# Patient Record
Sex: FEMALE | Race: WHITE | Employment: OTHER
[De-identification: names, ages, dates, MRNs, and addresses within clinical notes are randomized per-mention and may not be internally consistent; named-entity substitution may affect disease eponyms.]

---

## 2017-01-09 ENCOUNTER — OFFICE VISIT (OUTPATIENT)
Dept: INTERNAL MEDICINE | Facility: CLINIC | Age: 42
End: 2017-01-09

## 2017-01-09 VITALS
RESPIRATION RATE: 16 BRPM | WEIGHT: 223 LBS | HEART RATE: 68 BPM | DIASTOLIC BLOOD PRESSURE: 64 MMHG | SYSTOLIC BLOOD PRESSURE: 120 MMHG | HEIGHT: 64 IN | BODY MASS INDEX: 38.07 KG/M2

## 2017-01-09 DIAGNOSIS — K90.49 MALABSORPTION DUE TO INTOLERANCE, NOT ELSEWHERE CLASSIFIED: ICD-10-CM

## 2017-01-09 DIAGNOSIS — D50.8 OTHER IRON DEFICIENCY ANEMIA: Primary | ICD-10-CM

## 2017-01-09 DIAGNOSIS — F41.9 ANXIETY: ICD-10-CM

## 2017-01-09 DIAGNOSIS — Z12.31 ENCOUNTER FOR SCREENING MAMMOGRAM FOR BREAST CANCER: ICD-10-CM

## 2017-01-09 DIAGNOSIS — Z13.6 SCREENING FOR CARDIOVASCULAR CONDITION: ICD-10-CM

## 2017-01-09 DIAGNOSIS — F32.A DEPRESSION, UNSPECIFIED DEPRESSION TYPE: ICD-10-CM

## 2017-01-09 PROCEDURE — 99203 OFFICE O/P NEW LOW 30 MIN: CPT | Performed by: NURSE PRACTITIONER

## 2017-01-09 RX ORDER — LORAZEPAM 0.5 MG/1
0.5 TABLET ORAL EVERY 8 HOURS PRN
Qty: 15 TABLET | Refills: 0 | Status: SHIPPED | OUTPATIENT
Start: 2017-01-09 | End: 2017-02-08

## 2017-01-09 ASSESSMENT — ENCOUNTER SYMPTOMS
DIARRHEA: 0
COUGH: 0
SHORTNESS OF BREATH: 0
RHINORRHEA: 0
PHOTOPHOBIA: 0
CONSTIPATION: 0
SORE THROAT: 0

## 2017-01-09 ASSESSMENT — PATIENT HEALTH QUESTIONNAIRE - PHQ9
SUM OF ALL RESPONSES TO PHQ QUESTIONS 1-9: 1
2. FEELING DOWN, DEPRESSED OR HOPELESS: 1
SUM OF ALL RESPONSES TO PHQ9 QUESTIONS 1 & 2: 1
1. LITTLE INTEREST OR PLEASURE IN DOING THINGS: 0

## 2017-01-13 LAB
CHOLESTEROL, TOTAL: 147 MG/DL
CHOLESTEROL/HDL RATIO: 3.5
HDLC SERPL-MCNC: 42 MG/DL (ref 35–70)
LDL CHOLESTEROL CALCULATED: 89 MG/DL (ref 0–160)
TRIGL SERPL-MCNC: 83 MG/DL
VLDLC SERPL CALC-MCNC: 17 MG/DL

## 2017-01-17 ENCOUNTER — TELEPHONE (OUTPATIENT)
Dept: INTERNAL MEDICINE | Facility: CLINIC | Age: 42
End: 2017-01-17

## 2017-01-17 DIAGNOSIS — K90.49 MALABSORPTION DUE TO INTOLERANCE, NOT ELSEWHERE CLASSIFIED: ICD-10-CM

## 2017-01-17 DIAGNOSIS — D50.8 OTHER IRON DEFICIENCY ANEMIA: ICD-10-CM

## 2017-01-17 DIAGNOSIS — Z13.6 SCREENING FOR CARDIOVASCULAR CONDITION: ICD-10-CM

## 2017-06-21 ENCOUNTER — HOSPITAL ENCOUNTER (EMERGENCY)
Age: 42
Discharge: HOME | End: 2017-06-21
Attending: EMERGENCY MEDICINE

## 2017-06-21 ENCOUNTER — APPOINTMENT (OUTPATIENT)
Dept: GENERAL RADIOLOGY | Age: 42
End: 2017-06-21

## 2017-06-21 VITALS
HEIGHT: 64 IN | WEIGHT: 260 LBS | SYSTOLIC BLOOD PRESSURE: 117 MMHG | BODY MASS INDEX: 44.39 KG/M2 | DIASTOLIC BLOOD PRESSURE: 74 MMHG | OXYGEN SATURATION: 98 % | RESPIRATION RATE: 15 BRPM | HEART RATE: 67 BPM | TEMPERATURE: 98.4 F

## 2017-06-21 DIAGNOSIS — L03.116 CELLULITIS OF LEFT FOOT: Primary | ICD-10-CM

## 2017-06-21 LAB
ABSOLUTE EOS #: 0.08 K/UL (ref 0–0.4)
ABSOLUTE LYMPH #: 1.8 K/UL (ref 1–4.8)
ABSOLUTE MONO #: 0.74 K/UL (ref 0.1–1.2)
ANION GAP SERPL CALCULATED.3IONS-SCNC: 12 MMOL/L (ref 9–17)
BASOPHILS # BLD: 1 %
BASOPHILS ABSOLUTE: 0.08 K/UL (ref 0–0.2)
BUN BLDV-MCNC: 13 MG/DL (ref 6–20)
BUN/CREAT BLD: NORMAL (ref 9–20)
CALCIUM SERPL-MCNC: 9.1 MG/DL (ref 8.6–10.4)
CHLORIDE BLD-SCNC: 105 MMOL/L (ref 98–107)
CO2: 23 MMOL/L (ref 20–31)
CREAT SERPL-MCNC: 0.58 MG/DL (ref 0.5–0.9)
DIFFERENTIAL TYPE: ABNORMAL
EOSINOPHILS RELATIVE PERCENT: 1 %
GFR AFRICAN AMERICAN: >60 ML/MIN
GFR NON-AFRICAN AMERICAN: >60 ML/MIN
GFR SERPL CREATININE-BSD FRML MDRD: NORMAL ML/MIN/{1.73_M2}
GFR SERPL CREATININE-BSD FRML MDRD: NORMAL ML/MIN/{1.73_M2}
GLUCOSE BLD-MCNC: 77 MG/DL (ref 70–99)
HCT VFR BLD CALC: 38.2 % (ref 36–46)
HEMOGLOBIN: 12.2 G/DL (ref 12–16)
LYMPHOCYTES # BLD: 22 %
MCH RBC QN AUTO: 24.6 PG (ref 26–34)
MCHC RBC AUTO-ENTMCNC: 32 G/DL (ref 31–37)
MCV RBC AUTO: 77 FL (ref 80–100)
MONOCYTES # BLD: 9 %
MORPHOLOGY: ABNORMAL
PDW BLD-RTO: 28.4 % (ref 12.5–15.4)
PLATELET # BLD: 177 K/UL (ref 140–450)
PLATELET ESTIMATE: ABNORMAL
PMV BLD AUTO: 9.6 FL (ref 6–12)
POTASSIUM SERPL-SCNC: 4 MMOL/L (ref 3.7–5.3)
RBC # BLD: 4.96 M/UL (ref 4–5.2)
RBC # BLD: ABNORMAL 10*6/UL
SEG NEUTROPHILS: 67 %
SEGMENTED NEUTROPHILS ABSOLUTE COUNT: 5.5 K/UL (ref 1.8–7.7)
SODIUM BLD-SCNC: 140 MMOL/L (ref 135–144)
TROPONIN INTERP: NORMAL
TROPONIN T: <0.03 NG/ML
WBC # BLD: 8.2 K/UL (ref 3.5–11)
WBC # BLD: ABNORMAL 10*3/UL

## 2017-06-21 PROCEDURE — 2580000003 HC RX 258: Performed by: EMERGENCY MEDICINE

## 2017-06-21 PROCEDURE — 96365 THER/PROPH/DIAG IV INF INIT: CPT

## 2017-06-21 PROCEDURE — 93005 ELECTROCARDIOGRAM TRACING: CPT

## 2017-06-21 PROCEDURE — 85025 COMPLETE CBC W/AUTO DIFF WBC: CPT

## 2017-06-21 PROCEDURE — 99284 EMERGENCY DEPT VISIT MOD MDM: CPT

## 2017-06-21 PROCEDURE — 71020 XR CHEST STANDARD TWO VW: CPT

## 2017-06-21 PROCEDURE — 80048 BASIC METABOLIC PNL TOTAL CA: CPT

## 2017-06-21 PROCEDURE — 36415 COLL VENOUS BLD VENIPUNCTURE: CPT

## 2017-06-21 PROCEDURE — 73630 X-RAY EXAM OF FOOT: CPT

## 2017-06-21 PROCEDURE — 6360000002 HC RX W HCPCS: Performed by: EMERGENCY MEDICINE

## 2017-06-21 PROCEDURE — 87040 BLOOD CULTURE FOR BACTERIA: CPT

## 2017-06-21 PROCEDURE — 84484 ASSAY OF TROPONIN QUANT: CPT

## 2017-06-21 RX ORDER — DOXYCYCLINE 100 MG/1
100 TABLET ORAL 2 TIMES DAILY
Qty: 20 TABLET | Refills: 0 | Status: SHIPPED | OUTPATIENT
Start: 2017-06-21 | End: 2017-07-01

## 2017-06-21 RX ORDER — CEPHALEXIN 500 MG/1
500 CAPSULE ORAL 2 TIMES DAILY
Qty: 20 CAPSULE | Refills: 0 | Status: SHIPPED | OUTPATIENT
Start: 2017-06-21 | End: 2018-05-02

## 2017-06-21 RX ADMIN — CEFTRIAXONE SODIUM 1 G: 1 INJECTION, POWDER, FOR SOLUTION INTRAMUSCULAR; INTRAVENOUS at 17:31

## 2017-06-21 ASSESSMENT — PAIN DESCRIPTION - ORIENTATION: ORIENTATION: LEFT

## 2017-06-21 ASSESSMENT — PAIN SCALES - GENERAL: PAINLEVEL_OUTOF10: 8

## 2017-06-21 ASSESSMENT — PAIN DESCRIPTION - PAIN TYPE: TYPE: ACUTE PAIN

## 2017-06-21 ASSESSMENT — PAIN DESCRIPTION - LOCATION: LOCATION: FOOT

## 2017-06-22 LAB
EKG ATRIAL RATE: 62 BPM
EKG P AXIS: 43 DEGREES
EKG P-R INTERVAL: 160 MS
EKG Q-T INTERVAL: 384 MS
EKG QRS DURATION: 74 MS
EKG QTC CALCULATION (BAZETT): 389 MS
EKG R AXIS: -14 DEGREES
EKG T AXIS: 21 DEGREES
EKG VENTRICULAR RATE: 62 BPM

## 2017-06-27 ENCOUNTER — HOSPITAL ENCOUNTER (EMERGENCY)
Age: 42
Discharge: HOME | End: 2017-06-27
Attending: EMERGENCY MEDICINE
Payer: COMMERCIAL

## 2017-06-27 VITALS
WEIGHT: 260 LBS | RESPIRATION RATE: 17 BRPM | OXYGEN SATURATION: 100 % | HEIGHT: 64 IN | DIASTOLIC BLOOD PRESSURE: 87 MMHG | TEMPERATURE: 98.2 F | BODY MASS INDEX: 44.39 KG/M2 | HEART RATE: 89 BPM | SYSTOLIC BLOOD PRESSURE: 141 MMHG

## 2017-06-27 DIAGNOSIS — L23.7 POISON IVY DERMATITIS: Primary | ICD-10-CM

## 2017-06-27 LAB
CULTURE: NORMAL
CULTURE: NORMAL
Lab: NORMAL
Lab: NORMAL
SPECIMEN DESCRIPTION: NORMAL
SPECIMEN DESCRIPTION: NORMAL
STATUS: NORMAL

## 2017-06-27 PROCEDURE — 6360000002 HC RX W HCPCS: Performed by: EMERGENCY MEDICINE

## 2017-06-27 PROCEDURE — 99282 EMERGENCY DEPT VISIT SF MDM: CPT

## 2017-06-27 PROCEDURE — 96372 THER/PROPH/DIAG INJ SC/IM: CPT

## 2017-06-27 RX ORDER — DEXAMETHASONE SODIUM PHOSPHATE 10 MG/ML
8 INJECTION INTRAMUSCULAR; INTRAVENOUS ONCE
Status: COMPLETED | OUTPATIENT
Start: 2017-06-27 | End: 2017-06-27

## 2017-06-27 RX ORDER — PREDNISONE 10 MG/1
TABLET ORAL
Qty: 45 TABLET | Refills: 0 | Status: SHIPPED | OUTPATIENT
Start: 2017-06-27 | End: 2018-05-02

## 2017-06-27 RX ADMIN — DEXAMETHASONE SODIUM PHOSPHATE 8 MG: 10 INJECTION INTRAMUSCULAR; INTRAVENOUS at 17:26

## 2017-06-28 ENCOUNTER — TELEPHONE (OUTPATIENT)
Dept: PRIMARY CARE CLINIC | Age: 42
End: 2017-06-28

## 2017-06-28 ENCOUNTER — OFFICE VISIT (OUTPATIENT)
Dept: PRIMARY CARE CLINIC | Age: 42
End: 2017-06-28
Payer: COMMERCIAL

## 2017-06-28 VITALS
SYSTOLIC BLOOD PRESSURE: 126 MMHG | RESPIRATION RATE: 16 BRPM | DIASTOLIC BLOOD PRESSURE: 84 MMHG | BODY MASS INDEX: 43.54 KG/M2 | HEART RATE: 72 BPM | WEIGHT: 255 LBS | HEIGHT: 64 IN

## 2017-06-28 DIAGNOSIS — L23.9 ALLERGIC CONTACT DERMATITIS, UNSPECIFIED TRIGGER: Primary | ICD-10-CM

## 2017-06-28 PROCEDURE — 99214 OFFICE O/P EST MOD 30 MIN: CPT | Performed by: INTERNAL MEDICINE

## 2017-06-28 RX ORDER — POVIDONE-IODINE 10 MG/G
OINTMENT TOPICAL
Qty: 1 TUBE | Refills: 4 | Status: SHIPPED | OUTPATIENT
Start: 2017-06-28 | End: 2017-07-05

## 2017-06-28 RX ORDER — BACITRACIN ZINC AND POLYMYXIN B SULFATE 500; 1000 [USP'U]/G; [USP'U]/G
OINTMENT TOPICAL
Qty: 1 TUBE | Refills: 3 | Status: SHIPPED | OUTPATIENT
Start: 2017-06-28 | End: 2017-07-05

## 2017-06-28 ASSESSMENT — ENCOUNTER SYMPTOMS
COUGH: 0
SHORTNESS OF BREATH: 0
BACK PAIN: 0
EYE REDNESS: 0
NAUSEA: 0
EYE DISCHARGE: 0
TROUBLE SWALLOWING: 0
ABDOMINAL PAIN: 0
VOMITING: 0
SORE THROAT: 0

## 2017-08-09 RX ORDER — LORAZEPAM 0.5 MG/1
0.5 TABLET ORAL EVERY 8 HOURS PRN
Qty: 15 TABLET | Refills: 0 | OUTPATIENT
Start: 2017-08-09 | End: 2017-09-08

## 2018-05-02 ENCOUNTER — OFFICE VISIT (OUTPATIENT)
Dept: ONCOLOGY | Age: 43
End: 2018-05-02
Payer: COMMERCIAL

## 2018-05-02 ENCOUNTER — HOSPITAL ENCOUNTER (OUTPATIENT)
Age: 43
Discharge: HOME | End: 2018-05-02
Payer: COMMERCIAL

## 2018-05-02 VITALS
BODY MASS INDEX: 44.69 KG/M2 | DIASTOLIC BLOOD PRESSURE: 86 MMHG | HEART RATE: 99 BPM | SYSTOLIC BLOOD PRESSURE: 125 MMHG | HEIGHT: 64 IN | WEIGHT: 261.8 LBS | TEMPERATURE: 98.4 F | RESPIRATION RATE: 20 BRPM

## 2018-05-02 DIAGNOSIS — D50.8 IRON DEFICIENCY ANEMIA SECONDARY TO INADEQUATE DIETARY IRON INTAKE: ICD-10-CM

## 2018-05-02 DIAGNOSIS — D50.8 IRON DEFICIENCY ANEMIA SECONDARY TO INADEQUATE DIETARY IRON INTAKE: Primary | ICD-10-CM

## 2018-05-02 LAB
ABSOLUTE EOS #: 0.1 K/UL (ref 0–0.4)
ABSOLUTE IMMATURE GRANULOCYTE: ABNORMAL K/UL (ref 0–0.3)
ABSOLUTE LYMPH #: 1.5 K/UL (ref 1–4.8)
ABSOLUTE MONO #: 0.6 K/UL (ref 0.1–1.2)
ALBUMIN SERPL-MCNC: 3.9 G/DL (ref 3.5–5.2)
ALBUMIN/GLOBULIN RATIO: 1.1 (ref 1–2.5)
ALP BLD-CCNC: 67 U/L (ref 35–104)
ALT SERPL-CCNC: 17 U/L (ref 5–33)
ANION GAP SERPL CALCULATED.3IONS-SCNC: 10 MMOL/L (ref 9–17)
AST SERPL-CCNC: 16 U/L
BASOPHILS # BLD: 1 % (ref 0–2)
BASOPHILS ABSOLUTE: 0.1 K/UL (ref 0–0.2)
BILIRUB SERPL-MCNC: 0.23 MG/DL (ref 0.3–1.2)
BUN BLDV-MCNC: 13 MG/DL (ref 6–20)
BUN/CREAT BLD: ABNORMAL (ref 9–20)
CALCIUM SERPL-MCNC: 9 MG/DL (ref 8.6–10.4)
CHLORIDE BLD-SCNC: 105 MMOL/L (ref 98–107)
CO2: 25 MMOL/L (ref 20–31)
CREAT SERPL-MCNC: 0.55 MG/DL (ref 0.5–0.9)
DIFFERENTIAL TYPE: ABNORMAL
EOSINOPHILS RELATIVE PERCENT: 1 % (ref 1–4)
FERRITIN: 4 UG/L (ref 13–150)
FOLATE: 10.2 NG/ML
GFR AFRICAN AMERICAN: >60 ML/MIN
GFR NON-AFRICAN AMERICAN: >60 ML/MIN
GFR SERPL CREATININE-BSD FRML MDRD: ABNORMAL ML/MIN/{1.73_M2}
GFR SERPL CREATININE-BSD FRML MDRD: ABNORMAL ML/MIN/{1.73_M2}
GLUCOSE BLD-MCNC: 126 MG/DL (ref 70–99)
HCT VFR BLD CALC: 33.4 % (ref 36–46)
HEMOGLOBIN: 10.7 G/DL (ref 12–16)
IMMATURE GRANULOCYTES: ABNORMAL %
LYMPHOCYTES # BLD: 15 % (ref 24–44)
MCH RBC QN AUTO: 24.6 PG (ref 26–34)
MCHC RBC AUTO-ENTMCNC: 32 G/DL (ref 31–37)
MCV RBC AUTO: 76.8 FL (ref 80–100)
MONOCYTES # BLD: 6 % (ref 2–11)
NRBC AUTOMATED: ABNORMAL PER 100 WBC
PDW BLD-RTO: 14.4 % (ref 12.5–15.4)
PLATELET # BLD: 251 K/UL (ref 140–450)
PLATELET ESTIMATE: ABNORMAL
PMV BLD AUTO: 9.4 FL (ref 6–12)
POTASSIUM SERPL-SCNC: 4.4 MMOL/L (ref 3.7–5.3)
RBC # BLD: 4.36 M/UL (ref 4–5.2)
RBC # BLD: ABNORMAL 10*6/UL
SEG NEUTROPHILS: 77 % (ref 36–66)
SEGMENTED NEUTROPHILS ABSOLUTE COUNT: 7.5 K/UL (ref 1.8–7.7)
SODIUM BLD-SCNC: 140 MMOL/L (ref 135–144)
THYROXINE, FREE: 1.17 NG/DL (ref 0.93–1.7)
TOTAL PROTEIN: 7.6 G/DL (ref 6.4–8.3)
TSH SERPL DL<=0.05 MIU/L-ACNC: 1.57 MIU/L (ref 0.3–5)
VITAMIN B-12: 399 PG/ML (ref 232–1245)
VITAMIN D 25-HYDROXY: 10.9 NG/ML (ref 30–100)
WBC # BLD: 9.8 K/UL (ref 3.5–11)
WBC # BLD: ABNORMAL 10*3/UL

## 2018-05-02 PROCEDURE — 82728 ASSAY OF FERRITIN: CPT

## 2018-05-02 PROCEDURE — 82746 ASSAY OF FOLIC ACID SERUM: CPT

## 2018-05-02 PROCEDURE — 99211 OFF/OP EST MAY X REQ PHY/QHP: CPT

## 2018-05-02 PROCEDURE — 82306 VITAMIN D 25 HYDROXY: CPT

## 2018-05-02 PROCEDURE — 36415 COLL VENOUS BLD VENIPUNCTURE: CPT

## 2018-05-02 PROCEDURE — 84443 ASSAY THYROID STIM HORMONE: CPT

## 2018-05-02 PROCEDURE — 82607 VITAMIN B-12: CPT

## 2018-05-02 PROCEDURE — 80053 COMPREHEN METABOLIC PANEL: CPT

## 2018-05-02 PROCEDURE — 99214 OFFICE O/P EST MOD 30 MIN: CPT | Performed by: INTERNAL MEDICINE

## 2018-05-02 PROCEDURE — 84439 ASSAY OF FREE THYROXINE: CPT

## 2018-05-02 PROCEDURE — 82525 ASSAY OF COPPER: CPT

## 2018-05-02 PROCEDURE — 84630 ASSAY OF ZINC: CPT

## 2018-05-02 PROCEDURE — 85025 COMPLETE CBC W/AUTO DIFF WBC: CPT

## 2018-05-05 LAB
COPPER: 114 UG/DL (ref 80–155)
ZINC: 60 UG/DL (ref 60–120)

## 2018-05-16 ENCOUNTER — HOSPITAL ENCOUNTER (OUTPATIENT)
Dept: INFUSION THERAPY | Age: 43
Discharge: HOME | End: 2018-05-16
Payer: COMMERCIAL

## 2018-05-16 ENCOUNTER — OFFICE VISIT (OUTPATIENT)
Dept: ONCOLOGY | Age: 43
End: 2018-05-16
Payer: MEDICAID

## 2018-05-16 VITALS
HEART RATE: 83 BPM | TEMPERATURE: 98.4 F | BODY MASS INDEX: 44.85 KG/M2 | DIASTOLIC BLOOD PRESSURE: 81 MMHG | WEIGHT: 261.3 LBS | RESPIRATION RATE: 18 BRPM | SYSTOLIC BLOOD PRESSURE: 125 MMHG

## 2018-05-16 DIAGNOSIS — K90.9 INTESTINAL MALABSORPTION, UNSPECIFIED TYPE: Primary | ICD-10-CM

## 2018-05-16 DIAGNOSIS — K90.49 MALABSORPTION DUE TO INTOLERANCE, NOT ELSEWHERE CLASSIFIED: ICD-10-CM

## 2018-05-16 DIAGNOSIS — D50.8 OTHER IRON DEFICIENCY ANEMIA: ICD-10-CM

## 2018-05-16 PROCEDURE — 6360000002 HC RX W HCPCS: Performed by: INTERNAL MEDICINE

## 2018-05-16 PROCEDURE — 96372 THER/PROPH/DIAG INJ SC/IM: CPT

## 2018-05-16 PROCEDURE — 2580000003 HC RX 258: Performed by: INTERNAL MEDICINE

## 2018-05-16 PROCEDURE — 99214 OFFICE O/P EST MOD 30 MIN: CPT | Performed by: INTERNAL MEDICINE

## 2018-05-16 PROCEDURE — 96366 THER/PROPH/DIAG IV INF ADDON: CPT

## 2018-05-16 PROCEDURE — 96365 THER/PROPH/DIAG IV INF INIT: CPT

## 2018-05-16 RX ORDER — SODIUM CHLORIDE 9 MG/ML
INJECTION, SOLUTION INTRAVENOUS CONTINUOUS
Status: CANCELLED | OUTPATIENT
Start: 2018-05-16

## 2018-05-16 RX ORDER — HEPARIN SODIUM (PORCINE) LOCK FLUSH IV SOLN 100 UNIT/ML 100 UNIT/ML
500 SOLUTION INTRAVENOUS PRN
Status: CANCELLED | OUTPATIENT
Start: 2018-05-16

## 2018-05-16 RX ORDER — CALCITRIOL 0.25 UG/1
0.25 CAPSULE, LIQUID FILLED ORAL DAILY
Status: CANCELLED | OUTPATIENT
Start: 2018-05-16

## 2018-05-16 RX ORDER — CYANOCOBALAMIN 1000 UG/ML
1000 INJECTION INTRAMUSCULAR; SUBCUTANEOUS
Status: DISCONTINUED | OUTPATIENT
Start: 2018-05-16 | End: 2018-05-17 | Stop reason: HOSPADM

## 2018-05-16 RX ORDER — SODIUM CHLORIDE 0.9 % (FLUSH) 0.9 %
5 SYRINGE (ML) INJECTION PRN
Status: CANCELLED | OUTPATIENT
Start: 2018-05-16

## 2018-05-16 RX ORDER — SODIUM CHLORIDE 0.9 % (FLUSH) 0.9 %
10 SYRINGE (ML) INJECTION PRN
Status: CANCELLED | OUTPATIENT
Start: 2018-05-16

## 2018-05-16 RX ORDER — CYANOCOBALAMIN 1000 UG/ML
1000 INJECTION INTRAMUSCULAR; SUBCUTANEOUS
Status: CANCELLED
Start: 2018-05-16

## 2018-05-16 RX ORDER — DIPHENHYDRAMINE HYDROCHLORIDE 50 MG/ML
50 INJECTION INTRAMUSCULAR; INTRAVENOUS ONCE
Status: CANCELLED | OUTPATIENT
Start: 2018-05-16 | End: 2018-05-16

## 2018-05-16 RX ADMIN — IRON SUCROSE 300 MG: 20 INJECTION, SOLUTION INTRAVENOUS at 16:38

## 2018-05-16 RX ADMIN — CYANOCOBALAMIN 1000 MCG: 1000 INJECTION, SOLUTION INTRAMUSCULAR at 16:41

## 2018-05-16 NOTE — PROGRESS NOTES
Pt here for Venofer infusion. Pt was treated without incident and discharged in stable condition. Pt will return in 2 days for next Venofer.

## 2018-05-17 RX ORDER — 0.9 % SODIUM CHLORIDE 0.9 %
10 VIAL (ML) INJECTION ONCE
Status: CANCELLED | OUTPATIENT
Start: 2018-05-17 | End: 2018-05-17

## 2018-05-17 RX ORDER — SODIUM CHLORIDE 9 MG/ML
INJECTION, SOLUTION INTRAVENOUS ONCE
Status: CANCELLED | OUTPATIENT
Start: 2018-05-17 | End: 2018-05-17

## 2018-05-17 RX ORDER — CYANOCOBALAMIN 1000 UG/ML
1000 INJECTION INTRAMUSCULAR; SUBCUTANEOUS ONCE
Status: CANCELLED
Start: 2018-05-17

## 2018-05-17 RX ORDER — EPINEPHRINE 1 MG/ML
0.3 INJECTION, SOLUTION, CONCENTRATE INTRAVENOUS PRN
Status: CANCELLED | OUTPATIENT
Start: 2018-05-17

## 2018-05-17 RX ORDER — METHYLPREDNISOLONE SODIUM SUCCINATE 125 MG/2ML
125 INJECTION, POWDER, LYOPHILIZED, FOR SOLUTION INTRAMUSCULAR; INTRAVENOUS ONCE
Status: CANCELLED | OUTPATIENT
Start: 2018-05-17 | End: 2018-05-17

## 2018-05-18 ENCOUNTER — HOSPITAL ENCOUNTER (OUTPATIENT)
Dept: INFUSION THERAPY | Age: 43
Discharge: HOME | End: 2018-05-18
Payer: COMMERCIAL

## 2018-05-18 VITALS
RESPIRATION RATE: 18 BRPM | HEART RATE: 88 BPM | DIASTOLIC BLOOD PRESSURE: 78 MMHG | SYSTOLIC BLOOD PRESSURE: 122 MMHG | TEMPERATURE: 98.2 F

## 2018-05-18 DIAGNOSIS — K90.49 MALABSORPTION DUE TO INTOLERANCE, NOT ELSEWHERE CLASSIFIED: ICD-10-CM

## 2018-05-18 DIAGNOSIS — D50.8 OTHER IRON DEFICIENCY ANEMIA: ICD-10-CM

## 2018-05-18 PROCEDURE — 96366 THER/PROPH/DIAG IV INF ADDON: CPT

## 2018-05-18 PROCEDURE — 6360000002 HC RX W HCPCS: Performed by: INTERNAL MEDICINE

## 2018-05-18 PROCEDURE — 2580000003 HC RX 258: Performed by: INTERNAL MEDICINE

## 2018-05-18 PROCEDURE — 96365 THER/PROPH/DIAG IV INF INIT: CPT

## 2018-05-18 RX ORDER — CYANOCOBALAMIN 1000 UG/ML
1000 INJECTION INTRAMUSCULAR; SUBCUTANEOUS ONCE
Status: CANCELLED
Start: 2018-05-18

## 2018-05-18 RX ORDER — SODIUM CHLORIDE 0.9 % (FLUSH) 0.9 %
5 SYRINGE (ML) INJECTION PRN
Status: CANCELLED | OUTPATIENT
Start: 2018-05-18

## 2018-05-18 RX ORDER — METHYLPREDNISOLONE SODIUM SUCCINATE 125 MG/2ML
125 INJECTION, POWDER, LYOPHILIZED, FOR SOLUTION INTRAMUSCULAR; INTRAVENOUS ONCE
Status: CANCELLED | OUTPATIENT
Start: 2018-05-18 | End: 2018-05-18

## 2018-05-18 RX ORDER — SODIUM CHLORIDE 9 MG/ML
INJECTION, SOLUTION INTRAVENOUS CONTINUOUS
Status: CANCELLED | OUTPATIENT
Start: 2018-05-18

## 2018-05-18 RX ORDER — SODIUM CHLORIDE 9 MG/ML
INJECTION, SOLUTION INTRAVENOUS ONCE
Status: COMPLETED | OUTPATIENT
Start: 2018-05-18 | End: 2018-05-18

## 2018-05-18 RX ORDER — SODIUM CHLORIDE 0.9 % (FLUSH) 0.9 %
10 SYRINGE (ML) INJECTION PRN
Status: CANCELLED | OUTPATIENT
Start: 2018-05-18

## 2018-05-18 RX ORDER — SODIUM CHLORIDE 9 MG/ML
INJECTION, SOLUTION INTRAVENOUS ONCE
Status: CANCELLED | OUTPATIENT
Start: 2018-05-18 | End: 2018-05-18

## 2018-05-18 RX ORDER — 0.9 % SODIUM CHLORIDE 0.9 %
10 VIAL (ML) INJECTION ONCE
Status: CANCELLED | OUTPATIENT
Start: 2018-05-18 | End: 2018-05-18

## 2018-05-18 RX ORDER — CYANOCOBALAMIN 1000 UG/ML
1000 INJECTION INTRAMUSCULAR; SUBCUTANEOUS ONCE
Status: DISCONTINUED
Start: 2018-05-18 | End: 2018-05-18

## 2018-05-18 RX ORDER — HEPARIN SODIUM (PORCINE) LOCK FLUSH IV SOLN 100 UNIT/ML 100 UNIT/ML
500 SOLUTION INTRAVENOUS PRN
Status: CANCELLED | OUTPATIENT
Start: 2018-05-18

## 2018-05-18 RX ORDER — DIPHENHYDRAMINE HYDROCHLORIDE 50 MG/ML
50 INJECTION INTRAMUSCULAR; INTRAVENOUS ONCE
Status: CANCELLED | OUTPATIENT
Start: 2018-05-18 | End: 2018-05-18

## 2018-05-18 RX ORDER — EPINEPHRINE 1 MG/ML
0.3 INJECTION, SOLUTION, CONCENTRATE INTRAVENOUS PRN
Status: CANCELLED | OUTPATIENT
Start: 2018-05-18

## 2018-05-18 RX ADMIN — SODIUM CHLORIDE: 9 INJECTION, SOLUTION INTRAVENOUS at 14:27

## 2018-05-18 RX ADMIN — IRON SUCROSE 300 MG: 20 INJECTION, SOLUTION INTRAVENOUS at 14:36

## 2018-05-18 ASSESSMENT — PAIN DESCRIPTION - LOCATION: LOCATION: LEG

## 2018-05-18 ASSESSMENT — PAIN DESCRIPTION - PAIN TYPE: TYPE: CHRONIC PAIN

## 2018-05-18 ASSESSMENT — PAIN DESCRIPTION - ORIENTATION: ORIENTATION: LEFT

## 2018-05-18 ASSESSMENT — PAIN SCALES - GENERAL: PAINLEVEL_OUTOF10: 3

## 2018-05-18 NOTE — PLAN OF CARE
Problem: Pain:  Goal: Pain level will decrease  Pain level will decrease   Outcome: Ongoing    Goal: Control of chronic pain  Control of chronic pain   Outcome: Ongoing

## 2018-05-18 NOTE — PROGRESS NOTES
Pt here for iron infusion. Tolerated very well. Denies any problems. Will return 5/21 for #3 of 5 treatments.

## 2018-05-21 ENCOUNTER — HOSPITAL ENCOUNTER (OUTPATIENT)
Dept: INFUSION THERAPY | Age: 43
Discharge: HOME | End: 2018-05-21
Payer: COMMERCIAL

## 2018-05-21 VITALS
HEART RATE: 65 BPM | RESPIRATION RATE: 16 BRPM | TEMPERATURE: 97.6 F | DIASTOLIC BLOOD PRESSURE: 79 MMHG | SYSTOLIC BLOOD PRESSURE: 124 MMHG

## 2018-05-21 DIAGNOSIS — K90.49 MALABSORPTION DUE TO INTOLERANCE, NOT ELSEWHERE CLASSIFIED: ICD-10-CM

## 2018-05-21 DIAGNOSIS — D50.8 OTHER IRON DEFICIENCY ANEMIA: ICD-10-CM

## 2018-05-21 PROCEDURE — 2580000003 HC RX 258: Performed by: INTERNAL MEDICINE

## 2018-05-21 PROCEDURE — 96365 THER/PROPH/DIAG IV INF INIT: CPT

## 2018-05-21 PROCEDURE — 96366 THER/PROPH/DIAG IV INF ADDON: CPT

## 2018-05-21 PROCEDURE — 6360000002 HC RX W HCPCS: Performed by: INTERNAL MEDICINE

## 2018-05-21 RX ORDER — DIPHENHYDRAMINE HYDROCHLORIDE 50 MG/ML
50 INJECTION INTRAMUSCULAR; INTRAVENOUS ONCE
Status: CANCELLED | OUTPATIENT
Start: 2018-05-21 | End: 2018-05-21

## 2018-05-21 RX ORDER — 0.9 % SODIUM CHLORIDE 0.9 %
10 VIAL (ML) INJECTION ONCE
Status: CANCELLED | OUTPATIENT
Start: 2018-05-21 | End: 2018-05-21

## 2018-05-21 RX ORDER — CYANOCOBALAMIN 1000 UG/ML
1000 INJECTION INTRAMUSCULAR; SUBCUTANEOUS ONCE
Status: CANCELLED
Start: 2018-05-21

## 2018-05-21 RX ORDER — SODIUM CHLORIDE 0.9 % (FLUSH) 0.9 %
5 SYRINGE (ML) INJECTION PRN
Status: CANCELLED | OUTPATIENT
Start: 2018-05-21

## 2018-05-21 RX ORDER — SODIUM CHLORIDE 9 MG/ML
INJECTION, SOLUTION INTRAVENOUS ONCE
Status: COMPLETED | OUTPATIENT
Start: 2018-05-21 | End: 2018-05-21

## 2018-05-21 RX ORDER — SODIUM CHLORIDE 9 MG/ML
INJECTION, SOLUTION INTRAVENOUS ONCE
Status: CANCELLED | OUTPATIENT
Start: 2018-05-21 | End: 2018-05-21

## 2018-05-21 RX ORDER — SODIUM CHLORIDE 9 MG/ML
INJECTION, SOLUTION INTRAVENOUS CONTINUOUS
Status: CANCELLED | OUTPATIENT
Start: 2018-05-21

## 2018-05-21 RX ORDER — SODIUM CHLORIDE 0.9 % (FLUSH) 0.9 %
10 SYRINGE (ML) INJECTION PRN
Status: CANCELLED | OUTPATIENT
Start: 2018-05-21

## 2018-05-21 RX ORDER — HEPARIN SODIUM (PORCINE) LOCK FLUSH IV SOLN 100 UNIT/ML 100 UNIT/ML
500 SOLUTION INTRAVENOUS PRN
Status: CANCELLED | OUTPATIENT
Start: 2018-05-21

## 2018-05-21 RX ORDER — EPINEPHRINE 1 MG/ML
0.3 INJECTION, SOLUTION, CONCENTRATE INTRAVENOUS PRN
Status: CANCELLED | OUTPATIENT
Start: 2018-05-21

## 2018-05-21 RX ORDER — METHYLPREDNISOLONE SODIUM SUCCINATE 125 MG/2ML
125 INJECTION, POWDER, LYOPHILIZED, FOR SOLUTION INTRAMUSCULAR; INTRAVENOUS ONCE
Status: CANCELLED | OUTPATIENT
Start: 2018-05-21 | End: 2018-05-21

## 2018-05-21 RX ADMIN — SODIUM CHLORIDE: 9 INJECTION, SOLUTION INTRAVENOUS at 11:47

## 2018-05-21 RX ADMIN — IRON SUCROSE 300 MG: 20 INJECTION, SOLUTION INTRAVENOUS at 11:49

## 2018-05-21 ASSESSMENT — PAIN DESCRIPTION - LOCATION: LOCATION: LEG

## 2018-05-21 ASSESSMENT — PAIN DESCRIPTION - PAIN TYPE: TYPE: CHRONIC PAIN

## 2018-05-21 ASSESSMENT — PAIN SCALES - GENERAL: PAINLEVEL_OUTOF10: 4

## 2018-05-21 ASSESSMENT — PAIN DESCRIPTION - DESCRIPTORS: DESCRIPTORS: TIGHTNESS;OTHER (COMMENT)

## 2018-05-21 ASSESSMENT — PAIN DESCRIPTION - ORIENTATION: ORIENTATION: LEFT

## 2018-05-21 NOTE — FLOWSHEET NOTE
05/21/18 1237   Encounter Summary   Services provided to: Patient   Referral/Consult From: [facility];Family members   Continue Visiting (05/21/18)   Complexity of Encounter Low   Length of Encounter 15 minutes   Routine   Type Initial   Assessment Approachable   Intervention Active listening;Explored feelings, thoughts, concerns;Explored coping resources;Nurtured hope;Discussed meaning/purpose   Outcome Engaged in conversation;Expressed feelings/needs/concerns;Coping;Receptive   Spiritual/Moravian   Type Spiritual support

## 2018-05-21 NOTE — PROGRESS NOTES
Pt here for Venofer infusion. Pt was treated without incident and discharged in stable condition. Pt will return in 2 days for Venofer.

## 2018-05-23 ENCOUNTER — HOSPITAL ENCOUNTER (OUTPATIENT)
Dept: INFUSION THERAPY | Age: 43
Discharge: HOME | End: 2018-05-23
Payer: COMMERCIAL

## 2018-05-23 VITALS
TEMPERATURE: 97.9 F | HEART RATE: 76 BPM | SYSTOLIC BLOOD PRESSURE: 122 MMHG | DIASTOLIC BLOOD PRESSURE: 84 MMHG | RESPIRATION RATE: 16 BRPM

## 2018-05-23 DIAGNOSIS — D50.8 OTHER IRON DEFICIENCY ANEMIA: ICD-10-CM

## 2018-05-23 DIAGNOSIS — K90.49 MALABSORPTION DUE TO INTOLERANCE, NOT ELSEWHERE CLASSIFIED: ICD-10-CM

## 2018-05-23 PROCEDURE — 96365 THER/PROPH/DIAG IV INF INIT: CPT | Performed by: NURSE PRACTITIONER

## 2018-05-23 PROCEDURE — 2580000003 HC RX 258: Performed by: INTERNAL MEDICINE

## 2018-05-23 PROCEDURE — 6360000002 HC RX W HCPCS: Performed by: INTERNAL MEDICINE

## 2018-05-23 PROCEDURE — 96366 THER/PROPH/DIAG IV INF ADDON: CPT | Performed by: NURSE PRACTITIONER

## 2018-05-23 RX ORDER — DIPHENHYDRAMINE HYDROCHLORIDE 50 MG/ML
50 INJECTION INTRAMUSCULAR; INTRAVENOUS ONCE
Status: CANCELLED | OUTPATIENT
Start: 2018-05-23 | End: 2018-05-23

## 2018-05-23 RX ORDER — EPINEPHRINE 1 MG/ML
0.3 INJECTION, SOLUTION, CONCENTRATE INTRAVENOUS PRN
Status: CANCELLED | OUTPATIENT
Start: 2018-05-23

## 2018-05-23 RX ORDER — SODIUM CHLORIDE 9 MG/ML
INJECTION, SOLUTION INTRAVENOUS ONCE
Status: CANCELLED | OUTPATIENT
Start: 2018-05-23 | End: 2018-05-23

## 2018-05-23 RX ORDER — HEPARIN SODIUM (PORCINE) LOCK FLUSH IV SOLN 100 UNIT/ML 100 UNIT/ML
500 SOLUTION INTRAVENOUS PRN
Status: CANCELLED | OUTPATIENT
Start: 2018-05-23

## 2018-05-23 RX ORDER — SODIUM CHLORIDE 9 MG/ML
INJECTION, SOLUTION INTRAVENOUS CONTINUOUS
Status: CANCELLED | OUTPATIENT
Start: 2018-05-23

## 2018-05-23 RX ORDER — SODIUM CHLORIDE 0.9 % (FLUSH) 0.9 %
10 SYRINGE (ML) INJECTION PRN
Status: CANCELLED | OUTPATIENT
Start: 2018-05-23

## 2018-05-23 RX ORDER — METHYLPREDNISOLONE SODIUM SUCCINATE 125 MG/2ML
125 INJECTION, POWDER, LYOPHILIZED, FOR SOLUTION INTRAMUSCULAR; INTRAVENOUS ONCE
Status: CANCELLED | OUTPATIENT
Start: 2018-05-23 | End: 2018-05-23

## 2018-05-23 RX ORDER — 0.9 % SODIUM CHLORIDE 0.9 %
10 VIAL (ML) INJECTION ONCE
Status: CANCELLED | OUTPATIENT
Start: 2018-05-23 | End: 2018-05-23

## 2018-05-23 RX ORDER — SODIUM CHLORIDE 0.9 % (FLUSH) 0.9 %
5 SYRINGE (ML) INJECTION PRN
Status: CANCELLED | OUTPATIENT
Start: 2018-05-23

## 2018-05-23 RX ORDER — CYANOCOBALAMIN 1000 UG/ML
1000 INJECTION INTRAMUSCULAR; SUBCUTANEOUS ONCE
Status: CANCELLED
Start: 2018-05-23

## 2018-05-23 RX ORDER — SODIUM CHLORIDE 9 MG/ML
INJECTION, SOLUTION INTRAVENOUS ONCE
Status: COMPLETED | OUTPATIENT
Start: 2018-05-23 | End: 2018-05-23

## 2018-05-23 RX ADMIN — IRON SUCROSE 300 MG: 20 INJECTION, SOLUTION INTRAVENOUS at 16:00

## 2018-05-23 RX ADMIN — SODIUM CHLORIDE: 9 INJECTION, SOLUTION INTRAVENOUS at 16:00

## 2018-05-25 ENCOUNTER — HOSPITAL ENCOUNTER (OUTPATIENT)
Dept: INFUSION THERAPY | Age: 43
Discharge: HOME | End: 2018-05-25
Payer: COMMERCIAL

## 2018-05-25 VITALS
TEMPERATURE: 98.6 F | SYSTOLIC BLOOD PRESSURE: 109 MMHG | RESPIRATION RATE: 16 BRPM | DIASTOLIC BLOOD PRESSURE: 70 MMHG | HEART RATE: 89 BPM

## 2018-05-25 DIAGNOSIS — K90.49 MALABSORPTION DUE TO INTOLERANCE, NOT ELSEWHERE CLASSIFIED: ICD-10-CM

## 2018-05-25 DIAGNOSIS — D50.8 OTHER IRON DEFICIENCY ANEMIA: ICD-10-CM

## 2018-05-25 PROCEDURE — 96365 THER/PROPH/DIAG IV INF INIT: CPT

## 2018-05-25 PROCEDURE — 96366 THER/PROPH/DIAG IV INF ADDON: CPT

## 2018-05-25 PROCEDURE — 2580000003 HC RX 258: Performed by: INTERNAL MEDICINE

## 2018-05-25 PROCEDURE — 6360000002 HC RX W HCPCS: Performed by: INTERNAL MEDICINE

## 2018-05-25 RX ORDER — SODIUM CHLORIDE 0.9 % (FLUSH) 0.9 %
10 SYRINGE (ML) INJECTION PRN
Status: CANCELLED | OUTPATIENT
Start: 2018-05-25

## 2018-05-25 RX ORDER — SODIUM CHLORIDE 0.9 % (FLUSH) 0.9 %
5 SYRINGE (ML) INJECTION PRN
Status: CANCELLED | OUTPATIENT
Start: 2018-05-25

## 2018-05-25 RX ORDER — CYANOCOBALAMIN 1000 UG/ML
1000 INJECTION INTRAMUSCULAR; SUBCUTANEOUS ONCE
Status: CANCELLED
Start: 2018-05-25

## 2018-05-25 RX ORDER — DIPHENHYDRAMINE HYDROCHLORIDE 50 MG/ML
50 INJECTION INTRAMUSCULAR; INTRAVENOUS ONCE
Status: CANCELLED | OUTPATIENT
Start: 2018-05-25 | End: 2018-05-25

## 2018-05-25 RX ORDER — EPINEPHRINE 1 MG/ML
0.3 INJECTION, SOLUTION, CONCENTRATE INTRAVENOUS PRN
Status: CANCELLED | OUTPATIENT
Start: 2018-05-25

## 2018-05-25 RX ORDER — METHYLPREDNISOLONE SODIUM SUCCINATE 125 MG/2ML
125 INJECTION, POWDER, LYOPHILIZED, FOR SOLUTION INTRAMUSCULAR; INTRAVENOUS ONCE
Status: CANCELLED | OUTPATIENT
Start: 2018-05-25 | End: 2018-05-25

## 2018-05-25 RX ORDER — SODIUM CHLORIDE 9 MG/ML
INJECTION, SOLUTION INTRAVENOUS ONCE
Status: COMPLETED | OUTPATIENT
Start: 2018-05-25 | End: 2018-05-25

## 2018-05-25 RX ORDER — 0.9 % SODIUM CHLORIDE 0.9 %
10 VIAL (ML) INJECTION ONCE
Status: CANCELLED | OUTPATIENT
Start: 2018-05-25 | End: 2018-05-25

## 2018-05-25 RX ORDER — SODIUM CHLORIDE 9 MG/ML
INJECTION, SOLUTION INTRAVENOUS CONTINUOUS
Status: CANCELLED | OUTPATIENT
Start: 2018-05-25

## 2018-05-25 RX ORDER — HEPARIN SODIUM (PORCINE) LOCK FLUSH IV SOLN 100 UNIT/ML 100 UNIT/ML
500 SOLUTION INTRAVENOUS PRN
Status: CANCELLED | OUTPATIENT
Start: 2018-05-25

## 2018-05-25 RX ORDER — SODIUM CHLORIDE 9 MG/ML
INJECTION, SOLUTION INTRAVENOUS ONCE
Status: CANCELLED | OUTPATIENT
Start: 2018-05-25 | End: 2018-05-25

## 2018-05-25 RX ADMIN — SODIUM CHLORIDE: 9 INJECTION, SOLUTION INTRAVENOUS at 14:38

## 2018-05-25 RX ADMIN — IRON SUCROSE 300 MG: 20 INJECTION, SOLUTION INTRAVENOUS at 14:40

## 2018-05-25 NOTE — PROGRESS NOTES
Pt here for 5 of 5 Venofer infusion. Pt was treated without incident and discharged in stable condition. Pt will return in 3 weeks for Vit B12.

## 2018-06-12 ENCOUNTER — HOSPITAL ENCOUNTER (OUTPATIENT)
Dept: INFUSION THERAPY | Age: 43
Discharge: HOME | End: 2018-06-12
Payer: COMMERCIAL

## 2018-06-12 VITALS
DIASTOLIC BLOOD PRESSURE: 80 MMHG | SYSTOLIC BLOOD PRESSURE: 123 MMHG | TEMPERATURE: 98.3 F | HEART RATE: 64 BPM | RESPIRATION RATE: 16 BRPM

## 2018-06-12 DIAGNOSIS — D50.8 OTHER IRON DEFICIENCY ANEMIA: ICD-10-CM

## 2018-06-12 DIAGNOSIS — K90.49 MALABSORPTION DUE TO INTOLERANCE, NOT ELSEWHERE CLASSIFIED: ICD-10-CM

## 2018-06-12 PROCEDURE — 96372 THER/PROPH/DIAG INJ SC/IM: CPT

## 2018-06-12 PROCEDURE — 6360000002 HC RX W HCPCS: Performed by: INTERNAL MEDICINE

## 2018-06-12 RX ORDER — SODIUM CHLORIDE 9 MG/ML
INJECTION, SOLUTION INTRAVENOUS ONCE
Status: CANCELLED | OUTPATIENT
Start: 2018-06-15 | End: 2018-06-15

## 2018-06-12 RX ORDER — 0.9 % SODIUM CHLORIDE 0.9 %
10 VIAL (ML) INJECTION ONCE
OUTPATIENT
Start: 2018-06-15 | End: 2018-06-15

## 2018-06-12 RX ORDER — SODIUM CHLORIDE 0.9 % (FLUSH) 0.9 %
10 SYRINGE (ML) INJECTION PRN
OUTPATIENT
Start: 2018-06-15

## 2018-06-12 RX ORDER — METHYLPREDNISOLONE SODIUM SUCCINATE 125 MG/2ML
125 INJECTION, POWDER, LYOPHILIZED, FOR SOLUTION INTRAMUSCULAR; INTRAVENOUS ONCE
OUTPATIENT
Start: 2018-06-15 | End: 2018-06-15

## 2018-06-12 RX ORDER — SODIUM CHLORIDE 9 MG/ML
INJECTION, SOLUTION INTRAVENOUS CONTINUOUS
OUTPATIENT
Start: 2018-06-15

## 2018-06-12 RX ORDER — SODIUM CHLORIDE 9 MG/ML
INJECTION, SOLUTION INTRAVENOUS ONCE
Status: DISCONTINUED | OUTPATIENT
Start: 2018-06-12 | End: 2018-06-13 | Stop reason: HOSPADM

## 2018-06-12 RX ORDER — CYANOCOBALAMIN 1000 UG/ML
1000 INJECTION INTRAMUSCULAR; SUBCUTANEOUS ONCE
Status: CANCELLED
Start: 2018-06-15

## 2018-06-12 RX ORDER — HEPARIN SODIUM (PORCINE) LOCK FLUSH IV SOLN 100 UNIT/ML 100 UNIT/ML
500 SOLUTION INTRAVENOUS PRN
OUTPATIENT
Start: 2018-06-15

## 2018-06-12 RX ORDER — EPINEPHRINE 1 MG/ML
0.3 INJECTION, SOLUTION, CONCENTRATE INTRAVENOUS PRN
OUTPATIENT
Start: 2018-06-15

## 2018-06-12 RX ORDER — DIPHENHYDRAMINE HYDROCHLORIDE 50 MG/ML
50 INJECTION INTRAMUSCULAR; INTRAVENOUS ONCE
OUTPATIENT
Start: 2018-06-15 | End: 2018-06-15

## 2018-06-12 RX ORDER — CYANOCOBALAMIN 1000 UG/ML
1000 INJECTION INTRAMUSCULAR; SUBCUTANEOUS ONCE
Status: COMPLETED
Start: 2018-06-12 | End: 2018-06-12

## 2018-06-12 RX ORDER — SODIUM CHLORIDE 0.9 % (FLUSH) 0.9 %
5 SYRINGE (ML) INJECTION PRN
OUTPATIENT
Start: 2018-06-15

## 2018-06-12 RX ADMIN — CYANOCOBALAMIN 1000 MCG: 1000 INJECTION, SOLUTION INTRAMUSCULAR at 12:36

## 2018-06-12 ASSESSMENT — PAIN DESCRIPTION - PAIN TYPE: TYPE: CHRONIC PAIN

## 2018-06-12 ASSESSMENT — PAIN DESCRIPTION - ORIENTATION: ORIENTATION: LEFT

## 2018-06-12 ASSESSMENT — PAIN DESCRIPTION - LOCATION: LOCATION: LEG

## 2018-06-12 ASSESSMENT — PAIN DESCRIPTION - DESCRIPTORS: DESCRIPTORS: ACHING;CONSTANT

## 2018-06-12 ASSESSMENT — PAIN SCALES - GENERAL: PAINLEVEL_OUTOF10: 5

## 2018-06-12 NOTE — PROGRESS NOTES
Pt here for B12 injection. Pt came in day early as she will not have transportation tomorrow. Pt was treated without incident and discharged in stable condition. Pt will return in 1 month for B12.

## 2018-07-11 ENCOUNTER — HOSPITAL ENCOUNTER (OUTPATIENT)
Dept: INFUSION THERAPY | Age: 43
Discharge: HOME | End: 2018-07-11
Payer: COMMERCIAL

## 2018-07-11 DIAGNOSIS — D50.8 OTHER IRON DEFICIENCY ANEMIA: ICD-10-CM

## 2018-07-11 DIAGNOSIS — K90.49 MALABSORPTION DUE TO INTOLERANCE, NOT ELSEWHERE CLASSIFIED: ICD-10-CM

## 2018-07-11 PROCEDURE — 96372 THER/PROPH/DIAG INJ SC/IM: CPT

## 2018-07-11 PROCEDURE — 6360000002 HC RX W HCPCS: Performed by: INTERNAL MEDICINE

## 2018-07-11 RX ORDER — CYANOCOBALAMIN 1000 UG/ML
1000 INJECTION INTRAMUSCULAR; SUBCUTANEOUS ONCE
Status: CANCELLED
Start: 2018-07-13

## 2018-07-11 RX ORDER — CYANOCOBALAMIN 1000 UG/ML
1000 INJECTION INTRAMUSCULAR; SUBCUTANEOUS ONCE
Status: COMPLETED
Start: 2018-07-11 | End: 2018-07-11

## 2018-07-11 RX ADMIN — CYANOCOBALAMIN 1000 MCG: 1000 INJECTION, SOLUTION INTRAMUSCULAR at 11:28

## 2018-07-11 NOTE — PROGRESS NOTES
Pt here for Vit B12 injection. Pt was treated without incident and discharged in stable condition. Pt will return in 1 month for next B12.

## 2018-10-03 ENCOUNTER — HOSPITAL ENCOUNTER (OUTPATIENT)
Dept: INFUSION THERAPY | Age: 43
Discharge: HOME | End: 2018-10-03
Payer: COMMERCIAL

## 2018-10-03 DIAGNOSIS — K90.49 MALABSORPTION DUE TO INTOLERANCE, NOT ELSEWHERE CLASSIFIED: ICD-10-CM

## 2018-10-03 DIAGNOSIS — D50.8 OTHER IRON DEFICIENCY ANEMIA: ICD-10-CM

## 2018-10-03 PROCEDURE — 6360000002 HC RX W HCPCS: Performed by: INTERNAL MEDICINE

## 2018-10-03 PROCEDURE — 96372 THER/PROPH/DIAG INJ SC/IM: CPT

## 2018-10-03 RX ORDER — CYANOCOBALAMIN 1000 UG/ML
1000 INJECTION INTRAMUSCULAR; SUBCUTANEOUS ONCE
Status: CANCELLED
Start: 2018-10-03

## 2018-10-03 RX ORDER — CYANOCOBALAMIN 1000 UG/ML
1000 INJECTION INTRAMUSCULAR; SUBCUTANEOUS ONCE
Status: COMPLETED
Start: 2018-10-03 | End: 2018-10-03

## 2018-10-03 RX ADMIN — CYANOCOBALAMIN 1000 MCG: 1000 INJECTION, SOLUTION INTRAMUSCULAR at 16:16

## 2018-10-19 ENCOUNTER — TELEPHONE (OUTPATIENT)
Dept: ONCOLOGY | Age: 43
End: 2018-10-19

## 2018-10-22 ENCOUNTER — HOSPITAL ENCOUNTER (OUTPATIENT)
Age: 43
Discharge: HOME | End: 2018-10-22
Payer: COMMERCIAL

## 2018-10-22 DIAGNOSIS — K90.9 INTESTINAL MALABSORPTION, UNSPECIFIED TYPE: ICD-10-CM

## 2018-10-22 LAB
ABSOLUTE EOS #: 0.1 K/UL (ref 0–0.4)
ABSOLUTE IMMATURE GRANULOCYTE: ABNORMAL K/UL (ref 0–0.3)
ABSOLUTE LYMPH #: 1.58 K/UL (ref 1–4.8)
ABSOLUTE MONO #: 0.39 K/UL (ref 0.1–1.2)
BASOPHILS # BLD: 1 % (ref 0–2)
BASOPHILS ABSOLUTE: 0.04 K/UL (ref 0–0.2)
DIFFERENTIAL TYPE: ABNORMAL
EOSINOPHILS RELATIVE PERCENT: 2 % (ref 1–4)
FERRITIN: 4 UG/L (ref 13–150)
HCT VFR BLD CALC: 32.3 % (ref 36–46)
HEMOGLOBIN: 9.7 G/DL (ref 12–16)
IMMATURE GRANULOCYTES: ABNORMAL %
LYMPHOCYTES # BLD: 31 % (ref 24–44)
MCH RBC QN AUTO: 23.4 PG (ref 26–34)
MCHC RBC AUTO-ENTMCNC: 30 G/DL (ref 31–37)
MCV RBC AUTO: 78 FL (ref 80–100)
MONOCYTES # BLD: 8 % (ref 2–11)
NRBC AUTOMATED: ABNORMAL PER 100 WBC
PDW BLD-RTO: 15.8 % (ref 12.5–15.4)
PLATELET # BLD: 229 K/UL (ref 140–450)
PLATELET ESTIMATE: ABNORMAL
PMV BLD AUTO: 11.5 FL (ref 8–14)
RBC # BLD: 4.14 M/UL (ref 4–5.2)
RBC # BLD: ABNORMAL 10*6/UL
SEG NEUTROPHILS: 58 % (ref 36–66)
SEGMENTED NEUTROPHILS ABSOLUTE COUNT: 2.96 K/UL (ref 1.8–7.7)
VITAMIN D 25-HYDROXY: 10.6 NG/ML (ref 30–100)
WBC # BLD: 5.1 K/UL (ref 3.5–11)
WBC # BLD: ABNORMAL 10*3/UL

## 2018-10-22 PROCEDURE — 84630 ASSAY OF ZINC: CPT

## 2018-10-22 PROCEDURE — 82306 VITAMIN D 25 HYDROXY: CPT

## 2018-10-22 PROCEDURE — 82728 ASSAY OF FERRITIN: CPT

## 2018-10-22 PROCEDURE — 36415 COLL VENOUS BLD VENIPUNCTURE: CPT

## 2018-10-22 PROCEDURE — 85025 COMPLETE CBC W/AUTO DIFF WBC: CPT

## 2018-10-22 PROCEDURE — 82525 ASSAY OF COPPER: CPT

## 2018-10-24 ENCOUNTER — OFFICE VISIT (OUTPATIENT)
Dept: ONCOLOGY | Age: 43
End: 2018-10-24
Payer: COMMERCIAL

## 2018-10-24 VITALS
BODY MASS INDEX: 46.09 KG/M2 | WEIGHT: 268.5 LBS | TEMPERATURE: 98.4 F | DIASTOLIC BLOOD PRESSURE: 82 MMHG | SYSTOLIC BLOOD PRESSURE: 124 MMHG | HEART RATE: 83 BPM

## 2018-10-24 DIAGNOSIS — D50.8 OTHER IRON DEFICIENCY ANEMIA: ICD-10-CM

## 2018-10-24 LAB
COPPER: 105 UG/DL (ref 80–155)
ZINC: 58 UG/DL (ref 60–120)

## 2018-10-24 PROCEDURE — G8484 FLU IMMUNIZE NO ADMIN: HCPCS | Performed by: INTERNAL MEDICINE

## 2018-10-24 PROCEDURE — 1036F TOBACCO NON-USER: CPT | Performed by: INTERNAL MEDICINE

## 2018-10-24 PROCEDURE — G8417 CALC BMI ABV UP PARAM F/U: HCPCS | Performed by: INTERNAL MEDICINE

## 2018-10-24 PROCEDURE — 99213 OFFICE O/P EST LOW 20 MIN: CPT | Performed by: INTERNAL MEDICINE

## 2018-10-24 PROCEDURE — 99211 OFF/OP EST MAY X REQ PHY/QHP: CPT | Performed by: INTERNAL MEDICINE

## 2018-10-24 PROCEDURE — G8427 DOCREV CUR MEDS BY ELIG CLIN: HCPCS | Performed by: INTERNAL MEDICINE

## 2018-10-24 NOTE — PROGRESS NOTES
PROGRESS NOTE    PCP: Giovani Strong MD  Referring Provider: No ref. provider found    VISIT DIAGNOSIS:  The encounter diagnosis was Other iron deficiency anemia. PAST MEDICAL HISTORY:      Diagnosis Date    Anemia     Anxiety     Carpal tunnel syndrome     Depression     Endometriosis     Gestational diabetes     x 3 children    Iron deficiency anemia     Scleroderma ([address]        PAST SURGICAL HISTORY:      Procedure Laterality Date     SECTION  x 3    GASTRIC BYPASS SURGERY      OTHER SURGICAL HISTORY      growth removed from outer area of uterus    TOENAIL EXCISION         CURRENT MEDICATIONS:   No current outpatient prescriptions on file. No current facility-administered medications for this visit. SUBJECTIVE:  Shelli Alvarez is a very pleasant 37 y.o. female who is hher for continued evaluation and follow-up of her known ongoing iron deficiency anemia. Notes over the last month she is increasingly more fatigued and tired. She describes that she was really scheduled to see me on November but her left leg is continuing to cause her pain. She's asking for referral to [facility]. She's frustrated not getting answers as she describes it for me she's contacting her office but cannot get an appointment warrant up-to-date as to the plan of care per her description to me. Thus she noted that I had offered potential referral to [facility] in once to continue this. She denies fevers chills night sweats denies vaginal bleeding blood in stools black or tarry stools epistaxis no change in her bowel habits. Denies chest pain she is short of breath with exertion but this is stable for \"I'm always tired\" but notes that this is more so with the last month or 2. She notes no abdominal pain no nausea.     PHYSICAL EXAM:   Vitals:    10/24/18 1605   BP: 124/82   Pulse: 83   Temp: 98.4 °F (36.9 °C)       Physical Exam   Constitutional: She is oriented to person, place, and referral to [facility] vascular surgery for her which I will gladly due for second opinion. As always I've asked her to contact me with any new questions problems or ongoing issues she continues otherwise on B12 replacement.       Electronically signed by Terrance Gregorio DO on 10/24/2018 at 4:56 PM

## 2018-10-25 RX ORDER — 0.9 % SODIUM CHLORIDE 0.9 %
10 VIAL (ML) INJECTION ONCE
Status: CANCELLED | OUTPATIENT
Start: 2018-10-25 | End: 2018-10-25

## 2018-10-25 RX ORDER — SODIUM CHLORIDE 9 MG/ML
INJECTION, SOLUTION INTRAVENOUS ONCE
Status: CANCELLED | OUTPATIENT
Start: 2018-10-25 | End: 2018-10-25

## 2018-10-25 RX ORDER — DIPHENHYDRAMINE HYDROCHLORIDE 50 MG/ML
50 INJECTION INTRAMUSCULAR; INTRAVENOUS ONCE
Status: CANCELLED | OUTPATIENT
Start: 2018-10-25 | End: 2018-10-25

## 2018-10-25 RX ORDER — HEPARIN SODIUM (PORCINE) LOCK FLUSH IV SOLN 100 UNIT/ML 100 UNIT/ML
500 SOLUTION INTRAVENOUS PRN
Status: CANCELLED | OUTPATIENT
Start: 2018-10-25

## 2018-10-25 RX ORDER — EPINEPHRINE 1 MG/ML
0.3 INJECTION, SOLUTION, CONCENTRATE INTRAVENOUS PRN
Status: CANCELLED | OUTPATIENT
Start: 2018-10-25

## 2018-10-25 RX ORDER — SODIUM CHLORIDE 0.9 % (FLUSH) 0.9 %
5 SYRINGE (ML) INJECTION PRN
Status: CANCELLED | OUTPATIENT
Start: 2018-10-25

## 2018-10-25 RX ORDER — SODIUM CHLORIDE 0.9 % (FLUSH) 0.9 %
10 SYRINGE (ML) INJECTION PRN
Status: CANCELLED | OUTPATIENT
Start: 2018-10-25

## 2018-10-25 RX ORDER — METHYLPREDNISOLONE SODIUM SUCCINATE 125 MG/2ML
125 INJECTION, POWDER, LYOPHILIZED, FOR SOLUTION INTRAMUSCULAR; INTRAVENOUS ONCE
Status: CANCELLED | OUTPATIENT
Start: 2018-10-25 | End: 2018-10-25

## 2018-10-25 RX ORDER — SODIUM CHLORIDE 9 MG/ML
INJECTION, SOLUTION INTRAVENOUS CONTINUOUS
Status: CANCELLED | OUTPATIENT
Start: 2018-10-25

## 2018-10-26 ENCOUNTER — TELEPHONE (OUTPATIENT)
Dept: ONCOLOGY | Age: 43
End: 2018-10-26

## 2018-10-26 NOTE — TELEPHONE ENCOUNTER
AVS from 10/24/2018    *Referral to [facility] was faxed, confirmation rcv'd  *Labs will be drawn one week before return visit.  Pt will have them done at [facility] center lab  *RV scheduled for 1/2/19 @ 3:20p    Pt was given AVS and appt schedule

## 2018-10-31 ENCOUNTER — HOSPITAL ENCOUNTER (OUTPATIENT)
Dept: INFUSION THERAPY | Age: 43
Discharge: HOME | End: 2018-10-31
Payer: COMMERCIAL

## 2018-10-31 DIAGNOSIS — K90.49 MALABSORPTION DUE TO INTOLERANCE, NOT ELSEWHERE CLASSIFIED: ICD-10-CM

## 2018-10-31 DIAGNOSIS — D50.8 OTHER IRON DEFICIENCY ANEMIA: ICD-10-CM

## 2018-10-31 PROCEDURE — 96372 THER/PROPH/DIAG INJ SC/IM: CPT

## 2018-10-31 PROCEDURE — 6360000002 HC RX W HCPCS: Performed by: INTERNAL MEDICINE

## 2018-10-31 RX ORDER — HEPARIN SODIUM (PORCINE) LOCK FLUSH IV SOLN 100 UNIT/ML 100 UNIT/ML
500 SOLUTION INTRAVENOUS PRN
Status: CANCELLED | OUTPATIENT
Start: 2018-10-31

## 2018-10-31 RX ORDER — 0.9 % SODIUM CHLORIDE 0.9 %
10 VIAL (ML) INJECTION ONCE
Status: CANCELLED | OUTPATIENT
Start: 2018-10-31 | End: 2018-10-31

## 2018-10-31 RX ORDER — SODIUM CHLORIDE 9 MG/ML
INJECTION, SOLUTION INTRAVENOUS CONTINUOUS
Status: CANCELLED | OUTPATIENT
Start: 2018-10-31

## 2018-10-31 RX ORDER — SODIUM CHLORIDE 9 MG/ML
INJECTION, SOLUTION INTRAVENOUS ONCE
Status: CANCELLED | OUTPATIENT
Start: 2018-10-31 | End: 2018-10-31

## 2018-10-31 RX ORDER — CYANOCOBALAMIN 1000 UG/ML
1000 INJECTION INTRAMUSCULAR; SUBCUTANEOUS ONCE
Status: CANCELLED
Start: 2018-10-31

## 2018-10-31 RX ORDER — SODIUM CHLORIDE 0.9 % (FLUSH) 0.9 %
10 SYRINGE (ML) INJECTION PRN
Status: CANCELLED | OUTPATIENT
Start: 2018-10-31

## 2018-10-31 RX ORDER — DIPHENHYDRAMINE HYDROCHLORIDE 50 MG/ML
50 INJECTION INTRAMUSCULAR; INTRAVENOUS ONCE
Status: CANCELLED | OUTPATIENT
Start: 2018-10-31 | End: 2018-10-31

## 2018-10-31 RX ORDER — METHYLPREDNISOLONE SODIUM SUCCINATE 125 MG/2ML
125 INJECTION, POWDER, LYOPHILIZED, FOR SOLUTION INTRAMUSCULAR; INTRAVENOUS ONCE
Status: CANCELLED | OUTPATIENT
Start: 2018-10-31 | End: 2018-10-31

## 2018-10-31 RX ORDER — EPINEPHRINE 1 MG/ML
0.3 INJECTION, SOLUTION, CONCENTRATE INTRAVENOUS PRN
Status: CANCELLED | OUTPATIENT
Start: 2018-10-31

## 2018-10-31 RX ORDER — CYANOCOBALAMIN 1000 UG/ML
1000 INJECTION INTRAMUSCULAR; SUBCUTANEOUS ONCE
Status: COMPLETED
Start: 2018-10-31 | End: 2018-10-31

## 2018-10-31 RX ORDER — SODIUM CHLORIDE 0.9 % (FLUSH) 0.9 %
5 SYRINGE (ML) INJECTION PRN
Status: CANCELLED | OUTPATIENT
Start: 2018-10-31

## 2018-10-31 RX ADMIN — CYANOCOBALAMIN 1000 MCG: 1000 INJECTION, SOLUTION INTRAMUSCULAR at 15:36

## 2018-11-12 ENCOUNTER — TELEPHONE (OUTPATIENT)
Dept: ONCOLOGY | Age: 43
End: 2018-11-12

## 2018-11-20 ENCOUNTER — HOSPITAL ENCOUNTER (OUTPATIENT)
Dept: INFUSION THERAPY | Age: 43
Discharge: HOME | End: 2018-11-20
Payer: COMMERCIAL

## 2018-11-20 VITALS
TEMPERATURE: 98.5 F | RESPIRATION RATE: 16 BRPM | HEART RATE: 65 BPM | DIASTOLIC BLOOD PRESSURE: 77 MMHG | SYSTOLIC BLOOD PRESSURE: 119 MMHG

## 2018-11-20 DIAGNOSIS — D50.8 OTHER IRON DEFICIENCY ANEMIA: ICD-10-CM

## 2018-11-20 DIAGNOSIS — K90.49 MALABSORPTION DUE TO INTOLERANCE, NOT ELSEWHERE CLASSIFIED: ICD-10-CM

## 2018-11-20 PROCEDURE — 6360000002 HC RX W HCPCS: Performed by: INTERNAL MEDICINE

## 2018-11-20 PROCEDURE — 96366 THER/PROPH/DIAG IV INF ADDON: CPT

## 2018-11-20 PROCEDURE — 96365 THER/PROPH/DIAG IV INF INIT: CPT

## 2018-11-20 PROCEDURE — 2580000003 HC RX 258: Performed by: INTERNAL MEDICINE

## 2018-11-20 RX ORDER — SODIUM CHLORIDE 0.9 % (FLUSH) 0.9 %
10 SYRINGE (ML) INJECTION PRN
Status: CANCELLED | OUTPATIENT
Start: 2018-11-20

## 2018-11-20 RX ORDER — SODIUM CHLORIDE 9 MG/ML
INJECTION, SOLUTION INTRAVENOUS ONCE
Status: CANCELLED | OUTPATIENT
Start: 2018-11-20 | End: 2018-11-20

## 2018-11-20 RX ORDER — SODIUM CHLORIDE 0.9 % (FLUSH) 0.9 %
5 SYRINGE (ML) INJECTION PRN
Status: CANCELLED | OUTPATIENT
Start: 2018-11-20

## 2018-11-20 RX ORDER — SODIUM CHLORIDE 9 MG/ML
INJECTION, SOLUTION INTRAVENOUS CONTINUOUS
Status: CANCELLED | OUTPATIENT
Start: 2018-11-20

## 2018-11-20 RX ORDER — HEPARIN SODIUM (PORCINE) LOCK FLUSH IV SOLN 100 UNIT/ML 100 UNIT/ML
500 SOLUTION INTRAVENOUS PRN
Status: CANCELLED | OUTPATIENT
Start: 2018-11-20

## 2018-11-20 RX ORDER — DIPHENHYDRAMINE HYDROCHLORIDE 50 MG/ML
50 INJECTION INTRAMUSCULAR; INTRAVENOUS ONCE
Status: CANCELLED | OUTPATIENT
Start: 2018-11-20 | End: 2018-11-20

## 2018-11-20 RX ORDER — METHYLPREDNISOLONE SODIUM SUCCINATE 125 MG/2ML
125 INJECTION, POWDER, LYOPHILIZED, FOR SOLUTION INTRAMUSCULAR; INTRAVENOUS ONCE
Status: CANCELLED | OUTPATIENT
Start: 2018-11-20 | End: 2018-11-20

## 2018-11-20 RX ORDER — SODIUM CHLORIDE 9 MG/ML
INJECTION, SOLUTION INTRAVENOUS ONCE
Status: COMPLETED | OUTPATIENT
Start: 2018-11-20 | End: 2018-11-20

## 2018-11-20 RX ORDER — CYANOCOBALAMIN 1000 UG/ML
1000 INJECTION INTRAMUSCULAR; SUBCUTANEOUS ONCE
Status: CANCELLED
Start: 2018-11-20

## 2018-11-20 RX ORDER — EPINEPHRINE 1 MG/ML
0.3 INJECTION, SOLUTION, CONCENTRATE INTRAVENOUS PRN
Status: CANCELLED | OUTPATIENT
Start: 2018-11-20

## 2018-11-20 RX ORDER — 0.9 % SODIUM CHLORIDE 0.9 %
10 VIAL (ML) INJECTION ONCE
Status: CANCELLED | OUTPATIENT
Start: 2018-11-20 | End: 2018-11-20

## 2018-11-20 RX ADMIN — IRON SUCROSE 300 MG: 20 INJECTION, SOLUTION INTRAVENOUS at 08:31

## 2018-11-20 RX ADMIN — SODIUM CHLORIDE: 9 INJECTION, SOLUTION INTRAVENOUS at 08:31

## 2018-11-20 NOTE — PROGRESS NOTES
Patient arrived for venofer 1 of 6  Tolerated infusion without incident   Ambulated to exit in stable condition   Return 11/27  venofer and b12   Gela Lewis RN

## 2018-11-27 ENCOUNTER — HOSPITAL ENCOUNTER (OUTPATIENT)
Dept: INFUSION THERAPY | Age: 43
Discharge: HOME | End: 2018-11-27
Payer: COMMERCIAL

## 2018-11-27 ENCOUNTER — TELEPHONE (OUTPATIENT)
Dept: ONCOLOGY | Age: 43
End: 2018-11-27

## 2018-11-27 VITALS
DIASTOLIC BLOOD PRESSURE: 72 MMHG | TEMPERATURE: 97.5 F | HEART RATE: 76 BPM | SYSTOLIC BLOOD PRESSURE: 104 MMHG | RESPIRATION RATE: 17 BRPM | BODY MASS INDEX: 45.76 KG/M2 | WEIGHT: 266.6 LBS

## 2018-11-27 DIAGNOSIS — K90.49 MALABSORPTION DUE TO INTOLERANCE, NOT ELSEWHERE CLASSIFIED: ICD-10-CM

## 2018-11-27 DIAGNOSIS — D50.8 OTHER IRON DEFICIENCY ANEMIA: ICD-10-CM

## 2018-11-27 DIAGNOSIS — D50.8 OTHER IRON DEFICIENCY ANEMIA: Primary | ICD-10-CM

## 2018-11-27 PROCEDURE — 6360000002 HC RX W HCPCS: Performed by: INTERNAL MEDICINE

## 2018-11-27 PROCEDURE — 2580000003 HC RX 258: Performed by: INTERNAL MEDICINE

## 2018-11-27 PROCEDURE — 96365 THER/PROPH/DIAG IV INF INIT: CPT

## 2018-11-27 PROCEDURE — 96372 THER/PROPH/DIAG INJ SC/IM: CPT

## 2018-11-27 PROCEDURE — 96366 THER/PROPH/DIAG IV INF ADDON: CPT

## 2018-11-27 RX ORDER — DIPHENHYDRAMINE HYDROCHLORIDE 50 MG/ML
50 INJECTION INTRAMUSCULAR; INTRAVENOUS ONCE
Status: CANCELLED | OUTPATIENT
Start: 2018-11-27 | End: 2018-11-27

## 2018-11-27 RX ORDER — SODIUM CHLORIDE 0.9 % (FLUSH) 0.9 %
10 SYRINGE (ML) INJECTION PRN
Status: CANCELLED | OUTPATIENT
Start: 2018-11-27

## 2018-11-27 RX ORDER — CYANOCOBALAMIN 1000 UG/ML
1000 INJECTION INTRAMUSCULAR; SUBCUTANEOUS ONCE
Status: CANCELLED
Start: 2018-11-27

## 2018-11-27 RX ORDER — CYANOCOBALAMIN 1000 UG/ML
1000 INJECTION INTRAMUSCULAR; SUBCUTANEOUS ONCE
Status: COMPLETED
Start: 2018-11-27 | End: 2018-11-27

## 2018-11-27 RX ORDER — METHYLPREDNISOLONE SODIUM SUCCINATE 125 MG/2ML
125 INJECTION, POWDER, LYOPHILIZED, FOR SOLUTION INTRAMUSCULAR; INTRAVENOUS ONCE
Status: CANCELLED | OUTPATIENT
Start: 2018-11-27 | End: 2018-11-27

## 2018-11-27 RX ORDER — SODIUM CHLORIDE 9 MG/ML
INJECTION, SOLUTION INTRAVENOUS ONCE
Status: COMPLETED | OUTPATIENT
Start: 2018-11-27 | End: 2018-11-27

## 2018-11-27 RX ORDER — 0.9 % SODIUM CHLORIDE 0.9 %
10 VIAL (ML) INJECTION ONCE
Status: CANCELLED | OUTPATIENT
Start: 2018-11-27 | End: 2018-11-27

## 2018-11-27 RX ORDER — SODIUM CHLORIDE 9 MG/ML
INJECTION, SOLUTION INTRAVENOUS CONTINUOUS
Status: CANCELLED | OUTPATIENT
Start: 2018-11-27

## 2018-11-27 RX ORDER — SODIUM CHLORIDE 0.9 % (FLUSH) 0.9 %
5 SYRINGE (ML) INJECTION PRN
Status: CANCELLED | OUTPATIENT
Start: 2018-11-27

## 2018-11-27 RX ORDER — SODIUM CHLORIDE 9 MG/ML
INJECTION, SOLUTION INTRAVENOUS ONCE
Status: CANCELLED | OUTPATIENT
Start: 2018-11-27 | End: 2018-11-27

## 2018-11-27 RX ORDER — HEPARIN SODIUM (PORCINE) LOCK FLUSH IV SOLN 100 UNIT/ML 100 UNIT/ML
500 SOLUTION INTRAVENOUS PRN
Status: CANCELLED | OUTPATIENT
Start: 2018-11-27

## 2018-11-27 RX ORDER — EPINEPHRINE 1 MG/ML
0.3 INJECTION, SOLUTION, CONCENTRATE INTRAVENOUS PRN
Status: CANCELLED | OUTPATIENT
Start: 2018-11-27

## 2018-11-27 RX ADMIN — CYANOCOBALAMIN 1000 MCG: 1000 INJECTION, SOLUTION INTRAMUSCULAR at 08:35

## 2018-11-27 RX ADMIN — SODIUM CHLORIDE: 9 INJECTION, SOLUTION INTRAVENOUS at 08:32

## 2018-11-27 RX ADMIN — IRON SUCROSE 300 MG: 20 INJECTION, SOLUTION INTRAVENOUS at 08:47

## 2018-11-27 ASSESSMENT — PAIN DESCRIPTION - LOCATION: LOCATION: LEG

## 2018-11-27 ASSESSMENT — PAIN SCALES - GENERAL: PAINLEVEL_OUTOF10: 4

## 2018-11-27 ASSESSMENT — PAIN DESCRIPTION - PAIN TYPE: TYPE: CHRONIC PAIN

## 2018-11-27 ASSESSMENT — PAIN DESCRIPTION - ORIENTATION: ORIENTATION: LEFT

## 2018-11-27 NOTE — TELEPHONE ENCOUNTER
Patient called stating that she was trying to schedule her [facility] referral and they were unable to access it since a referral wasn't available   Writer looked at Dr Miles Devi last progress note, he is ok with referral to [address] [place] [address] for second opinion  Order placed and [facility] to process referral   Patient notified of the above verbalizes understanding   Gonzalo Lora RN

## 2018-11-28 ENCOUNTER — TELEPHONE (OUTPATIENT)
Dept: ONCOLOGY | Age: 43
End: 2018-11-28

## 2018-11-28 NOTE — TELEPHONE ENCOUNTER
Writer called Javi Lagunas back to inform her that patient has seen an in-network doctor, Doctor Abhishek Quezada @ [facility]. Writer left a detailed message informing Javi Lagunas of above.  Jocy Nobles

## 2018-11-28 NOTE — TELEPHONE ENCOUNTER
Collette returned call from [facility] [phone] and stated that Franco Boucher is out of network, she would like Ashley Browning to reutrn her call

## 2018-11-29 DIAGNOSIS — D50.8 OTHER IRON DEFICIENCY ANEMIA: Primary | ICD-10-CM

## 2018-11-29 DIAGNOSIS — K90.49 MALABSORPTION DUE TO INTOLERANCE, NOT ELSEWHERE CLASSIFIED: ICD-10-CM

## 2018-11-30 ENCOUNTER — HOSPITAL ENCOUNTER (OUTPATIENT)
Dept: INFUSION THERAPY | Age: 43
Discharge: HOME | End: 2018-11-30
Payer: COMMERCIAL

## 2018-11-30 VITALS
HEART RATE: 80 BPM | RESPIRATION RATE: 16 BRPM | DIASTOLIC BLOOD PRESSURE: 85 MMHG | SYSTOLIC BLOOD PRESSURE: 139 MMHG | TEMPERATURE: 98.2 F

## 2018-11-30 DIAGNOSIS — K90.49 MALABSORPTION DUE TO INTOLERANCE, NOT ELSEWHERE CLASSIFIED: ICD-10-CM

## 2018-11-30 DIAGNOSIS — D50.8 OTHER IRON DEFICIENCY ANEMIA: ICD-10-CM

## 2018-11-30 LAB
ABSOLUTE EOS #: 0.1 K/UL (ref 0–0.4)
ABSOLUTE IMMATURE GRANULOCYTE: ABNORMAL K/UL (ref 0–0.3)
ABSOLUTE LYMPH #: 1.4 K/UL (ref 1–4.8)
ABSOLUTE MONO #: 0.3 K/UL (ref 0.1–1.2)
BASOPHILS # BLD: 1 % (ref 0–2)
BASOPHILS ABSOLUTE: 0.1 K/UL (ref 0–0.2)
DIFFERENTIAL TYPE: ABNORMAL
EOSINOPHILS RELATIVE PERCENT: 2 % (ref 1–4)
FERRITIN: 126 UG/L (ref 13–150)
HCT VFR BLD CALC: 32.2 % (ref 36–46)
HEMOGLOBIN: 9.9 G/DL (ref 12–16)
IMMATURE GRANULOCYTES: ABNORMAL %
LYMPHOCYTES # BLD: 30 % (ref 24–44)
MCH RBC QN AUTO: 21.9 PG (ref 26–34)
MCHC RBC AUTO-ENTMCNC: 30.9 G/DL (ref 31–37)
MCV RBC AUTO: 71.1 FL (ref 80–100)
MONOCYTES # BLD: 7 % (ref 2–11)
NRBC AUTOMATED: ABNORMAL PER 100 WBC
PDW BLD-RTO: 18.3 % (ref 12.5–15.4)
PLATELET # BLD: 239 K/UL (ref 140–450)
PLATELET ESTIMATE: ABNORMAL
PMV BLD AUTO: 9.4 FL (ref 6–12)
RBC # BLD: 4.53 M/UL (ref 4–5.2)
RBC # BLD: ABNORMAL 10*6/UL
SEG NEUTROPHILS: 60 % (ref 36–66)
SEGMENTED NEUTROPHILS ABSOLUTE COUNT: 2.8 K/UL (ref 1.8–7.7)
VITAMIN D 25-HYDROXY: 22.3 NG/ML (ref 30–100)
WBC # BLD: 4.7 K/UL (ref 3.5–11)
WBC # BLD: ABNORMAL 10*3/UL

## 2018-11-30 PROCEDURE — 6360000002 HC RX W HCPCS: Performed by: INTERNAL MEDICINE

## 2018-11-30 PROCEDURE — 82306 VITAMIN D 25 HYDROXY: CPT

## 2018-11-30 PROCEDURE — 82728 ASSAY OF FERRITIN: CPT

## 2018-11-30 PROCEDURE — 2580000003 HC RX 258: Performed by: INTERNAL MEDICINE

## 2018-11-30 PROCEDURE — 96365 THER/PROPH/DIAG IV INF INIT: CPT | Performed by: NURSE PRACTITIONER

## 2018-11-30 PROCEDURE — 36415 COLL VENOUS BLD VENIPUNCTURE: CPT

## 2018-11-30 PROCEDURE — 96366 THER/PROPH/DIAG IV INF ADDON: CPT | Performed by: NURSE PRACTITIONER

## 2018-11-30 PROCEDURE — 85025 COMPLETE CBC W/AUTO DIFF WBC: CPT

## 2018-11-30 RX ORDER — DIPHENHYDRAMINE HYDROCHLORIDE 50 MG/ML
50 INJECTION INTRAMUSCULAR; INTRAVENOUS ONCE
Status: CANCELLED | OUTPATIENT
Start: 2018-11-30 | End: 2018-11-30

## 2018-11-30 RX ORDER — SODIUM CHLORIDE 9 MG/ML
INJECTION, SOLUTION INTRAVENOUS ONCE
Status: COMPLETED | OUTPATIENT
Start: 2018-11-30 | End: 2018-11-30

## 2018-11-30 RX ORDER — SODIUM CHLORIDE 0.9 % (FLUSH) 0.9 %
10 SYRINGE (ML) INJECTION PRN
Status: CANCELLED | OUTPATIENT
Start: 2018-11-30

## 2018-11-30 RX ORDER — EPINEPHRINE 1 MG/ML
0.3 INJECTION, SOLUTION, CONCENTRATE INTRAVENOUS PRN
Status: CANCELLED | OUTPATIENT
Start: 2018-11-30

## 2018-11-30 RX ORDER — METHYLPREDNISOLONE SODIUM SUCCINATE 125 MG/2ML
125 INJECTION, POWDER, LYOPHILIZED, FOR SOLUTION INTRAMUSCULAR; INTRAVENOUS ONCE
Status: CANCELLED | OUTPATIENT
Start: 2018-11-30 | End: 2018-11-30

## 2018-11-30 RX ORDER — CYANOCOBALAMIN 1000 UG/ML
1000 INJECTION INTRAMUSCULAR; SUBCUTANEOUS ONCE
Status: CANCELLED
Start: 2018-11-30

## 2018-11-30 RX ORDER — HEPARIN SODIUM (PORCINE) LOCK FLUSH IV SOLN 100 UNIT/ML 100 UNIT/ML
500 SOLUTION INTRAVENOUS PRN
Status: CANCELLED | OUTPATIENT
Start: 2018-11-30

## 2018-11-30 RX ORDER — SODIUM CHLORIDE 0.9 % (FLUSH) 0.9 %
5 SYRINGE (ML) INJECTION PRN
Status: CANCELLED | OUTPATIENT
Start: 2018-11-30

## 2018-11-30 RX ORDER — SODIUM CHLORIDE 9 MG/ML
INJECTION, SOLUTION INTRAVENOUS ONCE
Status: CANCELLED | OUTPATIENT
Start: 2018-11-30 | End: 2018-11-30

## 2018-11-30 RX ORDER — SODIUM CHLORIDE 9 MG/ML
INJECTION, SOLUTION INTRAVENOUS CONTINUOUS
Status: CANCELLED | OUTPATIENT
Start: 2018-11-30

## 2018-11-30 RX ORDER — 0.9 % SODIUM CHLORIDE 0.9 %
10 VIAL (ML) INJECTION ONCE
Status: CANCELLED | OUTPATIENT
Start: 2018-11-30 | End: 2018-11-30

## 2018-11-30 RX ADMIN — IRON SUCROSE 300 MG: 20 INJECTION, SOLUTION INTRAVENOUS at 08:41

## 2018-11-30 RX ADMIN — SODIUM CHLORIDE: 9 INJECTION, SOLUTION INTRAVENOUS at 08:41

## 2018-12-04 ENCOUNTER — HOSPITAL ENCOUNTER (OUTPATIENT)
Dept: INFUSION THERAPY | Age: 43
Discharge: HOME | End: 2018-12-04
Payer: COMMERCIAL

## 2018-12-04 VITALS
SYSTOLIC BLOOD PRESSURE: 117 MMHG | RESPIRATION RATE: 17 BRPM | TEMPERATURE: 98.1 F | DIASTOLIC BLOOD PRESSURE: 82 MMHG | HEART RATE: 62 BPM

## 2018-12-04 DIAGNOSIS — D50.8 OTHER IRON DEFICIENCY ANEMIA: ICD-10-CM

## 2018-12-04 DIAGNOSIS — K90.49 MALABSORPTION DUE TO INTOLERANCE, NOT ELSEWHERE CLASSIFIED: ICD-10-CM

## 2018-12-04 PROCEDURE — 2580000003 HC RX 258: Performed by: INTERNAL MEDICINE

## 2018-12-04 PROCEDURE — 96365 THER/PROPH/DIAG IV INF INIT: CPT

## 2018-12-04 PROCEDURE — 6360000002 HC RX W HCPCS: Performed by: INTERNAL MEDICINE

## 2018-12-04 PROCEDURE — 96366 THER/PROPH/DIAG IV INF ADDON: CPT

## 2018-12-04 RX ORDER — HEPARIN SODIUM (PORCINE) LOCK FLUSH IV SOLN 100 UNIT/ML 100 UNIT/ML
500 SOLUTION INTRAVENOUS PRN
Status: CANCELLED | OUTPATIENT
Start: 2018-12-04

## 2018-12-04 RX ORDER — 0.9 % SODIUM CHLORIDE 0.9 %
10 VIAL (ML) INJECTION ONCE
Status: CANCELLED | OUTPATIENT
Start: 2018-12-04 | End: 2018-12-04

## 2018-12-04 RX ORDER — SODIUM CHLORIDE 9 MG/ML
INJECTION, SOLUTION INTRAVENOUS ONCE
Status: CANCELLED | OUTPATIENT
Start: 2018-12-04 | End: 2018-12-04

## 2018-12-04 RX ORDER — SODIUM CHLORIDE 0.9 % (FLUSH) 0.9 %
5 SYRINGE (ML) INJECTION PRN
Status: CANCELLED | OUTPATIENT
Start: 2018-12-04

## 2018-12-04 RX ORDER — SODIUM CHLORIDE 9 MG/ML
INJECTION, SOLUTION INTRAVENOUS ONCE
Status: COMPLETED | OUTPATIENT
Start: 2018-12-04 | End: 2018-12-04

## 2018-12-04 RX ORDER — CYANOCOBALAMIN 1000 UG/ML
1000 INJECTION INTRAMUSCULAR; SUBCUTANEOUS ONCE
Status: CANCELLED
Start: 2018-12-04

## 2018-12-04 RX ORDER — SODIUM CHLORIDE 0.9 % (FLUSH) 0.9 %
10 SYRINGE (ML) INJECTION PRN
Status: CANCELLED | OUTPATIENT
Start: 2018-12-04

## 2018-12-04 RX ORDER — DIPHENHYDRAMINE HYDROCHLORIDE 50 MG/ML
50 INJECTION INTRAMUSCULAR; INTRAVENOUS ONCE
Status: CANCELLED | OUTPATIENT
Start: 2018-12-04 | End: 2018-12-04

## 2018-12-04 RX ORDER — EPINEPHRINE 1 MG/ML
0.3 INJECTION, SOLUTION, CONCENTRATE INTRAVENOUS PRN
Status: CANCELLED | OUTPATIENT
Start: 2018-12-04

## 2018-12-04 RX ORDER — METHYLPREDNISOLONE SODIUM SUCCINATE 125 MG/2ML
125 INJECTION, POWDER, LYOPHILIZED, FOR SOLUTION INTRAMUSCULAR; INTRAVENOUS ONCE
Status: CANCELLED | OUTPATIENT
Start: 2018-12-04 | End: 2018-12-04

## 2018-12-04 RX ORDER — SODIUM CHLORIDE 9 MG/ML
INJECTION, SOLUTION INTRAVENOUS CONTINUOUS
Status: CANCELLED | OUTPATIENT
Start: 2018-12-04

## 2018-12-04 RX ADMIN — IRON SUCROSE 300 MG: 20 INJECTION, SOLUTION INTRAVENOUS at 08:27

## 2018-12-04 RX ADMIN — SODIUM CHLORIDE: 9 INJECTION, SOLUTION INTRAVENOUS at 08:15

## 2018-12-07 ENCOUNTER — HOSPITAL ENCOUNTER (OUTPATIENT)
Dept: INFUSION THERAPY | Age: 43
Discharge: HOME | End: 2018-12-07
Payer: COMMERCIAL

## 2018-12-07 VITALS
HEART RATE: 62 BPM | RESPIRATION RATE: 16 BRPM | TEMPERATURE: 98.2 F | DIASTOLIC BLOOD PRESSURE: 75 MMHG | SYSTOLIC BLOOD PRESSURE: 109 MMHG

## 2018-12-07 DIAGNOSIS — K90.49 MALABSORPTION DUE TO INTOLERANCE, NOT ELSEWHERE CLASSIFIED: ICD-10-CM

## 2018-12-07 DIAGNOSIS — D50.8 OTHER IRON DEFICIENCY ANEMIA: ICD-10-CM

## 2018-12-07 DIAGNOSIS — K90.9 INTESTINAL MALABSORPTION, UNSPECIFIED TYPE: ICD-10-CM

## 2018-12-07 PROCEDURE — 96366 THER/PROPH/DIAG IV INF ADDON: CPT

## 2018-12-07 PROCEDURE — 6360000002 HC RX W HCPCS: Performed by: INTERNAL MEDICINE

## 2018-12-07 PROCEDURE — 96365 THER/PROPH/DIAG IV INF INIT: CPT

## 2018-12-07 PROCEDURE — 2580000003 HC RX 258: Performed by: INTERNAL MEDICINE

## 2018-12-07 RX ORDER — SODIUM CHLORIDE 0.9 % (FLUSH) 0.9 %
5 SYRINGE (ML) INJECTION PRN
Status: CANCELLED | OUTPATIENT
Start: 2018-12-07

## 2018-12-07 RX ORDER — DIPHENHYDRAMINE HYDROCHLORIDE 50 MG/ML
50 INJECTION INTRAMUSCULAR; INTRAVENOUS ONCE
Status: CANCELLED | OUTPATIENT
Start: 2018-12-07 | End: 2018-12-07

## 2018-12-07 RX ORDER — 0.9 % SODIUM CHLORIDE 0.9 %
10 VIAL (ML) INJECTION ONCE
Status: CANCELLED | OUTPATIENT
Start: 2018-12-07 | End: 2018-12-07

## 2018-12-07 RX ORDER — HEPARIN SODIUM (PORCINE) LOCK FLUSH IV SOLN 100 UNIT/ML 100 UNIT/ML
500 SOLUTION INTRAVENOUS PRN
Status: CANCELLED | OUTPATIENT
Start: 2018-12-07

## 2018-12-07 RX ORDER — SODIUM CHLORIDE 9 MG/ML
INJECTION, SOLUTION INTRAVENOUS CONTINUOUS
Status: CANCELLED | OUTPATIENT
Start: 2018-12-07

## 2018-12-07 RX ORDER — SODIUM CHLORIDE 0.9 % (FLUSH) 0.9 %
10 SYRINGE (ML) INJECTION PRN
Status: CANCELLED | OUTPATIENT
Start: 2018-12-07

## 2018-12-07 RX ORDER — SODIUM CHLORIDE 9 MG/ML
INJECTION, SOLUTION INTRAVENOUS ONCE
Status: COMPLETED | OUTPATIENT
Start: 2018-12-07 | End: 2018-12-07

## 2018-12-07 RX ORDER — EPINEPHRINE 1 MG/ML
0.3 INJECTION, SOLUTION, CONCENTRATE INTRAVENOUS PRN
Status: CANCELLED | OUTPATIENT
Start: 2018-12-07

## 2018-12-07 RX ORDER — METHYLPREDNISOLONE SODIUM SUCCINATE 125 MG/2ML
125 INJECTION, POWDER, LYOPHILIZED, FOR SOLUTION INTRAMUSCULAR; INTRAVENOUS ONCE
Status: CANCELLED | OUTPATIENT
Start: 2018-12-07 | End: 2018-12-07

## 2018-12-07 RX ORDER — SODIUM CHLORIDE 9 MG/ML
INJECTION, SOLUTION INTRAVENOUS ONCE
Status: CANCELLED | OUTPATIENT
Start: 2018-12-07 | End: 2018-12-07

## 2018-12-07 RX ORDER — CYANOCOBALAMIN 1000 UG/ML
1000 INJECTION INTRAMUSCULAR; SUBCUTANEOUS ONCE
Status: CANCELLED
Start: 2018-12-07

## 2018-12-07 RX ADMIN — SODIUM CHLORIDE: 9 INJECTION, SOLUTION INTRAVENOUS at 08:28

## 2018-12-07 RX ADMIN — IRON SUCROSE 300 MG: 20 INJECTION, SOLUTION INTRAVENOUS at 08:29

## 2018-12-07 ASSESSMENT — PAIN DESCRIPTION - ORIENTATION: ORIENTATION: LEFT

## 2018-12-07 ASSESSMENT — PAIN DESCRIPTION - DESCRIPTORS: DESCRIPTORS: TIGHTNESS

## 2018-12-07 ASSESSMENT — PAIN DESCRIPTION - LOCATION: LOCATION: LEG

## 2018-12-07 ASSESSMENT — PAIN DESCRIPTION - PAIN TYPE: TYPE: CHRONIC PAIN

## 2018-12-07 ASSESSMENT — PAIN SCALES - GENERAL: PAINLEVEL_OUTOF10: 4

## 2018-12-07 NOTE — PROGRESS NOTES
Pt here for venofer infusion. Pt was treated without incident and discharged in stable condition. Pt will return in 4 days for venofer.

## 2018-12-11 ENCOUNTER — HOSPITAL ENCOUNTER (OUTPATIENT)
Dept: INFUSION THERAPY | Age: 43
Discharge: HOME | End: 2018-12-11
Payer: COMMERCIAL

## 2018-12-11 VITALS
TEMPERATURE: 98.1 F | RESPIRATION RATE: 17 BRPM | HEART RATE: 62 BPM | DIASTOLIC BLOOD PRESSURE: 77 MMHG | SYSTOLIC BLOOD PRESSURE: 106 MMHG

## 2018-12-11 DIAGNOSIS — K90.49 MALABSORPTION DUE TO INTOLERANCE, NOT ELSEWHERE CLASSIFIED: ICD-10-CM

## 2018-12-11 DIAGNOSIS — D50.8 OTHER IRON DEFICIENCY ANEMIA: ICD-10-CM

## 2018-12-11 PROCEDURE — 96366 THER/PROPH/DIAG IV INF ADDON: CPT

## 2018-12-11 PROCEDURE — 2580000003 HC RX 258: Performed by: INTERNAL MEDICINE

## 2018-12-11 PROCEDURE — 6360000002 HC RX W HCPCS: Performed by: INTERNAL MEDICINE

## 2018-12-11 PROCEDURE — 96365 THER/PROPH/DIAG IV INF INIT: CPT

## 2018-12-11 RX ORDER — EPINEPHRINE 1 MG/ML
0.3 INJECTION, SOLUTION, CONCENTRATE INTRAVENOUS PRN
Status: CANCELLED | OUTPATIENT
Start: 2018-12-11

## 2018-12-11 RX ORDER — METHYLPREDNISOLONE SODIUM SUCCINATE 125 MG/2ML
125 INJECTION, POWDER, LYOPHILIZED, FOR SOLUTION INTRAMUSCULAR; INTRAVENOUS ONCE
Status: CANCELLED | OUTPATIENT
Start: 2018-12-11 | End: 2018-12-11

## 2018-12-11 RX ORDER — HEPARIN SODIUM (PORCINE) LOCK FLUSH IV SOLN 100 UNIT/ML 100 UNIT/ML
500 SOLUTION INTRAVENOUS PRN
Status: CANCELLED | OUTPATIENT
Start: 2018-12-11

## 2018-12-11 RX ORDER — SODIUM CHLORIDE 9 MG/ML
INJECTION, SOLUTION INTRAVENOUS ONCE
Status: COMPLETED | OUTPATIENT
Start: 2018-12-11 | End: 2018-12-11

## 2018-12-11 RX ORDER — SODIUM CHLORIDE 0.9 % (FLUSH) 0.9 %
10 SYRINGE (ML) INJECTION PRN
Status: CANCELLED | OUTPATIENT
Start: 2018-12-11

## 2018-12-11 RX ORDER — SODIUM CHLORIDE 9 MG/ML
INJECTION, SOLUTION INTRAVENOUS CONTINUOUS
Status: CANCELLED | OUTPATIENT
Start: 2018-12-11

## 2018-12-11 RX ORDER — 0.9 % SODIUM CHLORIDE 0.9 %
10 VIAL (ML) INJECTION ONCE
Status: CANCELLED | OUTPATIENT
Start: 2018-12-11 | End: 2018-12-11

## 2018-12-11 RX ORDER — SODIUM CHLORIDE 0.9 % (FLUSH) 0.9 %
5 SYRINGE (ML) INJECTION PRN
Status: CANCELLED | OUTPATIENT
Start: 2018-12-11

## 2018-12-11 RX ORDER — DIPHENHYDRAMINE HYDROCHLORIDE 50 MG/ML
50 INJECTION INTRAMUSCULAR; INTRAVENOUS ONCE
Status: CANCELLED | OUTPATIENT
Start: 2018-12-11 | End: 2018-12-11

## 2018-12-11 RX ORDER — SODIUM CHLORIDE 9 MG/ML
INJECTION, SOLUTION INTRAVENOUS ONCE
Status: CANCELLED | OUTPATIENT
Start: 2018-12-11 | End: 2018-12-11

## 2018-12-11 RX ORDER — CYANOCOBALAMIN 1000 UG/ML
1000 INJECTION INTRAMUSCULAR; SUBCUTANEOUS ONCE
Status: CANCELLED
Start: 2018-12-11

## 2018-12-11 RX ADMIN — SODIUM CHLORIDE: 9 INJECTION, SOLUTION INTRAVENOUS at 08:29

## 2018-12-11 RX ADMIN — IRON SUCROSE 300 MG: 20 INJECTION, SOLUTION INTRAVENOUS at 08:30

## 2018-12-19 ENCOUNTER — HOSPITAL ENCOUNTER (OUTPATIENT)
Age: 43
Discharge: HOME | End: 2018-12-19
Payer: COMMERCIAL

## 2018-12-19 DIAGNOSIS — D50.8 OTHER IRON DEFICIENCY ANEMIA: ICD-10-CM

## 2018-12-19 LAB
ABSOLUTE EOS #: 0.05 K/UL (ref 0–0.4)
ABSOLUTE IMMATURE GRANULOCYTE: ABNORMAL K/UL (ref 0–0.3)
ABSOLUTE LYMPH #: 1.62 K/UL (ref 1–4.8)
ABSOLUTE MONO #: 0.43 K/UL (ref 0.1–1.2)
BASOPHILS # BLD: 1 % (ref 0–2)
BASOPHILS ABSOLUTE: 0.05 K/UL (ref 0–0.2)
DIFFERENTIAL TYPE: ABNORMAL
EOSINOPHILS RELATIVE PERCENT: 1 % (ref 1–4)
HCT VFR BLD CALC: 37.1 % (ref 36–46)
HEMOGLOBIN: 11.6 G/DL (ref 12–16)
IMMATURE GRANULOCYTES: ABNORMAL %
LYMPHOCYTES # BLD: 30 % (ref 24–44)
MCH RBC QN AUTO: 24.2 PG (ref 26–34)
MCHC RBC AUTO-ENTMCNC: 31.4 G/DL (ref 31–37)
MCV RBC AUTO: 77 FL (ref 80–100)
MONOCYTES # BLD: 8 % (ref 2–11)
MORPHOLOGY: ABNORMAL
NRBC AUTOMATED: ABNORMAL PER 100 WBC
PDW BLD-RTO: 27.5 % (ref 12.5–15.4)
PLATELET # BLD: 236 K/UL (ref 140–450)
PLATELET ESTIMATE: ABNORMAL
PMV BLD AUTO: 9.4 FL (ref 6–12)
RBC # BLD: 4.82 M/UL (ref 4–5.2)
RBC # BLD: ABNORMAL 10*6/UL
SEG NEUTROPHILS: 60 % (ref 36–66)
SEGMENTED NEUTROPHILS ABSOLUTE COUNT: 3.25 K/UL (ref 1.8–7.7)
WBC # BLD: 5.4 K/UL (ref 3.5–11)
WBC # BLD: ABNORMAL 10*3/UL

## 2018-12-19 PROCEDURE — 85025 COMPLETE CBC W/AUTO DIFF WBC: CPT

## 2018-12-19 PROCEDURE — 82728 ASSAY OF FERRITIN: CPT

## 2018-12-19 PROCEDURE — 36415 COLL VENOUS BLD VENIPUNCTURE: CPT

## 2018-12-20 LAB — FERRITIN: 179 UG/L (ref 13–150)

## 2018-12-26 ENCOUNTER — TELEPHONE (OUTPATIENT)
Dept: ONCOLOGY | Age: 43
End: 2018-12-26

## 2018-12-26 ENCOUNTER — HOSPITAL ENCOUNTER (OUTPATIENT)
Dept: INFUSION THERAPY | Age: 43
Discharge: HOME | End: 2018-12-26
Payer: COMMERCIAL

## 2018-12-26 DIAGNOSIS — D50.8 OTHER IRON DEFICIENCY ANEMIA: ICD-10-CM

## 2018-12-26 DIAGNOSIS — K90.49 MALABSORPTION DUE TO INTOLERANCE, NOT ELSEWHERE CLASSIFIED: ICD-10-CM

## 2018-12-26 PROCEDURE — 96372 THER/PROPH/DIAG INJ SC/IM: CPT

## 2018-12-26 PROCEDURE — 6360000002 HC RX W HCPCS: Performed by: INTERNAL MEDICINE

## 2018-12-26 RX ORDER — SODIUM CHLORIDE 9 MG/ML
INJECTION, SOLUTION INTRAVENOUS CONTINUOUS
Status: CANCELLED | OUTPATIENT
Start: 2018-12-26

## 2018-12-26 RX ORDER — CYANOCOBALAMIN 1000 UG/ML
1000 INJECTION INTRAMUSCULAR; SUBCUTANEOUS ONCE
Status: CANCELLED
Start: 2018-12-26

## 2018-12-26 RX ORDER — EPINEPHRINE 1 MG/ML
0.3 INJECTION, SOLUTION, CONCENTRATE INTRAVENOUS PRN
Status: CANCELLED | OUTPATIENT
Start: 2018-12-26

## 2018-12-26 RX ORDER — METHYLPREDNISOLONE SODIUM SUCCINATE 125 MG/2ML
125 INJECTION, POWDER, LYOPHILIZED, FOR SOLUTION INTRAMUSCULAR; INTRAVENOUS ONCE
Status: CANCELLED | OUTPATIENT
Start: 2018-12-26 | End: 2018-12-26

## 2018-12-26 RX ORDER — SODIUM CHLORIDE 0.9 % (FLUSH) 0.9 %
10 SYRINGE (ML) INJECTION PRN
Status: CANCELLED | OUTPATIENT
Start: 2018-12-26

## 2018-12-26 RX ORDER — HEPARIN SODIUM (PORCINE) LOCK FLUSH IV SOLN 100 UNIT/ML 100 UNIT/ML
500 SOLUTION INTRAVENOUS PRN
Status: CANCELLED | OUTPATIENT
Start: 2018-12-26

## 2018-12-26 RX ORDER — 0.9 % SODIUM CHLORIDE 0.9 %
10 VIAL (ML) INJECTION ONCE
Status: CANCELLED | OUTPATIENT
Start: 2018-12-26 | End: 2018-12-26

## 2018-12-26 RX ORDER — DIPHENHYDRAMINE HYDROCHLORIDE 50 MG/ML
50 INJECTION INTRAMUSCULAR; INTRAVENOUS ONCE
Status: CANCELLED | OUTPATIENT
Start: 2018-12-26 | End: 2018-12-26

## 2018-12-26 RX ORDER — CYANOCOBALAMIN 1000 UG/ML
1000 INJECTION INTRAMUSCULAR; SUBCUTANEOUS ONCE
Status: COMPLETED
Start: 2018-12-26 | End: 2018-12-26

## 2018-12-26 RX ORDER — SODIUM CHLORIDE 0.9 % (FLUSH) 0.9 %
5 SYRINGE (ML) INJECTION PRN
Status: CANCELLED | OUTPATIENT
Start: 2018-12-26

## 2018-12-26 RX ADMIN — CYANOCOBALAMIN 1000 MCG: 1000 INJECTION, SOLUTION INTRAMUSCULAR at 15:21

## 2019-01-02 ENCOUNTER — TELEPHONE (OUTPATIENT)
Dept: ONCOLOGY | Age: 44
End: 2019-01-02

## 2019-01-02 ENCOUNTER — OFFICE VISIT (OUTPATIENT)
Dept: ONCOLOGY | Age: 44
End: 2019-01-02
Payer: COMMERCIAL

## 2019-01-02 VITALS
WEIGHT: 270 LBS | SYSTOLIC BLOOD PRESSURE: 136 MMHG | HEART RATE: 62 BPM | TEMPERATURE: 97.9 F | DIASTOLIC BLOOD PRESSURE: 89 MMHG | BODY MASS INDEX: 46.35 KG/M2

## 2019-01-02 DIAGNOSIS — D50.8 OTHER IRON DEFICIENCY ANEMIA: ICD-10-CM

## 2019-01-02 PROCEDURE — G8427 DOCREV CUR MEDS BY ELIG CLIN: HCPCS | Performed by: INTERNAL MEDICINE

## 2019-01-02 PROCEDURE — 99211 OFF/OP EST MAY X REQ PHY/QHP: CPT | Performed by: INTERNAL MEDICINE

## 2019-01-02 PROCEDURE — G8484 FLU IMMUNIZE NO ADMIN: HCPCS | Performed by: INTERNAL MEDICINE

## 2019-01-02 PROCEDURE — G8417 CALC BMI ABV UP PARAM F/U: HCPCS | Performed by: INTERNAL MEDICINE

## 2019-01-02 PROCEDURE — 1036F TOBACCO NON-USER: CPT | Performed by: INTERNAL MEDICINE

## 2019-01-02 PROCEDURE — 99213 OFFICE O/P EST LOW 20 MIN: CPT | Performed by: INTERNAL MEDICINE

## 2019-01-02 RX ORDER — M-VIT,TX,IRON,MINS/CALC/FOLIC 27MG-0.4MG
1 TABLET ORAL DAILY
COMMUNITY
End: 2020-12-01

## 2019-01-02 RX ORDER — ZINC GLUCONATE 50 MG
25 TABLET ORAL DAILY
COMMUNITY
End: 2020-12-01 | Stop reason: ALTCHOICE

## 2019-01-23 ENCOUNTER — HOSPITAL ENCOUNTER (OUTPATIENT)
Dept: INFUSION THERAPY | Age: 44
Discharge: HOME | End: 2019-01-23
Payer: COMMERCIAL

## 2019-01-23 DIAGNOSIS — K90.49 MALABSORPTION DUE TO INTOLERANCE, NOT ELSEWHERE CLASSIFIED: ICD-10-CM

## 2019-01-23 DIAGNOSIS — D50.8 OTHER IRON DEFICIENCY ANEMIA: ICD-10-CM

## 2019-01-23 PROCEDURE — 96372 THER/PROPH/DIAG INJ SC/IM: CPT

## 2019-01-23 PROCEDURE — 6360000002 HC RX W HCPCS: Performed by: INTERNAL MEDICINE

## 2019-01-23 RX ORDER — SODIUM CHLORIDE 0.9 % (FLUSH) 0.9 %
10 SYRINGE (ML) INJECTION PRN
Status: CANCELLED | OUTPATIENT
Start: 2019-01-23

## 2019-01-23 RX ORDER — CYANOCOBALAMIN 1000 UG/ML
1000 INJECTION INTRAMUSCULAR; SUBCUTANEOUS ONCE
Status: COMPLETED
Start: 2019-01-23 | End: 2019-01-23

## 2019-01-23 RX ORDER — METHYLPREDNISOLONE SODIUM SUCCINATE 125 MG/2ML
125 INJECTION, POWDER, LYOPHILIZED, FOR SOLUTION INTRAMUSCULAR; INTRAVENOUS ONCE
Status: CANCELLED | OUTPATIENT
Start: 2019-01-23 | End: 2019-01-23

## 2019-01-23 RX ORDER — SODIUM CHLORIDE 0.9 % (FLUSH) 0.9 %
5 SYRINGE (ML) INJECTION PRN
Status: CANCELLED | OUTPATIENT
Start: 2019-01-23

## 2019-01-23 RX ORDER — DIPHENHYDRAMINE HYDROCHLORIDE 50 MG/ML
50 INJECTION INTRAMUSCULAR; INTRAVENOUS ONCE
Status: CANCELLED | OUTPATIENT
Start: 2019-01-23 | End: 2019-01-23

## 2019-01-23 RX ORDER — EPINEPHRINE 1 MG/ML
0.3 INJECTION, SOLUTION, CONCENTRATE INTRAVENOUS PRN
Status: CANCELLED | OUTPATIENT
Start: 2019-01-23

## 2019-01-23 RX ORDER — HEPARIN SODIUM (PORCINE) LOCK FLUSH IV SOLN 100 UNIT/ML 100 UNIT/ML
500 SOLUTION INTRAVENOUS PRN
Status: CANCELLED | OUTPATIENT
Start: 2019-01-23

## 2019-01-23 RX ORDER — 0.9 % SODIUM CHLORIDE 0.9 %
10 VIAL (ML) INJECTION ONCE
Status: CANCELLED | OUTPATIENT
Start: 2019-01-23 | End: 2019-01-23

## 2019-01-23 RX ORDER — CYANOCOBALAMIN 1000 UG/ML
1000 INJECTION INTRAMUSCULAR; SUBCUTANEOUS ONCE
Status: CANCELLED
Start: 2019-01-23

## 2019-01-23 RX ORDER — SODIUM CHLORIDE 9 MG/ML
INJECTION, SOLUTION INTRAVENOUS CONTINUOUS
Status: CANCELLED | OUTPATIENT
Start: 2019-01-23

## 2019-01-23 RX ADMIN — CYANOCOBALAMIN 1000 MCG: 1000 INJECTION, SOLUTION INTRAMUSCULAR at 15:45

## 2019-01-29 RX ORDER — CHOLECALCIFEROL (VITAMIN D3) 1250 MCG
1 CAPSULE ORAL WEEKLY
Qty: 7 CAPSULE | Refills: 1 | Status: SHIPPED | OUTPATIENT
Start: 2019-01-29 | End: 2020-12-09 | Stop reason: ALTCHOICE

## 2019-02-20 ENCOUNTER — HOSPITAL ENCOUNTER (OUTPATIENT)
Dept: INFUSION THERAPY | Age: 44
Discharge: HOME | End: 2019-02-20
Payer: COMMERCIAL

## 2019-02-20 DIAGNOSIS — K90.49 MALABSORPTION DUE TO INTOLERANCE, NOT ELSEWHERE CLASSIFIED: ICD-10-CM

## 2019-02-20 DIAGNOSIS — D50.8 OTHER IRON DEFICIENCY ANEMIA: ICD-10-CM

## 2019-02-20 PROCEDURE — 96372 THER/PROPH/DIAG INJ SC/IM: CPT

## 2019-02-20 PROCEDURE — 6360000002 HC RX W HCPCS: Performed by: INTERNAL MEDICINE

## 2019-02-20 RX ORDER — SODIUM CHLORIDE 9 MG/ML
INJECTION, SOLUTION INTRAVENOUS CONTINUOUS
Status: CANCELLED | OUTPATIENT
Start: 2019-02-20

## 2019-02-20 RX ORDER — SODIUM CHLORIDE 0.9 % (FLUSH) 0.9 %
10 SYRINGE (ML) INJECTION PRN
Status: CANCELLED | OUTPATIENT
Start: 2019-02-20

## 2019-02-20 RX ORDER — CYANOCOBALAMIN 1000 UG/ML
1000 INJECTION INTRAMUSCULAR; SUBCUTANEOUS ONCE
Status: COMPLETED
Start: 2019-02-20 | End: 2019-02-20

## 2019-02-20 RX ORDER — SODIUM CHLORIDE 0.9 % (FLUSH) 0.9 %
5 SYRINGE (ML) INJECTION PRN
Status: CANCELLED | OUTPATIENT
Start: 2019-02-20

## 2019-02-20 RX ORDER — HEPARIN SODIUM (PORCINE) LOCK FLUSH IV SOLN 100 UNIT/ML 100 UNIT/ML
500 SOLUTION INTRAVENOUS PRN
Status: CANCELLED | OUTPATIENT
Start: 2019-02-20

## 2019-02-20 RX ORDER — DIPHENHYDRAMINE HYDROCHLORIDE 50 MG/ML
50 INJECTION INTRAMUSCULAR; INTRAVENOUS ONCE
Status: CANCELLED | OUTPATIENT
Start: 2019-02-20 | End: 2019-02-20

## 2019-02-20 RX ORDER — EPINEPHRINE 1 MG/ML
0.3 INJECTION, SOLUTION, CONCENTRATE INTRAVENOUS PRN
Status: CANCELLED | OUTPATIENT
Start: 2019-02-20

## 2019-02-20 RX ORDER — CYANOCOBALAMIN 1000 UG/ML
1000 INJECTION INTRAMUSCULAR; SUBCUTANEOUS ONCE
Status: CANCELLED
Start: 2019-02-20

## 2019-02-20 RX ORDER — 0.9 % SODIUM CHLORIDE 0.9 %
10 VIAL (ML) INJECTION ONCE
Status: CANCELLED | OUTPATIENT
Start: 2019-02-20 | End: 2019-02-20

## 2019-02-20 RX ORDER — METHYLPREDNISOLONE SODIUM SUCCINATE 125 MG/2ML
125 INJECTION, POWDER, LYOPHILIZED, FOR SOLUTION INTRAMUSCULAR; INTRAVENOUS ONCE
Status: CANCELLED | OUTPATIENT
Start: 2019-02-20 | End: 2019-02-20

## 2019-02-20 RX ADMIN — CYANOCOBALAMIN 1000 MCG: 1000 INJECTION, SOLUTION INTRAMUSCULAR at 15:59

## 2019-02-20 NOTE — PROGRESS NOTES
Pt here for B12 injection. Pt was treated without incident and discharged in stable condition. Pt will return in 1 month for B12.

## 2019-03-04 ENCOUNTER — HOSPITAL ENCOUNTER (EMERGENCY)
Age: 44
Discharge: HOME | End: 2019-03-04
Attending: SPECIALIST
Payer: COMMERCIAL

## 2019-03-04 ENCOUNTER — APPOINTMENT (OUTPATIENT)
Dept: GENERAL RADIOLOGY | Age: 44
End: 2019-03-04
Payer: COMMERCIAL

## 2019-03-04 VITALS
WEIGHT: 260 LBS | SYSTOLIC BLOOD PRESSURE: 162 MMHG | BODY MASS INDEX: 44.39 KG/M2 | HEART RATE: 74 BPM | HEIGHT: 64 IN | TEMPERATURE: 98.6 F | DIASTOLIC BLOOD PRESSURE: 87 MMHG | RESPIRATION RATE: 14 BRPM | OXYGEN SATURATION: 96 %

## 2019-03-04 DIAGNOSIS — J11.1 INFLUENZA-LIKE ILLNESS: Primary | ICD-10-CM

## 2019-03-04 LAB
DIRECT EXAM: NORMAL
Lab: NORMAL
SPECIMEN DESCRIPTION: NORMAL

## 2019-03-04 PROCEDURE — 71046 X-RAY EXAM CHEST 2 VIEWS: CPT

## 2019-03-04 PROCEDURE — 87804 INFLUENZA ASSAY W/OPTIC: CPT

## 2019-03-04 PROCEDURE — 99284 EMERGENCY DEPT VISIT MOD MDM: CPT

## 2019-03-04 PROCEDURE — 93005 ELECTROCARDIOGRAM TRACING: CPT

## 2019-03-04 RX ORDER — OSELTAMIVIR PHOSPHATE 75 MG/1
75 CAPSULE ORAL 2 TIMES DAILY
Qty: 10 CAPSULE | Refills: 0 | Status: SHIPPED | OUTPATIENT
Start: 2019-03-04 | End: 2019-03-09

## 2019-03-04 ASSESSMENT — PAIN DESCRIPTION - PAIN TYPE: TYPE: ACUTE PAIN

## 2019-03-04 ASSESSMENT — PAIN DESCRIPTION - LOCATION: LOCATION: GENERALIZED

## 2019-03-05 LAB
EKG ATRIAL RATE: 70 BPM
EKG P AXIS: 52 DEGREES
EKG P-R INTERVAL: 156 MS
EKG Q-T INTERVAL: 366 MS
EKG QRS DURATION: 80 MS
EKG QTC CALCULATION (BAZETT): 395 MS
EKG R AXIS: -12 DEGREES
EKG T AXIS: 15 DEGREES
EKG VENTRICULAR RATE: 70 BPM

## 2019-03-05 ASSESSMENT — ENCOUNTER SYMPTOMS
EYE DISCHARGE: 0
VOMITING: 0
NAUSEA: 1
WHEEZING: 0
RHINORRHEA: 1
SORE THROAT: 0
EYE REDNESS: 0
ABDOMINAL PAIN: 0
SHORTNESS OF BREATH: 0
COUGH: 1
DIARRHEA: 0

## 2019-04-17 RX ORDER — SODIUM CHLORIDE 0.9 % (FLUSH) 0.9 %
5 SYRINGE (ML) INJECTION PRN
Status: CANCELLED | OUTPATIENT
Start: 2019-05-15

## 2019-04-17 RX ORDER — CYANOCOBALAMIN 1000 UG/ML
1000 INJECTION INTRAMUSCULAR; SUBCUTANEOUS ONCE
Status: CANCELLED
Start: 2019-05-15

## 2019-04-17 RX ORDER — 0.9 % SODIUM CHLORIDE 0.9 %
10 VIAL (ML) INJECTION ONCE
Status: CANCELLED | OUTPATIENT
Start: 2019-05-15

## 2019-04-17 RX ORDER — SODIUM CHLORIDE 9 MG/ML
INJECTION, SOLUTION INTRAVENOUS CONTINUOUS
Status: CANCELLED | OUTPATIENT
Start: 2019-05-15

## 2019-04-17 RX ORDER — HEPARIN SODIUM (PORCINE) LOCK FLUSH IV SOLN 100 UNIT/ML 100 UNIT/ML
500 SOLUTION INTRAVENOUS PRN
Status: CANCELLED | OUTPATIENT
Start: 2019-05-15

## 2019-04-17 RX ORDER — SODIUM CHLORIDE 0.9 % (FLUSH) 0.9 %
10 SYRINGE (ML) INJECTION PRN
Status: CANCELLED | OUTPATIENT
Start: 2019-05-15

## 2019-04-17 RX ORDER — METHYLPREDNISOLONE SODIUM SUCCINATE 125 MG/2ML
125 INJECTION, POWDER, LYOPHILIZED, FOR SOLUTION INTRAMUSCULAR; INTRAVENOUS ONCE
Status: CANCELLED | OUTPATIENT
Start: 2019-05-15

## 2019-04-17 RX ORDER — DIPHENHYDRAMINE HYDROCHLORIDE 50 MG/ML
50 INJECTION INTRAMUSCULAR; INTRAVENOUS ONCE
Status: CANCELLED | OUTPATIENT
Start: 2019-05-15

## 2019-04-17 RX ORDER — EPINEPHRINE 1 MG/ML
0.3 INJECTION, SOLUTION, CONCENTRATE INTRAVENOUS PRN
Status: CANCELLED | OUTPATIENT
Start: 2019-05-15

## 2019-04-17 RX ORDER — CYANOCOBALAMIN 1000 UG/ML
1000 INJECTION INTRAMUSCULAR; SUBCUTANEOUS ONCE
Status: DISCONTINUED
Start: 2019-04-17 | End: 2019-04-19 | Stop reason: HOSPADM

## 2019-04-18 ENCOUNTER — APPOINTMENT (OUTPATIENT)
Dept: GENERAL RADIOLOGY | Age: 44
End: 2019-04-18
Payer: COMMERCIAL

## 2019-04-18 ENCOUNTER — APPOINTMENT (OUTPATIENT)
Dept: CT IMAGING | Age: 44
End: 2019-04-18
Payer: COMMERCIAL

## 2019-04-18 ENCOUNTER — HOSPITAL ENCOUNTER (EMERGENCY)
Age: 44
Discharge: HOME | End: 2019-04-18
Attending: EMERGENCY MEDICINE
Payer: COMMERCIAL

## 2019-04-18 ENCOUNTER — HOSPITAL ENCOUNTER (OUTPATIENT)
Dept: INFUSION THERAPY | Age: 44
Discharge: HOME | End: 2019-04-18
Payer: COMMERCIAL

## 2019-04-18 VITALS
WEIGHT: 270 LBS | RESPIRATION RATE: 11 BRPM | DIASTOLIC BLOOD PRESSURE: 72 MMHG | SYSTOLIC BLOOD PRESSURE: 119 MMHG | HEART RATE: 72 BPM | OXYGEN SATURATION: 99 % | HEIGHT: 64 IN | TEMPERATURE: 98.3 F | BODY MASS INDEX: 46.1 KG/M2

## 2019-04-18 DIAGNOSIS — D50.8 OTHER IRON DEFICIENCY ANEMIA: Primary | ICD-10-CM

## 2019-04-18 DIAGNOSIS — R42 DIZZINESS: Primary | ICD-10-CM

## 2019-04-18 DIAGNOSIS — K90.49 MALABSORPTION DUE TO INTOLERANCE, NOT ELSEWHERE CLASSIFIED: ICD-10-CM

## 2019-04-18 LAB
-: ABNORMAL
AMORPHOUS: ABNORMAL
ANION GAP SERPL CALCULATED.3IONS-SCNC: 9 MMOL/L (ref 9–17)
BACTERIA: ABNORMAL
BILIRUBIN URINE: NEGATIVE
BUN BLDV-MCNC: 11 MG/DL (ref 6–20)
BUN/CREAT BLD: NORMAL (ref 9–20)
CALCIUM SERPL-MCNC: 8.8 MG/DL (ref 8.6–10.4)
CASTS UA: ABNORMAL /LPF
CHLORIDE BLD-SCNC: 105 MMOL/L (ref 98–107)
CO2: 24 MMOL/L (ref 20–31)
COLOR: YELLOW
COMMENT UA: ABNORMAL
CREAT SERPL-MCNC: 0.5 MG/DL (ref 0.5–0.9)
CRYSTALS, UA: ABNORMAL /HPF
EPITHELIAL CELLS UA: ABNORMAL /HPF
FERRITIN: 11 UG/L (ref 13–150)
GFR AFRICAN AMERICAN: >60 ML/MIN
GFR NON-AFRICAN AMERICAN: >60 ML/MIN
GFR SERPL CREATININE-BSD FRML MDRD: NORMAL ML/MIN/{1.73_M2}
GFR SERPL CREATININE-BSD FRML MDRD: NORMAL ML/MIN/{1.73_M2}
GLUCOSE BLD-MCNC: 99 MG/DL (ref 70–99)
GLUCOSE URINE: ABNORMAL
HCT VFR BLD CALC: 38.3 % (ref 36–46)
HEMOGLOBIN: 12.8 G/DL (ref 12–16)
KETONES, URINE: NEGATIVE
LEUKOCYTE ESTERASE, URINE: NEGATIVE
MCH RBC QN AUTO: 26.9 PG (ref 26–34)
MCHC RBC AUTO-ENTMCNC: 33.3 G/DL (ref 31–37)
MCV RBC AUTO: 80.7 FL (ref 80–100)
MUCUS: ABNORMAL
NITRITE, URINE: NEGATIVE
NRBC AUTOMATED: NORMAL PER 100 WBC
OTHER OBSERVATIONS UA: ABNORMAL
PDW BLD-RTO: 14.9 % (ref 12.5–15.4)
PH UA: 6
PLATELET # BLD: 218 K/UL (ref 140–450)
PMV BLD AUTO: 9.4 FL (ref 6–12)
POTASSIUM SERPL-SCNC: 4.1 MMOL/L (ref 3.7–5.3)
PROTEIN UA: NEGATIVE
RBC # BLD: 4.75 M/UL (ref 4–5.2)
RBC UA: ABNORMAL /HPF
RENAL EPITHELIAL, UA: ABNORMAL /HPF
SODIUM BLD-SCNC: 138 MMOL/L (ref 135–144)
SPECIFIC GRAVITY UA: 1.02
TRICHOMONAS: ABNORMAL
TROPONIN INTERP: NORMAL
TROPONIN T: NORMAL NG/ML
TROPONIN, HIGH SENSITIVITY: <6 NG/L (ref 0–14)
TURBIDITY: ABNORMAL
URINE HGB: NEGATIVE
UROBILINOGEN, URINE: NORMAL
VITAMIN D 25-HYDROXY: 24.3 NG/ML (ref 30–100)
WBC # BLD: 5.2 K/UL (ref 3.5–11)
WBC UA: ABNORMAL /HPF
YEAST: ABNORMAL

## 2019-04-18 PROCEDURE — 96372 THER/PROPH/DIAG INJ SC/IM: CPT | Performed by: NURSE PRACTITIONER

## 2019-04-18 PROCEDURE — 80048 BASIC METABOLIC PNL TOTAL CA: CPT

## 2019-04-18 PROCEDURE — 82525 ASSAY OF COPPER: CPT

## 2019-04-18 PROCEDURE — 6360000002 HC RX W HCPCS: Performed by: INTERNAL MEDICINE

## 2019-04-18 PROCEDURE — 99284 EMERGENCY DEPT VISIT MOD MDM: CPT

## 2019-04-18 PROCEDURE — 36415 COLL VENOUS BLD VENIPUNCTURE: CPT

## 2019-04-18 PROCEDURE — 84484 ASSAY OF TROPONIN QUANT: CPT

## 2019-04-18 PROCEDURE — 81001 URINALYSIS AUTO W/SCOPE: CPT

## 2019-04-18 PROCEDURE — 70450 CT HEAD/BRAIN W/O DYE: CPT

## 2019-04-18 PROCEDURE — 6370000000 HC RX 637 (ALT 250 FOR IP): Performed by: EMERGENCY MEDICINE

## 2019-04-18 PROCEDURE — 84630 ASSAY OF ZINC: CPT

## 2019-04-18 PROCEDURE — 93005 ELECTROCARDIOGRAM TRACING: CPT

## 2019-04-18 PROCEDURE — 71046 X-RAY EXAM CHEST 2 VIEWS: CPT

## 2019-04-18 PROCEDURE — 82728 ASSAY OF FERRITIN: CPT

## 2019-04-18 PROCEDURE — 85027 COMPLETE CBC AUTOMATED: CPT

## 2019-04-18 PROCEDURE — 82306 VITAMIN D 25 HYDROXY: CPT

## 2019-04-18 RX ORDER — CYANOCOBALAMIN 1000 UG/ML
1000 INJECTION INTRAMUSCULAR; SUBCUTANEOUS ONCE
Status: COMPLETED
Start: 2019-04-18 | End: 2019-04-18

## 2019-04-18 RX ORDER — MECLIZINE HCL 12.5 MG/1
25 TABLET ORAL ONCE
Status: COMPLETED | OUTPATIENT
Start: 2019-04-18 | End: 2019-04-18

## 2019-04-18 RX ORDER — MECLIZINE HYDROCHLORIDE 25 MG/1
25 TABLET ORAL 3 TIMES DAILY PRN
Qty: 30 TABLET | Refills: 0 | Status: SHIPPED | OUTPATIENT
Start: 2019-04-18 | End: 2019-04-28

## 2019-04-18 RX ADMIN — MECLIZINE 25 MG: 12.5 TABLET ORAL at 10:53

## 2019-04-18 RX ADMIN — CYANOCOBALAMIN 1000 MCG: 1000 INJECTION, SOLUTION INTRAMUSCULAR at 08:46

## 2019-04-18 ASSESSMENT — PAIN DESCRIPTION - LOCATION: LOCATION: HEAD

## 2019-04-18 ASSESSMENT — PAIN SCALES - GENERAL: PAINLEVEL_OUTOF10: 5

## 2019-04-18 ASSESSMENT — PAIN DESCRIPTION - DESCRIPTORS: DESCRIPTORS: ACHING

## 2019-04-18 NOTE — PROGRESS NOTES
Patient here for her B12 injection. She c/o not feeling well. She tolerated her injection well. She stated that she was suppose to have lab work after her last visit with Dr. Kamran Sherwood. But she had not started her Vit D yet. So someone told her not to have her labs drawn that day. She wanted to know if she should have them done today. I told her that would be fine. Katt Reilly from registration called me. She informed me that she had her labs drawn. Patient wanted to know if she should wait. I told zaria to tell the patient she could and I will watch for her. Her cbc is the only lab that would be back today. The cbc is completely with in normal limits. I updated Katt Reilly and she will let the patient know. I encouraged Katt Reilly to tell her she should go to the ED if she still was feeling bad. Katt Reilly verbalized understanding.

## 2019-04-18 NOTE — ED PROVIDER NOTES
Musculoskeletal: No extremity pain or swelling   Neurologic: Moving all 4 extremities without difficulty there are no gross focal neurologic deficits finger to nose normal.  Skin: Warm and dry       Physical Exam  DIFFERENTIAL DIAGNOSIS/ MDM:     No gross focal neurologic deficits. Labs EKG chest x-ray and CT head. DIAGNOSTIC RESULTS     EKG: All [facility]'s [facility] who either signs or Co-signs this chart in the absence of a cardiologist.    1016 sinus rhythm rate 74.  QRS 74  no acute ST or T wave changes.     Not indicated unless otherwise documented above    LABS:  Results for orders placed or performed during the hospital encounter of 63/92/46   Basic Metabolic Panel   Result Value Ref Range    Glucose 99 70 - 99 mg/dL    BUN 11 6 - 20 mg/dL    CREATININE 0.50 0.50 - 0.90 mg/dL    Bun/Cre Ratio NOT REPORTED 9 - 20    Calcium 8.8 8.6 - 10.4 mg/dL    Sodium 138 135 - 144 mmol/L    Potassium 4.1 3.7 - 5.3 mmol/L    Chloride 105 98 - 107 mmol/L    CO2 24 20 - 31 mmol/L    Anion Gap 9 9 - 17 mmol/L    GFR Non-African American >60 >60 mL/min    GFR African American >60 >60 mL/min    GFR Comment          GFR Staging NOT REPORTED    Troponin   Result Value Ref Range    Troponin, High Sensitivity <6 0 - 14 ng/L    Troponin T NOT REPORTED <0.03 ng/mL    Troponin Interp NOT REPORTED    Urinalysis Reflex to Culture   Result Value Ref Range    Color, UA YELLOW YELLOW    Turbidity UA SLIGHTLY CLOUDY (A) CLEAR    Glucose, Ur 1+ (A) NEGATIVE    Bilirubin Urine NEGATIVE NEGATIVE    Ketones, Urine NEGATIVE NEGATIVE    Specific [place], UA 1.025     Urine Hgb NEGATIVE NEGATIVE    pH, UA 6.0     Protein, UA NEGATIVE NEGATIVE    Urobilinogen, Urine Normal Normal    Nitrite, Urine NEGATIVE NEGATIVE    Leukocyte Esterase, Urine NEGATIVE NEGATIVE    Urinalysis Comments NOT REPORTED    Microscopic Urinalysis   Result Value Ref Range    -          WBC, UA 0 TO 2 /HPF    RBC, UA 0 TO 2 /HPF    Casts UA NOT REPORTED /LPF    Crystals UA NOT REPORTED None /HPF    Epithelial Cells UA 2 TO 5 /HPF    Renal Epithelial, Urine NOT REPORTED 0 /HPF    Bacteria, UA FEW (A) None    Mucus, UA 2+ (A) None    Trichomonas, UA NOT REPORTED None    Amorphous, UA NOT REPORTED None    Other Observations UA (A) NOT REQ. Utilizing a urinalysis as the only screening method to exclude a potential uropathogen can be unreliable in many patient populations. Rapid screening tests are less sensitive than culture and if UTI is a clinical possibility, culture should be considered despite a negative urinalysis. Yeast, UA NOT REPORTED None       Not indicated unless otherwise documented above    RADIOLOGY:   I reviewed the radiologist interpretations:    XR CHEST STANDARD (2 VW)   Final Result   No acute findings in the chest.         CT Head WO Contrast   Final Result   No acute intracranial abnormality. Not indicated unless otherwise documented above    EMERGENCY DEPARTMENT COURSE:     The patient was given the following medications:  Orders Placed This Encounter   Medications    meclizine (ANTIVERT) tablet 25 mg    meclizine (ANTIVERT) 25 MG tablet     Sig: Take 1 tablet by mouth 3 times daily as needed for Dizziness     Dispense:  30 tablet     Refill:  0        Vitals:   -------------------------  /82   Pulse 74   Temp 97.9 °F (36.6 °C)   Resp 16   Ht 5' 4\" (1.626 m)   Wt 122.5 kg (270 lb)   SpO2 99%   BMI 46.35 kg/m²     11:35 AM CAT scan unremarkable. Labs unremarkable. Hemoglobin in the office was 12.8. Discharge to follow-up with ENT for possible further workup. May need cardiology as well. Despite now admitting that this is been going on since 2009 those avenues have not been explored. Discharge with [facility] [place]. Return if worsening symptoms or any other concerns. I have reviewed the disposition diagnosis with the patient and or their family/guardian.  I have answered their questions and given discharge instructions. They voiced understanding of these instructions and did not have any furtherquestions or complaints. CRITICAL CARE:    None    CONSULTS:    None    PROCEDURES:    None      OARRS Report if indicated             FINAL IMPRESSION      1.  Dizziness          DISPOSITION/PLAN   DISPOSITION          PATIENT REFERRED TO:  Brandon Roach MD  [address] [place] [address], [address] [place] [address]  [address]  [phone]    Schedule an appointment as soon as possible for a visit in 1 week        DISCHARGE MEDICATIONS:  New Prescriptions    MECLIZINE (ANTIVERT) 25 MG TABLET    Take 1 tablet by mouth 3 times daily as needed for Dizziness       (Please note that portions of thisnote were completed with a voice recognition program.  Efforts were made to edit the dictations but occasionally words are mis-transcribed.)    Valeria Jose,, DO  Attending Emergency Physician        Valeria Jose, DO  04/18/19 [address], DO  04/18/19 1710

## 2019-04-20 LAB
EKG ATRIAL RATE: 74 BPM
EKG P AXIS: 37 DEGREES
EKG P-R INTERVAL: 174 MS
EKG Q-T INTERVAL: 372 MS
EKG QRS DURATION: 74 MS
EKG QTC CALCULATION (BAZETT): 412 MS
EKG R AXIS: -15 DEGREES
EKG T AXIS: 20 DEGREES
EKG VENTRICULAR RATE: 74 BPM

## 2019-04-21 LAB — COPPER: 109 UG/DL (ref 80–155)

## 2019-04-22 ENCOUNTER — TELEPHONE (OUTPATIENT)
Dept: ONCOLOGY | Age: 44
End: 2019-04-22

## 2019-04-22 NOTE — TELEPHONE ENCOUNTER
Florecita, CNP, reviewed lab results and stated Ferritin is low. Florecita stated, per Dr Franca Wolfe, to order Venofer. Writer informed Shelli Tran, pharmacist, of above. Shelli Tran to add cycle to patient's order.  Emmie Kevin

## 2019-04-24 RX ORDER — SODIUM CHLORIDE 0.9 % (FLUSH) 0.9 %
5 SYRINGE (ML) INJECTION PRN
Status: CANCELLED | OUTPATIENT
Start: 2019-04-24

## 2019-04-24 RX ORDER — SODIUM CHLORIDE 9 MG/ML
INJECTION, SOLUTION INTRAVENOUS CONTINUOUS
Status: CANCELLED | OUTPATIENT
Start: 2019-04-24

## 2019-04-24 RX ORDER — SODIUM CHLORIDE 0.9 % (FLUSH) 0.9 %
10 SYRINGE (ML) INJECTION PRN
Status: CANCELLED | OUTPATIENT
Start: 2019-04-24

## 2019-04-24 RX ORDER — METHYLPREDNISOLONE SODIUM SUCCINATE 125 MG/2ML
125 INJECTION, POWDER, LYOPHILIZED, FOR SOLUTION INTRAMUSCULAR; INTRAVENOUS ONCE
Status: CANCELLED | OUTPATIENT
Start: 2019-04-24

## 2019-04-24 RX ORDER — HEPARIN SODIUM (PORCINE) LOCK FLUSH IV SOLN 100 UNIT/ML 100 UNIT/ML
500 SOLUTION INTRAVENOUS PRN
Status: CANCELLED | OUTPATIENT
Start: 2019-04-24

## 2019-04-24 RX ORDER — 0.9 % SODIUM CHLORIDE 0.9 %
10 VIAL (ML) INJECTION ONCE
Status: CANCELLED | OUTPATIENT
Start: 2019-04-24

## 2019-04-24 RX ORDER — EPINEPHRINE 1 MG/ML
0.3 INJECTION, SOLUTION, CONCENTRATE INTRAVENOUS PRN
Status: CANCELLED | OUTPATIENT
Start: 2019-04-24

## 2019-04-24 RX ORDER — DIPHENHYDRAMINE HYDROCHLORIDE 50 MG/ML
50 INJECTION INTRAMUSCULAR; INTRAVENOUS ONCE
Status: CANCELLED | OUTPATIENT
Start: 2019-04-24

## 2019-04-25 LAB — ZINC: 68 UG/DL (ref 60–120)

## 2019-04-26 NOTE — TELEPHONE ENCOUNTER
Venofer denied by insurance due to Hgb >12. Writer faxed lab results showing low ferritin to utilization mgmt at [phone] and marketplace at [phone].  Babar Morin No

## 2019-04-30 ENCOUNTER — TELEPHONE (OUTPATIENT)
Dept: ONCOLOGY | Age: 44
End: 2019-04-30

## 2019-05-01 PROBLEM — K90.9 MALABSORPTION OF IRON: Status: ACTIVE | Noted: 2019-05-01

## 2019-05-01 NOTE — TELEPHONE ENCOUNTER
Dr Danielle Valenzeula stated he performed peer to peer with pharmacist and stated that Venofer is denied due to Hgb >12.  Sumi Talley

## 2019-05-03 ENCOUNTER — TELEPHONE (OUTPATIENT)
Dept: ONCOLOGY | Age: 44
End: 2019-05-03

## 2019-05-03 NOTE — TELEPHONE ENCOUNTER
Pt called and LM questioning her iron infusion, I see notes from RN's that it was denied. Looks like a peer to peer was possible done on 5/2.  Will talk to jerzy rn on 5/6    I called pt back to let her know that I will look into it next week but mailbox is full

## 2019-05-08 ENCOUNTER — OFFICE VISIT (OUTPATIENT)
Dept: ONCOLOGY | Age: 44
End: 2019-05-08
Payer: COMMERCIAL

## 2019-05-08 ENCOUNTER — HOSPITAL ENCOUNTER (OUTPATIENT)
Age: 44
Discharge: HOME | End: 2019-05-08
Payer: COMMERCIAL

## 2019-05-08 VITALS
HEART RATE: 81 BPM | TEMPERATURE: 98 F | WEIGHT: 269.8 LBS | BODY MASS INDEX: 46.31 KG/M2 | SYSTOLIC BLOOD PRESSURE: 133 MMHG | DIASTOLIC BLOOD PRESSURE: 88 MMHG

## 2019-05-08 DIAGNOSIS — D50.8 OTHER IRON DEFICIENCY ANEMIA: ICD-10-CM

## 2019-05-08 LAB
ABSOLUTE EOS #: 0.1 K/UL (ref 0–0.4)
ABSOLUTE IMMATURE GRANULOCYTE: ABNORMAL K/UL (ref 0–0.3)
ABSOLUTE LYMPH #: 2.1 K/UL (ref 1–4.8)
ABSOLUTE MONO #: 0.6 K/UL (ref 0.1–1.2)
BASOPHILS # BLD: 1 % (ref 0–2)
BASOPHILS ABSOLUTE: 0.1 K/UL (ref 0–0.2)
DIFFERENTIAL TYPE: ABNORMAL
EOSINOPHILS RELATIVE PERCENT: 2 % (ref 1–4)
FERRITIN: 8 UG/L (ref 13–150)
HCT VFR BLD CALC: 36.2 % (ref 36–46)
HEMOGLOBIN: 12 G/DL (ref 12–16)
IMMATURE GRANULOCYTES: ABNORMAL %
LYMPHOCYTES # BLD: 27 % (ref 24–44)
MCH RBC QN AUTO: 26.5 PG (ref 26–34)
MCHC RBC AUTO-ENTMCNC: 33.2 G/DL (ref 31–37)
MCV RBC AUTO: 79.9 FL (ref 80–100)
MONOCYTES # BLD: 8 % (ref 2–11)
NRBC AUTOMATED: ABNORMAL PER 100 WBC
PDW BLD-RTO: 14.6 % (ref 12.5–15.4)
PLATELET # BLD: 298 K/UL (ref 140–450)
PLATELET ESTIMATE: ABNORMAL
PMV BLD AUTO: 9.2 FL (ref 6–12)
RBC # BLD: 4.54 M/UL (ref 4–5.2)
RBC # BLD: ABNORMAL 10*6/UL
SEG NEUTROPHILS: 62 % (ref 36–66)
SEGMENTED NEUTROPHILS ABSOLUTE COUNT: 4.7 K/UL (ref 1.8–7.7)
WBC # BLD: 7.6 K/UL (ref 3.5–11)
WBC # BLD: ABNORMAL 10*3/UL

## 2019-05-08 PROCEDURE — 1036F TOBACCO NON-USER: CPT | Performed by: INTERNAL MEDICINE

## 2019-05-08 PROCEDURE — 82728 ASSAY OF FERRITIN: CPT

## 2019-05-08 PROCEDURE — 99211 OFF/OP EST MAY X REQ PHY/QHP: CPT | Performed by: INTERNAL MEDICINE

## 2019-05-08 PROCEDURE — 36415 COLL VENOUS BLD VENIPUNCTURE: CPT

## 2019-05-08 PROCEDURE — 85025 COMPLETE CBC W/AUTO DIFF WBC: CPT

## 2019-05-08 PROCEDURE — 99213 OFFICE O/P EST LOW 20 MIN: CPT | Performed by: INTERNAL MEDICINE

## 2019-05-08 PROCEDURE — G8427 DOCREV CUR MEDS BY ELIG CLIN: HCPCS | Performed by: INTERNAL MEDICINE

## 2019-05-08 PROCEDURE — G8417 CALC BMI ABV UP PARAM F/U: HCPCS | Performed by: INTERNAL MEDICINE

## 2019-05-08 NOTE — PROGRESS NOTES
PROGRESS NOTE    PCP: No primary care provider on file. Referring Provider: No ref. provider found    VISIT DIAGNOSIS:  The encounter diagnosis was Other iron deficiency anemia. PAST MEDICAL HISTORY:      Diagnosis Date    Anemia     Anxiety     Carpal tunnel syndrome     Depression     Endometriosis     Gestational diabetes     x 3 children    Iron deficiency anemia     Scleroderma ([address]        PAST SURGICAL HISTORY:      Procedure Laterality Date     SECTION  x 3    CHOLECYSTECTOMY      GASTRIC BYPASS SURGERY      OTHER SURGICAL HISTORY      growth removed from outer area of uterus    PELVIC LAPAROSCOPY      TOENAIL EXCISION         CURRENT MEDICATIONS:   Current Outpatient Medications   Medication Sig Dispense Refill    vitamin D (CHOLECALCIFEROL) 1000 UNIT TABS tablet Take 50 tablets by mouth once a week for 7 doses Sig one 50,000 IU tablet weekly x 7 weeks 350 tablet 0    zinc gluconate 50 MG tablet Take 25 mg by mouth daily      Multiple Vitamins-Minerals (THERAPEUTIC MULTIVITAMIN-MINERALS) tablet Take 1 tablet by mouth daily      Cholecalciferol (VITAMIN D3) 56654 units CAPS Take 1 capsule by mouth once a week for 7 doses 7 capsule 1     No current facility-administered medications for this visit. SUBJECTIVE:  Rufino Hernandez is a very pleasant 37 y.o. female who is for continued evaluation regard to her continued iron deficiency anemia. She notes last one to 2 months she's much more fatigued than she previously has. By description she's been seen in urgent care/the ER in regard to ongoing disequilibrium and feeling off balance. She's not fallen. Symptoms did improve about a week or 10 days ago but she is very very fatigued. She has not started any other medication. You. She's not had this in the past though. She denies chest pain orthopnea she is quite short of breath with exertion this is worsening. I feel miserable again. .  I need to get the iron again. Ju Perry \". Blood in stools or black or tarry stools no hematuria no epistaxis. She's not had interval infections. She's not been hospitalized. She's not required transfusion. Notes no focal numbness tingling or weakness. No blurry vision or double vision no headaches no mouth sores are noted. The patients past, family and social histories documented within the EHR have been reviewed. Any updates or changes have been made to the EHR. PHYSICAL EXAM:   Vitals:    05/08/19 1527   BP: 133/88   Pulse: 81   Temp: 98 °F (36.7 °C)       Physical Exam   Constitutional: She is oriented to person, place, and time. She appears well-developed and well-nourished. No distress. HENT:   Head: Normocephalic and atraumatic. Mouth/Throat: Oropharynx is clear and moist. No oropharyngeal exudate. Eyes: Pupils are equal, round, and reactive to light. Conjunctivae are normal. Right eye exhibits no discharge. No scleral icterus. Neck: Normal range of motion. Neck supple. No JVD present. No tracheal deviation present. No thyromegaly present. Cardiovascular: Normal rate, regular rhythm, normal heart sounds and intact distal pulses. Exam reveals no gallop and no friction rub. No murmur heard. Pulmonary/Chest: Effort normal and breath sounds normal. No stridor. No respiratory distress. She has no wheezes. She has no rales. She exhibits no tenderness. Abdominal: Soft. Bowel sounds are normal. She exhibits no distension and no mass. There is no tenderness. There is no rebound and no guarding. No hernia. Musculoskeletal: Normal range of motion. She exhibits no edema, tenderness or deformity. Lymphadenopathy:     She has no cervical adenopathy. Neurological: She is alert and oriented to person, place, and time. She displays normal reflexes. No cranial nerve deficit or sensory deficit. Coordination normal.   Skin: Skin is warm and dry. No rash noted. She is not diaphoretic. No erythema. No pallor.        LABS:   Results for EKG 12 Lead   Result Value Ref Range    Ventricular Rate 74 BPM    Atrial Rate 74 BPM    P-R Interval 174 ms    QRS Duration 74 ms    Q-T Interval 372 ms    QTc Calculation (Bazett) 412 ms    P Axis 37 degrees    R Axis -15 degrees    T Axis 20 degrees       IMPRESSION:     1. Other iron deficiency anemia        Patient Active Problem List   Diagnosis    Iron deficiency anemia    Intestinal malabsorption    Anxiety    Depression    Malabsorption of iron       PLAN:     49-year-old white female known history of anxiety/depression, scleroderma, DJD, known histories of gastric bypass resulting in iron deficiency from absorption issues.  She's not been seen since July 2 insurance issues. Radha Maciel has ongoing issues with swelling/lymphedema of her lower extremities left predominantly over right continues to be an ongoing chronic issue. Bharathi continues to have issues with left lower extremity edema chronically. She has attempted to set up a second opinion regarding vascular surgery at [facility] clinic and for some reason has not been approved. She may go back to [address] she is unclear of where she wants to go next. Swelling is no worse but continues to be an ongoing issue. Of note there was suggestion of iliac vein compression syndrome by vascular surgery at [facility]. Last ferritin was 8 thus she is iron deficient severely. I've related to her that I did write orders for Venofer, however, her insurance is rejected this based on a policy that requires the hemoglobin to drop below 12 regardless of the ferritin before iron infusion. I've discussed that I reviewed this with. Appear with her insurance spoke to a pharmacist covering the [id number] and reviewed that I thought this was a real disservice that led to patient's having symptoms and ongoing issues and could put patients at risk for cardiovascular events/laded events to anemia/iron deficiency really impaired quality of life/Ali of care.   His pharmacist phyllis nsurance policy of requiring the hemoglobin to drop below 12 . [employer] [facility] pharmacist [id number] was witnessed by [employer]. Patient's severity symptoms I will attempt to initiate appeal.  Otherwise I've written orders for CBC and ferritin be checked every 3 weeks. I've asked Mee Gray to contact me interim new questions problems or ongoing issues particularly worsening symptoms of anemia. I will plan a three-month follow-up otherwise. He is verbalizes understanding and is in agreement with plan of care.     1.       Electronically signed by Sandra Turner DO on 5/8/2019 at 4:09 PM

## 2019-05-15 ENCOUNTER — HOSPITAL ENCOUNTER (OUTPATIENT)
Dept: INFUSION THERAPY | Age: 44
Discharge: HOME | End: 2019-05-15
Payer: COMMERCIAL

## 2019-05-15 DIAGNOSIS — K90.49 MALABSORPTION DUE TO INTOLERANCE, NOT ELSEWHERE CLASSIFIED: ICD-10-CM

## 2019-05-15 DIAGNOSIS — D50.8 OTHER IRON DEFICIENCY ANEMIA: Primary | ICD-10-CM

## 2019-05-15 PROCEDURE — 96372 THER/PROPH/DIAG INJ SC/IM: CPT

## 2019-05-15 PROCEDURE — 6360000002 HC RX W HCPCS: Performed by: INTERNAL MEDICINE

## 2019-05-15 RX ORDER — 0.9 % SODIUM CHLORIDE 0.9 %
10 VIAL (ML) INJECTION ONCE
Status: CANCELLED | OUTPATIENT
Start: 2019-06-12

## 2019-05-15 RX ORDER — SODIUM CHLORIDE 9 MG/ML
INJECTION, SOLUTION INTRAVENOUS CONTINUOUS
Status: CANCELLED | OUTPATIENT
Start: 2019-06-12

## 2019-05-15 RX ORDER — SODIUM CHLORIDE 0.9 % (FLUSH) 0.9 %
5 SYRINGE (ML) INJECTION PRN
Status: CANCELLED | OUTPATIENT
Start: 2019-06-12

## 2019-05-15 RX ORDER — HEPARIN SODIUM (PORCINE) LOCK FLUSH IV SOLN 100 UNIT/ML 100 UNIT/ML
500 SOLUTION INTRAVENOUS PRN
Status: CANCELLED | OUTPATIENT
Start: 2019-06-12

## 2019-05-15 RX ORDER — CYANOCOBALAMIN 1000 UG/ML
1000 INJECTION INTRAMUSCULAR; SUBCUTANEOUS ONCE
Status: COMPLETED
Start: 2019-05-15 | End: 2019-05-15

## 2019-05-15 RX ORDER — DIPHENHYDRAMINE HYDROCHLORIDE 50 MG/ML
50 INJECTION INTRAMUSCULAR; INTRAVENOUS ONCE
Status: CANCELLED | OUTPATIENT
Start: 2019-06-12

## 2019-05-15 RX ORDER — CYANOCOBALAMIN 1000 UG/ML
1000 INJECTION INTRAMUSCULAR; SUBCUTANEOUS ONCE
Status: CANCELLED
Start: 2019-06-12

## 2019-05-15 RX ORDER — SODIUM CHLORIDE 0.9 % (FLUSH) 0.9 %
10 SYRINGE (ML) INJECTION PRN
Status: CANCELLED | OUTPATIENT
Start: 2019-06-12

## 2019-05-15 RX ORDER — METHYLPREDNISOLONE SODIUM SUCCINATE 125 MG/2ML
125 INJECTION, POWDER, LYOPHILIZED, FOR SOLUTION INTRAMUSCULAR; INTRAVENOUS ONCE
Status: CANCELLED | OUTPATIENT
Start: 2019-06-12

## 2019-05-15 RX ORDER — EPINEPHRINE 1 MG/ML
0.3 INJECTION, SOLUTION, CONCENTRATE INTRAVENOUS PRN
Status: CANCELLED | OUTPATIENT
Start: 2019-06-12

## 2019-05-15 RX ADMIN — CYANOCOBALAMIN 1000 MCG: 1000 INJECTION, SOLUTION INTRAMUSCULAR at 16:02

## 2019-06-20 ENCOUNTER — HOSPITAL ENCOUNTER (OUTPATIENT)
Age: 44
Discharge: HOME | End: 2019-06-20
Payer: COMMERCIAL

## 2019-06-20 DIAGNOSIS — D50.8 OTHER IRON DEFICIENCY ANEMIA: ICD-10-CM

## 2019-06-20 LAB
ABSOLUTE EOS #: 0.1 K/UL (ref 0–0.4)
ABSOLUTE IMMATURE GRANULOCYTE: ABNORMAL K/UL (ref 0–0.3)
ABSOLUTE LYMPH #: 1.7 K/UL (ref 1–4.8)
ABSOLUTE MONO #: 0.7 K/UL (ref 0.1–1.2)
BASOPHILS # BLD: 1 % (ref 0–2)
BASOPHILS ABSOLUTE: 0.1 K/UL (ref 0–0.2)
DIFFERENTIAL TYPE: ABNORMAL
EOSINOPHILS RELATIVE PERCENT: 1 % (ref 1–4)
HCT VFR BLD CALC: 31.9 % (ref 36–46)
HEMOGLOBIN: 10.2 G/DL (ref 12–16)
IMMATURE GRANULOCYTES: ABNORMAL %
LYMPHOCYTES # BLD: 22 % (ref 24–44)
MCH RBC QN AUTO: 23.9 PG (ref 26–34)
MCHC RBC AUTO-ENTMCNC: 32.1 G/DL (ref 31–37)
MCV RBC AUTO: 74.4 FL (ref 80–100)
MONOCYTES # BLD: 9 % (ref 2–11)
NRBC AUTOMATED: ABNORMAL PER 100 WBC
PDW BLD-RTO: 15.7 % (ref 12.5–15.4)
PLATELET # BLD: 223 K/UL (ref 140–450)
PLATELET ESTIMATE: ABNORMAL
PMV BLD AUTO: 9.3 FL (ref 6–12)
RBC # BLD: 4.28 M/UL (ref 4–5.2)
RBC # BLD: ABNORMAL 10*6/UL
SEG NEUTROPHILS: 67 % (ref 36–66)
SEGMENTED NEUTROPHILS ABSOLUTE COUNT: 5.2 K/UL (ref 1.8–7.7)
WBC # BLD: 7.7 K/UL (ref 3.5–11)
WBC # BLD: ABNORMAL 10*3/UL

## 2019-06-20 PROCEDURE — 82728 ASSAY OF FERRITIN: CPT

## 2019-06-20 PROCEDURE — 36415 COLL VENOUS BLD VENIPUNCTURE: CPT

## 2019-06-20 PROCEDURE — 85025 COMPLETE CBC W/AUTO DIFF WBC: CPT

## 2019-06-21 LAB — FERRITIN: 5 UG/L (ref 13–150)

## 2019-06-25 ENCOUNTER — TELEPHONE (OUTPATIENT)
Dept: ONCOLOGY | Age: 44
End: 2019-06-25

## 2019-06-25 NOTE — TELEPHONE ENCOUNTER
Call received from patient requesting lab results from 6/20/19. Lab results provided. Patient questioning if Venofer will be approved. Writer spoke with Bailee Bernal, , and she stated to have pharmacy re-drop Venofer. Writer informed Liliam Flores, pre-cert, of lab results from 6/20/19 with Hgb 10.2 and ferritin 5.  Nahomy Mendez

## 2019-06-26 NOTE — TELEPHONE ENCOUNTER
Aidan Marks informed writer to contact [employer] at [phone] to obtain a PA for Venofer. Writer called [employer] at above number and spoke with Cary. Cary faxed over PA form. PA form completed and faxed to [employer] at [phone] along with cbc results, ferritin results, and progress note.  Alex Lara

## 2019-06-26 NOTE — TELEPHONE ENCOUNTER
Writer requested status of pre-cert form Joseph Garza. Lula Santiago stated she would look into pre-cert for [employer].  Ashwin Vasquez

## 2019-07-11 ENCOUNTER — HOSPITAL ENCOUNTER (OUTPATIENT)
Dept: INFUSION THERAPY | Age: 44
Discharge: HOME | End: 2019-07-11
Payer: COMMERCIAL

## 2019-07-11 VITALS
TEMPERATURE: 99.1 F | HEART RATE: 87 BPM | RESPIRATION RATE: 16 BRPM | DIASTOLIC BLOOD PRESSURE: 80 MMHG | SYSTOLIC BLOOD PRESSURE: 139 MMHG

## 2019-07-11 DIAGNOSIS — K90.49 MALABSORPTION DUE TO INTOLERANCE, NOT ELSEWHERE CLASSIFIED: ICD-10-CM

## 2019-07-11 DIAGNOSIS — D50.8 OTHER IRON DEFICIENCY ANEMIA: Primary | ICD-10-CM

## 2019-07-11 PROCEDURE — 2580000003 HC RX 258: Performed by: INTERNAL MEDICINE

## 2019-07-11 PROCEDURE — 96372 THER/PROPH/DIAG INJ SC/IM: CPT

## 2019-07-11 PROCEDURE — 96366 THER/PROPH/DIAG IV INF ADDON: CPT

## 2019-07-11 PROCEDURE — 6360000002 HC RX W HCPCS: Performed by: INTERNAL MEDICINE

## 2019-07-11 PROCEDURE — 96365 THER/PROPH/DIAG IV INF INIT: CPT

## 2019-07-11 RX ORDER — CYANOCOBALAMIN 1000 UG/ML
1000 INJECTION INTRAMUSCULAR; SUBCUTANEOUS ONCE
Status: COMPLETED
Start: 2019-07-11 | End: 2019-07-11

## 2019-07-11 RX ORDER — 0.9 % SODIUM CHLORIDE 0.9 %
10 VIAL (ML) INJECTION ONCE
Status: CANCELLED | OUTPATIENT
Start: 2019-07-15

## 2019-07-11 RX ORDER — METHYLPREDNISOLONE SODIUM SUCCINATE 125 MG/2ML
125 INJECTION, POWDER, LYOPHILIZED, FOR SOLUTION INTRAMUSCULAR; INTRAVENOUS ONCE
Status: CANCELLED | OUTPATIENT
Start: 2019-07-15

## 2019-07-11 RX ORDER — SODIUM CHLORIDE 9 MG/ML
INJECTION, SOLUTION INTRAVENOUS CONTINUOUS
Status: CANCELLED | OUTPATIENT
Start: 2019-07-15

## 2019-07-11 RX ORDER — SODIUM CHLORIDE 9 MG/ML
INJECTION, SOLUTION INTRAVENOUS CONTINUOUS
Status: DISCONTINUED | OUTPATIENT
Start: 2019-07-11 | End: 2019-07-11 | Stop reason: HOSPADM

## 2019-07-11 RX ORDER — CYANOCOBALAMIN 1000 UG/ML
1000 INJECTION INTRAMUSCULAR; SUBCUTANEOUS ONCE
Status: CANCELLED
Start: 2019-08-08

## 2019-07-11 RX ORDER — SODIUM CHLORIDE 0.9 % (FLUSH) 0.9 %
5 SYRINGE (ML) INJECTION PRN
Status: CANCELLED | OUTPATIENT
Start: 2019-07-15

## 2019-07-11 RX ORDER — HEPARIN SODIUM (PORCINE) LOCK FLUSH IV SOLN 100 UNIT/ML 100 UNIT/ML
500 SOLUTION INTRAVENOUS PRN
Status: CANCELLED | OUTPATIENT
Start: 2019-07-15

## 2019-07-11 RX ORDER — SODIUM CHLORIDE 0.9 % (FLUSH) 0.9 %
10 SYRINGE (ML) INJECTION PRN
Status: CANCELLED | OUTPATIENT
Start: 2019-07-15

## 2019-07-11 RX ORDER — DIPHENHYDRAMINE HYDROCHLORIDE 50 MG/ML
50 INJECTION INTRAMUSCULAR; INTRAVENOUS ONCE
Status: CANCELLED | OUTPATIENT
Start: 2019-07-15

## 2019-07-11 RX ORDER — EPINEPHRINE 1 MG/ML
0.3 INJECTION, SOLUTION, CONCENTRATE INTRAVENOUS PRN
Status: CANCELLED | OUTPATIENT
Start: 2019-07-15

## 2019-07-11 RX ADMIN — CYANOCOBALAMIN 1000 MCG: 1000 INJECTION, SOLUTION INTRAMUSCULAR at 10:33

## 2019-07-11 RX ADMIN — SODIUM CHLORIDE: 9 INJECTION, SOLUTION INTRAVENOUS at 10:33

## 2019-07-11 RX ADMIN — IRON SUCROSE 300 MG: 20 INJECTION, SOLUTION INTRAVENOUS at 10:36

## 2019-07-11 NOTE — PROGRESS NOTES
Pt arrives ambulatory with daughter at side for #1 of 6 Venofer and B12 injection. C/o fatigue. Venofer and B12 given without incident. Pt kept for 30 min observation, no s/s of reaction. Pt then d/c'd in stable condition. Will return on 7/18/19 for #2 Venofer.

## 2019-07-18 ENCOUNTER — HOSPITAL ENCOUNTER (OUTPATIENT)
Dept: INFUSION THERAPY | Age: 44
Discharge: HOME | End: 2019-07-18
Payer: COMMERCIAL

## 2019-07-18 VITALS
TEMPERATURE: 98.2 F | RESPIRATION RATE: 16 BRPM | HEART RATE: 76 BPM | DIASTOLIC BLOOD PRESSURE: 84 MMHG | SYSTOLIC BLOOD PRESSURE: 124 MMHG

## 2019-07-18 DIAGNOSIS — D50.8 OTHER IRON DEFICIENCY ANEMIA: Primary | ICD-10-CM

## 2019-07-18 PROCEDURE — 2580000003 HC RX 258: Performed by: INTERNAL MEDICINE

## 2019-07-18 PROCEDURE — 96366 THER/PROPH/DIAG IV INF ADDON: CPT | Performed by: NURSE PRACTITIONER

## 2019-07-18 PROCEDURE — 96365 THER/PROPH/DIAG IV INF INIT: CPT | Performed by: NURSE PRACTITIONER

## 2019-07-18 PROCEDURE — 6360000002 HC RX W HCPCS: Performed by: INTERNAL MEDICINE

## 2019-07-18 RX ORDER — EPINEPHRINE 1 MG/ML
0.3 INJECTION, SOLUTION, CONCENTRATE INTRAVENOUS PRN
Status: CANCELLED | OUTPATIENT
Start: 2019-07-22

## 2019-07-18 RX ORDER — SODIUM CHLORIDE 0.9 % (FLUSH) 0.9 %
10 SYRINGE (ML) INJECTION PRN
Status: CANCELLED | OUTPATIENT
Start: 2019-07-22

## 2019-07-18 RX ORDER — SODIUM CHLORIDE 0.9 % (FLUSH) 0.9 %
5 SYRINGE (ML) INJECTION PRN
Status: CANCELLED | OUTPATIENT
Start: 2019-07-22

## 2019-07-18 RX ORDER — HEPARIN SODIUM (PORCINE) LOCK FLUSH IV SOLN 100 UNIT/ML 100 UNIT/ML
500 SOLUTION INTRAVENOUS PRN
Status: CANCELLED | OUTPATIENT
Start: 2019-07-22

## 2019-07-18 RX ORDER — 0.9 % SODIUM CHLORIDE 0.9 %
10 VIAL (ML) INJECTION ONCE
Status: CANCELLED | OUTPATIENT
Start: 2019-07-22

## 2019-07-18 RX ORDER — DIPHENHYDRAMINE HYDROCHLORIDE 50 MG/ML
50 INJECTION INTRAMUSCULAR; INTRAVENOUS ONCE
Status: CANCELLED | OUTPATIENT
Start: 2019-07-22

## 2019-07-18 RX ORDER — SODIUM CHLORIDE 9 MG/ML
INJECTION, SOLUTION INTRAVENOUS CONTINUOUS
Status: CANCELLED | OUTPATIENT
Start: 2019-07-22

## 2019-07-18 RX ORDER — SODIUM CHLORIDE 9 MG/ML
INJECTION, SOLUTION INTRAVENOUS CONTINUOUS
Status: DISCONTINUED | OUTPATIENT
Start: 2019-07-18 | End: 2019-07-19 | Stop reason: HOSPADM

## 2019-07-18 RX ORDER — METHYLPREDNISOLONE SODIUM SUCCINATE 125 MG/2ML
125 INJECTION, POWDER, LYOPHILIZED, FOR SOLUTION INTRAMUSCULAR; INTRAVENOUS ONCE
Status: CANCELLED | OUTPATIENT
Start: 2019-07-22

## 2019-07-18 RX ORDER — CYANOCOBALAMIN 1000 UG/ML
1000 INJECTION INTRAMUSCULAR; SUBCUTANEOUS ONCE
Status: CANCELLED
Start: 2019-08-08

## 2019-07-18 RX ADMIN — SODIUM CHLORIDE: 9 INJECTION, SOLUTION INTRAVENOUS at 14:23

## 2019-07-18 RX ADMIN — IRON SUCROSE 300 MG: 20 INJECTION, SOLUTION INTRAVENOUS at 14:24

## 2019-07-18 NOTE — PROGRESS NOTES
Patient here for her iron infusion, 2 of 6. She is very tired today, but no other complaints. Patient tolerated infusion well. Stable and ambulatory at d/c, accompanied by her daughter.

## 2019-07-25 ENCOUNTER — HOSPITAL ENCOUNTER (OUTPATIENT)
Dept: INFUSION THERAPY | Age: 44
Discharge: HOME | End: 2019-07-25
Payer: COMMERCIAL

## 2019-07-25 VITALS — SYSTOLIC BLOOD PRESSURE: 115 MMHG | RESPIRATION RATE: 16 BRPM | DIASTOLIC BLOOD PRESSURE: 80 MMHG | HEART RATE: 75 BPM

## 2019-07-25 DIAGNOSIS — D50.8 OTHER IRON DEFICIENCY ANEMIA: Primary | ICD-10-CM

## 2019-07-25 PROCEDURE — 6360000002 HC RX W HCPCS: Performed by: INTERNAL MEDICINE

## 2019-07-25 PROCEDURE — 96365 THER/PROPH/DIAG IV INF INIT: CPT

## 2019-07-25 PROCEDURE — 2580000003 HC RX 258: Performed by: INTERNAL MEDICINE

## 2019-07-25 PROCEDURE — 96366 THER/PROPH/DIAG IV INF ADDON: CPT

## 2019-07-25 RX ORDER — HEPARIN SODIUM (PORCINE) LOCK FLUSH IV SOLN 100 UNIT/ML 100 UNIT/ML
500 SOLUTION INTRAVENOUS PRN
Status: CANCELLED | OUTPATIENT
Start: 2019-07-29

## 2019-07-25 RX ORDER — DIPHENHYDRAMINE HYDROCHLORIDE 50 MG/ML
50 INJECTION INTRAMUSCULAR; INTRAVENOUS ONCE
Status: CANCELLED | OUTPATIENT
Start: 2019-07-29

## 2019-07-25 RX ORDER — EPINEPHRINE 1 MG/ML
0.3 INJECTION, SOLUTION, CONCENTRATE INTRAVENOUS PRN
Status: CANCELLED | OUTPATIENT
Start: 2019-07-29

## 2019-07-25 RX ORDER — 0.9 % SODIUM CHLORIDE 0.9 %
10 VIAL (ML) INJECTION ONCE
Status: CANCELLED | OUTPATIENT
Start: 2019-07-29

## 2019-07-25 RX ORDER — SODIUM CHLORIDE 9 MG/ML
INJECTION, SOLUTION INTRAVENOUS CONTINUOUS
Status: DISCONTINUED | OUTPATIENT
Start: 2019-07-25 | End: 2019-07-26 | Stop reason: HOSPADM

## 2019-07-25 RX ORDER — SODIUM CHLORIDE 9 MG/ML
INJECTION, SOLUTION INTRAVENOUS CONTINUOUS
Status: CANCELLED | OUTPATIENT
Start: 2019-07-29

## 2019-07-25 RX ORDER — METHYLPREDNISOLONE SODIUM SUCCINATE 125 MG/2ML
125 INJECTION, POWDER, LYOPHILIZED, FOR SOLUTION INTRAMUSCULAR; INTRAVENOUS ONCE
Status: CANCELLED | OUTPATIENT
Start: 2019-07-29

## 2019-07-25 RX ORDER — SODIUM CHLORIDE 0.9 % (FLUSH) 0.9 %
10 SYRINGE (ML) INJECTION PRN
Status: CANCELLED | OUTPATIENT
Start: 2019-07-29

## 2019-07-25 RX ORDER — SODIUM CHLORIDE 0.9 % (FLUSH) 0.9 %
5 SYRINGE (ML) INJECTION PRN
Status: CANCELLED | OUTPATIENT
Start: 2019-07-29

## 2019-07-25 RX ADMIN — SODIUM CHLORIDE: 9 INJECTION, SOLUTION INTRAVENOUS at 13:25

## 2019-07-25 RX ADMIN — IRON SUCROSE 300 MG: 20 INJECTION, SOLUTION INTRAVENOUS at 13:25

## 2019-07-25 NOTE — PROGRESS NOTES
Pt here for 3 of 6 Venofer infusions. Pt was treated without incident and discharged in stable condition. Pt will return in 1 week for 4 of 6.

## 2019-08-05 ENCOUNTER — HOSPITAL ENCOUNTER (OUTPATIENT)
Dept: INFUSION THERAPY | Age: 44
Discharge: HOME | End: 2019-08-05
Payer: COMMERCIAL

## 2019-08-05 VITALS
DIASTOLIC BLOOD PRESSURE: 65 MMHG | SYSTOLIC BLOOD PRESSURE: 109 MMHG | HEART RATE: 69 BPM | RESPIRATION RATE: 18 BRPM | TEMPERATURE: 98.7 F

## 2019-08-05 DIAGNOSIS — D50.8 OTHER IRON DEFICIENCY ANEMIA: Primary | ICD-10-CM

## 2019-08-05 PROCEDURE — 6360000002 HC RX W HCPCS: Performed by: INTERNAL MEDICINE

## 2019-08-05 PROCEDURE — 96366 THER/PROPH/DIAG IV INF ADDON: CPT

## 2019-08-05 PROCEDURE — 2580000003 HC RX 258: Performed by: INTERNAL MEDICINE

## 2019-08-05 PROCEDURE — 96365 THER/PROPH/DIAG IV INF INIT: CPT

## 2019-08-05 RX ORDER — 0.9 % SODIUM CHLORIDE 0.9 %
10 VIAL (ML) INJECTION ONCE
Status: CANCELLED | OUTPATIENT
Start: 2019-08-08

## 2019-08-05 RX ORDER — METHYLPREDNISOLONE SODIUM SUCCINATE 125 MG/2ML
125 INJECTION, POWDER, LYOPHILIZED, FOR SOLUTION INTRAMUSCULAR; INTRAVENOUS ONCE
Status: CANCELLED | OUTPATIENT
Start: 2019-08-08

## 2019-08-05 RX ORDER — HEPARIN SODIUM (PORCINE) LOCK FLUSH IV SOLN 100 UNIT/ML 100 UNIT/ML
500 SOLUTION INTRAVENOUS PRN
Status: CANCELLED | OUTPATIENT
Start: 2019-08-08

## 2019-08-05 RX ORDER — SODIUM CHLORIDE 9 MG/ML
INJECTION, SOLUTION INTRAVENOUS CONTINUOUS
Status: CANCELLED | OUTPATIENT
Start: 2019-08-08

## 2019-08-05 RX ORDER — CYANOCOBALAMIN 1000 UG/ML
1000 INJECTION INTRAMUSCULAR; SUBCUTANEOUS ONCE
Status: CANCELLED
Start: 2019-08-08

## 2019-08-05 RX ORDER — SODIUM CHLORIDE 0.9 % (FLUSH) 0.9 %
5 SYRINGE (ML) INJECTION PRN
Status: CANCELLED | OUTPATIENT
Start: 2019-08-08

## 2019-08-05 RX ORDER — SODIUM CHLORIDE 0.9 % (FLUSH) 0.9 %
10 SYRINGE (ML) INJECTION PRN
Status: CANCELLED | OUTPATIENT
Start: 2019-08-08

## 2019-08-05 RX ORDER — SODIUM CHLORIDE 9 MG/ML
INJECTION, SOLUTION INTRAVENOUS CONTINUOUS
Status: ACTIVE | OUTPATIENT
Start: 2019-08-05 | End: 2019-08-05

## 2019-08-05 RX ORDER — EPINEPHRINE 1 MG/ML
0.3 INJECTION, SOLUTION, CONCENTRATE INTRAVENOUS PRN
Status: CANCELLED | OUTPATIENT
Start: 2019-08-08

## 2019-08-05 RX ORDER — DIPHENHYDRAMINE HYDROCHLORIDE 50 MG/ML
50 INJECTION INTRAMUSCULAR; INTRAVENOUS ONCE
Status: CANCELLED | OUTPATIENT
Start: 2019-08-08

## 2019-08-05 RX ADMIN — SODIUM CHLORIDE: 9 INJECTION, SOLUTION INTRAVENOUS at 09:23

## 2019-08-05 RX ADMIN — IRON SUCROSE 300 MG: 20 INJECTION, SOLUTION INTRAVENOUS at 09:25

## 2019-08-05 ASSESSMENT — PAIN DESCRIPTION - ORIENTATION: ORIENTATION: LEFT

## 2019-08-05 ASSESSMENT — PAIN DESCRIPTION - LOCATION: LOCATION: LEG

## 2019-08-05 ASSESSMENT — PAIN DESCRIPTION - DESCRIPTORS: DESCRIPTORS: TIGHTNESS

## 2019-08-05 ASSESSMENT — PAIN SCALES - GENERAL: PAINLEVEL_OUTOF10: 2

## 2019-08-05 ASSESSMENT — PAIN DESCRIPTION - PAIN TYPE: TYPE: CHRONIC PAIN

## 2019-08-05 NOTE — PROGRESS NOTES
Salina Ferguson here for iron infusion #4 of 6. Denies any problems. Tolerates very well. Will return next week but needs to reschedule to Thursdays. Will stop and talk with schedulers.

## 2019-08-15 ENCOUNTER — HOSPITAL ENCOUNTER (OUTPATIENT)
Dept: INFUSION THERAPY | Age: 44
Discharge: HOME | End: 2019-08-15
Payer: COMMERCIAL

## 2019-08-15 VITALS
RESPIRATION RATE: 16 BRPM | HEART RATE: 76 BPM | DIASTOLIC BLOOD PRESSURE: 83 MMHG | SYSTOLIC BLOOD PRESSURE: 128 MMHG | TEMPERATURE: 98.2 F

## 2019-08-15 DIAGNOSIS — D50.8 OTHER IRON DEFICIENCY ANEMIA: Primary | ICD-10-CM

## 2019-08-15 DIAGNOSIS — K90.49 MALABSORPTION DUE TO INTOLERANCE, NOT ELSEWHERE CLASSIFIED: ICD-10-CM

## 2019-08-15 PROCEDURE — 96372 THER/PROPH/DIAG INJ SC/IM: CPT

## 2019-08-15 PROCEDURE — 6360000002 HC RX W HCPCS: Performed by: INTERNAL MEDICINE

## 2019-08-15 PROCEDURE — 96365 THER/PROPH/DIAG IV INF INIT: CPT

## 2019-08-15 PROCEDURE — 2580000003 HC RX 258: Performed by: INTERNAL MEDICINE

## 2019-08-15 RX ORDER — SODIUM CHLORIDE 0.9 % (FLUSH) 0.9 %
10 SYRINGE (ML) INJECTION PRN
Status: CANCELLED | OUTPATIENT
Start: 2019-08-19

## 2019-08-15 RX ORDER — SODIUM CHLORIDE 0.9 % (FLUSH) 0.9 %
5 SYRINGE (ML) INJECTION PRN
Status: CANCELLED | OUTPATIENT
Start: 2019-08-19

## 2019-08-15 RX ORDER — SODIUM CHLORIDE 9 MG/ML
INJECTION, SOLUTION INTRAVENOUS CONTINUOUS
Status: CANCELLED | OUTPATIENT
Start: 2019-08-19

## 2019-08-15 RX ORDER — SODIUM CHLORIDE 9 MG/ML
INJECTION, SOLUTION INTRAVENOUS CONTINUOUS
Status: DISCONTINUED | OUTPATIENT
Start: 2019-08-15 | End: 2019-08-16 | Stop reason: HOSPADM

## 2019-08-15 RX ORDER — DIPHENHYDRAMINE HYDROCHLORIDE 50 MG/ML
50 INJECTION INTRAMUSCULAR; INTRAVENOUS ONCE
Status: CANCELLED | OUTPATIENT
Start: 2019-08-19

## 2019-08-15 RX ORDER — EPINEPHRINE 1 MG/ML
0.3 INJECTION, SOLUTION, CONCENTRATE INTRAVENOUS PRN
Status: CANCELLED | OUTPATIENT
Start: 2019-08-19

## 2019-08-15 RX ORDER — CYANOCOBALAMIN 1000 UG/ML
1000 INJECTION INTRAMUSCULAR; SUBCUTANEOUS ONCE
Status: COMPLETED
Start: 2019-08-15 | End: 2019-08-15

## 2019-08-15 RX ORDER — METHYLPREDNISOLONE SODIUM SUCCINATE 125 MG/2ML
125 INJECTION, POWDER, LYOPHILIZED, FOR SOLUTION INTRAMUSCULAR; INTRAVENOUS ONCE
Status: CANCELLED | OUTPATIENT
Start: 2019-08-19

## 2019-08-15 RX ORDER — CYANOCOBALAMIN 1000 UG/ML
1000 INJECTION INTRAMUSCULAR; SUBCUTANEOUS ONCE
Status: CANCELLED
Start: 2019-09-12

## 2019-08-15 RX ORDER — 0.9 % SODIUM CHLORIDE 0.9 %
10 VIAL (ML) INJECTION ONCE
Status: CANCELLED | OUTPATIENT
Start: 2019-08-19

## 2019-08-15 RX ORDER — HEPARIN SODIUM (PORCINE) LOCK FLUSH IV SOLN 100 UNIT/ML 100 UNIT/ML
500 SOLUTION INTRAVENOUS PRN
Status: CANCELLED | OUTPATIENT
Start: 2019-08-19

## 2019-08-15 RX ADMIN — IRON SUCROSE 300 MG: 20 INJECTION, SOLUTION INTRAVENOUS at 13:39

## 2019-08-15 RX ADMIN — CYANOCOBALAMIN 1000 MCG: 1000 INJECTION, SOLUTION INTRAMUSCULAR at 15:51

## 2019-08-15 RX ADMIN — SODIUM CHLORIDE: 9 INJECTION, SOLUTION INTRAVENOUS at 13:39

## 2019-08-15 ASSESSMENT — PAIN SCALES - GENERAL: PAINLEVEL_OUTOF10: 5

## 2019-08-15 ASSESSMENT — PAIN DESCRIPTION - LOCATION: LOCATION: LEG

## 2019-08-15 ASSESSMENT — PAIN DESCRIPTION - PAIN TYPE: TYPE: CHRONIC PAIN

## 2019-08-15 ASSESSMENT — PAIN DESCRIPTION - ORIENTATION: ORIENTATION: LEFT

## 2019-08-22 ENCOUNTER — HOSPITAL ENCOUNTER (OUTPATIENT)
Dept: INFUSION THERAPY | Age: 44
Discharge: HOME | End: 2019-08-22
Payer: COMMERCIAL

## 2019-08-22 VITALS
SYSTOLIC BLOOD PRESSURE: 113 MMHG | HEART RATE: 87 BPM | DIASTOLIC BLOOD PRESSURE: 77 MMHG | RESPIRATION RATE: 16 BRPM | TEMPERATURE: 98.3 F

## 2019-08-22 DIAGNOSIS — D50.8 OTHER IRON DEFICIENCY ANEMIA: Primary | ICD-10-CM

## 2019-08-22 PROCEDURE — 96365 THER/PROPH/DIAG IV INF INIT: CPT

## 2019-08-22 PROCEDURE — 2580000003 HC RX 258: Performed by: INTERNAL MEDICINE

## 2019-08-22 PROCEDURE — 6360000002 HC RX W HCPCS: Performed by: INTERNAL MEDICINE

## 2019-08-22 RX ORDER — SODIUM CHLORIDE 9 MG/ML
INJECTION, SOLUTION INTRAVENOUS CONTINUOUS
Status: CANCELLED | OUTPATIENT
Start: 2019-08-22

## 2019-08-22 RX ORDER — SODIUM CHLORIDE 0.9 % (FLUSH) 0.9 %
10 SYRINGE (ML) INJECTION PRN
Status: CANCELLED | OUTPATIENT
Start: 2019-08-22

## 2019-08-22 RX ORDER — SODIUM CHLORIDE 0.9 % (FLUSH) 0.9 %
5 SYRINGE (ML) INJECTION PRN
Status: CANCELLED | OUTPATIENT
Start: 2019-08-22

## 2019-08-22 RX ORDER — EPINEPHRINE 1 MG/ML
0.3 INJECTION, SOLUTION, CONCENTRATE INTRAVENOUS PRN
Status: CANCELLED | OUTPATIENT
Start: 2019-08-22

## 2019-08-22 RX ORDER — DIPHENHYDRAMINE HYDROCHLORIDE 50 MG/ML
50 INJECTION INTRAMUSCULAR; INTRAVENOUS ONCE
Status: CANCELLED | OUTPATIENT
Start: 2019-08-22

## 2019-08-22 RX ORDER — METHYLPREDNISOLONE SODIUM SUCCINATE 125 MG/2ML
125 INJECTION, POWDER, LYOPHILIZED, FOR SOLUTION INTRAMUSCULAR; INTRAVENOUS ONCE
Status: CANCELLED | OUTPATIENT
Start: 2019-08-22

## 2019-08-22 RX ORDER — SODIUM CHLORIDE 9 MG/ML
INJECTION, SOLUTION INTRAVENOUS CONTINUOUS
Status: DISCONTINUED | OUTPATIENT
Start: 2019-08-22 | End: 2019-08-23 | Stop reason: HOSPADM

## 2019-08-22 RX ORDER — HEPARIN SODIUM (PORCINE) LOCK FLUSH IV SOLN 100 UNIT/ML 100 UNIT/ML
500 SOLUTION INTRAVENOUS PRN
Status: CANCELLED | OUTPATIENT
Start: 2019-08-22

## 2019-08-22 RX ORDER — 0.9 % SODIUM CHLORIDE 0.9 %
10 VIAL (ML) INJECTION ONCE
Status: CANCELLED | OUTPATIENT
Start: 2019-08-22

## 2019-08-22 RX ADMIN — IRON SUCROSE 300 MG: 20 INJECTION, SOLUTION INTRAVENOUS at 13:15

## 2019-08-22 RX ADMIN — SODIUM CHLORIDE: 9 INJECTION, SOLUTION INTRAVENOUS at 13:15

## 2019-08-22 NOTE — PROGRESS NOTES
Pt here for 6 of 6 Venofer infusion. Pt was treated without incident and discharged in stable condition. Pt will return on 9-4-19 for follow up with Dr Angella Lilly.

## 2019-08-28 ENCOUNTER — TELEPHONE (OUTPATIENT)
Dept: ONCOLOGY | Age: 44
End: 2019-08-28

## 2019-09-03 ENCOUNTER — HOSPITAL ENCOUNTER (OUTPATIENT)
Age: 44
Discharge: HOME | End: 2019-09-03
Payer: COMMERCIAL

## 2019-09-03 DIAGNOSIS — D50.8 OTHER IRON DEFICIENCY ANEMIA: ICD-10-CM

## 2019-09-03 LAB
ALBUMIN SERPL-MCNC: 4.3 G/DL (ref 3.5–5.2)
ALBUMIN/GLOBULIN RATIO: 1.1 (ref 1–2.5)
ALP BLD-CCNC: 69 U/L (ref 35–104)
ALT SERPL-CCNC: 27 U/L (ref 5–33)
ANION GAP SERPL CALCULATED.3IONS-SCNC: 12 MMOL/L (ref 9–17)
AST SERPL-CCNC: 23 U/L
BILIRUB SERPL-MCNC: 0.2 MG/DL (ref 0.3–1.2)
BUN BLDV-MCNC: 8 MG/DL (ref 6–20)
BUN/CREAT BLD: ABNORMAL (ref 9–20)
CALCIUM SERPL-MCNC: 9.3 MG/DL (ref 8.6–10.4)
CHLORIDE BLD-SCNC: 99 MMOL/L (ref 98–107)
CO2: 25 MMOL/L (ref 20–31)
CREAT SERPL-MCNC: 0.64 MG/DL (ref 0.5–0.9)
FERRITIN: 161 UG/L (ref 13–150)
FOLATE: 10.6 NG/ML
GFR AFRICAN AMERICAN: >60 ML/MIN
GFR NON-AFRICAN AMERICAN: >60 ML/MIN
GFR SERPL CREATININE-BSD FRML MDRD: ABNORMAL ML/MIN/{1.73_M2}
GFR SERPL CREATININE-BSD FRML MDRD: ABNORMAL ML/MIN/{1.73_M2}
GLUCOSE BLD-MCNC: 147 MG/DL (ref 70–99)
HCT VFR BLD CALC: 42.1 % (ref 36–46)
HEMOGLOBIN: 13.6 G/DL (ref 12–16)
MCH RBC QN AUTO: 25.1 PG (ref 26–34)
MCHC RBC AUTO-ENTMCNC: 32.4 G/DL (ref 31–37)
MCV RBC AUTO: 77.6 FL (ref 80–100)
NRBC AUTOMATED: ABNORMAL PER 100 WBC
PDW BLD-RTO: 26.7 % (ref 12.5–15.4)
PLATELET # BLD: 189 K/UL (ref 140–450)
PMV BLD AUTO: 9.9 FL (ref 6–12)
POTASSIUM SERPL-SCNC: 4.1 MMOL/L (ref 3.7–5.3)
RBC # BLD: 5.42 M/UL (ref 4–5.2)
SODIUM BLD-SCNC: 136 MMOL/L (ref 135–144)
TOTAL PROTEIN: 8.2 G/DL (ref 6.4–8.3)
VITAMIN B-12: 548 PG/ML (ref 232–1245)
WBC # BLD: 6.2 K/UL (ref 3.5–11)

## 2019-09-03 PROCEDURE — 82728 ASSAY OF FERRITIN: CPT

## 2019-09-03 PROCEDURE — 82746 ASSAY OF FOLIC ACID SERUM: CPT

## 2019-09-03 PROCEDURE — 36415 COLL VENOUS BLD VENIPUNCTURE: CPT

## 2019-09-03 PROCEDURE — 85027 COMPLETE CBC AUTOMATED: CPT

## 2019-09-03 PROCEDURE — 82607 VITAMIN B-12: CPT

## 2019-09-03 PROCEDURE — 80053 COMPREHEN METABOLIC PANEL: CPT

## 2019-09-04 ENCOUNTER — HOSPITAL ENCOUNTER (OUTPATIENT)
Dept: ULTRASOUND IMAGING | Age: 44
Discharge: HOME | End: 2019-09-06
Payer: COMMERCIAL

## 2019-09-04 ENCOUNTER — OFFICE VISIT (OUTPATIENT)
Dept: ONCOLOGY | Age: 44
End: 2019-09-04
Payer: COMMERCIAL

## 2019-09-04 ENCOUNTER — TELEPHONE (OUTPATIENT)
Dept: ONCOLOGY | Age: 44
End: 2019-09-04

## 2019-09-04 VITALS
WEIGHT: 274.1 LBS | SYSTOLIC BLOOD PRESSURE: 156 MMHG | TEMPERATURE: 98 F | BODY MASS INDEX: 47.05 KG/M2 | DIASTOLIC BLOOD PRESSURE: 100 MMHG | HEART RATE: 89 BPM | RESPIRATION RATE: 18 BRPM

## 2019-09-04 DIAGNOSIS — D50.0 IRON DEFICIENCY ANEMIA DUE TO CHRONIC BLOOD LOSS: Primary | ICD-10-CM

## 2019-09-04 DIAGNOSIS — D50.0 IRON DEFICIENCY ANEMIA DUE TO CHRONIC BLOOD LOSS: ICD-10-CM

## 2019-09-04 PROCEDURE — 93971 EXTREMITY STUDY: CPT

## 2019-09-04 PROCEDURE — G8427 DOCREV CUR MEDS BY ELIG CLIN: HCPCS | Performed by: INTERNAL MEDICINE

## 2019-09-04 PROCEDURE — G8417 CALC BMI ABV UP PARAM F/U: HCPCS | Performed by: INTERNAL MEDICINE

## 2019-09-04 PROCEDURE — 1036F TOBACCO NON-USER: CPT | Performed by: INTERNAL MEDICINE

## 2019-09-04 PROCEDURE — 99211 OFF/OP EST MAY X REQ PHY/QHP: CPT | Performed by: INTERNAL MEDICINE

## 2019-09-04 PROCEDURE — 99213 OFFICE O/P EST LOW 20 MIN: CPT | Performed by: INTERNAL MEDICINE

## 2019-09-04 NOTE — TELEPHONE ENCOUNTER
Per Dr Christina Holland' AVS-pt to have venous scan of lt leg-being done at [facility] on 9/4/19 pm.  Pt to return to see him in Feb 2020-pt informed she will be notified to make an appt at a later date as the 2020 schedule is not yet built-pt understood-pt to have labs before rv-slips given to pt w/instruction, understood

## 2019-09-12 ENCOUNTER — HOSPITAL ENCOUNTER (OUTPATIENT)
Dept: INFUSION THERAPY | Age: 44
Discharge: HOME | End: 2019-09-12
Payer: COMMERCIAL

## 2019-09-12 DIAGNOSIS — K90.49 MALABSORPTION DUE TO INTOLERANCE, NOT ELSEWHERE CLASSIFIED: ICD-10-CM

## 2019-09-12 DIAGNOSIS — D50.8 OTHER IRON DEFICIENCY ANEMIA: Primary | ICD-10-CM

## 2019-09-12 PROCEDURE — 6360000002 HC RX W HCPCS: Performed by: INTERNAL MEDICINE

## 2019-09-12 PROCEDURE — 96372 THER/PROPH/DIAG INJ SC/IM: CPT

## 2019-09-12 RX ORDER — CYANOCOBALAMIN 1000 UG/ML
1000 INJECTION INTRAMUSCULAR; SUBCUTANEOUS ONCE
Status: COMPLETED
Start: 2019-09-12 | End: 2019-09-12

## 2019-09-12 RX ORDER — CYANOCOBALAMIN 1000 UG/ML
1000 INJECTION INTRAMUSCULAR; SUBCUTANEOUS ONCE
Status: CANCELLED
Start: 2019-10-10

## 2019-09-12 RX ADMIN — CYANOCOBALAMIN 1000 MCG: 1000 INJECTION, SOLUTION INTRAMUSCULAR at 15:46

## 2019-09-12 NOTE — PROGRESS NOTES
Marisel Rhodes here for B 12 injection   She denies any complaints   B 12 injection given without incident and pt discharged to home

## 2019-10-01 ENCOUNTER — OFFICE VISIT (OUTPATIENT)
Dept: PRIMARY CARE CLINIC | Age: 44
End: 2019-10-01
Payer: COMMERCIAL

## 2019-10-01 VITALS
DIASTOLIC BLOOD PRESSURE: 82 MMHG | HEART RATE: 72 BPM | WEIGHT: 274 LBS | RESPIRATION RATE: 18 BRPM | BODY MASS INDEX: 46.78 KG/M2 | SYSTOLIC BLOOD PRESSURE: 132 MMHG | HEIGHT: 64 IN

## 2019-10-01 DIAGNOSIS — Z13.6 SCREENING FOR CARDIOVASCULAR CONDITION: ICD-10-CM

## 2019-10-01 DIAGNOSIS — Z13.29 SCREENING FOR THYROID DISORDER: ICD-10-CM

## 2019-10-01 DIAGNOSIS — R73.09 ELEVATED GLUCOSE: ICD-10-CM

## 2019-10-01 DIAGNOSIS — D50.8 OTHER IRON DEFICIENCY ANEMIA: ICD-10-CM

## 2019-10-01 DIAGNOSIS — Z86.32 HISTORY OF GESTATIONAL DIABETES: ICD-10-CM

## 2019-10-01 DIAGNOSIS — F41.9 ANXIETY: ICD-10-CM

## 2019-10-01 DIAGNOSIS — M79.89 PAIN AND SWELLING OF LEFT LOWER LEG: ICD-10-CM

## 2019-10-01 DIAGNOSIS — F41.0 PANIC ATTACKS: Primary | ICD-10-CM

## 2019-10-01 DIAGNOSIS — M79.662 PAIN AND SWELLING OF LEFT LOWER LEG: ICD-10-CM

## 2019-10-01 PROCEDURE — G8417 CALC BMI ABV UP PARAM F/U: HCPCS | Performed by: NURSE PRACTITIONER

## 2019-10-01 PROCEDURE — G8484 FLU IMMUNIZE NO ADMIN: HCPCS | Performed by: NURSE PRACTITIONER

## 2019-10-01 PROCEDURE — G8427 DOCREV CUR MEDS BY ELIG CLIN: HCPCS | Performed by: NURSE PRACTITIONER

## 2019-10-01 PROCEDURE — 99205 OFFICE O/P NEW HI 60 MIN: CPT | Performed by: NURSE PRACTITIONER

## 2019-10-01 PROCEDURE — 1036F TOBACCO NON-USER: CPT | Performed by: NURSE PRACTITIONER

## 2019-10-01 RX ORDER — BUSPIRONE HYDROCHLORIDE 15 MG/1
15 TABLET ORAL 3 TIMES DAILY PRN
Qty: 60 TABLET | Refills: 5 | Status: SHIPPED | OUTPATIENT
Start: 2019-10-01 | End: 2020-06-15

## 2019-10-01 RX ORDER — CILOSTAZOL 100 MG/1
100 TABLET ORAL 2 TIMES DAILY
Qty: 60 TABLET | Refills: 3 | Status: SHIPPED | OUTPATIENT
Start: 2019-10-01 | End: 2020-12-01 | Stop reason: ALTCHOICE

## 2019-10-01 ASSESSMENT — ENCOUNTER SYMPTOMS
DIARRHEA: 0
COUGH: 0
VOMITING: 0
SHORTNESS OF BREATH: 0
ABDOMINAL PAIN: 0
NAUSEA: 0
SINUS PRESSURE: 0
WHEEZING: 0
TROUBLE SWALLOWING: 0
SORE THROAT: 0
BLOOD IN STOOL: 0
CONSTIPATION: 0

## 2019-10-02 ENCOUNTER — TELEPHONE (OUTPATIENT)
Dept: PRIMARY CARE CLINIC | Age: 44
End: 2019-10-02

## 2019-10-02 DIAGNOSIS — M79.89 PAIN AND SWELLING OF LEFT LOWER LEG: Primary | ICD-10-CM

## 2019-10-02 DIAGNOSIS — M79.662 PAIN AND SWELLING OF LEFT LOWER LEG: Primary | ICD-10-CM

## 2019-10-10 ENCOUNTER — HOSPITAL ENCOUNTER (OUTPATIENT)
Dept: INFUSION THERAPY | Age: 44
Discharge: HOME | End: 2019-10-10
Payer: COMMERCIAL

## 2019-10-10 DIAGNOSIS — D50.8 OTHER IRON DEFICIENCY ANEMIA: Primary | ICD-10-CM

## 2019-10-10 DIAGNOSIS — K90.49 MALABSORPTION DUE TO INTOLERANCE, NOT ELSEWHERE CLASSIFIED: ICD-10-CM

## 2019-10-10 PROCEDURE — 96372 THER/PROPH/DIAG INJ SC/IM: CPT

## 2019-10-10 PROCEDURE — 6360000002 HC RX W HCPCS: Performed by: INTERNAL MEDICINE

## 2019-10-10 RX ORDER — CYANOCOBALAMIN 1000 UG/ML
1000 INJECTION INTRAMUSCULAR; SUBCUTANEOUS ONCE
Status: COMPLETED
Start: 2019-10-10 | End: 2019-10-10

## 2019-10-10 RX ORDER — CYANOCOBALAMIN 1000 UG/ML
1000 INJECTION INTRAMUSCULAR; SUBCUTANEOUS ONCE
Status: CANCELLED
Start: 2019-11-07

## 2019-10-10 RX ADMIN — CYANOCOBALAMIN 1000 MCG: 1000 INJECTION, SOLUTION INTRAMUSCULAR at 16:03

## 2019-10-18 ENCOUNTER — HOSPITAL ENCOUNTER (OUTPATIENT)
Age: 44
Discharge: HOME | End: 2019-10-18
Payer: COMMERCIAL

## 2019-10-18 DIAGNOSIS — Z13.6 SCREENING FOR CARDIOVASCULAR CONDITION: ICD-10-CM

## 2019-10-18 DIAGNOSIS — Z86.32 HISTORY OF GESTATIONAL DIABETES: ICD-10-CM

## 2019-10-18 DIAGNOSIS — R73.09 ELEVATED GLUCOSE: ICD-10-CM

## 2019-10-18 DIAGNOSIS — Z13.29 SCREENING FOR THYROID DISORDER: ICD-10-CM

## 2019-10-18 LAB
CHOLESTEROL/HDL RATIO: 4
CHOLESTEROL: 165 MG/DL
HDLC SERPL-MCNC: 41 MG/DL
LDL CHOLESTEROL: 105 MG/DL (ref 0–130)
TRIGL SERPL-MCNC: 96 MG/DL
TSH SERPL DL<=0.05 MIU/L-ACNC: 1.93 MIU/L (ref 0.3–5)
VLDLC SERPL CALC-MCNC: NORMAL MG/DL (ref 1–30)

## 2019-10-18 PROCEDURE — 36415 COLL VENOUS BLD VENIPUNCTURE: CPT

## 2019-10-18 PROCEDURE — 80061 LIPID PANEL: CPT

## 2019-10-18 PROCEDURE — 83036 HEMOGLOBIN GLYCOSYLATED A1C: CPT

## 2019-10-18 PROCEDURE — 84443 ASSAY THYROID STIM HORMONE: CPT

## 2019-10-20 LAB
ESTIMATED AVERAGE GLUCOSE: 111 MG/DL
HBA1C MFR BLD: 5.5 % (ref 4–6)

## 2019-11-12 ENCOUNTER — OFFICE VISIT (OUTPATIENT)
Dept: FAMILY MEDICINE CLINIC | Age: 44
End: 2019-11-12
Payer: COMMERCIAL

## 2019-11-12 VITALS
TEMPERATURE: 98.3 F | HEART RATE: 95 BPM | HEIGHT: 64 IN | DIASTOLIC BLOOD PRESSURE: 88 MMHG | WEIGHT: 274.03 LBS | SYSTOLIC BLOOD PRESSURE: 133 MMHG | BODY MASS INDEX: 46.78 KG/M2 | RESPIRATION RATE: 16 BRPM

## 2019-11-12 DIAGNOSIS — H57.11 EYE PAIN, RIGHT: Primary | ICD-10-CM

## 2019-11-12 PROCEDURE — G8417 CALC BMI ABV UP PARAM F/U: HCPCS | Performed by: PHYSICIAN ASSISTANT

## 2019-11-12 PROCEDURE — G8427 DOCREV CUR MEDS BY ELIG CLIN: HCPCS | Performed by: PHYSICIAN ASSISTANT

## 2019-11-12 PROCEDURE — G8484 FLU IMMUNIZE NO ADMIN: HCPCS | Performed by: PHYSICIAN ASSISTANT

## 2019-11-12 PROCEDURE — 99213 OFFICE O/P EST LOW 20 MIN: CPT | Performed by: PHYSICIAN ASSISTANT

## 2019-11-12 PROCEDURE — 1036F TOBACCO NON-USER: CPT | Performed by: PHYSICIAN ASSISTANT

## 2019-11-12 RX ORDER — ERYTHROMYCIN 5 MG/G
OINTMENT OPHTHALMIC
Qty: 1 TUBE | Refills: 0 | Status: SHIPPED | OUTPATIENT
Start: 2019-11-12 | End: 2019-11-22

## 2019-11-12 RX ORDER — POLYMYXIN B SULFATE AND TRIMETHOPRIM 1; 10000 MG/ML; [USP'U]/ML
1 SOLUTION OPHTHALMIC EVERY 4 HOURS
Qty: 1 BOTTLE | Refills: 0 | Status: SHIPPED | OUTPATIENT
Start: 2019-11-12 | End: 2019-11-22

## 2019-11-15 ENCOUNTER — HOSPITAL ENCOUNTER (EMERGENCY)
Age: 44
Discharge: HOME | End: 2019-11-15
Attending: EMERGENCY MEDICINE
Payer: COMMERCIAL

## 2019-11-15 VITALS
SYSTOLIC BLOOD PRESSURE: 147 MMHG | HEART RATE: 62 BPM | OXYGEN SATURATION: 99 % | RESPIRATION RATE: 18 BRPM | TEMPERATURE: 97.6 F | WEIGHT: 260 LBS | DIASTOLIC BLOOD PRESSURE: 94 MMHG | BODY MASS INDEX: 44.39 KG/M2 | HEIGHT: 64 IN

## 2019-11-15 DIAGNOSIS — H57.11 ACUTE RIGHT EYE PAIN: Primary | ICD-10-CM

## 2019-11-15 PROCEDURE — 99282 EMERGENCY DEPT VISIT SF MDM: CPT

## 2019-11-15 PROCEDURE — 90715 TDAP VACCINE 7 YRS/> IM: CPT | Performed by: EMERGENCY MEDICINE

## 2019-11-15 PROCEDURE — 6370000000 HC RX 637 (ALT 250 FOR IP): Performed by: EMERGENCY MEDICINE

## 2019-11-15 PROCEDURE — 90471 IMMUNIZATION ADMIN: CPT | Performed by: EMERGENCY MEDICINE

## 2019-11-15 PROCEDURE — 6360000002 HC RX W HCPCS: Performed by: EMERGENCY MEDICINE

## 2019-11-15 RX ORDER — TOBRAMYCIN 3 MG/ML
1 SOLUTION/ DROPS OPHTHALMIC EVERY 4 HOURS
Qty: 5 ML | Refills: 0 | Status: SHIPPED | OUTPATIENT
Start: 2019-11-15 | End: 2019-11-25

## 2019-11-15 RX ADMIN — FLUORESCEIN SODIUM 1 MG: 1 STRIP OPHTHALMIC at 16:50

## 2019-11-15 RX ADMIN — TETANUS TOXOID, REDUCED DIPHTHERIA TOXOID AND ACELLULAR PERTUSSIS VACCINE, ADSORBED 0.5 ML: 5; 2.5; 8; 8; 2.5 SUSPENSION INTRAMUSCULAR at 16:50

## 2019-11-15 ASSESSMENT — PAIN SCALES - GENERAL: PAINLEVEL_OUTOF10: 2

## 2019-11-15 ASSESSMENT — ENCOUNTER SYMPTOMS
BLURRED VISION: 1
RESPIRATORY NEGATIVE: 1
EYE REDNESS: 1
FOREIGN BODY SENSATION: 1
GASTROINTESTINAL NEGATIVE: 1
EYE ITCHING: 1
PHOTOPHOBIA: 0
EYE DISCHARGE: 0
EYE PAIN: 1

## 2019-11-15 ASSESSMENT — PAIN DESCRIPTION - LOCATION: LOCATION: EYE

## 2019-11-15 ASSESSMENT — VISUAL ACUITY
OU: 20/30
OS: 20/40
OD: 0

## 2019-11-15 ASSESSMENT — PAIN DESCRIPTION - ORIENTATION: ORIENTATION: RIGHT

## 2020-01-03 ENCOUNTER — HOSPITAL ENCOUNTER (OUTPATIENT)
Dept: INFUSION THERAPY | Age: 45
Discharge: HOME | End: 2020-01-03
Payer: COMMERCIAL

## 2020-01-03 DIAGNOSIS — D50.8 OTHER IRON DEFICIENCY ANEMIA: Primary | ICD-10-CM

## 2020-01-03 DIAGNOSIS — K90.49 MALABSORPTION DUE TO INTOLERANCE, NOT ELSEWHERE CLASSIFIED: ICD-10-CM

## 2020-01-03 PROCEDURE — 96372 THER/PROPH/DIAG INJ SC/IM: CPT

## 2020-01-03 PROCEDURE — 6360000002 HC RX W HCPCS: Performed by: INTERNAL MEDICINE

## 2020-01-03 RX ORDER — CYANOCOBALAMIN 1000 UG/ML
1000 INJECTION INTRAMUSCULAR; SUBCUTANEOUS ONCE
Status: CANCELLED
Start: 2020-01-30

## 2020-01-03 RX ORDER — CYANOCOBALAMIN 1000 UG/ML
1000 INJECTION INTRAMUSCULAR; SUBCUTANEOUS ONCE
Status: COMPLETED
Start: 2020-01-03 | End: 2020-01-03

## 2020-01-03 RX ADMIN — CYANOCOBALAMIN 1000 MCG: 1000 INJECTION, SOLUTION INTRAMUSCULAR at 15:34

## 2020-01-03 NOTE — PROGRESS NOTES
Patient here for B12 injection. She tolerated injection well and was discharged home in stable condition. She is due to return 1/31 for next injection.

## 2020-03-25 ENCOUNTER — TELEPHONE (OUTPATIENT)
Dept: PRIMARY CARE CLINIC | Age: 45
End: 2020-03-25

## 2020-03-25 NOTE — TELEPHONE ENCOUNTER
I do not have any reason to advise her to avoid working at this time. As long as she continues to use good handwashing and avoid close contact with others she should be fine.  She is also advised to change from her work clothes as soon as she arrives home

## 2020-03-27 ENCOUNTER — TELEPHONE (OUTPATIENT)
Dept: INFUSION THERAPY | Age: 45
End: 2020-03-27

## 2020-04-27 ENCOUNTER — TELEPHONE (OUTPATIENT)
Dept: ONCOLOGY | Age: 45
End: 2020-04-27

## 2020-05-28 ENCOUNTER — TELEPHONE (OUTPATIENT)
Dept: ONCOLOGY | Age: 45
End: 2020-05-28

## 2020-05-28 NOTE — TELEPHONE ENCOUNTER
received call back from patient. Yu Moon reports she had lost her insurance recently due to issues with premium payments. Yu Moon states she used to have Medicaid but has not been eligible recently and has utilized [facility].  discussed [address] with patient and sent paperwork. Patient states she has multiple doctor appointments she has put off and injections which she says she has lately not been feeling well.  encouraged patient to call with any questions about application.

## 2020-06-15 ENCOUNTER — TELEPHONE (OUTPATIENT)
Dept: PRIMARY CARE CLINIC | Age: 45
End: 2020-06-15

## 2020-06-15 ENCOUNTER — HOSPITAL ENCOUNTER (EMERGENCY)
Age: 45
Discharge: HOME | End: 2020-06-15
Attending: EMERGENCY MEDICINE

## 2020-06-15 ENCOUNTER — APPOINTMENT (OUTPATIENT)
Dept: CT IMAGING | Age: 45
End: 2020-06-15

## 2020-06-15 VITALS
SYSTOLIC BLOOD PRESSURE: 120 MMHG | TEMPERATURE: 98.8 F | OXYGEN SATURATION: 98 % | BODY MASS INDEX: 47.8 KG/M2 | DIASTOLIC BLOOD PRESSURE: 76 MMHG | HEART RATE: 70 BPM | RESPIRATION RATE: 18 BRPM | HEIGHT: 64 IN | WEIGHT: 280 LBS

## 2020-06-15 LAB
-: ABNORMAL
ABSOLUTE EOS #: 0 K/UL (ref 0–0.4)
ABSOLUTE IMMATURE GRANULOCYTE: ABNORMAL K/UL (ref 0–0.3)
ABSOLUTE LYMPH #: 1.07 K/UL (ref 1–4.8)
ABSOLUTE MONO #: 0.54 K/UL (ref 0.1–0.8)
ALBUMIN SERPL-MCNC: 4.1 G/DL (ref 3.5–5.2)
ALBUMIN/GLOBULIN RATIO: 1.2 (ref 1–2.5)
ALP BLD-CCNC: 66 U/L (ref 35–104)
ALT SERPL-CCNC: 13 U/L (ref 5–33)
AMORPHOUS: ABNORMAL
AMYLASE: 38 U/L (ref 28–100)
ANION GAP SERPL CALCULATED.3IONS-SCNC: 10 MMOL/L (ref 9–17)
AST SERPL-CCNC: 16 U/L
BACTERIA: ABNORMAL
BASOPHILS # BLD: 0 % (ref 0–2)
BASOPHILS ABSOLUTE: 0 K/UL (ref 0–0.2)
BILIRUB SERPL-MCNC: 0.28 MG/DL (ref 0.3–1.2)
BILIRUBIN URINE: NEGATIVE
BUN BLDV-MCNC: 13 MG/DL (ref 6–20)
BUN/CREAT BLD: ABNORMAL (ref 9–20)
CALCIUM SERPL-MCNC: 9 MG/DL (ref 8.6–10.4)
CASTS UA: ABNORMAL /LPF
CHLORIDE BLD-SCNC: 101 MMOL/L (ref 98–107)
CO2: 24 MMOL/L (ref 20–31)
COLOR: YELLOW
COMMENT UA: ABNORMAL
CREAT SERPL-MCNC: 1.02 MG/DL (ref 0.5–0.9)
CRYSTALS, UA: ABNORMAL /HPF
DIFFERENTIAL TYPE: ABNORMAL
EOSINOPHILS RELATIVE PERCENT: 0 % (ref 1–4)
EPITHELIAL CELLS UA: ABNORMAL /HPF (ref 0–5)
GFR AFRICAN AMERICAN: >60 ML/MIN
GFR NON-AFRICAN AMERICAN: 59 ML/MIN
GFR SERPL CREATININE-BSD FRML MDRD: ABNORMAL ML/MIN/{1.73_M2}
GFR SERPL CREATININE-BSD FRML MDRD: ABNORMAL ML/MIN/{1.73_M2}
GLUCOSE BLD-MCNC: 105 MG/DL (ref 70–99)
GLUCOSE URINE: NEGATIVE
HCG QUANTITATIVE: <1 IU/L
HCT VFR BLD CALC: 27.2 % (ref 36–46)
HEMOGLOBIN: 8.1 G/DL (ref 12–16)
IMMATURE GRANULOCYTES: ABNORMAL %
KETONES, URINE: NEGATIVE
LEUKOCYTE ESTERASE, URINE: NEGATIVE
LIPASE: 29 U/L (ref 13–60)
LYMPHOCYTES # BLD: 16 % (ref 24–44)
MCH RBC QN AUTO: 18.6 PG (ref 26–34)
MCHC RBC AUTO-ENTMCNC: 29.8 G/DL (ref 31–37)
MCV RBC AUTO: 62.4 FL (ref 80–100)
MONOCYTES # BLD: 8 % (ref 1–7)
MORPHOLOGY: ABNORMAL
MUCUS: ABNORMAL
NITRITE, URINE: NEGATIVE
NRBC AUTOMATED: ABNORMAL PER 100 WBC
OTHER OBSERVATIONS UA: ABNORMAL
PDW BLD-RTO: 19.2 % (ref 12.5–15.4)
PH UA: 5.5 (ref 5–8)
PLATELET # BLD: 304 K/UL (ref 140–450)
PLATELET ESTIMATE: ABNORMAL
PMV BLD AUTO: 9.4 FL (ref 6–12)
POTASSIUM SERPL-SCNC: 4.1 MMOL/L (ref 3.7–5.3)
PROTEIN UA: NEGATIVE
RBC # BLD: 4.36 M/UL (ref 4–5.2)
RBC # BLD: ABNORMAL 10*6/UL
RBC UA: ABNORMAL /HPF (ref 0–2)
RENAL EPITHELIAL, UA: ABNORMAL /HPF
SEG NEUTROPHILS: 76 % (ref 36–66)
SEGMENTED NEUTROPHILS ABSOLUTE COUNT: 5.09 K/UL (ref 1.8–7.7)
SODIUM BLD-SCNC: 135 MMOL/L (ref 135–144)
SPECIFIC GRAVITY UA: 1.03 (ref 1–1.03)
TOTAL PROTEIN: 7.4 G/DL (ref 6.4–8.3)
TRICHOMONAS: ABNORMAL
TURBIDITY: ABNORMAL
URINE HGB: NEGATIVE
UROBILINOGEN, URINE: NORMAL
WBC # BLD: 6.7 K/UL (ref 3.5–11)
WBC # BLD: ABNORMAL 10*3/UL
WBC UA: ABNORMAL /HPF (ref 0–5)
YEAST: ABNORMAL

## 2020-06-15 PROCEDURE — 84702 CHORIONIC GONADOTROPIN TEST: CPT

## 2020-06-15 PROCEDURE — 99284 EMERGENCY DEPT VISIT MOD MDM: CPT

## 2020-06-15 PROCEDURE — 85025 COMPLETE CBC W/AUTO DIFF WBC: CPT

## 2020-06-15 PROCEDURE — 36415 COLL VENOUS BLD VENIPUNCTURE: CPT

## 2020-06-15 PROCEDURE — 83690 ASSAY OF LIPASE: CPT

## 2020-06-15 PROCEDURE — 82150 ASSAY OF AMYLASE: CPT

## 2020-06-15 PROCEDURE — 6370000000 HC RX 637 (ALT 250 FOR IP): Performed by: EMERGENCY MEDICINE

## 2020-06-15 PROCEDURE — 80053 COMPREHEN METABOLIC PANEL: CPT

## 2020-06-15 PROCEDURE — 74176 CT ABD & PELVIS W/O CONTRAST: CPT

## 2020-06-15 PROCEDURE — 6360000002 HC RX W HCPCS: Performed by: EMERGENCY MEDICINE

## 2020-06-15 PROCEDURE — 96374 THER/PROPH/DIAG INJ IV PUSH: CPT

## 2020-06-15 PROCEDURE — 81001 URINALYSIS AUTO W/SCOPE: CPT

## 2020-06-15 PROCEDURE — 96375 TX/PRO/DX INJ NEW DRUG ADDON: CPT

## 2020-06-15 RX ORDER — ONDANSETRON 4 MG/1
4 TABLET, ORALLY DISINTEGRATING ORAL EVERY 8 HOURS PRN
Qty: 20 TABLET | Refills: 0 | Status: SHIPPED | OUTPATIENT
Start: 2020-06-15 | End: 2020-12-01

## 2020-06-15 RX ORDER — TAMSULOSIN HYDROCHLORIDE 0.4 MG/1
0.4 CAPSULE ORAL ONCE
Status: COMPLETED | OUTPATIENT
Start: 2020-06-15 | End: 2020-06-15

## 2020-06-15 RX ORDER — TAMSULOSIN HYDROCHLORIDE 0.4 MG/1
0.4 CAPSULE ORAL DAILY
Qty: 5 CAPSULE | Refills: 0 | Status: SHIPPED | OUTPATIENT
Start: 2020-06-15 | End: 2020-12-09 | Stop reason: ALTCHOICE

## 2020-06-15 RX ORDER — FENTANYL CITRATE 50 UG/ML
50 INJECTION, SOLUTION INTRAMUSCULAR; INTRAVENOUS EVERY 30 MIN PRN
Status: DISCONTINUED | OUTPATIENT
Start: 2020-06-15 | End: 2020-06-15 | Stop reason: HOSPADM

## 2020-06-15 RX ORDER — ONDANSETRON 2 MG/ML
4 INJECTION INTRAMUSCULAR; INTRAVENOUS ONCE
Status: COMPLETED | OUTPATIENT
Start: 2020-06-15 | End: 2020-06-15

## 2020-06-15 RX ORDER — HYDROCODONE BITARTRATE AND ACETAMINOPHEN 5; 325 MG/1; MG/1
1 TABLET ORAL EVERY 6 HOURS PRN
Qty: 20 TABLET | Refills: 0 | Status: SHIPPED | OUTPATIENT
Start: 2020-06-15 | End: 2020-06-18

## 2020-06-15 RX ADMIN — FENTANYL CITRATE 50 MCG: 50 INJECTION, SOLUTION INTRAMUSCULAR; INTRAVENOUS at 16:02

## 2020-06-15 RX ADMIN — TAMSULOSIN HYDROCHLORIDE 0.4 MG: 0.4 CAPSULE ORAL at 17:51

## 2020-06-15 RX ADMIN — ONDANSETRON 4 MG: 2 INJECTION INTRAMUSCULAR; INTRAVENOUS at 16:00

## 2020-06-15 ASSESSMENT — PAIN SCALES - GENERAL
PAINLEVEL_OUTOF10: 4
PAINLEVEL_OUTOF10: 7
PAINLEVEL_OUTOF10: 5

## 2020-06-15 ASSESSMENT — ENCOUNTER SYMPTOMS
NAUSEA: 0
CONSTIPATION: 0
EYE REDNESS: 0
STRIDOR: 0
DIARRHEA: 0
VOMITING: 0
SHORTNESS OF BREATH: 0
WHEEZING: 0
EYE PAIN: 0
COUGH: 0
SORE THROAT: 0
COLOR CHANGE: 0
EYE DISCHARGE: 0
ABDOMINAL PAIN: 1

## 2020-06-15 ASSESSMENT — PAIN DESCRIPTION - DESCRIPTORS: DESCRIPTORS: ACHING

## 2020-06-15 ASSESSMENT — PAIN DESCRIPTION - PAIN TYPE: TYPE: ACUTE PAIN

## 2020-06-15 ASSESSMENT — PAIN DESCRIPTION - LOCATION: LOCATION: FLANK

## 2020-06-15 ASSESSMENT — PAIN DESCRIPTION - ORIENTATION: ORIENTATION: LEFT

## 2020-06-15 NOTE — ED NOTES
Female patient to ED with left flank pain that radiates around to her groin area that started on Saturday, relates to history of kidney stones, rates pain 5/10 achy in nature, relates to some nausea and vomiting today, patient took 600 mg Motrin @ 1300 prior to arrival, relates to burning and frequency with urination, resp even, no distress noted, skin pink warm and dry, patient is calm and cooperative, here for evaluation      Perlita Sahu RN  06/15/20 7556

## 2020-06-15 NOTE — ED PROVIDER NOTES
[address] [facility]  EMERGENCY DEPARTMENT ENCOUNTER      Pt Name: Kimberlee Hood  MRN: [id number]  [place] 1975  Date of evaluation: 6/15/2020  Provider: Cain Christopher MD    [address] [place] [address]       Chief Complaint   Patient presents with    Flank Pain     left       HISTORY OF PRESENT ILLNESS  (Location/Symptom, Timing/Onset, Context/Setting, Quality, Duration, Modifying Factors, Severity.)   Kimberlee Hood is a 40 y.o. female who presents to the emergency department complaining of left flank pain. It started within the last 24 hours. It has progressively gotten worse. No problems with urination or bowel habits. No fever that she is aware of. No chest pain or shortness of breath. Nothing seems to be making it better or worse. Nursing Notes were reviewed. REVIEW OF SYSTEMS    (2-9 systems for level 4, 10 or more for level 5)     Review of Systems   Constitutional: Negative for activity change, appetite change, chills, fatigue and fever. HENT: Negative for congestion, ear pain and sore throat. Eyes: Negative for pain, discharge and redness. Respiratory: Negative for cough, shortness of breath, wheezing and stridor. Cardiovascular: Negative for chest pain. Gastrointestinal: Positive for abdominal pain. Negative for constipation, diarrhea, nausea and vomiting. Genitourinary: Positive for flank pain. Negative for decreased urine volume and difficulty urinating. Musculoskeletal: Negative for arthralgias and myalgias. Skin: Negative for color change and rash. Neurological: Negative for dizziness, weakness and headaches. Psychiatric/Behavioral: Negative for behavioral problems and confusion. Except as noted above the remainder of the review of systems was reviewed and negative.        PAST MEDICAL HISTORY     Past Medical History:   Diagnosis Date    Anemia     Anxiety     Carpal tunnel syndrome     Depression     Endometriosis     Gestational diabetes     x 3 children Physician        Avila Taylor MD  06/18/20 0154

## 2020-06-15 NOTE — TELEPHONE ENCOUNTER
Pt called in today stating the pain in her back was unbearable. Pt states that on Sat the pain was on the right side of her back but on Sunday all the pain she was having was on her left side. Pt states nothing she does relieves her pain besides just sitting and rocking until she vomits. I put the pt on hold and spoke with PCP because I saw she was available to speak with. PCP recommends that if she thinks it is kidney stones she needs to be evaluated in the ER where they can do imaging for her. I returned to my call with the pt and informed her that it her PCP has recommended she be seen by the ED.

## 2020-06-17 ENCOUNTER — TELEPHONE (OUTPATIENT)
Dept: PRIMARY CARE CLINIC | Age: 45
End: 2020-06-17

## 2020-11-20 ENCOUNTER — NURSE TRIAGE (OUTPATIENT)
Dept: OTHER | Facility: CLINIC | Age: 45
End: 2020-11-20

## 2020-11-20 NOTE — TELEPHONE ENCOUNTER
Swelling to left leg. Not new. Has been going on for a long time. Increasingly worse over the past year. Dec 1st appointment. Reason for Disposition   Mild swelling of both ankles and chronic (unchanged)    Answer Assessment - Initial Assessment Questions  1. ONSET: \"When did the swelling start? \" (e.g., minutes, hours, days)        See above    2. LOCATION: \"What part of the leg is swollen? \"  \"Are both legs swollen or just one leg? \"        Whole leg    3. SEVERITY: \"How bad is the swelling? \" (e.g., localized; mild, moderate, severe)   - Localized - small area of swelling localized to one leg   - MILD pedal edema - swelling limited to foot and ankle, pitting edema < 1/4 inch (6 mm) deep, rest and elevation eliminate most or all swelling   - MODERATE edema - swelling of lower leg to knee, pitting edema > 1/4 inch (6 mm) deep, rest and elevation only partially reduce swelling   - SEVERE edema - swelling extends above knee, facial or hand swelling present         Moderate    4. REDNESS: \"Does the swelling look red or infected? \"        No    5. PAIN: \"Is the swelling painful to touch? \" If so, ask: \"How painful is it? \"   (Scale 1-10; mild, moderate or severe)        Pain 3/10    6. FEVER: \"Do you have a fever? \" If so, ask: \"What is it, how was it measured, and when did it start? \"         No    7. CAUSE: \"What do you think is causing the leg swelling? \"        Being worked up    8. MEDICAL HISTORY: \"Do you have a history of heart failure, kidney disease, liver failure, or cancer? \"        No    9. RECURRENT SYMPTOM: \"Have you had leg swelling before? \" If so, ask: \"When was the last time? \" \"What happened that time? \"        Yes    10. OTHER SYMPTOMS: \"Do you have any other symptoms? \" (e.g., chest pain, difficulty breathing)          No    11. PREGNANCY: \"Is there any chance you are pregnant? \" \"When was your last menstrual period? \"          No    Protocols used: LEG SWELLING AND EDEMA-ADULT-OH    Caller provided care

## 2020-12-01 ENCOUNTER — OFFICE VISIT (OUTPATIENT)
Dept: PRIMARY CARE CLINIC | Age: 45
End: 2020-12-01
Payer: MEDICARE

## 2020-12-01 VITALS
WEIGHT: 273 LBS | BODY MASS INDEX: 46.86 KG/M2 | OXYGEN SATURATION: 99 % | HEART RATE: 99 BPM | DIASTOLIC BLOOD PRESSURE: 84 MMHG | SYSTOLIC BLOOD PRESSURE: 146 MMHG

## 2020-12-01 PROBLEM — I77.9 PERIPHERAL ARTERIAL OCCLUSIVE DISEASE (HCC): Status: ACTIVE | Noted: 2020-12-01

## 2020-12-01 PROBLEM — E60 ZINC DEFICIENCY: Status: ACTIVE | Noted: 2020-12-01

## 2020-12-01 PROBLEM — E55.9 VITAMIN D DEFICIENCY: Status: ACTIVE | Noted: 2020-12-01

## 2020-12-01 PROCEDURE — G8484 FLU IMMUNIZE NO ADMIN: HCPCS | Performed by: NURSE PRACTITIONER

## 2020-12-01 PROCEDURE — G8417 CALC BMI ABV UP PARAM F/U: HCPCS | Performed by: NURSE PRACTITIONER

## 2020-12-01 PROCEDURE — 1036F TOBACCO NON-USER: CPT | Performed by: NURSE PRACTITIONER

## 2020-12-01 PROCEDURE — 99214 OFFICE O/P EST MOD 30 MIN: CPT | Performed by: NURSE PRACTITIONER

## 2020-12-01 PROCEDURE — G8427 DOCREV CUR MEDS BY ELIG CLIN: HCPCS | Performed by: NURSE PRACTITIONER

## 2020-12-01 ASSESSMENT — ENCOUNTER SYMPTOMS
SORE THROAT: 0
ABDOMINAL PAIN: 0
CONSTIPATION: 0
TROUBLE SWALLOWING: 0
SINUS PRESSURE: 0
SHORTNESS OF BREATH: 0
COUGH: 0
WHEEZING: 0
NAUSEA: 0
BLOOD IN STOOL: 0
DIARRHEA: 0
VOMITING: 0

## 2020-12-01 NOTE — PROGRESS NOTES
[address] PRIMARY CARE  [address] [facility]  [address] [address]  Dept: [phone]  Dept Fax: [phone]    Vinny Gresham is a 39 y.o. female who presentstoday for her medical conditions/complaints as noted below.   Vinny Gresham is c/o of  Chief Complaint   Patient presents with    Edema     left leg     Other     has previous qualified for insurance, needs to get back to seeing specialists shes lost     Anemia     asking about getting bloodwork done before seeing specialist next week        HPI:     Presents today with several concerns  Reports about a year ago she lost her insurance and consequently has been able to see her hematologist or follow through with any other referral or treatments  She is now insured again,  has upcoming appt with heme and would like blood work prior to seeing them  Has had increased fatigue, tearfulness, reports this is how she felt when she was first diagnosed and needed to be hospitalized    Quit job in July due to her leg pain  In process of pursuing disability and is currently on Medicaid  She reports has increased difficulty with walking and standing due to the left leg pain, past history of arterial surgery on left  Was referred to vascular surgeon over 1 year ago but unable to follow through due to loss of insurance          Hemoglobin A1C (%)   Date Value   10/18/2019 5.5             ( goal A1C is < 7)   No results found for: LABMICR  LDL Cholesterol (mg/dL)   Date Value   10/18/2019 105     LDL Calculated (mg/dL)   Date Value   2017 89       (goal LDL is <100)   AST (U/L)   Date Value   06/15/2020 16     ALT (U/L)   Date Value   06/15/2020 13     BUN (mg/dL)   Date Value   06/15/2020 13     BP Readings from Last 3 Encounters:   20 (!) 146/84   06/15/20 120/76   11/15/19 (!) 147/94          ([id number])    Past Medical History:   Diagnosis Date    Anemia     Anxiety     Carpal tunnel syndrome     Depression     Endometriosis     Gestational diabetes     x 3 children    Headache     migraines    Iron deficiency anemia     Kidney stones     Scleroderma (HCC)       Past Surgical History:   Procedure Laterality Date     SECTION  x 3    CHOLECYSTECTOMY      GASTRIC BYPASS SURGERY      OTHER SURGICAL HISTORY      growth removed from outer area of uterus    PELVIC LAPAROSCOPY      TOENAIL EXCISION         Family History   Problem Relation Age of Onset    Colon Cancer Mother     Early Death Father         accident    Diabetes Sister     High Blood Pressure Sister     Diabetes Brother     High Blood Pressure Brother     Diabetes Sister     High Blood Pressure Sister           Social History     Tobacco Use    Smoking status: Never Smoker    Smokeless tobacco: Never Used   Substance Use Topics    Alcohol use: No      Current Outpatient Medications   Medication Sig Dispense Refill    tamsulosin (FLOMAX) 0.4 MG capsule Take 1 capsule by mouth daily for 5 doses 5 capsule 0    vitamin D (CHOLECALCIFEROL) 1000 UNIT TABS tablet Take 50 tablets by mouth once a week for 7 doses Sig one 50,000 IU tablet weekly x 7 weeks 350 tablet 0    Cholecalciferol (VITAMIN D3) 06868 units CAPS Take 1 capsule by mouth once a week for 7 doses 7 capsule 1     No current facility-administered medications for this visit.       Allergies   Allergen Reactions    Morphine     Omnipaque [Iohexol] Hives     CT Dye  **can do the omnipaque if does allergy pack treatment**    Trileptal [Oxcarbazepine]        Health Maintenance   Topic Date Due    HIV screen  1990    Cervical cancer screen  1996    Flu vaccine (1) 2020    Diabetes screen  10/18/2022    Lipid screen  10/18/2024    DTaP/Tdap/Td vaccine (4 - Td) 11/15/2029    Hepatitis A vaccine  Aged Out    Hepatitis B vaccine  Aged Out    Hib vaccine  Aged Out    Meningococcal (ACWY) vaccine  Aged Out    Pneumococcal 0-64 years Vaccine  Aged [place] Subjective:      Review of Systems   Constitutional: Positive for fatigue. Negative for activity change, appetite change, chills, fever and unexpected weight change. HENT: Negative for congestion, ear pain, hearing loss, sinus pressure, sore throat and trouble swallowing. Eyes: Negative for visual disturbance. Respiratory: Negative for cough, shortness of breath and wheezing. Cardiovascular: Negative for chest pain, palpitations and leg swelling. Gastrointestinal: Negative for abdominal pain, blood in stool, constipation, diarrhea, nausea and vomiting. Endocrine: Negative for cold intolerance, heat intolerance, polydipsia, polyphagia and polyuria. Genitourinary: Negative for difficulty urinating, frequency, hematuria and urgency. Musculoskeletal: Positive for arthralgias, gait problem (Due to left leg pain) and myalgias. Skin: Negative for rash. Allergic/Immunologic: Negative for environmental allergies. Neurological: Negative for dizziness, weakness, light-headedness and headaches. Psychiatric/Behavioral: Negative for confusion. The patient is not nervous/anxious. Objective:     Physical Exam  Constitutional:       Appearance: She is well-developed. HENT:      Head: Normocephalic. Eyes:      Conjunctiva/sclera: Conjunctivae normal.      Pupils: Pupils are equal, round, and reactive to light. Neck:      Musculoskeletal: Normal range of motion. Cardiovascular:      Rate and Rhythm: Normal rate and regular rhythm. Heart sounds: Normal heart sounds. No murmur. Pulmonary:      Effort: Pulmonary effort is normal.      Breath sounds: Normal breath sounds. No wheezing. Abdominal:      General: Bowel sounds are normal. There is no distension. Palpations: Abdomen is soft. Musculoskeletal: Normal range of motion. Skin:     General: Skin is warm and dry. Neurological:      Mental Status: She is alert and oriented to person, place, and time.    Psychiatric: Behavior: Behavior normal.         Thought Content: Thought content normal.         Judgment: Judgment normal.       BP (!) 146/84   Pulse 99   Wt 273 lb (123.8 kg)   SpO2 99%   BMI 46.86 kg/m²     Assessment:       Diagnosis Orders   1. Other iron deficiency anemia  CBC Auto Differential    Copper, Serum    Iron and TIBC    Vitamin B12 & Folate    Ferritin   2. Malabsorption due to intolerance, not elsewhere classified  CBC Auto Differential    Iron and TIBC    Ferritin   3. Malabsorption of iron  CBC Auto Differential    Iron and TIBC    Ferritin   4. Screening for cardiovascular condition  Comprehensive Metabolic Panel    Lipid Panel   5. Vitamin D deficiency  Vitamin D 25 Hydroxy   6. Zinc deficiency  Zinc   7. Screening for thyroid disorder  TSH without Reflex   8. Encounter for screening mammogram for breast cancer  JOVITA DIGITAL SCREEN W OR WO CAD BILATERAL   9. Left leg pain  Corinna Ramos MD, Vascular Surgery, [place]   10. Peripheral arterial occlusive disease (HCC)  Corinna Ramos MD, Vascular Surgery, [place]             Plan:      Return in about 3 months (around 3/1/2021) for leg pain, anemia.     Orders Placed This Encounter   Procedures    JOVITA DIGITAL SCREEN W OR WO CAD BILATERAL     Standing Status:   Future     Standing Expiration Date:   2/1/2022     Order Specific Question:   Reason for exam:     Answer:   screening    CBC Auto Differential     Standing Status:   Future     Standing Expiration Date:   12/1/2021    Comprehensive Metabolic Panel     Standing Status:   Future     Standing Expiration Date:   12/1/2021    Lipid Panel     Standing Status:   Future     Standing Expiration Date:   12/1/2021     Order Specific Question:   Is Patient Fasting?/# of Hours     Answer:   8    Zinc     Standing Status:   Future     Standing Expiration Date:   12/1/2021    Copper, Serum     Standing Status:   Future     Standing Expiration Date:   12/1/2021    Iron and TIBC Standing Status:   Future     Standing Expiration Date:   12/1/2021     Order Specific Question:   Is Patient Fasting? Answer:   yes     Order Specific Question:   No of Hours? Answer:   8    Vitamin B12 & Folate     Standing Status:   Future     Standing Expiration Date:   12/1/2021    Ferritin     Standing Status:   Future     Standing Expiration Date:   12/1/2021    TSH without Reflex     Standing Status:   Future     Standing Expiration Date:   12/1/2021    Vitamin D 25 Hydroxy     Standing Status:   Future     Standing Expiration Date:   12/1/2021   Payam Diaz MD, Vascular Surgery, [place]     Referral Priority:   Routine     Referral Type:   Eval and Treat     Referral Reason:   Specialty Services Required     Referred to Provider:   Kristyn Blackburn MD     Requested Specialty:   Vascular Surgery     Number of Visits Requested:   1     Iron deficiency anemia, vitamin deficiencies-lengthy discussion with review of chart, labs that have been previously ordered per hematology have been reordered. She has appointment next week with her hematologist and will complete labs prior to that time  Left leg pain, peripheral arterial disease-lengthy discussion, she will be provided informed to office regarding her disability, referral to vascular surgeon   Patient given educational materials - see patient instructions. Discussed use, benefit, and side effects of prescribed medications. All patientquestions answered. Pt voiced understanding. Reviewed health maintenance. Instructedto continue current medications, diet and exercise. Patient agreed with treatmentplan. Follow up as directed.      Electronicallysigned by JUSTICE You CNP on 12/1/2020 at 5:23 PM

## 2020-12-03 ENCOUNTER — TELEPHONE (OUTPATIENT)
Dept: PRIMARY CARE CLINIC | Age: 45
End: 2020-12-03

## 2020-12-03 ENCOUNTER — HOSPITAL ENCOUNTER (OUTPATIENT)
Age: 45
Discharge: HOME | End: 2020-12-03
Payer: MEDICARE

## 2020-12-03 DIAGNOSIS — E55.9 VITAMIN D DEFICIENCY: ICD-10-CM

## 2020-12-03 DIAGNOSIS — K90.9 MALABSORPTION OF IRON: ICD-10-CM

## 2020-12-03 DIAGNOSIS — D50.8 OTHER IRON DEFICIENCY ANEMIA: ICD-10-CM

## 2020-12-03 DIAGNOSIS — E60 ZINC DEFICIENCY: ICD-10-CM

## 2020-12-03 DIAGNOSIS — Z13.6 SCREENING FOR CARDIOVASCULAR CONDITION: ICD-10-CM

## 2020-12-03 DIAGNOSIS — K90.49 MALABSORPTION DUE TO INTOLERANCE, NOT ELSEWHERE CLASSIFIED: ICD-10-CM

## 2020-12-03 DIAGNOSIS — Z13.29 SCREENING FOR THYROID DISORDER: ICD-10-CM

## 2020-12-03 LAB
ABSOLUTE EOS #: 0.07 K/UL (ref 0–0.4)
ABSOLUTE IMMATURE GRANULOCYTE: ABNORMAL K/UL (ref 0–0.3)
ABSOLUTE LYMPH #: 2.06 K/UL (ref 1–4.8)
ABSOLUTE MONO #: 0.57 K/UL (ref 0.1–1.2)
ALBUMIN SERPL-MCNC: 4.1 G/DL (ref 3.5–5.2)
ALBUMIN/GLOBULIN RATIO: 1.1 (ref 1–2.5)
ALP BLD-CCNC: 74 U/L (ref 35–104)
ALT SERPL-CCNC: 19 U/L (ref 5–33)
ANION GAP SERPL CALCULATED.3IONS-SCNC: 10 MMOL/L (ref 9–17)
AST SERPL-CCNC: 19 U/L
BASOPHILS # BLD: 1 % (ref 0–2)
BASOPHILS ABSOLUTE: 0.07 K/UL (ref 0–0.2)
BILIRUB SERPL-MCNC: 0.28 MG/DL (ref 0.3–1.2)
BUN BLDV-MCNC: 11 MG/DL (ref 6–20)
BUN/CREAT BLD: ABNORMAL (ref 9–20)
CALCIUM SERPL-MCNC: 9.9 MG/DL (ref 8.6–10.4)
CHLORIDE BLD-SCNC: 105 MMOL/L (ref 98–107)
CHOLESTEROL/HDL RATIO: 3
CHOLESTEROL: 133 MG/DL
CO2: 24 MMOL/L (ref 20–31)
CREAT SERPL-MCNC: 0.61 MG/DL (ref 0.5–0.9)
DIFFERENTIAL TYPE: ABNORMAL
EOSINOPHILS RELATIVE PERCENT: 1 % (ref 1–4)
FERRITIN: 3 UG/L (ref 13–150)
FOLATE: 15 NG/ML
GFR AFRICAN AMERICAN: >60 ML/MIN
GFR NON-AFRICAN AMERICAN: >60 ML/MIN
GFR SERPL CREATININE-BSD FRML MDRD: ABNORMAL ML/MIN/{1.73_M2}
GFR SERPL CREATININE-BSD FRML MDRD: ABNORMAL ML/MIN/{1.73_M2}
GLUCOSE BLD-MCNC: 105 MG/DL (ref 70–99)
HCT VFR BLD CALC: 27.2 % (ref 36–46)
HDLC SERPL-MCNC: 44 MG/DL
HEMOGLOBIN: 7.6 G/DL (ref 12–16)
IMMATURE GRANULOCYTES: ABNORMAL %
IRON SATURATION: 5 % (ref 20–55)
IRON: 22 UG/DL (ref 37–145)
LDL CHOLESTEROL: 69 MG/DL (ref 0–130)
LYMPHOCYTES # BLD: 29 % (ref 24–44)
MCH RBC QN AUTO: 16.8 PG (ref 26–34)
MCHC RBC AUTO-ENTMCNC: 27.9 G/DL (ref 31–37)
MCV RBC AUTO: 60 FL (ref 80–100)
MONOCYTES # BLD: 8 % (ref 2–11)
MORPHOLOGY: ABNORMAL
NRBC AUTOMATED: ABNORMAL PER 100 WBC
PDW BLD-RTO: 23.1 % (ref 12.5–15.4)
PLATELET # BLD: 313 K/UL (ref 140–450)
PLATELET ESTIMATE: ABNORMAL
PMV BLD AUTO: 9.8 FL (ref 8–14)
POTASSIUM SERPL-SCNC: 4.5 MMOL/L (ref 3.7–5.3)
RBC # BLD: 4.53 M/UL (ref 4–5.2)
RBC # BLD: ABNORMAL 10*6/UL
SEG NEUTROPHILS: 61 % (ref 36–66)
SEGMENTED NEUTROPHILS ABSOLUTE COUNT: 4.33 K/UL (ref 1.8–7.7)
SODIUM BLD-SCNC: 139 MMOL/L (ref 135–144)
TOTAL IRON BINDING CAPACITY: 443 UG/DL (ref 250–450)
TOTAL PROTEIN: 7.8 G/DL (ref 6.4–8.3)
TRIGL SERPL-MCNC: 101 MG/DL
TSH SERPL DL<=0.05 MIU/L-ACNC: 2.77 MIU/L (ref 0.3–5)
UNSATURATED IRON BINDING CAPACITY: 421 UG/DL (ref 112–347)
VITAMIN B-12: 1653 PG/ML (ref 232–1245)
VITAMIN D 25-HYDROXY: 32.5 NG/ML (ref 30–100)
VLDLC SERPL CALC-MCNC: NORMAL MG/DL (ref 1–30)
WBC # BLD: 7.1 K/UL (ref 3.5–11)
WBC # BLD: ABNORMAL 10*3/UL

## 2020-12-03 PROCEDURE — 83540 ASSAY OF IRON: CPT

## 2020-12-03 PROCEDURE — 82728 ASSAY OF FERRITIN: CPT

## 2020-12-03 PROCEDURE — 80053 COMPREHEN METABOLIC PANEL: CPT

## 2020-12-03 PROCEDURE — 85025 COMPLETE CBC W/AUTO DIFF WBC: CPT

## 2020-12-03 PROCEDURE — 82607 VITAMIN B-12: CPT

## 2020-12-03 PROCEDURE — 82306 VITAMIN D 25 HYDROXY: CPT

## 2020-12-03 PROCEDURE — 82746 ASSAY OF FOLIC ACID SERUM: CPT

## 2020-12-03 PROCEDURE — 82525 ASSAY OF COPPER: CPT

## 2020-12-03 PROCEDURE — 83550 IRON BINDING TEST: CPT

## 2020-12-03 PROCEDURE — 84443 ASSAY THYROID STIM HORMONE: CPT

## 2020-12-03 PROCEDURE — 84630 ASSAY OF ZINC: CPT

## 2020-12-03 PROCEDURE — 80061 LIPID PANEL: CPT

## 2020-12-03 PROCEDURE — 36415 COLL VENOUS BLD VENIPUNCTURE: CPT

## 2020-12-03 NOTE — TELEPHONE ENCOUNTER
Please let her know this is not emergent however I would like her to call Dr. Kieran Bower office in the morning and make them aware of her recent labs in case they would like her to get an infusion prior to her visit with him.   Thanks

## 2020-12-03 NOTE — TELEPHONE ENCOUNTER
Noted, see separate phone encounter, patient encouraged to call her hematology office to report results.

## 2020-12-03 NOTE — TELEPHONE ENCOUNTER
Patient received an alert on her phone regarding a critical lab result. Her Hemoglobin is 7.6. She sees Dr. Maximino De on 12/9/2020   She is wondering if she needs to do anything right now in the meantime. She is not having any new or worsening symptoms. Please advise.  Thank you

## 2020-12-04 ENCOUNTER — TELEPHONE (OUTPATIENT)
Dept: ONCOLOGY | Age: 45
End: 2020-12-04

## 2020-12-04 NOTE — TELEPHONE ENCOUNTER
Call received from patient stating her hgb is low at 7.6. Writer spoke with patient and she says she has some dizziness and fatigue. Patient denies SOB, chest pain and all other s/s. Patient denies any active bleeding/bruising. Patient was previously seen and treated by Dr Ervin Smith for iron deficiency anemia. Patient's ferritin is 3, iron is 22, iron sat is 5 and hgb is 7.6. Patient has appt with Dr Monster Bryant on 12/9 for further management. Patient last saw Dr Ervin Smith 9/2019. Writer instructed patient to go to ER STAT with any severe s/s. Patient communicated understanding. Dr Monster Bryant informed of above and he will place orders for Liliam Zurdo. Dr Monster Bryant stated that there is no need for a blood transfusion at this time.   Josie Owen

## 2020-12-06 LAB
COPPER: 146.4 UG/DL (ref 80–155)
ZINC: 79.8 UG/DL (ref 60–120)

## 2020-12-09 ENCOUNTER — OFFICE VISIT (OUTPATIENT)
Dept: ONCOLOGY | Age: 45
End: 2020-12-09
Payer: MEDICARE

## 2020-12-09 ENCOUNTER — TELEPHONE (OUTPATIENT)
Dept: ONCOLOGY | Age: 45
End: 2020-12-09

## 2020-12-09 VITALS
TEMPERATURE: 98.5 F | HEART RATE: 92 BPM | WEIGHT: 272.8 LBS | BODY MASS INDEX: 46.83 KG/M2 | SYSTOLIC BLOOD PRESSURE: 126 MMHG | DIASTOLIC BLOOD PRESSURE: 81 MMHG

## 2020-12-09 PROCEDURE — 99204 OFFICE O/P NEW MOD 45 MIN: CPT | Performed by: INTERNAL MEDICINE

## 2020-12-09 PROCEDURE — G8484 FLU IMMUNIZE NO ADMIN: HCPCS | Performed by: INTERNAL MEDICINE

## 2020-12-09 PROCEDURE — G8417 CALC BMI ABV UP PARAM F/U: HCPCS | Performed by: INTERNAL MEDICINE

## 2020-12-09 PROCEDURE — G8427 DOCREV CUR MEDS BY ELIG CLIN: HCPCS | Performed by: INTERNAL MEDICINE

## 2020-12-09 PROCEDURE — 99211 OFF/OP EST MAY X REQ PHY/QHP: CPT | Performed by: INTERNAL MEDICINE

## 2020-12-09 RX ORDER — IBUPROFEN 200 MG
1 CAPSULE ORAL DAILY
COMMUNITY

## 2020-12-09 RX ORDER — CHOLECALCIFEROL (VITAMIN D3) 25 MCG
CAPSULE ORAL DAILY
COMMUNITY
End: 2021-05-05 | Stop reason: SDUPTHER

## 2020-12-09 RX ORDER — UBIDECARENONE 75 MG
50 CAPSULE ORAL DAILY
COMMUNITY
End: 2022-04-15 | Stop reason: SDUPTHER

## 2020-12-09 RX ORDER — MELATONIN
1000 DAILY
Qty: 90 TABLET | Refills: 1 | Status: SHIPPED | OUTPATIENT
Start: 2020-12-09 | End: 2022-04-15 | Stop reason: SDUPTHER

## 2020-12-09 NOTE — TELEPHONE ENCOUNTER
AVS from 12/9/20    rv in 3 months with labs prior     rv scheduled for 3/10/21 @ 3:30pm pt will have labs drawn prior to appt    Pt was given AVS and appt schedule

## 2020-12-09 NOTE — PROGRESS NOTES
Maninder Jensen                                                                                                                  2020  MRN:   [id number]  YOB: 1975  PCP:                           JUSTICE Scott CNP  Referring Physician: No ref. provider found  Treating Physician Name: Rani Kingsley MD      Reason for consultation:  Chief Complaint   Patient presents with    Follow-up     review status of disease    Discuss Labs       Current problems:  Iron deficiency anemia with malabsorption component     Active and recent treatments:  Plan for IV iron    Summary of Case/History:    Maninder Jensen a 39 y. o.female is a patient with anemia. She has history of anemia and was managed previously by Dr. Helen Abreu with IV iron. Her most recent lab work shows recurrent iron deficiency anemia and she reports she historically received monthly B12 injections and IV iron every 3-6 months. She has history of scleroderma and gastric by-pass, done in , contributing to iron malabsorption. She reports she has had zinc and D deficiencies, she does not have any bariatric vitamins. She is very symptomatic with dizziness, cold intolerance and feeling very fatigued. She denies any bleeding.        Past Medical History:   Past Medical History:   Diagnosis Date    Anemia     Anxiety     Carpal tunnel syndrome     Depression     Endometriosis     Gestational diabetes     x 3 children    Headache     migraines    Iron deficiency anemia     Kidney stones     Scleroderma (HCC)        Past Surgical History:     Past Surgical History:   Procedure Laterality Date     SECTION  x 3    CHOLECYSTECTOMY      GASTRIC BYPASS SURGERY      OTHER SURGICAL HISTORY      growth removed from outer area of uterus    PELVIC LAPAROSCOPY      TOENAIL EXCISION         Patient Family Social History:     Social History     Socioeconomic History    Marital status: Single     Spouse name: None    Number of children: None    Years of education: None    Highest education level: None   Occupational History    None   Social Needs    Financial resource strain: None    Food insecurity     Worry: None     Inability: None    Transportation needs     Medical: None     Non-medical: None   Tobacco Use    Smoking status: Never Smoker    Smokeless tobacco: Never Used   Substance and Sexual Activity    Alcohol use: No    Drug use: No    Sexual activity: None   Lifestyle    Physical activity     Days per week: None     Minutes per session: None    Stress: None   Relationships    Social connections     Talks on phone: None     Gets together: None     Attends Holiness service: None     Active member of club or organization: None     Attends meetings of clubs or organizations: None     Relationship status: None    Intimate partner violence     Fear of current or ex partner: None     Emotionally abused: None     Physically abused: None     Forced sexual activity: None   Other Topics Concern    None   Social History Narrative    None     Family History   Problem Relation Age of Onset    Colon Cancer Mother     Early Death Father         accident    Diabetes Sister     High Blood Pressure Sister     Diabetes Brother     High Blood Pressure Brother     Diabetes Sister     High Blood Pressure Sister      Current Medications:     Current Outpatient Medications   Medication Sig Dispense Refill    Multiple Vitamins-Minerals (MULTI-JULIO CÉSAR PO) Take by mouth daily      Cholecalciferol (VITAMIN D-3) 25 MCG (1000 UT) CAPS Take by mouth daily      vitamin B-12 (CYANOCOBALAMIN) 100 MCG tablet Take 50 mcg by mouth daily      calcium carbonate (OYSTER SHELL CALCIUM 500 MG) 1250 (500 Ca) MG tablet Take 1 tablet by mouth daily      Zinc Sulfate (ZINC 15 PO) Take by mouth daily       No current facility-administered medications for this visit.         Allergies:   Morphine; Omnipaque [iohexol]; and Trileptal [oxcarbazepine] Review of Systems:    Constitutional: No fever or chills. No night sweats, no weight loss. Positive fatigue  Eyes: No eye discharge, double vision, or eye pain   HEENT: negative for sore mouth, sore throat, hoarseness and voice change   Respiratory: negative for cough , sputum, dyspnea, wheezing, hemoptysis, chest pain   Cardiovascular: negative for chest pain, dyspnea, palpitations, orthopnea, PND   Gastrointestinal: negative for nausea, vomiting, diarrhea, constipation, abdominal pain, Dysphagia, hematemesis and hematochezia   Genitourinary: negative for frequency, dysuria, nocturia, urinary incontinence, and hematuria   Integument: negative for rash, skin lesions, bruises.    Hematologic/Lymphatic: negative for easy bruising, bleeding, lymphadenopathy, or petechiae   Endocrine: negative for heat or cold intolerance,weight changes, change in bowel habits and hair loss   Musculoskeletal: negative for myalgias, arthralgias, pain, joint swelling,and bone pain   Neurological: negative for headaches, dizziness, seizures, weakness, numbness        Physical Exam:    Vitals: /81   Pulse 92   Temp 98.5 °F (36.9 °C) (Oral)   Wt 272 lb 12.8 oz (123.7 kg)   BMI 46.83 kg/m²   General appearance - well appearing, no in pain or distress  Mental status - AAO X3  Eyes - pupils equal and reactive, extraocular eye movements intact  Mouth - mucous membranes moist, pharynx normal without lesions  Neck - supple, no significant adenopathy  Lymphatics - no palpable lymphadenopathy, no hepatosplenomegaly  Chest - clear to auscultation, no wheezes, rales or rhonchi, symmetric air entry  Heart - normal rate, regular rhythm, normal S1, S2, no murmurs  Abdomen - soft, nontender, nondistended, no masses or organomegaly  Neurological - alert, oriented, normal speech, no focal findings or movement disorder noted  Extremities - peripheral pulses normal, no pedal edema, no clubbing or cyanosis  Skin - normal coloration and turgor, no rashes, no suspicious skin lesions noted       DATA:    CBC: No results for input(s): WBC, HGB, PLT in the last 72 hours. BMP:  No results for input(s): NA, K, CL, CO2, BUN, CREATININE, GLUCOSE in the last 72 hours. Hepatic: No results for input(s): AST, ALT, ALB, BILITOT, ALKPHOS in the last 72 hours. INR: No results for input(s): INR in the last 72 hours. PTT:No results for input(s): PTT in the last 72 hours. Lab Results   Component Value Date    WBC 7.1 12/03/2020    HGB 7.6 (L) 12/03/2020    HCT 27.2 (L) 12/03/2020    MCV 60.0 (L) 12/03/2020     12/03/2020     Lab Results   Component Value Date    IRON 22 (L) 12/03/2020    TIBC 443 12/03/2020    FERRITIN 3 (L) 12/03/2020       Chemistry        Component Value Date/Time     12/03/2020 1251    K 4.5 12/03/2020 1251     12/03/2020 1251    CO2 24 12/03/2020 1251    BUN 11 12/03/2020 1251    CREATININE 0.61 12/03/2020 1251        Component Value Date/Time    CALCIUM 9.9 12/03/2020 1251    ALKPHOS 74 12/03/2020 1251    AST 19 12/03/2020 1251    ALT 19 12/03/2020 1251    BILITOT 0.28 (L) 12/03/2020 1251        Lab Results   Component Value Date    [id number] 1653 (H) 12/03/2020     Impression   1. 4 mm proximal left ureteral calculus with mild hydronephrosis.  Additional   nonobstructive lower pole 5 mm calculus on the left. 2. No other acute findings within the abdomen or pelvis. Impression:  Iron deficiency anemia with malabsorption component  HX gastric bypass, 2009  HX scleroderma   Vitamin D deficiency  Fatigue    Plan:  I had a detailed discussion with the patient and personally went over results of her lab work-up and other relevant clinical data. Reviewed previous medical records  Patient has history of anemia likely secondary to malabsorption from gastric bypass surgery. Patient has failed oral iron in the past and has required IV iron.   Patient latest hemoglobin shows a ferritin of 3 and her CBC is only 7.6 with MCV of 60.  This is consistent with severe iron deficiency. I will arrange for IV iron. Also check for other nutritional deficiencies of which patient is at risk of due to gastric Quinn-en-Y surgery. Patient had multiple questions which answered to the best of my ability. Patient expressed understanding of the plan and was in agreement. YOANA PERKINS         I spent more than 60 minutes examining, evaluating, reviewing data, counseling the patient and coordinating care. Greater than 50% of time was spent face-to-face with the patient this note is created with the assistance of a speech recognition program.  While intending to generate a document that actually reflects the content of the visit, the document can still have some errors including those of syntax and sound a like substitutions which may escape proof reading. It such instances, actual meaning can be extrapolated by contextual diversion.

## 2020-12-15 ENCOUNTER — TELEPHONE (OUTPATIENT)
Dept: ONCOLOGY | Age: 45
End: 2020-12-15

## 2020-12-21 ENCOUNTER — INITIAL CONSULT (OUTPATIENT)
Dept: VASCULAR SURGERY | Age: 45
End: 2020-12-21
Payer: MEDICARE

## 2020-12-21 ENCOUNTER — HOSPITAL ENCOUNTER (OUTPATIENT)
Dept: INFUSION THERAPY | Age: 45
Discharge: HOME | End: 2020-12-21
Payer: MEDICARE

## 2020-12-21 VITALS
DIASTOLIC BLOOD PRESSURE: 81 MMHG | BODY MASS INDEX: 46.57 KG/M2 | SYSTOLIC BLOOD PRESSURE: 130 MMHG | WEIGHT: 272.8 LBS | HEIGHT: 64 IN | RESPIRATION RATE: 18 BRPM | HEART RATE: 88 BPM | TEMPERATURE: 97.4 F | OXYGEN SATURATION: 98 %

## 2020-12-21 VITALS
WEIGHT: 272.8 LBS | TEMPERATURE: 98.2 F | SYSTOLIC BLOOD PRESSURE: 120 MMHG | BODY MASS INDEX: 46.83 KG/M2 | RESPIRATION RATE: 18 BRPM | HEART RATE: 82 BPM | DIASTOLIC BLOOD PRESSURE: 76 MMHG

## 2020-12-21 DIAGNOSIS — K90.9 MALABSORPTION OF IRON: Primary | ICD-10-CM

## 2020-12-21 PROCEDURE — 2580000003 HC RX 258: Performed by: INTERNAL MEDICINE

## 2020-12-21 PROCEDURE — 96365 THER/PROPH/DIAG IV INF INIT: CPT | Performed by: NURSE PRACTITIONER

## 2020-12-21 PROCEDURE — 6360000002 HC RX W HCPCS: Performed by: INTERNAL MEDICINE

## 2020-12-21 PROCEDURE — G8427 DOCREV CUR MEDS BY ELIG CLIN: HCPCS | Performed by: SURGERY

## 2020-12-21 PROCEDURE — G8484 FLU IMMUNIZE NO ADMIN: HCPCS | Performed by: SURGERY

## 2020-12-21 PROCEDURE — 99244 OFF/OP CNSLTJ NEW/EST MOD 40: CPT | Performed by: SURGERY

## 2020-12-21 PROCEDURE — G8417 CALC BMI ABV UP PARAM F/U: HCPCS | Performed by: SURGERY

## 2020-12-21 RX ORDER — SODIUM CHLORIDE 9 MG/ML
INJECTION, SOLUTION INTRAVENOUS CONTINUOUS
Status: CANCELLED | OUTPATIENT
Start: 2020-12-21

## 2020-12-21 RX ORDER — SODIUM CHLORIDE 0.9 % (FLUSH) 0.9 %
5 SYRINGE (ML) INJECTION PRN
Status: CANCELLED | OUTPATIENT
Start: 2020-12-28

## 2020-12-21 RX ORDER — SODIUM CHLORIDE 0.9 % (FLUSH) 0.9 %
5 SYRINGE (ML) INJECTION PRN
Status: CANCELLED | OUTPATIENT
Start: 2020-12-21

## 2020-12-21 RX ORDER — METHYLPREDNISOLONE SODIUM SUCCINATE 125 MG/2ML
125 INJECTION, POWDER, LYOPHILIZED, FOR SOLUTION INTRAMUSCULAR; INTRAVENOUS ONCE
Status: CANCELLED | OUTPATIENT
Start: 2020-12-21 | End: 2020-12-21

## 2020-12-21 RX ORDER — EPINEPHRINE 1 MG/ML
0.3 INJECTION, SOLUTION, CONCENTRATE INTRAVENOUS PRN
Status: CANCELLED | OUTPATIENT
Start: 2020-12-21

## 2020-12-21 RX ORDER — EPINEPHRINE 1 MG/ML
0.3 INJECTION, SOLUTION, CONCENTRATE INTRAVENOUS PRN
Status: CANCELLED | OUTPATIENT
Start: 2020-12-28

## 2020-12-21 RX ORDER — SODIUM CHLORIDE 9 MG/ML
INJECTION, SOLUTION INTRAVENOUS CONTINUOUS
Status: CANCELLED | OUTPATIENT
Start: 2020-12-28

## 2020-12-21 RX ORDER — SODIUM CHLORIDE 0.9 % (FLUSH) 0.9 %
10 SYRINGE (ML) INJECTION PRN
Status: CANCELLED | OUTPATIENT
Start: 2020-12-28

## 2020-12-21 RX ORDER — HEPARIN SODIUM (PORCINE) LOCK FLUSH IV SOLN 100 UNIT/ML 100 UNIT/ML
500 SOLUTION INTRAVENOUS PRN
Status: CANCELLED | OUTPATIENT
Start: 2020-12-21

## 2020-12-21 RX ORDER — DIPHENHYDRAMINE HYDROCHLORIDE 50 MG/ML
50 INJECTION INTRAMUSCULAR; INTRAVENOUS ONCE
Status: CANCELLED | OUTPATIENT
Start: 2020-12-28 | End: 2020-12-28

## 2020-12-21 RX ORDER — DIPHENHYDRAMINE HYDROCHLORIDE 50 MG/ML
50 INJECTION INTRAMUSCULAR; INTRAVENOUS ONCE
Status: CANCELLED | OUTPATIENT
Start: 2020-12-21 | End: 2020-12-21

## 2020-12-21 RX ORDER — SODIUM CHLORIDE 0.9 % (FLUSH) 0.9 %
10 SYRINGE (ML) INJECTION PRN
Status: CANCELLED | OUTPATIENT
Start: 2020-12-21

## 2020-12-21 RX ORDER — HEPARIN SODIUM (PORCINE) LOCK FLUSH IV SOLN 100 UNIT/ML 100 UNIT/ML
500 SOLUTION INTRAVENOUS PRN
Status: CANCELLED | OUTPATIENT
Start: 2020-12-28

## 2020-12-21 RX ORDER — SODIUM CHLORIDE 9 MG/ML
INJECTION, SOLUTION INTRAVENOUS CONTINUOUS
Status: DISCONTINUED | OUTPATIENT
Start: 2020-12-21 | End: 2020-12-22 | Stop reason: HOSPADM

## 2020-12-21 RX ORDER — METHYLPREDNISOLONE SODIUM SUCCINATE 125 MG/2ML
125 INJECTION, POWDER, LYOPHILIZED, FOR SOLUTION INTRAMUSCULAR; INTRAVENOUS ONCE
Status: CANCELLED | OUTPATIENT
Start: 2020-12-28 | End: 2020-12-28

## 2020-12-21 RX ADMIN — SODIUM CHLORIDE: 9 INJECTION, SOLUTION INTRAVENOUS at 12:00

## 2020-12-21 RX ADMIN — FERRIC CARBOXYMALTOSE INJECTION 750 MG: 50 INJECTION, SOLUTION INTRAVENOUS at 12:09

## 2020-12-21 NOTE — PROGRESS NOTES
Medications:      Current Outpatient Medications   Medication Sig Dispense Refill    Multiple Vitamins-Minerals (MULTI-JULIO CÉSAR PO) Take by mouth daily      Cholecalciferol (VITAMIN D-3) 25 MCG (1000 UT) CAPS Take by mouth daily      vitamin B-12 (CYANOCOBALAMIN) 100 MCG tablet Take 50 mcg by mouth daily      calcium carbonate (OYSTER SHELL CALCIUM 500 MG) 1250 (500 Ca) MG tablet Take 1 tablet by mouth daily      vitamin D3 (CHOLECALCIFEROL) 25 MCG (1000 UT) TABS tablet Take 1 tablet by mouth daily 90 tablet 1    zinc 50 MG CAPS Take 50 mg by mouth daily 90 capsule 1     No current facility-administered medications for this visit. Social History:     Tobacco:    reports that she has never smoked. She has never used smokeless tobacco.  Alcohol:      reports no history of alcohol use. Drug Use:  reports no history of drug use. 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 HENT: Negative for congestion. Eyes: Negative for visual disturbance. Respiratory: Negative for chest tightness and shortness of breath. Cardiovascular: Positive for leg swelling. Negative for chest pain. Gastrointestinal: Negative for abdominal pain. Endocrine: Negative. Genitourinary: Negative. Musculoskeletal: Negative. Skin: Negative for color change and wound. Allergic/Immunologic: Negative. Neurological: Negative for facial asymmetry, speech difficulty, weakness and numbness. Hematological: Negative. Psychiatric/Behavioral: Negative.         Physical Exam:     Vitals:  /81 (Site: Right Upper Arm, Position: Sitting, Cuff Size: Large Adult)   Pulse 88   Temp 97.4 °F (36.3 °C)   Resp 18   Ht 5' 4\" (1.626 m)   Wt 272 lb 12.8 oz (123.7 kg)   SpO2 98%   BMI 46.83 kg/m²     Physical Exam  Constitutional: Appearance: She is well-developed and well-groomed. Eyes:      Extraocular Movements: Extraocular movements intact. Conjunctiva/sclera: Conjunctivae normal.   Neck:      Musculoskeletal: Full passive range of motion without pain. Vascular: No carotid bruit. Cardiovascular:      Rate and Rhythm: Normal rate and regular rhythm. Pulses:           Radial pulses are 2+ on the right side and 2+ on the left side. Dorsalis pedis pulses are 2+ on the right side and 2+ on the left side. Posterior tibial pulses are 2+ on the right side and 2+ on the left side. Pulmonary:      Effort: Pulmonary effort is normal. No respiratory distress. Abdominal:      Palpations: Abdomen is soft. Tenderness: There is no abdominal tenderness. Musculoskeletal:      Right lower leg: She exhibits no tenderness and no swelling. Left lower leg: She exhibits swelling. She exhibits no tenderness. No edema. Right foot: Normal capillary refill. No tenderness or swelling. Left foot: Normal capillary refill. No tenderness or swelling. Feet:      Right foot:      Skin integrity: No ulcer or skin breakdown. Left foot:      Skin integrity: No ulcer or skin breakdown. Skin:     General: Skin is warm. Capillary Refill: Capillary refill takes less than 2 seconds. Neurological:      Mental Status: She is alert and oriented to person, place, and time. GCS: GCS eye subscore is 4. GCS verbal subscore is 5. GCS motor subscore is 6. Sensory: Sensation is intact. Motor: Motor function is intact. Psychiatric:         Mood and Affect: Mood normal.         Speech: Speech normal.         Behavior: Behavior normal.         Thought Content:  Thought content normal.     Chronic swelling and lymphedema of left lower extremity      Imaging/Labs:     Venous duplex reveals no superficial or deep venous thrombosis SAVANNAH MARTIN              new onset diabetes mellitus hba1c done in august a soutpt 7.1 ,will ask diabetic educator,metformin once need for cardiac cath determined           Electronically Signed On 08/07/2017 11:10  __________________________________________________   SAVANNAH MARTIN              21:36  __________________________________________________   SAVANNAH MARTIN     CT does not reveal severe narrowing of left common iliac vein to suggest May Thurner's Syndrome    Assessment and Plan:     Chronic left leg swelling, lymphedema, possible element of May Thurner's Syndrome  · She will try to obtain venogram images from her procedure  · Will make referral to lymphedema clinic to help manage swelling with compression and would be good candidate for lymphedema pumps  · Advised her to try to obtain velcro compression stockings for easier application and comfort to help with chronic swelling  · She will follow up in 3-6 months for re-evaluation    Electronically signed by Radha Ayala MD on 12/21/20 at 10:43 AM [place] [address] [address] [facility]  Office: [phone]  Cell: [phone]  Email: [email or web address]

## 2020-12-21 NOTE — PROGRESS NOTES
Patient here esrly, she requested to have her iron. I was able to take her. She tolerated her iron infusion well.

## 2020-12-23 ENCOUNTER — TELEPHONE (OUTPATIENT)
Dept: VASCULAR SURGERY | Age: 45
End: 2020-12-23

## 2020-12-23 NOTE — TELEPHONE ENCOUNTER
----- Message from Mook Gómez MD sent at 12/23/2020 10:39 AM EST -----  Regarding: RE: compression socks  I told her they can be purchased online thru [employer], making referral to lymphedema clinic for pumps which have velcro attachments  ----- Message -----  From: Chris Mae MA  Sent: 12/21/2020  11:17 AM EST  To: Mook Gómez MD, #  Subject: compression socks                                Patient stated that you recommended she use velcro compression stockings. I didn't see an order.     She needs it send to [facility]  in [place] Phone: [phone]

## 2020-12-24 ASSESSMENT — ENCOUNTER SYMPTOMS
SHORTNESS OF BREATH: 0
COLOR CHANGE: 0
ABDOMINAL PAIN: 0
ALLERGIC/IMMUNOLOGIC NEGATIVE: 1
CHEST TIGHTNESS: 0

## 2020-12-28 ENCOUNTER — HOSPITAL ENCOUNTER (OUTPATIENT)
Dept: INFUSION THERAPY | Age: 45
Discharge: HOME | End: 2020-12-28
Payer: MEDICARE

## 2020-12-28 VITALS
HEART RATE: 70 BPM | TEMPERATURE: 98.3 F | RESPIRATION RATE: 18 BRPM | DIASTOLIC BLOOD PRESSURE: 83 MMHG | SYSTOLIC BLOOD PRESSURE: 121 MMHG

## 2020-12-28 DIAGNOSIS — K90.9 MALABSORPTION OF IRON: Primary | ICD-10-CM

## 2020-12-28 PROCEDURE — 96365 THER/PROPH/DIAG IV INF INIT: CPT

## 2020-12-28 PROCEDURE — 2580000003 HC RX 258: Performed by: INTERNAL MEDICINE

## 2020-12-28 PROCEDURE — 6360000002 HC RX W HCPCS: Performed by: INTERNAL MEDICINE

## 2020-12-28 RX ORDER — SODIUM CHLORIDE 0.9 % (FLUSH) 0.9 %
10 SYRINGE (ML) INJECTION PRN
Status: CANCELLED | OUTPATIENT
Start: 2020-12-28

## 2020-12-28 RX ORDER — SODIUM CHLORIDE 9 MG/ML
INJECTION, SOLUTION INTRAVENOUS CONTINUOUS
Status: ACTIVE | OUTPATIENT
Start: 2020-12-28 | End: 2020-12-28

## 2020-12-28 RX ORDER — SODIUM CHLORIDE 9 MG/ML
INJECTION, SOLUTION INTRAVENOUS CONTINUOUS
Status: CANCELLED | OUTPATIENT
Start: 2020-12-28

## 2020-12-28 RX ORDER — HEPARIN SODIUM (PORCINE) LOCK FLUSH IV SOLN 100 UNIT/ML 100 UNIT/ML
500 SOLUTION INTRAVENOUS PRN
Status: CANCELLED | OUTPATIENT
Start: 2020-12-28

## 2020-12-28 RX ORDER — SODIUM CHLORIDE 0.9 % (FLUSH) 0.9 %
5 SYRINGE (ML) INJECTION PRN
Status: CANCELLED | OUTPATIENT
Start: 2020-12-28

## 2020-12-28 RX ORDER — EPINEPHRINE 1 MG/ML
0.3 INJECTION, SOLUTION, CONCENTRATE INTRAVENOUS PRN
Status: CANCELLED | OUTPATIENT
Start: 2020-12-28

## 2020-12-28 RX ORDER — METHYLPREDNISOLONE SODIUM SUCCINATE 125 MG/2ML
125 INJECTION, POWDER, LYOPHILIZED, FOR SOLUTION INTRAMUSCULAR; INTRAVENOUS ONCE
Status: CANCELLED | OUTPATIENT
Start: 2020-12-28 | End: 2020-12-28

## 2020-12-28 RX ORDER — DIPHENHYDRAMINE HYDROCHLORIDE 50 MG/ML
50 INJECTION INTRAMUSCULAR; INTRAVENOUS ONCE
Status: CANCELLED | OUTPATIENT
Start: 2020-12-28 | End: 2020-12-28

## 2020-12-28 RX ADMIN — SODIUM CHLORIDE: 9 INJECTION, SOLUTION INTRAVENOUS at 11:26

## 2020-12-28 RX ADMIN — FERRIC CARBOXYMALTOSE INJECTION 750 MG: 50 INJECTION, SOLUTION INTRAVENOUS at 11:25

## 2020-12-28 NOTE — PROGRESS NOTES
Patient here for day 2 of 2 injectafer. Vitals stable. She tolerated treatment well and ws discharged home in stable condition. She is due to return 3/10 for MD visit.

## 2020-12-31 NOTE — TELEPHONE ENCOUNTER
Patient called back and stated that she is not working right now and wanted to know if you could write a DME Rx for her to see if insurance will help cover. Thank you!

## 2021-01-04 DIAGNOSIS — I87.2 CHRONIC VENOUS INSUFFICIENCY OF LOWER EXTREMITY: ICD-10-CM

## 2021-01-04 DIAGNOSIS — I89.0 LYMPHEDEMA OF EXTREMITY: Primary | ICD-10-CM

## 2021-01-22 ENCOUNTER — TELEPHONE (OUTPATIENT)
Dept: VASCULAR SURGERY | Age: 46
End: 2021-01-22

## 2021-01-22 NOTE — TELEPHONE ENCOUNTER
Patient called to get an update on her lymphedema pumps and stockings. I advised her that the order for compression stockings was faxed last month and that the order and everything for the lymphedema pumps was also already sent to our rep, DUNG. I told her I would reach out to Jorge and see where we were at with everything. Patient stated that she has been having issues with her phone lately. Patient also said that she has been having swelling and a \"constant tickle\" about 3-4\" above her ankle on the outside of her left leg. She said it almost feels like someone is using a feather and tickling that same spot. She has been struggling to get around as well. She said it is not warm to the touch, not red and there is not a bump. She wanted to update us with that info. Patient's appt is 6/21 with us. LM for DUNG to get an update.

## 2021-01-22 NOTE — TELEPHONE ENCOUNTER
PT states you wrote the wrong compression stockings on her prescription. She states it has to say Shar Herbert otherwise she will not get the right stockings you want her to receive. She needs a new script sent to her pharmacy.

## 2021-01-27 NOTE — TELEPHONE ENCOUNTER
Spoke to DUNG who said that her insurance won't cover the lymphedema pumps. She gave the patient a financial form to fill out and hasn't heard anything back from her. She will follow up with the patient.

## 2021-03-05 ENCOUNTER — HOSPITAL ENCOUNTER (OUTPATIENT)
Age: 46
Discharge: HOME | End: 2021-03-05
Payer: MEDICARE

## 2021-03-05 DIAGNOSIS — K90.49 MALABSORPTION DUE TO INTOLERANCE, NOT ELSEWHERE CLASSIFIED: ICD-10-CM

## 2021-03-05 DIAGNOSIS — E55.9 VITAMIN D DEFICIENCY: ICD-10-CM

## 2021-03-05 DIAGNOSIS — F41.9 ANXIETY: ICD-10-CM

## 2021-03-05 DIAGNOSIS — D50.8 OTHER IRON DEFICIENCY ANEMIA: ICD-10-CM

## 2021-03-05 DIAGNOSIS — E60 ZINC DEFICIENCY: ICD-10-CM

## 2021-03-05 LAB
ABSOLUTE EOS #: 0.06 K/UL (ref 0–0.4)
ABSOLUTE IMMATURE GRANULOCYTE: ABNORMAL K/UL (ref 0–0.3)
ABSOLUTE LYMPH #: 2.16 K/UL (ref 1–4.8)
ABSOLUTE MONO #: 0.42 K/UL (ref 0.1–0.8)
ALBUMIN SERPL-MCNC: 4.3 G/DL (ref 3.5–5.2)
ALBUMIN/GLOBULIN RATIO: 1.2 (ref 1–2.5)
ALP BLD-CCNC: 99 U/L (ref 35–104)
ALT SERPL-CCNC: 25 U/L (ref 5–33)
ANION GAP SERPL CALCULATED.3IONS-SCNC: 8 MMOL/L (ref 9–17)
AST SERPL-CCNC: 17 U/L
BASOPHILS # BLD: 0 % (ref 0–2)
BASOPHILS ABSOLUTE: 0 K/UL (ref 0–0.2)
BILIRUB SERPL-MCNC: 0.12 MG/DL (ref 0.3–1.2)
BUN BLDV-MCNC: 16 MG/DL (ref 6–20)
BUN/CREAT BLD: ABNORMAL (ref 9–20)
CALCIUM SERPL-MCNC: 9.3 MG/DL (ref 8.6–10.4)
CHLORIDE BLD-SCNC: 102 MMOL/L (ref 98–107)
CO2: 26 MMOL/L (ref 20–31)
CREAT SERPL-MCNC: 0.62 MG/DL (ref 0.5–0.9)
DIFFERENTIAL TYPE: ABNORMAL
EOSINOPHILS RELATIVE PERCENT: 1 % (ref 1–4)
FERRITIN: 6 UG/L (ref 13–150)
FOLATE: 9.4 NG/ML
GFR AFRICAN AMERICAN: >60 ML/MIN
GFR NON-AFRICAN AMERICAN: >60 ML/MIN
GFR SERPL CREATININE-BSD FRML MDRD: ABNORMAL ML/MIN/{1.73_M2}
GFR SERPL CREATININE-BSD FRML MDRD: ABNORMAL ML/MIN/{1.73_M2}
GLUCOSE BLD-MCNC: 128 MG/DL (ref 70–99)
HCT VFR BLD CALC: 34.6 % (ref 36–46)
HEMOGLOBIN: 10.8 G/DL (ref 12–16)
IMMATURE GRANULOCYTES: ABNORMAL %
IRON SATURATION: 4 % (ref 20–55)
IRON: 18 UG/DL (ref 37–145)
LYMPHOCYTES # BLD: 36 % (ref 24–44)
MCH RBC QN AUTO: 23.3 PG (ref 26–34)
MCHC RBC AUTO-ENTMCNC: 31.3 G/DL (ref 31–37)
MCV RBC AUTO: 74.3 FL (ref 80–100)
MONOCYTES # BLD: 7 % (ref 1–7)
MORPHOLOGY: ABNORMAL
MORPHOLOGY: ABNORMAL
NRBC AUTOMATED: ABNORMAL PER 100 WBC
PDW BLD-RTO: 26.2 % (ref 12.5–15.4)
PLATELET # BLD: 286 K/UL (ref 140–450)
PLATELET ESTIMATE: ABNORMAL
PMV BLD AUTO: 9.1 FL (ref 6–12)
POTASSIUM SERPL-SCNC: 4.1 MMOL/L (ref 3.7–5.3)
RBC # BLD: 4.65 M/UL (ref 4–5.2)
RBC # BLD: ABNORMAL 10*6/UL
SEG NEUTROPHILS: 56 % (ref 36–66)
SEGMENTED NEUTROPHILS ABSOLUTE COUNT: 3.36 K/UL (ref 1.8–7.7)
SODIUM BLD-SCNC: 136 MMOL/L (ref 135–144)
TOTAL IRON BINDING CAPACITY: 414 UG/DL (ref 250–450)
TOTAL PROTEIN: 8 G/DL (ref 6.4–8.3)
UNSATURATED IRON BINDING CAPACITY: 396 UG/DL (ref 112–347)
VITAMIN B-12: 685 PG/ML (ref 232–1245)
VITAMIN D 25-HYDROXY: 25.5 NG/ML (ref 30–100)
WBC # BLD: 6 K/UL (ref 3.5–11)
WBC # BLD: ABNORMAL 10*3/UL

## 2021-03-05 PROCEDURE — 83540 ASSAY OF IRON: CPT

## 2021-03-05 PROCEDURE — 80053 COMPREHEN METABOLIC PANEL: CPT

## 2021-03-05 PROCEDURE — 82525 ASSAY OF COPPER: CPT

## 2021-03-05 PROCEDURE — 36415 COLL VENOUS BLD VENIPUNCTURE: CPT

## 2021-03-05 PROCEDURE — 85025 COMPLETE CBC W/AUTO DIFF WBC: CPT

## 2021-03-05 PROCEDURE — 82728 ASSAY OF FERRITIN: CPT

## 2021-03-05 PROCEDURE — 84630 ASSAY OF ZINC: CPT

## 2021-03-05 PROCEDURE — 82607 VITAMIN B-12: CPT

## 2021-03-05 PROCEDURE — 82306 VITAMIN D 25 HYDROXY: CPT

## 2021-03-05 PROCEDURE — 82746 ASSAY OF FOLIC ACID SERUM: CPT

## 2021-03-05 PROCEDURE — 83550 IRON BINDING TEST: CPT

## 2021-03-09 LAB — COPPER: 130.4 UG/DL (ref 80–155)

## 2021-03-10 ENCOUNTER — OFFICE VISIT (OUTPATIENT)
Dept: ONCOLOGY | Age: 46
End: 2021-03-10
Payer: MEDICARE

## 2021-03-10 ENCOUNTER — TELEPHONE (OUTPATIENT)
Dept: ONCOLOGY | Age: 46
End: 2021-03-10

## 2021-03-10 VITALS
SYSTOLIC BLOOD PRESSURE: 129 MMHG | TEMPERATURE: 98.1 F | DIASTOLIC BLOOD PRESSURE: 86 MMHG | HEART RATE: 93 BPM | WEIGHT: 279.6 LBS | BODY MASS INDEX: 47.99 KG/M2

## 2021-03-10 DIAGNOSIS — E55.9 VITAMIN D DEFICIENCY: ICD-10-CM

## 2021-03-10 DIAGNOSIS — D64.9 ANEMIA, UNSPECIFIED TYPE: ICD-10-CM

## 2021-03-10 DIAGNOSIS — D50.8 OTHER IRON DEFICIENCY ANEMIA: Primary | ICD-10-CM

## 2021-03-10 LAB — ZINC: 62.1 UG/DL (ref 60–120)

## 2021-03-10 PROCEDURE — 99214 OFFICE O/P EST MOD 30 MIN: CPT | Performed by: INTERNAL MEDICINE

## 2021-03-10 PROCEDURE — 99211 OFF/OP EST MAY X REQ PHY/QHP: CPT | Performed by: INTERNAL MEDICINE

## 2021-03-10 PROCEDURE — G8417 CALC BMI ABV UP PARAM F/U: HCPCS | Performed by: INTERNAL MEDICINE

## 2021-03-10 PROCEDURE — G8427 DOCREV CUR MEDS BY ELIG CLIN: HCPCS | Performed by: INTERNAL MEDICINE

## 2021-03-10 PROCEDURE — G8484 FLU IMMUNIZE NO ADMIN: HCPCS | Performed by: INTERNAL MEDICINE

## 2021-03-10 PROCEDURE — 1036F TOBACCO NON-USER: CPT | Performed by: INTERNAL MEDICINE

## 2021-03-10 NOTE — PROGRESS NOTES
and updated.     Past Medical History:   Past Medical History:   Diagnosis Date    Anemia     Anxiety     Carpal tunnel syndrome     Depression     Endometriosis     Gestational diabetes     x 3 children    Headache     migraines    Iron deficiency anemia     Kidney stones     Scleroderma (HCC)        Past Surgical History:     Past Surgical History:   Procedure Laterality Date     SECTION  x 3    CHOLECYSTECTOMY      GASTRIC BYPASS SURGERY      OTHER SURGICAL HISTORY      growth removed from outer area of uterus    PELVIC LAPAROSCOPY      TOENAIL EXCISION         Patient Family Social History:     Social History     Socioeconomic History    Marital status: Single     Spouse name: Not on file    Number of children: Not on file    Years of education: Not on file    Highest education level: Not on file   Occupational History    Not on file   Social Needs    Financial resource strain: Not on file    Food insecurity     Worry: Not on file     Inability: Not on file    Transportation needs     Medical: Not on file     Non-medical: Not on file   Tobacco Use    Smoking status: Never Smoker    Smokeless tobacco: Never Used   Substance and Sexual Activity    Alcohol use: No    Drug use: No    Sexual activity: Not on file   Lifestyle    Physical activity     Days per week: Not on file     Minutes per session: Not on file    Stress: Not on file   Relationships    Social connections     Talks on phone: Not on file     Gets together: Not on file     Attends Sabianist service: Not on file     Active member of club or organization: Not on file     Attends meetings of clubs or organizations: Not on file     Relationship status: Not on file    Intimate partner violence     Fear of current or ex partner: Not on file     Emotionally abused: Not on file     Physically abused: Not on file     Forced sexual activity: Not on file   Other Topics Concern    Not on file   Social History Narrative    Not on file     Family History   Problem Relation Age of Onset    Colon Cancer Mother     Early Death Father         accident    Diabetes Sister     High Blood Pressure Sister     Diabetes Brother     High Blood Pressure Brother     Diabetes Sister     High Blood Pressure Sister      Current Medications:     Current Outpatient Medications   Medication Sig Dispense Refill    Elastic Bandages & Supports (JOBST ULTRASHEER 20-30MMHG LG) MISC Wear daily, ok to remove in the evenings 3 each 3    Multiple Vitamins-Minerals (MULTI-JULIO CÉSAR PO) Take by mouth daily      Cholecalciferol (VITAMIN D-3) 25 MCG (1000 UT) CAPS Take by mouth daily      vitamin B-12 (CYANOCOBALAMIN) 100 MCG tablet Take 50 mcg by mouth daily      calcium carbonate (OYSTER SHELL CALCIUM 500 MG) 1250 (500 Ca) MG tablet Take 1 tablet by mouth daily      vitamin D3 (CHOLECALCIFEROL) 25 MCG (1000 UT) TABS tablet Take 1 tablet by mouth daily 90 tablet 1    zinc 50 MG CAPS Take 50 mg by mouth daily 90 capsule 1     No current facility-administered medications for this visit. Allergies:   Contrast [iodides], Morphine, Omnipaque [iohexol], and Trileptal [oxcarbazepine]    Review of Systems:    Constitutional: No fever or chills. No night sweats, no weight loss. Positive fatigue  Eyes: No eye discharge, double vision, or eye pain   HEENT: negative for sore mouth, sore throat, hoarseness and voice change   Respiratory: negative for cough , sputum, wheezing, hemoptysis, chest pain + exertional shortness of breath  Cardiovascular: negative for chest pain, dyspnea, palpitations, orthopnea, PND   Gastrointestinal: negative for nausea, vomiting, diarrhea, constipation, abdominal pain, Dysphagia, hematemesis and hematochezia   Genitourinary: negative for frequency, dysuria, nocturia, urinary incontinence, and hematuria   Gynecological: + menorrhagia with irregular periods  Integument: negative for rash, skin lesions, bruises. Hematologic/Lymphatic: negative for easy bruising, bleeding, lymphadenopathy, or petechiae   Endocrine: negative for heat or cold intolerance,weight changes, change in bowel habits and hair loss   Musculoskeletal: negative for myalgias, arthralgias, pain, joint swelling,and bone pain   Neurological: negative for headaches, dizziness, seizures, weakness, numbness        Physical Exam:    Vitals: There were no vitals taken for this visit. General appearance - well appearing, no in pain or distress  Mental status - AAO X3  Eyes - pupils equal and reactive, extraocular eye movements intact  Mouth - mucous membranes moist, pharynx normal without lesions  Neck - supple, no significant adenopathy  Lymphatics - no palpable lymphadenopathy, no hepatosplenomegaly  Chest - clear to auscultation, no wheezes, rales or rhonchi, symmetric air entry  Heart - normal rate, regular rhythm, normal S1, S2, no murmurs  Abdomen - soft, nontender, nondistended, no masses or organomegaly  Neurological - alert, oriented, normal speech, no focal findings or movement disorder noted  Extremities - peripheral pulses normal, no pedal edema, no clubbing or cyanosis  Skin - normal coloration and turgor, no rashes, no suspicious skin lesions noted       DATA:    CBC: No results for input(s): WBC, HGB, PLT in the last 72 hours. BMP:  No results for input(s): NA, K, CL, CO2, BUN, CREATININE, GLUCOSE in the last 72 hours. Hepatic: No results for input(s): AST, ALT, ALB, BILITOT, ALKPHOS in the last 72 hours. INR: No results for input(s): INR in the last 72 hours. PTT:No results for input(s): PTT in the last 72 hours.      Lab Results   Component Value Date    WBC 6.0 03/05/2021    HGB 10.8 (L) 03/05/2021    HCT 34.6 (L) 03/05/2021    MCV 74.3 (L) 03/05/2021     03/05/2021     Lab Results   Component Value Date    IRON 18 (L) 03/05/2021    TIBC 414 03/05/2021    FERRITIN 6 (L) 03/05/2021       Chemistry        Component Value Date/Time    NA 136 03/05/2021 1501    K 4.1 03/05/2021 1501     03/05/2021 1501    CO2 26 03/05/2021 1501    BUN 16 03/05/2021 1501    CREATININE 0.62 03/05/2021 1501        Component Value Date/Time    CALCIUM 9.3 03/05/2021 1501    ALKPHOS 99 03/05/2021 1501    AST 17 03/05/2021 1501    ALT 25 03/05/2021 1501    BILITOT 0.12 (L) 03/05/2021 1501        Lab Results   Component Value Date    [id number] 950 03/05/2021         Impression:  Iron deficiency anemia with malabsorption component  HX gastric bypass, 2009  HX scleroderma   Vitamin D deficiency  Fatigue    Plan:  We discussed her lab work which shows peristent iron deficiency anemia with little improvement in her counts following IV iron in 12/2020. We discussed her menorrhagia and she plans to follow up with gynecology for evaluation and recommendations. I discussed plan for additional IV iron and ongoing monitoring. Given history of gastric bypass surgery patient will need long-term surveillance for nutritional deficiencies and repletion including iron. Continue supplementation with multivitamins. Return in 2 months with repeat lab work. Lili Garcia        This note is created with the assistance of a speech recognition program.  While intending to generate a document that actually reflects the content of the visit, the document can still have some errors including those of syntax and sound a like substitutions which may escape proof reading. It such instances, actual meaning can be extrapolated by contextual diversion.

## 2021-03-10 NOTE — TELEPHONE ENCOUNTER
AVS from 3/10/21     injectafer  As soon as approved      rv in 2 months with labs couple of days prior     AVS given to schedule to follow precert    RV scheduled 5/12/21 @ 3:15pm    PT was given AVS and an appt schedule    Electronically signed by Sepideh Rodriguez on 3/10/2021 at 4:11 PM

## 2021-03-11 ENCOUNTER — TELEPHONE (OUTPATIENT)
Dept: ONCOLOGY | Age: 46
End: 2021-03-11

## 2021-03-11 NOTE — TELEPHONE ENCOUNTER
Call and lm for pt to call and schedule injectafer    Paperwork is in the pending/left message drawer

## 2021-03-12 ENCOUNTER — HOSPITAL ENCOUNTER (OUTPATIENT)
Dept: INFUSION THERAPY | Age: 46
Discharge: HOME | End: 2021-03-12
Payer: MEDICARE

## 2021-03-12 VITALS — RESPIRATION RATE: 18 BRPM | DIASTOLIC BLOOD PRESSURE: 84 MMHG | SYSTOLIC BLOOD PRESSURE: 143 MMHG | HEART RATE: 85 BPM

## 2021-03-12 DIAGNOSIS — K90.9 MALABSORPTION OF IRON: Primary | ICD-10-CM

## 2021-03-12 DIAGNOSIS — D50.8 OTHER IRON DEFICIENCY ANEMIA: ICD-10-CM

## 2021-03-12 PROCEDURE — 6360000002 HC RX W HCPCS: Performed by: INTERNAL MEDICINE

## 2021-03-12 PROCEDURE — 96365 THER/PROPH/DIAG IV INF INIT: CPT

## 2021-03-12 PROCEDURE — 2580000003 HC RX 258: Performed by: INTERNAL MEDICINE

## 2021-03-12 RX ORDER — SODIUM CHLORIDE 9 MG/ML
INJECTION, SOLUTION INTRAVENOUS CONTINUOUS
Status: CANCELLED | OUTPATIENT
Start: 2021-03-12

## 2021-03-12 RX ORDER — SODIUM CHLORIDE 9 MG/ML
INJECTION, SOLUTION INTRAVENOUS CONTINUOUS
Status: DISCONTINUED | OUTPATIENT
Start: 2021-03-12 | End: 2021-03-13 | Stop reason: HOSPADM

## 2021-03-12 RX ORDER — SODIUM CHLORIDE 9 MG/ML
INJECTION, SOLUTION INTRAVENOUS CONTINUOUS
Status: CANCELLED | OUTPATIENT
Start: 2021-03-19

## 2021-03-12 RX ADMIN — FERRIC CARBOXYMALTOSE INJECTION 750 MG: 50 INJECTION, SOLUTION INTRAVENOUS at 13:32

## 2021-03-12 RX ADMIN — SODIUM CHLORIDE: 9 INJECTION, SOLUTION INTRAVENOUS at 13:32

## 2021-03-18 ENCOUNTER — HOSPITAL ENCOUNTER (OUTPATIENT)
Age: 46
Discharge: HOME | End: 2021-03-18
Payer: MEDICARE

## 2021-03-18 LAB
ABSOLUTE EOS #: 0.19 K/UL (ref 0–0.4)
ABSOLUTE IMMATURE GRANULOCYTE: 0 K/UL (ref 0–0.3)
ABSOLUTE LYMPH #: 2.18 K/UL (ref 1–4.8)
ABSOLUTE MONO #: 0.77 K/UL (ref 0.1–0.8)
BASOPHILS # BLD: 0 % (ref 0–2)
BASOPHILS ABSOLUTE: 0 K/UL (ref 0–0.2)
CHOLESTEROL, FASTING: 154 MG/DL
CHOLESTEROL/HDL RATIO: 3.8
DIFFERENTIAL TYPE: ABNORMAL
EOSINOPHILS RELATIVE PERCENT: 3 % (ref 1–4)
FERRITIN: 523 UG/L (ref 13–150)
FOLLICLE STIMULATING HORMONE: 1.7 U/L (ref 1.7–21.5)
GLUCOSE FASTING: 96 MG/DL (ref 70–99)
HCG QUANTITATIVE: <1 IU/L
HCT VFR BLD CALC: 38.7 % (ref 36.3–47.1)
HDLC SERPL-MCNC: 41 MG/DL
HEMOGLOBIN: 10.9 G/DL (ref 11.9–15.1)
IMMATURE GRANULOCYTES: 0 %
INSULIN COMMENT: NORMAL
INSULIN REFERENCE RANGE:: NORMAL
INSULIN: 16.4 MU/L
IRON: 69 UG/DL (ref 37–145)
LDL CHOLESTEROL: 84 MG/DL (ref 0–130)
LH: 3.4 U/L (ref 1–95.6)
LYMPHOCYTES # BLD: 34 % (ref 24–44)
MCH RBC QN AUTO: 23 PG (ref 25.2–33.5)
MCHC RBC AUTO-ENTMCNC: 28.2 G/DL (ref 28.4–34.8)
MCV RBC AUTO: 81.6 FL (ref 82.6–102.9)
MONOCYTES # BLD: 12 % (ref 1–7)
MORPHOLOGY: ABNORMAL
NRBC AUTOMATED: 0 PER 100 WBC
PDW BLD-RTO: 23.4 % (ref 11.8–14.4)
PLATELET # BLD: ABNORMAL K/UL (ref 138–453)
PLATELET ESTIMATE: ABNORMAL
PLATELET, FLUORESCENCE: 237 K/UL (ref 138–453)
PLATELET, IMMATURE FRACTION: 7.3 % (ref 1.1–10.3)
PMV BLD AUTO: ABNORMAL FL (ref 8.1–13.5)
PROGESTERONE LEVEL: 0.11 NG/ML
PROLACTIN: 21.96 UG/L (ref 4.79–23.3)
RBC # BLD: 4.74 M/UL (ref 3.95–5.11)
RBC # BLD: ABNORMAL 10*6/UL
SEG NEUTROPHILS: 51 % (ref 36–66)
SEGMENTED NEUTROPHILS ABSOLUTE COUNT: 3.26 K/UL (ref 1.8–7.7)
T3 FREE: 2.6 PG/ML (ref 2.02–4.43)
THYROXINE, FREE: 0.87 NG/DL (ref 0.93–1.7)
TRIGLYCERIDE, FASTING: 143 MG/DL
TSH SERPL DL<=0.05 MIU/L-ACNC: 1.76 MIU/L (ref 0.3–5)
VITAMIN D 25-HYDROXY: 21 NG/ML (ref 30–100)
VLDLC SERPL CALC-MCNC: NORMAL MG/DL (ref 1–30)
WBC # BLD: 6.4 K/UL (ref 3.5–11.3)
WBC # BLD: ABNORMAL 10*3/UL

## 2021-03-18 PROCEDURE — 82728 ASSAY OF FERRITIN: CPT

## 2021-03-18 PROCEDURE — 84144 ASSAY OF PROGESTERONE: CPT

## 2021-03-18 PROCEDURE — 82947 ASSAY GLUCOSE BLOOD QUANT: CPT

## 2021-03-18 PROCEDURE — 84481 FREE ASSAY (FT-3): CPT

## 2021-03-18 PROCEDURE — 84702 CHORIONIC GONADOTROPIN TEST: CPT

## 2021-03-18 PROCEDURE — 83002 ASSAY OF GONADOTROPIN (LH): CPT

## 2021-03-18 PROCEDURE — 36415 COLL VENOUS BLD VENIPUNCTURE: CPT

## 2021-03-18 PROCEDURE — 85025 COMPLETE CBC W/AUTO DIFF WBC: CPT

## 2021-03-18 PROCEDURE — 84146 ASSAY OF PROLACTIN: CPT

## 2021-03-18 PROCEDURE — 80061 LIPID PANEL: CPT

## 2021-03-18 PROCEDURE — 83001 ASSAY OF GONADOTROPIN (FSH): CPT

## 2021-03-18 PROCEDURE — 84439 ASSAY OF FREE THYROXINE: CPT

## 2021-03-18 PROCEDURE — 84443 ASSAY THYROID STIM HORMONE: CPT

## 2021-03-18 PROCEDURE — 83525 ASSAY OF INSULIN: CPT

## 2021-03-18 PROCEDURE — 85055 RETICULATED PLATELET ASSAY: CPT

## 2021-03-18 PROCEDURE — 82306 VITAMIN D 25 HYDROXY: CPT

## 2021-03-18 PROCEDURE — 83540 ASSAY OF IRON: CPT

## 2021-03-18 PROCEDURE — 86800 THYROGLOBULIN ANTIBODY: CPT

## 2021-03-19 ENCOUNTER — HOSPITAL ENCOUNTER (OUTPATIENT)
Age: 46
Discharge: HOME | End: 2021-03-19
Payer: MEDICARE

## 2021-03-19 ENCOUNTER — HOSPITAL ENCOUNTER (OUTPATIENT)
Dept: INFUSION THERAPY | Age: 46
Discharge: HOME | End: 2021-03-19
Payer: MEDICARE

## 2021-03-19 VITALS
RESPIRATION RATE: 18 BRPM | DIASTOLIC BLOOD PRESSURE: 85 MMHG | HEART RATE: 67 BPM | TEMPERATURE: 98 F | SYSTOLIC BLOOD PRESSURE: 145 MMHG

## 2021-03-19 DIAGNOSIS — K90.9 MALABSORPTION OF IRON: Primary | ICD-10-CM

## 2021-03-19 DIAGNOSIS — D50.8 OTHER IRON DEFICIENCY ANEMIA: ICD-10-CM

## 2021-03-19 LAB
AFP: 9.7 UG/L
CA 125: 42 U/ML
CA 19-9: 5 U/ML (ref 0–35)
CARCINOEMBRYONIC ANTIGEN: 0.6 NG/ML
ESTRADIOL LEVEL: 26 PG/ML (ref 27–314)
HCG QUALITATIVE: NEGATIVE
LACTATE DEHYDROGENASE: 247 U/L (ref 135–214)
PREALBUMIN: 18.6 MG/DL (ref 20–40)
THYROGLOBULIN AB: <12 IU/ML (ref 0–40)

## 2021-03-19 PROCEDURE — 82105 ALPHA-FETOPROTEIN SERUM: CPT

## 2021-03-19 PROCEDURE — 84134 ASSAY OF PREALBUMIN: CPT

## 2021-03-19 PROCEDURE — 96365 THER/PROPH/DIAG IV INF INIT: CPT

## 2021-03-19 PROCEDURE — 86301 IMMUNOASSAY TUMOR CA 19-9: CPT

## 2021-03-19 PROCEDURE — 84703 CHORIONIC GONADOTROPIN ASSAY: CPT

## 2021-03-19 PROCEDURE — 86304 IMMUNOASSAY TUMOR CA 125: CPT

## 2021-03-19 PROCEDURE — 82670 ASSAY OF TOTAL ESTRADIOL: CPT

## 2021-03-19 PROCEDURE — 6360000002 HC RX W HCPCS: Performed by: INTERNAL MEDICINE

## 2021-03-19 PROCEDURE — 36415 COLL VENOUS BLD VENIPUNCTURE: CPT

## 2021-03-19 PROCEDURE — 82378 CARCINOEMBRYONIC ANTIGEN: CPT

## 2021-03-19 PROCEDURE — 2580000003 HC RX 258: Performed by: INTERNAL MEDICINE

## 2021-03-19 PROCEDURE — 83615 LACTATE (LD) (LDH) ENZYME: CPT

## 2021-03-19 RX ORDER — SODIUM CHLORIDE 9 MG/ML
INJECTION, SOLUTION INTRAVENOUS CONTINUOUS
Status: CANCELLED | OUTPATIENT
Start: 2021-03-19

## 2021-03-19 RX ORDER — SODIUM CHLORIDE 9 MG/ML
INJECTION, SOLUTION INTRAVENOUS CONTINUOUS
Status: DISCONTINUED | OUTPATIENT
Start: 2021-03-19 | End: 2021-03-20 | Stop reason: HOSPADM

## 2021-03-19 RX ADMIN — SODIUM CHLORIDE: 9 INJECTION, SOLUTION INTRAVENOUS at 15:13

## 2021-03-19 RX ADMIN — FERRIC CARBOXYMALTOSE INJECTION 750 MG: 50 INJECTION, SOLUTION INTRAVENOUS at 15:14

## 2021-03-25 ENCOUNTER — TELEPHONE (OUTPATIENT)
Dept: PRIMARY CARE CLINIC | Age: 46
End: 2021-03-25

## 2021-03-25 DIAGNOSIS — R97.0 ELEVATED CEA: Primary | ICD-10-CM

## 2021-03-25 DIAGNOSIS — R97.1 ELEVATED CA-125: ICD-10-CM

## 2021-03-25 DIAGNOSIS — R74.02 ELEVATED SERUM LACTATE DEHYDROGENASE: ICD-10-CM

## 2021-03-25 DIAGNOSIS — R97.8 ELEVATED TUMOR MARKERS: ICD-10-CM

## 2021-03-25 NOTE — TELEPHONE ENCOUNTER
spok with pt, pt states that she has already consulted with Dr. Baljinder Coello. States that she had a D and C scheduled with her that was cancelled due to the lab results. Pt states that Dr. Baljinder Coello referred her to Gynecological Oncologist Dr. Irene Rodriguez, but has not received any communication with Dr. Saul cat to get an appt scheduled. Pt asking if PCP could refer her to the [facility] Gynecological oncology department at the [facility], this would be Dr. Nolia Crigler and [facility]. Please advise.

## 2021-03-25 NOTE — TELEPHONE ENCOUNTER
I have reviewed her chart and I am not overly concerned. The CEA that she sees as elevated can be elevated for other reasons. Her recent mammogram was normal.  I suggest she follow-up with Dr. Kavon Cui as she is the one who has ordered all of these labs. Her other option would be to talk to her current hematologist that she sees for her iron infusions.  Dr Wendy Mcdowell is also on oncologist and would be best able to review her current labs and identify anything of concern  I will plan to see her as scheduled on April 1 as I do not have any sooner appointments available  Thanks

## 2021-03-25 NOTE — TELEPHONE ENCOUNTER
Pt would like to know if she is able to get her appt on  moved to sometime tomorrow. She thinks she has cancer and wants to talk about her lab work . She is concerned because it could be the same cancer that her mother  from. She mentioned that all of her tumor markers were high and she was given a referral to see another doctor who has not called her back in a week. She is asking to receive another referral to a gynecologist at the [facility] in [facility]. Pt would like a call back with an update as soon as possible.

## 2021-04-01 ENCOUNTER — OFFICE VISIT (OUTPATIENT)
Dept: PRIMARY CARE CLINIC | Age: 46
End: 2021-04-01
Payer: MEDICARE

## 2021-04-01 VITALS
DIASTOLIC BLOOD PRESSURE: 82 MMHG | BODY MASS INDEX: 48.71 KG/M2 | RESPIRATION RATE: 18 BRPM | HEART RATE: 76 BPM | OXYGEN SATURATION: 98 % | SYSTOLIC BLOOD PRESSURE: 142 MMHG | WEIGHT: 283.8 LBS

## 2021-04-01 DIAGNOSIS — K90.9 MALABSORPTION OF IRON: ICD-10-CM

## 2021-04-01 DIAGNOSIS — I77.9 PERIPHERAL ARTERIAL OCCLUSIVE DISEASE (HCC): ICD-10-CM

## 2021-04-01 DIAGNOSIS — N85.8 UTERINE MASS: ICD-10-CM

## 2021-04-01 DIAGNOSIS — F41.9 ANXIETY: ICD-10-CM

## 2021-04-01 DIAGNOSIS — D50.8 OTHER IRON DEFICIENCY ANEMIA: Primary | ICD-10-CM

## 2021-04-01 PROBLEM — N80.9 ENDOMETRIOSIS: Status: ACTIVE | Noted: 2021-04-01

## 2021-04-01 PROBLEM — Z87.39 HISTORY OF SCLERODERMA: Status: ACTIVE | Noted: 2021-04-01

## 2021-04-01 PROBLEM — I87.1 MAY-THURNER SYNDROME: Status: ACTIVE | Noted: 2021-04-01

## 2021-04-01 PROCEDURE — G8427 DOCREV CUR MEDS BY ELIG CLIN: HCPCS | Performed by: NURSE PRACTITIONER

## 2021-04-01 PROCEDURE — G8417 CALC BMI ABV UP PARAM F/U: HCPCS | Performed by: NURSE PRACTITIONER

## 2021-04-01 PROCEDURE — 99214 OFFICE O/P EST MOD 30 MIN: CPT | Performed by: NURSE PRACTITIONER

## 2021-04-01 PROCEDURE — 1036F TOBACCO NON-USER: CPT | Performed by: NURSE PRACTITIONER

## 2021-04-01 RX ORDER — BUSPIRONE HYDROCHLORIDE 10 MG/1
10 TABLET ORAL 3 TIMES DAILY PRN
Qty: 90 TABLET | Refills: 5 | Status: SHIPPED | OUTPATIENT
Start: 2021-04-01 | End: 2022-06-07

## 2021-04-01 RX ORDER — MEGESTROL ACETATE 20 MG/1
20 TABLET ORAL 2 TIMES DAILY
COMMUNITY
End: 2021-04-26 | Stop reason: SDUPTHER

## 2021-04-01 SDOH — ECONOMIC STABILITY: FOOD INSECURITY: WITHIN THE PAST 12 MONTHS, YOU WORRIED THAT YOUR FOOD WOULD RUN OUT BEFORE YOU GOT MONEY TO BUY MORE.: NEVER TRUE

## 2021-04-01 SDOH — ECONOMIC STABILITY: TRANSPORTATION INSECURITY
IN THE PAST 12 MONTHS, HAS LACK OF TRANSPORTATION KEPT YOU FROM MEETINGS, WORK, OR FROM GETTING THINGS NEEDED FOR DAILY LIVING?: NO

## 2021-04-01 SDOH — ECONOMIC STABILITY: FOOD INSECURITY: WITHIN THE PAST 12 MONTHS, THE FOOD YOU BOUGHT JUST DIDN'T LAST AND YOU DIDN'T HAVE MONEY TO GET MORE.: NEVER TRUE

## 2021-04-01 SDOH — ECONOMIC STABILITY: INCOME INSECURITY: HOW HARD IS IT FOR YOU TO PAY FOR THE VERY BASICS LIKE FOOD, HOUSING, MEDICAL CARE, AND HEATING?: NOT HARD AT ALL

## 2021-04-01 ASSESSMENT — ENCOUNTER SYMPTOMS
SHORTNESS OF BREATH: 0
COUGH: 0
SINUS PRESSURE: 0
WHEEZING: 0
VOMITING: 0
NAUSEA: 0
DIARRHEA: 0
ABDOMINAL PAIN: 0
CONSTIPATION: 0
SORE THROAT: 0
BLOOD IN STOOL: 0
TROUBLE SWALLOWING: 0

## 2021-04-01 NOTE — PROGRESS NOTES
[facility] PR[facility]  [facility] [address] [facility] [address]  [address] [address]  Dept: [phone]  Dept Fax: [phone]    Vincent Durbin is a 39 y.o. female who presentstoday for her medical conditions/complaints as noted below.   Vincent Durbin is c/o of  Chief Complaint   Patient presents with    3 Month Follow-Up         HPI:     Here today for follow up  Recently saw her GYN, uterine mass found along with some concerning labs  Has appt coming up with GYN-oncology next week, will likely require surgery  Has been started on megestrol  States she is very frightened/worried as has young children in the home, \"I really hope I do not have cancer\"    Reports has been seeing vascular on regular basis, has follow-up in a couple of months for further evaluation, was told may require surgical intervention but she states she has to resolve her uterine issue first    Has been seeing hematologist regularly with intermittent infusions to manage her anemia      Hemoglobin A1C (%)   Date Value   10/18/2019 5.5             ( goal A1C is < 7)   No results found for: LABMICR  LDL Cholesterol (mg/dL)   Date Value   2021 84   2020 69   10/18/2019 105     LDL Calculated (mg/dL)   Date Value   2017 89       (goal LDL is <100)   AST (U/L)   Date Value   2021 17     ALT (U/L)   Date Value   2021 25     BUN (mg/dL)   Date Value   2021 16     BP Readings from Last 3 Encounters:   21 (!) 142/82   21 (!) 145/85   21 (!) 143/84          ([id number])    Past Medical History:   Diagnosis Date    Anemia     Anxiety     Carpal tunnel syndrome     Depression     Endometriosis     Gestational diabetes     x 3 children    Headache     migraines    Iron deficiency anemia     Kidney stones     Scleroderma ([address]       Past Surgical History:   Procedure Laterality Date     SECTION  x 3    CHOLECYSTECTOMY      GASTRIC BYPASS SURGERY      OTHER SURGICAL HISTORY      growth removed from outer area of uterus    PELVIC LAPAROSCOPY      TOENAIL EXCISION         Family History   Problem Relation Age of Onset    Colon Cancer Mother     Early Death Father         accident    Diabetes Sister     High Blood Pressure Sister     Diabetes Brother     High Blood Pressure Brother     Diabetes Sister     High Blood Pressure Sister           Social History     Tobacco Use    Smoking status: Never Smoker    Smokeless tobacco: Never Used   Substance Use Topics    Alcohol use: No      Current Outpatient Medications   Medication Sig Dispense Refill    megestrol (MEGACE) 20 MG tablet Take 20 mg by mouth 2 times daily      busPIRone (BUSPAR) 10 MG tablet Take 1 tablet by mouth 3 times daily as needed (anxiety) 90 tablet 5    Elastic Bandages & Supports (JOBST ULTRASHEER 20-30MMHG LG) MISC Wear daily, ok to remove in the evenings 3 each 3    Multiple Vitamins-Minerals (MULTI-JULIO CÉSAR PO) Take by mouth daily      Cholecalciferol (VITAMIN D-3) 25 MCG (1000 UT) CAPS Take by mouth daily      vitamin B-12 (CYANOCOBALAMIN) 100 MCG tablet Take 50 mcg by mouth daily      calcium carbonate (OYSTER SHELL CALCIUM 500 MG) 1250 (500 Ca) MG tablet Take 1 tablet by mouth daily      vitamin D3 (CHOLECALCIFEROL) 25 MCG (1000 UT) TABS tablet Take 1 tablet by mouth daily 90 tablet 1    zinc 50 MG CAPS Take 50 mg by mouth daily 90 capsule 1     No current facility-administered medications for this visit. Allergies   Allergen Reactions    Contrast [Iodides] Hives     Chest pain, HTN  Able to handle if the pre-medication prep is followed.     Morphine     Omnipaque [Iohexol] Hives     CT Dye  **can do the omnipaque if does allergy pack treatment**    Trileptal [Oxcarbazepine]        Health Maintenance   Topic Date Due    Hepatitis C screen  Never done    HIV screen  Never done    COVID-19 Vaccine (1) Never done    Flu vaccine (Season Ended) 09/01/2021    Diabetes screen 03/18/2024    Cervical cancer screen  03/19/2024    Lipid screen  03/18/2026    DTaP/Tdap/Td vaccine (4 - Td) 11/15/2029    Hepatitis A vaccine  Aged Out    Hepatitis B vaccine  Aged Out    Hib vaccine  Aged Out    Meningococcal (ACWY) vaccine  Aged Out    Pneumococcal 0-64 years Vaccine  Aged Out       Subjective:      Review of Systems   Constitutional: Negative for activity change, appetite change, chills, fatigue, fever and unexpected weight change. HENT: Negative for congestion, ear pain, hearing loss, sinus pressure, sore throat and trouble swallowing. Eyes: Negative for visual disturbance. Respiratory: Negative for cough, shortness of breath and wheezing. Cardiovascular: Negative for chest pain, palpitations and leg swelling. Gastrointestinal: Negative for abdominal pain, blood in stool, constipation, diarrhea, nausea and vomiting. Endocrine: Negative for cold intolerance, heat intolerance, polydipsia, polyphagia and polyuria. Genitourinary: Negative for difficulty urinating, frequency, hematuria and urgency. Musculoskeletal: Positive for arthralgias, gait problem (Cane) and myalgias. Skin: Negative for rash. Allergic/Immunologic: Negative for environmental allergies. Neurological: Negative for dizziness, weakness, light-headedness and headaches. Psychiatric/Behavioral: Negative for confusion. The patient is not nervous/anxious. Objective:     Physical Exam  Constitutional:       Appearance: She is well-developed. HENT:      Head: Normocephalic. Eyes:      Conjunctiva/sclera: Conjunctivae normal.      Pupils: Pupils are equal, round, and reactive to light. Neck:      Musculoskeletal: Normal range of motion. Cardiovascular:      Rate and Rhythm: Normal rate and regular rhythm. Heart sounds: Normal heart sounds. No murmur. Pulmonary:      Effort: Pulmonary effort is normal.      Breath sounds: Normal breath sounds. No wheezing.    Abdominal:      General: Bowel sounds are normal. There is no distension. Palpations: Abdomen is soft. Musculoskeletal: Normal range of motion. Skin:     General: Skin is warm and dry. Neurological:      Mental Status: She is alert and oriented to person, place, and time. Psychiatric:         Behavior: Behavior normal.         Thought Content: Thought content normal.         Judgment: Judgment normal.       BP (!) 142/82   Pulse 76   Resp 18   Wt 283 lb 12.8 oz (128.7 kg)   SpO2 98%   BMI 48.71 kg/m²     Assessment:       Diagnosis Orders   1. Other iron deficiency anemia     2. Peripheral arterial occlusive disease ([address]     3. Malabsorption of iron     4. Anxiety  busPIRone (BUSPAR) 10 MG tablet   5. Uterine mass               Plan:      Return in about 6 months (around 10/1/2021). Iron deficiency anemia-has improved with regular infusions, she will continue to follow-up with hematology as planned  PAD-some progression with worsening of symptoms, may need surgical intervention near future, reviewed last vascular note, she will follow-up as planned in June  Uterine mass, anxiety-lengthy discussion and review of chart. She has appointment with GYN oncology next week. Consequently she is very worried about potential outcomes, anxiety interfering with her daily life, will try BuSpar     Orders Placed This Encounter   Medications    busPIRone (BUSPAR) 10 MG tablet     Sig: Take 1 tablet by mouth 3 times daily as needed (anxiety)     Dispense:  90 tablet     Refill:  5       Patient given educational materials - see patient instructions. Discussed use, benefit, and side effects of prescribed medications. All patientquestions answered. Pt voiced understanding. Reviewed health maintenance. Instructedto continue current medications, diet and exercise. Patient agreed with treatmentplan. Follow up as directed.      Electronicallysigned by JUSTICE Stephenson - CNP on 4/1/2021 at 5:26 PM

## 2021-04-02 ENCOUNTER — IMMUNIZATION (OUTPATIENT)
Dept: PRIMARY CARE CLINIC | Age: 46
End: 2021-04-02
Payer: MEDICARE

## 2021-04-02 PROCEDURE — 0001A COVID-19, PFIZER VACCINE 30MCG/0.3ML DOSE: CPT | Performed by: INTERNAL MEDICINE

## 2021-04-02 PROCEDURE — 91300 COVID-19, PFIZER VACCINE 30MCG/0.3ML DOSE: CPT | Performed by: INTERNAL MEDICINE

## 2021-04-09 ENCOUNTER — HOSPITAL ENCOUNTER (OUTPATIENT)
Age: 46
Setting detail: SPECIMEN
Discharge: HOME | End: 2021-04-09
Payer: MEDICARE

## 2021-04-09 ENCOUNTER — OFFICE VISIT (OUTPATIENT)
Dept: GYNECOLOGIC ONCOLOGY | Age: 46
End: 2021-04-09
Payer: MEDICARE

## 2021-04-09 VITALS
DIASTOLIC BLOOD PRESSURE: 94 MMHG | WEIGHT: 278.4 LBS | HEART RATE: 93 BPM | SYSTOLIC BLOOD PRESSURE: 173 MMHG | OXYGEN SATURATION: 97 % | BODY MASS INDEX: 47.79 KG/M2

## 2021-04-09 DIAGNOSIS — R97.8 ELEVATED TUMOR MARKERS: ICD-10-CM

## 2021-04-09 DIAGNOSIS — N92.1 MENORRHAGIA WITH IRREGULAR CYCLE: ICD-10-CM

## 2021-04-09 DIAGNOSIS — R93.89 THICKENED ENDOMETRIUM: Primary | ICD-10-CM

## 2021-04-09 PROCEDURE — 1036F TOBACCO NON-USER: CPT | Performed by: PHYSICIAN ASSISTANT

## 2021-04-09 PROCEDURE — G8417 CALC BMI ABV UP PARAM F/U: HCPCS | Performed by: PHYSICIAN ASSISTANT

## 2021-04-09 PROCEDURE — 99205 OFFICE O/P NEW HI 60 MIN: CPT | Performed by: PHYSICIAN ASSISTANT

## 2021-04-09 PROCEDURE — G8427 DOCREV CUR MEDS BY ELIG CLIN: HCPCS | Performed by: PHYSICIAN ASSISTANT

## 2021-04-09 NOTE — PROGRESS NOTES
[address] [facility], [facility], [address] [facility] [address]      I am seeing Lucy Carlos for New Patient at the request of her PCP Greg JACK and her OB/GYNDr. Kai Crawford. Chief Complaint: Thickened endometrium, right ovarian cyst, and elevated tumor markers     HPI Carlos Boxer is a  (3  sections) 39 y.o. female who is being referred for findings of thickened endometrial stripe on pelvic ultrasound and elevated tumor markers. She presented to a local OBGYN provider Dr. Kai Crawford to re-establish gynecologic care on 3/11/21 after the patient was lost to follow up care due to lack of insurance. She had concerns of menorrhagia and \"went through 12 super tampons in a work shift\". She disclosed that she has a history of menorrhagia for 10+ years. She previously followed with hematology for her anemia with Dr. Cinthya Silverman and currently follows with Dr. Arjun Hermosillo. She receives IV venofer therapy for iron deficiency anemia with malabsorption component. A pelvic ultrasound was performed on 3/17/21 which revealed the uterus measuring 12.13 x 6.23 x 5.56 cm. Endometrial stripe was thickened at 28 mm. A right ovarian cyst was noted measuring 3.53 cm. No left identified. No free fluid. Blood work was obtained on 3/18/21 revealing Hbg 10.9 Hct 38.7. Iron studies stable. Her serum Hcg was negative. FSH low at 1.7 units (premenopausal)    At follow up with Dr. Roxana Lou on 3/19 to review results of her ultrasound. She was started on Megestrol 20mg BID. Orders were placed for patient to have tumor markers drawn.  slightly elevated at 42 units (less than 38 considered normal), AFP elevated at 9.7 units (less 8.4 considered normal). Her CEA and CA 19-9 were within normal limits. Estradiol 26 units. LDH was slightly elevated at 247 units (less than 214 considered normal).     Patient was then counseled on referral to Gynecology Oncology given the findings of thickened endometrium, ovarian cyst and elevation of tumor markers. She continues to follow with her hematologist Dr. Vanita Heck and her most recent IV venofer therapy was recently given on 3/12 and 3/19. Today, she does not have concerns of the abdominal or pelvic pain. She does state she gets intermittent left lower quadrant discomfort that simply feels like a \"tugging or gnawing sensation\". Her menses at age 15. LMP 2021. She is currently on Megace 20mg BID and only minimal scant vaginal bleeding since starting hormonal therapy. She has urinary incontinence at baseline. She denies dysuria or hematuria. She has formed bowel habits. She denies blood in the stool. She has no chest pain or shortness of breath, however she does comment that she does get out of breath easily with exercise, she attributes this to her weight. She is up to date on pap smear history most recently performed by Dr. Trini Villareal on 3/19/21 was negative for intraepithelial lesion or malignancy, HPV negative. She does not recall history of abnormal pap smear however she did not have pap smear in last 7 years. Medical history is significant for morbid obesity, endometriosis, scleroderma, PAD (follows with Dr. Fariba Carvajal), menorrhagia, and iron deficient anemia with malabsorption component. Surgical history pertinent for 3  sections, gastric bypass in , abdominal laparoscopy for \"uterine growth\" in  Dr. Ricardo Patino. Family history revealed her mother with history of colon cancer, diagnosed at 70. A paternal grandmother with hx breast cancer. She states all of women in her family have had to have hysterectomies, however she is unsure of reasoning. She is a non-smoker, non-drinker. She has not been sexually active in years. She is up to date on mammogram screening, obtained on 3/22/21 BIRADS-1. She has never had a colonoscopy.     Review of Systems  I have personally reviewed and agree with the review of systems done by my ancillary staff in the [facility] documentation. OBJECTIVE:  Vitals:    04/09/21 1355 04/09/21 1519   BP: (!) 175/97 (!) 173/94   Pulse: 93    SpO2: 97%    Weight: 278 lb 6.4 oz (126.3 kg)      General: anxious, yet she is alert and oriented x3, in no acute distress    HEENT:  EOM intact, NICOLASA, no cervical or supraclavicular lymphadenopathy. No Thyromegaly. Heart: Auscultation of heart revels a regular rate with no murmur, gallops, or rubs. Lungs:  Clear bilaterally to auscultation to the bases. CVA: No tenderness bilaterally. Abdomen: Morbidly obese. Prior laparoscopic incision scar present. No rashes or erythema throughout. There are no palpable masses or ascites. No detectable organomegaly. Lower Extremities:  No calf tenderness, no musculoskeletal deformities. Left lower extremity is larger in size compared to RLE (pt states secondary to her vascular disorder- see recent note and image Dr. Mitul Soto)    Pelvic Exam reveals normal-appearing external female genitalia. There are no identifiable lesions to the external genitalia. The patient was quite guarded during examination. Placement of a large speculum reveals a small amount of old blood in the vaginal vault. There is no abnormal discharge. There is no odor present. The vaginal canal was long. The cervix was pulled up rather high. The cervix was cleansed with Betadine swab. A tenaculum was then used on the anterior cervix. To bring the cervical os into view. There were no identifiable lesions on the cervix. Gentle cervical dilation was then performed in a gradual fashion. Endometrial Pipelle was advanced and was able to be sounded to approximately 8 cm. Endometrial biopsy was then obtained, a second pass was made to gain adequate tissue. The tenaculum was then removed. There was adequate hemostasis. Bimanual examination was limited secondary to patient's habitus.   The uterus was palpable and follow up with her PCP in regard to elevated blood pressure readings. She should also have referral to rheumatologist for her history of \"scleroderma\". Follow Up:  10 day- 2 weeks    For patient's who underwent a biopsy: patient is counseled on signs and symptoms to notify office which include increased vaginal bleeding or discharge or a temperature of 100.4 degrees F or higher. I spent 60 minutes providing care to the patient and >50% of the time was spent counseling and conoordinating care, discussing the patient's current situation, reviewing her options, counseling and education her and answering her questions. All of the patient's pertinent images, labs and previous records and procedures were reviewed.     Electronically signed by Julius Kellogg PA-C on 4/9/2021 at 3:35 PM EDT

## 2021-04-09 NOTE — PATIENT INSTRUCTIONS
Return to clinic in 10 to 14 days    Obtain pelvic ultrasound prior to next appointment    Continue Megace therapy as prescribed    Follow-up with your primary care provider regarding elevated blood pressure readings and general wellness    Call or return to clinic sooner with any questions or concerns

## 2021-04-13 LAB — SURGICAL PATHOLOGY REPORT: NORMAL

## 2021-04-19 ENCOUNTER — HOSPITAL ENCOUNTER (OUTPATIENT)
Dept: ULTRASOUND IMAGING | Age: 46
Discharge: HOME | End: 2021-04-21
Payer: MEDICARE

## 2021-04-19 DIAGNOSIS — R93.89 THICKENED ENDOMETRIUM: ICD-10-CM

## 2021-04-19 DIAGNOSIS — N92.1 MENORRHAGIA WITH IRREGULAR CYCLE: ICD-10-CM

## 2021-04-19 PROCEDURE — 76830 TRANSVAGINAL US NON-OB: CPT

## 2021-04-19 PROCEDURE — 76856 US EXAM PELVIC COMPLETE: CPT

## 2021-04-21 ENCOUNTER — TELEPHONE (OUTPATIENT)
Dept: GYNECOLOGIC ONCOLOGY | Age: 46
End: 2021-04-21

## 2021-04-21 ENCOUNTER — VIRTUAL VISIT (OUTPATIENT)
Dept: GYNECOLOGIC ONCOLOGY | Age: 46
End: 2021-04-21
Payer: MEDICARE

## 2021-04-21 ENCOUNTER — NURSE TRIAGE (OUTPATIENT)
Dept: OTHER | Age: 46
End: 2021-04-21

## 2021-04-21 DIAGNOSIS — R10.9 ABDOMINAL DISCOMFORT: Primary | ICD-10-CM

## 2021-04-21 DIAGNOSIS — N92.1 MENORRHAGIA WITH IRREGULAR CYCLE: ICD-10-CM

## 2021-04-21 DIAGNOSIS — R97.8 ELEVATED TUMOR MARKERS: ICD-10-CM

## 2021-04-21 PROCEDURE — G8428 CUR MEDS NOT DOCUMENT: HCPCS | Performed by: PHYSICIAN ASSISTANT

## 2021-04-21 PROCEDURE — 99212 OFFICE O/P EST SF 10 MIN: CPT | Performed by: PHYSICIAN ASSISTANT

## 2021-04-21 RX ORDER — LIDOCAINE HYDROCHLORIDE AND EPINEPHRINE 10; 10 MG/ML; UG/ML
2 INJECTION, SOLUTION INFILTRATION; PERINEURAL ONCE
Status: CANCELLED | OUTPATIENT
Start: 2021-04-21

## 2021-04-21 RX ORDER — PREDNISONE 50 MG/1
50 TABLET ORAL EVERY 6 HOURS
Qty: 3 TABLET | Refills: 0 | Status: SHIPPED | OUTPATIENT
Start: 2021-04-21 | End: 2021-04-22 | Stop reason: CLARIF

## 2021-04-21 RX ORDER — DIPHENHYDRAMINE HCL 25 MG
50 TABLET ORAL ONCE
Qty: 2 TABLET | Refills: 0 | Status: SHIPPED | OUTPATIENT
Start: 2021-04-21 | End: 2021-04-21

## 2021-04-21 NOTE — PROGRESS NOTES
Valente Mejia is a 39 y.o. female evaluated via telephone on 4/21/2021. Consent:  She and/or health care decision maker is aware that that she may receive a bill for this telephone service, depending on her insurance coverage, and has provided verbal consent to proceed: Yes    Documentation:  I communicated with the patient and/or health care decision maker about thickened endometrium and elevated tumor markers. Details of this discussion including any medical advice provided: We discussed the results of her inconclusive endometrial biopsy not obtaining endometrial tissue. We are recommending proceeding with a hysteroscopy D&C. Her recent repeat pelvic ultrasound on 4/19/21 revealed uterus stable in size measuring 12 x 6.8 x 7.7 cm, endometrial stripe remains thickened at 25.6mm. There is no cyst or mass noted in the right ovary as previously stated on her ultrasound with Dr. Kelsey Riley. Left ovary not visualized, however no masses. She states she is continuing to have LLQ pain and she can feel a \"golf ball sized lump in the tissue\" where she states she had the \"growth removed before\" which I did review with her was consistent with endometriosis. She states her vaginal bleeding is stable and \"at bay\" with the current Megace therapy. She has had one episode of passing blood clots. Advised her to continue Megace at this time. Will order CT abdomen pelvis to evaluate abdomen and pelvis further given lower abdominal discomfort and elevated tumor markers. Evaluate for any liver abnormality. Pt states she does have allergy to CT dye which consists of hives. She states she recently had a CT with contrast at [facility] and will tolerate dye if pre-medication is given. We may want to consider adding on diagnostic laparoscopy at the time of her D&C if her tumor markers remain elevated or abnormalities are identified on CT scan and her abdominal discomfort is unexplained otherwise. She will follow up with us in 2 weeks to discuss results and sign surgical consent for hysteroscopy D&C with [facility], possible exploratory laparoscopy     I affirm this is a Patient Initiated Episode with a Patient who has not had a related appointment within my department in the past 7 days or scheduled within the next 24 hours. Patient identification was verified at the start of the visit: Yes    Total Time: minutes: 5-10 minutes    The visit was conducted pursuant to the emergency declaration under the [address] [place] [address], [address] [place] [address] authority and the [employer] Act. Patient identification was verified, and a caregiver was present when appropriate. The patient was located in a state where the provider was credentialed to provide care.     Note: not billable if this call serves to triage the patient into an appointment for the relevant concern      [facility]

## 2021-04-21 NOTE — TELEPHONE ENCOUNTER
Reason for Disposition   [1] COVID-19 infection suspected by caller or triager AND [2] mild symptoms (cough, fever, or others) AND [7] no complications or SOB    Answer Assessment - Initial Assessment Questions  1. COVID-19 DIAGNOSIS: \"Who made your Coronavirus (COVID-19) diagnosis? \" \"Was it confirmed by a positive lab test?\" If not diagnosed by a HCP, ask \"Are there lots of cases (community spread) where you live? \" (See public health department website, if unsure)      Not knowingly exposed to covid  2. COVID-19 EXPOSURE: \"Was there any known exposure to COVID before the symptoms began? \" CDC Definition of close contact: within 6 feet (2 meters) for a total of 15 minutes or more over a 24-hour period. Not sure  3. ONSET: \"When did the COVID-19 symptoms start? \"       Last night  4. WORST SYMPTOM: \"What is your worst symptom? \" (e.g., cough, fever, shortness of breath, muscle aches)      Body aches, sore throat  5. COUGH: \"Do you have a cough? \" If so, ask: \"How bad is the cough? \"        Denies cough  6. FEVER: \"Do you have a fever? \" If so, ask: \"What is your temperature, how was it measured, and when did it start? \"      Denies fever  7. RESPIRATORY STATUS: \"Describe your breathing? \" (e.g., shortness of breath, wheezing, unable to speak)       No respiratory distress. 8. BETTER-SAME-WORSE: \"Are you getting better, staying the same or getting worse compared to yesterday? \"  If getting worse, ask, \"In what way? \"      Feeling worse today  9. HIGH RISK DISEASE: \"Do you have any chronic medical problems? \" (e.g., asthma, heart or lung disease, weak immune system, obesity, etc.)      Wt is 279#, is currently having testing for cancer  10. PREGNANCY: \"Is there any chance you are pregnant? \" \"When was your last menstrual period? \"        No  11. OTHER SYMPTOMS: \"Do you have any other symptoms? \"  (e.g., chills, fatigue, headache, loss of smell or taste, muscle pain, sore throat; new loss of smell or taste especially support the diagnosis of COVID-19)        Does have her sense of smell and taste. Protocols used: CORONAVIRUS (FCLXU-37) DIAGNOSED OR SUSPECTED-ADULT-AH  Patient is considering covid testing. She will follow up with the office tomorrow.

## 2021-04-21 NOTE — PATIENT INSTRUCTIONS
Obtain CT abdomen and pelvis and repeat of tumor marker labs    Continue Megace as prescribed    Follow up in 2 weeks to discuss results and surgical consent

## 2021-04-21 NOTE — TELEPHONE ENCOUNTER
LVM for pt regarding her virtual appt with me today. Calling to see if pt is still able to make appointment I would still like to visit with her to discuss result and next steps. This can be done virtually at this time. Advised pt to return call to office [phone] opt 3 to reschedule her virtual appt.

## 2021-04-21 NOTE — TELEPHONE ENCOUNTER
Lvm for pt to return call. Calling to change virtual visit to an in office visit another day to discuss next steps.

## 2021-04-22 ENCOUNTER — HOSPITAL ENCOUNTER (OUTPATIENT)
Age: 46
Setting detail: SPECIMEN
Discharge: HOME | End: 2021-04-22
Payer: MEDICARE

## 2021-04-22 ENCOUNTER — OFFICE VISIT (OUTPATIENT)
Dept: FAMILY MEDICINE CLINIC | Age: 46
End: 2021-04-22
Payer: MEDICARE

## 2021-04-22 VITALS
DIASTOLIC BLOOD PRESSURE: 77 MMHG | WEIGHT: 278 LBS | TEMPERATURE: 98.2 F | HEART RATE: 82 BPM | SYSTOLIC BLOOD PRESSURE: 134 MMHG | BODY MASS INDEX: 47.46 KG/M2 | RESPIRATION RATE: 16 BRPM | OXYGEN SATURATION: 98 % | HEIGHT: 64 IN

## 2021-04-22 DIAGNOSIS — J02.9 SORE THROAT: ICD-10-CM

## 2021-04-22 DIAGNOSIS — J02.0 ACUTE STREPTOCOCCAL PHARYNGITIS: Primary | ICD-10-CM

## 2021-04-22 DIAGNOSIS — R07.89 CHEST TIGHTNESS: ICD-10-CM

## 2021-04-22 DIAGNOSIS — Z91.89 AT RISK FOR SIDE EFFECT OF MEDICATION: ICD-10-CM

## 2021-04-22 DIAGNOSIS — Z20.822 SUSPECTED COVID-19 VIRUS INFECTION: ICD-10-CM

## 2021-04-22 PROBLEM — N20.0 CALCULUS OF KIDNEY: Status: ACTIVE | Noted: 2018-03-23

## 2021-04-22 PROBLEM — Z98.84 BARIATRIC SURGERY STATUS: Status: ACTIVE | Noted: 2018-03-23

## 2021-04-22 LAB — S PYO AG THROAT QL: POSITIVE

## 2021-04-22 PROCEDURE — 99213 OFFICE O/P EST LOW 20 MIN: CPT | Performed by: NURSE PRACTITIONER

## 2021-04-22 PROCEDURE — 87880 STREP A ASSAY W/OPTIC: CPT | Performed by: NURSE PRACTITIONER

## 2021-04-22 PROCEDURE — 1036F TOBACCO NON-USER: CPT | Performed by: NURSE PRACTITIONER

## 2021-04-22 PROCEDURE — G8427 DOCREV CUR MEDS BY ELIG CLIN: HCPCS | Performed by: NURSE PRACTITIONER

## 2021-04-22 PROCEDURE — G8417 CALC BMI ABV UP PARAM F/U: HCPCS | Performed by: NURSE PRACTITIONER

## 2021-04-22 RX ORDER — FLUCONAZOLE 150 MG/1
150 TABLET ORAL ONCE
Qty: 1 TABLET | Refills: 0 | Status: SHIPPED | OUTPATIENT
Start: 2021-04-22 | End: 2021-04-22

## 2021-04-22 RX ORDER — AMOXICILLIN 500 MG/1
500 CAPSULE ORAL 2 TIMES DAILY
Qty: 20 CAPSULE | Refills: 0 | Status: SHIPPED | OUTPATIENT
Start: 2021-04-22 | End: 2021-05-02

## 2021-04-22 RX ORDER — ALBUTEROL SULFATE 90 UG/1
2 AEROSOL, METERED RESPIRATORY (INHALATION) EVERY 6 HOURS PRN
Qty: 1 INHALER | Refills: 0 | Status: ON HOLD | OUTPATIENT
Start: 2021-04-22 | End: 2021-05-12

## 2021-04-22 ASSESSMENT — ENCOUNTER SYMPTOMS
TROUBLE SWALLOWING: 0
ABDOMINAL PAIN: 0
VOMITING: 0
RHINORRHEA: 0
CHEST TIGHTNESS: 1
VOICE CHANGE: 0
COUGH: 1
SORE THROAT: 1
SHORTNESS OF BREATH: 0
EYE PAIN: 0
DIARRHEA: 1
NAUSEA: 0

## 2021-04-22 NOTE — PROGRESS NOTES
Sister        Social History     Tobacco Use    Smoking status: Never Smoker    Smokeless tobacco: Never Used   Substance Use Topics    Alcohol use: No        Prior to Visit Medications    Medication Sig Taking? Authorizing Provider   albuterol sulfate HFA (PROVENTIL HFA) 108 (90 Base) MCG/ACT inhaler Inhale 2 puffs into the lungs every 6 hours as needed (chest tightness) Yes JUSTICE Machuca CNP   amoxicillin (AMOXIL) 500 MG capsule Take 1 capsule by mouth 2 times daily for 10 days Yes JUSTICE Machuca CNP   fluconazole (DIFLUCAN) 150 MG tablet Take 1 tablet by mouth once for 1 dose Yes JUSTICE Machuca CNP   megestrol (MEGACE) 20 MG tablet Take 20 mg by mouth 2 times daily Yes Historical Provider, MD   busPIRone (BUSPAR) 10 MG tablet Take 1 tablet by mouth 3 times daily as needed (anxiety) Yes JUSTICE Carreon CNP   Elastic Bandages & Supports (JOBST ULTRASHEER 20-30MMHG LG) MISC Wear daily, ok to remove in the evenings Yes Sara Ferrari MD   Multiple Vitamins-Minerals (MULTI-JULIO CÉSAR PO) Take by mouth daily Yes Historical Provider, MD   Cholecalciferol (VITAMIN D-3) 25 MCG (1000 UT) CAPS Take by mouth daily Yes Historical Provider, MD   vitamin B-12 (CYANOCOBALAMIN) 100 MCG tablet Take 50 mcg by mouth daily Yes Historical Provider, MD   calcium carbonate (OYSTER SHELL CALCIUM 500 MG) 1250 (500 Ca) MG tablet Take 1 tablet by mouth daily Yes Historical Provider, MD   vitamin D3 (CHOLECALCIFEROL) 25 MCG (1000 UT) TABS tablet Take 1 tablet by mouth daily Yes Jacqueline Beckham MD   zinc 50 MG CAPS Take 50 mg by mouth daily  Jacqueline Beckham MD       Allergies   Allergen Reactions    Contrast [Iodides] Hives     Chest pain, HTN  Able to handle if the pre-medication prep is followed.     Morphine     Omnipaque [Iohexol] Hives     CT Dye  **can do the omnipaque if does allergy pack treatment**    Trileptal [Oxcarbazepine]          Subjective:      Review of Systems   Constitutional: Positive for fatigue. Negative for chills and fever. HENT: Positive for congestion and sore throat. Negative for ear pain, rhinorrhea, trouble swallowing and voice change. Eyes: Negative for pain and visual disturbance. Respiratory: Positive for cough and chest tightness. Negative for shortness of breath. Cardiovascular: Negative for chest pain, palpitations and leg swelling. Gastrointestinal: Positive for diarrhea. Negative for abdominal pain, nausea and vomiting. Genitourinary: Negative for decreased urine volume and difficulty urinating. Musculoskeletal: Positive for arthralgias and myalgias. Negative for gait problem and neck pain. Skin: Negative for pallor and rash. Neurological: Negative for weakness, light-headedness and headaches. Psychiatric/Behavioral: Negative for sleep disturbance. Objective:     Physical Exam  Vitals signs and nursing note reviewed. Constitutional:       General: She is not in acute distress. Appearance: Normal appearance. HENT:      Head: Normocephalic and atraumatic. Jaw: No trismus. Right Ear: Tympanic membrane and ear canal normal.      Left Ear: Tympanic membrane and ear canal normal.      Nose: Congestion present. Mouth/Throat:      Lips: Pink. Mouth: Mucous membranes are moist.      Pharynx: Oropharynx is clear. Uvula midline. Posterior oropharyngeal erythema present. Comments: Handling secretions with no issues. Eyes:      Extraocular Movements: Extraocular movements intact. Conjunctiva/sclera: Conjunctivae normal.   Neck:      Musculoskeletal: Normal range of motion and neck supple. Cardiovascular:      Rate and Rhythm: Normal rate and regular rhythm. Pulses: Normal pulses. Pulmonary:      Effort: Pulmonary effort is normal. No tachypnea. Breath sounds: Normal breath sounds. No wheezing, rhonchi or rales. Abdominal:      General: Bowel sounds are normal. There is no distension.       Palpations: Abdomen is soft. Tenderness: There is no abdominal tenderness. Musculoskeletal: Normal range of motion. Right lower leg: No edema. Left lower leg: No edema. Skin:     General: Skin is warm and dry. Capillary Refill: Capillary refill takes less than 2 seconds. Findings: No rash. Neurological:      Mental Status: She is alert and oriented to person, place, and time. Coordination: Coordination normal.      Gait: Gait normal.   Psychiatric:         Mood and Affect: Mood normal.         Thought Content: Thought content normal.           MEDICAL DECISION MAKING Assessment/Plan:     Kamari Hazel was seen today for generalized body aches, chest congestion and pharyngitis. Diagnoses and all orders for this visit:    Acute streptococcal pharyngitis  -     amoxicillin (AMOXIL) 500 MG capsule; Take 1 capsule by mouth 2 times daily for 10 days    Suspected COVID-19 virus infection  -     COVID-19; Future    Sore throat  -     COVID-19; Future  -     POCT rapid strep A    Chest tightness  -     COVID-19; Future  -     albuterol sulfate HFA (PROVENTIL HFA) 108 (90 Base) MCG/ACT inhaler; Inhale 2 puffs into the lungs every 6 hours as needed (chest tightness)    At risk for side effect of medication  -     fluconazole (DIFLUCAN) 150 MG tablet; Take 1 tablet by mouth once for 1 dose        Results for orders placed or performed in visit on 04/22/21   POCT rapid strep A   Result Value Ref Range    Strep A Ag Positive (A) None Detected     Based on the history and exam, positive rapid strep test in the office today, will treat as acute strep throat. Strep Throat:  Strep throat is caused by a bacterial infection that causes severe throat pain, fever and swollen glands in the neck. You will need an antibiotic to get better. Will send out COVID-19 testing. Pt to quarantine as stated in the AVS.   Albuterol inhaler as directed for chest tightness.      It is important that you:  Rest.  Drink plenty of fluids. Please fill and take the antibiotic as directed on the bottle for the full duration that it is prescribed. Even if you are feeling better, please finish the medication. Change your toothbrush in 2 days. You are contagious until you have been on the antibiotic for 24 hours. Salt water gargles (1tsp of table salt dissolved in 8oz of warm water. Gargle with as needed)  Use Cepacol lozenges as directed on the package for pain. You may take Ibuprofen as directed on the bottle for pain, fever or chills. You may take Tylenol as directed on the bottle for pain, fever or chills. Please follow up with urgent care or with your PCP if symptoms not improving. Go to the ED for worsening symptoms, difficutly breathing, difficutly swallowing, drooling, swelling of the neck or tongue, cannot move your neck or have difficulty opening your mouth, or for other emergent concerns. Preventing the Spread of Coronavirus Disease 2019 in Homes and Residential Communities: For the most recent information go to: [employer]    Patient given educational materials - see patientinstructions. Discussed use, benefit, and side effects of prescribed medications. All patient questions answered. Pt verbalized understanding. Instructed to continue current medications, diet and exercise. Patient agreed with treatment plan. Follow up as directed.      Electronically signed by JUSTICE Reyes CNP on 4/22/2021 at 4:58 PM

## 2021-04-22 NOTE — PATIENT INSTRUCTIONS
Learning About Coronavirus [phone])  Coronavirus [phone]): Overview  What is coronavirus (COVID-19)? The coronavirus disease (COVID-19) is caused by a virus. It is an illness that was first found in [place], [place], in December 2019. It has since spread worldwide. The virus can cause fever, cough, and trouble breathing. In severe cases, it can cause pneumonia and make it hard to breathe without help. It can cause death. Coronaviruses are a large group of viruses. They cause the common cold. They also cause more serious illnesses like Middle East respiratory syndrome (MERS) and severe acute respiratory syndrome (SARS). COVID-19 is caused by a novel coronavirus. That means it's a new type that has not been seen in people before. This virus spreads person-to-person through droplets from coughing and sneezing. It can also spread when you are close to someone who is infected. And it can spread when you touch something that has the virus on it, such as a doorknob or a tabletop. What can you do to protect yourself from coronavirus (COVID-19)? The best way to protect yourself from getting sick is to:  · Avoid areas where there is an outbreak. · Avoid contact with people who may be infected. · Wash your hands often with soap or alcohol-based hand sanitizers. · Avoid crowds and try to stay at least 6 feet away from other people. · Wash your hands often, especially after you cough or sneeze. Use soap and water, and scrub for at least 20 seconds. If soap and water aren't available, use an alcohol-based hand . · Avoid touching your mouth, nose, and eyes. What can you do to avoid spreading the virus to others? To help avoid spreading the virus to others:  · Cover your mouth with a tissue when you cough or sneeze. Then throw the tissue in the trash. · Use a disinfectant to clean things that you touch often. · Wear a cloth face cover if you have to go to public areas.   · Stay home if you are sick or have with fever. Don't use cold water or ice. · Use petroleum jelly on sore skin. This can help if the skin around your nose and lips becomes sore from rubbing a lot with tissues. Tips for isolation  · Wear a cloth face cover when you are around other people. It can help stop the spread of the virus when you cough or sneeze. · Limit contact with people in your home. If possible, stay in a separate bedroom and use a separate bathroom. · Avoid contact with pets and other animals. · Cover your mouth and nose with a tissue when you cough or sneeze. Then throw it in the trash right away. · Wash your hands often, especially after you cough or sneeze. Use soap and water, and scrub for at least 20 seconds. If soap and water aren't available, use an alcohol-based hand . · Don't share personal household items. These include bedding, towels, cups and glasses, and eating utensils. · Clean and disinfect your home every day. Use household  and disinfectant wipes or sprays. Take special care to clean things that you grab with your hands. These include doorknobs, remote controls, phones, and handles on your refrigerator and microwave. And don't forget countertops, tabletops, bathrooms, and computer keyboards. When should you call for help? [id number] anytime you think you may need emergency care. For example, call if you have life-threatening symptoms, such as:  · You have severe trouble breathing. (You can't talk at all.)  · You have constant chest pain or pressure. · You are severely dizzy or lightheaded. · You are confused or can't think clearly. · Your face and lips have a blue color. · You pass out (lose consciousness) or are very hard to wake up. Call your doctor now or seek immediate medical care if:  · You have moderate trouble breathing. (You can't speak a full sentence.)  · You are coughing up blood (more than about 1 teaspoon). · You have signs of low blood pressure.  These include feeling lightheaded; being too weak to stand; and having cold, pale, clammy skin. Watch closely for changes in your health, and be sure to contact your doctor if:  · Your symptoms get worse. · You are not getting better as expected. Call before you go to the doctor's office. Follow their instructions. And wear a cloth face cover. Current as of: April 24, 2020               Content Version: 12.4  © 2006-2020 [employer]. Care instructions adapted under license by your healthcare professional. If you have questions about a medical condition or this instruction, always ask your healthcare professional. [place] [address] any warranty or liability for your use of this information. Patient Education        Sore Throat: Care Instructions  Your Care Instructions     Infection by bacteria or a virus causes most sore throats. Cigarette smoke, dry air, air pollution, allergies, and yelling can also cause a sore throat. Sore throats can be painful and annoying. Fortunately, most sore throats go away on their own. If you have a bacterial infection, your doctor may prescribe antibiotics. Follow-up care is a key part of your treatment and safety. Be sure to make and go to all appointments, and call your doctor if you are having problems. It's also a good idea to know your test results and keep a list of the medicines you take. How can you care for yourself at home? · If your doctor prescribed antibiotics, take them as directed. Do not stop taking them just because you feel better. You need to take the full course of antibiotics. · Gargle with warm salt water once an hour to help reduce swelling and relieve discomfort. Use 1 teaspoon of salt mixed in 1 cup of warm water. · Take an over-the-counter pain medicine, such as acetaminophen (Tylenol), ibuprofen (Advil, Motrin), or naproxen (Aleve). Read and follow all instructions on the label.   · Be careful when taking over-the-counter cold or flu medicines and Tylenol at the same time. Many of these medicines have acetaminophen, which is Tylenol. Read the labels to make sure that you are not taking more than the recommended dose. Too much acetaminophen (Tylenol) can be harmful. · Drink plenty of fluids. Fluids may help soothe an irritated throat. Hot fluids, such as tea or soup, may help decrease throat pain. · Use over-the-counter throat lozenges to soothe pain. Regular cough drops or hard candy may also help. These should not be given to young children because of the risk of choking. · Do not smoke or allow others to smoke around you. If you need help quitting, talk to your doctor about stop-smoking programs and medicines. These can increase your chances of quitting for good. · Use a vaporizer or humidifier to add moisture to your bedroom. Follow the directions for cleaning the machine. When should you call for help? Call your doctor now or seek immediate medical care if:    · You have new or worse trouble swallowing.     · Your sore throat gets much worse on one side. Watch closely for changes in your health, and be sure to contact your doctor if you do not get better as expected. Where can you learn more? Go to https://Open English.OneSpot. org and sign in to your [employer] account. Enter L649 in the [facility] box to learn more about \"Sore Throat: Care Instructions. \"     If you do not have an account, please click on the \"Sign Up Now\" link. Current as of: December 2, 2020               Content Version: 12.8  © 2006-2021 [employer]. Care instructions adapted under license by [facility] If you have questions about a medical condition or this instruction, always ask your healthcare professional. [place] [address] any warranty or liability for your use of this information.          Patient Education        Strep Throat: Care Instructions  Your Care Instructions     Strep throat is a bacterial infection that causes sudden, severe sore throat and fever. Strep throat, which is caused by bacteria called streptococcus, is treated with antibiotics. Sometimes a strep test is necessary to tell if the sore throat is caused by strep bacteria. Treatment can help ease symptoms and may prevent future problems. Follow-up care is a key part of your treatment and safety. Be sure to make and go to all appointments, and call your doctor if you are having problems. It's also a good idea to know your test results and keep a list of the medicines you take. How can you care for yourself at home? · Take your antibiotics as directed. Do not stop taking them just because you feel better. You need to take the full course of antibiotics. · Strep throat can spread to others until 24 hours after you begin taking antibiotics. During this time, avoid contact with other people at work, school, or home, especially infants and children. Do not sneeze or cough on others, and wash your hands often. Keep your drinking glass and eating utensils separate from those of others. Wash these items well in hot, soapy water. · Gargle with warm salt water at least once each hour to help reduce swelling and make your throat feel better. Use 1 teaspoon of salt mixed in 8 fluid ounces of warm water. · Take an over-the-counter pain medication, such as acetaminophen (Tylenol), ibuprofen (Advil, Motrin), or naproxen (Aleve). Read and follow all instructions on the label. · Try an over-the-counter anesthetic throat spray or throat lozenges, which may help relieve throat pain. · Drink plenty of fluids. Fluids may help soothe an irritated throat. Hot fluids, such as tea or soup, may help your throat feel better. · Eat soft solids and drink plenty of clear liquids. Flavored ice pops, ice cream, scrambled eggs, sherbet, and gelatin dessert (such as Jell-O) may also soothe the throat.   · Get lots of rest.  · Do not smoke, and avoid secondhand smoke. If you need help quitting, talk to your doctor about stop-smoking programs and medicines. These can increase your chances of quitting for good. · Use a vaporizer or humidifier to add moisture to the air in your bedroom. Follow the directions for cleaning the machine. When should you call for help? Call your doctor now or seek immediate medical care if:    · You have a new or higher fever.     · You have a fever with a stiff neck or severe headache.     · You have new or worse trouble swallowing.     · Your sore throat gets much worse on one side.     · Your pain becomes much worse on one side of your throat. Watch closely for changes in your health, and be sure to contact your doctor if:    · You are not getting better after 2 days (48 hours).     · You do not get better as expected. Where can you learn more? Go to https://GT UrologicalpemeganBiTaksieb.Mobstats. org and sign in to your [email or web address] account. Enter K625 in the [employer] box to learn more about \"Strep Throat: Care Instructions. \"     If you do not have an account, please click on the \"Sign Up Now\" link. Current as of: December 2, 2020               Content Version: 12.8  © 1082-6829 [employer]. Care instructions adapted under license by [facility] ([facility]). If you have questions about a medical condition or this instruction, always ask your healthcare professional. [address] any warranty or liability for your use of this information.

## 2021-04-23 LAB
SARS-COV-2: NORMAL
SARS-COV-2: NOT DETECTED
SOURCE: NORMAL

## 2021-04-26 ENCOUNTER — TELEPHONE (OUTPATIENT)
Dept: GYNECOLOGIC ONCOLOGY | Age: 46
End: 2021-04-26

## 2021-04-26 DIAGNOSIS — N92.1 MENORRHAGIA WITH IRREGULAR CYCLE: Primary | ICD-10-CM

## 2021-04-26 RX ORDER — MEGESTROL ACETATE 20 MG/1
20 TABLET ORAL 2 TIMES DAILY
Qty: 30 TABLET | Refills: 1 | Status: SHIPPED | OUTPATIENT
Start: 2021-04-26 | End: 2021-05-05

## 2021-04-26 RX ORDER — PREDNISONE 50 MG/1
TABLET ORAL
Qty: 3 TABLET | Refills: 0 | Status: SHIPPED | OUTPATIENT
Start: 2021-04-26 | End: 2021-05-05

## 2021-04-26 NOTE — TELEPHONE ENCOUNTER
Patient calling regarding upcoming CT on May 3. She's allergic to the dye and needs to have a script for benadryl and a steroid allergy prep kit sent to the pharmacy. She is almost out of the Megace 20 mg. She's asking that you send a script for the Megace in addition to the benadryl and allergy prep kit. Patient uses [facility] in [place], [place].

## 2021-04-26 NOTE — PROGRESS NOTES
Scripts for pre-contrast allergy were sent on 4/21, however it appears someone discontinued the prednisone in her cart. I called [facility] and they confirmed benadryl script. New script for prednisone sent and a refill her Megace.

## 2021-04-30 ENCOUNTER — IMMUNIZATION (OUTPATIENT)
Dept: PRIMARY CARE CLINIC | Age: 46
End: 2021-04-30
Payer: MEDICARE

## 2021-04-30 PROCEDURE — 0002A COVID-19, PFIZER VACCINE 30MCG/0.3ML DOSE: CPT | Performed by: INTERNAL MEDICINE

## 2021-04-30 PROCEDURE — 91300 COVID-19, PFIZER VACCINE 30MCG/0.3ML DOSE: CPT | Performed by: INTERNAL MEDICINE

## 2021-05-03 ENCOUNTER — HOSPITAL ENCOUNTER (OUTPATIENT)
Age: 46
Discharge: HOME | End: 2021-05-03
Payer: MEDICARE

## 2021-05-03 ENCOUNTER — HOSPITAL ENCOUNTER (OUTPATIENT)
Dept: CT IMAGING | Age: 46
Discharge: HOME | End: 2021-05-05
Payer: MEDICARE

## 2021-05-03 DIAGNOSIS — R10.9 ABDOMINAL DISCOMFORT: ICD-10-CM

## 2021-05-03 DIAGNOSIS — R97.8 ELEVATED TUMOR MARKERS: ICD-10-CM

## 2021-05-03 LAB
AFP: 6.2 UG/L
CA 125: 58 U/ML
LACTATE DEHYDROGENASE: 169 U/L (ref 135–214)

## 2021-05-03 PROCEDURE — 36415 COLL VENOUS BLD VENIPUNCTURE: CPT

## 2021-05-03 PROCEDURE — 2580000003 HC RX 258: Performed by: PHYSICIAN ASSISTANT

## 2021-05-03 PROCEDURE — 74177 CT ABD & PELVIS W/CONTRAST: CPT

## 2021-05-03 PROCEDURE — 86304 IMMUNOASSAY TUMOR CA 125: CPT

## 2021-05-03 PROCEDURE — 6360000004 HC RX CONTRAST MEDICATION: Performed by: PHYSICIAN ASSISTANT

## 2021-05-03 PROCEDURE — 82105 ALPHA-FETOPROTEIN SERUM: CPT

## 2021-05-03 PROCEDURE — 83615 LACTATE (LD) (LDH) ENZYME: CPT

## 2021-05-03 RX ORDER — SODIUM CHLORIDE 0.9 % (FLUSH) 0.9 %
10 SYRINGE (ML) INJECTION PRN
Status: DISCONTINUED | OUTPATIENT
Start: 2021-05-03 | End: 2021-05-06 | Stop reason: HOSPADM

## 2021-05-03 RX ORDER — 0.9 % SODIUM CHLORIDE 0.9 %
80 INTRAVENOUS SOLUTION INTRAVENOUS ONCE
Status: COMPLETED | OUTPATIENT
Start: 2021-05-03 | End: 2021-05-03

## 2021-05-03 RX ADMIN — SODIUM CHLORIDE, PRESERVATIVE FREE 10 ML: 5 INJECTION INTRAVENOUS at 14:22

## 2021-05-03 RX ADMIN — SODIUM CHLORIDE 80 ML: 9 INJECTION, SOLUTION INTRAVENOUS at 14:22

## 2021-05-03 RX ADMIN — IOPAMIDOL 75 ML: 755 INJECTION, SOLUTION INTRAVENOUS at 14:22

## 2021-05-05 ENCOUNTER — HOSPITAL ENCOUNTER (OUTPATIENT)
Age: 46
Discharge: HOME | End: 2021-05-05
Payer: MEDICARE

## 2021-05-05 ENCOUNTER — HOSPITAL ENCOUNTER (OUTPATIENT)
Age: 46
Discharge: HOME | End: 2021-05-07
Payer: MEDICARE

## 2021-05-05 ENCOUNTER — HOSPITAL ENCOUNTER (OUTPATIENT)
Dept: GENERAL RADIOLOGY | Age: 46
Discharge: HOME | End: 2021-05-07
Payer: MEDICARE

## 2021-05-05 ENCOUNTER — TELEPHONE (OUTPATIENT)
Dept: GYNECOLOGIC ONCOLOGY | Age: 46
End: 2021-05-05

## 2021-05-05 ENCOUNTER — TELEPHONE (OUTPATIENT)
Dept: PRIMARY CARE CLINIC | Age: 46
End: 2021-05-05

## 2021-05-05 ENCOUNTER — OFFICE VISIT (OUTPATIENT)
Dept: GYNECOLOGIC ONCOLOGY | Age: 46
End: 2021-05-05
Payer: MEDICARE

## 2021-05-05 VITALS
OXYGEN SATURATION: 96 % | HEART RATE: 85 BPM | SYSTOLIC BLOOD PRESSURE: 144 MMHG | HEIGHT: 64 IN | BODY MASS INDEX: 46.95 KG/M2 | DIASTOLIC BLOOD PRESSURE: 96 MMHG | WEIGHT: 275 LBS

## 2021-05-05 DIAGNOSIS — R93.89 THICKENED ENDOMETRIUM: ICD-10-CM

## 2021-05-05 DIAGNOSIS — Z01.818 PRE-OP EVALUATION: ICD-10-CM

## 2021-05-05 DIAGNOSIS — Z01.818 PRE-OP EVALUATION: Primary | ICD-10-CM

## 2021-05-05 DIAGNOSIS — D50.8 OTHER IRON DEFICIENCY ANEMIA: ICD-10-CM

## 2021-05-05 DIAGNOSIS — R97.8 ELEVATED TUMOR MARKERS: ICD-10-CM

## 2021-05-05 LAB
ABO/RH: NORMAL
ABSOLUTE EOS #: 0.21 K/UL (ref 0–0.4)
ABSOLUTE IMMATURE GRANULOCYTE: ABNORMAL K/UL (ref 0–0.3)
ABSOLUTE LYMPH #: 2.28 K/UL (ref 1–4.8)
ABSOLUTE MONO #: 0.62 K/UL (ref 0.1–1.2)
ANION GAP SERPL CALCULATED.3IONS-SCNC: 7 MMOL/L (ref 9–17)
ANTIBODY SCREEN: NEGATIVE
ARM BAND NUMBER: NORMAL
BASOPHILS # BLD: 1 % (ref 0–2)
BASOPHILS ABSOLUTE: 0.07 K/UL (ref 0–0.2)
BUN BLDV-MCNC: 13 MG/DL (ref 6–20)
BUN/CREAT BLD: ABNORMAL (ref 9–20)
CALCIUM SERPL-MCNC: 9.1 MG/DL (ref 8.6–10.4)
CHLORIDE BLD-SCNC: 105 MMOL/L (ref 98–107)
CO2: 24 MMOL/L (ref 20–31)
CREAT SERPL-MCNC: 0.66 MG/DL (ref 0.5–0.9)
DIFFERENTIAL TYPE: ABNORMAL
EOSINOPHILS RELATIVE PERCENT: 3 % (ref 1–4)
EXPIRATION DATE: NORMAL
FERRITIN: 178 UG/L (ref 13–150)
GFR AFRICAN AMERICAN: >60 ML/MIN
GFR NON-AFRICAN AMERICAN: >60 ML/MIN
GFR SERPL CREATININE-BSD FRML MDRD: ABNORMAL ML/MIN/{1.73_M2}
GFR SERPL CREATININE-BSD FRML MDRD: ABNORMAL ML/MIN/{1.73_M2}
GLUCOSE BLD-MCNC: 100 MG/DL (ref 70–99)
HCT VFR BLD CALC: 43.6 % (ref 36–46)
HEMOGLOBIN: 14.3 G/DL (ref 12–16)
IMMATURE GRANULOCYTES: ABNORMAL %
IRON SATURATION: 34 % (ref 20–55)
IRON: 94 UG/DL (ref 37–145)
LYMPHOCYTES # BLD: 33 % (ref 24–44)
MCH RBC QN AUTO: 26.1 PG (ref 26–34)
MCHC RBC AUTO-ENTMCNC: 32.7 G/DL (ref 31–37)
MCV RBC AUTO: 79.6 FL (ref 80–100)
MONOCYTES # BLD: 9 % (ref 2–11)
MORPHOLOGY: ABNORMAL
MORPHOLOGY: ABNORMAL
NRBC AUTOMATED: ABNORMAL PER 100 WBC
PDW BLD-RTO: 25.2 % (ref 12.5–15.4)
PLATELET # BLD: 177 K/UL (ref 140–450)
PLATELET ESTIMATE: ABNORMAL
PMV BLD AUTO: 9.2 FL (ref 6–12)
POTASSIUM SERPL-SCNC: 4.1 MMOL/L (ref 3.7–5.3)
RBC # BLD: 5.48 M/UL (ref 4–5.2)
RBC # BLD: ABNORMAL 10*6/UL
SEG NEUTROPHILS: 54 % (ref 36–66)
SEGMENTED NEUTROPHILS ABSOLUTE COUNT: 3.72 K/UL (ref 1.8–7.7)
SODIUM BLD-SCNC: 136 MMOL/L (ref 135–144)
TOTAL IRON BINDING CAPACITY: 278 UG/DL (ref 250–450)
UNSATURATED IRON BINDING CAPACITY: 184 UG/DL (ref 112–347)
WBC # BLD: 6.9 K/UL (ref 3.5–11)
WBC # BLD: ABNORMAL 10*3/UL

## 2021-05-05 PROCEDURE — 99214 OFFICE O/P EST MOD 30 MIN: CPT | Performed by: PHYSICIAN ASSISTANT

## 2021-05-05 PROCEDURE — G8417 CALC BMI ABV UP PARAM F/U: HCPCS | Performed by: PHYSICIAN ASSISTANT

## 2021-05-05 PROCEDURE — 86900 BLOOD TYPING SEROLOGIC ABO: CPT

## 2021-05-05 PROCEDURE — 80048 BASIC METABOLIC PNL TOTAL CA: CPT

## 2021-05-05 PROCEDURE — 71046 X-RAY EXAM CHEST 2 VIEWS: CPT

## 2021-05-05 PROCEDURE — G8427 DOCREV CUR MEDS BY ELIG CLIN: HCPCS | Performed by: PHYSICIAN ASSISTANT

## 2021-05-05 PROCEDURE — 93005 ELECTROCARDIOGRAM TRACING: CPT | Performed by: PHYSICIAN ASSISTANT

## 2021-05-05 PROCEDURE — 86850 RBC ANTIBODY SCREEN: CPT

## 2021-05-05 PROCEDURE — 86901 BLOOD TYPING SEROLOGIC RH(D): CPT

## 2021-05-05 PROCEDURE — 36415 COLL VENOUS BLD VENIPUNCTURE: CPT

## 2021-05-05 PROCEDURE — 1036F TOBACCO NON-USER: CPT | Performed by: PHYSICIAN ASSISTANT

## 2021-05-05 PROCEDURE — 83550 IRON BINDING TEST: CPT

## 2021-05-05 PROCEDURE — 83540 ASSAY OF IRON: CPT

## 2021-05-05 PROCEDURE — 82728 ASSAY OF FERRITIN: CPT

## 2021-05-05 PROCEDURE — 85025 COMPLETE CBC W/AUTO DIFF WBC: CPT

## 2021-05-05 ASSESSMENT — ENCOUNTER SYMPTOMS
ABDOMINAL DISTENTION: 1
BACK PAIN: 1
ABDOMINAL PAIN: 1

## 2021-05-05 NOTE — PATIENT INSTRUCTIONS
You are to stop taking Megace at this time.     Stay on clear liquid diet 1 day prior to surgery    Obtain preoperative lab work and chest x-ray with EKG    Continue anemia work-up with Dr. Jody Schwab    Call or return to clinic sooner with any questions or concerns

## 2021-05-05 NOTE — TELEPHONE ENCOUNTER
Patient called and is having some minor OP surgery on 05-12 and would need PCP clearance and unable to officially book it until they get the clearance from [facility].  Pt would like to know from her visit on April 1 would be OK to use or would she need to be seen to get the clearance- please reach out to patient so she knows what she would need to do, [phone] pt was seen by Sanford Muller with [facility], thank you

## 2021-05-05 NOTE — TELEPHONE ENCOUNTER
Lvm for pt to return call. Calling to notify surgery is scheduled at [facility] on 5/12/21 @1:00pm;arrive 11:00am. Pre-admission will do telephone call. Covid test is @ [facility] on 5/8/21 @10:30 am under canopy at surgery center. Post op visit 5/19/21 @ 11:30am at [facility] location.

## 2021-05-05 NOTE — PROGRESS NOTES
[address], [facility], [id number] Gissell Brooks is a 39 y.o. female who presents for a follow up for thickened endometrium, elevated     Chief Complaint: Thickened endometrium, elevated     HPI: She is a G3, P3 (3  section) female who presents today to discuss her follow-up testing results and management plan. Of note she has a longstanding history of menorrhagia and irregular cycles. She was recently placed on Megace therapy by her local OB/GYN. A pelvic ultrasound revealed a thickened endometrial stripe and a right ovarian cyst.  She was scheduled to undergo a hysteroscopy D&C however tumor markers were drawn and an elevated CA-125 at 42 units, elevated alpha-fetoprotein at 9.7, and a slightly elevated LDH at 247. Therefore she was referred to [facility] gynecologic oncology for further evaluation and treatment. She was seen by [facility] gynecologic oncology on 2021 and attempt was made at an office endometrial biopsy at this time. However pathology later returned no endometrial tissue identified. She was asked to repeat her pelvic ultrasound and tumor markers. Pelvic ultrasound on 2021 build uterus measuring 12.2 x 6.8 x 7.7 cm. Endometrial stripe with continued thickened at 25.6 mm. Right ovary was within normal limits, with no cyst or masses. The left ovary was not visualized however no adnexal masses. No evidence of free fluid. Repeat tumor markers obtained on 5/3/2021 revealed the alpha-fetoprotein and LDH is now within normal limits. Her with a CA-125 does remain elevated and is now 58 units. Patient also had concerns of left lower quadrant abdominal discomfort and therefore CT abdomen and pelvis was performed on 5/3/2021 showed no acute intra-abdominal or intrapelvic pathology. There are findings of nephrolithiasis without obstruction. Today, the patient is generally doing well.   All of the above results were discussed with the patient which include the continued thickened endometrial stripe and the persistent elevated CA-125. She states she is having intermittent vaginal bleeding with occasional passage of clots. She does again does disclose her family history is \"full of GYN cancers\". She states her mother is 1 of 12 siblings, and that all females had hysterectomy for \"precancerous cells\" or uterine malignancy. She states 2 of her biological sisters have also had a hysterectomy. Review of Systems  I have personally reviewed and agree with the review of the systems done by my ancillary staff in the [facility] documentation. Objective:  Vitals:    05/05/21 0825 05/05/21 0847 05/05/21 0849   BP: (!) 144/96 (!) 148/103 (!) 144/96   Pulse: 85     SpO2: 96%     Weight: 275 lb (124.7 kg)     Height: 5' 4\" (1.626 m)         Physical Exam   General:  Well appearing, alert and oriented x 3, No acute distress. Heart: Auscultation of heart revels a regular rate no murmur, gallops, gallops or rubs. Lungs:  Clear bilaterally to auscultation to the bases. Us Non Ob Transvaginal    Result Date: 4/19/2021  EXAMINATION: PELVIC ULTRASOUND 4/19/2021 TECHNIQUE: Transabdominal and transvaginal pelvic ultrasound was performed. COMPARISON: CT abdomen and pelvis Iza 15, 2020. HISTORY: ORDERING SYSTEM PROVIDED HISTORY: Thickened endometrium TECHNOLOGIST PROVIDED HISTORY: This procedure can be scheduled via [employer].   Access your [employer] account by visiting [employer]. evaluate uterine pathology, hx right ovarian cyst Reason for Exam: thickened endometrium, eval uterine pathology, h/o rt ovarian cyst Acuity: Chronic Type of Exam: Subsequent/Follow-up Relevant Medical/Surgical History: severe anemia, menorrahagia, elevated tumor markers, endometriosis FINDINGS: Measurements: Uterus:  12.2 x 6.8 x 7.7 cm Endometrial stripe:  25.6 mm Right Ovary:  3.2 x 2.4 x 2.2 cm Left Ovary:  Not measured Ultrasound Findings: Uterus: Uterus demonstrates normal myometrial echotexture. Nabothian cysts are noted within the cervical region. Endometrial stripe: Abnormally thickened. Right Ovary: Right ovary is within normal limits. Left Ovary:  Not visualized. No adnexal mass. Free Fluid: No evidence of free fluid. Abnormally thickened endometrium. Endometrial hyperplasia or malignancy cannot be excluded and tissue sampling is recommended if not already performed. Us Pelvis Complete    Result Date: 4/19/2021  EXAMINATION: PELVIC ULTRASOUND 4/19/2021 TECHNIQUE: Transabdominal and transvaginal pelvic ultrasound was performed. COMPARISON: CT abdomen and pelvis Iza 15, 2020. HISTORY: ORDERING SYSTEM PROVIDED HISTORY: Thickened endometrium TECHNOLOGIST PROVIDED HISTORY: This procedure can be scheduled via [employer]. Access your [employer] account by visiting [employer]. evaluate uterine pathology, hx right ovarian cyst Reason for Exam: thickened endometrium, eval uterine pathology, h/o rt ovarian cyst Acuity: Chronic Type of Exam: Subsequent/Follow-up Relevant Medical/Surgical History: severe anemia, menorrahagia, elevated tumor markers, endometriosis FINDINGS: Measurements: Uterus:  12.2 x 6.8 x 7.7 cm Endometrial stripe:  25.6 mm Right Ovary:  3.2 x 2.4 x 2.2 cm Left Ovary:  Not measured Ultrasound Findings: Uterus: Uterus demonstrates normal myometrial echotexture. Nabothian cysts are noted within the cervical region. Endometrial stripe: Abnormally thickened. Right Ovary: Right ovary is within normal limits. Left Ovary:  Not visualized. No adnexal mass. Free Fluid: No evidence of free fluid. Abnormally thickened endometrium. Endometrial hyperplasia or malignancy cannot be excluded and tissue sampling is recommended if not already performed.      Ct Abdomen Pelvis W Iv Contrast Additional Contrast? Oral    Result Date: 5/3/2021  EXAMINATION: CT OF THE ABDOMEN AND PELVIS WITH CONTRAST 5/3/2021 1:01 pm TECHNIQUE: CT of the abdomen and pelvis was performed with the administration of intravenous contrast. Multiplanar reformatted images are provided for review. Dose modulation, iterative reconstruction, and/or weight based adjustment of the mA/kV was utilized to reduce the radiation dose to as low as reasonably achievable. COMPARISON: None. HISTORY: ORDERING SYSTEM PROVIDED HISTORY: Abdominal discomfort TECHNOLOGIST PROVIDED HISTORY: elevated GYN and liver tumor markers, pt reports palpable left abdominal wall mass, hx abdominal wall endometriosis Reason for Exam: Elevated GYN and liver tumor markers, pt feels a palpable mass to left side of abdominal wall. Abdominal discomfort throughout, hx of endometriosis. Acuity: Chronic Type of Exam: Subsequent/Follow-up FINDINGS: Lower Chest: Lung bases are clear without pulmonary nodules, masses, effusions, or foci of airspace disease. The base of the heart is normal in size without pericardial fluid collection. Organs: Status post cholecystectomy. No focal hepatic mass lesions. No intrahepatic or extrahepatic biliary ductal dilatation. The pancreas and spleen are unremarkable. GI/Bowel: Bowel is without evidence for obstruction or inflammation. No free intraperitoneal air or fluid noted. Small bowel loops are normal in caliber. No definite hiatal hernia. Pelvis:  No evidence for free fluid. Peritoneum/Retroperitoneum: The adrenal glands are normal in size and configuration bilaterally. Kidneys perfuse and excrete in a symmetric fashion and ureters are not obstructed. Punctate nephrolithiasis within the lower pole of the left kidney. Urinary bladder is unremarkable. Bones/Soft Tissues: Osseous structures are intact without evidence for acute fracture or dislocation. No definite lytic or blastic lesions. Overlying soft tissues are unremarkable. Bilateral L5 pars defects resulting in subtle anterolisthesis of L5 relative to S1. No acute osseous abnormality. Vasculature:   The abdominal aorta is normal in caliber. No discrete and aneurysm or dissection. No lymphadenopathy within the abdomen or pelvis. No evidence for acute intra-abdominal or intrapelvic pathology. No bowel obstruction or inflammation. No free intraperitoneal air or fluid. No evidence for urinary obstruction. Normal appendix. Left-sided nephrolithiasis without urinary obstruction or hydronephrosis. Status post cholecystectomy. Lab Results   Component Value Date     58 05/03/2021     42 03/19/2021       Assessment:  1. Thickened endometrium    2. Elevated CA-125    Plan:  Plan for hysteroscopy with dilation and curettage       She has been asked to stop her megace at this time due to her underlying PAD and possible May Thurner's Syndrome and the propensity to increase risk of blood clots. Follow Up:  Post op      INFORMED CONSENT:  We discussed options including but not limited to observation with serial ultrasound and tumor markers versus tissue sampling with hysteroscopy D&C. Benefits of the procedure(s) include but not limited to treatment, possible staging of precancerous/cancerous lesions and/or improvement in systems and quality of life. The patient prefers to proceed with her work up including the hysteroscopy D&C. She will undergo pre surgical lab work, CXR, and EKG. We will also ask for medical clearance. The procedure(s) were explained in great detail along with all the possible risks and complications including, but not limited to blood loss requiring transfusions, DVT requiring blood thinning agents, uterine perforation, as well as the possibility of infection requiring prophylactic antibiotics. The patient was counseled at length about the risks of aníbal Covid-19 during their perioperative period and any recovery window from their procedure.   The patient was made aware that aníbal Covid-19  may worsen their prognosis for recovering from their

## 2021-05-05 NOTE — PROGRESS NOTES
Review of Systems   Constitutional: Positive for appetite change (decreased) and fatigue. Cardiovascular: Positive for chest pain (at times), leg swelling (left leg swelling) and palpitations. Gastrointestinal: Positive for abdominal distention (feels bloated) and abdominal pain (LLQ pain). Endocrine: Positive for hot flashes. Genitourinary: Positive for vaginal bleeding. Urgency   Musculoskeletal: Positive for back pain (not new). Neurological: Positive for dizziness (at times,not now). All other systems reviewed and are negative.

## 2021-05-06 LAB
EKG ATRIAL RATE: 62 BPM
EKG P AXIS: 38 DEGREES
EKG P-R INTERVAL: 144 MS
EKG Q-T INTERVAL: 392 MS
EKG QRS DURATION: 76 MS
EKG QTC CALCULATION (BAZETT): 397 MS
EKG R AXIS: -20 DEGREES
EKG T AXIS: 15 DEGREES
EKG VENTRICULAR RATE: 62 BPM

## 2021-05-06 PROCEDURE — 93010 ELECTROCARDIOGRAM REPORT: CPT | Performed by: INTERNAL MEDICINE

## 2021-05-07 NOTE — PROGRESS NOTES
Preoperative Instructions:    Stop eating solid foods at midnight the night prior to your surgery or as directed by dr Rissa Sears     Stop drinking clear liquids at midnight the night prior to your surgery. Arrive at the surgery center (3rd entrance) on ___[id number]____________ by __[id number]_____________. Please stop any blood thinning medications as directed by your surgeon or prescribing physician. Failure to stop certain medications may interfere with your scheduled surgery. These may include: Aspirin, Coumadin, Plavix, NSAIDS (Motrin, Aleve, Advil, Mobic, Celebrex), Eliquis, Pradaxa, Xarelto, Fish oil, and herbal supplements. You may continue the rest of your medications through the night before surgery unless instructed otherwise. Day of surgery please take only the following medication(s) with a small sip of water:None      Please shower with antibacterial soap and water. .      Reminders:  -If you are going home the day of your procedure, you will need a family member or friend to stay during the procedure and drive you home after your procedure. Your  must be 25years of age or older and able to sign off on your discharge instructions.    -If you are going home the same day of your surgery, someone must remain with you for the first 24 hours after your surgery if you receive sedation or anesthesia.      -Please do not wear any jewelery or body piercing the day of surgery

## 2021-05-08 ENCOUNTER — HOSPITAL ENCOUNTER (OUTPATIENT)
Dept: LAB | Age: 46
Setting detail: SPECIMEN
Discharge: HOME | End: 2021-05-08
Payer: MEDICARE

## 2021-05-08 ENCOUNTER — HOSPITAL ENCOUNTER (OUTPATIENT)
Age: 46
Setting detail: SPECIMEN
Discharge: HOME | End: 2021-05-08

## 2021-05-08 DIAGNOSIS — Z20.822 COVID-19 RULED OUT BY LABORATORY TESTING: Primary | ICD-10-CM

## 2021-05-10 ENCOUNTER — PREP FOR PROCEDURE (OUTPATIENT)
Dept: GYNECOLOGIC ONCOLOGY | Age: 46
End: 2021-05-10

## 2021-05-10 DIAGNOSIS — Z20.822 COVID-19 RULED OUT BY LABORATORY TESTING: ICD-10-CM

## 2021-05-10 LAB
SARS-COV-2: NORMAL
SARS-COV-2: NOT DETECTED
SOURCE: NORMAL

## 2021-05-10 RX ORDER — SODIUM CHLORIDE 0.9 % (FLUSH) 0.9 %
10 SYRINGE (ML) INJECTION PRN
Status: CANCELLED | OUTPATIENT
Start: 2021-05-10

## 2021-05-10 RX ORDER — SODIUM CHLORIDE 9 MG/ML
25 INJECTION, SOLUTION INTRAVENOUS PRN
Status: CANCELLED | OUTPATIENT
Start: 2021-05-10

## 2021-05-10 RX ORDER — SODIUM CHLORIDE 9 MG/ML
INJECTION, SOLUTION INTRAVENOUS CONTINUOUS
Status: CANCELLED | OUTPATIENT
Start: 2021-05-10

## 2021-05-10 RX ORDER — SODIUM CHLORIDE 0.9 % (FLUSH) 0.9 %
10 SYRINGE (ML) INJECTION EVERY 12 HOURS SCHEDULED
Status: CANCELLED | OUTPATIENT
Start: 2021-05-10

## 2021-05-11 ENCOUNTER — ANESTHESIA EVENT (OUTPATIENT)
Dept: OPERATING ROOM | Age: 46
End: 2021-05-11
Payer: MEDICARE

## 2021-05-12 ENCOUNTER — APPOINTMENT (OUTPATIENT)
Dept: GENERAL RADIOLOGY | Age: 46
End: 2021-05-12
Payer: MEDICARE

## 2021-05-12 ENCOUNTER — ANESTHESIA (OUTPATIENT)
Dept: OPERATING ROOM | Age: 46
End: 2021-05-12
Payer: MEDICARE

## 2021-05-12 ENCOUNTER — HOSPITAL ENCOUNTER (OUTPATIENT)
Age: 46
Setting detail: OUTPATIENT SURGERY
Discharge: HOME | End: 2021-05-12
Attending: OBSTETRICS & GYNECOLOGY | Admitting: OBSTETRICS & GYNECOLOGY
Payer: MEDICARE

## 2021-05-12 ENCOUNTER — HOSPITAL ENCOUNTER (EMERGENCY)
Age: 46
Discharge: HOME | End: 2021-05-13
Attending: EMERGENCY MEDICINE
Payer: MEDICARE

## 2021-05-12 VITALS
DIASTOLIC BLOOD PRESSURE: 96 MMHG | TEMPERATURE: 97.2 F | BODY MASS INDEX: 47.29 KG/M2 | HEART RATE: 75 BPM | WEIGHT: 277 LBS | RESPIRATION RATE: 12 BRPM | HEIGHT: 64 IN | SYSTOLIC BLOOD PRESSURE: 153 MMHG | OXYGEN SATURATION: 98 %

## 2021-05-12 VITALS — DIASTOLIC BLOOD PRESSURE: 59 MMHG | OXYGEN SATURATION: 99 % | SYSTOLIC BLOOD PRESSURE: 112 MMHG | TEMPERATURE: 66 F

## 2021-05-12 DIAGNOSIS — G89.18 POST-OP PAIN: Primary | ICD-10-CM

## 2021-05-12 DIAGNOSIS — D64.9 CHRONIC ANEMIA: ICD-10-CM

## 2021-05-12 PROBLEM — Z98.890 S/P DILATION AND CURETTAGE: Status: ACTIVE | Noted: 2021-05-12

## 2021-05-12 LAB
-: ABNORMAL
ABSOLUTE EOS #: 0 K/UL (ref 0–0.4)
ABSOLUTE IMMATURE GRANULOCYTE: ABNORMAL K/UL (ref 0–0.3)
ABSOLUTE LYMPH #: 0.68 K/UL (ref 1–4.8)
ABSOLUTE MONO #: 0.23 K/UL (ref 0.1–1.2)
ALBUMIN SERPL-MCNC: 4.1 G/DL (ref 3.5–5.2)
ALBUMIN/GLOBULIN RATIO: 1.2 (ref 1–2.5)
ALP BLD-CCNC: 86 U/L (ref 35–104)
ALT SERPL-CCNC: 55 U/L (ref 5–33)
AMORPHOUS: ABNORMAL
ANION GAP SERPL CALCULATED.3IONS-SCNC: 9 MMOL/L (ref 9–17)
AST SERPL-CCNC: 22 U/L
BACTERIA: ABNORMAL
BASOPHILS # BLD: 0 % (ref 0–2)
BASOPHILS ABSOLUTE: 0 K/UL (ref 0–0.2)
BILIRUB SERPL-MCNC: 0.21 MG/DL (ref 0.3–1.2)
BILIRUBIN DIRECT: <0.08 MG/DL
BILIRUBIN URINE: NEGATIVE
BILIRUBIN, INDIRECT: ABNORMAL MG/DL (ref 0–1)
BUN BLDV-MCNC: 10 MG/DL (ref 6–20)
BUN/CREAT BLD: ABNORMAL (ref 9–20)
CALCIUM SERPL-MCNC: 9.1 MG/DL (ref 8.6–10.4)
CASTS UA: ABNORMAL /LPF
CHLORIDE BLD-SCNC: 107 MMOL/L (ref 98–107)
CO2: 21 MMOL/L (ref 20–31)
COLOR: YELLOW
COMMENT UA: ABNORMAL
CREAT SERPL-MCNC: 0.59 MG/DL (ref 0.5–0.9)
CRYSTALS, UA: ABNORMAL /HPF
DIFFERENTIAL TYPE: ABNORMAL
EOSINOPHILS RELATIVE PERCENT: 0 % (ref 1–4)
EPITHELIAL CELLS UA: ABNORMAL /HPF (ref 0–5)
GFR AFRICAN AMERICAN: >60 ML/MIN
GFR NON-AFRICAN AMERICAN: >60 ML/MIN
GFR SERPL CREATININE-BSD FRML MDRD: ABNORMAL ML/MIN/{1.73_M2}
GFR SERPL CREATININE-BSD FRML MDRD: ABNORMAL ML/MIN/{1.73_M2}
GLOBULIN: ABNORMAL G/DL (ref 1.5–3.8)
GLUCOSE BLD-MCNC: 172 MG/DL (ref 70–99)
GLUCOSE URINE: ABNORMAL
HCG, PREGNANCY URINE (POC): NEGATIVE
HCT VFR BLD CALC: 30.2 % (ref 36–46)
HEMOGLOBIN: 10 G/DL (ref 12–16)
IMMATURE GRANULOCYTES: ABNORMAL %
KETONES, URINE: NEGATIVE
LEUKOCYTE ESTERASE, URINE: NEGATIVE
LIPASE: 26 U/L (ref 13–60)
LYMPHOCYTES # BLD: 12 % (ref 24–44)
MCH RBC QN AUTO: 26.4 PG (ref 26–34)
MCHC RBC AUTO-ENTMCNC: 33.1 G/DL (ref 31–37)
MCV RBC AUTO: 79.7 FL (ref 80–100)
MONOCYTES # BLD: 4 % (ref 2–11)
MORPHOLOGY: ABNORMAL
MUCUS: ABNORMAL
NITRITE, URINE: NEGATIVE
NRBC AUTOMATED: ABNORMAL PER 100 WBC
OTHER OBSERVATIONS UA: ABNORMAL
PDW BLD-RTO: 25.4 % (ref 12.5–15.4)
PH UA: 5 (ref 5–8)
PLATELET # BLD: 164 K/UL (ref 140–450)
PLATELET ESTIMATE: ABNORMAL
PMV BLD AUTO: 9.2 FL (ref 6–12)
POTASSIUM SERPL-SCNC: 4.7 MMOL/L (ref 3.7–5.3)
PROTEIN UA: NEGATIVE
RBC # BLD: 3.79 M/UL (ref 4–5.2)
RBC # BLD: ABNORMAL 10*6/UL
RBC UA: ABNORMAL /HPF (ref 0–2)
RENAL EPITHELIAL, UA: ABNORMAL /HPF
SEG NEUTROPHILS: 84 % (ref 36–66)
SEGMENTED NEUTROPHILS ABSOLUTE COUNT: 4.79 K/UL (ref 1.8–7.7)
SODIUM BLD-SCNC: 137 MMOL/L (ref 135–144)
SPECIFIC GRAVITY UA: 1.02 (ref 1–1.03)
TOTAL PROTEIN: 7.5 G/DL (ref 6.4–8.3)
TRICHOMONAS: ABNORMAL
TURBIDITY: CLEAR
URINE HGB: ABNORMAL
UROBILINOGEN, URINE: NORMAL
WBC # BLD: 5.7 K/UL (ref 3.5–11)
WBC # BLD: ABNORMAL 10*3/UL
WBC UA: ABNORMAL /HPF (ref 0–5)
YEAST: ABNORMAL

## 2021-05-12 PROCEDURE — 58558 HYSTEROSCOPY BIOPSY: CPT | Performed by: OBSTETRICS & GYNECOLOGY

## 2021-05-12 PROCEDURE — 3700000000 HC ANESTHESIA ATTENDED CARE: Performed by: OBSTETRICS & GYNECOLOGY

## 2021-05-12 PROCEDURE — 7100000010 HC PHASE II RECOVERY - FIRST 15 MIN: Performed by: OBSTETRICS & GYNECOLOGY

## 2021-05-12 PROCEDURE — 6360000002 HC RX W HCPCS: Performed by: EMERGENCY MEDICINE

## 2021-05-12 PROCEDURE — 7100000000 HC PACU RECOVERY - FIRST 15 MIN: Performed by: OBSTETRICS & GYNECOLOGY

## 2021-05-12 PROCEDURE — 83690 ASSAY OF LIPASE: CPT

## 2021-05-12 PROCEDURE — 80048 BASIC METABOLIC PNL TOTAL CA: CPT

## 2021-05-12 PROCEDURE — 6360000002 HC RX W HCPCS: Performed by: NURSE ANESTHETIST, CERTIFIED REGISTERED

## 2021-05-12 PROCEDURE — 6360000002 HC RX W HCPCS

## 2021-05-12 PROCEDURE — 3600000003 HC SURGERY LEVEL 3 BASE: Performed by: OBSTETRICS & GYNECOLOGY

## 2021-05-12 PROCEDURE — 2500000003 HC RX 250 WO HCPCS: Performed by: NURSE ANESTHETIST, CERTIFIED REGISTERED

## 2021-05-12 PROCEDURE — 36415 COLL VENOUS BLD VENIPUNCTURE: CPT

## 2021-05-12 PROCEDURE — 3600000013 HC SURGERY LEVEL 3 ADDTL 15MIN: Performed by: OBSTETRICS & GYNECOLOGY

## 2021-05-12 PROCEDURE — 2709999900 HC NON-CHARGEABLE SUPPLY: Performed by: OBSTETRICS & GYNECOLOGY

## 2021-05-12 PROCEDURE — 81025 URINE PREGNANCY TEST: CPT

## 2021-05-12 PROCEDURE — 81001 URINALYSIS AUTO W/SCOPE: CPT

## 2021-05-12 PROCEDURE — 80076 HEPATIC FUNCTION PANEL: CPT

## 2021-05-12 PROCEDURE — 85025 COMPLETE CBC W/AUTO DIFF WBC: CPT

## 2021-05-12 PROCEDURE — 7100000011 HC PHASE II RECOVERY - ADDTL 15 MIN: Performed by: OBSTETRICS & GYNECOLOGY

## 2021-05-12 PROCEDURE — 3700000001 HC ADD 15 MINUTES (ANESTHESIA): Performed by: OBSTETRICS & GYNECOLOGY

## 2021-05-12 PROCEDURE — 88305 TISSUE EXAM BY PATHOLOGIST: CPT

## 2021-05-12 PROCEDURE — 2720000010 HC SURG SUPPLY STERILE: Performed by: OBSTETRICS & GYNECOLOGY

## 2021-05-12 PROCEDURE — 2580000003 HC RX 258: Performed by: ANESTHESIOLOGY

## 2021-05-12 PROCEDURE — 93005 ELECTROCARDIOGRAM TRACING: CPT | Performed by: EMERGENCY MEDICINE

## 2021-05-12 PROCEDURE — 2580000003 HC RX 258: Performed by: EMERGENCY MEDICINE

## 2021-05-12 PROCEDURE — 74022 RADEX COMPL AQT ABD SERIES: CPT

## 2021-05-12 RX ORDER — PROPOFOL 10 MG/ML
INJECTION, EMULSION INTRAVENOUS PRN
Status: DISCONTINUED | OUTPATIENT
Start: 2021-05-12 | End: 2021-05-12 | Stop reason: SDUPTHER

## 2021-05-12 RX ORDER — ONDANSETRON 2 MG/ML
4 INJECTION INTRAMUSCULAR; INTRAVENOUS
Status: COMPLETED | OUTPATIENT
Start: 2021-05-12 | End: 2021-05-12

## 2021-05-12 RX ORDER — ACETAMINOPHEN AND CODEINE PHOSPHATE 300; 30 MG/1; MG/1
1 TABLET ORAL EVERY 4 HOURS PRN
Qty: 5 TABLET | Refills: 0 | Status: SHIPPED | OUTPATIENT
Start: 2021-05-12 | End: 2021-05-14

## 2021-05-12 RX ORDER — OXYCODONE HYDROCHLORIDE AND ACETAMINOPHEN 5; 325 MG/1; MG/1
2 TABLET ORAL PRN
Status: DISCONTINUED | OUTPATIENT
Start: 2021-05-12 | End: 2021-05-12 | Stop reason: HOSPADM

## 2021-05-12 RX ORDER — 0.9 % SODIUM CHLORIDE 0.9 %
500 INTRAVENOUS SOLUTION INTRAVENOUS
Status: DISCONTINUED | OUTPATIENT
Start: 2021-05-12 | End: 2021-05-12 | Stop reason: HOSPADM

## 2021-05-12 RX ORDER — LIDOCAINE HYDROCHLORIDE 10 MG/ML
INJECTION, SOLUTION EPIDURAL; INFILTRATION; INTRACAUDAL; PERINEURAL PRN
Status: DISCONTINUED | OUTPATIENT
Start: 2021-05-12 | End: 2021-05-12 | Stop reason: SDUPTHER

## 2021-05-12 RX ORDER — PROMETHAZINE HYDROCHLORIDE 25 MG/ML
12.5 INJECTION, SOLUTION INTRAMUSCULAR; INTRAVENOUS
Status: DISCONTINUED | OUTPATIENT
Start: 2021-05-12 | End: 2021-05-12 | Stop reason: HOSPADM

## 2021-05-12 RX ORDER — ONDANSETRON 2 MG/ML
INJECTION INTRAMUSCULAR; INTRAVENOUS
Status: COMPLETED
Start: 2021-05-12 | End: 2021-05-12

## 2021-05-12 RX ORDER — SODIUM CHLORIDE 9 MG/ML
25 INJECTION, SOLUTION INTRAVENOUS PRN
Status: DISCONTINUED | OUTPATIENT
Start: 2021-05-12 | End: 2021-05-12 | Stop reason: HOSPADM

## 2021-05-12 RX ORDER — KETOROLAC TROMETHAMINE 30 MG/ML
30 INJECTION, SOLUTION INTRAMUSCULAR; INTRAVENOUS ONCE
Status: COMPLETED | OUTPATIENT
Start: 2021-05-12 | End: 2021-05-12

## 2021-05-12 RX ORDER — ONDANSETRON 2 MG/ML
4 INJECTION INTRAMUSCULAR; INTRAVENOUS ONCE
Status: COMPLETED | OUTPATIENT
Start: 2021-05-12 | End: 2021-05-12

## 2021-05-12 RX ORDER — DEXAMETHASONE SODIUM PHOSPHATE 10 MG/ML
INJECTION INTRAMUSCULAR; INTRAVENOUS PRN
Status: DISCONTINUED | OUTPATIENT
Start: 2021-05-12 | End: 2021-05-12 | Stop reason: SDUPTHER

## 2021-05-12 RX ORDER — OXYCODONE HYDROCHLORIDE AND ACETAMINOPHEN 5; 325 MG/1; MG/1
1 TABLET ORAL PRN
Status: DISCONTINUED | OUTPATIENT
Start: 2021-05-12 | End: 2021-05-12 | Stop reason: HOSPADM

## 2021-05-12 RX ORDER — SODIUM CHLORIDE 0.9 % (FLUSH) 0.9 %
10 SYRINGE (ML) INJECTION EVERY 12 HOURS SCHEDULED
Status: DISCONTINUED | OUTPATIENT
Start: 2021-05-12 | End: 2021-05-12 | Stop reason: HOSPADM

## 2021-05-12 RX ORDER — SODIUM CHLORIDE, SODIUM LACTATE, POTASSIUM CHLORIDE, CALCIUM CHLORIDE 600; 310; 30; 20 MG/100ML; MG/100ML; MG/100ML; MG/100ML
INJECTION, SOLUTION INTRAVENOUS CONTINUOUS
Status: DISCONTINUED | OUTPATIENT
Start: 2021-05-12 | End: 2021-05-12 | Stop reason: HOSPADM

## 2021-05-12 RX ORDER — FENTANYL CITRATE 50 UG/ML
INJECTION, SOLUTION INTRAMUSCULAR; INTRAVENOUS PRN
Status: DISCONTINUED | OUTPATIENT
Start: 2021-05-12 | End: 2021-05-12 | Stop reason: SDUPTHER

## 2021-05-12 RX ORDER — DIPHENHYDRAMINE HYDROCHLORIDE 50 MG/ML
12.5 INJECTION INTRAMUSCULAR; INTRAVENOUS
Status: DISCONTINUED | OUTPATIENT
Start: 2021-05-12 | End: 2021-05-12 | Stop reason: HOSPADM

## 2021-05-12 RX ORDER — LABETALOL 20 MG/4 ML (5 MG/ML) INTRAVENOUS SYRINGE
5 EVERY 10 MIN PRN
Status: DISCONTINUED | OUTPATIENT
Start: 2021-05-12 | End: 2021-05-12 | Stop reason: HOSPADM

## 2021-05-12 RX ORDER — DOCUSATE SODIUM 100 MG/1
100 CAPSULE, LIQUID FILLED ORAL 2 TIMES DAILY
Qty: 20 CAPSULE | Refills: 0 | Status: SHIPPED | OUTPATIENT
Start: 2021-05-12 | End: 2021-05-26 | Stop reason: ALTCHOICE

## 2021-05-12 RX ORDER — MEPERIDINE HYDROCHLORIDE 50 MG/ML
12.5 INJECTION INTRAMUSCULAR; INTRAVENOUS; SUBCUTANEOUS EVERY 5 MIN PRN
Status: DISCONTINUED | OUTPATIENT
Start: 2021-05-12 | End: 2021-05-12 | Stop reason: HOSPADM

## 2021-05-12 RX ORDER — SODIUM CHLORIDE 0.9 % (FLUSH) 0.9 %
10 SYRINGE (ML) INJECTION PRN
Status: DISCONTINUED | OUTPATIENT
Start: 2021-05-12 | End: 2021-05-12 | Stop reason: HOSPADM

## 2021-05-12 RX ORDER — SODIUM CHLORIDE 9 MG/ML
INJECTION, SOLUTION INTRAVENOUS CONTINUOUS
Status: DISCONTINUED | OUTPATIENT
Start: 2021-05-12 | End: 2021-05-12 | Stop reason: HOSPADM

## 2021-05-12 RX ORDER — MIDAZOLAM HYDROCHLORIDE 1 MG/ML
INJECTION INTRAMUSCULAR; INTRAVENOUS PRN
Status: DISCONTINUED | OUTPATIENT
Start: 2021-05-12 | End: 2021-05-12 | Stop reason: SDUPTHER

## 2021-05-12 RX ORDER — 0.9 % SODIUM CHLORIDE 0.9 %
1000 INTRAVENOUS SOLUTION INTRAVENOUS ONCE
Status: COMPLETED | OUTPATIENT
Start: 2021-05-12 | End: 2021-05-12

## 2021-05-12 RX ORDER — LIDOCAINE HYDROCHLORIDE 10 MG/ML
1 INJECTION, SOLUTION EPIDURAL; INFILTRATION; INTRACAUDAL; PERINEURAL
Status: DISCONTINUED | OUTPATIENT
Start: 2021-05-12 | End: 2021-05-12 | Stop reason: HOSPADM

## 2021-05-12 RX ADMIN — ONDANSETRON 4 MG: 2 INJECTION INTRAMUSCULAR; INTRAVENOUS at 14:14

## 2021-05-12 RX ADMIN — DEXAMETHASONE SODIUM PHOSPHATE 5 MG: 10 INJECTION INTRAMUSCULAR; INTRAVENOUS at 13:00

## 2021-05-12 RX ADMIN — FENTANYL CITRATE 25 MCG: 50 INJECTION, SOLUTION INTRAMUSCULAR; INTRAVENOUS at 13:11

## 2021-05-12 RX ADMIN — SODIUM CHLORIDE 1000 ML: 9 INJECTION, SOLUTION INTRAVENOUS at 22:21

## 2021-05-12 RX ADMIN — LIDOCAINE HYDROCHLORIDE 50 MG: 10 INJECTION, SOLUTION EPIDURAL; INFILTRATION; INTRACAUDAL; PERINEURAL at 12:54

## 2021-05-12 RX ADMIN — SODIUM CHLORIDE, POTASSIUM CHLORIDE, SODIUM LACTATE AND CALCIUM CHLORIDE: 600; 310; 30; 20 INJECTION, SOLUTION INTRAVENOUS at 13:30

## 2021-05-12 RX ADMIN — FENTANYL CITRATE 25 MCG: 50 INJECTION, SOLUTION INTRAMUSCULAR; INTRAVENOUS at 13:30

## 2021-05-12 RX ADMIN — FENTANYL CITRATE 100 MCG: 50 INJECTION, SOLUTION INTRAMUSCULAR; INTRAVENOUS at 12:54

## 2021-05-12 RX ADMIN — ONDANSETRON 4 MG: 2 INJECTION INTRAMUSCULAR; INTRAVENOUS at 22:21

## 2021-05-12 RX ADMIN — FENTANYL CITRATE 25 MCG: 50 INJECTION, SOLUTION INTRAMUSCULAR; INTRAVENOUS at 13:06

## 2021-05-12 RX ADMIN — MIDAZOLAM HYDROCHLORIDE 2 MG: 1 INJECTION, SOLUTION INTRAMUSCULAR; INTRAVENOUS at 12:51

## 2021-05-12 RX ADMIN — SODIUM CHLORIDE, POTASSIUM CHLORIDE, SODIUM LACTATE AND CALCIUM CHLORIDE: 600; 310; 30; 20 INJECTION, SOLUTION INTRAVENOUS at 12:51

## 2021-05-12 RX ADMIN — FENTANYL CITRATE 25 MCG: 50 INJECTION, SOLUTION INTRAMUSCULAR; INTRAVENOUS at 13:26

## 2021-05-12 RX ADMIN — KETOROLAC TROMETHAMINE 30 MG: 30 INJECTION, SOLUTION INTRAMUSCULAR; INTRAVENOUS at 22:21

## 2021-05-12 RX ADMIN — PROPOFOL 200 MG: 10 INJECTION, EMULSION INTRAVENOUS at 12:54

## 2021-05-12 ASSESSMENT — PULMONARY FUNCTION TESTS
PIF_VALUE: 19
PIF_VALUE: 3
PIF_VALUE: 0
PIF_VALUE: 18
PIF_VALUE: 18
PIF_VALUE: 19
PIF_VALUE: 13
PIF_VALUE: 1
PIF_VALUE: 18
PIF_VALUE: 24
PIF_VALUE: 19
PIF_VALUE: 2
PIF_VALUE: 19
PIF_VALUE: 3
PIF_VALUE: 6
PIF_VALUE: 5
PIF_VALUE: 19
PIF_VALUE: 19
PIF_VALUE: 6
PIF_VALUE: 7
PIF_VALUE: 17
PIF_VALUE: 19
PIF_VALUE: 18
PIF_VALUE: 1
PIF_VALUE: 5
PIF_VALUE: 19
PIF_VALUE: 18
PIF_VALUE: 18

## 2021-05-12 ASSESSMENT — PAIN SCALES - GENERAL
PAINLEVEL_OUTOF10: 0
PAINLEVEL_OUTOF10: 0

## 2021-05-12 ASSESSMENT — PAIN - FUNCTIONAL ASSESSMENT: PAIN_FUNCTIONAL_ASSESSMENT: 0-10

## 2021-05-12 NOTE — BRIEF OP NOTE
Brief Operative Note  Department of Obstetrics and Gynecology  [facility]     Patient: Celso Aguirre   : 1975  MRN: [id number]       Acct: [de-identified]   Date of Procedure: 21     Pre-operative Diagnosis: 39 y.o. female    Abnormal Uterine Bleeding    Thickened Endometrial Lining    Elevated Ca 125    Anemia     Hx Gastric Bypass    Hx C-Sections    Endometriosis      Post-operative Diagnosis: same as above, endometrial polyp     Procedure: Hysteroscopy, Dilation and curettage, polypectomy     Surgeon: Dr Doug Mccray      Assistants: Mahendra Luna PGY3, Darion Walls [facility]    Anesthesia: General    Findings:  Enlarged broad base endometrial polyp, enlarged uterus   Total IV fluids/Blood products:  1600 ml crystalloid  Urine Output:  150 ml  Drained prior to case   Estimated blood loss:  20 mL  Drains:  none  Specimens:  Endometrial polyp, endometrial curettings   Instrument and Sponge Count: Correct  Complications: none  Condition:  stable, transferred to post anesthesia recovery    See full operative report for further details. Diego Weir DO  Ob/Gyn Resident    2021, 1:44 PM          Attending Physician Statement  I have discussed the care of Celso Aguirre, including pertinent history and exam findings,  with the resident. I have seen and examined the patient and the key elements of all parts of the encounter have been performed by me. I agree with the assessment, plan and orders as documented by the resident.      Velvet Urban MD

## 2021-05-12 NOTE — ANESTHESIA POSTPROCEDURE EVALUATION
Department of Anesthesiology  Postprocedure Note    Patient: Nirmal De La Rosa  MRN: [id number]  YOB: 1975  Date of evaluation: 5/12/2021  Time:  2:14 PM     Procedure Summary     Date: 05/12/21 Room / Location: [address] [place] [address] 04 / [address] [facility]    Anesthesia Start: 1258 Anesthesia Stop: 1347    Procedure: DILATATION AND CURETTAGE HYSTEROSCOPY MYOSURE (N/A ) Diagnosis:       (ENDOMETRIAL LESION )      (ABNORMAL MENSTRAL BLEEDING)    Surgeons: Bruna Adames MD Responsible Provider: Amrita Osei MD    Anesthesia Type: general ASA Status: 2          Anesthesia Type: general    Miguel Angel Phase I: Miguel Angel Score: 9    Miguel Angel Phase II: Miguel Angel Score: 9    Last vitals: Reviewed and per EMR flowsheets.        Anesthesia Post Evaluation    Patient location during evaluation: PACU  Patient participation: complete - patient participated  Level of consciousness: awake and alert  Airway patency: patent  Nausea & Vomiting: no nausea and no vomiting  Complications: no  Cardiovascular status: hemodynamically stable  Respiratory status: face mask and spontaneous ventilation  Hydration status: euvolemic

## 2021-05-12 NOTE — H&P
GYN ONC Pre-Op H&P  [facility]    Patient Name: Dolores Quinones     Patient : 1975  Room/Bed: [facility] OR [facility]/NONE  Admission Date/Time: 2021 11:37 AM  Primary Care Physician: JUSTICE Figueroa CNP  MRN: [id number]    Date: 2021  Time: 12:22 PM    The patient was seen in pre-op holding. She is here for a hysteroscopy and D&C due to thickened endometrial lining and elevated Ca 125. HPI was collected by Yon Cordero PA-C on 21:  Trinity Babb is a G3, P3 (3  section) female who presents today to discuss her follow-up testing results and management plan. Of note she has a longstanding history of menorrhagia and irregular cycles. She was recently placed on Megace therapy by her local OB/GYN. A pelvic ultrasound revealed a thickened endometrial stripe and a right ovarian cyst.  She was scheduled to undergo a hysteroscopy D&C however tumor markers were drawn and an elevated CA-125 at 42 units, elevated alpha-fetoprotein at 9.7, and a slightly elevated LDH at 247. Therefore she was referred to [facility] gynecologic oncology for further evaluation and treatment.     She was seen by [facility] gynecologic oncology on 2021 and attempt was made at an office endometrial biopsy at this time. However pathology later returned no endometrial tissue identified. She was asked to repeat her pelvic ultrasound and tumor markers. Pelvic ultrasound on 2021 build uterus measuring 12.2 x 6.8 x 7.7 cm. Endometrial stripe with continued thickened at 25.6 mm. Right ovary was within normal limits, with no cyst or masses. The left ovary was not visualized however no adnexal masses. No evidence of free fluid. Repeat tumor markers obtained on 5/3/2021 revealed the alpha-fetoprotein and LDH is now within normal limits.   Her with a CA-125 does remain elevated and is now 58 units.      Patient also had concerns of left lower quadrant abdominal discomfort and therefore CT abdomen and pelvis was performed on 5/3/2021 showed no acute intra-abdominal or intrapelvic pathology. There are findings of nephrolithiasis without obstruction.     Today, the patient is generally doing well. All of the above results were discussed with the patient which include the continued thickened endometrial stripe and the persistent elevated CA-125. She states she is having intermittent vaginal bleeding with occasional passage of clots. \"    The procedure risks and complications were reviewed. The labs, Consent, and H&P were reviewed and updated. The patient was counseled on the possibility of  the need of a second surgery. The patient voiced understanding and had all of her questions answered. The possibility of incomplete removal of abnormal tissue was discussed. OBSTETRICAL HISTORY:   OB History   No obstetric history on file. PAST MEDICAL HISTORY:   has a past medical history of Anemia, Anxiety, Carpal tunnel syndrome, Depression, Endometriosis, Gestational diabetes, Headache, History of blood transfusion, Iron deficiency anemia, Kidney stones, May-Thurner syndrome, Obesity, and Scleroderma ([facility] [address] PAST SURGICAL HISTORY:   has a past surgical history that includes Gastric bypass surgery (2009);  section (x 3); toenail excision; other surgical history; Cholecystectomy; pelvic laparoscopy; and vascular surgery (2016).     ALLERGIES:  Allergies as of 2021 - Review Complete 2021   Allergen Reaction Noted    Contrast [iodides] Hives 2020    Morphine  2014    Omnipaque [iohexol] Hives 2014    Trileptal [oxcarbazepine]  2015       MEDICATIONS:  Current Facility-Administered Medications   Medication Dose Route Frequency Provider Last Rate Last Admin    lactated ringers infusion   Intravenous Continuous Arne Saint, MD        sodium chloride flush 0.9 % injection 10 mL  10 mL Intravenous 2 times per day Arne Saint, MD        sodium chloride flush 0.9 % injection 10 mL  10 mL Intravenous FERNANDO Montejo MD        0.9 % sodium chloride infusion  25 mL Intravenous FERNANDO Montejo MD        lidocaine PF 1 % injection 1 mL  1 mL Intradermal Once FERNANDO Montejo MD        0.9 % sodium chloride infusion   Intravenous Continuous Cindy Arik, PA-C        0.9 % sodium chloride infusion  25 mL Intravenous PRN Cindy Raik, PA-C        sodium chloride flush 0.9 % injection 10 mL  10 mL Intravenous 2 times per day Cindy Arik, PA-C        sodium chloride flush 0.9 % injection 10 mL  10 mL Intravenous PRN Cindy Arik, PA-C           FAMILY HISTORY:  family history includes Colon Cancer in her mother; Diabetes in her brother, sister, and sister; Early Death in her father; High Blood Pressure in her brother, sister, and sister. SOCIAL HISTORY:   reports that she has never smoked. She has never used smokeless tobacco. She reports that she does not drink alcohol or use drugs.     VITALS:  Vitals:    05/12/21 1221   BP: 139/82   Resp: 18   Temp: 98.6 °F (37 °C)   TempSrc: Temporal   SpO2: 97%   Weight: 277 lb (125.6 kg)   Height: 5' 4\" (1.626 m)                                                                                                                               PHYSICAL EXAM:     Unchanged from Prior H&P  CONSTITUTIONAL:  Alert and oriented, no acute distress  HEAD: normocephalic, atraumatic  EYES: Pupils equal and reactive to light, Extraocular muscles intact, sclera non icteric  ENT: Mucus membranes moist, No otorrhea, no rhinorrhea  NECK:  supple, symmetrical, trachea midline   LUNGS:  Good air movement bilaterally, unlabored respirations   CARDIOVASCULAR: Regular rate and rhythm   ABDOMEN: soft, non tender, non distended, no rebound or guarding, no hernias, no hepatomegaly, no splenomegly  MUSCULOSKELETAL:  Equal strength bilaterally, normal muscle tone  SKIN: No abscess or rash  NEUROLOGIC:   no focal deficits  PSYCH: affect appropriate  Pelvic Exam: deferred to OR       LAB RESULTS:  Admission on 05/12/2021   Component Date Value Ref Range Status    HCG, Pregnancy Urine (POC) 05/12/2021 NEGATIVE  NEGATIVE Final    Comment: Specimens with hCG levels near the threshold of the test (25 mIU/mL) may give a negative or   indeterminate result. In such cases, another test should be performed with a new specimen   in 48-72 hours. If early pregnancy is suspected clinically in this setting, correlation   with quantitative serum b-hCG level is suggested. TESTING PERFORMED AT  [facility] [address] [facility], [address] Outpatient Visit on 05/08/2021   Component Date Value Ref Range Status    SARS-CoV-2 05/08/2021        Final    Source 05/08/2021 . NASOPHARYNGEAL SWAB   Final    SARS-CoV-2 05/08/2021 Not Detected  Not Detected Final    Comment:       The specimen is NEGATIVE for SARS-CoV-2, the novel coronavirus associated with COVID-19. A negative result does not rule out COVID-19. Alexey SARS-CoV-2 for use on the [employer]ey 6800/8800 Systems is a real-time RT-PCR test intended   for the qualitative detection of nucleic acids from SARS-CoV-2  in clinician-collected nasal, nasopharyngeal, and oropharyngeal swab specimens from   individuals who meet COVID-19 clinical and/or epidemiological criteria. Alexey SARS-CoV-2 is for use only under Emergency Use Authorization (EUA) in laboratories   certified under [address] (CLIA), 42 U. S.C. §181U,   that meet requirements to perform high or moderate complexity tests. An individual without symptoms of COVID-19 and who is not shedding SARS-CoV-2 virus would   expect to have a negative (not detected) result in this assay.   Fact sheet for Healthcare Providers: BuildHer.es  Fact sheet for Patients: https://www.                           fda.gov/media/830060/download METHODOLOGY: RT-PCR     Hospital Outpatient Visit on 05/03/2021   Component Date Value Ref Range Status    AFP (Alpha Fetoprotein) 05/03/2021 6.2  <8.4 ug/L Final    Comment: The Roche \"ECLIA\" assay is used. Results obtained with different assay methods cannot be   used interchangeably.        05/03/2021 58* <38 U/mL Final    LD 05/03/2021 169  135 - 214 U/L Final   Hospital Outpatient Visit on 05/05/2021   Component Date Value Ref Range Status    Ventricular Rate 05/05/2021 62  BPM Final    Atrial Rate 05/05/2021 62  BPM Final    P-R Interval 05/05/2021 144  ms Final    QRS Duration 05/05/2021 76  ms Final    Q-T Interval 05/05/2021 392  ms Final    QTc Calculation (Bazett) 05/05/2021 397  ms Final    P Axis 05/05/2021 38  degrees Final    R Axis 05/05/2021 -20  degrees Final    T Axis 05/05/2021 15  degrees Final   Hospital Outpatient Visit on 05/05/2021   Component Date Value Ref Range Status    Expiration Date 05/05/2021 05/15/2021,2359   Final    Arm Band Number 05/05/2021 BE [id number]   Final    ABO/Rh 05/05/2021 A POSITIVE   Final    Antibody Screen 05/05/2021 NEGATIVE   Final   Hospital Outpatient Visit on 05/05/2021   Component Date Value Ref Range Status    Iron 05/05/2021 94  37 - 145 ug/dL Final    TIBC 05/05/2021 278  250 - 450 ug/dL Final    Iron Saturation 05/05/2021 34  20 - 55 % Final    UIBC 05/05/2021 184  112 - 347 ug/dL Final    Ferritin 05/05/2021 178* 13 - 150 ug/L Final    Glucose 05/05/2021 100* 70 - 99 mg/dL Final    BUN 05/05/2021 13  6 - 20 mg/dL Final    CREATININE 05/05/2021 0.66  0.50 - 0.90 mg/dL Final    Bun/Cre Ratio 05/05/2021 NOT REPORTED  9 - 20 Final    Calcium 05/05/2021 9.1  8.6 - 10.4 mg/dL Final    Sodium 05/05/2021 136  135 - 144 mmol/L Final    Potassium 05/05/2021 4.1  3.7 - 5.3 mmol/L Final    Chloride 05/05/2021 105  98 - 107 mmol/L Final    CO2 05/05/2021 24  20 - 31 mmol/L Final    Anion Gap 05/05/2021 7* 9 - 17 mmol/L Final    GFR Non- 05/05/2021 >60  >60 mL/min Final    GFR  05/05/2021 >60  >60 mL/min Final    GFR Comment 05/05/2021        Final    Comment: Average GFR for 38-51 years old:   80 mL/min/1.73sq m  Chronic Kidney Disease:   <60 mL/min/1.73sq m  Kidney failure:   <15 mL/min/1.73sq m              eGFR calculated using average adult body mass.  Additional eGFR calculator available at:        [employer]            GFR Staging 05/05/2021 NOT REPORTED   Final    WBC 05/05/2021 6.9  3.5 - 11.0 k/uL Final    RBC 05/05/2021 5.48* 4.0 - 5.2 m/uL Final    Hemoglobin 05/05/2021 14.3  12.0 - 16.0 g/dL Final    Hematocrit 05/05/2021 43.6  36 - 46 % Final    MCV 05/05/2021 79.6* 80 - 100 fL Final    MCH 05/05/2021 26.1  26 - 34 pg Final    MCHC 05/05/2021 32.7  31 - 37 g/dL Final    RDW 05/05/2021 25.2* 12.5 - 15.4 % Final    Platelets 52/73/5709 177  140 - 450 k/uL Final    MPV 05/05/2021 9.2  6.0 - 12.0 fL Final    NRBC Automated 05/05/2021 NOT REPORTED  per 100 WBC Final    Differential Type 05/05/2021 NOT REPORTED   Final    Immature Granulocytes 05/05/2021 NOT REPORTED  0 % Final    Absolute Immature Granulocyte 05/05/2021 NOT REPORTED  0.00 - 0.30 k/uL Final    WBC Morphology 05/05/2021 NOT REPORTED   Final    RBC Morphology 05/05/2021 NOT REPORTED   Final    Platelet Estimate 51/80/8016 NOT REPORTED   Final    Seg Neutrophils 05/05/2021 54  36 - 66 % Final    Lymphocytes 05/05/2021 33  24 - 44 % Final    Monocytes 05/05/2021 9  2 - 11 % Final    Eosinophils % 05/05/2021 3  1 - 4 % Final    Basophils 05/05/2021 1  0 - 2 % Final    Segs Absolute 05/05/2021 3.72  1.8 - 7.7 k/uL Final    Absolute Lymph # 05/05/2021 2.28  1.0 - 4.8 k/uL Final    Absolute Mono # 05/05/2021 0.62  0.1 - 1.2 k/uL Final    Absolute Eos # 05/05/2021 0.21  0.0 - 0.4 k/uL Final    Basophils Absolute 05/05/2021 0.07  0.0 - 0.2 k/uL Final    Morphology 05/05/2021 Zeto. evaluate uterine pathology, hx right ovarian cyst Reason for Exam: thickened endometrium, eval uterine pathology, h/o rt ovarian cyst Acuity: Chronic Type of Exam: Subsequent/Follow-up Relevant Medical/Surgical History: severe anemia, menorrahagia, elevated tumor markers, endometriosis FINDINGS: Measurements: Uterus:  12.2 x 6.8 x 7.7 cm Endometrial stripe:  25.6 mm Right Ovary:  3.2 x 2.4 x 2.2 cm Left Ovary:  Not measured Ultrasound Findings: Uterus: Uterus demonstrates normal myometrial echotexture. Nabothian cysts are noted within the cervical region. Endometrial stripe: Abnormally thickened. Right Ovary: Right ovary is within normal limits. Left Ovary:  Not visualized. No adnexal mass. Free Fluid: No evidence of free fluid. Abnormally thickened endometrium. Endometrial hyperplasia or malignancy cannot be excluded and tissue sampling is recommended if not already performed. CT Abd/Pelvis W Iv Contrast Additional Contrast? Oral   Result Date: 5/3/2021  EXAMINATION: CT OF THE ABDOMEN AND PELVIS WITH CONTRAST 5/3/2021 1:01 pm TECHNIQUE: CT of the abdomen and pelvis was performed with the administration of intravenous contrast. Multiplanar reformatted images are provided for review. Dose modulation, iterative reconstruction, and/or weight based adjustment of the mA/kV was utilized to reduce the radiation dose to as low as reasonably achievable. COMPARISON: None. HISTORY: ORDERING SYSTEM PROVIDED HISTORY: Abdominal discomfort TECHNOLOGIST PROVIDED HISTORY: elevated GYN and liver tumor markers, pt reports palpable left abdominal wall mass, hx abdominal wall endometriosis Reason for Exam: Elevated GYN and liver tumor markers, pt feels a palpable mass to left side of abdominal wall. Abdominal discomfort throughout, hx of endometriosis.  Acuity: Chronic Type of Exam: Subsequent/Follow-up FINDINGS: Lower Chest: Lung bases are clear without pulmonary nodules, masses, effusions, or foci of airspace disease. The base of the heart is normal in size without pericardial fluid collection. Organs: Status post cholecystectomy. No focal hepatic mass lesions. No intrahepatic or extrahepatic biliary ductal dilatation. The pancreas and spleen are unremarkable. GI/Bowel: Bowel is without evidence for obstruction or inflammation. No free intraperitoneal air or fluid noted. Small bowel loops are normal in caliber. No definite hiatal hernia. Pelvis:  No evidence for free fluid. Peritoneum/Retroperitoneum: The adrenal glands are normal in size and configuration bilaterally. Kidneys perfuse and excrete in a symmetric fashion and ureters are not obstructed. Punctate nephrolithiasis within the lower pole of the left kidney. Urinary bladder is unremarkable. Bones/Soft Tissues: Osseous structures are intact without evidence for acute fracture or dislocation. No definite lytic or blastic lesions. Overlying soft tissues are unremarkable. Bilateral L5 pars defects resulting in subtle anterolisthesis of L5 relative to S1. No acute osseous abnormality. Vasculature: The abdominal aorta is normal in caliber. No discrete and aneurysm or dissection. No lymphadenopathy within the abdomen or pelvis. No evidence for acute intra-abdominal or intrapelvic pathology. No bowel obstruction or inflammation. No free intraperitoneal air or fluid. No evidence for urinary obstruction. Normal appendix. Left-sided nephrolithiasis without urinary obstruction or hydronephrosis. Status post cholecystectomy. DIAGNOSIS & PLAN:  1. Thickened Endometrial Lining   2. Elevatd Ca 125    - Proceed with planned procedure: hysteroscopy, D&C, possible Myosure    - Consent signed, on chart. - The patient is ready for transport to the operative suite. Counseling: The patient was counseled on all options both medical and surgical, conservative as well as definitive.  She has elected to proceed with the procedure as stated

## 2021-05-12 NOTE — PROGRESS NOTES
CLINICAL PHARMACY NOTE: MEDS TO [address] Select Patient?: No  Total # of Prescriptions Filled: 2   The following medications were delivered to the patient:  · Tylenol #3   · Stool Softener (Colace) 100mg  Total # of Interventions Completed: 0  Time Spent (min): 15    Additional Documentation:  Meds to beds delivered

## 2021-05-12 NOTE — ANESTHESIA PRE PROCEDURE
Intravenous PRN Sully David MD        lidocaine PF 1 % injection 1 mL  1 mL Intradermal Once PRN Sully David MD        0.9 % sodium chloride infusion   Intravenous Continuous Cindy CRISTOBAL Elise        0.9 % sodium chloride infusion  25 mL Intravenous PRN Cindysa Arik PA-C        sodium chloride flush 0.9 % injection 10 mL  10 mL Intravenous 2 times per day [facility]        sodium chloride flush 0.9 % injection 10 mL  10 mL Intravenous PRN Cindysa Arik PA-C           Allergies: Allergies   Allergen Reactions    Trileptal [Oxcarbazepine] Shortness Of Breath     Red neck rash/ visual changes    Contrast [Iodides] Hives     Chest pain, HTN  Able to handle if the pre-medication prep is followed.     Morphine Other (See Comments)     Red streak up arm at iv site/ and hives    Omnipaque [Iohexol] Hives     CT Dye  **can do the omnipaque if does allergy pack treatment**       Problem List:    Patient Active Problem List   Diagnosis Code    Iron deficiency anemia D50.9    Intestinal malabsorption K90.9    Anxiety F41.9    Depression F32.9    Malabsorption of iron K90.9    Vitamin D deficiency E55.9    Zinc deficiency E60    Peripheral arterial occlusive disease (HCC) I77.9    Endometriosis N80.9    History of scleroderma Z87.39    May-Thurner syndrome I87.1    Bariatric surgery status Z98.84    Calculus of kidney N20.0    Suspected COVID-19 virus infection Z20.822       Past Medical History:        Diagnosis Date    Anemia     Anxiety     Carpal tunnel syndrome     Depression     Endometriosis     Gestational diabetes     x 3 children    Headache     migraines    History of blood transfusion     Iron deficiency anemia     Kidney stones     May-Thurner syndrome     Obesity     Scleroderma ([facility] [address]        Past Surgical History:        Procedure Laterality Date     SECTION  x 3    CHOLECYSTECTOMY      GASTRIC BYPASS SURGERY  2009    OTHER SURGICAL HISTORY growth removed from outer area of uterus    PELVIC LAPAROSCOPY      TOENAIL EXCISION      VASCULAR SURGERY  2016    to evaluate May Rader syndrome effects on  blood vessels       Social History:    Social History     Tobacco Use    Smoking status: Never Smoker    Smokeless tobacco: Never Used   Substance Use Topics    Alcohol use: No                                Counseling given: Not Answered      Vital Signs (Current):   Vitals:    05/12/21 1221   BP: 139/82   Resp: 18   Temp: 98.6 °F (37 °C)   TempSrc: Temporal   SpO2: 97%   Weight: 277 lb (125.6 kg)   Height: 5' 4\" (1.626 m)                                              BP Readings from Last 3 Encounters:   05/12/21 139/82   05/05/21 (!) 144/96   04/22/21 134/77       NPO Status: Time of last liquid consumption: 2359                        Time of last solid consumption: 2359                        Date of last liquid consumption: 05/11/21                        Date of last solid food consumption: 05/11/21    BMI:   Wt Readings from Last 3 Encounters:   05/12/21 277 lb (125.6 kg)   05/05/21 275 lb (124.7 kg)   04/22/21 278 lb (126.1 kg)     Body mass index is 47.55 kg/m². CBC:   Lab Results   Component Value Date    WBC 6.9 05/05/2021    RBC 5.48 05/05/2021    HGB 14.3 05/05/2021    HCT 43.6 05/05/2021    MCV 79.6 05/05/2021    RDW 25.2 05/05/2021     05/05/2021       CMP:   Lab Results   Component Value Date     05/05/2021    K 4.1 05/05/2021     05/05/2021    CO2 24 05/05/2021    BUN 13 05/05/2021    CREATININE 0.66 05/05/2021    GFRAA >60 05/05/2021    LABGLOM >60 05/05/2021    GLUCOSE 100 05/05/2021    PROT 8.0 03/05/2021    CALCIUM 9.1 05/05/2021    BILITOT 0.12 03/05/2021    ALKPHOS 99 03/05/2021    AST 17 03/05/2021    ALT 25 03/05/2021       POC Tests: No results for input(s): POCGLU, POCNA, POCK, POCCL, POCBUN, POCHEMO, POCHCT in the last 72 hours.     Coags: No results found for: PROTIME, INR, APTT    HCG (If Applicable):   Lab Results   Component Value Date    HCG NEGATIVE 05/12/2021    HCGQUANT <1 03/18/2021        ABGs: No results found for: PHART, PO2ART, HHR1WPZ, SIH0ITP, BEART, H1RWOSKJ     Type & Screen (If Applicable):  No results found for: LABABO, LABRH    Drug/Infectious Status (If Applicable):  No results found for: HIV, HEPCAB    COVID-19 Screening (If Applicable):   Lab Results   Component Value Date    COVID19 Not Detected 05/08/2021           Anesthesia Evaluation  Patient summary reviewed and Nursing notes reviewed  Airway: Mallampati: I  TM distance: >3 FB   Neck ROM: full  Mouth opening: > = 3 FB Dental: normal exam         Pulmonary:normal exam    (+) sleep apnea: on noncompliant,                             Cardiovascular:Negative CV ROS          ECG reviewed                     ROS comment: -EKG - SR @ 62     Neuro/Psych:                ROS comment: -WEAKNESS LEFT LEG  -NUMBNESS TINGLING LEFT LEG GI/Hepatic/Renal:   (+) morbid obesity          Endo/Other:                      ROS comment: -RECENT STEROID USE 3 WEEKS AGO FOR STREP THROAT  -NPO AFTER MIDNIGHT  -ALLERGIES - TRILEPTAL, IODIDES, MORPHINE Abdominal:           Vascular:           ROS comment: -PERIPHERAL VASCULAR DISEASE  -ANEMIA. Anesthesia Plan      general     ASA 2     (LMA)  Induction: intravenous. MIPS: Postoperative opioids intended and Prophylactic antiemetics administered. Anesthetic plan and risks discussed with patient. Plan discussed with CRNA.     Attending anesthesiologist reviewed and agrees with Pre Eval content              Taisha Sanches MD   5/12/2021

## 2021-05-13 ENCOUNTER — HOSPITAL ENCOUNTER (OUTPATIENT)
Age: 46
Discharge: HOME | End: 2021-05-13
Payer: MEDICARE

## 2021-05-13 ENCOUNTER — TELEPHONE (OUTPATIENT)
Dept: GYNECOLOGIC ONCOLOGY | Age: 46
End: 2021-05-13

## 2021-05-13 VITALS
HEART RATE: 86 BPM | BODY MASS INDEX: 46.95 KG/M2 | OXYGEN SATURATION: 97 % | RESPIRATION RATE: 18 BRPM | DIASTOLIC BLOOD PRESSURE: 80 MMHG | TEMPERATURE: 98.4 F | WEIGHT: 275 LBS | SYSTOLIC BLOOD PRESSURE: 127 MMHG | HEIGHT: 64 IN

## 2021-05-13 DIAGNOSIS — Z98.890 STATUS POST D&C: ICD-10-CM

## 2021-05-13 DIAGNOSIS — Z98.890 POST-OPERATIVE STATE: Primary | ICD-10-CM

## 2021-05-13 DIAGNOSIS — Z98.890 POST-OPERATIVE STATE: ICD-10-CM

## 2021-05-13 LAB
ABSOLUTE EOS #: 0.18 K/UL (ref 0–0.4)
ABSOLUTE IMMATURE GRANULOCYTE: ABNORMAL K/UL (ref 0–0.3)
ABSOLUTE LYMPH #: 2.67 K/UL (ref 1–4.8)
ABSOLUTE MONO #: 0.62 K/UL (ref 0.1–0.8)
ANION GAP SERPL CALCULATED.3IONS-SCNC: 8 MMOL/L (ref 9–17)
BASOPHILS # BLD: 0 % (ref 0–2)
BASOPHILS ABSOLUTE: 0 K/UL (ref 0–0.2)
BUN BLDV-MCNC: 7 MG/DL (ref 6–20)
BUN/CREAT BLD: ABNORMAL (ref 9–20)
CALCIUM SERPL-MCNC: 9.1 MG/DL (ref 8.6–10.4)
CHLORIDE BLD-SCNC: 105 MMOL/L (ref 98–107)
CO2: 25 MMOL/L (ref 20–31)
CREAT SERPL-MCNC: 0.68 MG/DL (ref 0.5–0.9)
DIFFERENTIAL TYPE: ABNORMAL
EKG ATRIAL RATE: 103 BPM
EKG P AXIS: 46 DEGREES
EKG P-R INTERVAL: 160 MS
EKG Q-T INTERVAL: 330 MS
EKG QRS DURATION: 78 MS
EKG QTC CALCULATION (BAZETT): 432 MS
EKG R AXIS: -20 DEGREES
EKG T AXIS: 60 DEGREES
EKG VENTRICULAR RATE: 103 BPM
EOSINOPHILS RELATIVE PERCENT: 2 % (ref 1–4)
GFR AFRICAN AMERICAN: >60 ML/MIN
GFR NON-AFRICAN AMERICAN: >60 ML/MIN
GFR SERPL CREATININE-BSD FRML MDRD: ABNORMAL ML/MIN/{1.73_M2}
GFR SERPL CREATININE-BSD FRML MDRD: ABNORMAL ML/MIN/{1.73_M2}
GLUCOSE BLD-MCNC: 127 MG/DL (ref 70–99)
HCT VFR BLD CALC: 37.3 % (ref 36–46)
HEMOGLOBIN: 12.3 G/DL (ref 12–16)
IMMATURE GRANULOCYTES: ABNORMAL %
LYMPHOCYTES # BLD: 30 % (ref 24–44)
MCH RBC QN AUTO: 26.5 PG (ref 26–34)
MCHC RBC AUTO-ENTMCNC: 33.1 G/DL (ref 31–37)
MCV RBC AUTO: 79.9 FL (ref 80–100)
MONOCYTES # BLD: 7 % (ref 1–7)
MORPHOLOGY: ABNORMAL
MORPHOLOGY: ABNORMAL
NRBC AUTOMATED: ABNORMAL PER 100 WBC
PDW BLD-RTO: 25.4 % (ref 12.5–15.4)
PLATELET # BLD: 207 K/UL (ref 140–450)
PLATELET ESTIMATE: ABNORMAL
PMV BLD AUTO: 9.3 FL (ref 6–12)
POTASSIUM SERPL-SCNC: 3.7 MMOL/L (ref 3.7–5.3)
RBC # BLD: 4.66 M/UL (ref 4–5.2)
RBC # BLD: ABNORMAL 10*6/UL
SEG NEUTROPHILS: 61 % (ref 36–66)
SEGMENTED NEUTROPHILS ABSOLUTE COUNT: 5.43 K/UL (ref 1.8–7.7)
SODIUM BLD-SCNC: 138 MMOL/L (ref 135–144)
WBC # BLD: 8.9 K/UL (ref 3.5–11)
WBC # BLD: ABNORMAL 10*3/UL

## 2021-05-13 PROCEDURE — 85025 COMPLETE CBC W/AUTO DIFF WBC: CPT

## 2021-05-13 PROCEDURE — 80048 BASIC METABOLIC PNL TOTAL CA: CPT

## 2021-05-13 PROCEDURE — 36415 COLL VENOUS BLD VENIPUNCTURE: CPT

## 2021-05-13 RX ORDER — ONDANSETRON 4 MG/1
4 TABLET, ORALLY DISINTEGRATING ORAL EVERY 8 HOURS PRN
Qty: 10 TABLET | Refills: 0 | Status: SHIPPED | OUTPATIENT
Start: 2021-05-13 | End: 2021-05-26 | Stop reason: ALTCHOICE

## 2021-05-13 NOTE — OP NOTE
antigen level at 42 units. Initially, the patient had a slightly elevated alpha-fetoprotein at 9.7  and a slightly elevated LDH at 247; however, these were repeated and  were within the normal range. The CA-125 antigen level was repeated and  was further elevated at 58 units. A pelvic ultrasound repeated on 2021, revealed the uterus  measuring 12.2 x 6.8 x 7.7 cm with an endometrial stripe that was 25.6  mm. The right ovarian cyst was no longer seen. In addition, the patient has been having some left lower quadrant  discomfort, and therefore a CT scan of the abdomen and pelvis was  performed on 2021. There was no acute intra-abdominal or pelvic  pathology noted. Because the patient continues to have heavy bleeding, an endometrial  biopsy was attempted in the office, but tissue was not obtained. She  was, therefore, offered a D and C and hysteroscopy. She agreed and was  taken to the operating room on 2021. Her significant past surgical history included a gastric bypass in . The patient has regained much of her weight. The patient also had a  cholecystectomy, a pelvic laparoscopy and  sections times three. She also had vascular surgery in . Past medical history was significant for endometriosis, gestational  diabetes and chronic iron deficient anemia as well as kidney stones,  morbid obesity, scleroderma and May-Thurner syndrome. She was taken to the operating room on 2021. DESCRIPTION OF OPERATIVE PROCEDURE:  Under adequate general anesthesia,  the patient was placed in the dorsal lithotomy position and prepped and  draped in the usual fashion. We were not able to ascertain the size of  her uterus on exam because of her morbid obesity. The cervix was  grasped anteriorly with a single-tooth tenaculum and was sounded to 12  cm. It was then gradually dilated up, so that we could place the  operative hysteroscope.   We noted what appeared to be a very large mass  filling the entire upper portion of the uterine cavity. It appeared to  have a very large sessile base. It was smooth on its surface; however,  indented easily suggesting that it was a polyp as opposed to a fibroid. An attempt was made to use the MyoSure cutting device, but this was  unsuccessful because we could not get an angle on the mass to where the  MyoSure would work. We therefore sent what we had as polypectomy and  then performed a sharp curettage. Only a small amount of tissue,  however, was obtained. At this point, the polyp forceps were placed and an attempt was made to  grasp the polyp and twisted off, but this was on successful as it was  too large to grasp. At this point, the procedure was abandoned. The patient tolerated the  surgery well and was taken to the recovery room in satisfactory  condition.         Alex Hannah MD    D: 05/12/2021 14:12:51       T: 05/12/2021 14:20:24     [id number]  Job#: [id number]     Doc#: [id number]    CC:

## 2021-05-13 NOTE — ED PROVIDER NOTES
[id number] [facility] ED  [address] [place] [address] [facility] [address]  Phone: [phone]  Fax: [phone]      Pt Name: Chucho Bonilla  [id number]  [place] 1975  Date of evaluation: 5/12/2021      CHIEF COMPLAINT       Chief Complaint   Patient presents with    Other     Had a D&C today and discharged to home. At home took a Tylenol #3 and then later had \"Hot Flashes\", that did not go away. HISTORY OF PRESENT ILLNESS   Chucho Bonilla is a 39 y.o. female who presents for evaluation of feeling flushed. The patient reports that she has history of endometriosis, heavy menstrual periods and anemia. She had a D&C with hysteroscopy today at [facility] by Dr. Santos Nguyen for further evaluation of her vaginal symptoms and was noted to have a uterine tumor that was biopsied. The patient states that afterwards she was discharged home and took a Tylenol #3 around 4:30 PM.  She states that starting around 6 PM she began feeling flushed as if she had a fever. She did not take her temperature and her family members did not feel that she was warm to the touch. The patient states that she subsequently developed a associated dull, achy, global headache, dizziness as if she is off balance, nausea, decreased appetite, and generalized dull, achy, mild abdominal cramping with scant vaginal spotting. She also complains of mild dysuria but thinks this is from the procedure. She tried applying ice packs, sitting under a fan, and taking a shower without any improvement in her symptoms. She has not taken any other medications and does not list any other provoking or palliating factors. The patient denies any associated chest pain or shortness of breath. She states that her abdominal cramping is what she would expect following her procedure. She has not yet had a bowel movement and is unsure if she is passing flatus. The patient is history of gastric bypass surgery and multiple c sections.   She has chronic anemia and this has been evaluated by hematology. She receives iron transfusions every 6 months and has an appointment with hematology next week. She states that her pre-op labs showed significant improvement in her anemia. The patient denies vision changes, neck pain, back pain, chest pain, shortness of breath, hematuria, vaginal discharge, focal weakness, numbness, tingling, recent injury or illness. REVIEW OF SYSTEMS     Ten point review of systems was reviewed and is negative unless otherwise noted in the HPI    [address]    has a past medical history of Anemia, Anxiety, Carpal tunnel syndrome, Depression, Endometriosis, Gestational diabetes, Headache, History of blood transfusion, Iron deficiency anemia, Kidney stones, May-Thurner syndrome, Obesity, and Scleroderma ([facility] [address] SURGICAL HISTORY      has a past surgical history that includes Gastric bypass surgery (2009);  section (x 3); toenail excision; other surgical history; Cholecystectomy; pelvic laparoscopy; vascular surgery (2016); Dilation and curettage of uterus (N/A, 2021); and Dilation and curettage of uterus (N/A, 2021). CURRENT MEDICATIONS       Discharge Medication List as of 2021 12:09 AM      CONTINUE these medications which have NOT CHANGED    Details   docusate sodium (COLACE) 100 MG capsule Take 1 capsule by mouth 2 times daily, Disp-20 capsule, R-0Print      acetaminophen-codeine (TYLENOL/CODEINE #3) 300-30 MG per tablet Take 1 tablet by mouth every 4 hours as needed for Pain for up to 2 days. Intended supply: 5 days.  Take lowest dose possible to manage pain, Disp-5 tablet, R-0Print      busPIRone (BUSPAR) 10 MG tablet Take 1 tablet by mouth 3 times daily as needed (anxiety), Disp-90 tablet, R-5Normal      Elastic Bandages & Supports (JOBST ULTRASHEER 20-30MMHG LG) MISC Disp-3 each, R-3, PrintWear daily, ok to remove in the evenings      Multiple Vitamins-Minerals (MULTI-JULIO CÉSAR PO) Take by mouth dailyHistorical Med      vitamin B-12 (CYANOCOBALAMIN) 100 MCG tablet Take 50 mcg by mouth dailyHistorical Med      calcium carbonate (OYSTER SHELL CALCIUM 500 MG) 1250 (500 Ca) MG tablet Take 1 tablet by mouth dailyHistorical Med      vitamin D3 (CHOLECALCIFEROL) 25 MCG (1000 UT) TABS tablet Take 1 tablet by mouth daily, Disp-90 tablet, R-1Normal      zinc 50 MG CAPS Take 50 mg by mouth daily, Disp-90 capsule, R-1Normal             ALLERGIES     is allergic to trileptal [oxcarbazepine]; contrast [iodides]; morphine; and omnipaque [iohexol]. FAMILY HISTORY     She indicated that her mother is . She indicated that her father is . She indicated that both of her sisters are alive. She indicated that her brother is alive. family history includes Colon Cancer in her mother; Diabetes in her brother, sister, and sister; Early Death in her father; High Blood Pressure in her brother, sister, and sister. SOCIAL HISTORY      reports that she has never smoked. She has never used smokeless tobacco. She reports that she does not drink alcohol or use drugs. PHYSICAL EXAM     INITIAL VITALS:  height is 5' 4\" (1.626 m) and weight is 124.7 kg (275 lb). Her temperature is 98.4 °F (36.9 °C). Her blood pressure is 127/80 and her pulse is 86. Her respiration is 18 and oxygen saturation is 97%. CONSTITUTIONAL: no apparent distress, well appearing  SKIN: warm, dry, no jaundice, hives or petechiae  EYES: clear conjunctiva, non-icteric sclera, pupils 3 mm, equal, round and reactive to light. Extraocular movements intact. HENT: normocephalic, atraumatic, dry mucus membranes  NECK: Nontender and supple with no nuchal rigidity, full range of motion  PULMONARY: clear to auscultation without wheezes, rhonchi, or rales, normal excursion, no accessory muscle use and no stridor  CARDIOVASCULAR: tachycardiac rate, regular rhythm. Strong radial pulses with intact distal perfusion.  Capillary refill <2 seconds. GASTROINTESTINAL: soft, appropriately tender over the lower abdomen, no pain over mcburney's point, negative mendenhall sign, no pulsatile mass, non-distended, no palpable masses, no rebound or guarding   GENITOURINARY: No costovertebral angle tenderness to palpation  MUSCULOSKELETAL: No midline spinal tenderness, step off or deformity. Extremities are otherwise nontender to palpation and nonerythematous. Compartments soft. No peripheral edema. NEUROLOGIC: alert and oriented x 3, GCS 15, normal mentation and speech. Moves all extremities x 4 without motor or sensory deficit, gait is stable without ataxia. Cranial nerves II through XII intact. No cerebellar signs. Normal finger-to-nose. Normal heel-to-shin. PSYCHIATRIC: normal mood and affect, thought process is clear and linear    DIAGNOSTIC RESULTS     EKG:  EKG 2206 sinus tachycardia, rate 103 bpm, normal axis, normal intervals, no ST elevation or depression, no T wave inversions, good R wave progression, Q wave in aVR, no change from EKG in 5/5/2021    RADIOLOGY:   Xr Acute Abd Series Chest 1 Vw    Result Date: 5/12/2021  1. No acute cardiopulmonary disease. 2. Unremarkable bowel gas pattern.        LABS:  Results for orders placed or performed during the hospital encounter of 05/12/21   CBC Auto Differential   Result Value Ref Range    WBC 5.7 3.5 - 11.0 k/uL    RBC 3.79 (L) 4.0 - 5.2 m/uL    Hemoglobin 10.0 (L) 12.0 - 16.0 g/dL    Hematocrit 30.2 (L) 36 - 46 %    MCV 79.7 (L) 80 - 100 fL    MCH 26.4 26 - 34 pg    MCHC 33.1 31 - 37 g/dL    RDW 25.4 (H) 12.5 - 15.4 %    Platelets 964 451 - 270 k/uL    MPV 9.2 6.0 - 12.0 fL    NRBC Automated NOT REPORTED per 100 WBC    Differential Type NOT REPORTED     Immature Granulocytes NOT REPORTED 0 %    Absolute Immature Granulocyte NOT REPORTED 0.00 - 0.30 k/uL    WBC Morphology NOT REPORTED     RBC Morphology NOT REPORTED     Platelet Estimate NOT REPORTED     Seg Neutrophils 84 (H) 36 - 66 %    Lymphocytes 12 (L) 24 - 44 %    Monocytes 4 2 - 11 %    Eosinophils % 0 (L) 1 - 4 %    Basophils 0 0 - 2 %    Segs Absolute 4.79 1.8 - 7.7 k/uL    Absolute Lymph # 0.68 (L) 1.0 - 4.8 k/uL    Absolute Mono # 0.23 0.1 - 1.2 k/uL    Absolute Eos # 0.00 0.0 - 0.4 k/uL    Basophils Absolute 0.00 0.0 - 0.2 k/uL    Morphology MICROCYTOSIS PRESENT     Morphology ANISOCYTOSIS PRESENT     Morphology 1+ ELLIPTOCYTES    Basic Metabolic Panel w/ Reflex to MG   Result Value Ref Range    Glucose 172 (H) 70 - 99 mg/dL    BUN 10 6 - 20 mg/dL    CREATININE 0.59 0.50 - 0.90 mg/dL    Bun/Cre Ratio NOT REPORTED 9 - 20    Calcium 9.1 8.6 - 10.4 mg/dL    Sodium 137 135 - 144 mmol/L    Potassium 4.7 3.7 - 5.3 mmol/L    Chloride 107 98 - 107 mmol/L    CO2 21 20 - 31 mmol/L    Anion Gap 9 9 - 17 mmol/L    GFR Non-African American >60 >60 mL/min    GFR African American >60 >60 mL/min    GFR Comment          GFR Staging NOT REPORTED    Hepatic Function Panel   Result Value Ref Range    Albumin 4.1 3.5 - 5.2 g/dL    Alkaline Phosphatase 86 35 - 104 U/L    ALT 55 (H) 5 - 33 U/L    AST 22 <32 U/L    Total Bilirubin 0.21 (L) 0.3 - 1.2 mg/dL    Bilirubin, Direct <0.08 <0.31 mg/dL    Bilirubin, Indirect CANNOT BE CALCULATED 0.00 - 1.00 mg/dL    Total Protein 7.5 6.4 - 8.3 g/dL    Globulin NOT REPORTED 1.5 - 3.8 g/dL    Albumin/Globulin Ratio 1.2 1.0 - 2.5   Lipase   Result Value Ref Range    Lipase 26 13 - 60 U/L   Urinalysis Reflex to Culture    Specimen: Urine, clean catch   Result Value Ref Range    Color, UA YELLOW YELLOW    Turbidity UA CLEAR CLEAR    Glucose, Ur 3+ (A) NEGATIVE    Bilirubin Urine NEGATIVE NEGATIVE    Ketones, Urine NEGATIVE NEGATIVE    Specific Gravity, UA 1.020 1.005 - 1.030    Urine Hgb MODERATE (A) NEGATIVE    pH, UA 5.0 5.0 - 8.0    Protein, UA NEGATIVE NEGATIVE    Urobilinogen, Urine Normal Normal    Nitrite, Urine NEGATIVE NEGATIVE    Leukocyte Esterase, Urine NEGATIVE NEGATIVE    Urinalysis Comments NOT REPORTED    Microscopic Urinalysis   Result Value Ref Range    -          WBC, UA None 0 - 5 /HPF    RBC, UA 5 TO 10 0 - 2 /HPF    Casts UA NOT REPORTED /LPF    Crystals, UA NOT REPORTED None /HPF    Epithelial Cells UA 0 TO 2 0 - 5 /HPF    Renal Epithelial, UA NOT REPORTED 0 /HPF    Bacteria, UA NOT REPORTED None    Mucus, UA NOT REPORTED None    Trichomonas, UA NOT REPORTED None    Amorphous, UA NOT REPORTED None    Other Observations UA (A) NOT REQ. Utilizing a urinalysis as the only screening method to exclude a potential uropathogen can be unreliable in many patient populations. Rapid screening tests are less sensitive than culture and if UTI is a clinical possibility, culture should be considered despite a negative urinalysis.     Yeast, UA FEW (A) None   EKG 12 Lead   Result Value Ref Range    Ventricular Rate 103 BPM    Atrial Rate 103 BPM    P-R Interval 160 ms    QRS Duration 78 ms    Q-T Interval 330 ms    QTc Calculation (Bazett) 432 ms    P Axis 46 degrees    R Axis -20 degrees    T Axis 60 degrees       EMERGENCY DEPARTMENT COURSE:        The patient was given the following medications:  Orders Placed This Encounter   Medications    0.9 % sodium chloride bolus    ondansetron (ZOFRAN) injection 4 mg    ketorolac (TORADOL) injection 30 mg    ondansetron (ZOFRAN ODT) 4 MG disintegrating tablet     Sig: Take 1 tablet by mouth every 8 hours as needed for Nausea     Dispense:  10 tablet     Refill:  0        Vitals:    Vitals:    05/12/21 2131 05/13/21 0005 05/13/21 0006 05/13/21 0008   BP: (!) 159/78  127/80    Pulse: 106 90 86    Resp:   16 18   Temp: 98.8 °F (37.1 °C)  98.4 °F (36.9 °C)    TempSrc: Oral      SpO2: 97%  97%    Weight: 124.7 kg (275 lb)      Height: 5' 4\" (1.626 m)        -------------------------  BP: 127/80, Temp: 98.4 °F (36.9 °C), Pulse: 86, Resp: 18    CONSULTS:  None    CRITICAL CARE:   None    PROCEDURES:  None    DIAGNOSIS/ MDM:   Cuhcho Bonilla is a 39 y.o. female who presents after she felt flushed following a D&C with hysteroscopy. She also took Tylenol 3 this afternoon. Vital signs show tachycardia upon arrival but heart rate improved with IV fluids. Heart regular rate and rhythm on repeat exam.  Lungs clear to auscultation. Abdomen soft but appropriately tender to palpation. Mucous membranes dry. Capillary refill less than 2 seconds. The patient symptoms significantly improved with IV fluids, Zofran and Toradol. CBC shows hemoglobin of 10 which she has historically been at her baseline. She did have a hemoglobin of 14 one week ago during preop testing but I suspect this may have been a lab error. She did not receive any recent transfusions and has never had a hemoglobin that high in the recent past.  The other possibility is that her hemoglobin dropped from 14 to 10 after her procedure this morning but there was no reported significant blood loss in the patient states that she has had minimal vaginal spotting since the procedure. There is no leukocytosis. BMP, LFTs and lipase are unremarkable. UA shows blood but no signs of infection. Her urinalysis was likely contaminated with her vaginal spotting. X-ray acute abdominal series shows no acute process and no free air. I suspect the patient's symptoms are secondary to her anesthesia and Tylenol 3 tablets today. I have low suspicion for acute abdomen, perforation, obstruction, sepsis, or surgical complication. The patient overall feels improved and is requesting discharge home. I spoke to her surgeon, Dr. Ling Naranjo, at 12:05 AM and he recommends discharging the patient and having her call him in the morning. She was instructed to take Tylenol as needed for pain. She is unable to take NSAIDs secondary to her gastric bypass. I prescribed her Zofran to help with nausea. She was instructed to return to the ER for worsening symptoms or any other concern.  The patient understands that at this time there is no evidence for a more malignant underlying process, but also understands that early in the process of an illness or injury, an emergency department work-up can be falsely reassuring. Routine discharge counseling was given, and the patient understands that worsening, changing or persistent symptoms should prompt a immediate call or follow-up with their primary care physician or return to the emergency department. The importance of appropriate follow-up was also discussed. I have reviewed the disposition diagnosis with the patient. I have answered their questions and given discharge instructions. They voiced understanding of these instructions and did not have any further questions or complaints. FINAL IMPRESSION      1. Post-op pain    2. Chronic anemia          DISPOSITION/PLAN   DISPOSITION Decision To Discharge 05/13/2021 12:01:57 AM        PATIENT REFERRED TO:  JUSTICE Negron - CNP  [address] [facility]  [address]  [address]  [phone]    Schedule an appointment as soon as possible for a visit in 2 days      Tello Carias [address] [place] [address] 36.  [phone]    Call today      [facility]  [address] [facility] [address] [phone]  Go to   If symptoms worsen      DISCHARGE MEDICATIONS:  Discharge Medication List as of 5/13/2021 12:09 AM      START taking these medications    Details   ondansetron (ZOFRAN ODT) 4 MG disintegrating tablet Take 1 tablet by mouth every 8 hours as needed for Nausea, Disp-10 tablet, R-0Normal             (Please note that portions of this note were completed with a voice recognitionprogram.  Efforts were made to edit the dictations but occasionally words are mis-transcribed.)    [address]  Emergency Physician Attending         Keegan Anderson DO  05/13/21 0136

## 2021-05-13 NOTE — TELEPHONE ENCOUNTER
Called to f/u with patient after recent ED visit overnight. She states she is doing better this morning, minimal abdominal discomfort but  \"Manageable\" She has gotten her son up and to school already this morning. She states the prior feeling of \"overheating\" that took her to the ED has since subsided. She is taking only Tylenol as needed for pain. She reports scant vaginal spotting. Per Dr. Santos Nguyen we would like her to repeat CBC and BMP today. Pt voiced understanding and plans to have completed this afternoon. Will follow up on results.

## 2021-05-14 LAB — SURGICAL PATHOLOGY REPORT: NORMAL

## 2021-05-19 ENCOUNTER — TELEPHONE (OUTPATIENT)
Dept: GYNECOLOGIC ONCOLOGY | Age: 46
End: 2021-05-19

## 2021-05-19 ENCOUNTER — OFFICE VISIT (OUTPATIENT)
Dept: GYNECOLOGIC ONCOLOGY | Age: 46
End: 2021-05-19

## 2021-05-19 VITALS
HEIGHT: 64 IN | HEART RATE: 81 BPM | SYSTOLIC BLOOD PRESSURE: 114 MMHG | WEIGHT: 278.2 LBS | OXYGEN SATURATION: 96 % | DIASTOLIC BLOOD PRESSURE: 81 MMHG | BODY MASS INDEX: 47.5 KG/M2

## 2021-05-19 DIAGNOSIS — N93.9 ABNORMAL UTERINE BLEEDING DUE TO ENDOMETRIAL POLYP: Primary | ICD-10-CM

## 2021-05-19 DIAGNOSIS — N84.0 ABNORMAL UTERINE BLEEDING DUE TO ENDOMETRIAL POLYP: Primary | ICD-10-CM

## 2021-05-19 PROCEDURE — 99024 POSTOP FOLLOW-UP VISIT: CPT | Performed by: OBSTETRICS & GYNECOLOGY

## 2021-05-19 ASSESSMENT — ENCOUNTER SYMPTOMS
NAUSEA: 1
BACK PAIN: 1

## 2021-05-19 NOTE — PROGRESS NOTES
Review of Systems   Constitutional: Positive for fatigue. Cardiovascular: Positive for chest pain (off and on not new) and leg swelling. Gastrointestinal: Positive for nausea (at times). Genitourinary: Positive for vaginal bleeding (spotting). Musculoskeletal: Positive for back pain. Neurological: Positive for dizziness (at times).
96%   Weight: 278 lb 3.2 oz (126.2 kg)   Height: 5' 4\" (1.626 m)       Physical Exam:  Not performed today but rather a discussion was held of the pathological results and options for management. Assessment:  Very large intrauterine polypoid mass, benign. Post Operative Changes: Good    Discussed result with patients, and all questions were answered. Discussion: I mention to the patient that the options are to continue to observe with ultrasound and Ca1 25's versus proceeding with hysterectomy because of the bleeding. The patient wishes to be observed at least initially. Plan: We will therefore obtain a follow-up pelvic ultrasound and Ca1 25 in 2 months and have the patient here to see how well she is doing. We may wish to obtain an H&H unless it has been performed by her hematologist.        Follow Up Instructions: Return in 2 weeks    Continue activity restrictions for another 1 weeks.     Electronically signed by Titi Hernandez MD on 5/19/21 at 12:06 PM EDT

## 2021-05-19 NOTE — TELEPHONE ENCOUNTER
Called pt, no answer lvm to see if pt is on her way to appointment today  @11:30am or if appointment needs to be rescheduled to another day.

## 2021-05-26 ENCOUNTER — TELEPHONE (OUTPATIENT)
Dept: ONCOLOGY | Age: 46
End: 2021-05-26

## 2021-05-26 ENCOUNTER — OFFICE VISIT (OUTPATIENT)
Dept: ONCOLOGY | Age: 46
End: 2021-05-26
Payer: MEDICARE

## 2021-05-26 VITALS
WEIGHT: 278.1 LBS | SYSTOLIC BLOOD PRESSURE: 119 MMHG | BODY MASS INDEX: 47.74 KG/M2 | DIASTOLIC BLOOD PRESSURE: 87 MMHG | HEART RATE: 84 BPM | TEMPERATURE: 96.4 F

## 2021-05-26 DIAGNOSIS — K90.9 IRON MALABSORPTION: Primary | ICD-10-CM

## 2021-05-26 DIAGNOSIS — D50.8 OTHER IRON DEFICIENCY ANEMIA: ICD-10-CM

## 2021-05-26 DIAGNOSIS — D64.9 ANEMIA, UNSPECIFIED TYPE: ICD-10-CM

## 2021-05-26 DIAGNOSIS — R97.1 ELEVATED CA-125: ICD-10-CM

## 2021-05-26 PROCEDURE — 99214 OFFICE O/P EST MOD 30 MIN: CPT | Performed by: INTERNAL MEDICINE

## 2021-05-26 PROCEDURE — 1036F TOBACCO NON-USER: CPT | Performed by: INTERNAL MEDICINE

## 2021-05-26 PROCEDURE — G8417 CALC BMI ABV UP PARAM F/U: HCPCS | Performed by: INTERNAL MEDICINE

## 2021-05-26 PROCEDURE — G8427 DOCREV CUR MEDS BY ELIG CLIN: HCPCS | Performed by: INTERNAL MEDICINE

## 2021-05-26 PROCEDURE — 99211 OFF/OP EST MAY X REQ PHY/QHP: CPT | Performed by: INTERNAL MEDICINE

## 2021-05-26 NOTE — TELEPHONE ENCOUNTER
AVS from 5/26/21    rv in 3 months with labs prior    RV scheduled 8/25/21 @ 10:45am    PT will have labs drawn one week prior to return visit      PT was given AVS and an appt schedule    Electronically signed by Ana Gray on 5/26/2021 at 2:28 PM

## 2021-05-26 NOTE — TELEPHONE ENCOUNTER
AVS from 5/26/21    rv in 3 months with labs prior    RV scheduled 8/25/21 @ 10:45am    PT will have labs drawn one week prior to return visit    PT was given AVS and an appt schedule    Electronically signed by Killian Strange on 5/26/2021 at 3:09 PM

## 2021-05-26 NOTE — PROGRESS NOTES
Verner Spell                                                                                                                  5/26/2021  MRN:   [id number]  YOB: 1975  PCP:                           JUSTICE Montaño - CNP  Referring Physician: No ref. provider found  Treating Physician Name: Agata Ortez MD      Reason for visit:  Chief Complaint   Patient presents with    Follow-up     review status of disease    Discuss Labs    Other     Discuss her iron deficiency     Fatigue     worse       Current problems:  Iron deficiency anemia with malabsorption component     Active and recent treatments:  Parenteral iron3/2021    Summary of Case/History:    Verner Spell a 39 y. o.female is a patient with anemia. She has history of anemia and was managed previously by Dr. Mis Kruse with IV iron. Her most recent lab work shows recurrent iron deficiency anemia and she reports she historically received monthly B12 injections and IV iron every 3-6 months. She has history of scleroderma and gastric by-pass, done in 2009, contributing to iron malabsorption. She reports she has had zinc and D deficiencies, she does not have any bariatric vitamins. She is very symptomatic with dizziness, cold intolerance and feeling very fatigued. She denies any bleeding. She has history of menorrhagia with irregular periods, she had benign growth removed from outside of the uterus in 2012. She has family history of uterine fibroids and cancer. Interim History: The patient presents today to review lab work. She has followed up with gynecology for menorrhagia and was referred to gyn-onc for elevated , work up showed large endometrial polyp was seen and biopsy taken was negative for malignancy, it was too large to be resected, hysterectomy was not recommended at this time but will continue to monitor.  She had been started on Megace and menorrhagia resolved but she had persistent spotting, she was taken off last month due to concern for elevated clotting, her menses have not resumed. She is concerned about her results. She has some fatigue. During this visit patient's allergy, social, medical, surgical history and medications were reviewed and updated.     Past Medical History:   Past Medical History:   Diagnosis Date    Anemia     Anxiety     Carpal tunnel syndrome     Depression     Endometriosis     Gestational diabetes     x 3 children    Headache     migraines    History of blood transfusion     Iron deficiency anemia     Kidney stones     May-Thurner syndrome     Obesity     Scleroderma ([address]        Past Surgical History:     Past Surgical History:   Procedure Laterality Date     SECTION  x 3    CHOLECYSTECTOMY      DILATION AND CURETTAGE OF UTERUS N/A 2021     DILATATION AND CURETTAGE HYSTEROSCOPY MYOSURE (N/A )    DILATION AND CURETTAGE OF UTERUS N/A 2021    DILATATION AND CURETTAGE HYSTEROSCOPY Heavenly Murcia performed by Kasia Giang MD at [address] [place] [address]      OTHER SURGICAL HISTORY      growth removed from outer area of uterus    PELVIC LAPAROSCOPY      TOENAIL EXCISION      VASCULAR SURGERY  2016    to evaluate May Rader syndrome effects on  blood vessels       Patient Family Social History:     Social History     Socioeconomic History    Marital status: Single     Spouse name: None    Number of children: None    Years of education: None    Highest education level: None   Occupational History    None   Tobacco Use    Smoking status: Never Smoker    Smokeless tobacco: Never Used   Vaping Use    Vaping Use: Never used   Substance and Sexual Activity    Alcohol use: No    Drug use: No    Sexual activity: Not Currently   Other Topics Concern    None   Social History Narrative    None     Social Determinants of Health     Financial Resource Strain: Low Risk     Difficulty of Paying Living Expenses: Not hard at all   Food Insecurity: No Food Insecurity    Worried About Running Out of Food in the Last Year: Never true    Ran Out of Food in the Last Year: Never true   Transportation Needs: No Transportation Needs    Lack of Transportation (Medical): No    Lack of Transportation (Non-Medical): No   Physical Activity:     Days of Exercise per Week:     Minutes of Exercise per Session:    Stress:     Feeling of Stress :    Social Connections:     Frequency of Communication with Friends and Family:     Frequency of Social Gatherings with Friends and Family:     Attends Jewish Services:     Active Member of Clubs or Organizations:     Attends Club or Organization Meetings:     Marital Status:    Intimate Partner Violence:     Fear of Current or Ex-Partner:     Emotionally Abused:     Physically Abused:     Sexually Abused:      Family History   Problem Relation Age of Onset    Colon Cancer Mother     Early Death Father         accident    Diabetes Sister     High Blood Pressure Sister     Diabetes Brother     High Blood Pressure Brother     Diabetes Sister     High Blood Pressure Sister      Current Medications:     Current Outpatient Medications   Medication Sig Dispense Refill    busPIRone (BUSPAR) 10 MG tablet Take 1 tablet by mouth 3 times daily as needed (anxiety) 90 tablet 5    Multiple Vitamins-Minerals (MULTI-JULIO CÉSAR PO) Take by mouth daily      vitamin B-12 (CYANOCOBALAMIN) 100 MCG tablet Take 50 mcg by mouth daily      calcium carbonate (OYSTER SHELL CALCIUM 500 MG) 1250 (500 Ca) MG tablet Take 1 tablet by mouth daily      vitamin D3 (CHOLECALCIFEROL) 25 MCG (1000 UT) TABS tablet Take 1 tablet by mouth daily 90 tablet 1    zinc 50 MG CAPS Take 50 mg by mouth daily 90 capsule 1    Elastic Bandages & Supports (JOBST ULTRASHEER 20-30MMHG LG) MISC Wear daily, ok to remove in the evenings (Patient not taking: Reported on 5/26/2021) 3 each 3     No current facility-administered medications for this visit. Allergies:   Trileptal [oxcarbazepine], Contrast [iodides], Morphine, and Omnipaque [iohexol]    Review of Systems:    Constitutional: No fever or chills. No night sweats, no weight loss. Positive fatigue  Eyes: No eye discharge, double vision, or eye pain   HEENT: negative for sore mouth, sore throat, hoarseness and voice change   Respiratory: negative for cough , sputum, wheezing, hemoptysis, chest pain  Cardiovascular: negative for chest pain, dyspnea, palpitations, orthopnea, PND   Gastrointestinal: negative for nausea, vomiting, diarrhea, constipation, abdominal pain, Dysphagia, hematemesis and hematochezia   Genitourinary: negative for frequency, dysuria, nocturia, urinary incontinence, and hematuria   Gynecological: + menorrhagia with irregular periods  Integument: negative for rash, skin lesions, bruises.    Hematologic/Lymphatic: negative for easy bruising, bleeding, lymphadenopathy, or petechiae   Endocrine: negative for heat or cold intolerance,weight changes, change in bowel habits and hair loss   Musculoskeletal: negative for myalgias, arthralgias, pain, joint swelling,and bone pain   Neurological: negative for headaches, dizziness, seizures, weakness, numbness        Physical Exam:    Vitals: /87   Pulse 84   Temp 96.4 °F (35.8 °C) (Temporal)   Wt 278 lb 1.6 oz (126.1 kg)   BMI 47.74 kg/m²   General appearance - well appearing, no in pain or distress  Mental status - AAO X3  Eyes - pupils equal and reactive, extraocular eye movements intact  Mouth - mucous membranes moist, pharynx normal without lesions  Neck - supple, no significant adenopathy  Lymphatics - no palpable lymphadenopathy, no hepatosplenomegaly  Chest - clear to auscultation, no wheezes, rales or rhonchi, symmetric air entry  Heart - normal rate, regular rhythm, normal S1, S2, no murmurs  Abdomen - soft, nontender, nondistended, no masses or organomegaly  Neurological - alert, oriented, normal speech, no focal findings or movement disorder noted  Extremities - peripheral pulses normal, no pedal edema, no clubbing or cyanosis  Skin - normal coloration and turgor, no rashes, no suspicious skin lesions noted       DATA:      Lab Results   Component Value Date    WBC 8.9 05/13/2021    HGB 12.3 05/13/2021    HCT 37.3 05/13/2021    MCV 79.9 (L) 05/13/2021     05/13/2021     Lab Results   Component Value Date    IRON 94 05/05/2021    TIBC 278 05/05/2021    FERRITIN 178 (H) 05/05/2021       Chemistry        Component Value Date/Time     05/13/2021 1358    K 3.7 05/13/2021 1358     05/13/2021 1358    CO2 25 05/13/2021 1358    BUN 7 05/13/2021 1358    CREATININE 0.68 05/13/2021 1358        Component Value Date/Time    CALCIUM 9.1 05/13/2021 1358    ALKPHOS 86 05/12/2021 2220    AST 22 05/12/2021 2220    ALT 55 (H) 05/12/2021 2220    BILITOT 0.21 (L) 05/12/2021 2220        Lab Results   Component Value Date    GSGAGHEC13 642 03/05/2021         Impression:  Iron deficiency anemia with malabsorption component  HX gastric bypass, 2009  HX scleroderma   Vitamin D deficiency  Fatigue  Elevated CA-125  May Thurner syndrome    Plan:  Reviewed results of lab work-up and other relevant clinical data  Patient received IV iron and tolerated well. Patient had a good response to IV iron with normalization of iron stores as well as normalization of hemoglobin. Patient menorrhagia has stopped now however she was taken off Megace due to concerns regarding possible venous thromboembolism due to May Thurner syndrome  Recommend surveillance of iron stores and CBC. Patient has failed oral iron in the past due to iron malabsorption. Continue surveillance of elevated CA-125. She will be seeing gynecological oncology in couple months with repeat CA-125 and ultrasound of pelvis.   We will see patient back in office in 3 months or sooner if clinically indicated        Rosio Lee MD        This note is created with the assistance of a speech

## 2021-07-19 ENCOUNTER — OFFICE VISIT (OUTPATIENT)
Dept: VASCULAR SURGERY | Age: 46
End: 2021-07-19
Payer: MEDICARE

## 2021-07-19 ENCOUNTER — TELEPHONE (OUTPATIENT)
Dept: GYNECOLOGIC ONCOLOGY | Age: 46
End: 2021-07-19

## 2021-07-19 ENCOUNTER — HOSPITAL ENCOUNTER (OUTPATIENT)
Dept: ULTRASOUND IMAGING | Age: 46
Discharge: HOME | End: 2021-07-21
Payer: MEDICARE

## 2021-07-19 ENCOUNTER — HOSPITAL ENCOUNTER (OUTPATIENT)
Age: 46
Setting detail: SPECIMEN
Discharge: HOME | End: 2021-07-19
Payer: MEDICARE

## 2021-07-19 VITALS
WEIGHT: 289.6 LBS | SYSTOLIC BLOOD PRESSURE: 153 MMHG | HEIGHT: 64 IN | HEART RATE: 88 BPM | DIASTOLIC BLOOD PRESSURE: 103 MMHG | TEMPERATURE: 97.9 F | OXYGEN SATURATION: 96 % | BODY MASS INDEX: 49.44 KG/M2 | RESPIRATION RATE: 18 BRPM

## 2021-07-19 DIAGNOSIS — N84.0 ABNORMAL UTERINE BLEEDING DUE TO ENDOMETRIAL POLYP: ICD-10-CM

## 2021-07-19 DIAGNOSIS — M79.604 PAIN IN BOTH LOWER EXTREMITIES: ICD-10-CM

## 2021-07-19 DIAGNOSIS — M79.89 LEG SWELLING: ICD-10-CM

## 2021-07-19 DIAGNOSIS — I89.0 LYMPHEDEMA OF EXTREMITY: ICD-10-CM

## 2021-07-19 DIAGNOSIS — I87.2 CHRONIC VENOUS INSUFFICIENCY OF LOWER EXTREMITY: Primary | ICD-10-CM

## 2021-07-19 DIAGNOSIS — M79.605 PAIN IN BOTH LOWER EXTREMITIES: ICD-10-CM

## 2021-07-19 DIAGNOSIS — N93.9 ABNORMAL UTERINE BLEEDING DUE TO ENDOMETRIAL POLYP: ICD-10-CM

## 2021-07-19 PROCEDURE — 99214 OFFICE O/P EST MOD 30 MIN: CPT | Performed by: SURGERY

## 2021-07-19 PROCEDURE — G8427 DOCREV CUR MEDS BY ELIG CLIN: HCPCS | Performed by: SURGERY

## 2021-07-19 PROCEDURE — 1036F TOBACCO NON-USER: CPT | Performed by: SURGERY

## 2021-07-19 PROCEDURE — G8417 CALC BMI ABV UP PARAM F/U: HCPCS | Performed by: SURGERY

## 2021-07-19 PROCEDURE — 76856 US EXAM PELVIC COMPLETE: CPT

## 2021-07-19 PROCEDURE — 76830 TRANSVAGINAL US NON-OB: CPT

## 2021-07-19 RX ORDER — DIPHENHYDRAMINE HYDROCHLORIDE 25 MG/1
25 CAPSULE ORAL PRN
COMMUNITY
Start: 2021-04-22 | End: 2021-07-21

## 2021-07-19 NOTE — PROGRESS NOTES
Division of Vascular Surgery        Follow Up      Chief Complaint:      Chronic pain and swelling in lower extremities    History of Present Illness:      Liberty Chandra is a 39 y.o. woman who presents for 6-month follow up regarding chronic swelling and pain in her left leg. It continues to cause her pain and discomfort. She tried compression socks but they would cause too much discomfort. Due to the pain and discomfort she does not walk as much anymore and has become more sedentary. She does not have any open wounds or sores on her legs. She never got to the lymphedema clinic for evaluation. (20) Candance Fore is a 39 y.o. woman who presents with chronic swelling and pain in her left leg for over 20 years. She denies history of DVT and has had multiple venous duplex for evaluation. Pain is worse when she has been on her feet for long period of time. Denies any trauma to her lower extremities. She was worked up by Dr. Qamar Singh for May Thurner Syndrome and even underwent venogram which she was told only had about 60% narrowing so a stent was not placed, this was a year or two ago. She has difficulty wearing compression stockings due to tightness around her ankle. She has good and bad days based on how swollen her legs are. She noticed increased swelling when she was pregnant and each time it got worse.     Medical History:     Past Medical History:   Diagnosis Date    Anemia     Anxiety     Carpal tunnel syndrome     Depression     Endometriosis     Gestational diabetes     x 3 children    Headache     migraines    History of blood transfusion     Iron deficiency anemia     Kidney stones     May-Thurner syndrome     Obesity     Scleroderma ([address]        Surgical History:     Past Surgical History:   Procedure Laterality Date     SECTION  x 3    CHOLECYSTECTOMY      DILATION AND CURETTAGE OF UTERUS N/A 2021     DILATATION AND CURETTAGE HYSTEROSCOPY MYOSURE (N/A )    DILATION AND CURETTAGE OF UTERUS N/A 5/12/2021    DILATATION AND CURETTAGE HYSTEROSCOPY Brenton Leonard performed by Ian Mock MD at [address] [place] [address]  2009    OTHER SURGICAL HISTORY      growth removed from outer area of uterus    PELVIC LAPAROSCOPY      TOENAIL EXCISION      VASCULAR SURGERY  2016    to evaluate May Rader syndrome effects on  blood vessels       Family History:     Family History   Problem Relation Age of Onset    Colon Cancer Mother     Early Death Father         accident    Diabetes Sister     High Blood Pressure Sister     Diabetes Brother     High Blood Pressure Brother     Diabetes Sister     High Blood Pressure Sister        Allergies:       Trileptal [oxcarbazepine], Contrast [iodides], Morphine, and Omnipaque [iohexol]    Medications:      Current Outpatient Medications   Medication Sig Dispense Refill    BANOPHEN 25 MG capsule Take 25 mg by mouth as needed      busPIRone (BUSPAR) 10 MG tablet Take 1 tablet by mouth 3 times daily as needed (anxiety) 90 tablet 5    Multiple Vitamins-Minerals (MULTI-JULIO CÉSAR PO) Take by mouth daily      vitamin B-12 (CYANOCOBALAMIN) 100 MCG tablet Take 50 mcg by mouth daily      calcium carbonate (OYSTER SHELL CALCIUM 500 MG) 1250 (500 Ca) MG tablet Take 1 tablet by mouth daily      vitamin D3 (CHOLECALCIFEROL) 25 MCG (1000 UT) TABS tablet Take 1 tablet by mouth daily 90 tablet 1    zinc 50 MG CAPS Take 50 mg by mouth daily 90 capsule 1    Elastic Bandages & Supports (JOBST ULTRASHEER 20-30MMHG LG) MISC Wear daily, ok to remove in the evenings (Patient not taking: Reported on 5/26/2021) 3 each 3     No current facility-administered medications for this visit. Social History:     Tobacco:    reports that she has never smoked. She has never used smokeless tobacco.  Alcohol:      reports no history of alcohol use. Drug Use:  reports no history of drug use.     Review of Systems:     Review of Systems refill. Swelling present. No tenderness. Feet:      Right foot:      Skin integrity: No ulcer or skin breakdown. Left foot:      Skin integrity: No ulcer or skin breakdown. Skin:     General: Skin is warm. Capillary Refill: Capillary refill takes less than 2 seconds. Neurological:      Mental Status: She is alert and oriented to person, place, and time. GCS: GCS eye subscore is 4. GCS verbal subscore is 5. GCS motor subscore is 6. Sensory: Sensation is intact. Motor: Motor function is intact. Psychiatric:         Mood and Affect: Mood normal.         Speech: Speech normal.         Behavior: Behavior normal.         Thought Content: Thought content normal.        Imaging/Labs:     none    Assessment and Plan:     Chronic left lower extremity swelling with pain, venous insufficiency, lymphedema  · Will make referral to lymphedema clinic again for evaluation  · She would be good candidate for lymphedema pumps  Patient has tried and failed 4 weeks of conservative treatments including elevation, compression, and exercise and significant symptoms still remain. Patient has proximal swelling leading into the groin and abdomen area. A basic pump could exacerbate symptoms and cause an increase in proximal swelling. I am recommending this patient receive a flexitouch to address truncal swelling and safely and efficiently move lymphatic fluid. · She will follow up in 6 months to see her progress      Laquey Abdiel was evaluated today and a DME order was entered for a motorized scooter because she requires this to successfully complete daily living tasks of ambulating. A motorized scooter is necessary due to the patient's impaired ambulation and mobility restrictions. The patient would not be able to resolve these daily living tasks using a cane or walker. The patient can self-propel safely in their home and can maneuver within their home with adequate access.   The patient cannot self-propel in a standard wheelchair. The need for this equipment was discussed with the patient and she understands, is in agreement, and has not expressed an unwillingness to use the scooter. Electronically signed by Chela Gray MD on 7/19/21 at 11:37 AM [address]: [phone]  C: [phone]  Email: [email or web address]

## 2021-07-20 LAB — CA 125: 36 U/ML

## 2021-07-21 ENCOUNTER — OFFICE VISIT (OUTPATIENT)
Dept: GYNECOLOGIC ONCOLOGY | Age: 46
End: 2021-07-21
Payer: MEDICARE

## 2021-07-21 VITALS
SYSTOLIC BLOOD PRESSURE: 150 MMHG | HEART RATE: 74 BPM | OXYGEN SATURATION: 96 % | HEIGHT: 64 IN | WEIGHT: 287.8 LBS | BODY MASS INDEX: 49.13 KG/M2 | DIASTOLIC BLOOD PRESSURE: 95 MMHG

## 2021-07-21 DIAGNOSIS — N84.0 ABNORMAL UTERINE BLEEDING DUE TO ENDOMETRIAL POLYP: ICD-10-CM

## 2021-07-21 DIAGNOSIS — N93.9 ABNORMAL UTERINE BLEEDING DUE TO ENDOMETRIAL POLYP: ICD-10-CM

## 2021-07-21 DIAGNOSIS — R93.89 THICKENED ENDOMETRIUM: Primary | ICD-10-CM

## 2021-07-21 PROCEDURE — 99213 OFFICE O/P EST LOW 20 MIN: CPT | Performed by: PHYSICIAN ASSISTANT

## 2021-07-21 PROCEDURE — G8427 DOCREV CUR MEDS BY ELIG CLIN: HCPCS | Performed by: PHYSICIAN ASSISTANT

## 2021-07-21 PROCEDURE — 1036F TOBACCO NON-USER: CPT | Performed by: PHYSICIAN ASSISTANT

## 2021-07-21 PROCEDURE — G8417 CALC BMI ABV UP PARAM F/U: HCPCS | Performed by: PHYSICIAN ASSISTANT

## 2021-07-21 ASSESSMENT — ENCOUNTER SYMPTOMS
NAUSEA: 1
ABDOMINAL DISTENTION: 1
BACK PAIN: 1

## 2021-07-21 NOTE — PROGRESS NOTES
[address] [facility], [facility], [id number] Gissell Tomlinson is a 39 y.o. female who presents for a follow up for thickened endometrium, elevated     Chief Complaint: Thickened endometrium, history of elevated     HPI: She is a G3, P3 (3  section) female who has a longstanding history of menorrhagia and irregular cycles. She has followed with hematology in the past due to chronic iron deficiency anemia with malabsorption component. A pelvic ultrasound revealed a thickened endometrial stripe and a right ovarian cyst.  She was scheduled to undergo a hysteroscopy D&C however tumor markers were drawn and an elevated CA-125 at 42 units, elevated alpha-fetoprotein at 9.7, and a slightly elevated LDH at 247. She was placed on Megace therapy at this time. She was then referred to [facility] gynecologic oncology for further evaluation and treatment. She was seen by [facility] gynecologic oncology on 2021 and attempt was made at an office endometrial biopsy at this time. However pathology later returned no endometrial tissue identified. She disclosed a family history is \"full of cancers\". She states her mother is 1 of 12 siblings, and that all females had hysterectomy for \"precancerous cells\" or uterine malignancy. She states 2 of her biological sisters have also had a hysterectomy. Repeat pelvic ultrasound on 2021 revealed uterus measuring 12.2 x 6.8 x 7.7 cm. Endometrial stripe with continued thickened at 25.6 mm. Right ovary was within normal limits, with no cyst or masses. The left ovary was not visualized however no adnexal masses. No evidence of free fluid. Repeat tumor markers obtained on 5/3/2021 revealed the alpha-fetoprotein and LDH is now within normal limits. Her pre operative CA-125 was elevated and is now 58 units.     She was asked to come off her Megace therapy as the patient has a history of vascular disorder that may increase her risk of blood clot formation. Discussed was held regarding further work up and it was advised that patient undergo a hysteroscopy and D&C. She was taken to the operating room for a hysteroscopy D&C on 5/12/2021. Intraoperatively, upon placement of operative scope a large mass filling the entire upper portion of the uterine cavity with a large sessile base. Appearance was smooth with easy indentation. The Myosure device was unable to resect the lesion due to size and therefore a sharp curettage was performed. The final pathology later returned benign endometrial polyps. She did well post operatively and discussion on 5/19/21 was to follow up with patient closely with repeat pelvic ultrasound and  antigen level. Most recent  is top normal limits at 36 units in 7/19/21. Her pelvic ultrasound on 7/19/21 reveals uterus measuring 13.6 x 4.5 x 6.7 cm. The endometrial stipe is mildly enlarged at 2 cm. Bilateral ovaries non visualized. She continues to follow with hematology Dr. Adonis Peck and was most recently seen on 5/26/21. She is due for repeat labs including CBC next month. Today, she is doing well. She states she had two days of light vaginal spotting two weeks ago. She denies any additional vaginal bleeding. She has no pelvic pain. She has no chest pain or shortness of breath. She is continuing the work up for her chronic left leg lymphedema. Review of Systems  I have personally reviewed and agree with the review of the systems done by my ancillary staff in the [facility] documentation. Objective:  Vitals:    07/21/21 1010 07/21/21 1034   BP: (!) 137/98 (!) 150/95   Pulse: 74    SpO2: 96%    Weight: 287 lb 12.8 oz (130.5 kg)    Height: 5' 4\" (1.626 m)        Physical Exam   General:  Well appearing, alert and oriented x 3, No acute distress. Heart: Auscultation of heart revels a regular rate no murmur, gallops, gallops or rubs.     Lungs:  Clear bilaterally to auscultation to the bases. Abdomen: Non tender to palpation     Lab Results   Component Value Date     36 07/19/2021     58 05/03/2021     42 03/19/2021     A discussion was held with the patient regarding her recent pelvic ultrasound which shows stability and slight decreased in endometrial lining since her D&C. Her  has now returned to normal. We discussed that her has had minimal bleeding since her procedure. We then discussed importance of continued follow up and role for treatment if patient is to develop heavy uterine bleeding or changes occur on transvaginal ultrasound and . Options for management including repeat D&C with resectoscope, versus D&C with IUD placement, versus endometrial ablation versus the most definitive management of hysterectomy. At this point in time as she is asymptomatic and her recent imaging and lab work are stable, we will plan for surveillance at this time. She is agreeable to plan. Assessment/Plan:  1. Endometrial polyps, benign. S/p D&C     2. Hx elevated , most recent value is within normal    3. Hx chronic iron deficiency anemia, will follow up on labs with hematology next month    Her Surveillance plan is as follows:   1. Obtain pelvic ultrasound and  in 3 1/2 months    2. Return to clinic after imaging to discuss results    3. She has been asked to call or return to clinic sooner if heavy uterine bleeding occurs     I spent 20 minutes providing care to the patient and >50% of the time was spent counseling and coordinating care, discussing the patient's current situation, reviewing her options, counseling and education her and answering her questions. The patient's pertinent images, labs, and previous records and procedures were reviewed.     Electronically signed by Jason Zarco PA-C on 7/21/21 at 9:45 AM DUARTE

## 2021-07-21 NOTE — PROGRESS NOTES
Review of Systems   Constitutional: Positive for appetite change (2-3 bites then she has nausea x 2 weeks) and fatigue. Cardiovascular: Positive for leg swelling (left worse than right ). Gastrointestinal: Positive for abdominal distention and nausea. Endocrine: Positive for hot flashes. Genitourinary: Positive for bladder incontinence and frequency. Musculoskeletal: Positive for back pain. Neurological: Positive for dizziness (sometimes).

## 2021-07-21 NOTE — PATIENT INSTRUCTIONS
Obtain pelvic and transvaginal ultrasound with  in 3 1/2 months    Follow up in clinic in 4 months to discuss results    Call or return to clinic sooner with any questions or concerns or onset of heavy uterine bleeding

## 2021-07-25 ASSESSMENT — ENCOUNTER SYMPTOMS
SHORTNESS OF BREATH: 0
CHEST TIGHTNESS: 0
COLOR CHANGE: 0
ABDOMINAL PAIN: 0
ALLERGIC/IMMUNOLOGIC NEGATIVE: 1

## 2021-08-23 ENCOUNTER — TELEPHONE (OUTPATIENT)
Dept: ONCOLOGY | Age: 46
End: 2021-08-23

## 2021-08-23 DIAGNOSIS — D50.8 OTHER IRON DEFICIENCY ANEMIA: Primary | ICD-10-CM

## 2021-08-23 NOTE — TELEPHONE ENCOUNTER
Left message asking patient to get labs done for her appointment with Navin. Did let her know if she has questions to call the office.

## 2021-08-24 ENCOUNTER — HOSPITAL ENCOUNTER (OUTPATIENT)
Age: 46
Discharge: HOME | End: 2021-08-24
Payer: MEDICARE

## 2021-08-24 DIAGNOSIS — D50.8 OTHER IRON DEFICIENCY ANEMIA: ICD-10-CM

## 2021-08-24 LAB
ABSOLUTE EOS #: 0.11 K/UL (ref 0–0.44)
ABSOLUTE IMMATURE GRANULOCYTE: <0.03 K/UL (ref 0–0.3)
ABSOLUTE LYMPH #: 2.08 K/UL (ref 1.1–3.7)
ABSOLUTE MONO #: 0.73 K/UL (ref 0.1–1.2)
ANION GAP SERPL CALCULATED.3IONS-SCNC: 14 MMOL/L (ref 9–17)
BASOPHILS # BLD: 1 % (ref 0–2)
BASOPHILS ABSOLUTE: 0.06 K/UL (ref 0–0.2)
BUN BLDV-MCNC: 12 MG/DL (ref 6–20)
BUN/CREAT BLD: NORMAL (ref 9–20)
CALCIUM SERPL-MCNC: 9.2 MG/DL (ref 8.6–10.4)
CHLORIDE BLD-SCNC: 105 MMOL/L (ref 98–107)
CO2: 22 MMOL/L (ref 20–31)
CREAT SERPL-MCNC: 0.67 MG/DL (ref 0.5–0.9)
DIFFERENTIAL TYPE: NORMAL
EOSINOPHILS RELATIVE PERCENT: 1 % (ref 1–4)
FERRITIN: 22 UG/L (ref 13–150)
FOLATE: 11.5 NG/ML
GFR AFRICAN AMERICAN: >60 ML/MIN
GFR NON-AFRICAN AMERICAN: >60 ML/MIN
GFR SERPL CREATININE-BSD FRML MDRD: NORMAL ML/MIN/{1.73_M2}
GFR SERPL CREATININE-BSD FRML MDRD: NORMAL ML/MIN/{1.73_M2}
GLUCOSE BLD-MCNC: 95 MG/DL (ref 70–99)
HCT VFR BLD CALC: 46.2 % (ref 36.3–47.1)
HEMOGLOBIN: 13.7 G/DL (ref 11.9–15.1)
IMMATURE GRANULOCYTES: 0 %
IRON SATURATION: 11 % (ref 20–55)
IRON: 36 UG/DL (ref 37–145)
LYMPHOCYTES # BLD: 27 % (ref 24–43)
MCH RBC QN AUTO: 27.7 PG (ref 25.2–33.5)
MCHC RBC AUTO-ENTMCNC: 29.7 G/DL (ref 28.4–34.8)
MCV RBC AUTO: 93.3 FL (ref 82.6–102.9)
MONOCYTES # BLD: 9 % (ref 3–12)
NRBC AUTOMATED: 0 PER 100 WBC
PDW BLD-RTO: 13.2 % (ref 11.8–14.4)
PLATELET # BLD: 215 K/UL (ref 138–453)
PLATELET ESTIMATE: NORMAL
PMV BLD AUTO: 12.6 FL (ref 8.1–13.5)
POTASSIUM SERPL-SCNC: 4.8 MMOL/L (ref 3.7–5.3)
RBC # BLD: 4.95 M/UL (ref 3.95–5.11)
RBC # BLD: NORMAL 10*6/UL
SEG NEUTROPHILS: 62 % (ref 36–65)
SEGMENTED NEUTROPHILS ABSOLUTE COUNT: 4.78 K/UL (ref 1.5–8.1)
SODIUM BLD-SCNC: 141 MMOL/L (ref 135–144)
TOTAL IRON BINDING CAPACITY: 342 UG/DL (ref 250–450)
UNSATURATED IRON BINDING CAPACITY: 306 UG/DL (ref 112–347)
VITAMIN B-12: 468 PG/ML (ref 232–1245)
WBC # BLD: 7.8 K/UL (ref 3.5–11.3)
WBC # BLD: NORMAL 10*3/UL

## 2021-08-24 PROCEDURE — 82607 VITAMIN B-12: CPT

## 2021-08-24 PROCEDURE — 36415 COLL VENOUS BLD VENIPUNCTURE: CPT

## 2021-08-24 PROCEDURE — 82728 ASSAY OF FERRITIN: CPT

## 2021-08-24 PROCEDURE — 80048 BASIC METABOLIC PNL TOTAL CA: CPT

## 2021-08-24 PROCEDURE — 82746 ASSAY OF FOLIC ACID SERUM: CPT

## 2021-08-24 PROCEDURE — 85025 COMPLETE CBC W/AUTO DIFF WBC: CPT

## 2021-08-24 PROCEDURE — 83550 IRON BINDING TEST: CPT

## 2021-08-24 PROCEDURE — 83540 ASSAY OF IRON: CPT

## 2021-08-25 ENCOUNTER — TELEPHONE (OUTPATIENT)
Dept: ONCOLOGY | Age: 46
End: 2021-08-25

## 2021-08-25 ENCOUNTER — HOSPITAL ENCOUNTER (OUTPATIENT)
Age: 46
Discharge: HOME | End: 2021-08-25
Payer: MEDICARE

## 2021-08-25 ENCOUNTER — TELEPHONE (OUTPATIENT)
Dept: GYNECOLOGIC ONCOLOGY | Age: 46
End: 2021-08-25

## 2021-08-25 ENCOUNTER — OFFICE VISIT (OUTPATIENT)
Dept: ONCOLOGY | Age: 46
End: 2021-08-25
Payer: MEDICARE

## 2021-08-25 VITALS
TEMPERATURE: 95.5 F | DIASTOLIC BLOOD PRESSURE: 83 MMHG | HEART RATE: 81 BPM | WEIGHT: 289.4 LBS | BODY MASS INDEX: 49.68 KG/M2 | SYSTOLIC BLOOD PRESSURE: 138 MMHG

## 2021-08-25 DIAGNOSIS — K90.9 IRON MALABSORPTION: ICD-10-CM

## 2021-08-25 DIAGNOSIS — D50.8 OTHER IRON DEFICIENCY ANEMIA: Primary | ICD-10-CM

## 2021-08-25 DIAGNOSIS — D50.8 OTHER IRON DEFICIENCY ANEMIA: ICD-10-CM

## 2021-08-25 DIAGNOSIS — N92.1 MENORRHAGIA WITH IRREGULAR CYCLE: Primary | ICD-10-CM

## 2021-08-25 DIAGNOSIS — R97.1 ELEVATED CA-125: ICD-10-CM

## 2021-08-25 LAB
ABSOLUTE EOS #: 0.1 K/UL (ref 0–0.4)
ABSOLUTE IMMATURE GRANULOCYTE: NORMAL K/UL (ref 0–0.3)
ABSOLUTE LYMPH #: 1.9 K/UL (ref 1–4.8)
ABSOLUTE MONO #: 0.5 K/UL (ref 0.1–1.2)
BASOPHILS # BLD: 1 % (ref 0–2)
BASOPHILS ABSOLUTE: 0.1 K/UL (ref 0–0.2)
DIFFERENTIAL TYPE: NORMAL
EOSINOPHILS RELATIVE PERCENT: 2 % (ref 1–4)
HCT VFR BLD CALC: 40.4 % (ref 36–46)
HEMOGLOBIN: 13.1 G/DL (ref 12–16)
IMMATURE GRANULOCYTES: NORMAL %
LYMPHOCYTES # BLD: 33 % (ref 24–44)
MCH RBC QN AUTO: 28.1 PG (ref 26–34)
MCHC RBC AUTO-ENTMCNC: 32.5 G/DL (ref 31–37)
MCV RBC AUTO: 86.3 FL (ref 80–100)
MONOCYTES # BLD: 9 % (ref 2–11)
NRBC AUTOMATED: NORMAL PER 100 WBC
PDW BLD-RTO: 13.9 % (ref 12.5–15.4)
PLATELET # BLD: 198 K/UL (ref 140–450)
PLATELET ESTIMATE: NORMAL
PMV BLD AUTO: 9.6 FL (ref 6–12)
RBC # BLD: 4.68 M/UL (ref 4–5.2)
RBC # BLD: NORMAL 10*6/UL
SEG NEUTROPHILS: 55 % (ref 36–66)
SEGMENTED NEUTROPHILS ABSOLUTE COUNT: 3.1 K/UL (ref 1.8–7.7)
WBC # BLD: 5.6 K/UL (ref 3.5–11)
WBC # BLD: NORMAL 10*3/UL

## 2021-08-25 PROCEDURE — 36415 COLL VENOUS BLD VENIPUNCTURE: CPT

## 2021-08-25 PROCEDURE — 85025 COMPLETE CBC W/AUTO DIFF WBC: CPT

## 2021-08-25 PROCEDURE — 99211 OFF/OP EST MAY X REQ PHY/QHP: CPT | Performed by: INTERNAL MEDICINE

## 2021-08-25 PROCEDURE — 99214 OFFICE O/P EST MOD 30 MIN: CPT | Performed by: INTERNAL MEDICINE

## 2021-08-25 PROCEDURE — G8417 CALC BMI ABV UP PARAM F/U: HCPCS | Performed by: INTERNAL MEDICINE

## 2021-08-25 PROCEDURE — 1036F TOBACCO NON-USER: CPT | Performed by: INTERNAL MEDICINE

## 2021-08-25 PROCEDURE — G8427 DOCREV CUR MEDS BY ELIG CLIN: HCPCS | Performed by: INTERNAL MEDICINE

## 2021-08-25 RX ORDER — FERROUS SULFATE 325(65) MG
325 TABLET ORAL
Qty: 90 TABLET | Refills: 1 | Status: SHIPPED | OUTPATIENT
Start: 2021-08-25 | End: 2021-09-14 | Stop reason: ALTCHOICE

## 2021-08-25 RX ORDER — MEDROXYPROGESTERONE ACETATE 10 MG/1
20 TABLET ORAL DAILY
Qty: 14 TABLET | Refills: 0 | Status: SHIPPED | OUTPATIENT
Start: 2021-08-25 | End: 2021-09-13 | Stop reason: DRUGHIGH

## 2021-08-25 RX ORDER — UBIDECARENONE 75 MG
50 CAPSULE ORAL DAILY
Qty: 30 TABLET | Status: CANCELLED | OUTPATIENT
Start: 2021-08-25

## 2021-08-25 NOTE — PROGRESS NOTES
Maris Tomlinson                                                                                                                  8/25/2021  MRN:   [id number]  YOB: 1975  PCP:                           JUSTICE Jiménez CNP  Referring Physician: No ref. provider found  Treating Physician Name: Shmuel Fulton MD      Reason for visit:  Chief Complaint   Patient presents with    Follow-up     review status of disease    Discuss Labs    Other     Period are back to being very heavy        Current problems:  Iron deficiency anemia with malabsorption component     Active and recent treatments:  Parenteral iron3/2021    Summary of Case/History:    Maris Tomlinson a 39 y. o.female is a patient with anemia. She has history of anemia and was managed previously by Dr. Missy Lewis with IV iron. Her most recent lab work shows recurrent iron deficiency anemia and she reports she historically received monthly B12 injections and IV iron every 3-6 months. She has history of scleroderma and gastric by-pass, done in 2009, contributing to iron malabsorption. She reports she has had zinc and D deficiencies, she does not have any bariatric vitamins. She is very symptomatic with dizziness, cold intolerance and feeling very fatigued. She denies any bleeding. She has history of menorrhagia with irregular periods, she had benign growth removed from outside of the uterus in 2012. She has family history of uterine fibroids and cancer. Interim History:     Patient presents to the clinic for follow-up visit and to discuss results of her lab work-up and other relevant clinical data. Patient is complaining of being tired. Also complains of having heavy menstrual bleeding. She will be following up with her gynecologist to discuss further treatment of the uterine fibroids. Patient CA-125 is now down to 36. Fatigue is continuous generalized.     During this visit patient's allergy, social, medical, surgical history and medications were reviewed and updated.     Past Medical History:   Past Medical History:   Diagnosis Date    Anemia     Anxiety     Carpal tunnel syndrome     Depression     Endometriosis     Gestational diabetes     x 3 children    Headache     migraines    History of blood transfusion     Iron deficiency anemia     Kidney stones     May-Thurner syndrome     Obesity     Scleroderma ([facility] [address]        Past Surgical History:     Past Surgical History:   Procedure Laterality Date     SECTION  x 3    CHOLECYSTECTOMY      DILATION AND CURETTAGE OF UTERUS N/A 2021     DILATATION AND CURETTAGE HYSTEROSCOPY MYOSURE (N/A )    DILATION AND CURETTAGE OF UTERUS N/A 2021    DILATATION AND CURETTAGE HYSTEROSCOPY Cammy Newfarhad performed by Anita Florentino MD at [address] [place] [address]      OTHER SURGICAL HISTORY      growth removed from outer area of uterus    PELVIC LAPAROSCOPY      TOENAIL EXCISION      VASCULAR SURGERY  2016    to evaluate May Rader syndrome effects on  blood vessels       Patient Family Social History:     Social History     Socioeconomic History    Marital status: Single     Spouse name: None    Number of children: None    Years of education: None    Highest education level: None   Occupational History    None   Tobacco Use    Smoking status: Never Smoker    Smokeless tobacco: Never Used   Vaping Use    Vaping Use: Never used   Substance and Sexual Activity    Alcohol use: No    Drug use: No    Sexual activity: Not Currently   Other Topics Concern    None   Social History Narrative    None     Social Determinants of Health     Financial Resource Strain: Low Risk     Difficulty of Paying Living Expenses: Not hard at all   Food Insecurity: No Food Insecurity    Worried About Running Out of Food in the Last Year: Never true    Blanco of Food in the Last Year: Never true   Transportation Needs: No Transportation Needs    Lack of Transportation (Medical): No    Lack of Transportation (Non-Medical): No   Physical Activity:     Days of Exercise per Week:     Minutes of Exercise per Session:    Stress:     Feeling of Stress :    Social Connections:     Frequency of Communication with Friends and Family:     Frequency of Social Gatherings with Friends and Family:     Attends Baptism Services:     Active Member of Clubs or Organizations:     Attends Club or Organization Meetings:     Marital Status:    Intimate Partner Violence:     Fear of Current or Ex-Partner:     Emotionally Abused:     Physically Abused:     Sexually Abused:      Family History   Problem Relation Age of Onset    Colon Cancer Mother     Early Death Father         accident    Diabetes Sister     High Blood Pressure Sister     Diabetes Brother     High Blood Pressure Brother     Diabetes Sister     High Blood Pressure Sister      Current Medications:     Current Outpatient Medications   Medication Sig Dispense Refill    busPIRone (BUSPAR) 10 MG tablet Take 1 tablet by mouth 3 times daily as needed (anxiety) 90 tablet 5    Multiple Vitamins-Minerals (MULTI-JULIO CÉSAR PO) Take by mouth daily      vitamin B-12 (CYANOCOBALAMIN) 100 MCG tablet Take 50 mcg by mouth daily      calcium carbonate (OYSTER SHELL CALCIUM 500 MG) 1250 (500 Ca) MG tablet Take 1 tablet by mouth daily      vitamin D3 (CHOLECALCIFEROL) 25 MCG (1000 UT) TABS tablet Take 1 tablet by mouth daily 90 tablet 1    zinc 50 MG CAPS Take 50 mg by mouth daily 90 capsule 1    Elastic Bandages & Supports (JOBST ULTRASHEER 20-30MMHG LG) MISC Wear daily, ok to remove in the evenings (Patient not taking: Reported on 5/26/2021) 3 each 3     No current facility-administered medications for this visit. Allergies:   Trileptal [oxcarbazepine], Contrast [iodides], Morphine, and Omnipaque [iohexol]    Review of Systems:    Constitutional: No fever or chills. No night sweats, no weight loss.   Positive fatigue  Eyes: No eye discharge, double vision, or eye pain   HEENT: negative for sore mouth, sore throat, hoarseness and voice change   Respiratory: negative for cough , sputum, wheezing, hemoptysis, chest pain  Cardiovascular: negative for chest pain, dyspnea, palpitations, orthopnea, PND   Gastrointestinal: negative for nausea, vomiting, diarrhea, constipation, abdominal pain, Dysphagia, hematemesis and hematochezia   Genitourinary: negative for frequency, dysuria, nocturia, urinary incontinence, and hematuria   Gynecological: + menorrhagia with irregular periods  Integument: negative for rash, skin lesions, bruises.    Hematologic/Lymphatic: negative for easy bruising, bleeding, lymphadenopathy, or petechiae   Endocrine: negative for heat or cold intolerance,weight changes, change in bowel habits and hair loss   Musculoskeletal: negative for myalgias, arthralgias, pain, joint swelling,and bone pain   Neurological: negative for headaches, dizziness, seizures, weakness, numbness        Physical Exam:    Vitals: /83   Pulse 81   Temp 95.5 °F (35.3 °C) (Temporal)   Wt 289 lb 6.4 oz (131.3 kg)   BMI 49.68 kg/m²   General appearance - well appearing, no in pain or distress  Mental status - AAO X3  Eyes - pupils equal and reactive, extraocular eye movements intact  Mouth - mucous membranes moist, pharynx normal without lesions  Neck - supple, no significant adenopathy  Lymphatics - no palpable lymphadenopathy, no hepatosplenomegaly  Chest - clear to auscultation, no wheezes, rales or rhonchi, symmetric air entry  Heart - normal rate, regular rhythm, normal S1, S2, no murmurs  Abdomen - soft, nontender, nondistended, no masses or organomegaly  Neurological - alert, oriented, normal speech, no focal findings or movement disorder noted  Extremities - peripheral pulses normal, no pedal edema, no clubbing or cyanosis  Skin - normal coloration and turgor, no rashes, no suspicious skin lesions noted DATA:      Lab Results   Component Value Date    WBC 7.8 08/24/2021    HGB 13.7 08/24/2021    HCT 46.2 08/24/2021    MCV 93.3 08/24/2021     08/24/2021     Lab Results   Component Value Date    IRON 36 (L) 08/24/2021    TIBC 342 08/24/2021    FERRITIN 22 08/24/2021       Chemistry        Component Value Date/Time     08/24/2021 1701    K 4.8 08/24/2021 1701     08/24/2021 1701    CO2 22 08/24/2021 1701    BUN 12 08/24/2021 1701    CREATININE 0.67 08/24/2021 1701        Component Value Date/Time    CALCIUM 9.2 08/24/2021 1701    ALKPHOS 86 05/12/2021 2220    AST 22 05/12/2021 2220    ALT 55 (H) 05/12/2021 2220    BILITOT 0.21 (L) 05/12/2021 2220        Lab Results   Component Value Date    ECZXSNQZ86 189 08/24/2021         Impression:  Iron deficiency anemia with malabsorption component  HX gastric bypass, 2009  HX scleroderma   Vitamin D deficiency  Fatigue  Elevated CA-125  May Thurner syndrome    Plan:  Reviewed results of lab work-up and other relevant clinical data  Patient is now having menorrhagia. I will reach out to patient's gynecologist  Iron saturation is low. Ferritin is only 22. Patient is symptomatic. I believe she will benefit from IV iron  Patient was earlier taken off Megace due to concern regarding high risk of venous thromboembolism due to May Thurner syndrome  Continue surveillance with periodic checking of CA-125 which is now getting better  We will see patient back in office in 2 to 3 months to assess response to IV iron. Hopefully with correction of menorrhagia patient iron deficiency anemia will improve  Patient multiple questions which answered to the best my ability. Addendum:    Patient CBC was checked per patient request due to having severe fatigue and heavy menstrual bleeding. Hemoglobin is stable at 13.1.   Patient's case was also discussed with the phone with López Neely from gynecologic oncology      Flora Clayton MD        This note is created with the assistance of a speech recognition program.  While intending to generate a document that actually reflects the content of the visit, the document can still have some errors including those of syntax and sound a like substitutions which may escape proof reading. It such instances, actual meaning can be extrapolated by contextual diversion.

## 2021-08-25 NOTE — TELEPHONE ENCOUNTER
AVS from 8/25/21     Cbc now      injectafer as soon approved      rv   In 2 months      Labs couple of days before rv  Cbc cmp iron studies and b12    Cbc drawn today    AVS given to  to follow precert    RV scheduled 10/27/21 @ 11am    PT will have labs drawn one week prior to return visit    PT was given AVS and an appt schedule    Electronically signed by José Manuel Moss on 8/25/2021 at 12:37 PM

## 2021-08-25 NOTE — TELEPHONE ENCOUNTER
Patient experiencing heavy bleeding. Menstrual cycle started on Sunday. Today she has saturated four \"super\" tampons in an hour. She's also had clots with this menstrual cycle. One clot was longer than the tampon and pushed the tampon out. Just saw Dr. Kenroy Sawyer and he states she is anemic again; he's ordering IV and oral iron for her.

## 2021-08-25 NOTE — PATIENT INSTRUCTIONS
Cbc now     injectafer as soon approved     rv   In 2 months     Labs couple of days before rv  Cbc cmp iron studies and b12

## 2021-08-25 NOTE — TELEPHONE ENCOUNTER
Called and spoke with patient to discuss her concerns. She states her heavy menstrual bleeding which started this morning. We reviewed her recent lab work from yesterday and today which is her hemoglobin stable at 13 units. She is planned to have IV iron therapy for her chronic iron deficiency anemia. Advised patient we would like to send short course of Provera medication for acute uterine bleeding. We also will have follow-up in 1 week appointment to discuss surgical intervention. Also discussed with the patient that if she is to continue to have acute heavy bleeding through this medication that she should present to the local ED for evaluation. She voiced understanding.

## 2021-08-27 ENCOUNTER — TELEPHONE (OUTPATIENT)
Dept: INFUSION THERAPY | Age: 46
End: 2021-08-27

## 2021-08-27 RX ORDER — MEDROXYPROGESTERONE ACETATE 10 MG/1
20 TABLET ORAL 3 TIMES DAILY
Qty: 30 TABLET | Refills: 0 | Status: SHIPPED | OUTPATIENT
Start: 2021-08-27 | End: 2021-09-13

## 2021-08-27 NOTE — PROGRESS NOTES
Called to check in on patient and see how she is doing since she started the Provera. She states she is taking the Provera 2 tablets once a day and she states her bleeding has begun to lessen however she is still passing clots. Reviewed prescription and it appears the medication should be written for 2 tablets 3 times daily as indicated for acute abnormal uterine bleeding. Will write for new prescription so patient has adequate amount of medications in order to take 20 mg 3 times daily for 7 days. She has follow-up in clinic on 9/1/2021.

## 2021-08-27 NOTE — TELEPHONE ENCOUNTER
Notified by pre-cert Injectafer denied d/t hgb=13.1. Will notify Dr. Magui Parra. F/u appt with repeat labs scheduled 10/27/21.

## 2021-09-01 ENCOUNTER — OFFICE VISIT (OUTPATIENT)
Dept: GYNECOLOGIC ONCOLOGY | Age: 46
End: 2021-09-01
Payer: MEDICARE

## 2021-09-01 VITALS
WEIGHT: 290.4 LBS | SYSTOLIC BLOOD PRESSURE: 133 MMHG | HEIGHT: 64 IN | BODY MASS INDEX: 49.58 KG/M2 | OXYGEN SATURATION: 98 % | HEART RATE: 78 BPM | DIASTOLIC BLOOD PRESSURE: 89 MMHG

## 2021-09-01 DIAGNOSIS — N92.1 MENORRHAGIA WITH IRREGULAR CYCLE: Primary | ICD-10-CM

## 2021-09-01 DIAGNOSIS — N85.9 LESION OF ENDOMETRIUM: ICD-10-CM

## 2021-09-01 DIAGNOSIS — D50.0 IRON DEFICIENCY ANEMIA DUE TO CHRONIC BLOOD LOSS: ICD-10-CM

## 2021-09-01 PROCEDURE — G8417 CALC BMI ABV UP PARAM F/U: HCPCS | Performed by: PHYSICIAN ASSISTANT

## 2021-09-01 PROCEDURE — 99214 OFFICE O/P EST MOD 30 MIN: CPT | Performed by: PHYSICIAN ASSISTANT

## 2021-09-01 PROCEDURE — 1036F TOBACCO NON-USER: CPT | Performed by: PHYSICIAN ASSISTANT

## 2021-09-01 PROCEDURE — G8427 DOCREV CUR MEDS BY ELIG CLIN: HCPCS | Performed by: PHYSICIAN ASSISTANT

## 2021-09-01 ASSESSMENT — ENCOUNTER SYMPTOMS
EYE PROBLEMS: 1
BACK PAIN: 1
GASTROINTESTINAL NEGATIVE: 1
CHEST TIGHTNESS: 1

## 2021-09-01 NOTE — PROGRESS NOTES
Review of Systems   Constitutional: Positive for fatigue. HENT:  Negative. Eyes: Positive for eye problems (dry eye). Respiratory: Positive for chest tightness (pain). Cardiovascular: Positive for chest pain (sharp right sided pain). Gastrointestinal: Negative. Endocrine: Positive for hot flashes. Genitourinary: Positive for bladder incontinence, frequency, menstrual problem, pelvic pain (cramping) and vaginal bleeding. Musculoskeletal: Positive for back pain (not new). Skin: Negative. Neurological: Positive for headaches. Hematological: Bruises/bleeds easily.

## 2021-09-01 NOTE — PROGRESS NOTES
[address] [facility], [facility], [address] Gissell Muñoz is a 39 y.o. female who presents for a follow up heavy uterine bleeding    Chief Complaint: Thickened endometrium, heavy uterine bleeding, history of elevated     HPI: She is a G3, P3 (3  section) female who has a longstanding history of menorrhagia and irregular cycles. She has followed with hematology in the past due to chronic iron deficiency anemia with malabsorption component. A pelvic ultrasound revealed a thickened endometrial stripe and a right ovarian cyst.  She was scheduled to undergo a hysteroscopy D&C however tumor markers were drawn and an elevated CA-125 at 42 units, elevated alpha-fetoprotein at 9.7, and a slightly elevated LDH at 247. She was placed on Megace therapy at this time. She was then referred to [facility] gynecologic oncology for further evaluation and treatment. She was seen by [facility] gynecologic oncology on 2021 and attempt was made at an office endometrial biopsy at this time. However pathology later returned no endometrial tissue identified. She disclosed a family history is \"full of cancers\". She states her mother is 1 of 12 siblings, and that all females had hysterectomy for \"precancerous cells\" or uterine malignancy. She states 2 of her biological sisters have also had a hysterectomy. Repeat pelvic ultrasound on 2021 revealed uterus measuring 12.2 x 6.8 x 7.7 cm. Endometrial stripe with continued thickened at 25.6 mm. Right ovary was within normal limits, with no cyst or masses. The left ovary was not visualized however no adnexal masses. No evidence of free fluid. Repeat tumor markers obtained on 5/3/2021 revealed the alpha-fetoprotein and LDH is now within normal limits. Her pre operative CA-125 was elevated and is now 58 units.     Discussed was held regarding further work up and it was advised that patient undergo a hysteroscopy and D&C. She was taken to the operating room for a hysteroscopy D&C on 2021. Intraoperatively, upon placement of operative scope a large mass filling the entire upper portion of the uterine cavity with a large sessile base. Appearance was smooth with easy indentation. The Myosure device was unable to resect the lesion due to size and therefore a sharp curettage was performed. The final pathology later returned benign endometrial polyps. She did well post operatively and discussion on 21 was to follow up with patient closely with repeat pelvic ultrasound and  antigen level. Most recent  is top normal limits at 36 units in 21. Her pelvic ultrasound on 21 reveals uterus measuring 13.6 x 4.5 x 6.7 cm. The endometrial stipe is mildly enlarged at 2 cm. Bilateral ovaries non visualized. She continues to follow with hematology Dr. Sophy Roque for chronic anemia. Today, she most recently notified [facility] gynecologic oncology of severe uterine bleeding with large clots. She was placed on acute progesterone therapy and advised to follow-up in clinic today to discuss management options. She states now that she is on the Provera her bleeding has stopped. Her surgical history is consistent with 3  sections, gastric bypass and laparoscopic cholecystectomy. Review of Systems  I have personally reviewed and agree with the review of the systems done by my ancillary staff in the [facility] documentation. Objective:  Vitals:    21 1058 21 1109   BP: (!) 143/87 133/89   Site: Right Upper Arm    Position: Sitting    Cuff Size: Large Adult    Pulse: 78    SpO2: 98%    Weight: 290 lb 6.4 oz (131.7 kg)    Height: 5' 4\" (1.626 m)        Physical Exam   General:  Well appearing, alert and oriented x 3, No acute distress. Heart: Auscultation of heart revels a regular rate no murmur, gallops, gallops or rubs.     Lungs:  Clear bilaterally to auscultation to the bases. Lab Results   Component Value Date     36 07/19/2021     58 05/03/2021     42 03/19/2021     A discussion was held with the patient by myself and Dr. Lance Luz. At this point in time patient is continue to have severe menorrhagia with an endometrial lesion that has not been able to be fully resected therefore possibility for underlying malignancy. We discussed recommendations including proceeding with definitive therapy. We also discussed that again there is a possibility of an underlying malignancy and therefore approach the situation for complete surgical staging. Plan: Robotic laparoscopic modified radical hysterectomy, bilateral salpingo-oophorectomies, cytologic washings, sentinel lymph node mapping and sentinel lymph node biopsies with possible laparotomy. She is aware she will need medical clearance. Assessment/Plan:  1. Severe menorrhagia     2. Endometrial lesion    3. Hx elevated     4. Hx chronic iron deficiency anemia    Her plan is as follows:   1. Obtain medical clearance with attention to hypertension. We will leave the discretion of cardiac clearance to her primary care provider. 2.  We discussed she may stop the acute Provera treatment this time as her menses has now concluded however if her next menstrual cycle continues with the severe heavy bleeding she is to restart her Provera as prescribed. 3. Return post operatively    INFORMED CONSENT:  We discussed options including but not limited to observation, versus hormonal therapy versus attempt at a second resection versus definitive therapy with hysterectomy. Our recommendation includes triple intervention she prefers to proceed with definitive treatment of a hysterectomy. Benefits of the procedure(s) include but not limited to treatment, possible staging of precancerous/cancerous lesions and/or improvement in systems and quality of life.       The procedure(s) were explained in great detail along with all the possible risks and complications including, but not limited to blood loss requiring transfusions, DVT requiring blood thinning agents, injury to surrounding structures including bladder, bowel ureters, etc., as well as the possibility of infection requiring prophylactic antibiotics. I spent 40 minutes providing care to the patient and >50% of the time was spent counseling and coordinating care, discussing the patient's current situation, reviewing her options, counseling and education her and answering her questions. The patient's pertinent images, labs, and previous records and procedures were reviewed. The patient was counseled at length about the risks of aníbal Covid-19 during their perioperative period and any recovery window from their procedure. The patient was made aware that aníbal Covid-19  may worsen their prognosis for recovering from their procedure  and lend to a higher morbidity and/or mortality risk. All material risks, benefits, and reasonable alternatives including postponing the procedure were discussed. The patient does wish to proceed with the procedure at this time.     Electronically signed by Echo Pena PA-C on 9/1/2021 at 11:45 AM EDT

## 2021-09-01 NOTE — PATIENT INSTRUCTIONS
[address] [address], [address]  [place] [address]    Dulcolax Bowel Preparation for Surgery    A few days before surgery purchase Dulcolax Laxative Tablets (generic name is Bisacodyl Tablets)    Be sure you purchase the Ducolax (Bisacodyl) LAXATIVE tablets (not stimulant tablets)    2 Days before Surgery:  · You may take your regular mediations, unless otherwise directed. · In the morning, take 4 Dulcolax laxative tablets. · Then drink clear liquids. Drink as much clear liquids as you can; this will prevent dehydration. · Do not eat any solid foods. · Continue to drink at least 8 ounces of clear liquid every 1-2 hours until bedtime. Day of Surgery:  · You may take your regular medications, unless otherwise directed. · You may continue to drink clear liquids up to 4 hours prior to surgery. Clear Liquid Diet:  · Bouillon (Chicken or beef; no meat or noodles), coffee and tea (you may use sugar/sweetener), gelatin, popsicles, water, all fruit juices (no pulp), all sports drinks (Gatorade, Power Janelle, Lemonade, or Lime janelle (no pulp), sherbet, clear soft drinks. NOTICE TO DIABETIC PATIENTS:  Make sure our office is aware that you are diabetic. Contact your primary care physician or endocrinologist for instructions on how to use your insulin or diabetic medications. POST OPERATIVE INSTRUCTIONS for HYSTERECTOMY    Pain Control:  · You may feel some chest, shoulder, or abdominal discomfort for a few days. This discomfort is a result of the gas that was introduced into the abdomen during surgery. Your body will absorb this gas within 24 to 48 hours which will relieve these symptoms. In the meantime, it may be helpful to apply heat to your abdomen or to lie flat. It is important to take a stool softener such as Colace while taking narcotic pain medication such as Percocet or Vicodin. Please purchase a stool softener before your surgery.   You may take Assurant distend the abdomen during surgery. Swelling of the hands and lower extremities is common due to fluids given during surgery. Swelling of the face can occur due to positioning during surgery. If you have selling of the calves that is persistent or associated with redness, call our office. Activity:  If is normal to feel tired for a few days after surgery. Listen to your body and do not overdo it. · Walking is encouraged immediately after surgery, as tolerated. You should NOT be bedridden after surgery as continued movement will prevent prolonged recovery times due to \"deconditioning\". ·  If you could climb stairs un-aided prior to surgery you may resume climbing stairs after discharge following your procedure. · No strenuous activities such as heavy lifting (greater than 10 pounds or a gallon of milk), pushing or pulling for six weeks. · You may slowly increase your cardiovascular exercise after 2 weeks. Abdominal exercise should be avoided for 6 weeks. · Do Not drive while taking prescription pain medication or if your level of discomfort could inhibit your ability to operate a motor vehicle safely. · You may shower the day after surgery. Pat incisions dry. Do not rub incisions with washcloth or towel. Keep your incisions as dry as possible. · You may take a bath after six weeks. You must also wait 6 weeks to go into a swimming pool, hot tub, or the ocean. Vaginal Bleeding:  Light vaginal bleeding, spotting, or brown discharge for up to 6 weeks is common. Note: If you have heavy, bright red vaginal bleeding, call our office. Bleeding that fills a pad in one hour is considered to be heavy bleeding. Sexual [place]:  Avoid placing anything in the vagina for 8 weeks (ie. .Tampons, Douching, and Sexual intercourse).   Vaginal closure disruption can occur if sexual intercourse is resumed before healing is complete and will require additional surgery, which can lead to shortening of the vagina and painful intercourse. Follow Up Appointment:  Your follow up appointment will be scheduled on the same day we schedule your surgery. If one is not, please call the office when you get home to schedule. Call the office if you Experience:  ·  A fever higher than 100.4 Fahrenheit  · Increasing pain not controlled by pain medications  · Inability to eat or drink without vomiting  · Shortness of breath  · Inability to empty your bladder  · Redness and tenderness at the incision site, or a large amount of drainage  · Heavy, bright red vaginal bleeding or foul odor from discharge. You can expect to have a small amount of reddish-brown colored discharge up to 6 weeks. DO NOT BE ALARMED BY THIS. If you fell you need to be seen emergently, please go to the Emergency Department, if possible, where your surgery was performed so that our physicians may care for you. Any questions regarding your surgery or post-operative recovery should be directed to the staff at the [facility]) Gynecology Oncology at [phone], rather than your primary care physician.

## 2021-09-07 ENCOUNTER — TELEPHONE (OUTPATIENT)
Dept: PRIMARY CARE CLINIC | Age: 46
End: 2021-09-07

## 2021-09-07 NOTE — TELEPHONE ENCOUNTER
Patient requires medical clearance for Hysterectomy. Writer called patient to schedule an appt for clearance, patient will call our office back when she has her schedule in front of her to schedule an appt for clearance.

## 2021-09-08 ENCOUNTER — TELEPHONE (OUTPATIENT)
Dept: ONCOLOGY | Age: 46
End: 2021-09-08

## 2021-09-08 NOTE — TELEPHONE ENCOUNTER
Called pt to schedule venofer infusion and she states she is on the way to her daughters school and will call back this afternoon to schedule    Paperwork is in the pending /left message drawer

## 2021-09-09 ENCOUNTER — HOSPITAL ENCOUNTER (OUTPATIENT)
Dept: INFUSION THERAPY | Age: 46
Discharge: HOME | End: 2021-09-09
Payer: MEDICARE

## 2021-09-09 VITALS
HEART RATE: 60 BPM | SYSTOLIC BLOOD PRESSURE: 120 MMHG | DIASTOLIC BLOOD PRESSURE: 77 MMHG | TEMPERATURE: 98.4 F | RESPIRATION RATE: 16 BRPM

## 2021-09-09 DIAGNOSIS — D50.0 IRON DEFICIENCY ANEMIA DUE TO CHRONIC BLOOD LOSS: Primary | ICD-10-CM

## 2021-09-09 PROCEDURE — 96365 THER/PROPH/DIAG IV INF INIT: CPT

## 2021-09-09 PROCEDURE — 6360000002 HC RX W HCPCS: Performed by: INTERNAL MEDICINE

## 2021-09-09 PROCEDURE — 2580000003 HC RX 258: Performed by: INTERNAL MEDICINE

## 2021-09-09 RX ORDER — SODIUM CHLORIDE 9 MG/ML
INJECTION, SOLUTION INTRAVENOUS CONTINUOUS
Status: CANCELLED | OUTPATIENT
Start: 2021-09-16

## 2021-09-09 RX ORDER — HEPARIN SODIUM (PORCINE) LOCK FLUSH IV SOLN 100 UNIT/ML 100 UNIT/ML
500 SOLUTION INTRAVENOUS PRN
Status: CANCELLED | OUTPATIENT
Start: 2021-09-09

## 2021-09-09 RX ORDER — HEPARIN SODIUM (PORCINE) LOCK FLUSH IV SOLN 100 UNIT/ML 100 UNIT/ML
500 SOLUTION INTRAVENOUS PRN
Status: CANCELLED | OUTPATIENT
Start: 2021-09-16

## 2021-09-09 RX ORDER — EPINEPHRINE 1 MG/ML
0.3 INJECTION, SOLUTION, CONCENTRATE INTRAVENOUS PRN
Status: CANCELLED | OUTPATIENT
Start: 2021-09-16

## 2021-09-09 RX ORDER — METHYLPREDNISOLONE SODIUM SUCCINATE 125 MG/2ML
125 INJECTION, POWDER, LYOPHILIZED, FOR SOLUTION INTRAMUSCULAR; INTRAVENOUS ONCE
Status: CANCELLED | OUTPATIENT
Start: 2021-09-09 | End: 2021-09-09

## 2021-09-09 RX ORDER — METHYLPREDNISOLONE SODIUM SUCCINATE 125 MG/2ML
125 INJECTION, POWDER, LYOPHILIZED, FOR SOLUTION INTRAMUSCULAR; INTRAVENOUS ONCE
Status: CANCELLED | OUTPATIENT
Start: 2021-09-16 | End: 2021-09-16

## 2021-09-09 RX ORDER — DIPHENHYDRAMINE HYDROCHLORIDE 50 MG/ML
50 INJECTION INTRAMUSCULAR; INTRAVENOUS ONCE
Status: CANCELLED | OUTPATIENT
Start: 2021-09-16 | End: 2021-09-16

## 2021-09-09 RX ORDER — SODIUM CHLORIDE 9 MG/ML
25 INJECTION, SOLUTION INTRAVENOUS PRN
Status: CANCELLED | OUTPATIENT
Start: 2021-09-09

## 2021-09-09 RX ORDER — SODIUM CHLORIDE 0.9 % (FLUSH) 0.9 %
5-40 SYRINGE (ML) INJECTION PRN
Status: CANCELLED | OUTPATIENT
Start: 2021-09-09

## 2021-09-09 RX ORDER — SODIUM CHLORIDE 0.9 % (FLUSH) 0.9 %
5-40 SYRINGE (ML) INJECTION PRN
Status: CANCELLED | OUTPATIENT
Start: 2021-09-16

## 2021-09-09 RX ORDER — SODIUM CHLORIDE 9 MG/ML
25 INJECTION, SOLUTION INTRAVENOUS PRN
Status: CANCELLED | OUTPATIENT
Start: 2021-09-16

## 2021-09-09 RX ORDER — SODIUM CHLORIDE 9 MG/ML
INJECTION, SOLUTION INTRAVENOUS CONTINUOUS
Status: ACTIVE | OUTPATIENT
Start: 2021-09-09 | End: 2021-09-09

## 2021-09-09 RX ORDER — MEPERIDINE HYDROCHLORIDE 50 MG/ML
12.5 INJECTION INTRAMUSCULAR; INTRAVENOUS; SUBCUTANEOUS ONCE
Status: CANCELLED | OUTPATIENT
Start: 2021-09-16 | End: 2021-09-16

## 2021-09-09 RX ORDER — MEPERIDINE HYDROCHLORIDE 50 MG/ML
12.5 INJECTION INTRAMUSCULAR; INTRAVENOUS; SUBCUTANEOUS ONCE
Status: CANCELLED | OUTPATIENT
Start: 2021-09-09 | End: 2021-09-09

## 2021-09-09 RX ORDER — SODIUM CHLORIDE 9 MG/ML
INJECTION, SOLUTION INTRAVENOUS CONTINUOUS
Status: CANCELLED | OUTPATIENT
Start: 2021-09-09

## 2021-09-09 RX ORDER — EPINEPHRINE 1 MG/ML
0.3 INJECTION, SOLUTION, CONCENTRATE INTRAVENOUS PRN
Status: CANCELLED | OUTPATIENT
Start: 2021-09-09

## 2021-09-09 RX ORDER — DIPHENHYDRAMINE HYDROCHLORIDE 50 MG/ML
50 INJECTION INTRAMUSCULAR; INTRAVENOUS ONCE
Status: CANCELLED | OUTPATIENT
Start: 2021-09-09 | End: 2021-09-09

## 2021-09-09 RX ADMIN — SODIUM CHLORIDE: 9 INJECTION, SOLUTION INTRAVENOUS at 08:17

## 2021-09-09 RX ADMIN — IRON SUCROSE 200 MG: 20 INJECTION, SOLUTION INTRAVENOUS at 08:25

## 2021-09-14 ENCOUNTER — OFFICE VISIT (OUTPATIENT)
Dept: PRIMARY CARE CLINIC | Age: 46
End: 2021-09-14
Payer: MEDICARE

## 2021-09-14 VITALS
SYSTOLIC BLOOD PRESSURE: 122 MMHG | BODY MASS INDEX: 49.57 KG/M2 | HEART RATE: 98 BPM | RESPIRATION RATE: 16 BRPM | DIASTOLIC BLOOD PRESSURE: 80 MMHG | WEIGHT: 288.8 LBS | OXYGEN SATURATION: 99 %

## 2021-09-14 DIAGNOSIS — N84.0 ABNORMAL UTERINE BLEEDING DUE TO ENDOMETRIAL POLYP: ICD-10-CM

## 2021-09-14 DIAGNOSIS — N93.9 ABNORMAL UTERINE BLEEDING DUE TO ENDOMETRIAL POLYP: ICD-10-CM

## 2021-09-14 DIAGNOSIS — I10 MILD HYPERTENSION: ICD-10-CM

## 2021-09-14 DIAGNOSIS — Z01.818 PRE-OP EXAM: Primary | ICD-10-CM

## 2021-09-14 PROCEDURE — 1036F TOBACCO NON-USER: CPT | Performed by: NURSE PRACTITIONER

## 2021-09-14 PROCEDURE — G8417 CALC BMI ABV UP PARAM F/U: HCPCS | Performed by: NURSE PRACTITIONER

## 2021-09-14 PROCEDURE — G8427 DOCREV CUR MEDS BY ELIG CLIN: HCPCS | Performed by: NURSE PRACTITIONER

## 2021-09-14 PROCEDURE — 99214 OFFICE O/P EST MOD 30 MIN: CPT | Performed by: NURSE PRACTITIONER

## 2021-09-14 RX ORDER — LOSARTAN POTASSIUM 25 MG/1
25 TABLET ORAL DAILY
Qty: 30 TABLET | Refills: 5 | Status: SHIPPED | OUTPATIENT
Start: 2021-09-14 | End: 2022-04-18

## 2021-09-14 ASSESSMENT — PATIENT HEALTH QUESTIONNAIRE - PHQ9
9. THOUGHTS THAT YOU WOULD BE BETTER OFF DEAD, OR OF HURTING YOURSELF: 0
4. FEELING TIRED OR HAVING LITTLE ENERGY: 3
6. FEELING BAD ABOUT YOURSELF - OR THAT YOU ARE A FAILURE OR HAVE LET YOURSELF OR YOUR FAMILY DOWN: 3
10. IF YOU CHECKED OFF ANY PROBLEMS, HOW DIFFICULT HAVE THESE PROBLEMS MADE IT FOR YOU TO DO YOUR WORK, TAKE CARE OF THINGS AT HOME, OR GET ALONG WITH OTHER PEOPLE: 0
3. TROUBLE FALLING OR STAYING ASLEEP: 3
SUM OF ALL RESPONSES TO PHQ QUESTIONS 1-9: 18
5. POOR APPETITE OR OVEREATING: 0
SUM OF ALL RESPONSES TO PHQ QUESTIONS 1-9: 18
8. MOVING OR SPEAKING SO SLOWLY THAT OTHER PEOPLE COULD HAVE NOTICED. OR THE OPPOSITE, BEING SO FIGETY OR RESTLESS THAT YOU HAVE BEEN MOVING AROUND A LOT MORE THAN USUAL: 3
SUM OF ALL RESPONSES TO PHQ QUESTIONS 1-9: 18
1. LITTLE INTEREST OR PLEASURE IN DOING THINGS: 3
7. TROUBLE CONCENTRATING ON THINGS, SUCH AS READING THE NEWSPAPER OR WATCHING TELEVISION: 0
SUM OF ALL RESPONSES TO PHQ9 QUESTIONS 1 & 2: 6
2. FEELING DOWN, DEPRESSED OR HOPELESS: 3

## 2021-09-14 ASSESSMENT — ENCOUNTER SYMPTOMS
VOMITING: 0
COUGH: 0
SORE THROAT: 0
TROUBLE SWALLOWING: 0
ABDOMINAL PAIN: 0
WHEEZING: 0
DIARRHEA: 0
CONSTIPATION: 0
NAUSEA: 0
SINUS PRESSURE: 0
BLOOD IN STOOL: 0
SHORTNESS OF BREATH: 0

## 2021-09-14 ASSESSMENT — COLUMBIA-SUICIDE SEVERITY RATING SCALE - C-SSRS
1. WITHIN THE PAST MONTH, HAVE YOU WISHED YOU WERE DEAD OR WISHED YOU COULD GO TO SLEEP AND NOT WAKE UP?: NO
2. HAVE YOU ACTUALLY HAD ANY THOUGHTS OF KILLING YOURSELF?: NO
6. HAVE YOU EVER DONE ANYTHING, STARTED TO DO ANYTHING, OR PREPARED TO DO ANYTHING TO END YOUR LIFE?: NO

## 2021-09-14 NOTE — LETTER
[facility]  [address]  [facility] [address]  Phone: [phone]  Fax: [address] [place] [address] JUSTICE Cruz CNP        2021     Patient: Jose Mathew   YOB: 1975   Date of Visit: 2021       To Whom It May Concern: It is my medical opinion that Radha Hernandez is considered a low surgical risk. Today she has been started on losartan for management of mild HTN. Pending normal pre-op labs and EKG, she is medically cleared. If you have any questions or concerns, please don't hesitate to call.     Sincerely,        JUSTICE Zimmer CNP

## 2021-09-14 NOTE — PROGRESS NOTES
[address] [facility]  [facility] [address]  [address] [address]  Dept: [phone]  Dept Fax: [phone]    Wanda Muñoz is a 39 y.o. female who presentstoday for her medical conditions/complaints as noted below.   Wanda Muñoz is c/o of  Chief Complaint   Patient presents with    Other     Clearance for Hysterectomy    Health Maintenance     Declined Colonoscopy, would like to proceed with Surgery first       HPI:     Here today for pre-op clearance for hysterectomy  She has had recurrent heavy uterine bleeding in spite of use of provera  Has had hysteroscopy that offered relief for a brief period of time  Also had findings of precancerous cells so has been being monitored closely  She states with this most recent recurrence of heavy bleeding she is ready for hysterectomy though she is notably anxious about the procedure  She reports blood pressures have been elevated at times at other office follow-ups, asking about treatment given her current anxiety level  Today's blood pressure is within normal ranges, review of other recent blood pressures at GYN indicates intermittent elevations upper 130s low 140s      Hemoglobin A1C (%)   Date Value   10/18/2019 5.5             ( goal A1C is < 7)   No results found for: LABMICR  LDL Cholesterol (mg/dL)   Date Value   2021 84   2020 69   10/18/2019 105     LDL Calculated (mg/dL)   Date Value   2017 89       (goal LDL is <100)   AST (U/L)   Date Value   2021 22     ALT (U/L)   Date Value   2021 55 (H)     BUN (mg/dL)   Date Value   2021 12     BP Readings from Last 3 Encounters:   21 122/80   21 120/77   21 133/89          ([id number])    Past Medical History:   Diagnosis Date    Anemia     Anxiety     Carpal tunnel syndrome     Depression     Endometriosis     Gestational diabetes     x 3 children    Headache     migraines    History of blood transfusion     Iron deficiency anemia     Kidney stones     May-Thurner syndrome     Obesity     Scleroderma (HCC)       Past Surgical History:   Procedure Laterality Date     SECTION  x 3    CHOLECYSTECTOMY      DILATION AND CURETTAGE OF UTERUS N/A 2021     DILATATION AND CURETTAGE HYSTEROSCOPY MYOSURE (N/A )    DILATION AND CURETTAGE OF UTERUS N/A 2021    DILATATION AND CURETTAGE HYSTEROSCOPY Brandi Vicente performed by Jamir Chavez MD at [address] [place] [address]      OTHER SURGICAL HISTORY      growth removed from outer area of uterus    PELVIC LAPAROSCOPY      TOENAIL EXCISION      VASCULAR SURGERY  2016    to evaluate May Rader syndrome effects on  blood vessels       Family History   Problem Relation Age of Onset    Colon Cancer Mother     Early Death Father         accident    Diabetes Sister     High Blood Pressure Sister     Diabetes Brother     High Blood Pressure Brother     Diabetes Sister     High Blood Pressure Sister           Social History     Tobacco Use    Smoking status: Never Smoker    Smokeless tobacco: Never Used   Substance Use Topics    Alcohol use: No      Current Outpatient Medications   Medication Sig Dispense Refill    losartan (COZAAR) 25 MG tablet Take 1 tablet by mouth daily 30 tablet 5    medroxyPROGESTERone (PROVERA) 10 MG tablet Take 1 tablet by mouth 2 times daily For 5 days if needed at onset of heavy uterine bleeding (Patient taking differently: Take 10 mg by mouth 3 times daily For 5 days if needed at onset of heavy uterine bleeding) 30 tablet 0    busPIRone (BUSPAR) 10 MG tablet Take 1 tablet by mouth 3 times daily as needed (anxiety) 90 tablet 5    Elastic Bandages & Supports (JOBST ULTRASHEER 20-30MMHG LG) MISC Wear daily, ok to remove in the evenings 3 each 3    Multiple Vitamins-Minerals (MULTI-JULIO CÉSAR PO) Take by mouth daily      calcium carbonate (OYSTER SHELL CALCIUM 500 MG) 1250 (500 Ca) MG tablet Take 1 tablet by mouth daily      vitamin D3 (CHOLECALCIFEROL) 25 MCG (1000 UT) TABS tablet Take 1 tablet by mouth daily 90 tablet 1    vitamin B-12 (CYANOCOBALAMIN) 100 MCG tablet Take 50 mcg by mouth daily      zinc 50 MG CAPS Take 50 mg by mouth daily 90 capsule 1     No current facility-administered medications for this visit. Allergies   Allergen Reactions    Trileptal [Oxcarbazepine] Shortness Of Breath     Red neck rash/ visual changes    Contrast [Iodides] Hives     Chest pain, HTN  Able to handle if the pre-medication prep is followed.  Morphine Other (See Comments)     Red streak up arm at iv site/ and hives    Omnipaque [Iohexol] Hives     CT Dye  **can do the omnipaque if does allergy pack treatment**       Health Maintenance   Topic Date Due    Hepatitis C screen  Never done    HIV screen  Never done    Colon cancer screen colonoscopy  Never done    Cervical cancer screen  03/20/2021    Flu vaccine (1) 09/01/2021    Potassium monitoring  08/24/2022    Creatinine monitoring  08/24/2022    Lipid screen  03/18/2026    DTaP/Tdap/Td vaccine (4 - Td or Tdap) 11/15/2029    COVID-19 Vaccine  Completed    Hepatitis A vaccine  Aged Out    Hepatitis B vaccine  Aged Out    Hib vaccine  Aged Out    Meningococcal (ACWY) vaccine  Aged Out    Pneumococcal 0-64 years Vaccine  Aged Out       Subjective:      Review of Systems   Constitutional: Negative for activity change, appetite change, chills, fatigue, fever and unexpected weight change. HENT: Negative for congestion, ear pain, hearing loss, sinus pressure, sore throat and trouble swallowing. Eyes: Negative for visual disturbance. Respiratory: Negative for cough, shortness of breath and wheezing. Cardiovascular: Negative for chest pain, palpitations and leg swelling. Gastrointestinal: Negative for abdominal pain, blood in stool, constipation, diarrhea, nausea and vomiting.    Endocrine: Negative for cold intolerance, heat intolerance, polydipsia, polyphagia and polyuria. Genitourinary: Negative for difficulty urinating, frequency, hematuria and urgency. Heavy uterine bleeding   Musculoskeletal: Negative for arthralgias and myalgias. Skin: Negative for rash. Allergic/Immunologic: Negative for environmental allergies. Neurological: Negative for dizziness, weakness, light-headedness and headaches. Psychiatric/Behavioral: Negative for confusion. The patient is not nervous/anxious. Objective:     Physical Exam  Constitutional:       Appearance: She is well-developed. HENT:      Head: Normocephalic. Eyes:      Conjunctiva/sclera: Conjunctivae normal.      Pupils: Pupils are equal, round, and reactive to light. Cardiovascular:      Rate and Rhythm: Normal rate and regular rhythm. Heart sounds: Normal heart sounds. No murmur heard. Pulmonary:      Effort: Pulmonary effort is normal.      Breath sounds: Normal breath sounds. No wheezing. Abdominal:      General: Bowel sounds are normal. There is no distension. Palpations: Abdomen is soft. Musculoskeletal:         General: Normal range of motion. Cervical back: Normal range of motion. Skin:     General: Skin is warm and dry. Neurological:      Mental Status: She is alert and oriented to person, place, and time. Psychiatric:         Behavior: Behavior normal.         Thought Content: Thought content normal.         Judgment: Judgment normal.       /80   Pulse 98   Resp 16   Wt 288 lb 12.8 oz (131 kg)   SpO2 99%   BMI 49.57 kg/m²     Assessment:       Diagnosis Orders   1. Pre-op exam     2. Abnormal uterine bleeding due to endometrial polyp     3. Mild hypertension  losartan (COZAAR) 25 MG tablet             Plan:      Return if symptoms worsen or fail to improve. Preop exam, abnormal uterine bleeding-reviewed and discussed recent notes from gynecology/oncology, most recent labs in chart are within normal ranges.   Will clear medically for surgery pending normal preop labs and EKG  Mild hypertension-start low-dose losartan, discussed may need dose increase in order to note improvement. She is agreeable to get BP better controlled prior to surgery. Encouraged to check blood pressures at home and to call/return to office if does not improve     Orders Placed This Encounter   Medications    losartan (COZAAR) 25 MG tablet     Sig: Take 1 tablet by mouth daily     Dispense:  30 tablet     Refill:  5       Patient given educational materials - see patient instructions. Discussed use, benefit, and side effects of prescribed medications. All patientquestions answered. Pt voiced understanding. Reviewed health maintenance. Instructedto continue current medications, diet and exercise. Patient agreed with treatmentplan. Follow up as directed.      Electronicallysigned by JUSTICE Gutiérrez CNP on 9/14/2021 at 8:20 PM

## 2021-09-17 ENCOUNTER — HOSPITAL ENCOUNTER (OUTPATIENT)
Dept: INFUSION THERAPY | Age: 46
Discharge: HOME | End: 2021-09-17
Payer: MEDICARE

## 2021-09-17 VITALS
SYSTOLIC BLOOD PRESSURE: 146 MMHG | TEMPERATURE: 98.6 F | RESPIRATION RATE: 20 BRPM | DIASTOLIC BLOOD PRESSURE: 82 MMHG | HEART RATE: 93 BPM

## 2021-09-17 DIAGNOSIS — D50.0 IRON DEFICIENCY ANEMIA DUE TO CHRONIC BLOOD LOSS: Primary | ICD-10-CM

## 2021-09-17 PROCEDURE — 96365 THER/PROPH/DIAG IV INF INIT: CPT

## 2021-09-17 PROCEDURE — 2580000003 HC RX 258: Performed by: INTERNAL MEDICINE

## 2021-09-17 PROCEDURE — 6360000002 HC RX W HCPCS: Performed by: INTERNAL MEDICINE

## 2021-09-17 RX ORDER — HEPARIN SODIUM (PORCINE) LOCK FLUSH IV SOLN 100 UNIT/ML 100 UNIT/ML
500 SOLUTION INTRAVENOUS PRN
Status: CANCELLED | OUTPATIENT
Start: 2021-09-23

## 2021-09-17 RX ORDER — SODIUM CHLORIDE 9 MG/ML
25 INJECTION, SOLUTION INTRAVENOUS PRN
Status: CANCELLED | OUTPATIENT
Start: 2021-09-23

## 2021-09-17 RX ORDER — METHYLPREDNISOLONE SODIUM SUCCINATE 125 MG/2ML
125 INJECTION, POWDER, LYOPHILIZED, FOR SOLUTION INTRAMUSCULAR; INTRAVENOUS ONCE
Status: CANCELLED | OUTPATIENT
Start: 2021-09-23 | End: 2021-09-23

## 2021-09-17 RX ORDER — SODIUM CHLORIDE 9 MG/ML
INJECTION, SOLUTION INTRAVENOUS CONTINUOUS
Status: CANCELLED | OUTPATIENT
Start: 2021-09-23

## 2021-09-17 RX ORDER — EPINEPHRINE 1 MG/ML
0.3 INJECTION, SOLUTION, CONCENTRATE INTRAVENOUS PRN
Status: CANCELLED | OUTPATIENT
Start: 2021-09-23

## 2021-09-17 RX ORDER — SODIUM CHLORIDE 9 MG/ML
INJECTION, SOLUTION INTRAVENOUS CONTINUOUS
Status: DISCONTINUED | OUTPATIENT
Start: 2021-09-17 | End: 2021-09-18 | Stop reason: HOSPADM

## 2021-09-17 RX ORDER — DIPHENHYDRAMINE HYDROCHLORIDE 50 MG/ML
50 INJECTION INTRAMUSCULAR; INTRAVENOUS ONCE
Status: CANCELLED | OUTPATIENT
Start: 2021-09-23 | End: 2021-09-23

## 2021-09-17 RX ORDER — SODIUM CHLORIDE 0.9 % (FLUSH) 0.9 %
5-40 SYRINGE (ML) INJECTION PRN
Status: CANCELLED | OUTPATIENT
Start: 2021-09-23

## 2021-09-17 RX ADMIN — IRON SUCROSE 200 MG: 20 INJECTION, SOLUTION INTRAVENOUS at 14:30

## 2021-09-17 RX ADMIN — SODIUM CHLORIDE: 9 INJECTION, SOLUTION INTRAVENOUS at 14:23

## 2021-09-21 ENCOUNTER — PREP FOR PROCEDURE (OUTPATIENT)
Dept: GYNECOLOGIC ONCOLOGY | Age: 46
End: 2021-09-21

## 2021-09-21 DIAGNOSIS — Z01.811 PRE-OP CHEST EXAM: Primary | ICD-10-CM

## 2021-09-21 RX ORDER — GABAPENTIN 600 MG/1
300 TABLET ORAL ONCE
Status: CANCELLED | OUTPATIENT
Start: 2021-09-21 | End: 2021-09-21

## 2021-09-21 RX ORDER — SODIUM CHLORIDE 9 MG/ML
25 INJECTION, SOLUTION INTRAVENOUS PRN
Status: CANCELLED | OUTPATIENT
Start: 2021-09-21

## 2021-09-21 RX ORDER — SODIUM CHLORIDE 0.9 % (FLUSH) 0.9 %
10 SYRINGE (ML) INJECTION EVERY 12 HOURS SCHEDULED
Status: CANCELLED | OUTPATIENT
Start: 2021-09-21

## 2021-09-21 RX ORDER — ACETAMINOPHEN 500 MG
1000 TABLET ORAL ONCE
Status: CANCELLED | OUTPATIENT
Start: 2021-09-21 | End: 2021-09-21

## 2021-09-21 RX ORDER — ONDANSETRON 2 MG/ML
4 INJECTION INTRAMUSCULAR; INTRAVENOUS ONCE
Status: CANCELLED | OUTPATIENT
Start: 2021-09-21 | End: 2021-09-21

## 2021-09-21 RX ORDER — CELECOXIB 100 MG/1
200 CAPSULE ORAL ONCE
Status: CANCELLED | OUTPATIENT
Start: 2021-09-21 | End: 2021-09-21

## 2021-09-21 RX ORDER — SODIUM CHLORIDE 9 MG/ML
INJECTION, SOLUTION INTRAVENOUS CONTINUOUS
Status: CANCELLED | OUTPATIENT
Start: 2021-09-21

## 2021-09-21 RX ORDER — DEXAMETHASONE 4 MG/1
4 TABLET ORAL ONCE
Status: CANCELLED | OUTPATIENT
Start: 2021-09-21 | End: 2021-09-21

## 2021-09-21 RX ORDER — SODIUM CHLORIDE 0.9 % (FLUSH) 0.9 %
10 SYRINGE (ML) INJECTION PRN
Status: CANCELLED | OUTPATIENT
Start: 2021-09-21

## 2021-09-24 ENCOUNTER — HOSPITAL ENCOUNTER (OUTPATIENT)
Dept: INFUSION THERAPY | Age: 46
Discharge: HOME | End: 2021-09-24
Payer: MEDICARE

## 2021-09-24 VITALS
HEART RATE: 80 BPM | RESPIRATION RATE: 20 BRPM | TEMPERATURE: 98.1 F | DIASTOLIC BLOOD PRESSURE: 65 MMHG | SYSTOLIC BLOOD PRESSURE: 129 MMHG

## 2021-09-24 DIAGNOSIS — D50.0 IRON DEFICIENCY ANEMIA DUE TO CHRONIC BLOOD LOSS: Primary | ICD-10-CM

## 2021-09-24 PROCEDURE — 96365 THER/PROPH/DIAG IV INF INIT: CPT

## 2021-09-24 PROCEDURE — 2580000003 HC RX 258: Performed by: INTERNAL MEDICINE

## 2021-09-24 PROCEDURE — 6360000002 HC RX W HCPCS: Performed by: INTERNAL MEDICINE

## 2021-09-24 RX ORDER — METHYLPREDNISOLONE SODIUM SUCCINATE 125 MG/2ML
125 INJECTION, POWDER, LYOPHILIZED, FOR SOLUTION INTRAMUSCULAR; INTRAVENOUS ONCE
Status: CANCELLED | OUTPATIENT
Start: 2021-10-01 | End: 2021-10-01

## 2021-09-24 RX ORDER — DIPHENHYDRAMINE HYDROCHLORIDE 50 MG/ML
50 INJECTION INTRAMUSCULAR; INTRAVENOUS ONCE
Status: CANCELLED | OUTPATIENT
Start: 2021-10-01 | End: 2021-10-01

## 2021-09-24 RX ORDER — EPINEPHRINE 1 MG/ML
0.3 INJECTION, SOLUTION, CONCENTRATE INTRAVENOUS PRN
Status: CANCELLED | OUTPATIENT
Start: 2021-10-01

## 2021-09-24 RX ORDER — HEPARIN SODIUM (PORCINE) LOCK FLUSH IV SOLN 100 UNIT/ML 100 UNIT/ML
500 SOLUTION INTRAVENOUS PRN
Status: CANCELLED | OUTPATIENT
Start: 2021-10-01

## 2021-09-24 RX ORDER — SODIUM CHLORIDE 9 MG/ML
25 INJECTION, SOLUTION INTRAVENOUS PRN
Status: CANCELLED | OUTPATIENT
Start: 2021-10-01

## 2021-09-24 RX ORDER — SODIUM CHLORIDE 9 MG/ML
INJECTION, SOLUTION INTRAVENOUS CONTINUOUS
Status: CANCELLED | OUTPATIENT
Start: 2021-10-01

## 2021-09-24 RX ORDER — SODIUM CHLORIDE 0.9 % (FLUSH) 0.9 %
5-40 SYRINGE (ML) INJECTION PRN
Status: CANCELLED | OUTPATIENT
Start: 2021-10-01

## 2021-09-24 RX ORDER — SODIUM CHLORIDE 9 MG/ML
INJECTION, SOLUTION INTRAVENOUS CONTINUOUS
Status: DISCONTINUED | OUTPATIENT
Start: 2021-09-24 | End: 2021-09-25 | Stop reason: HOSPADM

## 2021-09-24 RX ORDER — MEPERIDINE HYDROCHLORIDE 50 MG/ML
12.5 INJECTION INTRAMUSCULAR; INTRAVENOUS; SUBCUTANEOUS ONCE
Status: CANCELLED | OUTPATIENT
Start: 2021-10-01 | End: 2021-10-01

## 2021-09-24 RX ADMIN — SODIUM CHLORIDE: 9 INJECTION, SOLUTION INTRAVENOUS at 13:24

## 2021-09-24 RX ADMIN — IRON SUCROSE 200 MG: 20 INJECTION, SOLUTION INTRAVENOUS at 13:32

## 2021-09-24 NOTE — PROGRESS NOTES
Pt here for #3 of 5 iron infusions. Denies any problems. Tolerates very well. Will return 10/1for #4 of 5.

## 2021-10-01 ENCOUNTER — HOSPITAL ENCOUNTER (OUTPATIENT)
Dept: INFUSION THERAPY | Age: 46
Discharge: HOME | End: 2021-10-01
Payer: MEDICARE

## 2021-10-01 VITALS — RESPIRATION RATE: 20 BRPM | HEART RATE: 64 BPM | DIASTOLIC BLOOD PRESSURE: 83 MMHG | SYSTOLIC BLOOD PRESSURE: 132 MMHG

## 2021-10-01 DIAGNOSIS — D50.0 IRON DEFICIENCY ANEMIA DUE TO CHRONIC BLOOD LOSS: Primary | ICD-10-CM

## 2021-10-01 PROCEDURE — 96365 THER/PROPH/DIAG IV INF INIT: CPT

## 2021-10-01 PROCEDURE — 6360000002 HC RX W HCPCS: Performed by: INTERNAL MEDICINE

## 2021-10-01 PROCEDURE — 2580000003 HC RX 258: Performed by: INTERNAL MEDICINE

## 2021-10-01 RX ORDER — DIPHENHYDRAMINE HYDROCHLORIDE 50 MG/ML
50 INJECTION INTRAMUSCULAR; INTRAVENOUS ONCE
Status: CANCELLED | OUTPATIENT
Start: 2021-10-08 | End: 2021-10-08

## 2021-10-01 RX ORDER — SODIUM CHLORIDE 0.9 % (FLUSH) 0.9 %
5-40 SYRINGE (ML) INJECTION PRN
Status: CANCELLED | OUTPATIENT
Start: 2021-10-08

## 2021-10-01 RX ORDER — HEPARIN SODIUM (PORCINE) LOCK FLUSH IV SOLN 100 UNIT/ML 100 UNIT/ML
500 SOLUTION INTRAVENOUS PRN
Status: CANCELLED | OUTPATIENT
Start: 2021-10-08

## 2021-10-01 RX ORDER — SODIUM CHLORIDE 9 MG/ML
INJECTION, SOLUTION INTRAVENOUS CONTINUOUS
Status: CANCELLED | OUTPATIENT
Start: 2021-10-08

## 2021-10-01 RX ORDER — SODIUM CHLORIDE 9 MG/ML
25 INJECTION, SOLUTION INTRAVENOUS PRN
Status: CANCELLED | OUTPATIENT
Start: 2021-10-08

## 2021-10-01 RX ORDER — EPINEPHRINE 1 MG/ML
0.3 INJECTION, SOLUTION, CONCENTRATE INTRAVENOUS PRN
Status: CANCELLED | OUTPATIENT
Start: 2021-10-08

## 2021-10-01 RX ORDER — SODIUM CHLORIDE 9 MG/ML
INJECTION, SOLUTION INTRAVENOUS CONTINUOUS
Status: DISCONTINUED | OUTPATIENT
Start: 2021-10-01 | End: 2021-10-02 | Stop reason: HOSPADM

## 2021-10-01 RX ORDER — METHYLPREDNISOLONE SODIUM SUCCINATE 125 MG/2ML
125 INJECTION, POWDER, LYOPHILIZED, FOR SOLUTION INTRAMUSCULAR; INTRAVENOUS ONCE
Status: CANCELLED | OUTPATIENT
Start: 2021-10-08 | End: 2021-10-08

## 2021-10-01 RX ORDER — MEPERIDINE HYDROCHLORIDE 50 MG/ML
12.5 INJECTION INTRAMUSCULAR; INTRAVENOUS; SUBCUTANEOUS ONCE
Status: CANCELLED | OUTPATIENT
Start: 2021-10-08 | End: 2021-10-08

## 2021-10-01 RX ADMIN — IRON SUCROSE 200 MG: 20 INJECTION, SOLUTION INTRAVENOUS at 13:25

## 2021-10-01 RX ADMIN — SODIUM CHLORIDE: 9 INJECTION, SOLUTION INTRAVENOUS at 13:25

## 2021-10-01 NOTE — FLOWSHEET NOTE
SPIRITUAL CARE PROGRESS NOTE    Spiritual Assessment:  encountered patient while rounding on the unit. Patient was approachable, receptive of 's presence. Patient shared brief narrative behind reasoning in the medical office today. Patient shared her siblings provide support to her and her extended family. Patient shared she was coping, but was slightly worried about an upcoming procedure she will be having but that she is seeking to be positive. Intervention:  engaged the patient in conversation about her feelings through active listening.  nurtured hope to the patient while being a spiritual presence. Outcome:  informed the patient spiritual care services are available to her when she receives her upcoming procedure at the hospital.  Patient expressed gratitude for today's visit by the . 10/01/21 1352   Encounter Summary   Services provided to: Patient   Referral/Consult From: [facility] Family members   Continue Visiting   (10/1/2021)   Complexity of Encounter Low   Length of Encounter 15 minutes   Routine   Type Initial   Assessment Calm; Approachable;Coping   Intervention Active listening;Nurtured hope;Explored coping resources;Sustaining presence/ Ministry of presence   Outcome Expressed gratitude   Spiritual/Episcopalian   Type Spiritual support     Electronically signed by Angella Mullins on 10/1/2021 at 1:57 PM.  [facility]  [phone]

## 2021-10-01 NOTE — PROGRESS NOTES
Pt here for 4 of 5 Venofer infusions. Pt was treated without incident and discharged in stable condition. Pt will return in 1 week for 5 of 5.

## 2021-10-08 ENCOUNTER — HOSPITAL ENCOUNTER (OUTPATIENT)
Dept: INFUSION THERAPY | Age: 46
Discharge: HOME | End: 2021-10-08
Payer: MEDICARE

## 2021-10-08 VITALS
DIASTOLIC BLOOD PRESSURE: 85 MMHG | SYSTOLIC BLOOD PRESSURE: 122 MMHG | RESPIRATION RATE: 18 BRPM | TEMPERATURE: 97.9 F | HEART RATE: 84 BPM

## 2021-10-08 DIAGNOSIS — D50.0 IRON DEFICIENCY ANEMIA DUE TO CHRONIC BLOOD LOSS: Primary | ICD-10-CM

## 2021-10-08 PROCEDURE — 96365 THER/PROPH/DIAG IV INF INIT: CPT

## 2021-10-08 PROCEDURE — 2580000003 HC RX 258: Performed by: INTERNAL MEDICINE

## 2021-10-08 PROCEDURE — 6360000002 HC RX W HCPCS: Performed by: INTERNAL MEDICINE

## 2021-10-08 RX ORDER — SODIUM CHLORIDE 0.9 % (FLUSH) 0.9 %
5-40 SYRINGE (ML) INJECTION PRN
Status: CANCELLED | OUTPATIENT
Start: 2021-10-15

## 2021-10-08 RX ORDER — SODIUM CHLORIDE 9 MG/ML
INJECTION, SOLUTION INTRAVENOUS CONTINUOUS
Status: DISCONTINUED | OUTPATIENT
Start: 2021-10-08 | End: 2021-10-09 | Stop reason: HOSPADM

## 2021-10-08 RX ORDER — SODIUM CHLORIDE 9 MG/ML
25 INJECTION, SOLUTION INTRAVENOUS PRN
Status: CANCELLED | OUTPATIENT
Start: 2021-10-15

## 2021-10-08 RX ORDER — DIPHENHYDRAMINE HYDROCHLORIDE 50 MG/ML
50 INJECTION INTRAMUSCULAR; INTRAVENOUS ONCE
Status: CANCELLED | OUTPATIENT
Start: 2021-10-15 | End: 2021-10-15

## 2021-10-08 RX ORDER — SODIUM CHLORIDE 9 MG/ML
INJECTION, SOLUTION INTRAVENOUS CONTINUOUS
Status: CANCELLED | OUTPATIENT
Start: 2021-10-15

## 2021-10-08 RX ORDER — METHYLPREDNISOLONE SODIUM SUCCINATE 125 MG/2ML
125 INJECTION, POWDER, LYOPHILIZED, FOR SOLUTION INTRAMUSCULAR; INTRAVENOUS ONCE
Status: CANCELLED | OUTPATIENT
Start: 2021-10-15 | End: 2021-10-15

## 2021-10-08 RX ORDER — EPINEPHRINE 1 MG/ML
0.3 INJECTION, SOLUTION, CONCENTRATE INTRAVENOUS PRN
Status: CANCELLED | OUTPATIENT
Start: 2021-10-15

## 2021-10-08 RX ORDER — HEPARIN SODIUM (PORCINE) LOCK FLUSH IV SOLN 100 UNIT/ML 100 UNIT/ML
500 SOLUTION INTRAVENOUS PRN
Status: CANCELLED | OUTPATIENT
Start: 2021-10-15

## 2021-10-08 RX ORDER — MEPERIDINE HYDROCHLORIDE 50 MG/ML
12.5 INJECTION INTRAMUSCULAR; INTRAVENOUS; SUBCUTANEOUS ONCE
Status: CANCELLED | OUTPATIENT
Start: 2021-10-15 | End: 2021-10-15

## 2021-10-08 RX ADMIN — SODIUM CHLORIDE: 9 INJECTION, SOLUTION INTRAVENOUS at 13:14

## 2021-10-08 RX ADMIN — IRON SUCROSE 200 MG: 20 INJECTION, SOLUTION INTRAVENOUS at 13:18

## 2021-10-13 RX ORDER — SODIUM CHLORIDE, SODIUM LACTATE, POTASSIUM CHLORIDE, CALCIUM CHLORIDE 600; 310; 30; 20 MG/100ML; MG/100ML; MG/100ML; MG/100ML
1000 INJECTION, SOLUTION INTRAVENOUS CONTINUOUS
Status: CANCELLED | OUTPATIENT
Start: 2021-10-13

## 2021-10-15 ENCOUNTER — HOSPITAL ENCOUNTER (OUTPATIENT)
Dept: GENERAL RADIOLOGY | Age: 46
Discharge: HOME | End: 2021-10-17
Payer: MEDICARE

## 2021-10-15 ENCOUNTER — HOSPITAL ENCOUNTER (OUTPATIENT)
Dept: PREADMISSION TESTING | Age: 46
Discharge: HOME | End: 2021-10-19
Payer: MEDICARE

## 2021-10-15 VITALS
TEMPERATURE: 98.1 F | SYSTOLIC BLOOD PRESSURE: 139 MMHG | OXYGEN SATURATION: 99 % | WEIGHT: 289 LBS | HEART RATE: 74 BPM | HEIGHT: 64 IN | RESPIRATION RATE: 20 BRPM | BODY MASS INDEX: 49.34 KG/M2 | DIASTOLIC BLOOD PRESSURE: 91 MMHG

## 2021-10-15 DIAGNOSIS — Z01.811 PRE-OP CHEST EXAM: ICD-10-CM

## 2021-10-15 LAB
ABO/RH: NORMAL
ABSOLUTE EOS #: 0.1 K/UL (ref 0–0.44)
ABSOLUTE IMMATURE GRANULOCYTE: <0.03 K/UL (ref 0–0.3)
ABSOLUTE LYMPH #: 2.16 K/UL (ref 1.1–3.7)
ABSOLUTE MONO #: 0.54 K/UL (ref 0.1–1.2)
ALBUMIN SERPL-MCNC: 4.4 G/DL (ref 3.5–5.2)
ALBUMIN/GLOBULIN RATIO: 1.2 (ref 1–2.5)
ALP BLD-CCNC: 98 U/L (ref 35–104)
ALT SERPL-CCNC: 36 U/L (ref 5–33)
ANION GAP SERPL CALCULATED.3IONS-SCNC: 12 MMOL/L (ref 9–17)
ANTIBODY SCREEN: NEGATIVE
ARM BAND NUMBER: NORMAL
AST SERPL-CCNC: 29 U/L
BASOPHILS # BLD: 1 % (ref 0–2)
BASOPHILS ABSOLUTE: 0.04 K/UL (ref 0–0.2)
BILIRUB SERPL-MCNC: 0.2 MG/DL (ref 0.3–1.2)
BUN BLDV-MCNC: 14 MG/DL (ref 6–20)
BUN/CREAT BLD: ABNORMAL (ref 9–20)
CA 125: 32 U/ML
CALCIUM SERPL-MCNC: 9.3 MG/DL (ref 8.6–10.4)
CHLORIDE BLD-SCNC: 105 MMOL/L (ref 98–107)
CO2: 22 MMOL/L (ref 20–31)
CREAT SERPL-MCNC: 0.61 MG/DL (ref 0.5–0.9)
DIFFERENTIAL TYPE: ABNORMAL
EOSINOPHILS RELATIVE PERCENT: 1 % (ref 1–4)
EXPIRATION DATE: NORMAL
GFR AFRICAN AMERICAN: >60 ML/MIN
GFR NON-AFRICAN AMERICAN: >60 ML/MIN
GFR SERPL CREATININE-BSD FRML MDRD: ABNORMAL ML/MIN/{1.73_M2}
GFR SERPL CREATININE-BSD FRML MDRD: ABNORMAL ML/MIN/{1.73_M2}
GLUCOSE BLD-MCNC: 95 MG/DL (ref 70–99)
HCG QUALITATIVE: NEGATIVE
HCT VFR BLD CALC: 44.2 % (ref 36.3–47.1)
HEMOGLOBIN: 13.2 G/DL (ref 11.9–15.1)
IMMATURE GRANULOCYTES: 0 %
LYMPHOCYTES # BLD: 31 % (ref 24–43)
MCH RBC QN AUTO: 26.9 PG (ref 25.2–33.5)
MCHC RBC AUTO-ENTMCNC: 29.9 G/DL (ref 28.4–34.8)
MCV RBC AUTO: 90 FL (ref 82.6–102.9)
MONOCYTES # BLD: 8 % (ref 3–12)
NRBC AUTOMATED: 0 PER 100 WBC
PDW BLD-RTO: 14.8 % (ref 11.8–14.4)
PLATELET # BLD: 237 K/UL (ref 138–453)
PLATELET ESTIMATE: ABNORMAL
PMV BLD AUTO: 11.6 FL (ref 8.1–13.5)
POTASSIUM SERPL-SCNC: 4.5 MMOL/L (ref 3.7–5.3)
RBC # BLD: 4.91 M/UL (ref 3.95–5.11)
RBC # BLD: ABNORMAL 10*6/UL
SEG NEUTROPHILS: 59 % (ref 36–65)
SEGMENTED NEUTROPHILS ABSOLUTE COUNT: 4.12 K/UL (ref 1.5–8.1)
SODIUM BLD-SCNC: 139 MMOL/L (ref 135–144)
TOTAL PROTEIN: 8 G/DL (ref 6.4–8.3)
WBC # BLD: 7 K/UL (ref 3.5–11.3)
WBC # BLD: ABNORMAL 10*3/UL

## 2021-10-15 PROCEDURE — 86850 RBC ANTIBODY SCREEN: CPT

## 2021-10-15 PROCEDURE — 36415 COLL VENOUS BLD VENIPUNCTURE: CPT

## 2021-10-15 PROCEDURE — 71046 X-RAY EXAM CHEST 2 VIEWS: CPT

## 2021-10-15 PROCEDURE — 86304 IMMUNOASSAY TUMOR CA 125: CPT

## 2021-10-15 PROCEDURE — 86901 BLOOD TYPING SEROLOGIC RH(D): CPT

## 2021-10-15 PROCEDURE — 85025 COMPLETE CBC W/AUTO DIFF WBC: CPT

## 2021-10-15 PROCEDURE — 86900 BLOOD TYPING SEROLOGIC ABO: CPT

## 2021-10-15 PROCEDURE — 80053 COMPREHEN METABOLIC PANEL: CPT

## 2021-10-15 PROCEDURE — 84703 CHORIONIC GONADOTROPIN ASSAY: CPT

## 2021-10-15 NOTE — PROGRESS NOTES
Anesthesia Focused Assessment    Hx of anesthesia complications:  Hypertension and hyperglycemia with most recent D+C   Family hx of anesthesia complications:  no      Prior + Covid-19 test? no      STOP-BANG Sleep Apnea Questionnaire    SNORE loudly (heard through closed doors)? Yes  TIRED, fatigued, sleepy during daytime? No  OBSERVED stopping breathing during sleep? No  High blood PRESSURE or being treated? Yes    BMI over 35? Yes  AGE over 48? No  NECK circumference over 16\"? Yes  GENDER (male)? No             Total 4  High risk 5-8  Intermediate risk 3-4  Low risk 0-2    ----------------------------------------------------------------------------------------------------------------------  RAIN                              No  If yes, machine? DM1                                            No  DM2                   No    Coronary Artery Disease      No  HTN         Yes  Defib/AICD/Pacemaker               No             Renal Failure                   No  If yes, on dialysis           Active smoker? No  Drinks alcohol? No  Illicit drugs? No  Dentition?        benign      Past Medical History:   Diagnosis Date    Anemia     Anxiety     Carpal tunnel syndrome     Depression     Endometriosis     Gestational diabetes     x 3 children    Headache     migraines    History of blood transfusion     Iron deficiency anemia     Kidney stones     May-Thurner syndrome     Obesity     Scleroderma ([address]     Snores     Under care of team     PCP - Emily Zapien CNP - LAST VISIT 9/2021    Under care of team     VASCULAR  - DR. RIBEIRO - 8/2021 - SEES FOR MAY-THURNER SYNDROME    Under care of team     HEM/ONC - DR. PERKINS - DINAH WHITMORE - LAST VISIT - 9/2021    Wears glasses          Patient was evaluated in PAT & anesthesia guidelines were applied. NPO guidelines, medication instructions and scheduled arrival time were reviewed with patient. Anesthesia contacted:   no    Medical or cardiac clearance ordered: no, medical clearance on file. Will retrieve most recent hematology and vascular notes.     Araina Mahoney, APRN - CNP   10/15/21  7:34 AM

## 2021-10-25 ENCOUNTER — ANESTHESIA EVENT (OUTPATIENT)
Dept: OPERATING ROOM | Age: 46
End: 2021-10-25
Payer: MEDICARE

## 2021-10-25 NOTE — H&P
GYN Oncology Pre-Op H&P  [facility]    Patient Name: Shauna Faulkner     Patient : 1975  [facility]  Admission Date/Time: 10/26/2021  5:56 AM  Primary Care Physician: JUSTICE Castelan CNP  MRN: [id number]    Date: 10/26/2021  Time: 6:43 AM    The patient was seen in pre-op holding. She is here for robotic assisted laparoscopic modified radical hysterectomy, bilateral salpingo-oophorectomy, cytologic washings, sentinel lymph node mapping and sentinel lymph node biopsies, possible laparotomy. HPI previously obtained by Dr. Chino Ryan on 21 and reads as follows:   Torres Schrader is a 70-year-old G3, P3 (3 C-sections) with a longstanding history of menorrhagia as well as menometrorrhagia. The patient was placed on Megace recently by a local OB/GYN. She has had episodes of anemia and is unable to tolerate oral iron because of a gastric bypass procedure. She therefore has been receiving intravenous iron under the care of Dr. Brandon Horvath, her hematologist oncologist.     She was referred because of a pelvic ultrasound revealing a thickened endometrial stripe at 2.5 cm. She also had a small right ovarian cyst.     Ca1 25 antigen level was elevated at 42units on 3/19/2021. A follow-up Ca1 25 1 5/3/2021 drawn preoperatively was further elevated at 58 units. Patient was advised to have a D&C and hysteroscopy. This was performed on 2021. At the time the patient was found to have a very large polypoid mass emanating from the uterine fundus. Unfortunately we were unable to get around the mass because of a sessile attachment to the uterine fundus. We therefore attempted to biopsy the mass and performed a thorough curettage. There was little bleeding. Postoperatively she has done fairly well. Final pathology revealed \"benign inactive endometrium with patchy decidual stroma\". There were \"pieces suggestive of benign endometrial polyp\".   Portions of the polyp that were biopsied revealed \"benign endometrial polyp with patchy decidual stroma\". \"    The procedure risks and complications were reviewed. The labs, Consent, and H&P were reviewed and updated. The patient was counseled on the possibility of  the need of a second surgery. The patient voiced understanding and had all of her questions answered. The possibility of incomplete removal of abnormal tissue was discussed. OBSTETRICAL HISTORY:   OB History   No obstetric history on file. PAST MEDICAL HISTORY:   has a past medical history of Anemia, Anxiety, Carpal tunnel syndrome, Depression, Endometriosis, Gestational diabetes, Headache, History of blood transfusion, Iron deficiency anemia, Kidney stones, May-Thurner syndrome, Obesity, Scleroderma ([facility] [address] Snores, Under care of team, Under care of team, Under care of team, and Wears glasses. PAST SURGICAL HISTORY:   has a past surgical history that includes Gastric bypass surgery ();  section (x 3); toenail excision; other surgical history; Cholecystectomy; pelvic laparoscopy; vascular surgery (); Dilation and curettage of uterus (N/A, 2021); Dilation and curettage of uterus (N/A, 2021); and Endometrial ablation (2021).     ALLERGIES:  Allergies as of 2021 - Fully Reviewed 2021   Allergen Reaction Noted    Trileptal [oxcarbazepine] Shortness Of Breath 2015    Contrast [iodides] Hives 2020    Morphine Other (See Comments) 2014    Omnipaque [iohexol] Hives 2014       MEDICATIONS:  Current Facility-Administered Medications   Medication Dose Route Frequency Provider Last Rate Last Admin    lactated ringers infusion   IntraVENous Continuous Ladarius Partida MD        midazolam PF (VERSED) injection 1 mg  1 mg IntraVENous Q10 Min PRN Ladarius Partida MD        lactated ringers infusion 1,000 mL  1,000 mL IntraVENous Continuous Truong Murphy MD        0.9 % sodium chloride infusion   IntraVENous Continuous Cindybrigette Elise PA-C        0.9 % sodium chloride infusion  25 mL IntraVENous PRN Cindybrigette Elise PA-C        ceFAZolin (ANCEF) 3000 mg in dextrose 5 % 100 mL IVPB  3,000 mg IntraVENous On Call to [address] Em[facility] [address]OBAL        enoxaparin (LOVENOX) injection 40 mg  40 mg SubCUTAneous Once [employer], PA-MICHAEL        sodium chloride flush 0.9 % injection 10 mL  10 mL IntraVENous 2 times per day CindyMELONY Suarez-MICHAEL        sodium chloride flush 0.9 % injection 10 mL  10 mL IntraVENous PRN Cindybrigette Elise PA-C        acetaminophen (TYLENOL) tablet 1,000 mg  1,000 mg Oral Once [employer], PA-MICHAEL        celecoxib (CELEBREX) capsule 200 mg  200 mg Oral Once [employer], CRISTOBAL        dexamethasone (DECADRON) tablet 4 mg  4 mg Oral Once AetnaCRISTOBAL        gabapentin (NEURONTIN) tablet 300 mg  300 mg Oral Once AetnaCRISTOBAL        ondansetron (ZOFRAN) injection 4 mg  4 mg IntraVENous Once AetCRISTOBAL rendon           FAMILY HISTORY:  family history includes Colon Cancer in her mother; Diabetes in her brother, sister, and sister; Early Death in her father; High Blood Pressure in her brother, sister, and sister. SOCIAL HISTORY:   reports that she has never smoked. She has never used smokeless tobacco. She reports that she does not drink alcohol and does not use drugs.     VITALS:  Vitals:    10/26/21 0635   BP: 136/85   Pulse: 95   Resp: 18   Temp: 97.2 °F (36.2 °C)   TempSrc: Temporal   SpO2: 98%   Weight: 289 lb (131.1 kg)   Height: 5' 4\" (1.626 m)                                                                                                                               PHYSICAL EXAM:     Unchanged from Prior H&P  CONSTITUTIONAL:  Alert and oriented, no acute distress  HEAD: normocephalic, atraumatic  EYES: Pupils equal and reactive to light, Extraocular muscles intact, sclera non icteric  ENT: Mucus membranes moist, No otorrhea, no rhinorrhea  NECK:  supple, symmetrical, trachea midline   LUNGS:  Good air is not necessary. Expiration Date 10/15/2021 10/29/2021,2359   Final    Arm Band Number 10/15/2021 BE [id number]   Final    ABO/Rh 10/15/2021 A POSITIVE   Final    Antibody Screen 10/15/2021 NEGATIVE   Final     10/15/2021 32  <38 U/mL Final       DIAGNOSTICS:    XR CHEST (2 VW)    Result Date: 10/15/2021  EXAMINATION: TWO XRAY VIEWS OF THE CHEST 10/15/2021 2:20 pm COMPARISON: PA and lateral chest May 5, 2021 and April 18, 2019. HISTORY: ORDERING SYSTEM PROVIDED HISTORY: Pre-op chest exam TECHNOLOGIST PROVIDED HISTORY: Reason for Exam: menorrhagia. large abd lesion FINDINGS: The heart is normal in size. No evidence of pneumothorax or pleural effusion. There is prominent elevation of the left hemidiaphragm. Central pulmonary vascularity appears normal.  There is suggestion of a large body habitus. Minimal spondylitic changes are present in the vertebral endplates. Mild chronic changes including prominent elevation of the left hemidiaphragm. No evidence of significant interval change. DIAGNOSIS & PLAN:  1. Endometrial Lesion  2. Severe Menorrhagia  3. History of Elevated    - Proceed with planned procedure: robotic assisted laparoscopic modified radical hysterectomy, bilateral salpingo-oophorectomy, cytologic washings, sentinel lymph node mapping and sentinel lymph node biopsies, possible laparotomy   - Consent signed, on chart. - The patient is ready for transport to the operative suite. Counseling: The patient was counseled on all options both medical and surgical, conservative as well as definitive. She has elected to proceed with the procedure as stated above. The patient was counseled on the procedure. Risks and complications were reviewed in detail. The patients orders, labs, consents have been completed. The history and physical as well as all supporting surgical documentation will be forwarded to the pre-operative holding area.      The patient is aware that this procedure may not alleviate her symptoms. That there may be a necessity for a second surgery and that there may be an incomplete removal of abnormal tissue. Max Overton DO  Ob/Gyn Resident  Pager: [phone]  [facility], [address]  10/26/2021, 6:43 AM        Attending Physician Statement  I have discussed the care of Stefani Kelly, including pertinent history and exam findings,  with the resident. I have seen and examined the patient and the key elements of all parts of the encounter have been performed by me. I agree with the assessment, plan and orders as documented by the resident.      Walter Damon MD

## 2021-10-26 ENCOUNTER — ANESTHESIA (OUTPATIENT)
Dept: OPERATING ROOM | Age: 46
End: 2021-10-26
Payer: MEDICARE

## 2021-10-26 ENCOUNTER — HOSPITAL ENCOUNTER (OUTPATIENT)
Age: 46
Setting detail: OBSERVATION
Discharge: HOME | End: 2021-10-28
Attending: OBSTETRICS & GYNECOLOGY | Admitting: OBSTETRICS & GYNECOLOGY
Payer: MEDICARE

## 2021-10-26 VITALS — SYSTOLIC BLOOD PRESSURE: 162 MMHG | OXYGEN SATURATION: 95 % | DIASTOLIC BLOOD PRESSURE: 96 MMHG | TEMPERATURE: 96.6 F

## 2021-10-26 DIAGNOSIS — G89.18 POST-OP PAIN: Primary | ICD-10-CM

## 2021-10-26 PROBLEM — Z90.710 S/P HYSTERECTOMY: Status: ACTIVE | Noted: 2021-10-26

## 2021-10-26 LAB
CASE NUMBER:: NORMAL
GLUCOSE BLD-MCNC: 168 MG/DL (ref 65–105)
HCG, PREGNANCY URINE (POC): NEGATIVE
SPECIMEN DESCRIPTION: NORMAL

## 2021-10-26 PROCEDURE — 88305 TISSUE EXAM BY PATHOLOGIST: CPT

## 2021-10-26 PROCEDURE — 81025 URINE PREGNANCY TEST: CPT

## 2021-10-26 PROCEDURE — 87088 URINE BACTERIA CULTURE: CPT

## 2021-10-26 PROCEDURE — 88112 CYTOPATH CELL ENHANCE TECH: CPT

## 2021-10-26 PROCEDURE — 6360000002 HC RX W HCPCS: Performed by: OBSTETRICS & GYNECOLOGY

## 2021-10-26 PROCEDURE — 2500000003 HC RX 250 WO HCPCS: Performed by: STUDENT IN AN ORGANIZED HEALTH CARE EDUCATION/TRAINING PROGRAM

## 2021-10-26 PROCEDURE — 2720000010 HC SURG SUPPLY STERILE: Performed by: OBSTETRICS & GYNECOLOGY

## 2021-10-26 PROCEDURE — 3600000019 HC SURGERY ROBOT ADDTL 15MIN: Performed by: OBSTETRICS & GYNECOLOGY

## 2021-10-26 PROCEDURE — 6360000002 HC RX W HCPCS: Performed by: ANESTHESIOLOGY

## 2021-10-26 PROCEDURE — 6360000002 HC RX W HCPCS

## 2021-10-26 PROCEDURE — 87086 URINE CULTURE/COLONY COUNT: CPT

## 2021-10-26 PROCEDURE — 2580000003 HC RX 258: Performed by: OBSTETRICS & GYNECOLOGY

## 2021-10-26 PROCEDURE — G0378 HOSPITAL OBSERVATION PER HR: HCPCS

## 2021-10-26 PROCEDURE — 6370000000 HC RX 637 (ALT 250 FOR IP): Performed by: PHYSICIAN ASSISTANT

## 2021-10-26 PROCEDURE — 3600000009 HC SURGERY ROBOT BASE: Performed by: OBSTETRICS & GYNECOLOGY

## 2021-10-26 PROCEDURE — 3700000000 HC ANESTHESIA ATTENDED CARE: Performed by: OBSTETRICS & GYNECOLOGY

## 2021-10-26 PROCEDURE — 1200000000 HC SEMI PRIVATE

## 2021-10-26 PROCEDURE — 82947 ASSAY GLUCOSE BLOOD QUANT: CPT

## 2021-10-26 PROCEDURE — 58571 TLH W/T/O 250 G OR LESS: CPT | Performed by: OBSTETRICS & GYNECOLOGY

## 2021-10-26 PROCEDURE — 6370000000 HC RX 637 (ALT 250 FOR IP): Performed by: STUDENT IN AN ORGANIZED HEALTH CARE EDUCATION/TRAINING PROGRAM

## 2021-10-26 PROCEDURE — 6360000002 HC RX W HCPCS: Performed by: STUDENT IN AN ORGANIZED HEALTH CARE EDUCATION/TRAINING PROGRAM

## 2021-10-26 PROCEDURE — 2500000003 HC RX 250 WO HCPCS: Performed by: OBSTETRICS & GYNECOLOGY

## 2021-10-26 PROCEDURE — 3700000001 HC ADD 15 MINUTES (ANESTHESIA): Performed by: OBSTETRICS & GYNECOLOGY

## 2021-10-26 PROCEDURE — 6360000002 HC RX W HCPCS: Performed by: PHYSICIAN ASSISTANT

## 2021-10-26 PROCEDURE — 2580000003 HC RX 258: Performed by: SPECIALIST

## 2021-10-26 PROCEDURE — 88331 PATH CONSLTJ SURG 1 BLK 1SPC: CPT

## 2021-10-26 PROCEDURE — 6360000002 HC RX W HCPCS: Performed by: SPECIALIST

## 2021-10-26 PROCEDURE — S2900 ROBOTIC SURGICAL SYSTEM: HCPCS | Performed by: OBSTETRICS & GYNECOLOGY

## 2021-10-26 PROCEDURE — C1760 CLOSURE DEV, VASC: HCPCS | Performed by: OBSTETRICS & GYNECOLOGY

## 2021-10-26 PROCEDURE — C9290 INJ, BUPIVACAINE LIPOSOME: HCPCS | Performed by: OBSTETRICS & GYNECOLOGY

## 2021-10-26 PROCEDURE — 2500000003 HC RX 250 WO HCPCS: Performed by: SPECIALIST

## 2021-10-26 PROCEDURE — 87186 SC STD MICRODIL/AGAR DIL: CPT

## 2021-10-26 PROCEDURE — 7100000001 HC PACU RECOVERY - ADDTL 15 MIN: Performed by: OBSTETRICS & GYNECOLOGY

## 2021-10-26 PROCEDURE — 7100000000 HC PACU RECOVERY - FIRST 15 MIN: Performed by: OBSTETRICS & GYNECOLOGY

## 2021-10-26 PROCEDURE — 2580000003 HC RX 258: Performed by: ANESTHESIOLOGY

## 2021-10-26 PROCEDURE — 6370000000 HC RX 637 (ALT 250 FOR IP): Performed by: OBSTETRICS & GYNECOLOGY

## 2021-10-26 PROCEDURE — 88307 TISSUE EXAM BY PATHOLOGIST: CPT

## 2021-10-26 PROCEDURE — 2709999900 HC NON-CHARGEABLE SUPPLY: Performed by: OBSTETRICS & GYNECOLOGY

## 2021-10-26 RX ORDER — ROCURONIUM BROMIDE 10 MG/ML
INJECTION, SOLUTION INTRAVENOUS PRN
Status: DISCONTINUED | OUTPATIENT
Start: 2021-10-26 | End: 2021-10-26 | Stop reason: SDUPTHER

## 2021-10-26 RX ORDER — PROPOFOL 10 MG/ML
INJECTION, EMULSION INTRAVENOUS PRN
Status: DISCONTINUED | OUTPATIENT
Start: 2021-10-26 | End: 2021-10-26 | Stop reason: SDUPTHER

## 2021-10-26 RX ORDER — ONDANSETRON 4 MG/1
4 TABLET, ORALLY DISINTEGRATING ORAL EVERY 8 HOURS PRN
Status: DISCONTINUED | OUTPATIENT
Start: 2021-10-26 | End: 2021-10-28 | Stop reason: HOSPADM

## 2021-10-26 RX ORDER — HYDROCODONE BITARTRATE AND ACETAMINOPHEN 5; 325 MG/1; MG/1
1 TABLET ORAL EVERY 4 HOURS PRN
Status: DISCONTINUED | OUTPATIENT
Start: 2021-10-26 | End: 2021-10-28 | Stop reason: HOSPADM

## 2021-10-26 RX ORDER — SODIUM CHLORIDE, SODIUM LACTATE, POTASSIUM CHLORIDE, CALCIUM CHLORIDE 600; 310; 30; 20 MG/100ML; MG/100ML; MG/100ML; MG/100ML
1000 INJECTION, SOLUTION INTRAVENOUS CONTINUOUS
Status: DISCONTINUED | OUTPATIENT
Start: 2021-10-26 | End: 2021-10-26

## 2021-10-26 RX ORDER — ONDANSETRON 2 MG/ML
INJECTION INTRAMUSCULAR; INTRAVENOUS PRN
Status: DISCONTINUED | OUTPATIENT
Start: 2021-10-26 | End: 2021-10-26 | Stop reason: SDUPTHER

## 2021-10-26 RX ORDER — BUSPIRONE HYDROCHLORIDE 10 MG/1
10 TABLET ORAL 3 TIMES DAILY PRN
Status: DISCONTINUED | OUTPATIENT
Start: 2021-10-26 | End: 2021-10-28 | Stop reason: HOSPADM

## 2021-10-26 RX ORDER — DOCUSATE SODIUM 100 MG/1
100 CAPSULE, LIQUID FILLED ORAL 2 TIMES DAILY PRN
Qty: 60 CAPSULE | Refills: 1 | Status: SHIPPED | OUTPATIENT
Start: 2021-10-26 | End: 2022-08-19 | Stop reason: ALTCHOICE

## 2021-10-26 RX ORDER — ULTRASOUND COUPLING MEDIUM
GEL (GRAM) TOPICAL PRN
Status: DISCONTINUED | OUTPATIENT
Start: 2021-10-26 | End: 2021-10-26 | Stop reason: ALTCHOICE

## 2021-10-26 RX ORDER — KETOROLAC TROMETHAMINE 15 MG/ML
INJECTION, SOLUTION INTRAMUSCULAR; INTRAVENOUS
Status: DISPENSED
Start: 2021-10-26 | End: 2021-10-27

## 2021-10-26 RX ORDER — GABAPENTIN 600 MG/1
300 TABLET ORAL ONCE
Status: COMPLETED | OUTPATIENT
Start: 2021-10-26 | End: 2021-10-26

## 2021-10-26 RX ORDER — ACETAMINOPHEN 325 MG/1
650 TABLET ORAL EVERY 4 HOURS PRN
Status: DISCONTINUED | OUTPATIENT
Start: 2021-10-26 | End: 2021-10-28 | Stop reason: HOSPADM

## 2021-10-26 RX ORDER — ACETAMINOPHEN 325 MG/1
650 TABLET ORAL EVERY 6 HOURS PRN
Qty: 120 TABLET | Refills: 3 | Status: SHIPPED | OUTPATIENT
Start: 2021-10-26

## 2021-10-26 RX ORDER — MIDAZOLAM HYDROCHLORIDE 2 MG/2ML
1 INJECTION, SOLUTION INTRAMUSCULAR; INTRAVENOUS EVERY 10 MIN PRN
Status: DISCONTINUED | OUTPATIENT
Start: 2021-10-26 | End: 2021-10-26 | Stop reason: HOSPADM

## 2021-10-26 RX ORDER — MIDAZOLAM HYDROCHLORIDE 1 MG/ML
INJECTION INTRAMUSCULAR; INTRAVENOUS PRN
Status: DISCONTINUED | OUTPATIENT
Start: 2021-10-26 | End: 2021-10-26 | Stop reason: SDUPTHER

## 2021-10-26 RX ORDER — SODIUM CHLORIDE 9 MG/ML
INJECTION, SOLUTION INTRAVENOUS CONTINUOUS
Status: DISCONTINUED | OUTPATIENT
Start: 2021-10-26 | End: 2021-10-26

## 2021-10-26 RX ORDER — SIMETHICONE 80 MG
80 TABLET,CHEWABLE ORAL EVERY 6 HOURS PRN
Qty: 180 TABLET | Refills: 0 | Status: SHIPPED | OUTPATIENT
Start: 2021-10-26 | End: 2022-01-24 | Stop reason: ALTCHOICE

## 2021-10-26 RX ORDER — MAGNESIUM HYDROXIDE 1200 MG/15ML
LIQUID ORAL CONTINUOUS PRN
Status: DISCONTINUED | OUTPATIENT
Start: 2021-10-26 | End: 2021-10-26 | Stop reason: ALTCHOICE

## 2021-10-26 RX ORDER — DEXAMETHASONE SODIUM PHOSPHATE 10 MG/ML
INJECTION INTRAMUSCULAR; INTRAVENOUS PRN
Status: DISCONTINUED | OUTPATIENT
Start: 2021-10-26 | End: 2021-10-26 | Stop reason: SDUPTHER

## 2021-10-26 RX ORDER — SIMETHICONE 80 MG
80 TABLET,CHEWABLE ORAL EVERY 6 HOURS PRN
Status: DISCONTINUED | OUTPATIENT
Start: 2021-10-26 | End: 2021-10-28 | Stop reason: HOSPADM

## 2021-10-26 RX ORDER — ONDANSETRON 2 MG/ML
4 INJECTION INTRAMUSCULAR; INTRAVENOUS ONCE
Status: COMPLETED | OUTPATIENT
Start: 2021-10-26 | End: 2021-10-26

## 2021-10-26 RX ORDER — MAGNESIUM HYDROXIDE 1200 MG/15ML
LIQUID ORAL PRN
Status: DISCONTINUED | OUTPATIENT
Start: 2021-10-26 | End: 2021-10-26 | Stop reason: ALTCHOICE

## 2021-10-26 RX ORDER — DEXAMETHASONE 4 MG/1
4 TABLET ORAL ONCE
Status: COMPLETED | OUTPATIENT
Start: 2021-10-26 | End: 2021-10-26

## 2021-10-26 RX ORDER — SODIUM CHLORIDE 9 MG/ML
25 INJECTION, SOLUTION INTRAVENOUS PRN
Status: DISCONTINUED | OUTPATIENT
Start: 2021-10-26 | End: 2021-10-26 | Stop reason: HOSPADM

## 2021-10-26 RX ORDER — PROMETHAZINE HYDROCHLORIDE 25 MG/ML
6.25 INJECTION, SOLUTION INTRAMUSCULAR; INTRAVENOUS ONCE
Status: DISCONTINUED | OUTPATIENT
Start: 2021-10-26 | End: 2021-10-26 | Stop reason: HOSPADM

## 2021-10-26 RX ORDER — SODIUM CHLORIDE 0.9 % (FLUSH) 0.9 %
10 SYRINGE (ML) INJECTION PRN
Status: DISCONTINUED | OUTPATIENT
Start: 2021-10-26 | End: 2021-10-26 | Stop reason: HOSPADM

## 2021-10-26 RX ORDER — KETOROLAC TROMETHAMINE 30 MG/ML
15 INJECTION, SOLUTION INTRAMUSCULAR; INTRAVENOUS EVERY 6 HOURS
Status: COMPLETED | OUTPATIENT
Start: 2021-10-26 | End: 2021-10-27

## 2021-10-26 RX ORDER — CALCIUM CARBONATE 200(500)MG
1000 TABLET,CHEWABLE ORAL 3 TIMES DAILY PRN
Status: DISCONTINUED | OUTPATIENT
Start: 2021-10-26 | End: 2021-10-28 | Stop reason: HOSPADM

## 2021-10-26 RX ORDER — INDOCYANINE GREEN AND WATER 25 MG
KIT INJECTION PRN
Status: DISCONTINUED | OUTPATIENT
Start: 2021-10-26 | End: 2021-10-26 | Stop reason: ALTCHOICE

## 2021-10-26 RX ORDER — FENTANYL CITRATE 50 UG/ML
INJECTION, SOLUTION INTRAMUSCULAR; INTRAVENOUS PRN
Status: DISCONTINUED | OUTPATIENT
Start: 2021-10-26 | End: 2021-10-26 | Stop reason: SDUPTHER

## 2021-10-26 RX ORDER — ONDANSETRON 2 MG/ML
4 INJECTION INTRAMUSCULAR; INTRAVENOUS EVERY 6 HOURS PRN
Status: DISCONTINUED | OUTPATIENT
Start: 2021-10-26 | End: 2021-10-28 | Stop reason: HOSPADM

## 2021-10-26 RX ORDER — DOCUSATE SODIUM 100 MG/1
100 CAPSULE, LIQUID FILLED ORAL 2 TIMES DAILY
Status: DISCONTINUED | OUTPATIENT
Start: 2021-10-26 | End: 2021-10-28 | Stop reason: HOSPADM

## 2021-10-26 RX ORDER — DEXTROSE, SODIUM CHLORIDE, AND POTASSIUM CHLORIDE 5; .45; .15 G/100ML; G/100ML; G/100ML
INJECTION INTRAVENOUS CONTINUOUS
Status: DISCONTINUED | OUTPATIENT
Start: 2021-10-26 | End: 2021-10-26

## 2021-10-26 RX ORDER — SODIUM CHLORIDE, SODIUM LACTATE, POTASSIUM CHLORIDE, CALCIUM CHLORIDE 600; 310; 30; 20 MG/100ML; MG/100ML; MG/100ML; MG/100ML
INJECTION, SOLUTION INTRAVENOUS CONTINUOUS
Status: DISCONTINUED | OUTPATIENT
Start: 2021-10-26 | End: 2021-10-26

## 2021-10-26 RX ORDER — CELECOXIB 100 MG/1
200 CAPSULE ORAL ONCE
Status: COMPLETED | OUTPATIENT
Start: 2021-10-26 | End: 2021-10-26

## 2021-10-26 RX ORDER — SODIUM CHLORIDE, SODIUM LACTATE, POTASSIUM CHLORIDE, CALCIUM CHLORIDE 600; 310; 30; 20 MG/100ML; MG/100ML; MG/100ML; MG/100ML
INJECTION, SOLUTION INTRAVENOUS CONTINUOUS PRN
Status: DISCONTINUED | OUTPATIENT
Start: 2021-10-26 | End: 2021-10-26 | Stop reason: SDUPTHER

## 2021-10-26 RX ORDER — SODIUM CHLORIDE 0.9 % (FLUSH) 0.9 %
10 SYRINGE (ML) INJECTION EVERY 12 HOURS SCHEDULED
Status: DISCONTINUED | OUTPATIENT
Start: 2021-10-26 | End: 2021-10-26 | Stop reason: HOSPADM

## 2021-10-26 RX ORDER — ACETAMINOPHEN 500 MG
1000 TABLET ORAL ONCE
Status: COMPLETED | OUTPATIENT
Start: 2021-10-26 | End: 2021-10-26

## 2021-10-26 RX ORDER — HYDROCODONE BITARTRATE AND ACETAMINOPHEN 5; 325 MG/1; MG/1
1 TABLET ORAL EVERY 6 HOURS PRN
Qty: 20 TABLET | Refills: 0 | Status: SHIPPED | OUTPATIENT
Start: 2021-10-26 | End: 2021-10-31

## 2021-10-26 RX ORDER — LIDOCAINE HYDROCHLORIDE 10 MG/ML
INJECTION, SOLUTION EPIDURAL; INFILTRATION; INTRACAUDAL; PERINEURAL PRN
Status: DISCONTINUED | OUTPATIENT
Start: 2021-10-26 | End: 2021-10-26 | Stop reason: SDUPTHER

## 2021-10-26 RX ORDER — HYDROCODONE BITARTRATE AND ACETAMINOPHEN 5; 325 MG/1; MG/1
2 TABLET ORAL EVERY 4 HOURS PRN
Status: DISCONTINUED | OUTPATIENT
Start: 2021-10-26 | End: 2021-10-28 | Stop reason: HOSPADM

## 2021-10-26 RX ORDER — PROMETHAZINE HYDROCHLORIDE 25 MG/ML
6.25 INJECTION, SOLUTION INTRAMUSCULAR; INTRAVENOUS ONCE
Status: COMPLETED | OUTPATIENT
Start: 2021-10-26 | End: 2021-10-26

## 2021-10-26 RX ORDER — DEXTROSE, SODIUM CHLORIDE, AND POTASSIUM CHLORIDE 5; .45; .15 G/100ML; G/100ML; G/100ML
INJECTION INTRAVENOUS CONTINUOUS
Status: DISCONTINUED | OUTPATIENT
Start: 2021-10-26 | End: 2021-10-27

## 2021-10-26 RX ADMIN — ONDANSETRON 4 MG: 2 INJECTION, SOLUTION INTRAMUSCULAR; INTRAVENOUS at 12:30

## 2021-10-26 RX ADMIN — LIDOCAINE HYDROCHLORIDE 50 MG: 10 INJECTION, SOLUTION EPIDURAL; INFILTRATION; INTRACAUDAL; PERINEURAL at 07:36

## 2021-10-26 RX ADMIN — SODIUM CHLORIDE, POTASSIUM CHLORIDE, SODIUM LACTATE AND CALCIUM CHLORIDE: 600; 310; 30; 20 INJECTION, SOLUTION INTRAVENOUS at 06:56

## 2021-10-26 RX ADMIN — HYDROMORPHONE HYDROCHLORIDE 0.5 MG: 1 INJECTION, SOLUTION INTRAMUSCULAR; INTRAVENOUS; SUBCUTANEOUS at 13:50

## 2021-10-26 RX ADMIN — HYDROMORPHONE HYDROCHLORIDE 0.5 MG: 1 INJECTION, SOLUTION INTRAMUSCULAR; INTRAVENOUS; SUBCUTANEOUS at 19:34

## 2021-10-26 RX ADMIN — ONDANSETRON 4 MG: 2 INJECTION INTRAMUSCULAR; INTRAVENOUS at 06:49

## 2021-10-26 RX ADMIN — DEXAMETHASONE SODIUM PHOSPHATE 10 MG: 10 INJECTION INTRAMUSCULAR; INTRAVENOUS at 09:53

## 2021-10-26 RX ADMIN — ROCURONIUM BROMIDE 20 MG: 10 INJECTION INTRAVENOUS at 08:12

## 2021-10-26 RX ADMIN — SODIUM CHLORIDE, POTASSIUM CHLORIDE, SODIUM LACTATE AND CALCIUM CHLORIDE: 600; 310; 30; 20 INJECTION, SOLUTION INTRAVENOUS at 06:33

## 2021-10-26 RX ADMIN — ROCURONIUM BROMIDE 30 MG: 10 INJECTION INTRAVENOUS at 08:51

## 2021-10-26 RX ADMIN — DOCUSATE SODIUM 100 MG: 100 CAPSULE ORAL at 20:30

## 2021-10-26 RX ADMIN — HYDROMORPHONE HYDROCHLORIDE 0.25 MG: 1 INJECTION, SOLUTION INTRAMUSCULAR; INTRAVENOUS; SUBCUTANEOUS at 16:15

## 2021-10-26 RX ADMIN — KETOROLAC TROMETHAMINE 15 MG: 30 INJECTION, SOLUTION INTRAMUSCULAR at 22:32

## 2021-10-26 RX ADMIN — GABAPENTIN 300 MG: 600 TABLET ORAL at 06:51

## 2021-10-26 RX ADMIN — KETOROLAC TROMETHAMINE 15 MG: 30 INJECTION, SOLUTION INTRAMUSCULAR at 16:31

## 2021-10-26 RX ADMIN — ROCURONIUM BROMIDE 20 MG: 10 INJECTION INTRAVENOUS at 10:16

## 2021-10-26 RX ADMIN — CELECOXIB 200 MG: 100 CAPSULE ORAL at 06:51

## 2021-10-26 RX ADMIN — PROMETHAZINE HYDROCHLORIDE 6.25 MG: 25 INJECTION INTRAMUSCULAR; INTRAVENOUS at 16:18

## 2021-10-26 RX ADMIN — FENTANYL CITRATE 50 MCG: 50 INJECTION, SOLUTION INTRAMUSCULAR; INTRAVENOUS at 08:56

## 2021-10-26 RX ADMIN — ENOXAPARIN SODIUM 40 MG: 40 INJECTION SUBCUTANEOUS at 06:51

## 2021-10-26 RX ADMIN — SODIUM CHLORIDE, POTASSIUM CHLORIDE, SODIUM LACTATE AND CALCIUM CHLORIDE: 600; 310; 30; 20 INJECTION, SOLUTION INTRAVENOUS at 12:54

## 2021-10-26 RX ADMIN — POTASSIUM CHLORIDE, DEXTROSE MONOHYDRATE AND SODIUM CHLORIDE: 150; 5; 450 INJECTION, SOLUTION INTRAVENOUS at 18:59

## 2021-10-26 RX ADMIN — DEXAMETHASONE 4 MG: 4 TABLET ORAL at 06:51

## 2021-10-26 RX ADMIN — SUGAMMADEX 400 MG: 100 INJECTION, SOLUTION INTRAVENOUS at 13:04

## 2021-10-26 RX ADMIN — HYDROMORPHONE HYDROCHLORIDE 0.5 MG: 1 INJECTION, SOLUTION INTRAMUSCULAR; INTRAVENOUS; SUBCUTANEOUS at 14:00

## 2021-10-26 RX ADMIN — FENTANYL CITRATE 100 MCG: 50 INJECTION, SOLUTION INTRAMUSCULAR; INTRAVENOUS at 07:36

## 2021-10-26 RX ADMIN — Medication 3000 MG: at 08:10

## 2021-10-26 RX ADMIN — FAMOTIDINE 20 MG: 10 INJECTION INTRAVENOUS at 20:29

## 2021-10-26 RX ADMIN — HYDROMORPHONE HYDROCHLORIDE 0.25 MG: 1 INJECTION, SOLUTION INTRAMUSCULAR; INTRAVENOUS; SUBCUTANEOUS at 16:08

## 2021-10-26 RX ADMIN — FLUORESCEIN SODIUM 25 MG: 100 INJECTION INTRAVENOUS at 12:28

## 2021-10-26 RX ADMIN — FENTANYL CITRATE 50 MCG: 50 INJECTION, SOLUTION INTRAMUSCULAR; INTRAVENOUS at 13:19

## 2021-10-26 RX ADMIN — FENTANYL CITRATE 50 MCG: 50 INJECTION, SOLUTION INTRAMUSCULAR; INTRAVENOUS at 13:13

## 2021-10-26 RX ADMIN — Medication 3000 MG: at 12:10

## 2021-10-26 RX ADMIN — ACETAMINOPHEN 1000 MG: 500 TABLET ORAL at 06:51

## 2021-10-26 RX ADMIN — ROCURONIUM BROMIDE 50 MG: 10 INJECTION INTRAVENOUS at 07:36

## 2021-10-26 RX ADMIN — ONDANSETRON 4 MG: 2 INJECTION, SOLUTION INTRAMUSCULAR; INTRAVENOUS at 09:52

## 2021-10-26 RX ADMIN — SODIUM CHLORIDE, SODIUM LACTATE, POTASSIUM CHLORIDE, CALCIUM CHLORIDE: 600; 310; 30; 20 INJECTION, SOLUTION INTRAVENOUS at 07:42

## 2021-10-26 RX ADMIN — MIDAZOLAM HYDROCHLORIDE 2 MG: 1 INJECTION, SOLUTION INTRAMUSCULAR; INTRAVENOUS at 07:27

## 2021-10-26 RX ADMIN — PROPOFOL 200 MG: 10 INJECTION, EMULSION INTRAVENOUS at 07:36

## 2021-10-26 ASSESSMENT — PULMONARY FUNCTION TESTS
PIF_VALUE: 38
PIF_VALUE: 38
PIF_VALUE: 37
PIF_VALUE: 39
PIF_VALUE: 38
PIF_VALUE: 38
PIF_VALUE: 24
PIF_VALUE: 16
PIF_VALUE: 0
PIF_VALUE: 38
PIF_VALUE: 37
PIF_VALUE: 28
PIF_VALUE: 37
PIF_VALUE: 39
PIF_VALUE: 39
PIF_VALUE: 37
PIF_VALUE: 29
PIF_VALUE: 37
PIF_VALUE: 38
PIF_VALUE: 29
PIF_VALUE: 29
PIF_VALUE: 38
PIF_VALUE: 37
PIF_VALUE: 38
PIF_VALUE: 38
PIF_VALUE: 35
PIF_VALUE: 38
PIF_VALUE: 37
PIF_VALUE: 39
PIF_VALUE: 38
PIF_VALUE: 37
PIF_VALUE: 24
PIF_VALUE: 37
PIF_VALUE: 37
PIF_VALUE: 38
PIF_VALUE: 37
PIF_VALUE: 38
PIF_VALUE: 37
PIF_VALUE: 38
PIF_VALUE: 37
PIF_VALUE: 3
PIF_VALUE: 1
PIF_VALUE: 38
PIF_VALUE: 37
PIF_VALUE: 33
PIF_VALUE: 37
PIF_VALUE: 37
PIF_VALUE: 38
PIF_VALUE: 39
PIF_VALUE: 39
PIF_VALUE: 37
PIF_VALUE: 39
PIF_VALUE: 37
PIF_VALUE: 37
PIF_VALUE: 38
PIF_VALUE: 37
PIF_VALUE: 29
PIF_VALUE: 38
PIF_VALUE: 37
PIF_VALUE: 35
PIF_VALUE: 35
PIF_VALUE: 38
PIF_VALUE: 39
PIF_VALUE: 32
PIF_VALUE: 3
PIF_VALUE: 38
PIF_VALUE: 0
PIF_VALUE: 37
PIF_VALUE: 37
PIF_VALUE: 35
PIF_VALUE: 37
PIF_VALUE: 37
PIF_VALUE: 39
PIF_VALUE: 2
PIF_VALUE: 37
PIF_VALUE: 37
PIF_VALUE: 2
PIF_VALUE: 0
PIF_VALUE: 39
PIF_VALUE: 38
PIF_VALUE: 37
PIF_VALUE: 38
PIF_VALUE: 35
PIF_VALUE: 39
PIF_VALUE: 20
PIF_VALUE: 39
PIF_VALUE: 37
PIF_VALUE: 28
PIF_VALUE: 38
PIF_VALUE: 37
PIF_VALUE: 37
PIF_VALUE: 1
PIF_VALUE: 37
PIF_VALUE: 39
PIF_VALUE: 39
PIF_VALUE: 35
PIF_VALUE: 28
PIF_VALUE: 39
PIF_VALUE: 38
PIF_VALUE: 39
PIF_VALUE: 28
PIF_VALUE: 35
PIF_VALUE: 37
PIF_VALUE: 38
PIF_VALUE: 37
PIF_VALUE: 33
PIF_VALUE: 40
PIF_VALUE: 38
PIF_VALUE: 17
PIF_VALUE: 39
PIF_VALUE: 37
PIF_VALUE: 35
PIF_VALUE: 39
PIF_VALUE: 9
PIF_VALUE: 38
PIF_VALUE: 1
PIF_VALUE: 37
PIF_VALUE: 38
PIF_VALUE: 31
PIF_VALUE: 39
PIF_VALUE: 38
PIF_VALUE: 39
PIF_VALUE: 1
PIF_VALUE: 37
PIF_VALUE: 38
PIF_VALUE: 39
PIF_VALUE: 21
PIF_VALUE: 38
PIF_VALUE: 39
PIF_VALUE: 29
PIF_VALUE: 38
PIF_VALUE: 37
PIF_VALUE: 38
PIF_VALUE: 38
PIF_VALUE: 39
PIF_VALUE: 39
PIF_VALUE: 37
PIF_VALUE: 1
PIF_VALUE: 21
PIF_VALUE: 38
PIF_VALUE: 35
PIF_VALUE: 37
PIF_VALUE: 7
PIF_VALUE: 38
PIF_VALUE: 5
PIF_VALUE: 30
PIF_VALUE: 25
PIF_VALUE: 37
PIF_VALUE: 39
PIF_VALUE: 38
PIF_VALUE: 38
PIF_VALUE: 37
PIF_VALUE: 38
PIF_VALUE: 18
PIF_VALUE: 38
PIF_VALUE: 35
PIF_VALUE: 38
PIF_VALUE: 33
PIF_VALUE: 25
PIF_VALUE: 35
PIF_VALUE: 38
PIF_VALUE: 23
PIF_VALUE: 37
PIF_VALUE: 39
PIF_VALUE: 38
PIF_VALUE: 37
PIF_VALUE: 39
PIF_VALUE: 37
PIF_VALUE: 37
PIF_VALUE: 38
PIF_VALUE: 35
PIF_VALUE: 37
PIF_VALUE: 37
PIF_VALUE: 38
PIF_VALUE: 39
PIF_VALUE: 38
PIF_VALUE: 31
PIF_VALUE: 37
PIF_VALUE: 38
PIF_VALUE: 2
PIF_VALUE: 41
PIF_VALUE: 37
PIF_VALUE: 29
PIF_VALUE: 35
PIF_VALUE: 2
PIF_VALUE: 38
PIF_VALUE: 37
PIF_VALUE: 37
PIF_VALUE: 38
PIF_VALUE: 39
PIF_VALUE: 25
PIF_VALUE: 38
PIF_VALUE: 37
PIF_VALUE: 38
PIF_VALUE: 39
PIF_VALUE: 35
PIF_VALUE: 11
PIF_VALUE: 38
PIF_VALUE: 37
PIF_VALUE: 35
PIF_VALUE: 21
PIF_VALUE: 38
PIF_VALUE: 31
PIF_VALUE: 37
PIF_VALUE: 38
PIF_VALUE: 37
PIF_VALUE: 37
PIF_VALUE: 1
PIF_VALUE: 38
PIF_VALUE: 39
PIF_VALUE: 37
PIF_VALUE: 38
PIF_VALUE: 37
PIF_VALUE: 39
PIF_VALUE: 28
PIF_VALUE: 38
PIF_VALUE: 37
PIF_VALUE: 37
PIF_VALUE: 33
PIF_VALUE: 29
PIF_VALUE: 37
PIF_VALUE: 38
PIF_VALUE: 37
PIF_VALUE: 39
PIF_VALUE: 38
PIF_VALUE: 37
PIF_VALUE: 37
PIF_VALUE: 38
PIF_VALUE: 38
PIF_VALUE: 29
PIF_VALUE: 38
PIF_VALUE: 34
PIF_VALUE: 37
PIF_VALUE: 37
PIF_VALUE: 31
PIF_VALUE: 8
PIF_VALUE: 34
PIF_VALUE: 39
PIF_VALUE: 35
PIF_VALUE: 37
PIF_VALUE: 38
PIF_VALUE: 38
PIF_VALUE: 28
PIF_VALUE: 38
PIF_VALUE: 0
PIF_VALUE: 18
PIF_VALUE: 37
PIF_VALUE: 26
PIF_VALUE: 37
PIF_VALUE: 1
PIF_VALUE: 39
PIF_VALUE: 29
PIF_VALUE: 38
PIF_VALUE: 39
PIF_VALUE: 37
PIF_VALUE: 0
PIF_VALUE: 38
PIF_VALUE: 39
PIF_VALUE: 39
PIF_VALUE: 37
PIF_VALUE: 32
PIF_VALUE: 35
PIF_VALUE: 38
PIF_VALUE: 3
PIF_VALUE: 38
PIF_VALUE: 37
PIF_VALUE: 38
PIF_VALUE: 37
PIF_VALUE: 39
PIF_VALUE: 39
PIF_VALUE: 1
PIF_VALUE: 39
PIF_VALUE: 31
PIF_VALUE: 21
PIF_VALUE: 37
PIF_VALUE: 29
PIF_VALUE: 37
PIF_VALUE: 39
PIF_VALUE: 38
PIF_VALUE: 38
PIF_VALUE: 30
PIF_VALUE: 37
PIF_VALUE: 11
PIF_VALUE: 38
PIF_VALUE: 5
PIF_VALUE: 5
PIF_VALUE: 24
PIF_VALUE: 37
PIF_VALUE: 37
PIF_VALUE: 32
PIF_VALUE: 38
PIF_VALUE: 31
PIF_VALUE: 38
PIF_VALUE: 38
PIF_VALUE: 17
PIF_VALUE: 29
PIF_VALUE: 2
PIF_VALUE: 37
PIF_VALUE: 37
PIF_VALUE: 27
PIF_VALUE: 37
PIF_VALUE: 4
PIF_VALUE: 2
PIF_VALUE: 37
PIF_VALUE: 37
PIF_VALUE: 31
PIF_VALUE: 29
PIF_VALUE: 37
PIF_VALUE: 29
PIF_VALUE: 39
PIF_VALUE: 28

## 2021-10-26 ASSESSMENT — PAIN SCALES - GENERAL
PAINLEVEL_OUTOF10: 0
PAINLEVEL_OUTOF10: 6
PAINLEVEL_OUTOF10: 0
PAINLEVEL_OUTOF10: 0
PAINLEVEL_OUTOF10: 2
PAINLEVEL_OUTOF10: 0
PAINLEVEL_OUTOF10: 9
PAINLEVEL_OUTOF10: 5
PAINLEVEL_OUTOF10: 6
PAINLEVEL_OUTOF10: 6
PAINLEVEL_OUTOF10: 0
PAINLEVEL_OUTOF10: 6
PAINLEVEL_OUTOF10: 8
PAINLEVEL_OUTOF10: 4
PAINLEVEL_OUTOF10: 7
PAINLEVEL_OUTOF10: 0
PAINLEVEL_OUTOF10: 9
PAINLEVEL_OUTOF10: 0
PAINLEVEL_OUTOF10: 4

## 2021-10-26 ASSESSMENT — PAIN DESCRIPTION - LOCATION
LOCATION: ABDOMEN

## 2021-10-26 ASSESSMENT — PAIN DESCRIPTION - PAIN TYPE
TYPE: SURGICAL PAIN
TYPE: ACUTE PAIN
TYPE: SURGICAL PAIN

## 2021-10-26 ASSESSMENT — PAIN - FUNCTIONAL ASSESSMENT: PAIN_FUNCTIONAL_ASSESSMENT: 0-10

## 2021-10-26 NOTE — ANESTHESIA POSTPROCEDURE EVALUATION
Department of Anesthesiology  Postprocedure Note    Patient: Jessika Phelan  MRN: [id number]  YOB: 1975  Date of evaluation: 10/26/2021  Time:  5:41 PM     Procedure Summary     Date: 10/26/21 Room / Location: [facility] / [facility]    Anesthesia Start: 2935 Anesthesia Stop: 9848    Procedure: XI ROBOTIC LAPAROSCOPIC MODIFIED RADICAL HYSTERECTOMY, BILATERAL SALPINGO OOPHORECTOMY, CYTOLOGIC WASHINGS, CYSTOSCOPY (N/A Abdomen) Diagnosis: (MENORRHAGIA, CHRONIC ANEMIA, LARGE ABDOMINAL LESION)    Surgeons: Suyapa Lopez MD Responsible Provider: Nitish Bhardwaj MD    Anesthesia Type: general ASA Status: 2          Anesthesia Type: general    Miguel Angel Phase I: Miguel Angel Score: 8    Miguel Angel Phase II:      Last vitals: Reviewed and per EMR flowsheets.        Anesthesia Post Evaluation    Patient location during evaluation: PACU  Patient participation: complete - patient participated  Level of consciousness: awake and alert  Pain score: 4  Airway patency: patent  Nausea & Vomiting: no nausea and no vomiting  Complications: no  Cardiovascular status: hemodynamically stable  Respiratory status: room air  Hydration status: euvolemic

## 2021-10-26 NOTE — PROGRESS NOTES
Gynecology Progress Note    Date: 10/26/2021  Time: 4:46 PM    Redmond Byers Cogan 55 y.o. female , POD # 0 s/p Robotic assisted laparoscopic modified radial hysterectomy, bilateral salpingo-oophorectomy, cytologic washings, cystoscopy on 10/26/21    Patient seen and examined. She complained of soreness and mouth feeling dry. Pain is controlled. Patient is  tolerating PO hydration. She is urinating via rabago catheter. She has minimal vaginal bleeding. She is not yet ambulating. She is not passing flatus. She denies Fever/Chills, Chest Pain, SOB, N/V, Calf Pain    Vitals:  Vitals:    10/26/21 1430 10/26/21 1445 10/26/21 1515 10/26/21 1600   BP: (!) 157/90 (!) 150/91  134/87   Pulse: 87 87 94 89   Resp: 12 12  13   Temp:       TempSrc:       SpO2: 98% 97%  98%   Weight:       Height:             Intake/Output:   Last Shift: I/O last 3 completed shifts: In:  [I.V.:]  Out: 1100 [Urine:950; Blood:150]  Current Shift: I/O this shift: In: 700 [I.V.:700]  Out: -         Physical Exam:  General:  no apparent distress, alert and cooperative  Neurologic:  alert, oriented, normal speech, no focal findings or movement disorder noted  Lungs:  No increased work of breathing, good air exchange, clear to auscultation bilaterally, no crackles or wheezing  Heart:  regular rate and rhythm and no murmur    Abdomen: soft, non-distended, appropriate tenderness, no CVA tenderness, hypoactive bowel sounds, ELOISE drain in LLQ with serosanguinous drainage.  Dressing surrounding ELOISE drain saturated with serosanguinous drainage and replaced  Incision: clean, dry and intact  Extremities:  no calf tenderness, non edematous, SCDs on and functioning    Lab:  Recent Results (from the past 12 hour(s))   POCT urine pregnancy    Collection Time: 10/26/21  6:30 AM   Result Value Ref Range    HCG, Pregnancy Urine (POC) NEGATIVE NEGATIVE   Cytology, Non-Gyn    Collection Time: 10/26/21  9:02 AM   Result Value Ref Range    Specimen Description NOT REPORTED     Case Number: [id number]    POC Glucose Fingerstick    Collection Time: 10/26/21  1:37 PM   Result Value Ref Range    POC Glucose 168 (H) 65 - 105 mg/dL       Assessment/Plan:  Redmond Byers Cogan 55 y.o. female , POD #0 s/p Robotic assisted laparoscopic modified radial hysterectomy, bilateral salpingo-oophorectomy, cytologic washings, cystoscopy on 10/26/21     - Doing well, vitals stable   - Continue rabago catheter, UOP adequate   - Continue IVF   - Encourage ambulation and use of incentive spirometer   - Pain control: Dilaudid/Toradol, de-escalate to Newcomb when able   - Labs: CBC, BMP in AM   - DVT Proph: Lovenox, SCDs   - Abx: S/p Ancef   - Diet: general   - Path: pending   - Continue post-op care, please page with any questions    Hx of Bariatric Surgery   - Avoid PO NSAIDs    Hx of Anxiety/Depression   - Controlled on no meds at this time      Caitlyn Nath DO  Ob/Gyn Resident  Pager: [phone]  10/26/2021, 4:46 PM

## 2021-10-26 NOTE — BRIEF OP NOTE
Brief Operative Note  Department of [facility] [address]      Patient: Steph Norris   : 1975  MRN: [id number]       Acct: [de-identified]   Date of Procedure: 10/26/21     Pre-operative Diagnosis: 55 y.o. female   Endometrial Lesion  Severe Menorrhagia  History of Elevated     Post-operative Diagnosis: same    Procedure: Robotic assisted laparoscopic modified radial hysterectomy, bilateral salpingo-oophorectomy, cytologic washings, cystoscopy    Surgeon: Carlos Enrique Whitehead MD     Assistant(s): Rosi Ryan, PGY4; Richy Chau, MS4; Armando Stephenson, MS4    Anesthesia: general     Findings:  Normal external genitalia, normal vaginal mucosa with moderate rectocele, normal appearing cervix without lesions. Bimanual exam reveals single anteverted uterus approximately 8 cm with no adnexal fullness. Survey of pelvis revealed normal appearing uterus, fallopian tubes, and ovaries. Cystoscopy reveals no evidence of sutures or trauma, bilateral ureteral jets visualized. Total IV fluids/Blood products:  1800 ml IV fluids  Urine Output:  300 ml    Estimated blood loss:  150ml  Drains:  rabago catheter, LLQ ELOISE drain  Specimens:  Uterus, cervix, bilateral fallopian tubes, bilateral ovaries, peritoneal washings for cytology  Instrument and Sponge Count: Correct  Complications:  none  Condition:  stable, transferred to post anesthesia recovery    See full operative report for further details. Rosi Ryan DO  Ob/Gyn Resident  Pager: [phone]  10/26/2021, 1:04 PM        Attending Physician Statement  I have discussed the care of Steph Norris, including pertinent history and exam findings,  with the resident. I have seen and examined the patient and the key elements of all parts of the encounter have been performed by me. I agree with the assessment, plan and orders as documented by the resident.      Carlos Enrique Whitehead MD

## 2021-10-26 NOTE — PROGRESS NOTES
C/o chest heaviness /hurts to take a deep breathe-dr menon at bedside-talked with patient-chest heaviness passing /denies dyspnea-no orders given

## 2021-10-26 NOTE — PROGRESS NOTES
Dr Aaron Anaya at bedside-notified serous -brownish drng around the darrian site-dressing taken down while dr Aaron Anaya here-redressed with split cathleen and ABDS x 2-viewed amount and color in darrian-no further orders at present

## 2021-10-26 NOTE — DISCHARGE SUMMARY
[facility] Oncology Discharge Summary  [place]      Patient Name: Lucho Reyes  Patient : 1975  Primary Care Physician: JUSTICE De Anda CNP  Admit Date: 10/26/2021    Principal Diagnosis: Endometrial Lesion    Other Diagnosis:   S/P hysterectomy [Z90.710]  Patient Active Problem List   Diagnosis    Iron deficiency anemia    Intestinal malabsorption    Anxiety    Depression    Malabsorption of iron    Vitamin D deficiency    Zinc deficiency    Peripheral arterial occlusive disease ([address]    Endometriosis    History of scleroderma    May-Thurner syndrome    Bariatric surgery status    Calculus of kidney    Suspected COVID-19 virus infection    Hysteroscopy, D&C, Polypectomy 21    Abnormal uterine bleeding due to endometrial polyp    RALH, BSO, Cytologic washings, cystoscopy       Infection: No  Hospital Acquired: No    Surgical Operations & Procedures: Robotic assisted laparoscopic modified radial hysterectomy, bilateral salpingo-oophorectomy, cytologic washings, cystoscopy    Consultations: Anesthesia    Pertinent Findings & Procedures:   Lucho Reyes is a 55 y.o. female No obstetric history on file. , admitted for surgical management of endometrial lesion and AUB; received Ancef and Lovenox preoperatively. She underwent Robotic assisted laparoscopic modified radial hysterectomy, bilateral salpingo-oophorectomy, cytologic washings, cystoscopy on 10/26/21. Post-op course normal, discharged home on 10/28/21. Follow up in 2 weeks. Discharge instructions reviewed and questions answered.     Course of patient: normal    Discharge to: Home    Readmission planned: No    Recommendations on Discharge:     Medications:     Medication List        START taking these medications      acetaminophen 325 MG tablet  Commonly known as: Aminofen  Take 2 tablets by mouth every 6 hours as needed for Pain     ampicillin 500 MG capsule  Commonly known as: PRINCIPEN  Take 1 capsule by mouth 4 times daily for 10 days     docusate sodium 100 MG capsule  Commonly known as: Colace  Take 1 capsule by mouth 2 times daily as needed for Constipation     enoxaparin 40 MG/0.4ML injection  Commonly known as: Lovenox  Inject 0.4 mLs into the skin daily     HYDROcodone-acetaminophen 5-325 MG per tablet  Commonly known as: Norco  Take 1 tablet by mouth every 6 hours as needed for Pain for up to 5 days. Intended supply: 5 days. Take lowest dose possible to manage pain     simethicone 80 MG chewable tablet  Commonly known as: MYLICON  Take 1 tablet by mouth every 6 hours as needed for Flatulence            CONTINUE taking these medications      busPIRone 10 MG tablet  Commonly known as: BUSPAR  Take 1 tablet by mouth 3 times daily as needed (anxiety)     calcium carbonate 1250 (500 Ca) MG tablet  Commonly known as: OYSTER SHELL CALCIUM 500 mg     Jobst UltraSheer 20-30mmHg Lg Misc  Wear daily, ok to remove in the evenings     losartan 25 MG tablet  Commonly known as: COZAAR  Take 1 tablet by mouth daily     MULTI-JULIO CÉSAR PO     vitamin B-12 100 MCG tablet  Commonly known as: CYANOCOBALAMIN     vitamin D3 25 MCG (1000 UT) Tabs tablet  Commonly known as: CHOLECALCIFEROL  Take 1 tablet by mouth daily     zinc 50 MG Caps  Take 50 mg by mouth daily               Where to Get Your Medications        You can get these medications from any pharmacy    Bring a paper prescription for each of these medications  acetaminophen 325 MG tablet  ampicillin 500 MG capsule  docusate sodium 100 MG capsule  enoxaparin 40 MG/0.4ML injection  HYDROcodone-acetaminophen 5-325 MG per tablet  simethicone 80 MG chewable tablet           Activity: pelvic rest x 6-8 weeks, no driving on narcotics, no lifting greater than 10 lbs  Diet: regular diet  Follow up: 2 weeks     Condition on discharge: stable   Discharge Date: 10/28/21    Comments:  Home care, Follow-up care, restrictions reviewed.     Clayton Baca DO  Ob/Gyn Resident  [address]

## 2021-10-26 NOTE — ANESTHESIA PRE PROCEDURE
Department of Anesthesiology  Preprocedure Note       Name:  Steph Norris   Age:  55 y.o.  :  1975                                          MRN:  [id number]         Date:  10/26/2021      Surgeon: Lonna Frankel):  Gonzalo Taylor MD    Procedure: Procedure(s):  XI ROBOTIC LAPAROSCOPIC MODIFIED RADICAL HYSTERECTOMY, BILATERAL SALPINGO OOPHORECTOMY, CYTOLOGIC WASHINGS, SENTINEL LYMPH NODE MAPPING AND SENTINEL LYMPH NODE BIOPSIES, POSSIBLE LAPAROTOMY, ** SHORT STAY**    Medications prior to admission:   Prior to Admission medications    Medication Sig Start Date End Date Taking?  Authorizing Provider   losartan (COZAAR) 25 MG tablet Take 1 tablet by mouth daily 21  Yes JUSTICE Maradiaga - CNP   Multiple Vitamins-Minerals (MULTI-JULIO CÉSAR PO) Take by mouth daily   Yes Historical Provider, MD   vitamin B-12 (CYANOCOBALAMIN) 100 MCG tablet Take 50 mcg by mouth daily   Yes Historical Provider, MD   calcium carbonate (OYSTER SHELL CALCIUM 500 MG) 1250 (500 Ca) MG tablet Take 1 tablet by mouth daily   Yes Historical Provider, MD   vitamin D3 (CHOLECALCIFEROL) 25 MCG (1000 UT) TABS tablet Take 1 tablet by mouth daily 20  Yes [facility], MD   zinc 50 MG CAPS Take 50 mg by mouth daily 12/9/20 10/26/21 Yes [facility], MD   busPIRone (BUSPAR) 10 MG tablet Take 1 tablet by mouth 3 times daily as needed (anxiety) 21   JUSTICE Stevens - CNP   Elastic Bandages & Supports (Luisa Inch 20-30MMHG LG) MISC Wear daily, ok to remove in the evenings 21   Mike Dixon MD       Current medications:    Current Facility-Administered Medications   Medication Dose Route Frequency Provider Last Rate Last Admin    lactated ringers infusion   IntraVENous Continuous Sari Mack  mL/hr at 10/26/21 0656 New Bag at 10/26/21 0656    midazolam PF (VERSED) injection 1 mg  1 mg IntraVENous Q10 Min PRN Sari Mack MD        lactated ringers infusion 1,000 mL  1,000 mL IntraVENous Continuous Jazmin Moraes MD        0.9 % sodium chloride infusion   IntraVENous Continuous Cindy Elise PA-C        0.9 % sodium chloride infusion  25 mL IntraVENous PRN Cindy Elise PA-C        ceFAZolin (ANCEF) 3000 mg in dextrose 5 % 100 mL IVPB  3,000 mg IntraVENous [facility]        sodium chloride flush 0.9 % injection 10 mL  10 mL IntraVENous 2 times per day [facility]        sodium chloride flush 0.9 % injection 10 mL  10 mL IntraVENous PRN Cindy Elise PA-C         Facility-Administered Medications Ordered in Other Encounters   Medication Dose Route Frequency Provider Last Rate Last Admin    lactated ringers infusion   IntraVENous Continuous PRN JUSTICE Brandt - CRNA   New Bag at 10/26/21 8893       Allergies: Allergies   Allergen Reactions    Contrast [Iodides] Hives     Chest pain, HTN  Able to handle if the pre-medication prep is followed.  Trileptal [Oxcarbazepine] Shortness Of Breath     Red neck rash/ visual changes    Morphine Other (See Comments)     Red streak up arm at iv site/ and hives       Problem List:    Patient Active Problem List   Diagnosis Code    Iron deficiency anemia D50.9    Intestinal malabsorption K90.9    Anxiety F41.9    Depression F32. A    Malabsorption of iron K90.9    Vitamin D deficiency E55.9    Zinc deficiency E60    Peripheral arterial occlusive disease (HCC) I77.9    Endometriosis N80.9    History of scleroderma Z87.39    May-Thurner syndrome I87.1    Bariatric surgery status Z98.84    Calculus of kidney N20.0    Suspected COVID-19 virus infection Z20.822    Hysteroscopy, D&C, Polypectomy 5/12/21 Z98.890    Abnormal uterine bleeding due to endometrial polyp N93.9, N84.0       Past Medical History:        Diagnosis Date    Anemia     Anxiety     Carpal tunnel syndrome     Depression     Endometriosis     Gestational diabetes     x 3 children    Headache     migraines    History of blood transfusion  Iron deficiency anemia     Kidney stones     May-Thurner syndrome     Obesity     Scleroderma (HCC)     Snores     Under care of team     PCP - Edi Mazariegos CNP - LAST VISIT 2021    Under care of team     VASCULAR  - DR. RIBEIRO - 2021 - SEES FOR MAY-THURNER SYNDROME    Under care of team     HEM/ONC - DR. MADDIE WHITMORE - LAST VISIT - 2021    Wears glasses        Past Surgical History:        Procedure Laterality Date     SECTION  x 3    CHOLECYSTECTOMY      DILATION AND CURETTAGE OF UTERUS N/A 2021     DILATATION AND CURETTAGE HYSTEROSCOPY MYOSURE (N/A )    DILATION AND CURETTAGE OF UTERUS N/A 2021    ER visit night of surgery for hypertension, hyperglycemia.  ENDOMETRIAL ABLATION  2021    GASTRIC BYPASS SURGERY      PABLO- EN- Y    OTHER SURGICAL HISTORY      growth removed from outer area of uterus    PELVIC LAPAROSCOPY      TOENAIL EXCISION      VASCULAR SURGERY  2016    to evaluate May Rader syndrome effects on  blood vessels       Social History:    Social History     Tobacco Use    Smoking status: Never Smoker    Smokeless tobacco: Never Used   Substance Use Topics    Alcohol use:  No                                Counseling given: Not Answered      Vital Signs (Current):   Vitals:    10/26/21 0635   BP: 136/85   Pulse: 95   Resp: 18   Temp: 97.2 °F (36.2 °C)   TempSrc: Temporal   SpO2: 98%   Weight: 289 lb (131.1 kg)   Height: 5' 4\" (1.626 m)                                              BP Readings from Last 3 Encounters:   10/26/21 136/85   10/15/21 (!) 139/91   10/08/21 122/85       NPO Status: Time of last liquid consumption:                         Time of last solid consumption:                         Date of last liquid consumption: 10/25/21                        Date of last solid food consumption: 10/23/21    BMI:   Wt Readings from Last 3 Encounters:   10/26/21 289 lb (131.1 kg)   10/15/21 289 lb (131.1 kg) 09/14/21 288 lb 12.8 oz (131 kg)     Body mass index is 49.61 kg/m². CBC:   Lab Results   Component Value Date    WBC 7.0 10/15/2021    RBC 4.91 10/15/2021    HGB 13.2 10/15/2021    HCT 44.2 10/15/2021    MCV 90.0 10/15/2021    RDW 14.8 10/15/2021     10/15/2021       CMP:   Lab Results   Component Value Date     10/15/2021    K 4.5 10/15/2021     10/15/2021    CO2 22 10/15/2021    BUN 14 10/15/2021    CREATININE 0.61 10/15/2021    GFRAA >60 10/15/2021    LABGLOM >60 10/15/2021    GLUCOSE 95 10/15/2021    PROT 8.0 10/15/2021    CALCIUM 9.3 10/15/2021    BILITOT 0.20 10/15/2021    ALKPHOS 98 10/15/2021    AST 29 10/15/2021    ALT 36 10/15/2021       POC Tests: No results for input(s): POCGLU, POCNA, POCK, POCCL, POCBUN, POCHEMO, POCHCT in the last 72 hours.     Coags: No results found for: PROTIME, INR, APTT    HCG (If Applicable):   Lab Results   Component Value Date    HCG NEGATIVE 05/12/2021    HCGQUANT <1 03/18/2021        ABGs: No results found for: PHART, PO2ART, NBO0MPV, AZX4ZSB, BEART, D1BWWIIU     Type & Screen (If Applicable):  No results found for: LABABO, LABRH    Drug/Infectious Status (If Applicable):  No results found for: HIV, HEPCAB    COVID-19 Screening (If Applicable):   Lab Results   Component Value Date    COVID19 Not Detected 05/08/2021           Anesthesia Evaluation  Patient summary reviewed and Nursing notes reviewed history of anesthetic complications:   Airway: Mallampati: II  TM distance: >3 FB   Neck ROM: full  Mouth opening: > = 3 FB Dental: normal exam         Pulmonary:Negative Pulmonary ROS and normal exam                               Cardiovascular:  Exercise tolerance: good (>4 METS),       (-) past MI, CAD, CABG/stent and  angina      Rhythm: regular  Rate: normal           Beta Blocker:  Not on Beta Blocker         Neuro/Psych:   (+) neuromuscular disease:, headaches:, psychiatric history:            GI/Hepatic/Renal: Neg GI/Hepatic/Renal ROS Endo/Other:    (+) Diabetes, : arthritis:., .                 Abdominal:             Vascular: Other Findings:             Anesthesia Plan      general     ASA 2       Induction: intravenous. MIPS: Postoperative opioids intended and Prophylactic antiemetics administered. Anesthetic plan and risks discussed with patient. Use of blood products discussed with patient whom consented to blood products.    Plan discussed with CRNA and surgical team.                Fermín Santacruz MD   10/26/2021

## 2021-10-26 NOTE — PROGRESS NOTES
Gynecology Oncology Progress Note    Date: 10/27/2021  Time: 9:08 AM    Gretchen Garner Cogan 55 y.o. female , POD # 1 s/p Robotic assisted laparoscopic modified radial hysterectomy, bilateral salpingo-oophorectomy, cytologic washings, cystoscopy on 10/26/21  Patient seen and examined. She complained of some soreness this morning. Pain is controlled. Patient is  tolerating oral intake. She has not yet voided spontaneously since catheter was removed, but had good urine output overnight. She denies any vaginal bleeding. She is not ambulating yet, but plans to this morning. She is not passing flatus. She denies Fever/Chills, Chest Pain, SOB, N/V, Calf Pain    Vitals:  Vitals:    10/26/21 1755 10/26/21 1952 10/26/21 2237 10/27/21 0544   BP: 134/84 113/62 134/61 123/70   Pulse: 101 115 103 66   Resp: 16 19 18 16   Temp: 98.3 °F (36.8 °C) 98 °F (36.7 °C) 98.7 °F (37.1 °C) 98 °F (36.7 °C)   TempSrc:  Oral Oral Oral   SpO2:  97%  100%   Weight:       Height:             Intake/Output:   Last Shift: I/O last 3 completed shifts:   In: 8478 [I.V.:4156]  Out: 9478 [Urine:2350; Drains:20; Blood:150]  Current Shift: I/O this shift:  In: -   Out: 430 [Urine:400; Drains:30]  800 mL out in last 12 hrs ~ 66mL/hr      Physical Exam:  General:  no apparent distress, alert and cooperative  Neurologic:  alert, oriented, normal speech, no focal findings or movement disorder noted  Lungs:  No increased work of breathing, good air exchange, clear to auscultation bilaterally, no crackles or wheezing  Heart:  regular rate and rhythm and no murmur    Abdomen: soft, non-distended, appropriate tenderness, no CVA tenderness, normal bowel sounds, ELOISE drain in LLQ with minimal serosanguinous drainage  Incision: clean, dry and intact  Extremities:  no calf tenderness, left lower extremity edema present which has not changed from baseline, SCDs on and functioning    Lab:  Recent Results (from the past 12 hour(s))   CBC auto differential    Collection Time: 10/27/21  5:30 AM   Result Value Ref Range    WBC 12.0 (H) 3.5 - 11.3 k/uL    RBC 4.28 3.95 - 5.11 m/uL    Hemoglobin 12.0 11.9 - 15.1 g/dL    Hematocrit 37.8 36.3 - 47.1 %    MCV 88.3 82.6 - 102.9 fL    MCH 28.0 25.2 - 33.5 pg    MCHC 31.7 28.4 - 34.8 g/dL    RDW 15.4 (H) 11.8 - 14.4 %    Platelets 347 335 - 185 k/uL    MPV 12.3 8.1 - 13.5 fL    NRBC Automated 0.0 0.0 per 100 WBC    Differential Type NOT REPORTED     Seg Neutrophils 78 (H) 36 - 65 %    Lymphocytes 13 (L) 24 - 43 %    Monocytes 8 3 - 12 %    Eosinophils % 0 (L) 1 - 4 %    Basophils 0 0 - 2 %    Immature Granulocytes 1 (H) 0 %    Segs Absolute 9.37 (H) 1.50 - 8.10 k/uL    Absolute Lymph # 1.52 1.10 - 3.70 k/uL    Absolute Mono # 0.96 0.10 - 1.20 k/uL    Absolute Eos # <0.03 0.00 - 0.44 k/uL    Basophils Absolute <0.03 0.00 - 0.20 k/uL    Absolute Immature Granulocyte 0.07 0.00 - 0.30 k/uL    WBC Morphology NOT REPORTED     RBC Morphology ANISOCYTOSIS PRESENT     Platelet Estimate NOT REPORTED    Basic Metabolic Panel w/ Reflex to MG    Collection Time: 10/27/21  5:30 AM   Result Value Ref Range    Glucose 126 (H) 70 - 99 mg/dL    BUN 5 (L) 6 - 20 mg/dL    CREATININE 0.49 (L) 0.50 - 0.90 mg/dL    Bun/Cre Ratio NOT REPORTED 9 - 20    Calcium 8.4 (L) 8.6 - 10.4 mg/dL    Sodium 139 135 - 144 mmol/L    Potassium 4.1 3.7 - 5.3 mmol/L    Chloride 106 98 - 107 mmol/L    CO2 22 20 - 31 mmol/L    Anion Gap 11 9 - 17 mmol/L    GFR Non-African American >60 >60 mL/min    GFR African American >60 >60 mL/min    GFR Comment          GFR Staging NOT REPORTED        Assessment/Plan:  Valery Pam Cogan 55 y.o. female , POD #1 s/p Robotic assisted laparoscopic modified radial hysterectomy, bilateral salpingo-oophorectomy, cytologic washings, cystoscopy    - Doing well, vitals stable   - S/p rabago catheter, UOP adequate   - Continue IVF   - Encourage ambulation and use of incentive spirometer   - Pain control: Toradol/Racine   - Labs: CBC, BMP reviewed this AM and overall unremarkable   - Urine culture resulted as E. Coli 10-50,000 CFU/mL. Will start patient on Ampicillin   - DVT Proph: Lovenox, SCDs. Lovenox to be continued for 30 days post operatively   - Abx: S/p Ancef x2 on POD#0   - Diet: general   - Path: pending   - Continue post-op care, please page with any questions. Anticipate discharge home today. Hx of Bariatric Surgery              - Avoid PO NSAIDs     Hx of Anxiety/Depression              - Controlled on no meds at this time                  Christopher Valdovinos,   Ob/Gyn Resident  Pager: [phone]  10/27/2021, 9:08 AM        Attending Physician Statement  I have discussed the care of Jessika Phelan, including pertinent history and exam findings,  with the resident. I have seen and examined the patient and the key elements of all parts of the encounter have been performed by me. I agree with the assessment, plan and orders as documented by the resident.      Karina Coats MD

## 2021-10-27 LAB
ABSOLUTE EOS #: <0.03 K/UL (ref 0–0.44)
ABSOLUTE IMMATURE GRANULOCYTE: 0.07 K/UL (ref 0–0.3)
ABSOLUTE LYMPH #: 1.52 K/UL (ref 1.1–3.7)
ABSOLUTE MONO #: 0.96 K/UL (ref 0.1–1.2)
ANION GAP SERPL CALCULATED.3IONS-SCNC: 11 MMOL/L (ref 9–17)
BASOPHILS # BLD: 0 % (ref 0–2)
BASOPHILS ABSOLUTE: <0.03 K/UL (ref 0–0.2)
BUN BLDV-MCNC: 5 MG/DL (ref 6–20)
BUN/CREAT BLD: ABNORMAL (ref 9–20)
CALCIUM SERPL-MCNC: 8.4 MG/DL (ref 8.6–10.4)
CHLORIDE BLD-SCNC: 106 MMOL/L (ref 98–107)
CO2: 22 MMOL/L (ref 20–31)
CREAT SERPL-MCNC: 0.49 MG/DL (ref 0.5–0.9)
CULTURE: ABNORMAL
DIFFERENTIAL TYPE: ABNORMAL
EOSINOPHILS RELATIVE PERCENT: 0 % (ref 1–4)
GFR AFRICAN AMERICAN: >60 ML/MIN
GFR NON-AFRICAN AMERICAN: >60 ML/MIN
GFR SERPL CREATININE-BSD FRML MDRD: ABNORMAL ML/MIN/{1.73_M2}
GFR SERPL CREATININE-BSD FRML MDRD: ABNORMAL ML/MIN/{1.73_M2}
GLUCOSE BLD-MCNC: 126 MG/DL (ref 70–99)
HCT VFR BLD CALC: 37.8 % (ref 36.3–47.1)
HEMOGLOBIN: 12 G/DL (ref 11.9–15.1)
IMMATURE GRANULOCYTES: 1 %
LYMPHOCYTES # BLD: 13 % (ref 24–43)
Lab: ABNORMAL
MCH RBC QN AUTO: 28 PG (ref 25.2–33.5)
MCHC RBC AUTO-ENTMCNC: 31.7 G/DL (ref 28.4–34.8)
MCV RBC AUTO: 88.3 FL (ref 82.6–102.9)
MONOCYTES # BLD: 8 % (ref 3–12)
NRBC AUTOMATED: 0 PER 100 WBC
PDW BLD-RTO: 15.4 % (ref 11.8–14.4)
PLATELET # BLD: 192 K/UL (ref 138–453)
PLATELET ESTIMATE: ABNORMAL
PMV BLD AUTO: 12.3 FL (ref 8.1–13.5)
POTASSIUM SERPL-SCNC: 4.1 MMOL/L (ref 3.7–5.3)
RBC # BLD: 4.28 M/UL (ref 3.95–5.11)
RBC # BLD: ABNORMAL 10*6/UL
SEG NEUTROPHILS: 78 % (ref 36–65)
SEGMENTED NEUTROPHILS ABSOLUTE COUNT: 9.37 K/UL (ref 1.5–8.1)
SODIUM BLD-SCNC: 139 MMOL/L (ref 135–144)
SPECIMEN DESCRIPTION: ABNORMAL
SURGICAL PATHOLOGY REPORT: NORMAL
SURGICAL PATHOLOGY REPORT: NORMAL
WBC # BLD: 12 K/UL (ref 3.5–11.3)
WBC # BLD: ABNORMAL 10*3/UL

## 2021-10-27 PROCEDURE — 2500000003 HC RX 250 WO HCPCS: Performed by: STUDENT IN AN ORGANIZED HEALTH CARE EDUCATION/TRAINING PROGRAM

## 2021-10-27 PROCEDURE — 96372 THER/PROPH/DIAG INJ SC/IM: CPT

## 2021-10-27 PROCEDURE — G0378 HOSPITAL OBSERVATION PER HR: HCPCS

## 2021-10-27 PROCEDURE — 85025 COMPLETE CBC W/AUTO DIFF WBC: CPT

## 2021-10-27 PROCEDURE — 96376 TX/PRO/DX INJ SAME DRUG ADON: CPT

## 2021-10-27 PROCEDURE — 96375 TX/PRO/DX INJ NEW DRUG ADDON: CPT

## 2021-10-27 PROCEDURE — 6370000000 HC RX 637 (ALT 250 FOR IP): Performed by: STUDENT IN AN ORGANIZED HEALTH CARE EDUCATION/TRAINING PROGRAM

## 2021-10-27 PROCEDURE — 96374 THER/PROPH/DIAG INJ IV PUSH: CPT

## 2021-10-27 PROCEDURE — 94761 N-INVAS EAR/PLS OXIMETRY MLT: CPT

## 2021-10-27 PROCEDURE — 80048 BASIC METABOLIC PNL TOTAL CA: CPT

## 2021-10-27 PROCEDURE — 6360000002 HC RX W HCPCS: Performed by: STUDENT IN AN ORGANIZED HEALTH CARE EDUCATION/TRAINING PROGRAM

## 2021-10-27 PROCEDURE — 36415 COLL VENOUS BLD VENIPUNCTURE: CPT

## 2021-10-27 RX ORDER — AMPICILLIN 500 MG/1
500 CAPSULE ORAL 4 TIMES DAILY
Qty: 40 CAPSULE | Refills: 0 | Status: SHIPPED | OUTPATIENT
Start: 2021-10-27 | End: 2021-11-06

## 2021-10-27 RX ORDER — CEPHALEXIN 500 MG/1
500 CAPSULE ORAL EVERY 6 HOURS SCHEDULED
Status: DISCONTINUED | OUTPATIENT
Start: 2021-10-27 | End: 2021-10-28 | Stop reason: HOSPADM

## 2021-10-27 RX ADMIN — HYDROCODONE BITARTRATE AND ACETAMINOPHEN 2 TABLET: 5; 325 TABLET ORAL at 12:02

## 2021-10-27 RX ADMIN — ONDANSETRON 4 MG: 2 INJECTION INTRAMUSCULAR; INTRAVENOUS at 19:39

## 2021-10-27 RX ADMIN — KETOROLAC TROMETHAMINE 15 MG: 30 INJECTION, SOLUTION INTRAMUSCULAR at 16:09

## 2021-10-27 RX ADMIN — DOCUSATE SODIUM 100 MG: 100 CAPSULE ORAL at 21:45

## 2021-10-27 RX ADMIN — KETOROLAC TROMETHAMINE 15 MG: 30 INJECTION, SOLUTION INTRAMUSCULAR at 10:25

## 2021-10-27 RX ADMIN — KETOROLAC TROMETHAMINE 15 MG: 30 INJECTION, SOLUTION INTRAMUSCULAR at 22:51

## 2021-10-27 RX ADMIN — FAMOTIDINE 20 MG: 10 INJECTION INTRAVENOUS at 08:50

## 2021-10-27 RX ADMIN — SIMETHICONE 80 MG: 80 TABLET, CHEWABLE ORAL at 21:45

## 2021-10-27 RX ADMIN — HYDROCODONE BITARTRATE AND ACETAMINOPHEN 1 TABLET: 5; 325 TABLET ORAL at 17:41

## 2021-10-27 RX ADMIN — MAGNESIUM HYDROXIDE 30 ML: 400 SUSPENSION ORAL at 17:06

## 2021-10-27 RX ADMIN — CEPHALEXIN 500 MG: 500 CAPSULE ORAL at 17:41

## 2021-10-27 RX ADMIN — KETOROLAC TROMETHAMINE 15 MG: 30 INJECTION, SOLUTION INTRAMUSCULAR at 05:18

## 2021-10-27 RX ADMIN — ENOXAPARIN SODIUM 40 MG: 40 INJECTION SUBCUTANEOUS at 08:50

## 2021-10-27 RX ADMIN — HYDROCODONE BITARTRATE AND ACETAMINOPHEN 2 TABLET: 5; 325 TABLET ORAL at 07:25

## 2021-10-27 RX ADMIN — DOCUSATE SODIUM 100 MG: 100 CAPSULE ORAL at 08:50

## 2021-10-27 RX ADMIN — SIMETHICONE 80 MG: 80 TABLET, CHEWABLE ORAL at 10:25

## 2021-10-27 RX ADMIN — HYDROCODONE BITARTRATE AND ACETAMINOPHEN 2 TABLET: 5; 325 TABLET ORAL at 21:48

## 2021-10-27 RX ADMIN — POTASSIUM CHLORIDE, DEXTROSE MONOHYDRATE AND SODIUM CHLORIDE: 150; 5; 450 INJECTION, SOLUTION INTRAVENOUS at 05:23

## 2021-10-27 ASSESSMENT — PAIN DESCRIPTION - PROGRESSION
CLINICAL_PROGRESSION: OTHER (COMMENT)
CLINICAL_PROGRESSION: NOT CHANGED

## 2021-10-27 ASSESSMENT — PAIN DESCRIPTION - LOCATION
LOCATION: ABDOMEN

## 2021-10-27 ASSESSMENT — PAIN SCALES - GENERAL
PAINLEVEL_OUTOF10: 6
PAINLEVEL_OUTOF10: 7
PAINLEVEL_OUTOF10: 4
PAINLEVEL_OUTOF10: 5
PAINLEVEL_OUTOF10: 8
PAINLEVEL_OUTOF10: 5
PAINLEVEL_OUTOF10: 5
PAINLEVEL_OUTOF10: 8
PAINLEVEL_OUTOF10: 3
PAINLEVEL_OUTOF10: 6
PAINLEVEL_OUTOF10: 3
PAINLEVEL_OUTOF10: 5

## 2021-10-27 ASSESSMENT — PAIN - FUNCTIONAL ASSESSMENT
PAIN_FUNCTIONAL_ASSESSMENT: PREVENTS OR INTERFERES SOME ACTIVE ACTIVITIES AND ADLS
PAIN_FUNCTIONAL_ASSESSMENT: PREVENTS OR INTERFERES SOME ACTIVE ACTIVITIES AND ADLS

## 2021-10-27 ASSESSMENT — PAIN DESCRIPTION - PAIN TYPE
TYPE: ACUTE PAIN;SURGICAL PAIN

## 2021-10-27 NOTE — OP NOTE
reveal no  pathology in the upper abdomen, however, in the pelvis, there was  extensive adhesions between the bladder and the uterus. The patient had  had three previous  sections. The ovaries and tubes appeared  normal.  There was no evidence of malignant disease. Frozen section was performed that revealed \"benign endometrial polyp. \"    BRIEF HISTORY AND INDICATION FOR OPERATION:  The patient is a  42-year-old G3, P3 female with three previous  sections. She  had had a longstanding history of severe menorrhagia and  menometrorrhagia. The patient was placed on Megace in 2021 by her  OB/GYN. The patient had had several episodes in the past of severe  anemia. She was unable to tolerate iron because of gastric bypass  procedure. She has been receiving intravenous iron for quite sometime  under the care of her hematologist/oncologist, Dr. Dhara Hernandez. The patient was referred to [facility] for fear of  possible malignancy. Her oncologist had ordered CA-125 antigen level on  2021 that was elevated at 42 units. A followup CA-125 on 2021 drawn preoperatively was further  elevated at 50 units. She saw Dr. Ean Chavez at the [facility]. A pelvic  ultrasound revealed a greatly thickened endometrial stripe at 2.5 cm. She also had a small right ovarian cyst that appeared benign. The patient was advised to have a D and C and hysteroscopy. This was  performed on 2021. At the time of the hysteroscopy, the patient was found to have a very  large polypoid mass emanating from the uterine fundus. We were unable  to remove the mass because of its sessile attachment to the uterine  fundus. D and C was performed and an attempt was made to biopsy the  mass. The patient did well postoperatively. Final pathology revealed \"benign and active endometrium with patchy  decidual stroma. \"  There was also suggestions of a \"benign endometrial  polyp. \"    The patient was placed on Megace, but this was later discontinued. The  patient had more bleeding episodes. The patient was asked to come off of the Megace because of a history of  a \"vascular disorder that may increase her risk of blood clot  formation. \"  This was May-Thurner syndrome. Because of her continued bleeding. She was seen again and it was  recommended that she have a hysterectomy. There was concern about the  possibility of malignancy with her greatly enlarged endometrial lesion  and her elevated CA-125 antigen. Because the uterus measured 13.6 x 4.5 x 6.72, she was informed that she  might require a laparotomy in order to extract the uterus. The patient  agreed and was taken to the operating room on 10/26/2021. DESCRIPTION OF OPERATIVE PROCEDURE:  Under adequate general anesthesia,  the patient was placed in the dorsal lithotomy position and prepped and  draped in the usual fashion. The vaginal portion of the procedure was performed initially and the  cervix was grasped with a single-tooth tenaculum. A sound was used to sound the uterus to 8.5 cm. Bimanual suggested possible of uterus 11-12 weeks' size pregnancy. Gentle dilation was performed. Sutures were placed at 9 o'clock and 3  o'clock on the cervix. The cervix was injected at 3 o'clock and 9  o'clock with the ICG dye for sentinel lymph node mapping and possible  sentinel lymph node biopsies. The a medium-sized [employer] uterine  manipulator was then placed and the sutures were tied. The abdominal portion of the procedure was then performed. The lower  portion of the umbilicus was infiltrated with a mixture of 0.25%  bupivacaine and Exparel in a 50:50 mixture. This was used to inject all  trocar sites before the incisions. A Veress needle was then placed  through this small incision in the umbilicus and the pneumoperitoneum  was created with CO2.     We then measured the appropriate distance from the symphysis pubis for  the camera site which was 2 cm to the right of midline and approximately  3 cm above her umbilicus using the camera as a guide. The two 8-mm  robotic arm ports were placed on the left under direct camera guidance  and one on the right. A 12-mm AirSeal assistant port was placed in the  left upper quadrant and a 5-mm trocar assistant port was placed in the  right upper quadrant. We then placed the patient in steep Trendelenburg and docked the [employer] robot. Peritoneal washings were performed initially in the pelvis and the fluid  was collected and submitted for cytologic evaluation. We then clipped the fallopian tubes next to the uterus to prevent any  tumor spillage. We did lyse a few adhesions, but we were able to get into the  retroperitoneal spaces bilaterally and identified the ureters, the  ovarian vascular bundles and the iliac blood vessels. The  retroperitoneal space was extremely fatty and we did not initially see  the sentinel lymph nodes, and therefore, it was decided to perform the  hysterectomy first to see if the cancer existed and then go back and  removed the lymph nodes. A survey of the abdomen revealed no evidence of any other disease in the  upper abdomen or in the lower abdomen. The only pathology was along the  scar between the bladder and the uterus. The bladder was pulled up  approximately nursing home to the uterine fundus by a very thick central  scar. This was quite difficult later in the procedure in order to  remove the bladder from the cervix and uterus. We were then able to use the vessel sealer device to cauterize and cut  the ovarian vascular bundle. This was performed with the ureters in  clear site. We then used the vessel sealer across the utero-ovarian  ligament to cauterize and cut and the fallopian tubes and ovaries were  then placed into the depths of the pelvis and later removed through the  vagina.     We then used the vessel sealer to cut and cauterize the broad ligaments  bilaterally as well as the uterine arteries and veins bilaterally. By using the VCare to manipulate the uterus, we were able to find the  VCare cup at 6 o'clock and we entered the vagina in a small area at this  point. The vessel sealer was also used to cauterize and cut the  uterosacral ligaments. We then took down the vesicouterine peritoneum, however, the scar  centrally was quite difficult and it was very hard find the bladder in  order to remove it from the cervix. Eventually, we had some success by  approaching the bladder from the side and once a plane was found, we  followed it around to the opposite side in order to remove the bladder  from that side as well. We then cauterized and cut through the  remainder of the scar tissue. I should mention that the urine remained  clear throughout the entire case. We were then able to cut and cauterize and push the bladder off of the  anterior cervix and well down onto the upper vagina. We cauterized the cardinal ligaments and these were cut as well. We entered the vagina at 12 o'clock and then used the single blade of  the monopolar scissors to cut around the [facility] using it as a guide. Eventually entire specimen was freed. An attempt was then made to  remove this through the vagina even though it was quite large. Uterus  was also quite soft and malleable. Later, we were able to remove the  entire uterus with cervix, bilateral tubes and bilateral ovaries through  the uterus. This was quite difficult. We then closed the vagina with figure-of-eight sutures of #1 Vicryl. We  placed 1-0 PDS figure-of-eight in the midline of the vaginal closure. The pelvis was copiously irrigated with warm normal saline x3. Hemostasis was obtained with the Bovie cautery.   Because of continued  oozing throughout the case, once the pelvis was as dry as possible, we  placed Surgicel powder over all the raw surfaces including the vaginal  cuff. Both ureters were noted to peristalse at the end of the case and the  urine was clear. We then placed a 7-mm flat Arvin-Rodriguez type drain into the depth of  the pelvis through the left flank trocar incision. This was later  hooked to bulb suction and sewn into place with 2-0 nylon. The sigmoid was then placed over the drain and over the raw surface in  the pelvis. We then closed the AirSeal port under direct camera guidance using #1  Vicryl. All port sites were then lavaged copiously with the warm normal saline  and Betadine mixture of 5:1. We then closed the port sites with 4-0  Monocryl. Dermabond was placed over the laparoscopic incisions as a  dressing and sealant. At this point, we performed cystoscopy and 0.25% fluorescein was given. We saw the urine promptly come from the ureters bilaterally. There were  no sutures or other evidence of pathology in the bladder. At this point, the patient was taken to the recovery room in  satisfactory condition. She tolerated the anesthesia and surgery well. Again, blood loss 150 mL.         Brayan Villareal MD    D: 10/26/2021 13:55:04       T: 10/26/2021 14:02:34     [email or web address]  Job#: [id number]     Doc#: [id number]    CC:

## 2021-10-27 NOTE — PLAN OF CARE
Problem: Falls - Risk of:  Goal: Will remain free from falls  Description: Will remain free from falls  10/27/2021 1245 by Pura Rivera RN  Outcome: Ongoing  10/27/2021 0104 by Amadou Marroquin RN  Outcome: Ongoing  Goal: Absence of physical injury  Description: Absence of physical injury  10/27/2021 1245 by Pura Rivera RN  Outcome: Ongoing  10/27/2021 0104 by Amadou Marroquin RN  Outcome: Ongoing     Problem: Skin Integrity:  Goal: Will show no infection signs and symptoms  Description: Will show no infection signs and symptoms  10/27/2021 1245 by Pura Rivera RN  Outcome: Ongoing  10/27/2021 0104 by Amadou Marroquin RN  Outcome: Ongoing  Goal: Absence of new skin breakdown  Description: Absence of new skin breakdown  10/27/2021 1245 by Pura Rivera RN  Outcome: Ongoing  10/27/2021 0104 by Amadou Marroquin RN  Outcome: Ongoing     Problem: Pain:  Goal: Pain level will decrease  Description: Pain level will decrease  Outcome: Ongoing  Goal: Control of acute pain  Description: Control of acute pain  Outcome: Ongoing

## 2021-10-27 NOTE — PROGRESS NOTES
3.5 - 11.3 k/uL    RBC 4.28 3.95 - 5.11 m/uL    Hemoglobin 12.0 11.9 - 15.1 g/dL    Hematocrit 37.8 36.3 - 47.1 %    MCV 88.3 82.6 - 102.9 fL    MCH 28.0 25.2 - 33.5 pg    MCHC 31.7 28.4 - 34.8 g/dL    RDW 15.4 (H) 11.8 - 14.4 %    Platelets 433 862 - 669 k/uL    MPV 12.3 8.1 - 13.5 fL    NRBC Automated 0.0 0.0 per 100 WBC    Differential Type NOT REPORTED     Seg Neutrophils 78 (H) 36 - 65 %    Lymphocytes 13 (L) 24 - 43 %    Monocytes 8 3 - 12 %    Eosinophils % 0 (L) 1 - 4 %    Basophils 0 0 - 2 %    Immature Granulocytes 1 (H) 0 %    Segs Absolute 9.37 (H) 1.50 - 8.10 k/uL    Absolute Lymph # 1.52 1.10 - 3.70 k/uL    Absolute Mono # 0.96 0.10 - 1.20 k/uL    Absolute Eos # <0.03 0.00 - 0.44 k/uL    Basophils Absolute <0.03 0.00 - 0.20 k/uL    Absolute Immature Granulocyte 0.07 0.00 - 0.30 k/uL    WBC Morphology NOT REPORTED     RBC Morphology ANISOCYTOSIS PRESENT     Platelet Estimate NOT REPORTED    Basic Metabolic Panel w/ Reflex to MG    Collection Time: 10/27/21  5:30 AM   Result Value Ref Range    Glucose 126 (H) 70 - 99 mg/dL    BUN 5 (L) 6 - 20 mg/dL    CREATININE 0.49 (L) 0.50 - 0.90 mg/dL    Bun/Cre Ratio NOT REPORTED 9 - 20    Calcium 8.4 (L) 8.6 - 10.4 mg/dL    Sodium 139 135 - 144 mmol/L    Potassium 4.1 3.7 - 5.3 mmol/L    Chloride 106 98 - 107 mmol/L    CO2 22 20 - 31 mmol/L    Anion Gap 11 9 - 17 mmol/L    GFR Non-African American >60 >60 mL/min    GFR African American >60 >60 mL/min    GFR Comment          GFR Staging NOT REPORTED        Assessment/Plan:  Georgean Han Cogan 55 y.o. female , POD #1 s/p Robotic assisted laparoscopic modified radial hysterectomy, bilateral salpingo-oophorectomy, cytologic washings, cystoscopy    - Doing well, vitals stable   - S/p rabago catheter, UOP adequate   - Discontinue IVF   - Encourage ambulation and use of incentive spirometer   - Pain control: Toradol/[place]   - Labs: CBC, BMP reviewed this AM and overall unremarkable   - Urine culture resulted as E.  Coli 10-50,000 CFU/mL. Will start patient on Ampicillin   - DVT Proph: Lovenox, SCDs. Lovenox to be continued for 30 days post operatively   - Abx: S/p Ancef x2 on POD#0   - Diet: general   - Path: pending   - Continue post-op care, please page with any questions. - Anticipate discharge home tomorrow.       Hx of Bariatric Surgery              - Avoid PO NSAIDs     Hx of Anxiety/Depression              - Controlled on no meds at this time                  Araceli Costello DO  Ob/Gyn Resident  Pager: [phone]  10/27/2021, 4:14 PM

## 2021-10-27 NOTE — PLAN OF CARE
Problem: Falls - Risk of:  Goal: Will remain free from falls  Description: Will remain free from falls  10/27/2021 1428 by Margy Sanchez RN  Outcome: Completed  10/27/2021 1245 by Margy Sanchez RN  Outcome: Ongoing  10/27/2021 0104 by Reji Carvalho RN  Outcome: Ongoing  Goal: Absence of physical injury  Description: Absence of physical injury  10/27/2021 1428 by Margy Sanchez RN  Outcome: Completed  10/27/2021 1245 by Margy Sanchez RN  Outcome: Ongoing  10/27/2021 0104 by Reji Carvalho RN  Outcome: Ongoing     Problem: Skin Integrity:  Goal: Will show no infection signs and symptoms  Description: Will show no infection signs and symptoms  10/27/2021 1428 by Margy Sanchez RN  Outcome: Completed  10/27/2021 1245 by Margy Sanchez RN  Outcome: Ongoing  10/27/2021 0104 by Reji Carvalho RN  Outcome: Ongoing  Goal: Absence of new skin breakdown  Description: Absence of new skin breakdown  10/27/2021 1428 by Margy Sanchez RN  Outcome: Completed  10/27/2021 1245 by Margy Sanchez RN  Outcome: Ongoing  10/27/2021 0104 by Reji Carvalho RN  Outcome: Ongoing     Problem: Pain:  Goal: Pain level will decrease  Description: Pain level will decrease  10/27/2021 1428 by Margy Sanchez RN  Outcome: Completed  10/27/2021 1245 by Margy Sanchez RN  Outcome: Ongoing  Goal: Control of acute pain  Description: Control of acute pain  10/27/2021 1428 by Margy Sanchez RN  Outcome: Completed  10/27/2021 1245 by Margy Sanchez RN  Outcome: Ongoing  Goal: Control of chronic pain  Description: Control of chronic pain  Outcome: Completed

## 2021-10-27 NOTE — CARE COORDINATION
Case Management Initial Discharge Plan  Sara Barger,             Met with:patient to discuss discharge plans. Information verified: address, contacts, phone number, , insurance Yes  Insurance Provider: [employer]    Emergency Contact/Next of Kin name & number: sister Jazmyn Fernandez [phone]  Who are involved in patient's support system? Sister children    PCP: Anitha Thorne, APRN - CNP  Date of last visit: 2 weeks      Discharge Planning    Living Arrangements:  Family Members     Home has 1 stories  3 stairs to climb to get into front door, 0 stairs to climb to reach second floor  Location of bedroom/bathroom in home 1st floor    Patient able to perform ADL's:Independent    Current Services (outpatient & in home) n/a  DME equipment: cane  DME provider:     Is patient receiving oral anticoagulation therapy? No    If indicated:   Physician managing anticoagulation treatment:   Where does patient obtain lab work for ATC treatment? Potential Assistance Needed:  N/A    Patient agreeable to home care: No  [place] of choice provided:  n/a       Evaluation: n/a    Expected Discharge date:  10/28/21    Patient expects to be discharged to:   home independent    If home: is the family and/or caregiver wiling & able to provide support at home? yes  Who will be providing this support? Children/sister    Follow Up Appointment: Best Day/ Time: Monday AM    Transportation provider:   Transportation arrangements needed for discharge: No    Readmission Risk              Risk of Unplanned Readmission:  8             Does patient have a readmission risk score greater than 14?: No  If yes, follow-up appointment must be made within 7 days of discharge. Goals of Care: safety       Educated patient on transitional options, provided freedom of choice and are agreeable with plan      Discharge Plan: home independent, denies need for further services.           Electronically signed by Becca Carbajal Cortland Goltz on 10/27/21 at 10:59 AM EDT

## 2021-10-27 NOTE — PLAN OF CARE
Problem: Falls - Risk of:  Goal: Will remain free from falls  Description: Will remain free from falls  10/27/2021 0104 by Cristal Og RN  Outcome: Ongoing  10/26/2021 1910 by Suezanne Litten, RN  Outcome: Ongoing  Goal: Absence of physical injury  Description: Absence of physical injury  10/27/2021 0104 by Cristal Og RN  Outcome: Ongoing  10/26/2021 1910 by Suezanne Litten, RN  Outcome: Ongoing     Problem: Skin Integrity:  Goal: Will show no infection signs and symptoms  Description: Will show no infection signs and symptoms  Outcome: Ongoing  Goal: Absence of new skin breakdown  Description: Absence of new skin breakdown  Outcome: Ongoing

## 2021-10-28 VITALS
OXYGEN SATURATION: 97 % | SYSTOLIC BLOOD PRESSURE: 149 MMHG | BODY MASS INDEX: 49.34 KG/M2 | RESPIRATION RATE: 18 BRPM | WEIGHT: 289 LBS | TEMPERATURE: 97.7 F | HEART RATE: 86 BPM | DIASTOLIC BLOOD PRESSURE: 89 MMHG | HEIGHT: 64 IN

## 2021-10-28 LAB
ABSOLUTE EOS #: 0.17 K/UL (ref 0–0.44)
ABSOLUTE IMMATURE GRANULOCYTE: <0.03 K/UL (ref 0–0.3)
ABSOLUTE LYMPH #: 1.59 K/UL (ref 1.1–3.7)
ABSOLUTE MONO #: 0.62 K/UL (ref 0.1–1.2)
ANION GAP SERPL CALCULATED.3IONS-SCNC: 10 MMOL/L (ref 9–17)
BASOPHILS # BLD: 1 % (ref 0–2)
BASOPHILS ABSOLUTE: 0.04 K/UL (ref 0–0.2)
BUN BLDV-MCNC: 6 MG/DL (ref 6–20)
BUN/CREAT BLD: ABNORMAL (ref 9–20)
CALCIUM SERPL-MCNC: 8.2 MG/DL (ref 8.6–10.4)
CHLORIDE BLD-SCNC: 105 MMOL/L (ref 98–107)
CO2: 22 MMOL/L (ref 20–31)
CREAT SERPL-MCNC: 0.49 MG/DL (ref 0.5–0.9)
DIFFERENTIAL TYPE: ABNORMAL
EOSINOPHILS RELATIVE PERCENT: 2 % (ref 1–4)
GFR AFRICAN AMERICAN: >60 ML/MIN
GFR NON-AFRICAN AMERICAN: >60 ML/MIN
GFR SERPL CREATININE-BSD FRML MDRD: ABNORMAL ML/MIN/{1.73_M2}
GFR SERPL CREATININE-BSD FRML MDRD: ABNORMAL ML/MIN/{1.73_M2}
GLUCOSE BLD-MCNC: 95 MG/DL (ref 70–99)
HCT VFR BLD CALC: 40.7 % (ref 36.3–47.1)
HEMOGLOBIN: 12 G/DL (ref 11.9–15.1)
IMMATURE GRANULOCYTES: 0 %
LYMPHOCYTES # BLD: 20 % (ref 24–43)
MCH RBC QN AUTO: 27.6 PG (ref 25.2–33.5)
MCHC RBC AUTO-ENTMCNC: 29.5 G/DL (ref 28.4–34.8)
MCV RBC AUTO: 93.6 FL (ref 82.6–102.9)
MONOCYTES # BLD: 8 % (ref 3–12)
NRBC AUTOMATED: 0 PER 100 WBC
PDW BLD-RTO: 15.8 % (ref 11.8–14.4)
PLATELET # BLD: 174 K/UL (ref 138–453)
PLATELET ESTIMATE: ABNORMAL
PMV BLD AUTO: 11.2 FL (ref 8.1–13.5)
POTASSIUM SERPL-SCNC: 4 MMOL/L (ref 3.7–5.3)
RBC # BLD: 4.35 M/UL (ref 3.95–5.11)
RBC # BLD: ABNORMAL 10*6/UL
SEG NEUTROPHILS: 70 % (ref 36–65)
SEGMENTED NEUTROPHILS ABSOLUTE COUNT: 5.68 K/UL (ref 1.5–8.1)
SODIUM BLD-SCNC: 137 MMOL/L (ref 135–144)
WBC # BLD: 8.1 K/UL (ref 3.5–11.3)
WBC # BLD: ABNORMAL 10*3/UL

## 2021-10-28 PROCEDURE — G0378 HOSPITAL OBSERVATION PER HR: HCPCS

## 2021-10-28 PROCEDURE — 6370000000 HC RX 637 (ALT 250 FOR IP): Performed by: STUDENT IN AN ORGANIZED HEALTH CARE EDUCATION/TRAINING PROGRAM

## 2021-10-28 PROCEDURE — 96372 THER/PROPH/DIAG INJ SC/IM: CPT

## 2021-10-28 PROCEDURE — 80048 BASIC METABOLIC PNL TOTAL CA: CPT

## 2021-10-28 PROCEDURE — 6360000002 HC RX W HCPCS: Performed by: STUDENT IN AN ORGANIZED HEALTH CARE EDUCATION/TRAINING PROGRAM

## 2021-10-28 PROCEDURE — 36415 COLL VENOUS BLD VENIPUNCTURE: CPT

## 2021-10-28 PROCEDURE — 85025 COMPLETE CBC W/AUTO DIFF WBC: CPT

## 2021-10-28 RX ORDER — PROMETHAZINE HYDROCHLORIDE 25 MG/ML
12.5 INJECTION, SOLUTION INTRAMUSCULAR; INTRAVENOUS EVERY 6 HOURS PRN
Status: DISCONTINUED | OUTPATIENT
Start: 2021-10-28 | End: 2021-10-28 | Stop reason: HOSPADM

## 2021-10-28 RX ORDER — FAMOTIDINE 20 MG/1
20 TABLET, FILM COATED ORAL 2 TIMES DAILY
Status: DISCONTINUED | OUTPATIENT
Start: 2021-10-28 | End: 2021-10-28 | Stop reason: HOSPADM

## 2021-10-28 RX ORDER — SIMETHICONE 80 MG
80 TABLET,CHEWABLE ORAL EVERY 6 HOURS
Status: DISCONTINUED | OUTPATIENT
Start: 2021-10-28 | End: 2021-10-28 | Stop reason: HOSPADM

## 2021-10-28 RX ADMIN — ONDANSETRON 4 MG: 4 TABLET, ORALLY DISINTEGRATING ORAL at 05:35

## 2021-10-28 RX ADMIN — BISACODYL 5 MG: 5 TABLET, COATED ORAL at 08:56

## 2021-10-28 RX ADMIN — CEPHALEXIN 500 MG: 500 CAPSULE ORAL at 00:11

## 2021-10-28 RX ADMIN — HYDROCODONE BITARTRATE AND ACETAMINOPHEN 2 TABLET: 5; 325 TABLET ORAL at 13:24

## 2021-10-28 RX ADMIN — CEPHALEXIN 500 MG: 500 CAPSULE ORAL at 12:19

## 2021-10-28 RX ADMIN — HYDROCODONE BITARTRATE AND ACETAMINOPHEN 2 TABLET: 5; 325 TABLET ORAL at 08:56

## 2021-10-28 RX ADMIN — DOCUSATE SODIUM 100 MG: 100 CAPSULE ORAL at 08:56

## 2021-10-28 RX ADMIN — FAMOTIDINE 20 MG: 20 TABLET, FILM COATED ORAL at 08:56

## 2021-10-28 RX ADMIN — MAGNESIUM HYDROXIDE 30 ML: 400 SUSPENSION ORAL at 12:19

## 2021-10-28 RX ADMIN — ENOXAPARIN SODIUM 40 MG: 40 INJECTION SUBCUTANEOUS at 08:56

## 2021-10-28 RX ADMIN — HYDROCODONE BITARTRATE AND ACETAMINOPHEN 2 TABLET: 5; 325 TABLET ORAL at 17:57

## 2021-10-28 RX ADMIN — CEPHALEXIN 500 MG: 500 CAPSULE ORAL at 17:57

## 2021-10-28 RX ADMIN — HYDROCODONE BITARTRATE AND ACETAMINOPHEN 2 TABLET: 5; 325 TABLET ORAL at 01:50

## 2021-10-28 RX ADMIN — ONDANSETRON 4 MG: 4 TABLET, ORALLY DISINTEGRATING ORAL at 13:46

## 2021-10-28 RX ADMIN — SIMETHICONE 80 MG: 80 TABLET, CHEWABLE ORAL at 08:56

## 2021-10-28 ASSESSMENT — PAIN DESCRIPTION - PROGRESSION: CLINICAL_PROGRESSION: NOT CHANGED

## 2021-10-28 ASSESSMENT — PAIN SCALES - GENERAL
PAINLEVEL_OUTOF10: 5
PAINLEVEL_OUTOF10: 7
PAINLEVEL_OUTOF10: 7
PAINLEVEL_OUTOF10: 8
PAINLEVEL_OUTOF10: 9
PAINLEVEL_OUTOF10: 6
PAINLEVEL_OUTOF10: 4

## 2021-10-28 ASSESSMENT — PAIN - FUNCTIONAL ASSESSMENT: PAIN_FUNCTIONAL_ASSESSMENT: PREVENTS OR INTERFERES SOME ACTIVE ACTIVITIES AND ADLS

## 2021-10-28 ASSESSMENT — PAIN DESCRIPTION - ONSET: ONSET: ON-GOING

## 2021-10-28 ASSESSMENT — PAIN DESCRIPTION - PAIN TYPE: TYPE: ACUTE PAIN;SURGICAL PAIN

## 2021-10-28 ASSESSMENT — PAIN DESCRIPTION - LOCATION: LOCATION: ABDOMEN

## 2021-10-28 ASSESSMENT — PAIN DESCRIPTION - FREQUENCY: FREQUENCY: CONTINUOUS

## 2021-10-28 ASSESSMENT — PAIN DESCRIPTION - DESCRIPTORS: DESCRIPTORS: ACHING

## 2021-10-28 NOTE — DISCHARGE SUMMARY
Discussed with the patient and all questioned fully answered. She will call me if any problems arise. Scripts sent with patient.  Writer dm patient to family waiting in lobby

## 2021-10-28 NOTE — PROGRESS NOTES
Gynecology Oncology Progress Note    Date: 10/28/2021  Time: 7:57 AM    Rudene Madura Cogan 55 y.o. female , POD # 2 s/p Robotic assisted laparoscopic modified radial hysterectomy, bilateral salpingo-oophorectomy, cytologic washings, cystoscopy on 10/26/21     Patient seen and examined. She complained of nausea and pain overnight. She reports she feels like she is having gas pain. Pain is controlled. Patient is  tolerating oral intake. She is urinating well. She denies any vaginal bleeding. She is  ambulating without difficulty. She is not passing flatus. She denies Fever/Chills, Chest Pain, SOB, N/V, Calf Pain    Vitals:  Vitals:    10/27/21 0956 10/27/21 1148 10/27/21 1533 10/27/21 1919   BP: 120/74 117/72 119/73 128/72   Pulse: 96 71 81 91   Resp: 18 18 14 16   Temp: 98.8 °F (37.1 °C) 98.3 °F (36.8 °C) 97.6 °F (36.4 °C) 98.1 °F (36.7 °C)   TempSrc: Oral Oral Oral Oral   SpO2: 96% 97% 97% 96%   Weight:       Height:             Intake/Output:   Last Shift: I/O last 3 completed shifts:  In: -   Out: 1675 [Urine:1625; Drains:50]  Current Shift: No intake/output data recorded. Physical Exam:  General:  no apparent distress, alert and cooperative  Neurologic:  alert, oriented, normal speech, no focal findings or movement disorder noted  Lungs:  No increased work of breathing, good air exchange, clear to auscultation bilaterally, no crackles or wheezing  Heart:  regular rate and rhythm and no murmur    Abdomen: soft, non-distended, appropriate tenderness, no CVA tenderness, normal bowel sounds  Incision: clean, dry and intact. Site of LLQ drain has continued to drain serosanguinous fluid. Continue daily dressing changes. Extremities:  no calf tenderness, left lower extremity edema unchanged from previous. Compliance with SCDs encouraged.     Lab:  Recent Results (from the past 12 hour(s))   CBC auto differential    Collection Time: 10/28/21  7:20 AM   Result Value Ref Range    WBC 8.1 3.5 - 11.3 k/uL    RBC 4.35 3.95 - 5.11 m/uL    Hemoglobin 12.0 11.9 - 15.1 g/dL    Hematocrit 40.7 36.3 - 47.1 %    MCV 93.6 82.6 - 102.9 fL    MCH 27.6 25.2 - 33.5 pg    MCHC 29.5 28.4 - 34.8 g/dL    RDW 15.8 (H) 11.8 - 14.4 %    Platelets 216 791 - 532 k/uL    MPV 11.2 8.1 - 13.5 fL    NRBC Automated 0.0 0.0 per 100 WBC    Differential Type NOT REPORTED     WBC Morphology NOT REPORTED     RBC Morphology ANISOCYTOSIS PRESENT     Platelet Estimate NOT REPORTED     Seg Neutrophils 70 (H) 36 - 65 %    Lymphocytes 20 (L) 24 - 43 %    Monocytes 8 3 - 12 %    Eosinophils % 2 1 - 4 %    Basophils 1 0 - 2 %    Immature Granulocytes 0 0 %    Segs Absolute 5.68 1.50 - 8.10 k/uL    Absolute Lymph # 1.59 1.10 - 3.70 k/uL    Absolute Mono # 0.62 0.10 - 1.20 k/uL    Absolute Eos # 0.17 0.00 - 0.44 k/uL    Basophils Absolute 0.04 0.00 - 0.20 k/uL    Absolute Immature Granulocyte <0.03 0.00 - 0.30 k/uL       Assessment/Plan:  Anabelle Jacobo Cogan 55 y.o. female , POD #2 s/p Robotic assisted laparoscopic modified radial hysterectomy, bilateral salpingo-oophorectomy, cytologic washings, cystoscopy on 10/26/21   - Doing well, vitals stable   - S/p rabago catheter, UOP adequate   - S/p IVF. Patient lost IV access overnight   - Encourage ambulation and use of incentive spirometer   - Pain control: Norco/Tylenol   - Labs: CBC, BMP reviewed and unremarkable   - DVT Proph: Lovenox, SCDs   - Abx: S/p Ancef intraop. Will continue patient on Ampicillin x10 days for positive urine culture. - Diet: general   - Path: pending.  [place] pathology revealed benign uterus intraop.    - IS and PO hydration encouraged   - Scheduled Zofran and Simethicone   - Continue bowel regimen colace, Doculax, and Milk of Mag   - Continue post-op care, please page with any questions      Raffi Mcclain DO  Ob/Gyn Resident  Pager: [phone]  10/28/2021, 7:57 AM         Attending Physician Statement  I have discussed the care of Sara Barger, including pertinent history and exam findings, with the resident. I have seen and examined the patient and the key elements of all parts of the encounter have been performed by me. I agree with the assessment, plan and orders as documented by the resident.      Carlos Enrique Whitehead MD

## 2021-10-29 ENCOUNTER — TELEPHONE (OUTPATIENT)
Dept: GYNECOLOGIC ONCOLOGY | Age: 46
End: 2021-10-29

## 2021-10-29 ENCOUNTER — TELEPHONE (OUTPATIENT)
Dept: PRIMARY CARE CLINIC | Age: 46
End: 2021-10-29

## 2021-10-29 NOTE — TELEPHONE ENCOUNTER
Patient returned call to the office. She states she is relatively well. She still generally sore. She states none of her incisions are red or tender. She states she is urinating without dysuria as she continues her antibiotics. She has not yet had a bowel movement. She states this morning she took Dulcolax laxatives and she has been continuing to take Colace daily. Recommended if patient does not have a bowel movement to also incorporate milk of magnesia 30 to 60 mL with plenty of fluids. Patient request that this be sent to pharmacy on file. Prescription sent. Patient states she has no vaginal bleeding. She is ambulating at baseline. She also reports she gave her last Lovenox injection today without concerns. She was recommended to call office if she has any questions or concerns. We will see her for postoperative check in the office.

## 2021-10-29 NOTE — TELEPHONE ENCOUNTER
[facility] Transitions Initial Follow Up Call    Outreach made within 2 business days of discharge: Yes    Patient: Debbie Bianchi Patient : 1975   MRN: [id number]  Reason for Admission: There are no discharge diagnoses documented for the most recent discharge. Discharge Date: 10/28/21       Spoke with: Patient    Discharge department/facility: [facility]    TCM Interactive Patient Contact:  Was patient able to fill all prescriptions: Yes  Was patient instructed to bring all medications to the follow-up visit: Yes  Is patient taking all medications as directed in the discharge summary? Yes  Does patient understand their discharge instructions: Yes  Does patient have questions or concerns that need addressed prior to 7-14 day follow up office visit: no    Patient states that she has her post-op appt with DOREEN on 11/10.      Scheduled appointment with PCP within 7-14 days    Follow Up  Future Appointments   Date Time Provider Aliza Mcnulty   11/10/2021 11:45 AM Celina [facility]ancino MD [email or web address]   2021 11:45 AM Kathrin Moya MD [address] [place] [address]

## 2021-10-29 NOTE — TELEPHONE ENCOUNTER
Called LVM for pt to return call. Office calling to assess how patient is feeling from post operative stand point.

## 2021-11-05 ENCOUNTER — TELEPHONE (OUTPATIENT)
Dept: GYNECOLOGIC ONCOLOGY | Age: 46
End: 2021-11-05

## 2021-11-05 NOTE — TELEPHONE ENCOUNTER
Called and rescheduled patient for 11/12/2021 @ 9:30 am. I let patient know that if she tests positive or doesn't feel well, we can change to a virtual appt. Patient voiced understanding.

## 2021-11-05 NOTE — TELEPHONE ENCOUNTER
Pt called in to cancel her appt today, due to her daughter being exposed to someone with covid at school. Writer was unsure how far out to reschedule pt due to being post op. Writer attempted to reach clinical staff. Please contact pt.

## 2021-11-06 ENCOUNTER — OFFICE VISIT (OUTPATIENT)
Dept: FAMILY MEDICINE CLINIC | Age: 46
End: 2021-11-06
Payer: MEDICARE

## 2021-11-06 ENCOUNTER — HOSPITAL ENCOUNTER (OUTPATIENT)
Age: 46
Setting detail: SPECIMEN
Discharge: HOME | End: 2021-11-06
Payer: MEDICARE

## 2021-11-06 VITALS
DIASTOLIC BLOOD PRESSURE: 76 MMHG | RESPIRATION RATE: 18 BRPM | HEART RATE: 99 BPM | OXYGEN SATURATION: 99 % | TEMPERATURE: 99.5 F | SYSTOLIC BLOOD PRESSURE: 136 MMHG

## 2021-11-06 DIAGNOSIS — Z11.52 ENCOUNTER FOR SCREENING FOR COVID-19: ICD-10-CM

## 2021-11-06 DIAGNOSIS — J02.9 SORE THROAT: ICD-10-CM

## 2021-11-06 DIAGNOSIS — R07.89 CHEST TIGHTNESS: ICD-10-CM

## 2021-11-06 DIAGNOSIS — J06.9 UPPER RESPIRATORY INFECTION WITH COUGH AND CONGESTION: Primary | ICD-10-CM

## 2021-11-06 LAB — S PYO AG THROAT QL: NORMAL

## 2021-11-06 PROCEDURE — G8484 FLU IMMUNIZE NO ADMIN: HCPCS | Performed by: NURSE PRACTITIONER

## 2021-11-06 PROCEDURE — G8427 DOCREV CUR MEDS BY ELIG CLIN: HCPCS | Performed by: NURSE PRACTITIONER

## 2021-11-06 PROCEDURE — 87880 STREP A ASSAY W/OPTIC: CPT | Performed by: NURSE PRACTITIONER

## 2021-11-06 PROCEDURE — 1036F TOBACCO NON-USER: CPT | Performed by: NURSE PRACTITIONER

## 2021-11-06 PROCEDURE — 99214 OFFICE O/P EST MOD 30 MIN: CPT | Performed by: NURSE PRACTITIONER

## 2021-11-06 PROCEDURE — G8417 CALC BMI ABV UP PARAM F/U: HCPCS | Performed by: NURSE PRACTITIONER

## 2021-11-06 RX ORDER — ALBUTEROL SULFATE 90 UG/1
2 AEROSOL, METERED RESPIRATORY (INHALATION) EVERY 6 HOURS PRN
Qty: 18 G | Refills: 0 | Status: SHIPPED | OUTPATIENT
Start: 2021-11-06 | End: 2022-01-24

## 2021-11-06 RX ORDER — BENZONATATE 100 MG/1
100 CAPSULE ORAL 3 TIMES DAILY PRN
Qty: 21 CAPSULE | Refills: 0 | Status: SHIPPED | OUTPATIENT
Start: 2021-11-06 | End: 2021-11-13

## 2021-11-06 ASSESSMENT — ENCOUNTER SYMPTOMS
RHINORRHEA: 0
EYE PAIN: 0
CHEST TIGHTNESS: 1
SHORTNESS OF BREATH: 0
VOMITING: 0
TROUBLE SWALLOWING: 0
SORE THROAT: 1
VOICE CHANGE: 0
NAUSEA: 0
COUGH: 1

## 2021-11-06 NOTE — PATIENT INSTRUCTIONS
Learning About Coronavirus [phone])  Coronavirus [phone]): Overview  What is coronavirus (COVID-19)? The coronavirus disease (COVID-19) is caused by a virus. It is an illness that was first found in [place], [place], in December 2019. It has since spread worldwide. The virus can cause fever, cough, and trouble breathing. In severe cases, it can cause pneumonia and make it hard to breathe without help. It can cause death. Coronaviruses are a large group of viruses. They cause the common cold. They also cause more serious illnesses like Middle East respiratory syndrome (MERS) and severe acute respiratory syndrome (SARS). COVID-19 is caused by a novel coronavirus. That means it's a new type that has not been seen in people before. This virus spreads person-to-person through droplets from coughing and sneezing. It can also spread when you are close to someone who is infected. And it can spread when you touch something that has the virus on it, such as a doorknob or a tabletop. What can you do to protect yourself from coronavirus (COVID-19)? The best way to protect yourself from getting sick is to:  · Avoid areas where there is an outbreak. · Avoid contact with people who may be infected. · Wash your hands often with soap or alcohol-based hand sanitizers. · Avoid crowds and try to stay at least 6 feet away from other people. · Wash your hands often, especially after you cough or sneeze. Use soap and water, and scrub for at least 20 seconds. If soap and water aren't available, use an alcohol-based hand . · Avoid touching your mouth, nose, and eyes. What can you do to avoid spreading the virus to others? To help avoid spreading the virus to others:  · Cover your mouth with a tissue when you cough or sneeze. Then throw the tissue in the trash. · Use a disinfectant to clean things that you touch often. · Wear a cloth face cover if you have to go to public areas.   · Stay home if you are sick or have been exposed to the virus. Don't go to school, work, or public areas. And don't use public transportation. · If you are sick:  ? Leave your home only if you need to get medical care. But call the doctor's office first so they know you're coming. And wear a face cover. ? Wear the face cover whenever you're around other people. It can help stop the spread of the virus when you cough or sneeze. ? Clean and disinfect your home every day. Use household  and disinfectant wipes or sprays. Take special care to clean things that you grab with your hands. These include doorknobs, remote controls, phones, and handles on your refrigerator and microwave. And don't forget countertops, tabletops, bathrooms, and computer keyboards. When to call for help  [id number] anytime you think you may need emergency care. For example, call if:  · You have severe trouble breathing. (You can't talk at all.)  · You have constant chest pain or pressure. · You are severely dizzy or lightheaded. · You are confused or can't think clearly. · Your face and lips have a blue color. · You pass out (lose consciousness) or are very hard to wake up. Call your doctor now if you develop symptoms such as:  · Shortness of breath. · Fever. · Cough. If you need to get care, call ahead to the doctor's office for instructions before you go. Make sure you wear a face cover to prevent exposing other people to the virus. Where can you get the latest information? The following health organizations are tracking and studying this virus. Their websites contain the most up-to-date information. Bharati Reynolds also learn what to do if you think you may have been exposed to the virus. · U.S. Centers for Disease Control and Prevention (CDC): The CDC provides updated news about the disease and travel advice. The website also tells you how to prevent the spread of infection.  www.cdc.gov  · World Health Organization [facility]): WHO offers information about the virus outbreaks. WHO also has travel advice. www.who.int  Current as of: April 24, 2020               Content Version: 12.4  © 2006-2020 Healthwise, Incorporated. Care instructions adapted under license by your healthcare professional. If you have questions about a medical condition or this instruction, always ask your healthcare professional. [address] any warranty or liability for your use of this information. Coronavirus ([id number]): Care Instructions  Overview  The coronavirus disease (COVID-19) is caused by a virus. It causes a fever, a cough, and shortness of breath. It mainly spreads person-to-person through droplets from coughing and sneezing. The virus also can spread when people are in close contact with someone who is infected. Most people have mild symptoms and can take care of themselves at home. If their symptoms get worse, they may need care in a hospital. There is no medicine to fight the virus. It's important to not spread the virus to others. If you have COVID-19, wear a face cover anytime you are around other people. You need to isolate yourself while you are sick. Your doctor will tell you when you no longer need to be isolated. Leave your home only if you need to get medical care. Follow-up care is a key part of your treatment and safety. Be sure to make and go to all appointments, and call your doctor if you are having problems. It's also a good idea to know your test results and keep a list of the medicines you take. How can you care for yourself at home? · Get extra rest. It can help you feel better. · Drink plenty of fluids. This helps replace fluids lost from fever. Fluids also help ease a scratchy throat. Water, soup, fruit juice, and hot tea with lemon are good choices. · Take acetaminophen (such as Tylenol) to reduce a fever. It may also help with muscle aches. Read and follow all instructions on the label.   · Sponge your body with lukewarm water to help lightheaded; being too weak to stand; and having cold, pale, clammy skin. Watch closely for changes in your health, and be sure to contact your doctor if:  · Your symptoms get worse. · You are not getting better as expected. Call before you go to the doctor's office. Follow their instructions. And wear a cloth face cover. Current as of: April 24, 2020               Content Version: 12.4  © 2006-2020 [employer]. Care instructions adapted under license by your healthcare professional. If you have questions about a medical condition or this instruction, always ask your healthcare professional. [place] [address] any warranty or liability for your use of this information. Patient Education        Upper Respiratory Infection (Cold): Care Instructions  Your Care Instructions     An upper respiratory infection, or URI, is an infection of the nose, sinuses, or throat. URIs are spread by coughs, sneezes, and direct contact. The common cold is the most frequent kind of URI. The flu and sinus infections are other kinds of URIs. Almost all URIs are caused by viruses. Antibiotics won't cure them. But you can treat most infections with home care. This may include drinking lots of fluids and taking over-the-counter pain medicine. You will probably feel better in 4 to 10 days. The doctor has checked you carefully, but problems can develop later. If you notice any problems or new symptoms, get medical treatment right away. Follow-up care is a key part of your treatment and safety. Be sure to make and go to all appointments, and call your doctor if you are having problems. It's also a good idea to know your test results and keep a list of the medicines you take. How can you care for yourself at home? · To prevent dehydration, drink plenty of fluids. Choose water and other clear liquids until you feel better.  If you have kidney, heart, or liver disease and have to limit fluids, talk with your doctor before you increase the amount of fluids you drink. · Take an over-the-counter pain medicine, such as acetaminophen (Tylenol), ibuprofen (Advil, Motrin), or naproxen (Aleve). Read and follow all instructions on the label. · Before you use cough and cold medicines, check the label. These medicines may not be safe for young children or for people with certain health problems. · Be careful when taking over-the-counter cold or flu medicines and Tylenol at the same time. Many of these medicines have acetaminophen, which is Tylenol. Read the labels to make sure that you are not taking more than the recommended dose. Too much acetaminophen (Tylenol) can be harmful. · Get plenty of rest.  · Do not smoke or allow others to smoke around you. If you need help quitting, talk to your doctor about stop-smoking programs and medicines. These can increase your chances of quitting for good. When should you call for help? Call 911 anytime you think you may need emergency care. For example, call if:    · You have severe trouble breathing. Call your doctor now or seek immediate medical care if:    · You seem to be getting much sicker.     · You have new or worse trouble breathing.     · You have a new or higher fever.     · You have a new rash. Watch closely for changes in your health, and be sure to contact your doctor if:    · You have a new symptom, such as a sore throat, an earache, or sinus pain.     · You cough more deeply or more often, especially if you notice more mucus or a change in the color of your mucus.     · You do not get better as expected. Where can you learn more? Go to [email or web address].[email or web address] org and sign in to your DermApproved account. Enter V071 in the [place] box to learn more about \"Upper Respiratory Infection (Cold): Care Instructions. \"     If you do not have an account, please click on the \"Sign Up Now\" link.   Current as of: July 6, 2021               Content Version: 13.0  © 2006-2021 [employer]. Care instructions adapted under license by [facility] ([facility]). If you have questions about a medical condition or this instruction, always ask your healthcare professional. [address] any warranty or liability for your use of this information. Patient Education        Sore Throat: Care Instructions  Your Care Instructions     Infection by bacteria or a virus causes most sore throats. Cigarette smoke, dry air, air pollution, allergies, and yelling can also cause a sore throat. Sore throats can be painful and annoying. Fortunately, most sore throats go away on their own. If you have a bacterial infection, your doctor may prescribe antibiotics. Follow-up care is a key part of your treatment and safety. Be sure to make and go to all appointments, and call your doctor if you are having problems. It's also a good idea to know your test results and keep a list of the medicines you take. How can you care for yourself at home? · If your doctor prescribed antibiotics, take them as directed. Do not stop taking them just because you feel better. You need to take the full course of antibiotics. · Gargle with warm salt water once an hour to help reduce swelling and relieve discomfort. Use 1 teaspoon of salt mixed in 1 cup of warm water. · Take an over-the-counter pain medicine, such as acetaminophen (Tylenol), ibuprofen (Advil, Motrin), or naproxen (Aleve). Read and follow all instructions on the label. · Be careful when taking over-the-counter cold or flu medicines and Tylenol at the same time. Many of these medicines have acetaminophen, which is Tylenol. Read the labels to make sure that you are not taking more than the recommended dose. Too much acetaminophen (Tylenol) can be harmful. · Drink plenty of fluids. Fluids may help soothe an irritated throat.  Hot fluids, such as tea or soup, may help decrease throat pain.  · Use over-the-counter throat lozenges to soothe pain. Regular cough drops or hard candy may also help. These should not be given to young children because of the risk of choking. · Do not smoke or allow others to smoke around you. If you need help quitting, talk to your doctor about stop-smoking programs and medicines. These can increase your chances of quitting for good. · Use a vaporizer or humidifier to add moisture to your bedroom. Follow the directions for cleaning the machine. When should you call for help? Call your doctor now or seek immediate medical care if:    · You have new or worse trouble swallowing.     · Your sore throat gets much worse on one side. Watch closely for changes in your health, and be sure to contact your doctor if you do not get better as expected. Where can you learn more? Go to [email or web address] org and sign in to your TrendKite account. Enter U154 in the [employer] box to learn more about \"Sore Throat: Care Instructions. \"     If you do not have an account, please click on the \"Sign Up Now\" link. Current as of: December 2, 2020               Content Version: 13.0  © 9602-2971 [employer], Incorporated. Care instructions adapted under license by [facility] ([facility]). If you have questions about a medical condition or this instruction, always ask your healthcare professional. [address] any warranty or liability for your use of this information.

## 2021-11-06 NOTE — PROGRESS NOTES
[address] WALK-IN  [address] [facility] [address]  [address] [address]  Dept: [phone]  Dept Fax: [phone]    Shauna Faulkner is a 55 y.o. female who presents today for her medical conditions/complaints of   Chief Complaint   Patient presents with    Cough     2-3 days     Fever    Headache    Pharyngitis    Chest Congestion          HPI:     /76 (Site: Left Upper Arm, Position: Sitting, Cuff Size: Large Adult)   Pulse 99   Temp 99.5 °F (37.5 °C) (Temporal)   Resp 18   LMP 09/15/2021 Comment: VERY HEAVY PERIODS  SpO2 99%       HPI  Pt presented to the clinic today with c/o congestion. This is a new problem. The current episode started 3 days ago. The problem has been unchanged since onset. Associated symptoms include: fever (did not check temp at home), chills, headache, sore throat, cough- productive with clear sputum, chest tightess . Pertinent negatives include: No  SOB, chest pain, abdominal pain, loss of taste, smell. Pt has tried Tylenol with little improvement. Vaccinated for COVID:  YES  History of COVID:  NO  Exposure to COVID:  NO  On Ampicillin s/p hysterectomy. Past Medical History:   Diagnosis Date    Anemia     Anxiety     Carpal tunnel syndrome     Depression     Endometriosis     Gestational diabetes     x 3 children    Headache     migraines    History of blood transfusion     Iron deficiency anemia     Kidney stones     May-Thurner syndrome     Obesity     Scleroderma (HCC)     Snores     Under care of team     PCP - Emily Zapien CNP - LAST VISIT 2021    Under care of team     VASCULAR  - DR. RIBEIRO - 2021 - SEES FOR MAY-THURNER SYNDROME    Under care of team     HEM/ONC - DR. PERKINS [facility] - LAST VISIT - 2021    Wears glasses         Past Surgical History:   Procedure Laterality Date     SECTION  x 3    CHOLECYSTECTOMY      DILATION AND CURETTAGE OF UTERUS N/A 2021 Take 1 capsule by mouth 4 times daily for 10 days Yes Easter Splinter, DO   docusate sodium (COLACE) 100 MG capsule Take 1 capsule by mouth 2 times daily as needed for Constipation Yes Easter Splinter, DO   simethicone (MYLICON) 80 MG chewable tablet Take 1 tablet by mouth every 6 hours as needed for Flatulence Yes Easter Splinter, DO   acetaminophen (AMINOFEN) 325 MG tablet Take 2 tablets by mouth every 6 hours as needed for Pain Yes Easter Splinter, DO   enoxaparin (LOVENOX) 40 MG/0.4ML injection Inject 0.4 mLs into the skin daily Yes Easter Splinter, DO   losartan (COZAAR) 25 MG tablet Take 1 tablet by mouth daily Yes JUSTICE Fowler CNP   busPIRone (BUSPAR) 10 MG tablet Take 1 tablet by mouth 3 times daily as needed (anxiety) Yes JUSTICE Fowler CNP   Elastic Bandages & Supports (JOBST ULTRASHEER 20-30MMHG LG) MISC Wear daily, ok to remove in the evenings Yes Eric Calderon MD   Multiple Vitamins-Minerals (MULTI-JULIO CÉSAR PO) Take by mouth daily Yes Historical Provider, MD   vitamin B-12 (CYANOCOBALAMIN) 100 MCG tablet Take 50 mcg by mouth daily Yes Historical Provider, MD   calcium carbonate (OYSTER SHELL CALCIUM 500 MG) 1250 (500 Ca) MG tablet Take 1 tablet by mouth daily Yes Historical Provider, MD   vitamin D3 (CHOLECALCIFEROL) 25 MCG (1000 UT) TABS tablet Take 1 tablet by mouth daily Yes Kim Nagel MD   zinc 50 MG CAPS Take 50 mg by mouth daily  Kim Nagel MD       Allergies   Allergen Reactions    Contrast [Iodides] Hives     Chest pain, HTN  Able to handle if the pre-medication prep is followed.  Trileptal [Oxcarbazepine] Shortness Of Breath     Red neck rash/ visual changes    Morphine Other (See Comments)     Red streak up arm at iv site/ and hives         Subjective:      Review of Systems   Constitutional: Positive for chills, fatigue and fever. HENT: Positive for congestion and sore throat. Negative for ear pain, rhinorrhea, trouble swallowing and voice change. normal.   Psychiatric:         Mood and Affect: Mood normal.         Thought Content: Thought content normal.           MEDICAL DECISION MAKING Assessment/Plan:     Reynaldo Stahl was seen today for cough, fever, headache, pharyngitis and chest congestion. Diagnoses and all orders for this visit:    Upper respiratory infection with cough and congestion  -     COVID-19; Future  -     albuterol sulfate HFA (PROVENTIL HFA) 108 (90 Base) MCG/ACT inhaler; Inhale 2 puffs into the lungs every 6 hours as needed for Wheezing or Shortness of Breath  -     benzonatate (TESSALON PERLES) 100 MG capsule; Take 1 capsule by mouth 3 times daily as needed for Cough  -     Spacer/Aero-Holding Chambers JEFFREY; 1 Device by Does not apply route daily as needed (chest tightness)    Chest tightness  -     COVID-19; Future  -     albuterol sulfate HFA (PROVENTIL HFA) 108 (90 Base) MCG/ACT inhaler; Inhale 2 puffs into the lungs every 6 hours as needed for Wheezing or Shortness of Breath  -     Spacer/Aero-Holding Chambers JEFFREY; 1 Device by Does not apply route daily as needed (chest tightness)    Sore throat  -     POCT rapid strep A  -     COVID-19; Future    Encounter for screening for COVID-19  -     COVID-19; Future        Results for orders placed or performed in visit on 11/06/21   POCT rapid strep A   Result Value Ref Range    Strep A Ag None Detected None Detected     Rapid strep A in the office today was NEG. Based on the history and exam, I suspect URI. Will send out COVID19 testing. Pt to fill and take medications as ordered- tessalon peals and albuterol inhaler. Continue on Ampicillin as you were prior to coming in today. Possible treatment alterations based on the results. Patient instructed to self-quarantine until testing results are back- and to follow the quarantine instructions in the after visit summary. Tylenol as directed on the bottle as needed for fever/pain.   Increase fluids, rest.   The patient indicates understanding of these issues and agrees with the plan. Educational materials provided on AVS.  Follow up if symptoms do not improve/worsen. Call with any questions or concerns. Discussed symptoms that will warrant urgent ED evaluation/treatment. Preventing the Spread of Coronavirus Disease 2019 in Homes and Residential Communities: For the most recent information go to: RetailCleaners.fi    Patient given educational materials - see patientinstructions. Discussed use, benefit, and side effects of prescribed medications. All patient questions answered. Pt verbalized understanding. Instructed to continue current medications, diet and exercise. Patient agreed with treatment plan. Follow up as directed.      Electronically signed by JUSTICE Mijares CNP on 11/6/2021 at 2:27 PM

## 2021-11-07 DIAGNOSIS — J06.9 UPPER RESPIRATORY INFECTION WITH COUGH AND CONGESTION: ICD-10-CM

## 2021-11-07 DIAGNOSIS — J02.9 SORE THROAT: ICD-10-CM

## 2021-11-07 DIAGNOSIS — Z11.52 ENCOUNTER FOR SCREENING FOR COVID-19: ICD-10-CM

## 2021-11-07 DIAGNOSIS — R07.89 CHEST TIGHTNESS: ICD-10-CM

## 2021-11-07 LAB
SARS-COV-2: ABNORMAL
SARS-COV-2: DETECTED
SOURCE: ABNORMAL

## 2021-11-08 RX ORDER — METHYLPREDNISOLONE SODIUM SUCCINATE 125 MG/2ML
125 INJECTION, POWDER, LYOPHILIZED, FOR SOLUTION INTRAMUSCULAR; INTRAVENOUS
Status: CANCELLED | OUTPATIENT
Start: 2021-11-08 | End: 2021-11-08

## 2021-11-08 RX ORDER — DIPHENHYDRAMINE HYDROCHLORIDE 50 MG/ML
50 INJECTION INTRAMUSCULAR; INTRAVENOUS
Status: CANCELLED | OUTPATIENT
Start: 2021-11-08 | End: 2021-11-08

## 2021-11-08 RX ORDER — EPINEPHRINE 1 MG/ML
0.3 INJECTION, SOLUTION, CONCENTRATE INTRAVENOUS PRN
Status: CANCELLED | OUTPATIENT
Start: 2021-11-08

## 2021-11-08 RX ORDER — SODIUM CHLORIDE 9 MG/ML
INJECTION, SOLUTION INTRAVENOUS CONTINUOUS PRN
Status: CANCELLED | OUTPATIENT
Start: 2021-11-08 | End: 2021-11-09

## 2021-11-08 RX ORDER — SODIUM CHLORIDE 9 MG/ML
100 INJECTION, SOLUTION INTRAVENOUS CONTINUOUS PRN
Status: CANCELLED | OUTPATIENT
Start: 2021-11-08

## 2021-11-11 ENCOUNTER — HOSPITAL ENCOUNTER (OUTPATIENT)
Dept: INFUSION THERAPY | Age: 46
Setting detail: INFUSION SERIES
Discharge: HOME | End: 2021-11-11
Payer: MEDICARE

## 2021-11-11 VITALS
OXYGEN SATURATION: 97 % | TEMPERATURE: 98.6 F | RESPIRATION RATE: 16 BRPM | DIASTOLIC BLOOD PRESSURE: 93 MMHG | SYSTOLIC BLOOD PRESSURE: 138 MMHG | HEART RATE: 71 BPM

## 2021-11-11 PROCEDURE — 96365 THER/PROPH/DIAG IV INF INIT: CPT

## 2021-11-11 PROCEDURE — 2580000003 HC RX 258: Performed by: NURSE PRACTITIONER

## 2021-11-11 PROCEDURE — 6360000002 HC RX W HCPCS: Performed by: NURSE PRACTITIONER

## 2021-11-11 RX ORDER — METHYLPREDNISOLONE SODIUM SUCCINATE 125 MG/2ML
125 INJECTION, POWDER, LYOPHILIZED, FOR SOLUTION INTRAMUSCULAR; INTRAVENOUS
Status: ACTIVE | OUTPATIENT
Start: 2021-11-11 | End: 2021-11-11

## 2021-11-11 RX ORDER — SODIUM CHLORIDE 9 MG/ML
INJECTION, SOLUTION INTRAVENOUS CONTINUOUS PRN
Status: ACTIVE | OUTPATIENT
Start: 2021-11-11 | End: 2021-11-11

## 2021-11-11 RX ORDER — EPINEPHRINE 1 MG/ML
0.3 INJECTION, SOLUTION, CONCENTRATE INTRAVENOUS PRN
Status: DISCONTINUED | OUTPATIENT
Start: 2021-11-11 | End: 2021-11-12 | Stop reason: HOSPADM

## 2021-11-11 RX ORDER — SODIUM CHLORIDE 9 MG/ML
100 INJECTION, SOLUTION INTRAVENOUS CONTINUOUS PRN
Status: DISCONTINUED | OUTPATIENT
Start: 2021-11-11 | End: 2021-11-12 | Stop reason: HOSPADM

## 2021-11-11 RX ORDER — DIPHENHYDRAMINE HYDROCHLORIDE 50 MG/ML
50 INJECTION INTRAMUSCULAR; INTRAVENOUS
Status: ACTIVE | OUTPATIENT
Start: 2021-11-11 | End: 2021-11-11

## 2021-11-11 RX ADMIN — CASIRIVIMAB AND IMDEVIMAB: 600; 600 INJECTION, SOLUTION, CONCENTRATE INTRAVENOUS at 09:22

## 2021-11-11 RX ADMIN — SODIUM CHLORIDE: 9 INJECTION, SOLUTION INTRAVENOUS at 09:22

## 2021-11-11 NOTE — PROGRESS NOTES
Reviewed FDA EUA Fact sheet and After Infusion Information sheet for discharge. Written copies provided to patient. Verbalized understanding. Encouraged PO fluids and rest. DC home with sister. CASIRIVIMAB and IMDEVIMAB     You received an infusion of CASIRIVIMAB and IMDEVIMAB authorized for emergency use by the FDA. You should continue to self-isolate and use infection control measures (wear mask, isolate, social distance, avoid sharing personal items, clean and disinfect \"high touch\" surfaces, and frequent handwashing) according to CDC guidelines. Report all changes in your health to your doctors as soon as possible. Symptoms to report to your physician immediately or seek emergency medical care:   ·  Fever, Chills, Shakes, Hives, Rash, Itching, Swelling of lips, mouth or throat   ·  Shortness of breath, difficulty breathing or wheezing, Chest pain   ·  Cough, Sneezing   ·  Fatigue, muscle or joint pain/aching,   ·  Headache   ·  Weakness, numbness, tingling is any part of your body   ·  Low blood pressure/Oxygen saturation levels  ·  Dizziness, feeling faint   ·  Nausea        These are not all of the possible side effects. Serious and unexpected side effects may happen.

## 2021-11-12 ENCOUNTER — TELEPHONE (OUTPATIENT)
Dept: GYNECOLOGIC ONCOLOGY | Age: 46
End: 2021-11-12

## 2021-11-12 ENCOUNTER — TELEMEDICINE (OUTPATIENT)
Dept: GYNECOLOGIC ONCOLOGY | Age: 46
End: 2021-11-12

## 2021-11-12 DIAGNOSIS — Z90.710 S/P HYSTERECTOMY: Primary | ICD-10-CM

## 2021-11-12 DIAGNOSIS — N85.9 LESION OF ENDOMETRIUM: ICD-10-CM

## 2021-11-12 PROCEDURE — 99024 POSTOP FOLLOW-UP VISIT: CPT | Performed by: PHYSICIAN ASSISTANT

## 2021-11-12 ASSESSMENT — ENCOUNTER SYMPTOMS
GASTROINTESTINAL NEGATIVE: 1
COUGH: 1
SHORTNESS OF BREATH: 1
CHEST TIGHTNESS: 1
EYES NEGATIVE: 1

## 2021-11-12 NOTE — PROGRESS NOTES
Review of Systems   Constitutional: Positive for chills (covid+ at this time) and fever. HENT:  Negative. Eyes: Negative. Respiratory: Positive for chest tightness, cough and shortness of breath. Covid+   Gastrointestinal: Negative. Endocrine: Negative. Genitourinary: Negative for pelvic pain (aches with healing). Musculoskeletal: Negative. Skin: Negative. Neurological: Positive for dizziness (after taking lovenox injections) and headaches. Hematological: Negative. Psychiatric/Behavioral: The patient is nervous/anxious.

## 2021-11-12 NOTE — PATIENT INSTRUCTIONS
Stop using Lovenox at this time    Continue no heavy lifting or strenuous activity more than 10 pounds. Continue nothing in the vagina.     Return to clinic in 6 weeks for postoperative check    Return to clinic sooner with any questions or concerns

## 2021-11-12 NOTE — PROGRESS NOTES
2021    TELEHEALTH EVALUATION -- Audio/Visual (During BJEV-27 public health emergency)    HPI:    Steph Norris (:  1975) has requested an audio/video evaluation for the following concern(s):  Patient initially being followed for longstanding history of severe menorrhagia, no menorrhagia. She had been following with her hematologist for these concerns and a CA-125 was obtained in 2021. This was elevated at 42 units and she was referred to [facility] gynecologic oncology for evaluation and treatment recommendations. The patient had a D&C discharged to be in May 2021. At this time the patient had a Ca1 25 and dennys to 58 units on 5/3/2021. Intraoperative findings from the [facility]  revealed a very large polypoid mass emanating from the uterine fundus. Scant biopsies from this polyp were benign. She was asked to be watched conservatively and notify of office if she had continued with heavy bleeding. She ultimately continued to have menorrhagia and plan was to proceed with definitive palliative hysterectomy. Her preoperative CA-125 had come back down to normal at 32 units on 10/15/2021. She underwent a robotic assisted laparoscopic modified radical hysterectomy with bilateral salpingo-oophorectomy and peritoneal washings for cytology and cystoscopy on 10/26/2021. She did well postoperatively and was discharged home on postop day 2. Today she is now 2 weeks postoperative state. She did test positive for COVID-19 infection therefore the patient is doing a virtual visit today. We then reviewed the pathology revealed a benign endometrial polyp negative for atypia or malignancy. Bilateral fallopian tubes and ovaries were negative for malignancy or atypia. The neck showed mild chronic cervicitis. Pelvic washings were negative for disease. She does feel that she has finally turned a corner from the infection and regaining her energy.  She denies post operative abdominal or pelvic pain. She states no vaginal bleeding. She is urinating and having bowel movements. Review of Systems    Prior to Visit Medications    Medication Sig Taking?  Authorizing Provider   albuterol sulfate HFA (PROVENTIL HFA) 108 (90 Base) MCG/ACT inhaler Inhale 2 puffs into the lungs every 6 hours as needed for Wheezing or Shortness of Breath Yes JUSTICE Santos CNP   benzonatate (TESSALON PERLES) 100 MG capsule Take 1 capsule by mouth 3 times daily as needed for Cough Yes JUSTICE Santos CNP   acetaminophen (AMINOFEN) 325 MG tablet Take 2 tablets by mouth every 6 hours as needed for Pain Yes Jan Churchill DO   enoxaparin (LOVENOX) 40 MG/0.4ML injection Inject 0.4 mLs into the skin daily Yes Jan Churchill DO   losartan (COZAAR) 25 MG tablet Take 1 tablet by mouth daily Yes JUSTICE Lehman CNP   busPIRone (BUSPAR) 10 MG tablet Take 1 tablet by mouth 3 times daily as needed (anxiety) Yes JUSTICE Lehman CNP   Multiple Vitamins-Minerals (MULTI-JULIO CÉSAR PO) Take by mouth daily Yes Historical Provider, MD   vitamin B-12 (CYANOCOBALAMIN) 100 MCG tablet Take 50 mcg by mouth daily Yes Historical Provider, MD   calcium carbonate (OYSTER SHELL CALCIUM 500 MG) 1250 (500 Ca) MG tablet Take 1 tablet by mouth daily Yes Historical Provider, MD   vitamin D3 (CHOLECALCIFEROL) 25 MCG (1000 UT) TABS tablet Take 1 tablet by mouth daily Yes Gustavo Guillermo MD   zinc 50 MG CAPS Take 50 mg by mouth daily Yes Gustavo Guillermo MD   Spacer/Aero-Holding Chambers JEFFREY 1 Device by Does not apply route daily as needed (chest tightness)  JUSTICE Santos CNP   magnesium hydroxide (MILK OF MAGNESIA) 400 MG/5ML suspension Take 30 mLs by mouth daily as needed for Constipation  Cindy Elise PA-C   docusate sodium (COLACE) 100 MG capsule Take 1 capsule by mouth 2 times daily as needed for Constipation  Patient not taking: Reported on 11/12/2021  Jan Churchill DO   simethicone (MYLICON) 80 MG chewable tablet Take 1 tablet by mouth every 6 hours as needed for Flatulence  Caitlyn Hair, DO   Elastic Bandages & Supports (JOBST ULTRASHEER 20-30MMHG LG) MISC Wear daily, ok to remove in the evenings  Tod Juan MD       Social History     Tobacco Use    Smoking status: Never Smoker    Smokeless tobacco: Never Used   Vaping Use    Vaping Use: Never used   Substance Use Topics    Alcohol use: No    Drug use: No          PHYSICAL EXAMINATION:  [ INSTRUCTIONS:  \"[x]\" Indicates a positive item  \"[]\" Indicates a negative item  -- DELETE ALL ITEMS NOT EXAMINED]  Vital Signs: (As obtained by patient/caregiver or practitioner observation)    Blood pressure-  Heart rate-    Respiratory rate-    Temperature-  Pulse oximetry-     Constitutional: [x] Appears well-developed and well-nourished [x] No apparent distress      [] Abnormal-   Mental status  [x] Alert and awake  [x] Oriented to person/place/time [x]Able to follow commands      Eyes:  EOM    [x]  Normal  [] Abnormal-  Sclera  [x]  Normal  [] Abnormal -         Discharge [x]  None visible  [] Abnormal -    HENT:   [x] Normocephalic, atraumatic.   [] Abnormal   [] Mouth/Throat: Mucous membranes are moist.     External Ears [x] Normal  [] Abnormal-     Neck: [x] No visualized mass     Pulmonary/Chest: [x] Respiratory effort normal.  [x] No visualized signs of difficulty breathing or respiratory distress        [] Abnormal-      Musculoskeletal:   [x] Normal gait with no signs of ataxia         [x] Normal range of motion of neck        [] Abnormal-       Neurological:        [x] No Facial Asymmetry (Cranial nerve 7 motor function) (limited exam to video visit)          [x] No gaze palsy        [] Abnormal-         Skin:        [x] No significant exanthematous lesions or discoloration noted on facial skin         [] Abnormal-            Psychiatric:       [x] Normal Affect [x] No Hallucinations        [] Abnormal-     Other pertinent observable physical exam findings- multiple laparoscopic incisions scars visualized. No erythema present. No drainage. Incisions appear closed. ASSESSMENT/PLAN:  Advised patient she may stop using her Lovenox at this time given benign pathology    Continue to limit no heavy lifting more than 10 pounds. Continue to keep stool soft. Encourage ambulation and plenty of fluids. Nothing in the vagina. She should return to clinic in 6 weeks for postoperative check    Call or return to clinic sooner with any questions or concerns. Jessika Phelan, was evaluated through a synchronous (real-time) audio-video encounter. The patient (or guardian if applicable) is aware that this is a billable service. Verbal consent to proceed has been obtained within the past 12 months. The visit was conducted pursuant to the emergency declaration under the [address] [place] [address], [address] [place] [address] authority and the [employer] Patient [employer] General Act. Patient identification was verified, and a caregiver was present when appropriate. The patient was located in a state where the provider was credentialed to provide care. Total time spent on this encounter: 9 minuts    --Caroline Gurrola PA-C on 11/12/2021 at 9:48 AM    An electronic signature was used to authenticate this note.

## 2021-12-03 ENCOUNTER — OFFICE VISIT (OUTPATIENT)
Dept: FAMILY MEDICINE CLINIC | Age: 46
End: 2021-12-03
Payer: MEDICARE

## 2021-12-03 ENCOUNTER — TELEPHONE (OUTPATIENT)
Dept: GYNECOLOGIC ONCOLOGY | Age: 46
End: 2021-12-03

## 2021-12-03 ENCOUNTER — HOSPITAL ENCOUNTER (OUTPATIENT)
Age: 46
Setting detail: SPECIMEN
Discharge: HOME | End: 2021-12-03

## 2021-12-03 VITALS
DIASTOLIC BLOOD PRESSURE: 88 MMHG | SYSTOLIC BLOOD PRESSURE: 138 MMHG | TEMPERATURE: 99.1 F | RESPIRATION RATE: 18 BRPM | HEART RATE: 90 BPM | OXYGEN SATURATION: 97 %

## 2021-12-03 DIAGNOSIS — R10.9 ABDOMINAL PAIN, UNSPECIFIED ABDOMINAL LOCATION: ICD-10-CM

## 2021-12-03 DIAGNOSIS — N30.01 ACUTE CYSTITIS WITH HEMATURIA: Primary | ICD-10-CM

## 2021-12-03 DIAGNOSIS — R81 GLUCOSURIA: ICD-10-CM

## 2021-12-03 LAB
BILIRUBIN, POC: NEGATIVE
BLOOD URINE, POC: NORMAL
CHP ED QC CHECK: YES
CLARITY, POC: NORMAL
COLOR, POC: NORMAL
GLUCOSE BLD-MCNC: 98 MG/DL
GLUCOSE URINE, POC: NORMAL
KETONES, POC: NORMAL
LEUKOCYTE EST, POC: NEGATIVE
NITRITE, POC: NEGATIVE
PH, POC: 5.5
PROTEIN, POC: NORMAL
SPECIFIC GRAVITY, POC: >1.03
UROBILINOGEN, POC: NORMAL

## 2021-12-03 PROCEDURE — 82962 GLUCOSE BLOOD TEST: CPT | Performed by: NURSE PRACTITIONER

## 2021-12-03 PROCEDURE — G8427 DOCREV CUR MEDS BY ELIG CLIN: HCPCS | Performed by: NURSE PRACTITIONER

## 2021-12-03 PROCEDURE — G8417 CALC BMI ABV UP PARAM F/U: HCPCS | Performed by: NURSE PRACTITIONER

## 2021-12-03 PROCEDURE — G8484 FLU IMMUNIZE NO ADMIN: HCPCS | Performed by: NURSE PRACTITIONER

## 2021-12-03 PROCEDURE — 1036F TOBACCO NON-USER: CPT | Performed by: NURSE PRACTITIONER

## 2021-12-03 PROCEDURE — 99213 OFFICE O/P EST LOW 20 MIN: CPT | Performed by: NURSE PRACTITIONER

## 2021-12-03 RX ORDER — CEPHALEXIN 500 MG/1
500 CAPSULE ORAL 3 TIMES DAILY
Qty: 30 CAPSULE | Refills: 0 | Status: SHIPPED | OUTPATIENT
Start: 2021-12-03 | End: 2021-12-13

## 2021-12-03 ASSESSMENT — ENCOUNTER SYMPTOMS
NAUSEA: 0
VOMITING: 0

## 2021-12-03 ASSESSMENT — PATIENT HEALTH QUESTIONNAIRE - PHQ9: DEPRESSION UNABLE TO ASSESS: PT REFUSES

## 2021-12-03 NOTE — PROGRESS NOTES
[address] [facility]  [address] [place]  Dept: [phone]  Dept Fax: [phone]    Leti Jones is a 55 y.o. female who presents to the urgent care today for her medical conditions/complaints as notedbelow. Leti Jones is c/o of Urinary Tract Infection (started wednesday, fever, abdominal pain while urinating, had hysterectomy x1 month ago, had a little blood in sample today)      HPI:     55 yr old female presents for Urinary Tract Infection. Sx  started at least 2 days ago with fever (subj), suprapubic abdominal pain while urinating, urinary fx, urgency and incontinence at times. hysterectomy x1 month ago, no complications other than she developed covid while admitted (recovered) and UTI, was on keflex and sx resolved. Pt concerned uti sx now could be related. Moving bowels ok, no flank or back pain, incisions healing well, no n/v/d, or URI sx, no vaginal discharge, no blood in panties  Has appt with OB/GYN next week    Urinary Tract Infection   This is a new problem. The current episode started in the past 7 days (x2d). The problem occurs every urination. The problem has been waxing and waning. The quality of the pain is described as burning. The pain is moderate. Maximum temperature: subjective. Associated symptoms include chills, frequency, hematuria (today), hesitancy and urgency. Pertinent negatives include no discharge, flank pain, nausea, possible pregnancy, sweats or vomiting. She has tried nothing for the symptoms. The treatment provided no relief. Her past medical history is significant for kidney stones. There is no history of recurrent UTIs.        Past Medical History:   Diagnosis Date    Anemia     Anxiety     Carpal tunnel syndrome     Depression     Endometriosis     Gestational diabetes     x 3 children    Headache     migraines    History of blood transfusion     Iron deficiency anemia     Kidney stones     May-Thurner syndrome     Obesity     Scleroderma ([facility] [address]     Snores     Under care of team     PCP - Kathy Early CNP - LAST VISIT 9/2021    Under care of team     VASCULAR  - DR. RIBEIRO - 8/2021 - SEES FOR MAY-THURNER SYNDROME    Under care of team     HEM/ONC - DR. PERKINS - DINAH WHITMORE - LAST VISIT - 9/2021    Wears glasses         Current Outpatient Medications   Medication Sig Dispense Refill    cephALEXin (KEFLEX) 500 MG capsule Take 1 capsule by mouth 3 times daily for 10 days 30 capsule 0    albuterol sulfate HFA (PROVENTIL HFA) 108 (90 Base) MCG/ACT inhaler Inhale 2 puffs into the lungs every 6 hours as needed for Wheezing or Shortness of Breath 18 g 0    Spacer/Aero-Holding Chambers JEFFREY 1 Device by Does not apply route daily as needed (chest tightness) 1 each 0    docusate sodium (COLACE) 100 MG capsule Take 1 capsule by mouth 2 times daily as needed for Constipation 60 capsule 1    simethicone (MYLICON) 80 MG chewable tablet Take 1 tablet by mouth every 6 hours as needed for Flatulence 180 tablet 0    acetaminophen (AMINOFEN) 325 MG tablet Take 2 tablets by mouth every 6 hours as needed for Pain 120 tablet 3    losartan (COZAAR) 25 MG tablet Take 1 tablet by mouth daily 30 tablet 5    busPIRone (BUSPAR) 10 MG tablet Take 1 tablet by mouth 3 times daily as needed (anxiety) 90 tablet 5    Elastic Bandages & Supports (JOBST ULTRASHEER 20-30MMHG LG) MISC Wear daily, ok to remove in the evenings 3 each 3    Multiple Vitamins-Minerals (MULTI-JULIO CÉSAR PO) Take by mouth daily      vitamin B-12 (CYANOCOBALAMIN) 100 MCG tablet Take 50 mcg by mouth daily      calcium carbonate (OYSTER SHELL CALCIUM 500 MG) 1250 (500 Ca) MG tablet Take 1 tablet by mouth daily      vitamin D3 (CHOLECALCIFEROL) 25 MCG (1000 UT) TABS tablet Take 1 tablet by mouth daily 90 tablet 1    zinc 50 MG CAPS Take 50 mg by mouth daily 90 capsule 1    magnesium hydroxide (MILK OF MAGNESIA) 400 MG/5ML suspension Take 30 mLs by mouth daily as needed for Constipation       No current facility-administered medications for this visit. Allergies   Allergen Reactions    Contrast [Iodides] Hives     Chest pain, HTN  Able to handle if the pre-medication prep is followed.  Trileptal [Oxcarbazepine] Shortness Of Breath     Red neck rash/ visual changes    Morphine Other (See Comments)     Red streak up arm at iv site/ and hives       Subjective:      Review of Systems   Constitutional: Positive for chills. Gastrointestinal: Negative for nausea and vomiting. Genitourinary: Positive for frequency, hematuria (today), hesitancy and urgency. Negative for flank pain. All other systems reviewed and are negative. 14 systems reviewed and negative except as listed in HPI. Objective:     Physical Exam  Vitals and nursing note reviewed. Constitutional:       General: She is not in acute distress. Appearance: Normal appearance. She is well-developed. She is obese. She is not ill-appearing, toxic-appearing or diaphoretic. Comments: Nontoxic  Very well appearing female   HENT:      Head: Normocephalic and atraumatic. Right Ear: External ear normal.      Left Ear: External ear normal.   Eyes:      General: No scleral icterus. Right eye: No discharge. Left eye: No discharge. Extraocular Movements: Extraocular movements intact. Conjunctiva/sclera: Conjunctivae normal.      Pupils: Pupils are equal, round, and reactive to light. Cardiovascular:      Rate and Rhythm: Normal rate and regular rhythm. Pulses: Normal pulses. Heart sounds: Normal heart sounds. Pulmonary:      Effort: Pulmonary effort is normal.      Breath sounds: Normal breath sounds. Abdominal:      General: Bowel sounds are normal. There is no distension. Palpations: Abdomen is soft. There is no mass. Tenderness: There is abdominal tenderness (mild suprapubic).  There is no right CVA tenderness, left CVA tenderness, guarding or rebound. Hernia: No hernia is present. Comments: Puncture sites healing well, no drng   Musculoskeletal:         General: No tenderness or deformity. Normal range of motion. Cervical back: Neck supple. Skin:     General: Skin is warm and dry. Capillary Refill: Capillary refill takes less than 2 seconds. Findings: No rash ( no rash to visible skin). Neurological:      General: No focal deficit present. Mental Status: She is alert and oriented to person, place, and time. Motor: No abnormal muscle tone. Coordination: Coordination normal.   Psychiatric:         Mood and Affect: Mood normal.         Behavior: Behavior normal.       /88   Pulse 90   Temp 99.1 °F (37.3 °C)   Resp 18   LMP 09/15/2021 Comment: VERY HEAVY PERIODS  SpO2 97%   Breastfeeding No     Assessment:       Diagnosis Orders   1. Acute cystitis with hematuria  POCT Glucose   2. Abdominal pain, unspecified abdominal location  POCT Urinalysis no Micro    Culture, Urine    POCT Glucose   3. Glucosuria         Plan:      Results for POC orders placed in visit on 12/03/21   POCT Glucose   Result Value Ref Range    Glucose 98 mg/dL    QC OK? yes    POCT Urinalysis no Micro   Result Value Ref Range    Color, UA dark yellow     Clarity, UA cloudy     Glucose, UA  mg/dL     Bilirubin, UA negative     Ketones, UA trace     Spec Grav, UA >1.030     Blood, UA POC trace     pH, UA 5.5     Protein, UA POC 30 mg/dl     Urobilinogen, UA 1.0 eu/dl     Leukocytes, UA negative     Nitrite, UA negative        Very well appearing female  POCT urine + protein, blood, 500 glucose and trace ketones  FBS 98  I reviewed pt chart and has had glucose and blood in urine previously  Has uti sx  Urine culture pending  Hx hysterectomy 10/26 - healed well.    Had uti after hysterectomy, I reviewed chart and culture + e-coli, tx with keflex  Keflex rx today  Red flag sx reviewed, pt agreeable to going

## 2021-12-03 NOTE — TELEPHONE ENCOUNTER
Pt called stating that since Wed 12. 1.21 she has been feverish, with stomach pain, uti sx, pain when urinating, and incontinence. Pt requesting antibiotic sent to [address] [place] [address] in [address] [place] [address].

## 2021-12-03 NOTE — PATIENT INSTRUCTIONS
Push fluids, keep hydrated  Follow up family doctor 1 week after antibiotics completed for urine recheck  Return worse  We will call you if urine culture positive for bacteria no covered by prescribed antibiotic  Patient Education        Urinary Tract Infection (UTI) in Women: Care Instructions  Overview     A urinary tract infection, or UTI, is a general term for an infection anywhere between the kidneys and the urethra (where urine comes out). Most UTIs are bladder infections. They often cause pain or burning when you urinate. UTIs are caused by bacteria and can be cured with antibiotics. Be sure to complete your treatment so that the infection does not get worse. Follow-up care is a key part of your treatment and safety. Be sure to make and go to all appointments, and call your doctor if you are having problems. It's also a good idea to know your test results and keep a list of the medicines you take. How can you care for yourself at home? · Take your antibiotics as directed. Do not stop taking them just because you feel better. You need to take the full course of antibiotics. · Drink extra water and other fluids for the next day or two. This will help make the urine less concentrated and help wash out the bacteria that are causing the infection. (If you have kidney, heart, or liver disease and have to limit fluids, talk with your doctor before you increase the amount of fluids you drink.)  · Avoid drinks that are carbonated or have caffeine. They can irritate the bladder. · Urinate often. Try to empty your bladder each time. · To relieve pain, take a hot bath or lay a heating pad set on low over your lower belly or genital area. Never go to sleep with a heating pad in place. To prevent UTIs  · Drink plenty of water each day. This helps you urinate often, which clears bacteria from your system.  (If you have kidney, heart, or liver disease and have to limit fluids, talk with your doctor before you increase the amount of fluids you drink.)  · Urinate when you need to. · If you are sexually active, urinate right after you have sex. · Change sanitary pads often. · Avoid douches, bubble baths, feminine hygiene sprays, and other feminine hygiene products that have deodorants. · After going to the bathroom, wipe from front to back. When should you call for help? Call your doctor now or seek immediate medical care if:    · Symptoms such as fever, chills, nausea, or vomiting get worse or appear for the first time.     · You have new pain in your back just below your rib cage. This is called flank pain.     · There is new blood or pus in your urine.     · You have any problems with your antibiotic medicine. Watch closely for changes in your health, and be sure to contact your doctor if:    · You are not getting better after taking an antibiotic for 2 days.     · Your symptoms go away but then come back. Where can you learn more? Go to https://[employer]. org and sign in to your [employer] account. Enter P085 in the [facility] box to learn more about \"Urinary Tract Infection (UTI) in Women: Care Instructions. \"     If you do not have an account, please click on the \"Sign Up Now\" link. Current as of: February 10, 2021               Content Version: 13.0  © 5999-6368 Healthwise, Incorporated. Care instructions adapted under license by [facility] ([facility]). If you have questions about a medical condition or this instruction, always ask your healthcare professional. [place] [address] any warranty or liability for your use of this information.

## 2021-12-05 LAB
CULTURE: NORMAL
Lab: NORMAL
SPECIMEN DESCRIPTION: NORMAL

## 2021-12-09 ENCOUNTER — TELEPHONE (OUTPATIENT)
Dept: GYNECOLOGIC ONCOLOGY | Age: 46
End: 2021-12-09

## 2021-12-09 DIAGNOSIS — Z98.890 POST-OPERATIVE STATE: Primary | ICD-10-CM

## 2021-12-09 NOTE — TELEPHONE ENCOUNTER
Returned call to patient to assess symptoms. She states she is continuing to take abx Keflex for 10 day duration. Her fevers have resolved. She states her stomach pain is not gone however is improved. Described any time she has urination or movement she feels pain in the lower pelvic region. She notices pink when urinating. She feels her fatigue is most concerning \"like how I felt when my iron was low\"    Plan for 6 week post op check tomorrow 12/10 and will discuss any future treatment if needed. Discussed possible antispasmodic after evaluation tomorrow. Recommend pt to have labs drawn tomorrow to rule out infection and anemia prior to her appointment. She voiced understanding.

## 2021-12-09 NOTE — TELEPHONE ENCOUNTER
Pt called stating that she was seen at walk in clinic on 12.3.21 for stomach pain, fever, possible uti. Pt tested negative for UTI, but was prescribed antibiotics, fever has gone away, but is still having pain up in her stomach when urinating, with a pink-parth color. Incisions were evaluated and look well, but pt is still having cramping, abdominal pain. Please advise pt if she needs to be seen in this office or the ER.

## 2021-12-10 ENCOUNTER — OFFICE VISIT (OUTPATIENT)
Dept: GYNECOLOGIC ONCOLOGY | Age: 46
End: 2021-12-10

## 2021-12-10 VITALS
HEIGHT: 64 IN | OXYGEN SATURATION: 97 % | SYSTOLIC BLOOD PRESSURE: 133 MMHG | BODY MASS INDEX: 48.18 KG/M2 | HEART RATE: 73 BPM | TEMPERATURE: 97.2 F | DIASTOLIC BLOOD PRESSURE: 88 MMHG | WEIGHT: 282.2 LBS

## 2021-12-10 DIAGNOSIS — Z90.710 S/P HYSTERECTOMY: Primary | ICD-10-CM

## 2021-12-10 DIAGNOSIS — Z98.890 POST-OPERATIVE STATE: ICD-10-CM

## 2021-12-10 PROCEDURE — 99024 POSTOP FOLLOW-UP VISIT: CPT | Performed by: PHYSICIAN ASSISTANT

## 2021-12-10 RX ORDER — OXYBUTYNIN CHLORIDE 5 MG/1
5 TABLET, EXTENDED RELEASE ORAL DAILY
Qty: 30 TABLET | Refills: 0 | Status: SHIPPED | OUTPATIENT
Start: 2021-12-10 | End: 2022-08-19 | Stop reason: ALTCHOICE

## 2021-12-10 ASSESSMENT — ENCOUNTER SYMPTOMS
CONSTIPATION: 1
ABDOMINAL PAIN: 1

## 2021-12-10 NOTE — PATIENT INSTRUCTIONS
Script sent to pharmacy for bladder spasms    Continue no heavy lifting more than 10 lbs and nothing in the vagina for 2 weeks    Return to clinic for post operative check in 2 weeks

## 2021-12-10 NOTE — PROGRESS NOTES
Review of Systems   Cardiovascular: Positive for leg swelling (left leg, not new). Gastrointestinal: Positive for abdominal pain (lower abdominal pain ;come and goes) and constipation. Genitourinary: Positive for hematuria. Neurological: Positive for dizziness (at times ;not new). All other systems reviewed and are negative.

## 2021-12-10 NOTE — PROGRESS NOTES
[address] [facility], [facility], [id number] Gissell Vail is a 55 y.o. female who presents for a Post Operative visit    CC/HPI: She is a  female who initially presented to [facility] with concerns of menorrhagia and elevated . She had years of treatment for anemia with IV venofer. She underwent a hysteroscopy and D&C in May 2021 and intraoperative findings revealed a large endometrial polyp that was unable to be fully resected due to large size and very long endometrial cavity. Fragments of tissue were noted to be benign. She was then monitored for her history of menorrhagia and ultimately continued to be symptomatic. Discussion for management options were held and patient opted to proceed with definitive therapy of her symptoms as well as to rule out invasive cancer given elevated . A robotic laparoscopic modified radical  hysterectomy, bilateral salpingo-oophorectomy, cytologic washings, sentinel lymph node mapping and biopsies was performed on 10/26/2021. Final pathology revealed the uterus was benign with endometrial polyp. Negative for atypia or malignancy bilateral fallopian tubes and ovaries were negative for atypia or malignancy. The pelvic washings were benign. The patient did well postoperatively from a surgical standpoint. She did incidentally have an E. coli urinary tract infection that was found at the time of surgery that was treated with Keflex. She also has remote history of nephrolithiasis. She did incidentally develop Covid infection after her procedure. She is now recovering. She is 6 weeks postoperative state. She had recent visit with PCP on 12/3/2021 with concerns of fever abdominal discomfort and dysuria. A urine culture was obtained the patient was given Keflex for 10-day duration. The culture returned negative for growth.   Patient was counseled to complete her antibiotic therapy. Today, she states her fever has resolved after initiating the antibiotics. She states her lower abdominal discomfort has improved however is still not resolved completely. She also notices this discomfort with bowel movements as well. She describes the sensation as pressure and shooting discomfort. She notices light pink tinge when urinating. She has no nausea or vomiting. She has occasional hot flashes which she states are not severe. Her chief complaint is increased in previous anxiety/depression and fatigue. She is current taking Buspar managed by her PCP which she believes does help. ROS:  I have personally reviewed and agree with the review of systems done by my ancillary staff in the [facility] documentation. Objective:  Vitals:    12/10/21 1141   BP: 133/88   Pulse: 73   Temp: 97.2 °F (36.2 °C)   TempSrc: Temporal   SpO2: 97%   Weight: 282 lb 3.2 oz (128 kg)   Height: 5' 4\" (1.626 m)       Physical Exam:  General: well-appearing, no acute distress    Abdomen: Multiple laparoscopic incision sites present. The incisions are closed and healed. There is no erythema or tenderness throughout. There are no herniations. Pelvic examination reveals scant dark discharge in the vaginal canal, old blood. The vaginal cuff is pulled very high. The vaginal cuff is identified and and appears closed. Bi  Manual examination reveals mild tenderness to suprapubic region. The vaginal cuff is able to be palpated with tip of examination finger. The vaginal cuff is closed and without separation or granulation. Moderate induration at vaginal cuff, no palpable fluid collection. Assessment:  Hx menorrhagia, benign endometrial polyp    Post Operative Changes:  Stable healing appropriately     Discussed result with the patient, and all questions were answered. Plan:  Referrals:  PCP to discuss management of anxiety/depression.  May be role for additional SSRI such as Paroxetine or Venlafaxine which has been show to aid in relief from menopausal symptomology. She prefers to avoid HRT. We discussed importance of Vitamin D3 and calcium supplementations and patient has previously been taking these supplements and we confirmed importance of continuation. No need for CBC to be completed at this time as clinically patient appears well and no bleeding or signs of infection     Follow Up Instructions:  Return to clinic in 2 weeks for post operative check    Script sent to pharmacy for bladder spasms     Continue activity restrictions for another 2 weeks.     Electronically signed by Garrick Thomas PA-C on 12/10/21 at 11:50 AM EST

## 2021-12-23 ENCOUNTER — HOSPITAL ENCOUNTER (OUTPATIENT)
Age: 46
Setting detail: SPECIMEN
Discharge: HOME | End: 2021-12-23

## 2021-12-23 ENCOUNTER — OFFICE VISIT (OUTPATIENT)
Dept: GYNECOLOGIC ONCOLOGY | Age: 46
End: 2021-12-23

## 2021-12-23 VITALS
DIASTOLIC BLOOD PRESSURE: 93 MMHG | BODY MASS INDEX: 47.87 KG/M2 | SYSTOLIC BLOOD PRESSURE: 136 MMHG | WEIGHT: 280.4 LBS | OXYGEN SATURATION: 97 % | HEIGHT: 64 IN | TEMPERATURE: 97.2 F | HEART RATE: 63 BPM

## 2021-12-23 DIAGNOSIS — R30.0 DYSURIA: ICD-10-CM

## 2021-12-23 DIAGNOSIS — Z98.890 POST-OPERATIVE STATE: ICD-10-CM

## 2021-12-23 DIAGNOSIS — Z90.710 S/P HYSTERECTOMY: Primary | ICD-10-CM

## 2021-12-23 PROCEDURE — 99024 POSTOP FOLLOW-UP VISIT: CPT | Performed by: PHYSICIAN ASSISTANT

## 2021-12-23 ASSESSMENT — ENCOUNTER SYMPTOMS
ABDOMINAL PAIN: 1
EYES NEGATIVE: 1
RESPIRATORY NEGATIVE: 1

## 2021-12-23 NOTE — PROGRESS NOTES
Review of Systems   Constitutional: Negative. HENT:  Negative. Eyes: Negative. Respiratory: Negative. Cardiovascular: Negative. Gastrointestinal: Positive for abdominal pain. Endocrine: Positive for hot flashes. Genitourinary: Positive for frequency and vaginal bleeding. Musculoskeletal: Negative. Skin: Negative. Neurological: Positive for numbness (leg). Hematological: Bruises/bleeds easily.

## 2021-12-23 NOTE — PROGRESS NOTES
[address] [facility], [facility], [address] Gissell Owens is a 55 y.o. female who presents for a Post Operative visit    CC/HPI: She is a  female who initially presented to [facility] with concerns of menorrhagia and elevated . She had years of treatment for anemia with IV venofer. She underwent a hysteroscopy and D&C in May 2021 and intraoperative findings revealed a large endometrial polyp that was unable to be fully resected due to large size and very long endometrial cavity. Fragments of tissue were noted to be benign. She was then monitored for her history of menorrhagia and ultimately continued to be symptomatic. Discussion for management options were held and patient opted to proceed with definitive therapy of her symptoms as well as to rule out invasive cancer given elevated . A robotic laparoscopic modified radical  hysterectomy, bilateral salpingo-oophorectomy, cytologic washings, sentinel lymph node mapping and biopsies was performed on 10/26/2021. Final pathology revealed the uterus was benign with endometrial polyp. Negative for atypia or malignancy bilateral fallopian tubes and ovaries were negative for atypia or malignancy. The pelvic washings were benign. The patient did well postoperatively from a surgical standpoint. She did incidentally have an E. coli urinary tract infection that was found at the time of surgery that was treated with Keflex. She also has remote history of nephrolithiasis. She did incidentally develop Covid infection after her procedure. She is now recovering. Today, she is now 8 weeks post operative state. She has occasional vaginal spotting. She has no abdominal or pelvic pain. She has concerns of urinary frequency with occasional dysuria, she is leaving urine sample today and we will send for culture. She states has been taking the Ditropan with relief.  She has started on OTC menopause relief medication. ROS:  I have personally reviewed and agree with the review of systems done by my ancillary staff in the [facility] documentation. Objective:  Vitals:    12/23/21 1018 12/23/21 1029   BP: (!) 144/87 (!) 136/93   Site: Left Lower Arm    Position: Sitting    Cuff Size: Medium Adult    Pulse: 63    Temp: 97.2 °F (36.2 °C)    SpO2: 97%    Weight: 280 lb 6.4 oz (127.2 kg)    Height: 5' 4\" (1.626 m)        Physical Exam:  General: well-appearing, no acute distress    Abdomen: Soft. Non tender. Multiple laparoscopic incision scars present. The incisions are closed and healed. There is no erythema or tenderness throughout. There are no herniations. Pelvic examination reveals no blood or discharge in the vaginal canal. The vaginal cuff is pulled very high. The vaginal cuff is identified and and appears closed. Bi  Manual examination reveals the vaginal cuff is able to be palpated with tip of examination finger. The vaginal cuff is closed and without separation or granulation. Mild induration at vaginal cuff, no palpable fluid collection. Assessment:  Hx menorrhagia, benign endometrial polyp, s/p AMBER/BSO    Post Operative Changes:  Stable healing appropriately     Plan: We will check urine today and treat appropriately. Advised to use oxybutynin as needed in post operative period    She is to continue on Vitamin D3 and calcium supplementations    Continue no heavy lifting more than 10 lbs and nothing in the vagina for 2 weeks. Then she may gradually resume normal functions.     Follow Up Instructions:  Return to clinic as needed in the future    She should return her GYN care to local gynecologist     Electronically signed by Isabel Hitchcock PA-C on 12/21/2021 at 11:00 AM EST

## 2021-12-25 LAB
CULTURE: NORMAL
Lab: NORMAL
SPECIMEN DESCRIPTION: NORMAL

## 2022-01-21 ENCOUNTER — TELEPHONE (OUTPATIENT)
Dept: PRIMARY CARE CLINIC | Age: 47
End: 2022-01-21

## 2022-01-21 NOTE — TELEPHONE ENCOUNTER
----- Message from Azucena Kan sent at 1/21/2022  4:48 PM EST -----  Subject: Message to Provider    QUESTIONS  Information for Provider? PT was exposed to 2 people yesterday 1/20/2022   and was inquiring if she should wait 5 days to be tested or be tested   sooner. PT did have Covid back in October of 2021. Please advise PT Thanks  ---------------------------------------------------------------------------  --------------  CALL BACK INFO  What is the best way for the office to contact you? OK to leave message on   voicemail  Preferred Call Back Phone Number? [phone]  ---------------------------------------------------------------------------  --------------  SCRIPT ANSWERS  Relationship to Patient?  Self

## 2022-01-24 ENCOUNTER — OFFICE VISIT (OUTPATIENT)
Dept: FAMILY MEDICINE CLINIC | Age: 47
End: 2022-01-24
Payer: MEDICARE

## 2022-01-24 ENCOUNTER — HOSPITAL ENCOUNTER (OUTPATIENT)
Age: 47
Setting detail: SPECIMEN
Discharge: HOME | End: 2022-01-24

## 2022-01-24 VITALS
HEART RATE: 81 BPM | OXYGEN SATURATION: 98 % | SYSTOLIC BLOOD PRESSURE: 120 MMHG | BODY MASS INDEX: 48.23 KG/M2 | WEIGHT: 281 LBS | TEMPERATURE: 98.1 F | DIASTOLIC BLOOD PRESSURE: 80 MMHG

## 2022-01-24 DIAGNOSIS — B34.9 VIRAL ILLNESS: Primary | ICD-10-CM

## 2022-01-24 DIAGNOSIS — Z11.52 ENCOUNTER FOR SCREENING FOR COVID-19: ICD-10-CM

## 2022-01-24 DIAGNOSIS — Z20.822 EXPOSURE TO CONFIRMED CASE OF COVID-19: ICD-10-CM

## 2022-01-24 PROCEDURE — 1036F TOBACCO NON-USER: CPT

## 2022-01-24 PROCEDURE — 99212 OFFICE O/P EST SF 10 MIN: CPT

## 2022-01-24 PROCEDURE — G8427 DOCREV CUR MEDS BY ELIG CLIN: HCPCS

## 2022-01-24 PROCEDURE — G8484 FLU IMMUNIZE NO ADMIN: HCPCS

## 2022-01-24 PROCEDURE — G8417 CALC BMI ABV UP PARAM F/U: HCPCS

## 2022-01-24 RX ORDER — LORATADINE 10 MG/1
10 TABLET ORAL DAILY
Qty: 30 TABLET | Refills: 0 | Status: SHIPPED | OUTPATIENT
Start: 2022-01-24 | End: 2022-02-23

## 2022-01-24 ASSESSMENT — ENCOUNTER SYMPTOMS
SORE THROAT: 1
SINUS PRESSURE: 1
RHINORRHEA: 0
COUGH: 0
EYE DISCHARGE: 0

## 2022-01-24 NOTE — PATIENT INSTRUCTIONS
Quarantine pending COVID-19 test results. Continue with symptomatic treatment. Patient Education        Learning About COVID-19 and Flu Symptoms  How can you tell COVID-19 from the flu? COVID-19 and the flu have similar symptoms. The two can be hard to tell apart. The only way to know for sure which illness you have is to be tested. Since the symptoms are so alike, it makes sense to act as if you have COVID-19 until your test results come back. This means staying home and limiting contact with people in your home. You'll need to wash your hands often and disinfect surfaces that you touch. And be sure to wear a mask when you're around other people. This is also good advice if you think you have the flu. COVID-19 and the flu have these symptoms in common:  · Fever or chills  · Cough  · Shortness of breath  · Fatigue (tiredness)  · Sore throat  · Runny or stuffy nose  · Muscle and body aches  · Headache  · Vomiting and diarrhea (more common in children than adults)  COVID-19 has another symptom that also may occur:  · New loss of taste or smell  COVID-19 symptoms may appear from 2 to 14 days after infection. Flu symptoms usually appear 1 to 4 days after infection. Why should you get a flu shot during the COVID-19 pandemic? It's important to get your yearly flu vaccine. Both the flu and COVID-19 can be active at the same time. You can get sick with both infections at once. And having both may make you more sick than getting just one. The flu vaccine won't protect you from COVID-19. But it can help prevent the flu or reduce its symptoms. If fewer people get very ill with the flu, this will help free up medical resources that are needed for COVID-19 patients. Where can you learn more? Go to [email or web address] org and sign in to your [employer] account. Enter C123 in the [employer] box to learn more about \"Learning About COVID-19 and Flu Symptoms. \"     If you do not have an account, please click on the \"Sign Up Now\" link. Current as of: March 26, 2021               Content Version: 13.1  © 2006-2021 Healthwise, Incorporated. Care instructions adapted under license by [facility] ([facility]). If you have questions about a medical condition or this instruction, always ask your healthcare professional. [address] any warranty or liability for your use of this information.

## 2022-01-24 NOTE — PROGRESS NOTES
[address] [facility]  [address] [address] WALK[facility]  [address]  [address]  [address] [address]  Dept: [phone]    Aguilar Vasquez is a 55 y.o. female Established patient, who presents to the walk-in clinic today with conditions/complaints as noted below:    Chief Complaint   Patient presents with    Other     scratchy throat, fatigue, head aches since 4 days ago, request covid test ( was exposed )         HPI:     URI   This is a new problem. The current episode started in the past 7 days (on Thursday). The problem has been unchanged. There has been no fever. Associated symptoms include ear pain (right), headaches and a sore throat (\"scratchy\"). Pertinent negatives include no congestion, coughing, rash or rhinorrhea. She has tried acetaminophen for the symptoms. The treatment provided no relief. Patient has received both doses of the Pfizer COVID-19 vaccine. States that her sister recently tested positive for COVID-19. Past Medical History:   Diagnosis Date    Anemia     Anxiety     Carpal tunnel syndrome     Depression     Endometriosis     Gestational diabetes     x 3 children    Headache     migraines    History of blood transfusion     Iron deficiency anemia     Kidney stones     May-Thurner syndrome     Obesity     Scleroderma (HCC)     Snores     Under care of team     PCP - Fide Ramos CNP - LAST VISIT 2021    Under care of team     VASCULAR  - DR. RIBEIRO - 2021 - SEES FOR MAY-THURNER SYNDROME    Under care of team     HEM/ONC - DR. PERKINS - DINAH WHITMORE - LAST VISIT - 2021    Wears glasses        Current Outpatient Medications   Medication Sig Dispense Refill    loratadine (CLARITIN) 10 MG tablet Take 1 tablet by mouth daily 30 tablet 0    Nutritional Supplements (ESTROVEN PO) Take by mouth daily      oxybutynin (DITROPAN XL) 5 MG extended release tablet Take 1 tablet by mouth daily 30 tablet 0    docusate sodium (COLACE) 100 MG capsule Take 1 capsule by mouth 2 times daily as needed for Constipation 60 capsule 1    acetaminophen (AMINOFEN) 325 MG tablet Take 2 tablets by mouth every 6 hours as needed for Pain 120 tablet 3    losartan (COZAAR) 25 MG tablet Take 1 tablet by mouth daily 30 tablet 5    busPIRone (BUSPAR) 10 MG tablet Take 1 tablet by mouth 3 times daily as needed (anxiety) 90 tablet 5    Elastic Bandages & Supports (JOBST ULTRASHEER 20-30MMHG LG) MISC Wear daily, ok to remove in the evenings 3 each 3    Multiple Vitamins-Minerals (MULTI-JULIO CÉSAR PO) Take by mouth daily      vitamin B-12 (CYANOCOBALAMIN) 100 MCG tablet Take 50 mcg by mouth daily      calcium carbonate (OYSTER SHELL CALCIUM 500 MG) 1250 (500 Ca) MG tablet Take 1 tablet by mouth daily      vitamin D3 (CHOLECALCIFEROL) 25 MCG (1000 UT) TABS tablet Take 1 tablet by mouth daily 90 tablet 1    zinc 50 MG CAPS Take 50 mg by mouth daily 90 capsule 1     No current facility-administered medications for this visit. Allergies   Allergen Reactions    Contrast [Iodides] Hives     Chest pain, HTN  Able to handle if the pre-medication prep is followed.  Trileptal [Oxcarbazepine] Shortness Of Breath     Red neck rash/ visual changes    Morphine Other (See Comments)     Red streak up arm at iv site/ and hives       :     Review of Systems   Constitutional: Positive for chills and fatigue. Negative for fever. HENT: Positive for ear pain (right), sinus pressure and sore throat (\"scratchy\"). Negative for congestion and rhinorrhea. Eyes: Negative for discharge. Respiratory: Negative for cough. Skin: Negative for rash.    Neurological: Positive for headaches.       :     /80   Pulse 81   Temp 98.1 °F (36.7 °C) (Temporal)   Wt 281 lb (127.5 kg)   LMP 09/15/2021 Comment: VERY HEAVY PERIODS  SpO2 98%   BMI 48.23 kg/m²     Physical Exam  Vitals reviewed. Constitutional:       General: She is not in acute distress. Appearance: Normal appearance. She is obese. HENT:      Head: Normocephalic and atraumatic. Right Ear: Tympanic membrane, ear canal and external ear normal.      Left Ear: Tympanic membrane, ear canal and external ear normal.      Nose: Nose normal.      Mouth/Throat:      Mouth: Mucous membranes are moist.      Pharynx: Oropharynx is clear. Posterior oropharyngeal erythema present. Eyes:      Conjunctiva/sclera: Conjunctivae normal.   Cardiovascular:      Rate and Rhythm: Normal rate and regular rhythm. Heart sounds: Normal heart sounds. Pulmonary:      Effort: Pulmonary effort is normal.      Breath sounds: Normal breath sounds. Musculoskeletal:      Cervical back: Neck supple. Lymphadenopathy:      Cervical: No cervical adenopathy. Skin:     General: Skin is warm and dry. Findings: No rash. Neurological:      General: No focal deficit present. Mental Status: She is alert and oriented to person, place, and time. Psychiatric:         Attention and Perception: Attention normal.         Mood and Affect: Mood normal.         Speech: Speech normal.         Behavior: Behavior normal. Behavior is cooperative.           :          1. Viral illness  -     COVID-19; Future  -     loratadine (CLARITIN) 10 MG tablet; Take 1 tablet by mouth daily, Disp-30 tablet, R-0Normal  2. Encounter for screening for COVID-19  -     COVID-19; Future  3. Exposure to confirmed case of COVID-19  -     COVID-19; Future       :      Return if symptoms worsen or fail to improve. Orders Placed This Encounter   Medications    loratadine (CLARITIN) 10 MG tablet     Sig: Take 1 tablet by mouth daily     Dispense:  30 tablet     Refill:  0      Due to coinciding symptoms and exposure, advised testing for COVID-19. Patient agrees, instructed to quarantine pending test results.   Advised to continue with symptomatic treatment. Increase fluid intake and rest.  Use Tylenol as needed for headaches. Follow-up with PCP as needed. Patient and/or parent given educational materials - see patient instructions. Discussed use, benefit, and side effects of prescribed medications. All patient questions answered. Patient and/or parent voiced understanding.       Electronically signed by JUSTICE Stephen 1/24/2022 at 12:13 PM

## 2022-01-25 DIAGNOSIS — Z20.822 EXPOSURE TO CONFIRMED CASE OF COVID-19: ICD-10-CM

## 2022-01-25 DIAGNOSIS — Z11.52 ENCOUNTER FOR SCREENING FOR COVID-19: ICD-10-CM

## 2022-01-25 DIAGNOSIS — B34.9 VIRAL ILLNESS: ICD-10-CM

## 2022-01-25 LAB
SARS-COV-2: NORMAL
SARS-COV-2: NOT DETECTED
SOURCE: NORMAL

## 2022-04-08 ENCOUNTER — APPOINTMENT (OUTPATIENT)
Dept: GENERAL RADIOLOGY | Age: 47
End: 2022-04-08
Payer: MEDICARE

## 2022-04-08 ENCOUNTER — HOSPITAL ENCOUNTER (EMERGENCY)
Age: 47
Discharge: HOME | End: 2022-04-08
Attending: EMERGENCY MEDICINE
Payer: MEDICARE

## 2022-04-08 VITALS
BODY MASS INDEX: 49.51 KG/M2 | RESPIRATION RATE: 18 BRPM | HEIGHT: 64 IN | HEART RATE: 79 BPM | WEIGHT: 290 LBS | DIASTOLIC BLOOD PRESSURE: 93 MMHG | SYSTOLIC BLOOD PRESSURE: 155 MMHG | OXYGEN SATURATION: 96 % | TEMPERATURE: 98.4 F

## 2022-04-08 DIAGNOSIS — J40 BRONCHITIS: Primary | ICD-10-CM

## 2022-04-08 LAB
ABSOLUTE EOS #: 0.1 K/UL (ref 0–0.4)
ABSOLUTE LYMPH #: 1.3 K/UL (ref 1–4.8)
ABSOLUTE MONO #: 0.4 K/UL (ref 0.1–1.2)
ANION GAP SERPL CALCULATED.3IONS-SCNC: 13 MMOL/L (ref 9–17)
BASOPHILS # BLD: 1 % (ref 0–2)
BASOPHILS ABSOLUTE: 0 K/UL (ref 0–0.2)
BUN BLDV-MCNC: 10 MG/DL (ref 6–20)
CALCIUM SERPL-MCNC: 9.3 MG/DL (ref 8.6–10.4)
CHLORIDE BLD-SCNC: 99 MMOL/L (ref 98–107)
CO2: 25 MMOL/L (ref 20–31)
CREAT SERPL-MCNC: 0.56 MG/DL (ref 0.5–0.9)
EOSINOPHILS RELATIVE PERCENT: 2 % (ref 1–4)
FLU A ANTIGEN: NEGATIVE
FLU B ANTIGEN: NEGATIVE
GFR AFRICAN AMERICAN: >60 ML/MIN
GFR NON-AFRICAN AMERICAN: >60 ML/MIN
GFR SERPL CREATININE-BSD FRML MDRD: ABNORMAL ML/MIN/{1.73_M2}
GLUCOSE BLD-MCNC: 108 MG/DL (ref 70–99)
HCT VFR BLD CALC: 42.5 % (ref 36–46)
HEMOGLOBIN: 13.9 G/DL (ref 12–16)
LYMPHOCYTES # BLD: 33 % (ref 24–44)
MCH RBC QN AUTO: 27.3 PG (ref 26–34)
MCHC RBC AUTO-ENTMCNC: 32.7 G/DL (ref 31–37)
MCV RBC AUTO: 83.5 FL (ref 80–100)
MONOCYTES # BLD: 10 % (ref 2–11)
PDW BLD-RTO: 14.9 % (ref 12.5–15.4)
PLATELET # BLD: 195 K/UL (ref 140–450)
PMV BLD AUTO: 9.5 FL (ref 6–12)
POTASSIUM SERPL-SCNC: 4.3 MMOL/L (ref 3.7–5.3)
RBC # BLD: 5.09 M/UL (ref 4–5.2)
S PYO AG THROAT QL: NEGATIVE
SEG NEUTROPHILS: 54 % (ref 36–66)
SEGMENTED NEUTROPHILS ABSOLUTE COUNT: 2.1 K/UL (ref 1.8–7.7)
SODIUM BLD-SCNC: 137 MMOL/L (ref 135–144)
SOURCE: NORMAL
TSH SERPL DL<=0.05 MIU/L-ACNC: 1.63 UIU/ML (ref 0.3–5)
WBC # BLD: 3.9 K/UL (ref 3.5–11)

## 2022-04-08 PROCEDURE — 87880 STREP A ASSAY W/OPTIC: CPT

## 2022-04-08 PROCEDURE — 84443 ASSAY THYROID STIM HORMONE: CPT

## 2022-04-08 PROCEDURE — U0003 INFECTIOUS AGENT DETECTION BY NUCLEIC ACID (DNA OR RNA); SEVERE ACUTE RESPIRATORY SYNDROME CORONAVIRUS 2 (SARS-COV-2) (CORONAVIRUS DISEASE [COVID-19]), AMPLIFIED PROBE TECHNIQUE, MAKING USE OF HIGH THROUGHPUT TECHNOLOGIES AS DESCRIBED BY CMS-2020-01-R: HCPCS

## 2022-04-08 PROCEDURE — 82746 ASSAY OF FOLIC ACID SERUM: CPT

## 2022-04-08 PROCEDURE — 83550 IRON BINDING TEST: CPT

## 2022-04-08 PROCEDURE — 80048 BASIC METABOLIC PNL TOTAL CA: CPT

## 2022-04-08 PROCEDURE — 6360000002 HC RX W HCPCS: Performed by: EMERGENCY MEDICINE

## 2022-04-08 PROCEDURE — 99283 EMERGENCY DEPT VISIT LOW MDM: CPT

## 2022-04-08 PROCEDURE — 87804 INFLUENZA ASSAY W/OPTIC: CPT

## 2022-04-08 PROCEDURE — 71046 X-RAY EXAM CHEST 2 VIEWS: CPT

## 2022-04-08 PROCEDURE — 96374 THER/PROPH/DIAG INJ IV PUSH: CPT

## 2022-04-08 PROCEDURE — 96361 HYDRATE IV INFUSION ADD-ON: CPT

## 2022-04-08 PROCEDURE — 85025 COMPLETE CBC W/AUTO DIFF WBC: CPT

## 2022-04-08 PROCEDURE — 36415 COLL VENOUS BLD VENIPUNCTURE: CPT

## 2022-04-08 PROCEDURE — 2580000003 HC RX 258: Performed by: EMERGENCY MEDICINE

## 2022-04-08 PROCEDURE — 82607 VITAMIN B-12: CPT

## 2022-04-08 PROCEDURE — 83540 ASSAY OF IRON: CPT

## 2022-04-08 PROCEDURE — U0005 INFEC AGEN DETEC AMPLI PROBE: HCPCS

## 2022-04-08 RX ORDER — ONDANSETRON 4 MG/1
4 TABLET, ORALLY DISINTEGRATING ORAL EVERY 8 HOURS PRN
Qty: 10 TABLET | Refills: 0 | Status: SHIPPED | OUTPATIENT
Start: 2022-04-08

## 2022-04-08 RX ORDER — AZITHROMYCIN 250 MG/1
TABLET, FILM COATED ORAL
Qty: 1 PACKET | Refills: 0 | Status: SHIPPED | OUTPATIENT
Start: 2022-04-08 | End: 2022-04-12

## 2022-04-08 RX ORDER — ONDANSETRON 2 MG/ML
4 INJECTION INTRAMUSCULAR; INTRAVENOUS ONCE
Status: COMPLETED | OUTPATIENT
Start: 2022-04-08 | End: 2022-04-08

## 2022-04-08 RX ORDER — 0.9 % SODIUM CHLORIDE 0.9 %
1000 INTRAVENOUS SOLUTION INTRAVENOUS ONCE
Status: COMPLETED | OUTPATIENT
Start: 2022-04-08 | End: 2022-04-08

## 2022-04-08 RX ADMIN — ONDANSETRON 4 MG: 2 INJECTION INTRAMUSCULAR; INTRAVENOUS at 15:32

## 2022-04-08 RX ADMIN — SODIUM CHLORIDE 1000 ML: 9 INJECTION, SOLUTION INTRAVENOUS at 15:33

## 2022-04-08 ASSESSMENT — ENCOUNTER SYMPTOMS
DIARRHEA: 0
VOMITING: 0
PHOTOPHOBIA: 0
RHINORRHEA: 1
ABDOMINAL PAIN: 0
NAUSEA: 0
CHEST TIGHTNESS: 0
SORE THROAT: 1
COUGH: 1
SHORTNESS OF BREATH: 0

## 2022-04-08 ASSESSMENT — PAIN - FUNCTIONAL ASSESSMENT: PAIN_FUNCTIONAL_ASSESSMENT: 0-10

## 2022-04-08 ASSESSMENT — PAIN DESCRIPTION - LOCATION: LOCATION: THROAT

## 2022-04-08 ASSESSMENT — PAIN DESCRIPTION - PAIN TYPE: TYPE: ACUTE PAIN

## 2022-04-08 ASSESSMENT — PAIN SCALES - GENERAL: PAINLEVEL_OUTOF10: 6

## 2022-04-08 NOTE — ED TRIAGE NOTES
The patient presented in the ED with a complaint of sore throat and an overall feeling severe fatigue. The patient stated that she has been trying to get in with her doctor  but has not had the chance too due to kids/household being sick.

## 2022-04-08 NOTE — ED PROVIDER NOTES
of team, Under care of team, Under care of team, and Wears glasses. SURGICAL HISTORY      has a past surgical history that includes Gastric bypass surgery (2009);  section (x 3); toenail excision; other surgical history; Cholecystectomy; pelvic laparoscopy; vascular surgery (2016); Dilation and curettage of uterus (N/A, 2021); Dilation and curettage of uterus (N/A, 2021); Endometrial ablation (2021); miles and bso (cervix removed) (10/26/2021); and Hysterectomy (N/A, 10/26/2021).     [address]       Discharge Medication List as of 2022  5:45 PM      CONTINUE these medications which have NOT CHANGED    Details   Nutritional Supplements (ESTROVEN PO) Take by mouth dailyHistorical Med      oxybutynin (DITROPAN XL) 5 MG extended release tablet Take 1 tablet by mouth daily, Disp-30 tablet, R-0Normal      docusate sodium (COLACE) 100 MG capsule Take 1 capsule by mouth 2 times daily as needed for Constipation, Disp-60 capsule, R-1Print      acetaminophen (AMINOFEN) 325 MG tablet Take 2 tablets by mouth every 6 hours as needed for Pain, Disp-120 tablet, R-3Print      losartan (COZAAR) 25 MG tablet Take 1 tablet by mouth daily, Disp-30 tablet, R-5Normal      busPIRone (BUSPAR) 10 MG tablet Take 1 tablet by mouth 3 times daily as needed (anxiety), Disp-90 tablet, R-5Normal      Elastic Bandages & Supports (JOBST ULTRASHEER 20-30MMHG LG) MISC Disp-3 each, R-3, PrintWear daily, ok to remove in the evenings      Multiple Vitamins-Minerals (MULTI-JULIO CÉSAR PO) Take by mouth dailyHistorical Med      vitamin B-12 (CYANOCOBALAMIN) 100 MCG tablet Take 50 mcg by mouth dailyHistorical Med      calcium carbonate (OYSTER SHELL CALCIUM 500 MG) 1250 (500 Ca) MG tablet Take 1 tablet by mouth dailyHistorical Med      vitamin D3 (CHOLECALCIFEROL) 25 MCG (1000 UT) TABS tablet Take 1 tablet by mouth daily, Disp-90 tablet, R-1Normal      zinc 50 MG CAPS Take 50 mg by mouth daily, Disp-90 capsule, R-1Normal ALLERGIES     is allergic to contrast [iodides], trileptal [oxcarbazepine], and morphine. FAMILY HISTORY     She indicated that her mother is . She indicated that her father is . She indicated that both of her sisters are alive. She indicated that her brother is alive. family history includes Colon Cancer in her mother; Diabetes in her brother, sister, and sister; Early Death in her father; High Blood Pressure in her brother, sister, and sister. SOCIAL HISTORY      reports that she has never smoked. She has never used smokeless tobacco. She reports that she does not drink alcohol and does not use drugs. PHYSICAL EXAM    (up to 7 for level 4, 8 or more for level 5)   INITIAL VITALS:  height is 5' 4\" (1.626 m) and weight is 131.5 kg (290 lb). Her oral temperature is 98.4 °F (36.9 °C). Her blood pressure is 155/93 (abnormal) and her pulse is 79. Her respiration is 18 and oxygen saturation is 96%. Physical Exam  Vitals and nursing note reviewed. Constitutional:       Appearance: She is well-developed. HENT:      Head: Normocephalic and atraumatic. Right Ear: Tympanic membrane and ear canal normal. No drainage, swelling or tenderness. No middle ear effusion. Tympanic membrane is not erythematous. Left Ear: Tympanic membrane and ear canal normal. No drainage, swelling or tenderness. No middle ear effusion. Tympanic membrane is not erythematous. Nose: Congestion present. Mouth/Throat:      Mouth: Mucous membranes are moist. No oral lesions. Pharynx: Uvula midline. Posterior oropharyngeal erythema present. No pharyngeal swelling, oropharyngeal exudate or uvula swelling. Tonsils: No tonsillar exudate or tonsillar abscesses. 0 on the right. 0 on the left. Eyes:      Conjunctiva/sclera: Conjunctivae normal.   Cardiovascular:      Rate and Rhythm: Normal rate and regular rhythm. Heart sounds: Normal heart sounds. No murmur heard. No friction rub. No gallop. Pulmonary:      Effort: Pulmonary effort is normal.      Breath sounds: Normal breath sounds. No wheezing, rhonchi or rales. Abdominal:      General: Bowel sounds are normal.      Palpations: Abdomen is soft. Skin:     General: Skin is warm and dry. Capillary Refill: Capillary refill takes less than 2 seconds. Neurological:      General: No focal deficit present. Mental Status: She is alert and oriented to person, place, and time. Psychiatric:         Mood and Affect: Mood normal.         Behavior: Behavior normal.         DIFFERENTIAL DIAGNOSIS/ MDM:     55year Old female presents with congestion, cough, and sore throat. She also reports generalized fatigue. Patient reports that she has a history of anemia, and was supposed to have labs drawn that were ordered by her hematologist.  She states that she felt well after she had a hysterectomy, but then 2 weeks ago started feeling tired again. She denies GI bleeding. Patient's hemoglobin was found to be normal.  Chest x-ray showed basilar atelectasis. Rapid strep and flu were negative. Covid test is pending    DIAGNOSTIC RESULTS     EKG: All EKG's are interpreted by the Emergency Department Physician who either signs or Co-signs this chart in the absence of a cardiologist.    none    RADIOLOGY:        Interpretation per the Radiologist below, if available at the time of this note:    XR CHEST (2 VW)    Result Date: 4/8/2022  EXAMINATION: TWO XRAY VIEWS OF THE CHEST 4/8/2022 4:20 pm COMPARISON: Chest x-ray dated 15 October 2021 HISTORY: ORDERING SYSTEM PROVIDED HISTORY: cough TECHNOLOGIST PROVIDED HISTORY: cough Reason for Exam: COUGH, PHARYNGITIS, FATIGUE FINDINGS: Stable elevation of the left hemidiaphragm. Cardiomediastinal silhouette is normal.  Linear opacity in the left lower lobe likely represents atelectasis. Right lung is clear. No pneumothorax or pleural effusion.      Stable elevation of the left hemidiaphragm with left basilar atelectasis. LABS:  Results for orders placed or performed during the hospital encounter of 04/08/22   Strep Screen Group A Throat    Specimen: Throat   Result Value Ref Range    Source . THROAT SWAB     Strep A Ag NEGATIVE NEGATIVE   Rapid Influenza A/B Antigens    Specimen: Nasopharyngeal   Result Value Ref Range    Flu A Antigen NEGATIVE NEGATIVE    Flu B Antigen NEGATIVE NEGATIVE   CBC with Auto Differential   Result Value Ref Range    WBC 3.9 3.5 - 11.0 k/uL    RBC 5.09 4.0 - 5.2 m/uL    Hemoglobin 13.9 12.0 - 16.0 g/dL    Hematocrit 42.5 36 - 46 %    MCV 83.5 80 - 100 fL    MCH 27.3 26 - 34 pg    MCHC 32.7 31 - 37 g/dL    RDW 14.9 12.5 - 15.4 %    Platelets 677 322 - 147 k/uL    MPV 9.5 6.0 - 12.0 fL    Seg Neutrophils 54 36 - 66 %    Lymphocytes 33 24 - 44 %    Monocytes 10 2 - 11 %    Eosinophils % 2 1 - 4 %    Basophils 1 0 - 2 %    Segs Absolute 2.10 1.8 - 7.7 k/uL    Absolute Lymph # 1.30 1.0 - 4.8 k/uL    Absolute Mono # 0.40 0.1 - 1.2 k/uL    Absolute Eos # 0.10 0.0 - 0.4 k/uL    Basophils Absolute 0.00 0.0 - 0.2 k/uL   Basic Metabolic Panel   Result Value Ref Range    Glucose 108 (H) 70 - 99 mg/dL    BUN 10 6 - 20 mg/dL    CREATININE 0.56 0.50 - 0.90 mg/dL    Calcium 9.3 8.6 - 10.4 mg/dL    Sodium 137 135 - 144 mmol/L    Potassium 4.3 3.7 - 5.3 mmol/L    Chloride 99 98 - 107 mmol/L    CO2 25 20 - 31 mmol/L    Anion Gap 13 9 - 17 mmol/L    GFR Non-African American >60 >60 mL/min    GFR African American >60 >60 mL/min    GFR Comment         TSH   Result Value Ref Range    TSH 1.63 0.30 - 5.00 uIU/mL       Flu and strep negative.  TSH normal. Hemoglobin normal.     EMERGENCY DEPARTMENT COURSE:   Vitals:    Vitals:    04/08/22 1433 04/08/22 1737   BP: (!) 158/101 (!) 155/93   Pulse: 79    Resp: 18    Temp: 98.4 °F (36.9 °C)    TempSrc: Oral    SpO2: 96%    Weight: 131.5 kg (290 lb)    Height: 5' 4\" (1.626 m)      -------------------------  BP: (!) 155/93, Temp: 98.4 °F (36.9 °C), Pulse: 79, Resp: 18      RE-EVALUATION:  Patient updated on test results and plan of care. CONSULTS:  none    PROCEDURES:  None    FINAL IMPRESSION      1. Bronchitis          DISPOSITION/PLAN   DISPOSITION        CONDITION ON DISPOSITION:   Stable     PATIENT REFERRED TO:  Mary Gross, JUSTICE - CNP  [address]  [address] [facility]  [phone]    Schedule an appointment as soon as possible for a visit in 2 days        DISCHARGE MEDICATIONS:  Discharge Medication List as of 4/8/2022  5:45 PM      START taking these medications    Details   azithromycin (ZITHROMAX Z-GANGA) 250 MG tablet Take 2 tablets (500 mg) on Day 1, and then take 1 tablet (250 mg) on days 2 through 5., Disp-1 packet, R-0Normal      ondansetron (ZOFRAN ODT) 4 MG disintegrating tablet Take 1 tablet by mouth every 8 hours as needed for Nausea, Disp-10 tablet, R-0Normal             (Please note that portions of this note were completed with a voicerecognition program.  Efforts were made to edit the dictations but occasionally words are mis-transcribed.)    Willette Buerger, MD,, MD, F.A.C.E.P.   Attending Emergency Medicine Physician       Willette Buerger, MD  04/08/22 [id number]

## 2022-04-09 LAB
FOLATE: 11.2 NG/ML
IRON SATURATION: 15 % (ref 20–55)
IRON: 54 UG/DL (ref 37–145)
SARS-COV-2: NORMAL
SARS-COV-2: NOT DETECTED
SOURCE: NORMAL
TOTAL IRON BINDING CAPACITY: 354 UG/DL (ref 250–450)
UNSATURATED IRON BINDING CAPACITY: 300 UG/DL (ref 112–347)
VITAMIN B-12: 645 PG/ML (ref 232–1245)

## 2022-04-15 ENCOUNTER — TELEPHONE (OUTPATIENT)
Dept: ONCOLOGY | Age: 47
End: 2022-04-15

## 2022-04-15 ENCOUNTER — OFFICE VISIT (OUTPATIENT)
Dept: ONCOLOGY | Age: 47
End: 2022-04-15
Payer: MEDICARE

## 2022-04-15 VITALS
TEMPERATURE: 97.3 F | SYSTOLIC BLOOD PRESSURE: 148 MMHG | BODY MASS INDEX: 49.81 KG/M2 | WEIGHT: 290.2 LBS | DIASTOLIC BLOOD PRESSURE: 93 MMHG | HEART RATE: 74 BPM

## 2022-04-15 DIAGNOSIS — R97.1 ELEVATED CA-125: ICD-10-CM

## 2022-04-15 DIAGNOSIS — R53.83 OTHER FATIGUE: ICD-10-CM

## 2022-04-15 DIAGNOSIS — D50.0 IRON DEFICIENCY ANEMIA DUE TO CHRONIC BLOOD LOSS: Primary | ICD-10-CM

## 2022-04-15 DIAGNOSIS — K90.9 IRON MALABSORPTION: ICD-10-CM

## 2022-04-15 PROCEDURE — G8417 CALC BMI ABV UP PARAM F/U: HCPCS | Performed by: INTERNAL MEDICINE

## 2022-04-15 PROCEDURE — 1036F TOBACCO NON-USER: CPT | Performed by: INTERNAL MEDICINE

## 2022-04-15 PROCEDURE — G8427 DOCREV CUR MEDS BY ELIG CLIN: HCPCS | Performed by: INTERNAL MEDICINE

## 2022-04-15 PROCEDURE — 99211 OFF/OP EST MAY X REQ PHY/QHP: CPT | Performed by: INTERNAL MEDICINE

## 2022-04-15 PROCEDURE — 99214 OFFICE O/P EST MOD 30 MIN: CPT | Performed by: INTERNAL MEDICINE

## 2022-04-15 RX ORDER — UBIDECARENONE 75 MG
1000 CAPSULE ORAL DAILY
Qty: 90 TABLET | Refills: 1 | Status: SHIPPED | OUTPATIENT
Start: 2022-04-15 | End: 2022-08-19 | Stop reason: CLARIF

## 2022-04-15 RX ORDER — MELATONIN
1000 DAILY
Qty: 90 TABLET | Refills: 1 | Status: SHIPPED | OUTPATIENT
Start: 2022-04-15

## 2022-04-15 RX ORDER — SODIUM CHLORIDE 9 MG/ML
INJECTION, SOLUTION INTRAVENOUS CONTINUOUS
Status: CANCELLED | OUTPATIENT
Start: 2022-04-15

## 2022-04-15 RX ORDER — MEPERIDINE HYDROCHLORIDE 50 MG/ML
12.5 INJECTION INTRAMUSCULAR; INTRAVENOUS; SUBCUTANEOUS PRN
Status: CANCELLED | OUTPATIENT
Start: 2022-04-15

## 2022-04-15 RX ORDER — HEPARIN SODIUM (PORCINE) LOCK FLUSH IV SOLN 100 UNIT/ML 100 UNIT/ML
500 SOLUTION INTRAVENOUS PRN
Status: CANCELLED | OUTPATIENT
Start: 2022-04-15

## 2022-04-15 RX ORDER — SODIUM CHLORIDE 9 MG/ML
25 INJECTION, SOLUTION INTRAVENOUS PRN
Status: CANCELLED | OUTPATIENT
Start: 2022-04-15

## 2022-04-15 RX ORDER — ACETAMINOPHEN 325 MG/1
650 TABLET ORAL
Status: CANCELLED | OUTPATIENT
Start: 2022-04-15

## 2022-04-15 RX ORDER — SODIUM CHLORIDE 0.9 % (FLUSH) 0.9 %
5-40 SYRINGE (ML) INJECTION PRN
Status: CANCELLED | OUTPATIENT
Start: 2022-04-15

## 2022-04-15 RX ORDER — ONDANSETRON 2 MG/ML
8 INJECTION INTRAMUSCULAR; INTRAVENOUS
Status: CANCELLED | OUTPATIENT
Start: 2022-04-15

## 2022-04-15 RX ORDER — ALBUTEROL SULFATE 90 UG/1
4 AEROSOL, METERED RESPIRATORY (INHALATION) PRN
Status: CANCELLED | OUTPATIENT
Start: 2022-04-15

## 2022-04-15 RX ORDER — DIPHENHYDRAMINE HYDROCHLORIDE 50 MG/ML
50 INJECTION INTRAMUSCULAR; INTRAVENOUS
Status: CANCELLED | OUTPATIENT
Start: 2022-04-15

## 2022-04-15 NOTE — PROGRESS NOTES
Salina Schilder                                                                                                                  4/15/2022  MRN:   [id number]  YOB: 1975  PCP:                           JUSTICE Villalba CNP  Referring Physician: No ref. provider found  Treating Physician Name: Mukund Rosas MD      Reason for visit:  Chief Complaint   Patient presents with    Follow-up     review status of disease    Discuss Labs    Other     Discuss what type of anemia she has       Current problems:  Iron deficiency anemia with malabsorption component     Active and recent treatments:  Parenteral iron-3/2021    Summary of Case/History:    Salina Schilder a 55 y. o.female is a patient with anemia. She has history of anemia and was managed previously by Dr. Dary Jarquin with IV iron. Her most recent lab work shows recurrent iron deficiency anemia and she reports she historically received monthly B12 injections and IV iron every 3-6 months. She has history of scleroderma and gastric by-pass, done in 2009, contributing to iron malabsorption. She reports she has had zinc and D deficiencies, she does not have any bariatric vitamins. She is very symptomatic with dizziness, cold intolerance and feeling very fatigued. She denies any bleeding. She has history of menorrhagia with irregular periods, she had benign growth removed from outside of the uterus in 2012. She has family history of uterine fibroids and cancer. Interim History:     Patient presents to the clinic for a follow-up visit and to discuss results of her lab work-up and other relevant clinical data. Since last office visit patient underwent hysterectomy. Patient was also recently hospitalized with COVID-19. Complains of being very tired. Recent lab work-up done show hemoglobin to be within range but iron saturations are low. Patient is intolerant to oral iron.     During this visit patient's allergy, social, medical, surgical history and medications were reviewed and updated. Past Medical History:   Past Medical History:   Diagnosis Date    Anemia     Anxiety     Carpal tunnel syndrome     Depression     Endometriosis     Gestational diabetes     x 3 children    Headache     migraines    History of blood transfusion     Iron deficiency anemia     Kidney stones     May-Thurner syndrome     Obesity     Scleroderma ([address]     Snores     Under care of team     PCP - Marie Springer CNP - LAST VISIT 2021    Under care of team     VASCULAR  - DR. RIBEIRO - 2021 - SEES FOR MAY-THURNER SYNDROME    Under care of team     HEM/ONC - DR. PERKINS - DINAH WHITMORE - LAST VISIT - 2021    Wears glasses        Past Surgical History:     Past Surgical History:   Procedure Laterality Date     SECTION  x 3    CHOLECYSTECTOMY      DILATION AND CURETTAGE OF UTERUS N/A 2021     DILATATION AND CURETTAGE HYSTEROSCOPY MYOSURE (N/A )    DILATION AND CURETTAGE OF UTERUS N/A 2021    ER visit night of surgery for hypertension, hyperglycemia.     ENDOMETRIAL ABLATION  2021    GASTRIC BYPASS SURGERY  2009    PABLO- EN- Y    HYSTERECTOMY N/A 10/26/2021    XI ROBOTIC LAPAROSCOPIC MODIFIED RADICAL HYSTERECTOMY, BILATERAL SALPINGO OOPHORECTOMY, CYTOLOGIC WASHINGS, CYSTOSCOPY performed by Sherry Madrid MD at [address] [facility]      growth removed from outer area of uterus    PELVIC LAPAROSCOPY      AMBER AND BSO  10/26/2021    ROBOTIC LAPAROSCOPIC MODIFIED RADICAL HYSTERECTOMY, BILATERAL SALPINGO OOPHORECTOMY, CYTOLOGIC WASHINGS, CYSTOSCOPY    TOENAIL EXCISION      VASCULAR SURGERY  2016    to evaluate May Rader syndrome effects on  blood vessels       Patient Family Social History:     Social History     Socioeconomic History    Marital status: Single     Spouse name: None    Number of children: None    Years of education: None    Highest education level: None   Occupational History    None Tobacco Use    Smoking status: Never Smoker    Smokeless tobacco: Never Used   Vaping Use    Vaping Use: Never used   Substance and Sexual Activity    Alcohol use: No    Drug use: No    Sexual activity: Not Currently   Other Topics Concern    None   Social History Narrative    None     Social Determinants of Health     Financial Resource Strain:     Difficulty of Paying Living Expenses: Not on file   Food Insecurity:     Worried About Running Out of Food in the Last Year: Not on file    Blanco of Food in the Last Year: Not on file   Transportation Needs:     Lack of Transportation (Medical): Not on file    Lack of Transportation (Non-Medical):  Not on file   Physical Activity:     Days of Exercise per Week: Not on file    Minutes of Exercise per Session: Not on file   Stress:     Feeling of Stress : Not on file   Social Connections:     Frequency of Communication with Friends and Family: Not on file    Frequency of Social Gatherings with Friends and Family: Not on file    Attends Alevism Services: Not on file    Active Member of [address] [place] [address] or Organizations: Not on file    Attends Club or Organization Meetings: Not on file    Marital Status: Not on file   Intimate Partner Violence:     Fear of Current or Ex-Partner: Not on file    Emotionally Abused: Not on file    Physically Abused: Not on file    Sexually Abused: Not on file   Housing Stability:     Unable to Pay for Housing in the Last Year: Not on file    Number of [place] in the Last Year: Not on file    Unstable Housing in the Last Year: Not on file     Family History   Problem Relation Age of Onset    Colon Cancer Mother     Early Death Father         accident    Diabetes Sister     High Blood Pressure Sister     Diabetes Brother     High Blood Pressure Brother     Diabetes Sister     High Blood Pressure Sister      Current Medications:     Current Outpatient Medications   Medication Sig Dispense Refill    ondansetron (ZOFRAN ODT) 4 MG disintegrating tablet Take 1 tablet by mouth every 8 hours as needed for Nausea 10 tablet 0    Nutritional Supplements (ESTROVEN PO) Take by mouth daily      oxybutynin (DITROPAN XL) 5 MG extended release tablet Take 1 tablet by mouth daily 30 tablet 0    docusate sodium (COLACE) 100 MG capsule Take 1 capsule by mouth 2 times daily as needed for Constipation 60 capsule 1    acetaminophen (AMINOFEN) 325 MG tablet Take 2 tablets by mouth every 6 hours as needed for Pain 120 tablet 3    losartan (COZAAR) 25 MG tablet Take 1 tablet by mouth daily 30 tablet 5    busPIRone (BUSPAR) 10 MG tablet Take 1 tablet by mouth 3 times daily as needed (anxiety) 90 tablet 5    Elastic Bandages & Supports (JOBST ULTRASHEER 20-30MMHG LG) MISC Wear daily, ok to remove in the evenings 3 each 3    Multiple Vitamins-Minerals (MULTI-JULIO CÉSAR PO) Take by mouth daily      vitamin B-12 (CYANOCOBALAMIN) 100 MCG tablet Take 50 mcg by mouth daily      calcium carbonate (OYSTER SHELL CALCIUM 500 MG) 1250 (500 Ca) MG tablet Take 1 tablet by mouth daily      vitamin D3 (CHOLECALCIFEROL) 25 MCG (1000 UT) TABS tablet Take 1 tablet by mouth daily 90 tablet 1    zinc 50 MG CAPS Take 50 mg by mouth daily 90 capsule 1     No current facility-administered medications for this visit. Allergies:   Contrast [iodides], Trileptal [oxcarbazepine], and Morphine    Review of Systems:    Constitutional: No fever or chills. No night sweats, no weight loss.   Positive fatigue  Eyes: No eye discharge, double vision, or eye pain   HEENT: negative for sore mouth, sore throat, hoarseness and voice change   Respiratory: negative for cough , sputum, wheezing, hemoptysis, chest pain  Cardiovascular: negative for chest pain, dyspnea, palpitations, orthopnea, PND   Gastrointestinal: negative for nausea, vomiting, diarrhea, constipation, abdominal pain, Dysphagia, hematemesis and hematochezia   Genitourinary: negative for frequency, dysuria, nocturia, urinary incontinence, and hematuria   Gynecological: + menorrhagia with irregular periods  Integument: negative for rash, skin lesions, bruises.    Hematologic/Lymphatic: negative for easy bruising, bleeding, lymphadenopathy, or petechiae   Endocrine: negative for heat or cold intolerance,weight changes, change in bowel habits and hair loss   Musculoskeletal: negative for myalgias, arthralgias, pain, joint swelling,and bone pain   Neurological: negative for headaches, dizziness, seizures, weakness, numbness        Physical Exam:    Vitals: BP (!) 148/93   Pulse 74   Temp 97.3 °F (36.3 °C) (Temporal)   Wt 290 lb 3.2 oz (131.6 kg)   LMP 09/15/2021 Comment: VERY HEAVY PERIODS  BMI 49.81 kg/m²   General appearance - well appearing, no in pain or distress  Mental status - AAO X3  Eyes - pupils equal and reactive, extraocular eye movements intact  Mouth - mucous membranes moist, pharynx normal without lesions  Neck - supple, no significant adenopathy  Lymphatics - no palpable lymphadenopathy, no hepatosplenomegaly  Chest - clear to auscultation, no wheezes, rales or rhonchi, symmetric air entry  Heart - normal rate, regular rhythm, normal S1, S2, no murmurs  Abdomen - soft, nontender, nondistended, no masses or organomegaly  Neurological - alert, oriented, normal speech, no focal findings or movement disorder noted  Extremities - peripheral pulses normal, no pedal edema, no clubbing or cyanosis  Skin - normal coloration and turgor, no rashes, no suspicious skin lesions noted       DATA:      Lab Results   Component Value Date    WBC 3.9 04/08/2022    HGB 13.9 04/08/2022    HCT 42.5 04/08/2022    MCV 83.5 04/08/2022     04/08/2022     Lab Results   Component Value Date    IRON 54 04/08/2022    TIBC 354 04/08/2022    FERRITIN 22 08/24/2021       Chemistry        Component Value Date/Time     04/08/2022 1530    K 4.3 04/08/2022 1530    CL 99 04/08/2022 1530    CO2 25 04/08/2022 1530    BUN 10 04/08/2022 1530    CREATININE 0.56 04/08/2022 1530        Component Value Date/Time    CALCIUM 9.3 04/08/2022 1530    ALKPHOS 98 10/15/2021 1410    AST 29 10/15/2021 1410    ALT 36 (H) 10/15/2021 1410    BILITOT 0.20 (L) 10/15/2021 1410        Lab Results   Component Value Date    [id number] 578 04/08/2022         Impression:  Iron deficiency anemia with malabsorption component  HX gastric bypass, 2009  HX scleroderma   Vitamin D deficiency  Fatigue  Elevated CA-125  May Thurner syndrome    Plan:  Reviewed results of lab work-up and other relevant clinical data  Patient is s/p hysterectomy. Hemoglobin has now normalized however patient complains of being very tired. Iron saturation is low. We do not have a ferritin level. Patient has malabsorption secondary to gastric bypass surgery. We will arrange for Venofer 3 mg 2 infusions  Continue B12 supplementation  Continue zinc supplementation at this point previously patient zinc levels were low normal.  She is at risk of malabsorption due to gastric bypass surgery  Patient last CA-125 was within range however patient is requesting to have it rechecked. I did explain to the patient that elevated CA-125 can lead to unnecessary testing and anxiety she expressed understanding and still wanted to be tested  Patient counseled on active lifestyle and trying to achieve ideal body weight. Return to clinic in 4 months repeat iron studies and CBC. Flora Clayton MD        This note is created with the assistance of a speech recognition program.  While intending to generate a document that actually reflects the content of the visit, the document can still have some errors including those of syntax and sound a like substitutions which may escape proof reading. It such instances, actual meaning can be extrapolated by contextual diversion. none

## 2022-04-15 NOTE — TELEPHONE ENCOUNTER
Called pt and LM to schedule venofer infusion    When pt calls back she needs scheduled for venofer weekly x2 weeks

## 2022-04-15 NOTE — TELEPHONE ENCOUNTER
venofir x 2  rv in 4 months with labs priro    AVS given to  to follow precert    RV scheduled 8/19/22 @ 11:30am    PT was given AVS and an appt schedule    Electronically signed by Facundo Torres on 4/15/2022 at 12:00 PM

## 2022-04-17 DIAGNOSIS — I10 MILD HYPERTENSION: ICD-10-CM

## 2022-04-18 RX ORDER — LOSARTAN POTASSIUM 25 MG/1
TABLET ORAL
Qty: 30 TABLET | Refills: 5 | Status: SHIPPED | OUTPATIENT
Start: 2022-04-18 | End: 2022-08-31 | Stop reason: SDUPTHER

## 2022-04-25 ENCOUNTER — HOSPITAL ENCOUNTER (OUTPATIENT)
Dept: INFUSION THERAPY | Age: 47
Discharge: HOME | End: 2022-04-25
Payer: MEDICARE

## 2022-04-25 VITALS
DIASTOLIC BLOOD PRESSURE: 86 MMHG | TEMPERATURE: 98 F | HEART RATE: 74 BPM | RESPIRATION RATE: 18 BRPM | SYSTOLIC BLOOD PRESSURE: 135 MMHG

## 2022-04-25 DIAGNOSIS — K90.9 MALABSORPTION OF IRON: Primary | ICD-10-CM

## 2022-04-25 DIAGNOSIS — D50.8 OTHER IRON DEFICIENCY ANEMIA: ICD-10-CM

## 2022-04-25 PROCEDURE — 6360000002 HC RX W HCPCS: Performed by: INTERNAL MEDICINE

## 2022-04-25 PROCEDURE — 96366 THER/PROPH/DIAG IV INF ADDON: CPT

## 2022-04-25 PROCEDURE — 2580000003 HC RX 258: Performed by: INTERNAL MEDICINE

## 2022-04-25 PROCEDURE — 96365 THER/PROPH/DIAG IV INF INIT: CPT

## 2022-04-25 RX ORDER — HEPARIN SODIUM (PORCINE) LOCK FLUSH IV SOLN 100 UNIT/ML 100 UNIT/ML
500 SOLUTION INTRAVENOUS PRN
Status: CANCELLED | OUTPATIENT
Start: 2022-05-02

## 2022-04-25 RX ORDER — SODIUM CHLORIDE 9 MG/ML
INJECTION, SOLUTION INTRAVENOUS CONTINUOUS
Status: CANCELLED | OUTPATIENT
Start: 2022-05-02

## 2022-04-25 RX ORDER — SODIUM CHLORIDE 9 MG/ML
25 INJECTION, SOLUTION INTRAVENOUS PRN
Status: CANCELLED | OUTPATIENT
Start: 2022-05-02

## 2022-04-25 RX ORDER — SODIUM CHLORIDE 0.9 % (FLUSH) 0.9 %
5-40 SYRINGE (ML) INJECTION PRN
Status: CANCELLED | OUTPATIENT
Start: 2022-05-02

## 2022-04-25 RX ORDER — DIPHENHYDRAMINE HYDROCHLORIDE 50 MG/ML
50 INJECTION INTRAMUSCULAR; INTRAVENOUS
Status: CANCELLED | OUTPATIENT
Start: 2022-05-02

## 2022-04-25 RX ORDER — MEPERIDINE HYDROCHLORIDE 50 MG/ML
12.5 INJECTION INTRAMUSCULAR; INTRAVENOUS; SUBCUTANEOUS PRN
Status: CANCELLED | OUTPATIENT
Start: 2022-05-02

## 2022-04-25 RX ORDER — FAMOTIDINE 10 MG/ML
20 INJECTION, SOLUTION INTRAVENOUS
Status: CANCELLED | OUTPATIENT
Start: 2022-05-02

## 2022-04-25 RX ORDER — ONDANSETRON 2 MG/ML
8 INJECTION INTRAMUSCULAR; INTRAVENOUS
Status: CANCELLED | OUTPATIENT
Start: 2022-05-02

## 2022-04-25 RX ORDER — EPINEPHRINE 1 MG/ML
0.3 INJECTION, SOLUTION, CONCENTRATE INTRAVENOUS PRN
Status: CANCELLED | OUTPATIENT
Start: 2022-05-02

## 2022-04-25 RX ORDER — ACETAMINOPHEN 325 MG/1
650 TABLET ORAL
Status: CANCELLED | OUTPATIENT
Start: 2022-05-02

## 2022-04-25 RX ORDER — SODIUM CHLORIDE 9 MG/ML
INJECTION, SOLUTION INTRAVENOUS CONTINUOUS
Status: ACTIVE | OUTPATIENT
Start: 2022-04-25 | End: 2022-04-25

## 2022-04-25 RX ORDER — ALBUTEROL SULFATE 90 UG/1
4 AEROSOL, METERED RESPIRATORY (INHALATION) PRN
Status: CANCELLED | OUTPATIENT
Start: 2022-05-02

## 2022-04-25 RX ADMIN — SODIUM CHLORIDE: 9 INJECTION, SOLUTION INTRAVENOUS at 11:11

## 2022-04-25 RX ADMIN — IRON SUCROSE 300 MG: 20 INJECTION, SOLUTION INTRAVENOUS at 11:13

## 2022-04-25 ASSESSMENT — PAIN DESCRIPTION - LOCATION: LOCATION: LEG

## 2022-04-25 ASSESSMENT — PAIN DESCRIPTION - ORIENTATION: ORIENTATION: LEFT

## 2022-04-25 ASSESSMENT — PAIN SCALES - GENERAL: PAINLEVEL_OUTOF10: 6

## 2022-04-25 NOTE — PLAN OF CARE
Problem: Chronic Conditions and Co-morbidities  Goal: Patient's chronic conditions and co-morbidity symptoms are monitored and maintained or improved  Outcome: Completed     Problem: Pain  Goal: Verbalizes/displays adequate comfort level or baseline comfort level  Outcome: Completed

## 2022-04-25 NOTE — PROGRESS NOTES
Patient here for venofer 1 of 2. Arrives ambulatory. Reports right sided arm and leg swelling. Legs elevated. Infusion complete without incident. Returns 5/02/2022 for venofer 2 of 2.

## 2022-05-02 ENCOUNTER — HOSPITAL ENCOUNTER (OUTPATIENT)
Dept: INFUSION THERAPY | Age: 47
Discharge: HOME | End: 2022-05-02
Payer: MEDICARE

## 2022-05-02 VITALS
DIASTOLIC BLOOD PRESSURE: 71 MMHG | SYSTOLIC BLOOD PRESSURE: 105 MMHG | RESPIRATION RATE: 18 BRPM | TEMPERATURE: 98.3 F | HEART RATE: 74 BPM

## 2022-05-02 DIAGNOSIS — D50.8 OTHER IRON DEFICIENCY ANEMIA: ICD-10-CM

## 2022-05-02 DIAGNOSIS — K90.9 MALABSORPTION OF IRON: Primary | ICD-10-CM

## 2022-05-02 PROCEDURE — 96366 THER/PROPH/DIAG IV INF ADDON: CPT

## 2022-05-02 PROCEDURE — 2580000003 HC RX 258: Performed by: INTERNAL MEDICINE

## 2022-05-02 PROCEDURE — 6360000002 HC RX W HCPCS: Performed by: INTERNAL MEDICINE

## 2022-05-02 PROCEDURE — 96365 THER/PROPH/DIAG IV INF INIT: CPT

## 2022-05-02 RX ORDER — DIPHENHYDRAMINE HYDROCHLORIDE 50 MG/ML
50 INJECTION INTRAMUSCULAR; INTRAVENOUS
Status: CANCELLED | OUTPATIENT
Start: 2022-05-09

## 2022-05-02 RX ORDER — ALBUTEROL SULFATE 90 UG/1
4 AEROSOL, METERED RESPIRATORY (INHALATION) PRN
Status: CANCELLED | OUTPATIENT
Start: 2022-05-09

## 2022-05-02 RX ORDER — MEPERIDINE HYDROCHLORIDE 50 MG/ML
12.5 INJECTION INTRAMUSCULAR; INTRAVENOUS; SUBCUTANEOUS PRN
Status: CANCELLED | OUTPATIENT
Start: 2022-05-09

## 2022-05-02 RX ORDER — EPINEPHRINE 1 MG/ML
0.3 INJECTION, SOLUTION, CONCENTRATE INTRAVENOUS PRN
Status: CANCELLED | OUTPATIENT
Start: 2022-05-09

## 2022-05-02 RX ORDER — HEPARIN SODIUM (PORCINE) LOCK FLUSH IV SOLN 100 UNIT/ML 100 UNIT/ML
500 SOLUTION INTRAVENOUS PRN
Status: CANCELLED | OUTPATIENT
Start: 2022-05-09

## 2022-05-02 RX ORDER — SODIUM CHLORIDE 9 MG/ML
INJECTION, SOLUTION INTRAVENOUS CONTINUOUS
Status: CANCELLED | OUTPATIENT
Start: 2022-05-09

## 2022-05-02 RX ORDER — FAMOTIDINE 10 MG/ML
20 INJECTION, SOLUTION INTRAVENOUS
Status: CANCELLED | OUTPATIENT
Start: 2022-05-09

## 2022-05-02 RX ORDER — SODIUM CHLORIDE 9 MG/ML
INJECTION, SOLUTION INTRAVENOUS CONTINUOUS
Status: ACTIVE | OUTPATIENT
Start: 2022-05-02 | End: 2022-05-02

## 2022-05-02 RX ORDER — SODIUM CHLORIDE 9 MG/ML
25 INJECTION, SOLUTION INTRAVENOUS PRN
Status: CANCELLED | OUTPATIENT
Start: 2022-05-09

## 2022-05-02 RX ORDER — ONDANSETRON 2 MG/ML
8 INJECTION INTRAMUSCULAR; INTRAVENOUS
Status: CANCELLED | OUTPATIENT
Start: 2022-05-09

## 2022-05-02 RX ORDER — SODIUM CHLORIDE 0.9 % (FLUSH) 0.9 %
5-40 SYRINGE (ML) INJECTION PRN
Status: CANCELLED | OUTPATIENT
Start: 2022-05-09

## 2022-05-02 RX ORDER — ACETAMINOPHEN 325 MG/1
650 TABLET ORAL
Status: CANCELLED | OUTPATIENT
Start: 2022-05-09

## 2022-05-02 RX ADMIN — IRON SUCROSE 300 MG: 20 INJECTION, SOLUTION INTRAVENOUS at 11:24

## 2022-05-02 RX ADMIN — SODIUM CHLORIDE: 9 INJECTION, SOLUTION INTRAVENOUS at 11:21

## 2022-05-02 NOTE — PROGRESS NOTES
Patient here for venofer 2 of 2. Arrives ambulatory. Infusion complete without incident. 30 minute post observation complete. Discharged in stable condition. Returns 8/19/2022 for office visit with Dr. Brianne Uriarte.

## 2022-06-07 DIAGNOSIS — F41.9 ANXIETY: ICD-10-CM

## 2022-06-07 RX ORDER — BUSPIRONE HYDROCHLORIDE 10 MG/1
TABLET ORAL
Qty: 90 TABLET | Refills: 5 | Status: SHIPPED | OUTPATIENT
Start: 2022-06-07

## 2022-08-15 ENCOUNTER — HOSPITAL ENCOUNTER (OUTPATIENT)
Age: 47
Discharge: HOME | End: 2022-08-15
Payer: MEDICARE

## 2022-08-15 DIAGNOSIS — D50.0 IRON DEFICIENCY ANEMIA DUE TO CHRONIC BLOOD LOSS: Primary | ICD-10-CM

## 2022-08-15 DIAGNOSIS — D50.8 OTHER IRON DEFICIENCY ANEMIA: ICD-10-CM

## 2022-08-15 DIAGNOSIS — R97.1 ELEVATED CA-125: ICD-10-CM

## 2022-08-15 DIAGNOSIS — D50.0 IRON DEFICIENCY ANEMIA DUE TO CHRONIC BLOOD LOSS: ICD-10-CM

## 2022-08-15 DIAGNOSIS — K90.9 IRON MALABSORPTION: ICD-10-CM

## 2022-08-15 LAB
ABSOLUTE EOS #: 0.1 K/UL (ref 0–0.4)
ABSOLUTE LYMPH #: 1.9 K/UL (ref 1–4.8)
ABSOLUTE MONO #: 0.4 K/UL (ref 0.1–1.2)
BASOPHILS # BLD: 1 % (ref 0–2)
BASOPHILS ABSOLUTE: 0.1 K/UL (ref 0–0.2)
CA 125: 20 U/ML
EOSINOPHILS RELATIVE PERCENT: 2 % (ref 1–4)
FOLATE: 9.2 NG/ML
HCT VFR BLD CALC: 42.8 % (ref 36–46)
HEMOGLOBIN: 14.3 G/DL (ref 12–16)
LYMPHOCYTES # BLD: 38 % (ref 24–44)
MCH RBC QN AUTO: 28.7 PG (ref 26–34)
MCHC RBC AUTO-ENTMCNC: 33.5 G/DL (ref 31–37)
MCV RBC AUTO: 85.6 FL (ref 80–100)
MONOCYTES # BLD: 7 % (ref 2–11)
PDW BLD-RTO: 14.9 % (ref 12.5–15.4)
PLATELET # BLD: 161 K/UL (ref 140–450)
PMV BLD AUTO: 9.7 FL (ref 6–12)
RBC # BLD: 5 M/UL (ref 4–5.2)
SEG NEUTROPHILS: 52 % (ref 36–66)
SEGMENTED NEUTROPHILS ABSOLUTE COUNT: 2.6 K/UL (ref 1.8–7.7)
VITAMIN B-12: 840 PG/ML (ref 232–1245)
WBC # BLD: 4.9 K/UL (ref 3.5–11)

## 2022-08-15 PROCEDURE — 82607 VITAMIN B-12: CPT

## 2022-08-15 PROCEDURE — 85025 COMPLETE CBC W/AUTO DIFF WBC: CPT

## 2022-08-15 PROCEDURE — 82746 ASSAY OF FOLIC ACID SERUM: CPT

## 2022-08-15 PROCEDURE — 86304 IMMUNOASSAY TUMOR CA 125: CPT

## 2022-08-15 PROCEDURE — 36415 COLL VENOUS BLD VENIPUNCTURE: CPT

## 2022-08-19 ENCOUNTER — TELEPHONE (OUTPATIENT)
Dept: ONCOLOGY | Age: 47
End: 2022-08-19

## 2022-08-19 ENCOUNTER — OFFICE VISIT (OUTPATIENT)
Dept: ONCOLOGY | Age: 47
End: 2022-08-19
Payer: MEDICARE

## 2022-08-19 ENCOUNTER — HOSPITAL ENCOUNTER (OUTPATIENT)
Age: 47
Discharge: HOME | End: 2022-08-19
Payer: MEDICARE

## 2022-08-19 VITALS
WEIGHT: 291.6 LBS | TEMPERATURE: 98 F | BODY MASS INDEX: 50.05 KG/M2 | SYSTOLIC BLOOD PRESSURE: 147 MMHG | DIASTOLIC BLOOD PRESSURE: 94 MMHG | HEART RATE: 76 BPM

## 2022-08-19 DIAGNOSIS — K90.9 IRON MALABSORPTION: ICD-10-CM

## 2022-08-19 DIAGNOSIS — R53.83 OTHER FATIGUE: ICD-10-CM

## 2022-08-19 DIAGNOSIS — D50.9 IRON DEFICIENCY ANEMIA, UNSPECIFIED IRON DEFICIENCY ANEMIA TYPE: ICD-10-CM

## 2022-08-19 DIAGNOSIS — D50.0 IRON DEFICIENCY ANEMIA DUE TO CHRONIC BLOOD LOSS: ICD-10-CM

## 2022-08-19 DIAGNOSIS — D50.9 IRON DEFICIENCY ANEMIA, UNSPECIFIED IRON DEFICIENCY ANEMIA TYPE: Primary | ICD-10-CM

## 2022-08-19 LAB
FERRITIN: 107 NG/ML (ref 13–150)
IRON SATURATION: 17 % (ref 20–55)
IRON: 53 UG/DL (ref 37–145)
TOTAL IRON BINDING CAPACITY: 314 UG/DL (ref 250–450)
UNSATURATED IRON BINDING CAPACITY: 261 UG/DL (ref 112–347)

## 2022-08-19 PROCEDURE — 82728 ASSAY OF FERRITIN: CPT

## 2022-08-19 PROCEDURE — G8427 DOCREV CUR MEDS BY ELIG CLIN: HCPCS | Performed by: INTERNAL MEDICINE

## 2022-08-19 PROCEDURE — 99214 OFFICE O/P EST MOD 30 MIN: CPT | Performed by: INTERNAL MEDICINE

## 2022-08-19 PROCEDURE — 83550 IRON BINDING TEST: CPT

## 2022-08-19 PROCEDURE — 36415 COLL VENOUS BLD VENIPUNCTURE: CPT

## 2022-08-19 PROCEDURE — 83540 ASSAY OF IRON: CPT

## 2022-08-19 PROCEDURE — G8417 CALC BMI ABV UP PARAM F/U: HCPCS | Performed by: INTERNAL MEDICINE

## 2022-08-19 PROCEDURE — 1036F TOBACCO NON-USER: CPT | Performed by: INTERNAL MEDICINE

## 2022-08-19 PROCEDURE — 99211 OFF/OP EST MAY X REQ PHY/QHP: CPT | Performed by: INTERNAL MEDICINE

## 2022-08-19 RX ORDER — LANOLIN ALCOHOL/MO/W.PET/CERES
1000 CREAM (GRAM) TOPICAL DAILY
COMMUNITY

## 2022-08-19 NOTE — PATIENT INSTRUCTIONS
Rv in 4 months   Labs today   ferritin and tibc      Labs in 4 months prior to rv   cbc ferritin tibc and b12 and zinc

## 2022-08-19 NOTE — PROGRESS NOTES
Tatiana Peck                                                                                                                  8/19/2022  MRN:   [id number]  YOB: 1975  PCP:                           JUSTICE Boggs - CNP  Referring Physician: No ref. provider found  Treating Physician Name: Madeleine Riley MD      Reason for visit:  Chief Complaint   Patient presents with    Follow-up     Review status of disease    Fatigue       Current problems:  Iron deficiency anemia with malabsorption component     Active and recent treatments:  Parenteral iron-3/2021    Summary of Case/History:    Tatiana Peck a 55 y. o.female is a patient with anemia. She has history of anemia and was managed previously by Dr. Jarvis Springer with IV iron. Her most recent lab work shows recurrent iron deficiency anemia and she reports she historically received monthly B12 injections and IV iron every 3-6 months. She has history of scleroderma and gastric by-pass, done in 2009, contributing to iron malabsorption. She reports she has had zinc and D deficiencies, she does not have any bariatric vitamins. She is very symptomatic with dizziness, cold intolerance and feeling very fatigued. She denies any bleeding. She has history of menorrhagia with irregular periods, she had benign growth removed from outside of the uterus in 2012. She has family history of uterine fibroids and cancer. Interim History:     Patient presents to the clinic for a follow-up visit and to discuss results of her lab work-up and other relevant clinical data with iron deficiency anemia. She has some mild fatigue with sleep changes. She received IV iron 59/9870 with no complications. During this visit patient's allergy, social, medical, surgical history and medications were reviewed and updated.     Past Medical History:   Past Medical History:   Diagnosis Date    Anemia     Anxiety     Carpal tunnel syndrome     Depression     Endometriosis Gestational diabetes     x 3 children    Headache     migraines    History of blood transfusion     Iron deficiency anemia     Kidney stones     May-Thurner syndrome     Obesity     Scleroderma (HCC)     Snores     Under care of team     PCP - Emily Zapien CNP - LAST VISIT 2021    Under care of team     VASCULAR  - DR. RIBEIRO - 2021 - SEES FOR MAY-THURNER SYNDROME    Under care of team     HEM/ONC - DR. MADDIE WHITMORE - LAST VISIT - 2021    Wears glasses        Past Surgical History:     Past Surgical History:   Procedure Laterality Date     SECTION  x 3    CHOLECYSTECTOMY      DILATION AND CURETTAGE OF UTERUS N/A 2021     DILATATION AND CURETTAGE HYSTEROSCOPY MYOSURE (N/A )    DILATION AND CURETTAGE OF UTERUS N/A 2021    ER visit night of surgery for hypertension, hyperglycemia.     ENDOMETRIAL ABLATION  2021    GASTRIC BYPASS SURGERY  2009    PABLO- EN- Y    HYSTERECTOMY (CERVIX STATUS UNKNOWN) N/A 10/26/2021    XI ROBOTIC LAPAROSCOPIC MODIFIED RADICAL HYSTERECTOMY, BILATERAL SALPINGO OOPHORECTOMY, CYTOLOGIC WASHINGS, CYSTOSCOPY performed by Braeden Parra MD at [facility]      growth removed from outer area of uterus    PELVIC LAPAROSCOPY      AMBER AND BSO (CERVIX REMOVED)  10/26/2021    ROBOTIC LAPAROSCOPIC MODIFIED RADICAL HYSTERECTOMY, BILATERAL SALPINGO OOPHORECTOMY, CYTOLOGIC WASHINGS, CYSTOSCOPY    TOENAIL EXCISION      VASCULAR SURGERY  2016    to evaluate May Rader syndrome effects on  blood vessels       Patient Family Social History:     Social History     Socioeconomic History    Marital status: Single     Spouse name: None    Number of children: None    Years of education: None    Highest education level: None   Tobacco Use    Smoking status: Never    Smokeless tobacco: Never   Vaping Use    Vaping Use: Never used   Substance and Sexual Activity    Alcohol use: No    Drug use: No    Sexual activity: Not Currently     Family History Problem Relation Age of Onset    Colon Cancer Mother     Early Death Father         accident    Diabetes Sister     High Blood Pressure Sister     Diabetes Brother     High Blood Pressure Brother     Diabetes Sister     High Blood Pressure Sister      Current Medications:     Current Outpatient Medications   Medication Sig Dispense Refill    vitamin B-12 (CYANOCOBALAMIN) 1000 MCG tablet Take 1,000 mcg by mouth daily      busPIRone (BUSPAR) 10 MG tablet TAKE 1 TABLET BY MOUTH THREE TIMES A DAY AS NEEDED FOR ANXIETY 90 tablet 5    losartan (COZAAR) 25 MG tablet TAKE 1 TABLET BY MOUTH EVERY DAY 30 tablet 5    zinc 50 MG CAPS Take 50 mg by mouth daily 90 capsule 1    vitamin D3 (CHOLECALCIFEROL) 25 MCG (1000 UT) TABS tablet Take 1 tablet by mouth daily 90 tablet 1    ondansetron (ZOFRAN ODT) 4 MG disintegrating tablet Take 1 tablet by mouth every 8 hours as needed for Nausea 10 tablet 0    Nutritional Supplements (ESTROVEN PO) Take by mouth daily      acetaminophen (AMINOFEN) 325 MG tablet Take 2 tablets by mouth every 6 hours as needed for Pain 120 tablet 3    Elastic Bandages & Supports (JOBST ULTRASHEER 20-30MMHG LG) MISC Wear daily, ok to remove in the evenings 3 each 3    Multiple Vitamins-Minerals (MULTI-JULIO CÉSAR PO) Take by mouth daily      calcium carbonate (OYSTER SHELL CALCIUM 500 MG) 1250 (500 Ca) MG tablet Take 1 tablet by mouth daily       No current facility-administered medications for this visit. Allergies:   Contrast [iodides], Trileptal [oxcarbazepine], and Morphine    Review of Systems:    Constitutional: No fever or chills. No night sweats, no weight loss.   Positive fatigue  Eyes: No eye discharge, double vision, or eye pain   HEENT: negative for sore mouth, sore throat, hoarseness and voice change   Respiratory: negative for cough , sputum, wheezing, hemoptysis, chest pain  Cardiovascular: negative for chest pain, dyspnea, palpitations, orthopnea, PND   Gastrointestinal: negative for nausea, vomiting, diarrhea, constipation, abdominal pain, Dysphagia, hematemesis and hematochezia   Genitourinary: negative for frequency, dysuria, nocturia, urinary incontinence, and hematuria   Gynecological: + menorrhagia with irregular periods  Integument: negative for rash, skin lesions, bruises.    Hematologic/Lymphatic: negative for easy bruising, bleeding, lymphadenopathy, or petechiae   Endocrine: negative for heat or cold intolerance,weight changes, change in bowel habits and hair loss   Musculoskeletal: negative for myalgias, arthralgias, pain, joint swelling,and bone pain   Neurological: negative for headaches, dizziness, seizures, weakness, numbness        Physical Exam:    Vitals: BP (!) 147/94   Pulse 76   Temp 98 °F (36.7 °C) (Temporal)   Wt 291 lb 9.6 oz (132.3 kg)   LMP 09/15/2021 Comment: VERY HEAVY PERIODS  BMI 50.05 kg/m²   General appearance - well appearing, no in pain or distress  Mental status - AAO X3  Eyes - pupils equal and reactive, extraocular eye movements intact  Mouth - mucous membranes moist, pharynx normal without lesions  Neck - supple, no significant adenopathy  Lymphatics - no palpable lymphadenopathy, no hepatosplenomegaly  Chest - clear to auscultation, no wheezes, rales or rhonchi, symmetric air entry  Heart - normal rate, regular rhythm, normal S1, S2, no murmurs  Abdomen - soft, nontender, nondistended, no masses or organomegaly  Neurological - alert, oriented, normal speech, no focal findings or movement disorder noted  Extremities - peripheral pulses normal, no pedal edema, no clubbing or cyanosis  Skin - normal coloration and turgor, no rashes, no suspicious skin lesions noted       DATA:      Lab Results   Component Value Date    WBC 4.9 08/15/2022    HGB 14.3 08/15/2022    HCT 42.8 08/15/2022    MCV 85.6 08/15/2022     08/15/2022     Lab Results   Component Value Date    IRON 54 04/08/2022    TIBC 354 04/08/2022    FERRITIN 22 08/24/2021       Chemistry Component Value Date/Time     04/08/2022 1530    K 4.3 04/08/2022 1530    CL 99 04/08/2022 1530    CO2 25 04/08/2022 1530    BUN 10 04/08/2022 1530    CREATININE 0.56 04/08/2022 1530        Component Value Date/Time    CALCIUM 9.3 04/08/2022 1530    ALKPHOS 98 10/15/2021 1410    AST 29 10/15/2021 1410    ALT 36 (H) 10/15/2021 1410    BILITOT 0.20 (L) 10/15/2021 1410        Lab Results   Component Value Date    [id number] 378 08/15/2022     Lab Results   Component Value Date     20 08/15/2022       Impression:  Iron deficiency anemia with malabsorption component  HX gastric bypass, 2009  HX scleroderma   Vitamin D deficiency  Fatigue  Elevated CA-125  May Thurner syndrome    Plan:  Her lab work was reviewed, her HGB is in range, she has response to IV iron, other counts in range, her B12 has normalized with oral therapy, and her  remains in range. With her  consistently in range following hysterectomy we will no longer monitor. Clinically she is doing well. We will continue with B12, D, and zinc, I am refilling her zinc and B12. I am ordering for iron studies to be done today and we will continue with regular lab work or with symptoms, we will check her zinc levels with next visit. Return in 4 months. Addendum:    Labs show iron def . Will give venofir       Scribe Attestation   This note was created by Curt Royal acting as scribe for the physician signing this note  Electronically Signed  Curt Royal, 8/19/2022  Scribe, [employer]. Attending Attestation   Note was reviewed and edited. I am in agreement with the note as entered    Andrea Arguello MD            This note is created with the assistance of a speech recognition program.  While intending to generate a document that actually reflects the content of the visit, the document can still have some errors including those of syntax and sound a like substitutions which may escape proof reading.   It such instances, actual meaning can be extrapolated by contextual diversion.

## 2022-08-19 NOTE — TELEPHONE ENCOUNTER
Rv in 4 months   Labs today  ferritin and tibc      Labs in 4 months prior to rv  cbc ferritin tibc and b12 and zinc    RV scheduled 12/16/22 @ 11:30am    Pt will have labs drawn one week prior to RV    PT was given AVS and appt schedule    Electronically signed by Lg Reardon on 8/19/2022 at 2:45 PM

## 2022-08-24 ENCOUNTER — TELEPHONE (OUTPATIENT)
Dept: INFUSION THERAPY | Age: 47
End: 2022-08-24

## 2022-08-24 DIAGNOSIS — D50.8 OTHER IRON DEFICIENCY ANEMIA: Primary | ICD-10-CM

## 2022-08-24 RX ORDER — HEPARIN SODIUM (PORCINE) LOCK FLUSH IV SOLN 100 UNIT/ML 100 UNIT/ML
500 SOLUTION INTRAVENOUS PRN
Status: CANCELLED | OUTPATIENT
Start: 2022-08-24

## 2022-08-24 RX ORDER — DIPHENHYDRAMINE HYDROCHLORIDE 50 MG/ML
50 INJECTION INTRAMUSCULAR; INTRAVENOUS
Status: CANCELLED | OUTPATIENT
Start: 2022-08-24

## 2022-08-24 RX ORDER — SODIUM CHLORIDE 9 MG/ML
5-250 INJECTION, SOLUTION INTRAVENOUS PRN
Status: CANCELLED | OUTPATIENT
Start: 2022-08-24

## 2022-08-24 RX ORDER — SODIUM CHLORIDE 9 MG/ML
INJECTION, SOLUTION INTRAVENOUS CONTINUOUS
Status: CANCELLED | OUTPATIENT
Start: 2022-08-24

## 2022-08-24 RX ORDER — FAMOTIDINE 10 MG/ML
20 INJECTION, SOLUTION INTRAVENOUS
Status: CANCELLED | OUTPATIENT
Start: 2022-08-24

## 2022-08-24 RX ORDER — ACETAMINOPHEN 325 MG/1
650 TABLET ORAL
Status: CANCELLED | OUTPATIENT
Start: 2022-08-24

## 2022-08-24 RX ORDER — ONDANSETRON 2 MG/ML
8 INJECTION INTRAMUSCULAR; INTRAVENOUS
Status: CANCELLED | OUTPATIENT
Start: 2022-08-24

## 2022-08-24 RX ORDER — ALBUTEROL SULFATE 90 UG/1
4 AEROSOL, METERED RESPIRATORY (INHALATION) PRN
Status: CANCELLED | OUTPATIENT
Start: 2022-08-24

## 2022-08-24 RX ORDER — SODIUM CHLORIDE 0.9 % (FLUSH) 0.9 %
5-40 SYRINGE (ML) INJECTION PRN
Status: CANCELLED | OUTPATIENT
Start: 2022-08-24

## 2022-08-24 RX ORDER — EPINEPHRINE 1 MG/ML
0.3 INJECTION, SOLUTION, CONCENTRATE INTRAVENOUS PRN
Status: CANCELLED | OUTPATIENT
Start: 2022-08-24

## 2022-08-24 RX ORDER — MEPERIDINE HYDROCHLORIDE 50 MG/ML
12.5 INJECTION INTRAMUSCULAR; INTRAVENOUS; SUBCUTANEOUS PRN
Status: CANCELLED | OUTPATIENT
Start: 2022-08-24

## 2022-08-25 ENCOUNTER — TELEPHONE (OUTPATIENT)
Dept: ONCOLOGY | Age: 47
End: 2022-08-25

## 2022-08-29 ENCOUNTER — HOSPITAL ENCOUNTER (OUTPATIENT)
Dept: INFUSION THERAPY | Age: 47
Discharge: HOME | End: 2022-08-29
Payer: MEDICARE

## 2022-08-29 VITALS
SYSTOLIC BLOOD PRESSURE: 143 MMHG | RESPIRATION RATE: 18 BRPM | TEMPERATURE: 98.3 F | DIASTOLIC BLOOD PRESSURE: 90 MMHG | HEART RATE: 75 BPM

## 2022-08-29 DIAGNOSIS — D50.8 OTHER IRON DEFICIENCY ANEMIA: Primary | ICD-10-CM

## 2022-08-29 PROCEDURE — 96365 THER/PROPH/DIAG IV INF INIT: CPT

## 2022-08-29 PROCEDURE — 2580000003 HC RX 258: Performed by: INTERNAL MEDICINE

## 2022-08-29 PROCEDURE — 96366 THER/PROPH/DIAG IV INF ADDON: CPT

## 2022-08-29 PROCEDURE — 6360000002 HC RX W HCPCS: Performed by: INTERNAL MEDICINE

## 2022-08-29 RX ORDER — DIPHENHYDRAMINE HYDROCHLORIDE 50 MG/ML
50 INJECTION INTRAMUSCULAR; INTRAVENOUS
Status: CANCELLED | OUTPATIENT
Start: 2022-09-05

## 2022-08-29 RX ORDER — ONDANSETRON 2 MG/ML
8 INJECTION INTRAMUSCULAR; INTRAVENOUS
Status: CANCELLED | OUTPATIENT
Start: 2022-09-05

## 2022-08-29 RX ORDER — SODIUM CHLORIDE 9 MG/ML
INJECTION, SOLUTION INTRAVENOUS CONTINUOUS
Status: CANCELLED | OUTPATIENT
Start: 2022-09-05

## 2022-08-29 RX ORDER — HEPARIN SODIUM (PORCINE) LOCK FLUSH IV SOLN 100 UNIT/ML 100 UNIT/ML
500 SOLUTION INTRAVENOUS PRN
Status: CANCELLED | OUTPATIENT
Start: 2022-09-05

## 2022-08-29 RX ORDER — FAMOTIDINE 10 MG/ML
20 INJECTION, SOLUTION INTRAVENOUS
Status: CANCELLED | OUTPATIENT
Start: 2022-09-05

## 2022-08-29 RX ORDER — ACETAMINOPHEN 325 MG/1
650 TABLET ORAL
Status: CANCELLED | OUTPATIENT
Start: 2022-09-05

## 2022-08-29 RX ORDER — ALBUTEROL SULFATE 90 UG/1
4 AEROSOL, METERED RESPIRATORY (INHALATION) PRN
Status: CANCELLED | OUTPATIENT
Start: 2022-09-05

## 2022-08-29 RX ORDER — SODIUM CHLORIDE 9 MG/ML
INJECTION, SOLUTION INTRAVENOUS CONTINUOUS
Status: DISCONTINUED | OUTPATIENT
Start: 2022-08-29 | End: 2022-08-30 | Stop reason: HOSPADM

## 2022-08-29 RX ORDER — SODIUM CHLORIDE 9 MG/ML
25 INJECTION, SOLUTION INTRAVENOUS PRN
Status: CANCELLED | OUTPATIENT
Start: 2022-09-05

## 2022-08-29 RX ORDER — EPINEPHRINE 1 MG/ML
0.3 INJECTION, SOLUTION, CONCENTRATE INTRAVENOUS PRN
Status: CANCELLED | OUTPATIENT
Start: 2022-09-05

## 2022-08-29 RX ORDER — SODIUM CHLORIDE 0.9 % (FLUSH) 0.9 %
5-40 SYRINGE (ML) INJECTION PRN
Status: CANCELLED | OUTPATIENT
Start: 2022-09-05

## 2022-08-29 RX ORDER — SODIUM CHLORIDE 9 MG/ML
5-250 INJECTION, SOLUTION INTRAVENOUS PRN
Status: CANCELLED | OUTPATIENT
Start: 2022-09-05

## 2022-08-29 RX ORDER — MEPERIDINE HYDROCHLORIDE 50 MG/ML
12.5 INJECTION INTRAMUSCULAR; INTRAVENOUS; SUBCUTANEOUS PRN
Status: CANCELLED | OUTPATIENT
Start: 2022-09-05

## 2022-08-29 RX ADMIN — SODIUM CHLORIDE: 9 INJECTION, SOLUTION INTRAVENOUS at 13:53

## 2022-08-29 RX ADMIN — IRON SUCROSE 300 MG: 20 INJECTION, SOLUTION INTRAVENOUS at 13:55

## 2022-08-29 ASSESSMENT — PAIN DESCRIPTION - LOCATION: LOCATION: LEG

## 2022-08-29 ASSESSMENT — PAIN SCALES - GENERAL: PAINLEVEL_OUTOF10: 5

## 2022-08-29 NOTE — PROGRESS NOTES
Patient here for day 1 of 5 venofer. Vitals stable. Denies any new issues at this time. She tolerated treatment well and was discharged home in stable condition. She is due to return 9/7 for day 2 of 5 venofer.

## 2022-08-31 ENCOUNTER — OFFICE VISIT (OUTPATIENT)
Dept: PRIMARY CARE CLINIC | Age: 47
End: 2022-08-31
Payer: MEDICARE

## 2022-08-31 VITALS
DIASTOLIC BLOOD PRESSURE: 92 MMHG | OXYGEN SATURATION: 98 % | SYSTOLIC BLOOD PRESSURE: 132 MMHG | HEART RATE: 81 BPM | BODY MASS INDEX: 50.12 KG/M2 | WEIGHT: 292 LBS

## 2022-08-31 DIAGNOSIS — I77.9 PERIPHERAL ARTERIAL OCCLUSIVE DISEASE (HCC): ICD-10-CM

## 2022-08-31 DIAGNOSIS — D50.0 IRON DEFICIENCY ANEMIA DUE TO CHRONIC BLOOD LOSS: ICD-10-CM

## 2022-08-31 DIAGNOSIS — K90.9 MALABSORPTION OF IRON: ICD-10-CM

## 2022-08-31 DIAGNOSIS — I10 MILD HYPERTENSION: ICD-10-CM

## 2022-08-31 DIAGNOSIS — F41.9 ANXIETY: ICD-10-CM

## 2022-08-31 DIAGNOSIS — I87.1 MAY-THURNER SYNDROME: ICD-10-CM

## 2022-08-31 DIAGNOSIS — F33.1 MODERATE EPISODE OF RECURRENT MAJOR DEPRESSIVE DISORDER (HCC): Primary | ICD-10-CM

## 2022-08-31 PROCEDURE — G8427 DOCREV CUR MEDS BY ELIG CLIN: HCPCS | Performed by: NURSE PRACTITIONER

## 2022-08-31 PROCEDURE — G8417 CALC BMI ABV UP PARAM F/U: HCPCS | Performed by: NURSE PRACTITIONER

## 2022-08-31 PROCEDURE — 1036F TOBACCO NON-USER: CPT | Performed by: NURSE PRACTITIONER

## 2022-08-31 PROCEDURE — 99214 OFFICE O/P EST MOD 30 MIN: CPT | Performed by: NURSE PRACTITIONER

## 2022-08-31 RX ORDER — M-VIT,TX,IRON,MINS/CALC/FOLIC 27MG-0.4MG
1 TABLET ORAL DAILY
Qty: 90 TABLET | Refills: 3 | Status: SHIPPED | OUTPATIENT
Start: 2022-08-31 | End: 2023-08-31

## 2022-08-31 RX ORDER — LOSARTAN POTASSIUM 50 MG/1
50 TABLET ORAL DAILY
Qty: 90 TABLET | Refills: 3 | Status: SHIPPED | OUTPATIENT
Start: 2022-08-31

## 2022-08-31 RX ORDER — IBUPROFEN 200 MG
1 CAPSULE ORAL DAILY
Qty: 30 TABLET | Status: CANCELLED | OUTPATIENT
Start: 2022-08-31

## 2022-08-31 RX ORDER — MULTIVIT AND MINERALS-FERROUS GLUCONATE 9 MG IRON/15 ML ORAL LIQUID
DAILY
Status: CANCELLED | OUTPATIENT
Start: 2022-08-31

## 2022-08-31 SDOH — ECONOMIC STABILITY: FOOD INSECURITY: WITHIN THE PAST 12 MONTHS, THE FOOD YOU BOUGHT JUST DIDN'T LAST AND YOU DIDN'T HAVE MONEY TO GET MORE.: SOMETIMES TRUE

## 2022-08-31 SDOH — ECONOMIC STABILITY: FOOD INSECURITY: WITHIN THE PAST 12 MONTHS, YOU WORRIED THAT YOUR FOOD WOULD RUN OUT BEFORE YOU GOT MONEY TO BUY MORE.: SOMETIMES TRUE

## 2022-08-31 ASSESSMENT — PATIENT HEALTH QUESTIONNAIRE - PHQ9
SUM OF ALL RESPONSES TO PHQ QUESTIONS 1-9: 26
SUM OF ALL RESPONSES TO PHQ QUESTIONS 1-9: 26
6. FEELING BAD ABOUT YOURSELF - OR THAT YOU ARE A FAILURE OR HAVE LET YOURSELF OR YOUR FAMILY DOWN: 3
10. IF YOU CHECKED OFF ANY PROBLEMS, HOW DIFFICULT HAVE THESE PROBLEMS MADE IT FOR YOU TO DO YOUR WORK, TAKE CARE OF THINGS AT HOME, OR GET ALONG WITH OTHER PEOPLE: 3
3. TROUBLE FALLING OR STAYING ASLEEP: 3
4. FEELING TIRED OR HAVING LITTLE ENERGY: 3
5. POOR APPETITE OR OVEREATING: 3
8. MOVING OR SPEAKING SO SLOWLY THAT OTHER PEOPLE COULD HAVE NOTICED. OR THE OPPOSITE, BEING SO FIGETY OR RESTLESS THAT YOU HAVE BEEN MOVING AROUND A LOT MORE THAN USUAL: 3
9. THOUGHTS THAT YOU WOULD BE BETTER OFF DEAD, OR OF HURTING YOURSELF: 2
7. TROUBLE CONCENTRATING ON THINGS, SUCH AS READING THE NEWSPAPER OR WATCHING TELEVISION: 3
SUM OF ALL RESPONSES TO PHQ9 QUESTIONS 1 & 2: 6
2. FEELING DOWN, DEPRESSED OR HOPELESS: 3
SUM OF ALL RESPONSES TO PHQ QUESTIONS 1-9: 24
SUM OF ALL RESPONSES TO PHQ QUESTIONS 1-9: 26
1. LITTLE INTEREST OR PLEASURE IN DOING THINGS: 3

## 2022-08-31 ASSESSMENT — ENCOUNTER SYMPTOMS
TROUBLE SWALLOWING: 0
ABDOMINAL PAIN: 0
COUGH: 0
BLOOD IN STOOL: 0
NAUSEA: 0
SINUS PRESSURE: 0
VOMITING: 0
BACK PAIN: 1
WHEEZING: 0
SHORTNESS OF BREATH: 0
CONSTIPATION: 0
DIARRHEA: 0
SORE THROAT: 0

## 2022-08-31 ASSESSMENT — COLUMBIA-SUICIDE SEVERITY RATING SCALE - C-SSRS
1. WITHIN THE PAST MONTH, HAVE YOU WISHED YOU WERE DEAD OR WISHED YOU COULD GO TO SLEEP AND NOT WAKE UP?: YES
6. HAVE YOU EVER DONE ANYTHING, STARTED TO DO ANYTHING, OR PREPARED TO DO ANYTHING TO END YOUR LIFE?: NO
2. HAVE YOU ACTUALLY HAD ANY THOUGHTS OF KILLING YOURSELF?: NO

## 2022-08-31 ASSESSMENT — SOCIAL DETERMINANTS OF HEALTH (SDOH): HOW HARD IS IT FOR YOU TO PAY FOR THE VERY BASICS LIKE FOOD, HOUSING, MEDICAL CARE, AND HEATING?: VERY HARD

## 2022-08-31 NOTE — PROGRESS NOTES
[address] [address] [address]  [address] [address]  Dept: [phone]  Dept Fax: [phone]    Fredo Judge is a 55 y.o. female who presentstoday for her medical conditions/complaints as noted below.   Fredo Judge is c/o of  Chief Complaint   Patient presents with    Hypertension     Discuss medication, bp has been elevated     Depression     Very depressed, feeling hopeless/worthless       HPI:     Here today for follow up  She reports had hyst last year   However, her anemia has persisted and she needs to have iron infusion every 3 months or so    She is tearful today  Feels depressed due to declining health  States \"I am not suicidal, I know I need to be here for my kids\"  She has hx of poor tolerance to SSRIs with suicidal ideation  Has tried other antidepressants with adverse effects including hive, vision loss  Using buspar more often which works but she is tired of feeling the anxiety  Would like to talk with someone    Asking about returning to vascular surgeon  Continues to struggle with leg pain and swelling from her May-Thurner syndrome  Discomfort has significantly limited her activity and she has been unable to work  This has been compounded by her other health problems including her depression at times  She is in process of pursuing disability and is requesting paperwork completion to start the process      Hemoglobin A1C (%)   Date Value   10/18/2019 5.5             ( goal A1C is < 7)   No results found for: LABMICR  LDL Cholesterol (mg/dL)   Date Value   03/18/2021 84   12/03/2020 69   10/18/2019 105     LDL Calculated (mg/dL)   Date Value   01/13/2017 89       (goal LDL is <100)   AST (U/L)   Date Value   10/15/2021 29     ALT (U/L)   Date Value   10/15/2021 36 (H)     BUN (mg/dL)   Date Value   04/08/2022 10     BP Readings from Last 3 Encounters:   08/31/22 (!) 132/92   08/29/22 (!) 143/90   08/19/22 (!) 147/94 ([id number])    Past Medical History:   Diagnosis Date    Anemia     Anxiety     Carpal tunnel syndrome     Depression     Endometriosis     Gestational diabetes     x 3 children    Headache     migraines    History of blood transfusion     Iron deficiency anemia     Kidney stones     May-Thurner syndrome     Obesity     Scleroderma ([address]     Snores     Under care of team     PCP - Benton Peterson CNP - LAST VISIT 2021    Under care of team     VASCULAR  - DR. RIBEIRO - 2021 - SEES FOR MAY-THURNER SYNDROME    Under care of team     HEM/ONC - DR. PERKINS  JANESSRUFUS WHITMORE - LAST VISIT - 2021    Wears glasses       Past Surgical History:   Procedure Laterality Date     SECTION  x 3    CHOLECYSTECTOMY      DILATION AND CURETTAGE OF UTERUS N/A 2021     DILATATION AND CURETTAGE HYSTEROSCOPY MYOSURE (N/A )    DILATION AND CURETTAGE OF UTERUS N/A 2021    ER visit night of surgery for hypertension, hyperglycemia.     ENDOMETRIAL ABLATION  2021    GASTRIC BYPASS SURGERY  2009    PABLO- EN- Y    HYSTERECTOMY (CERVIX STATUS UNKNOWN) N/A 10/26/2021    XI ROBOTIC LAPAROSCOPIC MODIFIED RADICAL HYSTERECTOMY, BILATERAL SALPINGO OOPHORECTOMY, CYTOLOGIC WASHINGS, CYSTOSCOPY performed by Shabbir Gutierrez MD at [facility]      growth removed from outer area of uterus    PELVIC LAPAROSCOPY      AMBER AND BSO (CERVIX REMOVED)  10/26/2021    ROBOTIC LAPAROSCOPIC MODIFIED RADICAL HYSTERECTOMY, BILATERAL SALPINGO OOPHORECTOMY, CYTOLOGIC WASHINGS, CYSTOSCOPY    TOENAIL EXCISION      VASCULAR SURGERY  2016    to evaluate May Rader syndrome effects on  blood vessels       Family History   Problem Relation Age of Onset    Colon Cancer Mother     Early Death Father         accident    Diabetes Sister     High Blood Pressure Sister     Diabetes Brother     High Blood Pressure Brother     Diabetes Sister     High Blood Pressure Sister           Social History     Tobacco Use    Smoking status: Never    Smokeless tobacco: Never   Substance Use Topics    Alcohol use: No      Current Outpatient Medications   Medication Sig Dispense Refill    losartan (COZAAR) 50 MG tablet Take 1 tablet by mouth daily 90 tablet 3    Oyster Shell 500 MG TABS Take 1 tablet by mouth 3 times daily 270 tablet 3    Multiple Vitamins-Minerals (THERAPEUTIC MULTIVITAMIN-MINERALS) tablet Take 1 tablet by mouth daily 90 tablet 3    vitamin B-12 (CYANOCOBALAMIN) 1000 MCG tablet Take 1,000 mcg by mouth daily      zinc 50 MG CAPS Take 50 mg by mouth daily 90 capsule 1    busPIRone (BUSPAR) 10 MG tablet TAKE 1 TABLET BY MOUTH THREE TIMES A DAY AS NEEDED FOR ANXIETY 90 tablet 5    vitamin D3 (CHOLECALCIFEROL) 25 MCG (1000 UT) TABS tablet Take 1 tablet by mouth daily 90 tablet 1    ondansetron (ZOFRAN ODT) 4 MG disintegrating tablet Take 1 tablet by mouth every 8 hours as needed for Nausea 10 tablet 0    Nutritional Supplements (ESTROVEN PO) Take by mouth daily      acetaminophen (AMINOFEN) 325 MG tablet Take 2 tablets by mouth every 6 hours as needed for Pain 120 tablet 3    Elastic Bandages & Supports (JOBST ULTRASHEER 20-30MMHG LG) MISC Wear daily, ok to remove in the evenings 3 each 3    Multiple Vitamins-Minerals (MULTI-JULIO CÉSAR PO) Take by mouth daily      calcium carbonate (OYSTER SHELL CALCIUM 500 MG) 1250 (500 Ca) MG tablet Take 1 tablet by mouth daily       No current facility-administered medications for this visit. Allergies   Allergen Reactions    Contrast [Iodides] Hives     Chest pain, HTN  Able to handle if the pre-medication prep is followed.     Trileptal [Oxcarbazepine] Shortness Of Breath     Red neck rash/ visual changes    Morphine Other (See Comments)     Red streak up arm at iv site/ and hives       Health Maintenance   Topic Date Due    HIV screen  Never done    Hepatitis C screen  Never done    Colorectal Cancer Screen  Never done    COVID-19 Vaccine (3 - Booster for Pfizer series) 09/30/2021    Depression Monitoring  09/14/2022    Flu vaccine (1) 09/01/2022    Diabetes screen  03/18/2024    Lipids  03/18/2026    DTaP/Tdap/Td vaccine (4 - Td or Tdap) 11/15/2029    Hepatitis A vaccine  Aged Out    Hepatitis B vaccine  Aged Out    Hib vaccine  Aged Out    Meningococcal (ACWY) vaccine  Aged Out    Pneumococcal 0-64 years Vaccine  Aged Out       Subjective:      Review of Systems   Constitutional:  Positive for fatigue. Negative for activity change, appetite change, chills, fever and unexpected weight change. HENT:  Negative for congestion, ear pain, hearing loss, sinus pressure, sore throat and trouble swallowing. Eyes:  Negative for visual disturbance. Respiratory:  Negative for cough, shortness of breath and wheezing. Cardiovascular:  Positive for leg swelling (Chronic). Negative for chest pain and palpitations. Gastrointestinal:  Negative for abdominal pain, blood in stool, constipation, diarrhea, nausea and vomiting. Endocrine: Negative for cold intolerance, heat intolerance, polydipsia, polyphagia and polyuria. Genitourinary:  Negative for difficulty urinating, frequency, hematuria and urgency. Musculoskeletal:  Positive for arthralgias, back pain and gait problem (Due to leg pain). Negative for myalgias. Skin:  Negative for rash. Allergic/Immunologic: Negative for environmental allergies. Neurological:  Negative for dizziness, weakness, light-headedness and headaches. Psychiatric/Behavioral:  Positive for dysphoric mood (tearful). Negative for confusion. The patient is not nervous/anxious. Objective:     Physical Exam  Constitutional:       Appearance: Normal appearance. She is well-developed. HENT:      Head: Normocephalic. Eyes:      Conjunctiva/sclera: Conjunctivae normal.      Pupils: Pupils are equal, round, and reactive to light. Cardiovascular:      Rate and Rhythm: Normal rate and regular rhythm. Heart sounds: Normal heart sounds. No murmur heard.   Pulmonary: Effort: Pulmonary effort is normal.      Breath sounds: Normal breath sounds. No wheezing. Abdominal:      General: Bowel sounds are normal. There is no distension. Palpations: Abdomen is soft. Musculoskeletal:         General: Normal range of motion. Cervical back: Normal range of motion. Comments: Gait slow and somewhat unsteady due to leg pain, walking with a cane   Skin:     General: Skin is warm and dry. Neurological:      Mental Status: She is alert and oriented to person, place, and time. Psychiatric:         Mood and Affect: Mood is depressed. Affect is tearful. Behavior: Behavior normal.         Thought Content: Thought content normal.         Judgment: Judgment normal.      Comments: Tearfulness subsided with some discussion     BP (!) 132/92   Pulse 81   Wt 292 lb (132.5 kg)   LMP 09/15/2021 Comment: VERY HEAVY PERIODS  SpO2 98%   BMI 50.12 kg/m²     Assessment:       Diagnosis Orders   1. Moderate episode of recurrent major depressive disorder ([facility]  [facility])      2. Anxiety  [facility])      3. Iron deficiency anemia due to chronic blood loss        4. Malabsorption of iron        5. Mild hypertension  losartan (COZAAR) 50 MG tablet      6. May-Thurner syndrome  Quan Cain MD, Vascular Surgery, [place]      7. Peripheral arterial occlusive disease ([facility]  Quan Cain MD, Vascular Surgery, [place]                Plan:      Return in about 3 months (around 11/30/2022) for hypertension check, depression/anxiety. Depression, anxiety-referral to counseling services, she denies any plan for suicide or suicidal ideation at this time, talked at length about how she is struggling with her medical conditions. Reviewed appropriate use of BuSpar and encouraged her to increase to use on a more regular basis if necessary.   Anemia, malabsorption of iron-reviewed recent note and labs per hematology, she will continue regular follow-up as planned  Hypertension-has been elevated past few months, will increase losartan  May Thurner syndrome, PAD-was evaluated by vascular but then developed issues with uterine bleeding and never returned. Referral placed to resume evaluation and treatment options     Form completed for [address] of job and family services per patient's request.  She is pursuing disability  Orders Placed This Encounter   Procedures    [facility])     Referral Priority:   Routine     Referral Type:   Behavioral Health     Referral Reason:   Specialty Services Required     Referred to Provider:   CAMMIE Reed     Requested Specialty:   Behavioral Health     Number of Visits Requested:   Leann Fleischer, MD, Vascular Surgery, [place]     Referral Priority:   Routine     Referral Type:   Eval and Treat     Referral Reason:   Specialty Services Required     Referred to Provider:   Roland Sigala MD     Requested Specialty:   Vascular Surgery     Number of Visits Requested:   1        Orders Placed This Encounter   Medications    losartan (COZAAR) 50 MG tablet     Sig: Take 1 tablet by mouth daily     Dispense:  90 tablet     Refill:  3    Oyster Shell 500 MG TABS     Sig: Take 1 tablet by mouth 3 times daily     Dispense:  270 tablet     Refill:  3    Multiple Vitamins-Minerals (THERAPEUTIC MULTIVITAMIN-MINERALS) tablet     Sig: Take 1 tablet by mouth daily     Dispense:  90 tablet     Refill:  3       Patient given educational materials - see patient instructions. Discussed use, benefit, and side effects of prescribed medications. All patientquestions answered. Pt voiced understanding. Reviewed health maintenance. Instructedto continue current medications, diet and exercise. Patient agreed with treatmentplan. Follow up as directed.      Electronicallysigned by Dayna Mcdermott Yasemin Reese - CNP on 8/31/2022 at 1:09 PM

## 2022-09-01 ENCOUNTER — TELEPHONE (OUTPATIENT)
Dept: INFUSION THERAPY | Age: 47
End: 2022-09-01

## 2022-09-01 NOTE — TELEPHONE ENCOUNTER
[facility] service forms completed by Marjorie Olea and signed by MD. Vanna Bower to [phone] on 8/31/22, confirmation rec'd. Forms left to be scanned.

## 2022-09-02 ENCOUNTER — TELEMEDICINE (OUTPATIENT)
Dept: BEHAVIORAL/MENTAL HEALTH CLINIC | Age: 47
End: 2022-09-02
Payer: MEDICARE

## 2022-09-02 DIAGNOSIS — F33.1 MODERATE EPISODE OF RECURRENT MAJOR DEPRESSIVE DISORDER (HCC): Primary | ICD-10-CM

## 2022-09-02 DIAGNOSIS — F41.9 ANXIETY: ICD-10-CM

## 2022-09-02 PROCEDURE — 90791 PSYCH DIAGNOSTIC EVALUATION: CPT | Performed by: SOCIAL WORKER

## 2022-09-02 PROCEDURE — 1036F TOBACCO NON-USER: CPT | Performed by: SOCIAL WORKER

## 2022-09-02 NOTE — PROGRESS NOTES
ADULT BEHAVIORAL HEALTH ASSESSMENT  CAMMIE Sanchez  Licensed Independent        Visit Date: 9/2/2022   Time of appointment: 12:18 PM    Time spent with Patient: 44 minutes. This is patient's first appointment. [facility] Location: [address] [place] [address]   This was a tele-health visit conducted via interactive/real time audio and video. Reason for Consult:  Depression and Anxiety     PCP:  JUSTICE Ibrahim CNP      Pt provided informed consent for the behavioral health program. Discussed with patient model of service to include the limits of confidentiality (i.e. abuse reporting, suicide intervention, etc.) and short-term intervention focused approach. Pt indicated understanding. Pt provided verbal consent to engage in tele-health psychotherapy. This visit was completed virtually using My Chart. This visit was held in a private space in patient's home. PRESENTING PROBLEM AND HISTORY  [facility] is a 52 y.o. female who presents for new evaluation and treatment of depression and anxiety. She expressed \"everything feels overwhelming right now\" \"It seems like the more I have to do, the less that gets done, because everything feels too much right now, and I don't know how to fix that\"     She has the following symptoms: depressed mood, anhedonia, decreased appetite, weight gain, decreased sleep, fatigue/lack of energy, lack of motivation, excessive guilt, low self-esteem, isolating self, feelings of worthlessness, anger/irritability, feeling nervous, anxious, or on edge, racing worry thoughts, excessive anxiety and worry about specific stressors, inability to stop or control worry, and difficulty with attention/concentration. Onset of symptoms was approximately several years ago. Symptoms have been gradually worsening since that time. She denies current suicidal and homicidal ideation.  Family history significant for depression. Risk factors:  challenging health conditions affecting mobility/mental health. Loss; nephew  Previous treatment includes Prozac. She complains of the following medication side effects: SI.     MENTAL STATUS EXAM  Mood was depressed with anxious affect. Suicidal ideation was denied at time of assessment. She did however report recent SI over the last couple of weeks. No plan identified to harm herself. Homicidal ideation was denied. Hygiene was good . Dress was appropriate. Behavior was Within Normal Limits with No observation or self-report of difficulties ambulating. Attitude was Engageable. Eye-contact was good. Speech: rate - WNL, rhythm -  WNL, volume - WNL  Verbalizations were goal directed. Thought processes were intact and goal-oriented without evidence of delusions, hallucinations, obsessions, or allie; with moderate cognitive distortions. Associations were characterized by intact cognitive processes. Pt was oriented to person, place, time, and general circumstances;  recent:  good. Insight and judgment were estimated to be good, AEB, a fair  understanding of cyclical maladaptive patterns, and the ability to use insight to inform behavior change.      CURRENT MEDICATIONS    Current Outpatient Medications:     amoxicillin (AMOXIL) 500 MG capsule, Take 1 capsule by mouth 2 times daily for 10 days, Disp: 20 capsule, Rfl: 0    fluticasone (FLONASE) 50 MCG/ACT nasal spray, 2 sprays by Nasal route daily, Disp: 16 g, Rfl: 3    losartan (COZAAR) 50 MG tablet, Take 1 tablet by mouth daily, Disp: 90 tablet, Rfl: 3    Oyster Shell 500 MG TABS, Take 1 tablet by mouth 3 times daily, Disp: 270 tablet, Rfl: 3    Multiple Vitamins-Minerals (THERAPEUTIC MULTIVITAMIN-MINERALS) tablet, Take 1 tablet by mouth daily, Disp: 90 tablet, Rfl: 3    vitamin B-12 (CYANOCOBALAMIN) 1000 MCG tablet, Take 1,000 mcg by mouth daily, Disp: , Rfl:     zinc 50 MG CAPS, Take 50 mg by mouth daily, Disp: 90 capsule, Rfl: 1    busPIRone (BUSPAR) 10 MG tablet, TAKE 1 TABLET BY MOUTH THREE TIMES A DAY AS NEEDED FOR ANXIETY, Disp: 90 tablet, Rfl: 5    vitamin D3 (CHOLECALCIFEROL) 25 MCG (1000 UT) TABS tablet, Take 1 tablet by mouth daily, Disp: 90 tablet, Rfl: 1    ondansetron (ZOFRAN ODT) 4 MG disintegrating tablet, Take 1 tablet by mouth every 8 hours as needed for Nausea, Disp: 10 tablet, Rfl: 0    Nutritional Supplements (ESTROVEN PO), Take by mouth daily, Disp: , Rfl:     acetaminophen (AMINOFEN) 325 MG tablet, Take 2 tablets by mouth every 6 hours as needed for Pain, Disp: 120 tablet, Rfl: 3    Elastic Bandages & Supports (JOBST ULTRASHEER 20-30MMHG LG) MISC, Wear daily, ok to remove in the evenings, Disp: 3 each, Rfl: 3    Multiple Vitamins-Minerals (MULTI-JULIO CÉSAR PO), Take by mouth daily, Disp: , Rfl:     calcium carbonate (OYSTER SHELL CALCIUM 500 MG) 1250 (500 Ca) MG tablet, Take 1 tablet by mouth daily, Disp: , Rfl:      FAMILY MEDICAL/MH HISTORY   Her family history includes Colon Cancer in her mother; Diabetes in her brother, sister, and sister; Early Death in her father; High Blood Pressure in her brother, sister, and sister. PATIENT MENTAL HEALTH HISTORY  Previous therapy age 15 and intermittently several times throughout life as an adult. She was previously prescribed Prozac many years ago and this was discontinued due to experiencing suicidal thoughts. She reported it has been over a decade since she has been in mental health services. Her PCP has prescribed Buspar. She has been off work since July 2020 due to changes in her mobility. She reported she did try to cut herself when she was a teen. No plan to harm herself currently.  She stated when suicidal thoughts arose most recently, she got a pen and notebook and wrote it all down and thought \"I have to be stronger than whatever darkness I was feeling\" She felt having the visit scheduled shortly after she told the office how she was feeling made her feel hopeful, stating, \"In some small way it just made me feel you can handle this\"    PSYCHOSOCIAL HISTORY  Current living situation: Pt reported she resides in her own home, with her two minor children and her sister. Her children are ages 32, 25, and 12.     Work/Education: Pt was previously working until July 2020 and this has been difficult for her as she reported she had to stop working because of changes in her mobility. She does not have a car so transportation has been limiting because of this. She     Support system: Pt reported she has a close relationship with her sister, they have lived together many (21) years - recently found out she is getting  and is unsure how this might change things. She shared she has another sister whom she is close with - who also has health issues and they lean on each other for support.  from kids father many years ago - this was a difficult break-up. She is very close with her oldest daughter. Yazidi/Spirituality: Pt did not disclose. DRUG AND ALCOHOL CURRENT USE/HISTORY  TOBACCO:  She reports that she has never smoked. She has never used smokeless tobacco.  ALCOHOL:  She reports no history of alcohol use. OTHER SUBSTANCES: She reports no history of drug use. ASSESSMENT  Venecia Huerta presented to the appointment today for evaluation and treatment of symptoms of depression. She is currently deemed no risk to herself or others and meets criteria for Major Depressive Disorder. Noelle's depressive symptoms are not well controlled at this time. She will also benefit from beginning brief and solution-focused consultation to address cognitive and behavioral interventions for depressive symptoms. Due to the history of depressive symptoms, Denver Sepulveda may benefit from establishing with an ongoing therapy provider whom Denver Sepulveda can see for regularly scheduled visits. Denver Sepulveda was in agreement with recommendations.     PHQ Scores 8/31/2022 9/14/2021 1/9/2017   PHQ2 Score 6 6 1   PHQ9 Score 26 18 1     Interpretation of Total Score Depression Severity: 1-4 = Minimal depression, 5-9 = Mild depression, 10-14 = Moderate depression, 15-19 = Moderately severe depression, 20-27 = Severe depression    How often pt has had thoughts of death or hurting self (if PHQ positive for depression):       No flowsheet data found. Interpretation of FAISAL-7 score: 5-9 = mild anxiety, 10-14 = moderate anxiety, 15+ = severe anxiety. Recommend referral to behavioral health for scores 10 or greater. DIAGNOSIS  Suhail Montalvo was seen today for depression and anxiety. Diagnoses and all orders for this visit:    Moderate episode of recurrent major depressive disorder ([facility] [address]    Anxiety      INTERVENTION  Conducted functional assessment, [place]-setting to identify pt's primary [facility] / overall health, and Supportive techniques. Elyssa Vazquez was scheduled for follow-up visit to begin psychotherapy to address depression, will refer to ongoing therapy if necessary. INTERACTIVE COMPLEXITY  Is interactive complexity present?   No  Reason:  N/A  Additional Supporting Information:  N/A       Electronically signed by Betzy Estrada on 9/18/2022 at 6:33 PM

## 2022-09-07 ENCOUNTER — HOSPITAL ENCOUNTER (OUTPATIENT)
Dept: INFUSION THERAPY | Age: 47
Discharge: HOME | End: 2022-09-07
Payer: MEDICARE

## 2022-09-07 VITALS
DIASTOLIC BLOOD PRESSURE: 78 MMHG | RESPIRATION RATE: 16 BRPM | SYSTOLIC BLOOD PRESSURE: 115 MMHG | TEMPERATURE: 98.1 F | HEART RATE: 64 BPM

## 2022-09-07 DIAGNOSIS — D50.8 OTHER IRON DEFICIENCY ANEMIA: Primary | ICD-10-CM

## 2022-09-07 PROCEDURE — 2580000003 HC RX 258: Performed by: INTERNAL MEDICINE

## 2022-09-07 PROCEDURE — 96365 THER/PROPH/DIAG IV INF INIT: CPT

## 2022-09-07 PROCEDURE — 96366 THER/PROPH/DIAG IV INF ADDON: CPT

## 2022-09-07 PROCEDURE — 6360000002 HC RX W HCPCS: Performed by: INTERNAL MEDICINE

## 2022-09-07 RX ORDER — EPINEPHRINE 1 MG/ML
0.3 INJECTION, SOLUTION, CONCENTRATE INTRAVENOUS PRN
OUTPATIENT
Start: 2022-09-12

## 2022-09-07 RX ORDER — ALBUTEROL SULFATE 90 UG/1
4 AEROSOL, METERED RESPIRATORY (INHALATION) PRN
OUTPATIENT
Start: 2022-09-12

## 2022-09-07 RX ORDER — HEPARIN SODIUM (PORCINE) LOCK FLUSH IV SOLN 100 UNIT/ML 100 UNIT/ML
500 SOLUTION INTRAVENOUS PRN
OUTPATIENT
Start: 2022-09-12

## 2022-09-07 RX ORDER — ONDANSETRON 2 MG/ML
8 INJECTION INTRAMUSCULAR; INTRAVENOUS
OUTPATIENT
Start: 2022-09-12

## 2022-09-07 RX ORDER — SODIUM CHLORIDE 0.9 % (FLUSH) 0.9 %
5-40 SYRINGE (ML) INJECTION PRN
OUTPATIENT
Start: 2022-09-12

## 2022-09-07 RX ORDER — SODIUM CHLORIDE 9 MG/ML
INJECTION, SOLUTION INTRAVENOUS CONTINUOUS
OUTPATIENT
Start: 2022-09-12

## 2022-09-07 RX ORDER — ACETAMINOPHEN 325 MG/1
650 TABLET ORAL
OUTPATIENT
Start: 2022-09-12

## 2022-09-07 RX ORDER — SODIUM CHLORIDE 9 MG/ML
INJECTION, SOLUTION INTRAVENOUS CONTINUOUS
Status: ACTIVE | OUTPATIENT
Start: 2022-09-07 | End: 2022-09-07

## 2022-09-07 RX ORDER — DIPHENHYDRAMINE HYDROCHLORIDE 50 MG/ML
50 INJECTION INTRAMUSCULAR; INTRAVENOUS
OUTPATIENT
Start: 2022-09-12

## 2022-09-07 RX ORDER — MEPERIDINE HYDROCHLORIDE 50 MG/ML
12.5 INJECTION INTRAMUSCULAR; INTRAVENOUS; SUBCUTANEOUS PRN
OUTPATIENT
Start: 2022-09-12

## 2022-09-07 RX ORDER — FAMOTIDINE 10 MG/ML
20 INJECTION, SOLUTION INTRAVENOUS
OUTPATIENT
Start: 2022-09-12

## 2022-09-07 RX ORDER — SODIUM CHLORIDE 9 MG/ML
5-250 INJECTION, SOLUTION INTRAVENOUS PRN
OUTPATIENT
Start: 2022-09-12

## 2022-09-07 RX ADMIN — IRON SUCROSE 300 MG: 20 INJECTION, SOLUTION INTRAVENOUS at 08:21

## 2022-09-07 RX ADMIN — SODIUM CHLORIDE: 9 INJECTION, SOLUTION INTRAVENOUS at 08:19

## 2022-09-07 NOTE — PROGRESS NOTES
Patient here for 2 of 2 venofer. Discussed w/ Dr. Live Raymond, patient is ok doing 2 doses. Patient tolerated infusion w/o incident and left in stable condition.  Returns 12/16 for

## 2022-09-15 ENCOUNTER — HOSPITAL ENCOUNTER (OUTPATIENT)
Age: 47
Setting detail: SPECIMEN
Discharge: HOME | End: 2022-09-15

## 2022-09-15 ENCOUNTER — OFFICE VISIT (OUTPATIENT)
Dept: FAMILY MEDICINE CLINIC | Age: 47
End: 2022-09-15
Payer: MEDICARE

## 2022-09-15 ENCOUNTER — TELEMEDICINE (OUTPATIENT)
Dept: BEHAVIORAL/MENTAL HEALTH CLINIC | Age: 47
End: 2022-09-15
Payer: MEDICARE

## 2022-09-15 VITALS
HEART RATE: 88 BPM | DIASTOLIC BLOOD PRESSURE: 85 MMHG | WEIGHT: 291 LBS | TEMPERATURE: 97.7 F | OXYGEN SATURATION: 95 % | HEIGHT: 64 IN | SYSTOLIC BLOOD PRESSURE: 130 MMHG | BODY MASS INDEX: 49.68 KG/M2

## 2022-09-15 DIAGNOSIS — Z11.52 ENCOUNTER FOR SCREENING FOR COVID-19: ICD-10-CM

## 2022-09-15 DIAGNOSIS — R09.82 POST-NASAL DRIP: ICD-10-CM

## 2022-09-15 DIAGNOSIS — J02.9 SORE THROAT: Primary | ICD-10-CM

## 2022-09-15 DIAGNOSIS — F33.1 MODERATE EPISODE OF RECURRENT MAJOR DEPRESSIVE DISORDER (HCC): Primary | ICD-10-CM

## 2022-09-15 LAB — S PYO AG THROAT QL: NORMAL

## 2022-09-15 PROCEDURE — 87880 STREP A ASSAY W/OPTIC: CPT | Performed by: REGISTERED NURSE

## 2022-09-15 PROCEDURE — 90834 PSYTX W PT 45 MINUTES: CPT | Performed by: SOCIAL WORKER

## 2022-09-15 PROCEDURE — 1036F TOBACCO NON-USER: CPT | Performed by: REGISTERED NURSE

## 2022-09-15 PROCEDURE — 99213 OFFICE O/P EST LOW 20 MIN: CPT | Performed by: REGISTERED NURSE

## 2022-09-15 PROCEDURE — G8417 CALC BMI ABV UP PARAM F/U: HCPCS | Performed by: REGISTERED NURSE

## 2022-09-15 PROCEDURE — G8428 CUR MEDS NOT DOCUMENT: HCPCS | Performed by: REGISTERED NURSE

## 2022-09-15 PROCEDURE — 1036F TOBACCO NON-USER: CPT | Performed by: SOCIAL WORKER

## 2022-09-15 RX ORDER — FLUCONAZOLE 150 MG/1
150 TABLET ORAL ONCE
Qty: 1 TABLET | Refills: 0 | Status: SHIPPED | OUTPATIENT
Start: 2022-09-15 | End: 2022-09-15

## 2022-09-15 RX ORDER — FLUTICASONE PROPIONATE 50 MCG
2 SPRAY, SUSPENSION (ML) NASAL DAILY
Qty: 16 G | Refills: 3 | Status: SHIPPED | OUTPATIENT
Start: 2022-09-15

## 2022-09-15 RX ORDER — AMOXICILLIN 500 MG/1
500 CAPSULE ORAL 2 TIMES DAILY
Qty: 20 CAPSULE | Refills: 0 | Status: SHIPPED | OUTPATIENT
Start: 2022-09-15 | End: 2022-09-25

## 2022-09-15 ASSESSMENT — ENCOUNTER SYMPTOMS
TROUBLE SWALLOWING: 0
NAUSEA: 1
FACIAL SWELLING: 0
SINUS PAIN: 0
COLOR CHANGE: 0
DIARRHEA: 1
ABDOMINAL PAIN: 0
RHINORRHEA: 0
WHEEZING: 0
VOICE CHANGE: 0
VOMITING: 0
COUGH: 1
CHOKING: 0
CHEST TIGHTNESS: 0
SORE THROAT: 1
BLOOD IN STOOL: 0
SHORTNESS OF BREATH: 0
EYES NEGATIVE: 1

## 2022-09-15 NOTE — PROGRESS NOTES
ADULT BEHAVIORAL HEALTH FOLLOW UP  CAMMIE Mccracken  Licensed Independent          Visit Date: 9/15/2022   Time of appointment: 11:10 AM    Time spent with Patient: 50 minutes. This is patient's second appointment. [facility] Location: [address] [place] [address]   This was a tele-health visit conducted via interactive/real time audio and video. Reason for Consult:  Depression     PCP:  JUSTICE John CNP      Pt provided informed consent for the behavioral health program. Discussed with patient model of service to include the limits of confidentiality (i.e. abuse reporting, suicide intervention, etc.) and short-term intervention focused approach. Pt indicated understanding. Pt provided verbal consent to engage in tele-health psychotherapy. This visit was held in a private space in patient's home. Eduard Bosworth is a 52 y.o. female who presents for follow up of depression. Kenisha Farrell reported she has been experiencing mood swings and feeling down. She shared that it has been difficult to manage her anger when feeling so overwhelmed. She shared she is feeling anxious about things upcoming in the future, primarily her son preparing for college and the decision making process. Kenisha Farrell reflected on how they have had differences in opinion and she wants him to make a choice that will be beneficial, as Kenisha Farrell shared she doesn't want to see her children go through what her daughter did when she was the first child to go to college. Kenisha Farrell expressed another challenge has been feeling that her children believe she is lazy because of changes in her physical health. Kenisha Farrell shared she has not wanted to scare them by sharing how difficult her condition has made things for her, but because of this she feels they don't realize how difficult it is for her to do things they are used to their mom doing.      Previous Recommendations: Kenisha Farrell was scheduled for follow-up visit to begin psychotherapy to address depression, will refer to ongoing therapy if necessary. MENTAL STATUS EXAM  Mood was depressed with anxious affect. Suicidal ideation was not reported. Homicidal ideation was not reported. Hygiene was  unable to assess . Dress was appropriate. Behavior was Within Normal Limits with No observation or self-report of difficulties ambulating. Attitude was Engageable. Eye-contact was good. Speech: rate - WNL, rhythm - WNL, volume - WNL. Verbalizations were coherent. Thought processes were intact and goal-oriented without evidence of delusions, hallucinations, obsessions, or allie; with little cognitive distortions. Associations were characterized by intact cognitive processes. Pt was oriented to person, place, time, and general circumstances;  recent:  good. Insight and judgment were estimated to be fair, AEB, a fair  understanding of cyclical maladaptive patterns, and the ability to use insight to inform behavior change. RUSSELL Medrano presented to the appointment today for evaluation and treatment of symptoms of depression. She is currently deemed no risk to herself or others and meets criteria for Major Depressive Disorder. Tamiko Deidra expressed she has been feeling more down, irritable, and frustrated with the stress and anxiety she has been experiencing. Tamiko Gay admitted that it has become more challenging over time due to her decline in her physical health due to not being able to function as she had been used to. She also expressed it has been difficult for her children to understand where she is coming from and often times feels that her children view her as lazy because they have not empathized/may not know how to empathize with what she is facing. Tamiko Deidra was in agreement with recommendations to continue psychotherapy, with referral to external ongoing clinician whom she can meet with for regularly scheduled appointments.      PHQ Scores 8/31/2022 9/14/2021 1/9/2017   PHQ2 Score 6 6 1   PHQ9 Score 26 18 1     Interpretation of Total Score Depression Severity: 1-4 = Minimal depression, 5-9 = Mild depression, 10-14 = Moderate depression, 15-19 = Moderately severe depression, 20-27 = Severe depression    How often pt has had thoughts of death or hurting self (if PHQ positive for depression):       No flowsheet data found. Interpretation of FAISAL-7 score: 5-9 = mild anxiety, 10-14 = moderate anxiety, 15+ = severe anxiety. Recommend referral to behavioral health for scores 10 or greater. DIAGNOSIS  Kenisha Farrell was seen today for depression. Diagnoses and all orders for this visit:    Moderate episode of recurrent major depressive disorder ([facility] [address]    INTERVENTION  Discussed various factors related to the development and maintenance of  depression (including biological, cognitive, behavioral, and environmental factors), Trained in strategies for increasing balanced thinking, Provided education, Provided Psychoeducation re: benefits of creating schedule/routine, to-do lists, utilizing calendar and asking children for support/family collaboration on completing chores, importance of rest when needed, and Identified maladaptive thoughts    PLAN  Kenisha Farrell was scheduled for follow-up visit to discuss transition to ongoing therapist. Kenisha Farrell shared her insurance will soon be changing. [employer] [facility] will assist with providing referrals. She can also locate in-network providers by [employer] company directly for referrals. INTERACTIVE COMPLEXITY  Is interactive complexity present?   No  Reason:  N/A  Additional Supporting Information:  N/A       Electronically signed by Rut Rosado on 10/4/2022 at 10:24 AM

## 2022-09-15 NOTE — PROGRESS NOTES
[address] [facility]  [address]  [address] [address]  Dept: [phone]  Dept Fax: [phone]    Venecia Huerta is a 55 y.o. female who presents today for her medical conditions/complaints of   Chief Complaint   Patient presents with    Pharyngitis     C/o sore throat, son has strep throat, some nasal congestion, head ache, GI upset          HPI:     /85   Pulse 88   Temp 97.7 °F (36.5 °C) (Temporal)   Ht 5' 4\" (1.626 m)   Wt 291 lb (132 kg)   LMP 09/15/2021 Comment: VERY HEAVY PERIODS  SpO2 95%   BMI 49.95 kg/m²       HPI  Patient presents for sore throat, her son has strep throat and she would like tested. She states she feels chills, cough headache, diarrhea and nausea. She has also had a few episodes of emesis. No reports of blood in her emesis. Patient states she has no difficultly breathing. Patient had one episode of sharp chest pain that lasted 10 minutes yesterday, no chest pain, dizziness, shortness of breath or palpations today. States she has had this chest sharpness before in the past. Denies any bloody or dark tarry stools. She would like to be evaluated for COVID19 due to her symptoms. She has had OTC Tylenol for her headache. Past Medical History:   Diagnosis Date    Anemia     Anxiety     Carpal tunnel syndrome     Depression     Endometriosis     Gestational diabetes     x 3 children    Headache     migraines    History of blood transfusion     Iron deficiency anemia     Kidney stones     May-Thurner syndrome     Obesity     Scleroderma ([address]     Snores     Under care of team     PCP - Melodie Dooley CNP - LAST VISIT 2021    Under care of team     VASCULAR  - DR. RIBEIRO - 2021 - SEES FOR MAY-THURNER SYNDROME    Under care of team     [facility] - LAST VISIT - 2021    Wears glasses         Past Surgical History:   Procedure Laterality Date     SECTION  x 3    CHOLECYSTECTOMY      DILATION AND CURETTAGE OF UTERUS N/A 05/12/2021     DILATATION AND CURETTAGE HYSTEROSCOPY MYOSURE (N/A )    DILATION AND CURETTAGE OF UTERUS N/A 05/12/2021    ER visit night of surgery for hypertension, hyperglycemia. ENDOMETRIAL ABLATION  05/2021    GASTRIC BYPASS SURGERY  2009    PABLO- EN- Y    HYSTERECTOMY (CERVIX STATUS UNKNOWN) N/A 10/26/2021    XI ROBOTIC LAPAROSCOPIC MODIFIED RADICAL HYSTERECTOMY, BILATERAL SALPINGO OOPHORECTOMY, CYTOLOGIC WASHINGS, CYSTOSCOPY performed by Lisandro Walls MD at [address]      growth removed from outer area of uterus    PELVIC LAPAROSCOPY      AMBER AND BSO (CERVIX REMOVED)  10/26/2021    ROBOTIC LAPAROSCOPIC MODIFIED RADICAL HYSTERECTOMY, BILATERAL SALPINGO OOPHORECTOMY, CYTOLOGIC WASHINGS, CYSTOSCOPY    TOENAIL EXCISION      VASCULAR SURGERY  2016    to evaluate May Rader syndrome effects on  blood vessels       Family History   Problem Relation Age of Onset    Colon Cancer Mother     Early Death Father         accident    Diabetes Sister     High Blood Pressure Sister     Diabetes Brother     High Blood Pressure Brother     Diabetes Sister     High Blood Pressure Sister        Social History     Tobacco Use    Smoking status: Never    Smokeless tobacco: Never   Substance Use Topics    Alcohol use: No        Prior to Visit Medications    Medication Sig Taking?  Authorizing Provider   amoxicillin (AMOXIL) 500 MG capsule Take 1 capsule by mouth 2 times daily for 10 days Yes JUSTICE Kellogg CNP   fluticasone (FLONASE) 50 MCG/ACT nasal spray 2 sprays by Nasal route daily Yes JUSTICE Kellogg CNP   fluconazole (DIFLUCAN) 150 MG tablet Take 1 tablet by mouth once for 1 dose Yes JUSTICE Kellogg CNP   losartan (COZAAR) 50 MG tablet Take 1 tablet by mouth daily  JUSTICE Khoury CNP   Oyster Shell 500 MG TABS Take 1 tablet by mouth 3 times daily  JUSTICE Polanco CNP Multiple Vitamins-Minerals (THERAPEUTIC MULTIVITAMIN-MINERALS) tablet Take 1 tablet by mouth daily  Fede LeachLEONOR hendricksN - CNP   vitamin B-12 (CYANOCOBALAMIN) 1000 MCG tablet Take 1,000 mcg by mouth daily  Historical Provider, MD   zinc 50 MG CAPS Take 50 mg by mouth daily  [place] Calix MD   busPIRone (BUSPAR) 10 MG tablet TAKE 1 TABLET BY MOUTH THREE TIMES A DAY AS NEEDED FOR ANXIETY  JUSTICE Agudelo - CNP   vitamin D3 (CHOLECALCIFEROL) 25 MCG (1000 UT) TABS tablet Take 1 tablet by mouth daily  [place] Calix MD   ondansetron (ZOFRAN ODT) 4 MG disintegrating tablet Take 1 tablet by mouth every 8 hours as needed for Nausea  Gricelda Jackson MD   Nutritional Supplements (ESTROVEN PO) Take by mouth daily  Historical Provider, MD   acetaminophen (AMINOFEN) 325 MG tablet Take 2 tablets by mouth every 6 hours as needed for Pain  Beatriz Kinsey,    Elastic Bandages & Supports (JOBST ULTRASHEER 20-30MMHG LG) MISC Wear daily, ok to remove in the evenings  Cat Young MD   Multiple Vitamins-Minerals (MULTI-JULIO CÉSAR PO) Take by mouth daily  Historical Provider, MD   calcium carbonate (OYSTER SHELL CALCIUM 500 MG) 1250 (500 Ca) MG tablet Take 1 tablet by mouth daily  Historical Provider, MD       Allergies   Allergen Reactions    Contrast [Iodides] Hives     Chest pain, HTN  Able to handle if the pre-medication prep is followed. Trileptal [Oxcarbazepine] Shortness Of Breath     Red neck rash/ visual changes    Morphine Other (See Comments)     Red streak up arm at iv site/ and hives         Subjective:      Review of Systems   Constitutional:  Positive for chills, fatigue and fever. HENT:  Positive for congestion, postnasal drip and sore throat. Negative for drooling, ear discharge, ear pain, facial swelling, mouth sores, rhinorrhea, sinus pain, trouble swallowing and voice change. Eyes: Negative. Respiratory:  Positive for cough.  Negative for choking, chest tightness, shortness of breath and wheezing. Cardiovascular:  Negative for chest pain, palpitations and leg swelling. Gastrointestinal:  Positive for diarrhea and nausea. Negative for abdominal pain, blood in stool and vomiting. Genitourinary: Negative. Negative for dysuria. Musculoskeletal: Negative. Skin: Negative. Negative for color change and wound. Neurological:  Positive for headaches. Negative for dizziness, tremors, facial asymmetry, weakness, light-headedness and numbness. Psychiatric/Behavioral: Negative. Objective:     Physical Exam  Constitutional:       General: She is not in acute distress. Appearance: Normal appearance. She is obese. She is ill-appearing. She is not toxic-appearing or diaphoretic. HENT:      Head: Normocephalic. Right Ear: Tympanic membrane, ear canal and external ear normal.      Left Ear: Tympanic membrane, ear canal and external ear normal.      Nose: Congestion present. Mouth/Throat:      Lips: Pink. No lesions. Mouth: No angioedema. Pharynx: Uvula midline. Posterior oropharyngeal erythema present. No oropharyngeal exudate or uvula swelling. Eyes:      Conjunctiva/sclera: Conjunctivae normal.      Pupils: Pupils are equal, round, and reactive to light. Cardiovascular:      Rate and Rhythm: Normal rate and regular rhythm. Heart sounds: Normal heart sounds. Pulmonary:      Effort: No respiratory distress. Breath sounds: Normal breath sounds. No wheezing, rhonchi or rales. Abdominal:      General: Abdomen is flat. Bowel sounds are normal.      Palpations: Abdomen is soft. Tenderness: There is no abdominal tenderness. There is no guarding. Musculoskeletal:      Cervical back: Normal range of motion. No rigidity or tenderness. Lymphadenopathy:      Cervical: No cervical adenopathy. Skin:     General: Skin is warm. Coloration: Skin is not pale. Neurological:      General: No focal deficit present.       Mental Status: She is alert and oriented to person, place, and time. Mental status is at baseline. Psychiatric:         Mood and Affect: Mood normal.         MEDICAL DECISION MAKING Assessment/Plan:     Darien Luis was seen today for pharyngitis. Diagnoses and all orders for this visit:    Sore throat  -     POCT rapid strep A  -     amoxicillin (AMOXIL) 500 MG capsule; Take 1 capsule by mouth 2 times daily for 10 days    Encounter for screening for COVID-19  -     COVID-19; Future    Post-nasal drip  -     fluticasone (FLONASE) 50 MCG/ACT nasal spray; 2 sprays by Nasal route daily    Other orders  -     fluconazole (DIFLUCAN) 150 MG tablet; Take 1 tablet by mouth once for 1 dose    POC strep negative in the office today. Posterior oropharyngeal erythema noted along with sore throat and close contact with strep. Will start on oral amoxicillin due to her sore erythematous throat and strep exposure. Patient requesting diflucan for history of antibiotic induced yeast infections. May use OTC pain medications such as Tylenol and Motrin for discomfort. Throat lozenges, warm tea with honey and warm salt watrer rinses advised. Will send out COVID19 test, reviewed COVID19 isolation guidelines with patient. See ER for any emergent concerns. Explained if she has any chest pain or difficulty breathing she should be evaluated in the ER. Patient verbalized understanding. Results for orders placed or performed in visit on 09/15/22   POCT rapid strep A   Result Value Ref Range    Strep A Ag None Detected None Detected       Patient counseled:     Patient given educational materials - see patientinstructions. Discussed use, benefit, and side effects of prescribed medications. All patient questions answered. Pt verbalized understanding. Instructed to continue current medications, diet and exercise. Patient agreed with treatment plan. Follow up as directed.      Electronically signed by JUSTICE Echavarria CNP on 9/15/2022 at 4:46 PM

## 2022-09-16 DIAGNOSIS — Z11.52 ENCOUNTER FOR SCREENING FOR COVID-19: ICD-10-CM

## 2022-09-16 LAB
SARS-COV-2: NORMAL
SARS-COV-2: NOT DETECTED
SOURCE: NORMAL

## 2022-09-29 ENCOUNTER — TELEMEDICINE (OUTPATIENT)
Dept: BEHAVIORAL/MENTAL HEALTH CLINIC | Age: 47
End: 2022-09-29
Payer: MEDICARE

## 2022-09-29 ENCOUNTER — TELEPHONE (OUTPATIENT)
Dept: PRIMARY CARE CLINIC | Age: 47
End: 2022-09-29

## 2022-09-29 DIAGNOSIS — F33.1 MODERATE EPISODE OF RECURRENT MAJOR DEPRESSIVE DISORDER (HCC): Primary | ICD-10-CM

## 2022-09-29 DIAGNOSIS — F41.9 ANXIETY: ICD-10-CM

## 2022-09-29 PROCEDURE — 1036F TOBACCO NON-USER: CPT | Performed by: SOCIAL WORKER

## 2022-09-29 PROCEDURE — 90834 PSYTX W PT 45 MINUTES: CPT | Performed by: SOCIAL WORKER

## 2022-09-29 NOTE — TELEPHONE ENCOUNTER
[facility] called stated that the pt is seeing Psych and they would like her PCP to order Gene sight testing for her .  Please advise       Last Visit Date: 8/31/2022   Next Visit Date: 12/2/2022

## 2022-10-03 NOTE — TELEPHONE ENCOUNTER
Please advise that the office does do GeneSight cheek swab testing, the patient would need to be scheduled for Nurse Visit on a day that the PCP is in the office for this.

## 2022-10-03 NOTE — TELEPHONE ENCOUNTER
Call made to the patient to inform them of their PCP's instructions, writer left a voice message for the patient to contact the office back to further discuss the PCP's instructions.

## 2022-10-10 NOTE — PROGRESS NOTES
ADULT BEHAVIORAL HEALTH FOLLOW UP  CAMMIE Malave  Licensed Independent          Visit Date: 9/29/2022   Time of appointment: 11:16 AM    Time spent with Patient: 40 minutes. [facility] [address] [facility]   This was a tele-health visit conducted via interactive/real time audio and video. Reason for Consult:  Depression and Anxiety     PCP:  JUSTICE Martinez CNP      Pt provided informed consent for the behavioral health program. Discussed with patient model of service to include the limits of confidentiality (i.e. abuse reporting, suicide intervention, etc.) and short-term intervention focused approach. Pt indicated understanding. Pt provided verbal consent to engage in tele-health psychotherapy. This visit was held in a private space in patient's home. Lance Lu is a 52 y.o. female who presents for follow up of depression and anxiety. Maryse Stiles presented to session for follow-up to discuss transition of care. She reported she has now received her new insurance card for [employer] plan to begin looking for [facility] providers that will be in-network. Maryse Stiles shared it has continued to be difficult to cope with everything she has been going through. She is nervous about starting over with a therapist and hopes to find someone that she feels comfortable talking with. Maryse Stiles also discussed plan to work with PCP on medication management. Maryse Stiles shared some anxiety surrounding medication process. [facility] shared there is a Pegastech test available that helps with identifying possible medications based on genetics. [facility] informed Maryse Stiles that this is a test she would need to talk with insurance company and PCP regarding to see if it is covered. Previous Recommendations: Maryse Stiles was scheduled for follow-up visit to discuss transition to ongoing therapist. Maryse Stiles shared her insurance will soon be changing.  [facility] will assist with providing referrals. She can also locate in-network providers by [employer] company directly for referrals. MENTAL STATUS EXAM  Mood was depressed with anxious affect. Suicidal ideation was not reported. Homicidal ideation was not reported. Hygiene was  unable to assess . Dress was appropriate. Behavior was Within Normal Limits with No observation or self-report of difficulties ambulating. Attitude was Engageable. Eye-contact was good. Speech: rate - WNL, rhythm - WNL, volume - WNL. Verbalizations were coherent. Thought processes were intact and goal-oriented without evidence of delusions, hallucinations, obsessions, or allie; with little cognitive distortions. Associations were characterized by intact cognitive processes. Pt was oriented to person, place, time, and general circumstances;  recent:  good. Insight and judgment were estimated to be fair, AEB, a fair  understanding of cyclical maladaptive patterns, and the ability to use insight to inform behavior change. ASSESSMENT  Braulio Rowe presented to the appointment today for evaluation and treatment of symptoms of depression. She is currently deemed no risk to herself or others and meets criteria for Major Depressive Disorder and Anxiety. Due to Noelle's increase in depressive and anxious symptoms and changes in physical health, Lord Carter will likely most benefit from establishing care with an ongoing therapist whom she can see for regularly scheduled appointments. Lord Carter was in agreement with recommendations and plans to locate an in-network therapist.     [facility] Scores 8/31/2022 9/14/2021 1/9/2017   PHQ2 Score 6 6 1   PHQ9 Score 26 18 1     Interpretation of Total Score Depression Severity: 1-4 = Minimal depression, 5-9 = Mild depression, 10-14 = Moderate depression, 15-19 = Moderately severe depression, 20-27 = Severe depression    How often pt has had thoughts of death or hurting self (if PHQ positive for depression):        No flowsheet data found. Interpretation of FAISAL-7 score: 5-9 = mild anxiety, 10-14 = moderate anxiety, 15+ = severe anxiety. Recommend referral to behavioral health for scores 10 or greater. DIAGNOSIS  Praneeth Luis was seen today for depression and anxiety. Diagnoses and all orders for this visit:    Moderate episode of recurrent major depressive disorder ([employer] [facility] [address]    Anxiety      INTERVENTION  Collaborative treatment planning,Clarified role of [facility] [facility] in primary care,Recommended that pt establish with a mental health clinician with whom they can meet regularly for psychotherapy services and Reviewed options for identifying appropriate providers. Roselia Garcia was recommended to continue plan of care to establish with ongoing therapist. [employer] [facility] will send Praneeth Luis possible referrals and she can also contact customer service phone # on the back of her card to locate in network providers. INTERACTIVE COMPLEXITY  Is interactive complexity present?   No  Reason:  N/A  Additional Supporting Information:  N/A       Electronically signed by CAMMIE Wolf on 10/10/2022 at 11:58 AM

## 2022-10-25 ENCOUNTER — OFFICE VISIT (OUTPATIENT)
Dept: FAMILY MEDICINE CLINIC | Age: 47
End: 2022-10-25
Payer: MEDICARE

## 2022-10-25 ENCOUNTER — NURSE ONLY (OUTPATIENT)
Dept: PRIMARY CARE CLINIC | Age: 47
End: 2022-10-25

## 2022-10-25 ENCOUNTER — HOSPITAL ENCOUNTER (OUTPATIENT)
Age: 47
Setting detail: SPECIMEN
Discharge: HOME | End: 2022-10-25

## 2022-10-25 VITALS
OXYGEN SATURATION: 96 % | SYSTOLIC BLOOD PRESSURE: 134 MMHG | HEIGHT: 64 IN | BODY MASS INDEX: 49.51 KG/M2 | RESPIRATION RATE: 14 BRPM | DIASTOLIC BLOOD PRESSURE: 86 MMHG | TEMPERATURE: 97.4 F | WEIGHT: 290 LBS | HEART RATE: 96 BPM

## 2022-10-25 DIAGNOSIS — R09.82 POST-NASAL DRIP: ICD-10-CM

## 2022-10-25 DIAGNOSIS — R05.1 ACUTE COUGH: ICD-10-CM

## 2022-10-25 DIAGNOSIS — Z11.52 ENCOUNTER FOR SCREENING FOR COVID-19: ICD-10-CM

## 2022-10-25 DIAGNOSIS — J02.9 SORE THROAT: Primary | ICD-10-CM

## 2022-10-25 DIAGNOSIS — J06.9 VIRAL URI: ICD-10-CM

## 2022-10-25 LAB — S PYO AG THROAT QL: NORMAL

## 2022-10-25 PROCEDURE — 99213 OFFICE O/P EST LOW 20 MIN: CPT | Performed by: REGISTERED NURSE

## 2022-10-25 PROCEDURE — 1036F TOBACCO NON-USER: CPT | Performed by: REGISTERED NURSE

## 2022-10-25 PROCEDURE — G8428 CUR MEDS NOT DOCUMENT: HCPCS | Performed by: REGISTERED NURSE

## 2022-10-25 PROCEDURE — G8484 FLU IMMUNIZE NO ADMIN: HCPCS | Performed by: REGISTERED NURSE

## 2022-10-25 PROCEDURE — 87880 STREP A ASSAY W/OPTIC: CPT | Performed by: REGISTERED NURSE

## 2022-10-25 PROCEDURE — G8417 CALC BMI ABV UP PARAM F/U: HCPCS | Performed by: REGISTERED NURSE

## 2022-10-25 RX ORDER — BENZONATATE 100 MG/1
200 CAPSULE ORAL 3 TIMES DAILY PRN
Qty: 21 CAPSULE | Refills: 0 | Status: SHIPPED | OUTPATIENT
Start: 2022-10-25 | End: 2022-11-01

## 2022-10-25 RX ORDER — FLUTICASONE PROPIONATE 50 MCG
2 SPRAY, SUSPENSION (ML) NASAL DAILY
Qty: 16 G | Refills: 3 | Status: SHIPPED | OUTPATIENT
Start: 2022-10-25

## 2022-10-25 ASSESSMENT — ENCOUNTER SYMPTOMS
ABDOMINAL PAIN: 0
VISUAL CHANGE: 0
WHEEZING: 0
NAUSEA: 1
SHORTNESS OF BREATH: 1
HEMOPTYSIS: 0
SINUS PAIN: 0
EYES NEGATIVE: 1
SINUS PRESSURE: 0
COUGH: 1
DIARRHEA: 0
VOMITING: 1
CHANGE IN BOWEL HABIT: 0
SORE THROAT: 1

## 2022-10-25 NOTE — PATIENT INSTRUCTIONS
The COVID-19 test that was done today can take 1-6 days for results. Until then you should assume you have this disease and adhere to home isolation as described below. When we get the test results back, one of the following readings will be obtained. A positive test means you have the virus. 2.  An inconclusive test means it wasn't sure if you have the virus or not. An inconclusive test result is treated as a positive result and recommendations  are the same as a positive test result. We may ask you to repeat this test in this circumstance. 3.  A negative test means you probably do not have the virus, but it is not conclusive. If your results of the COVID-19 test is NEGATIVE -     The patient may stop isolation, in consultation with your health care provider, typically when, your healthcare provider has determined that the cause of the illness is NOT COVID-19 and approves your return to work. Please follow up with your physician for evaluation about this. 1/4/2022- the following website is the link to the CDC that discusses the new quarantine/isolation guidelines. https://www.hughes-richardson.net/. html#print    The point of quarantine guidelines is to minimize the spread of the COVID -19 virus to others, especially the high risk population. But, the new CDC guidelines published 1/4/22 does increase the risk of spreading the virus with the option of a shortened quarantine/isolation if leaving after the first 5 days. Please read the specific scenario that is applicable to you, listed below, to minimize the spread of this virus. If your results of the COVID-19 test is POSITIVE- you must isolate yourself from others. Isolation:    Isolation is used to separate people with confirmed or suspected COVID-19 from those without COVID-19. People who are in isolation should stay home until its safe for them to be around others.  At home, who are immunocompromised or at high risk for severe disease, and nursing homes and other high-risk settings, until after at least 10 days. -Do not go to places where you are unable to wear a mask, such as restaurants and some gyms, and avoid eating around others at home and at work until after 10 days   If you continue to have fever or your other symptoms have not improved after 5 days of isolation, you should wait to end your isolation until you are fever-free for 24 hours without the use of fever-reducing medication and your other symptoms have improved. Continue to wear a well-fitting mask. Contact your healthcare provider if you have questions. Do not travel during your 5-day isolation period. After you end isolation, avoid travel until a full 10 days after your first day of symptoms. If you must travel on days 6-10, wear a well-fitting mask when you are around others for the entire duration of travel. If you are unable to wear a mask, you should not travel during the 10 days. Do not go to places where you are unable to wear a mask, such as restaurants and some gyms, and avoid eating around others at home and at work until a full 10 days after your first day of symptoms. If an individual has access to a test and wants to test, the best approach is to use an antigen test (see comment below) towards the end of the 5-day isolation period. Collect the test sample only if you are fever-free for 24 hours without the use of fever-reducing medication and your other symptoms have improved (loss of taste and smell may persist for weeks or months after recovery and need not delay the end of isolation). If your test result is positive, you should continue to isolate until day 10. If your test result is negative,  you can end isolation, but continue to wear a well-fitting mask around others at home and in public until day 10. Follow additional recommendations for masking and restricting travel as described above.     Note that these recommendations on ending isolation do not apply to people with severe COVID-19 or with weakened immune systems (immunocompromised). See section below for recommendations for when to end isolation for these groups. Comment - Negative results should be treated as presumptive. Negative results do not rule out SARS-CoV-2 infection and should not be used as the sole basis for treatment or patient management decisions, including infection control decisions. To improve results, antigen tests should be used twice over a three-day period with at least 24 hours and no more than 48 hours between tests. Scenario 2:    Ending isolation for people who tested positive for COVID-19 but had no symptoms  If you test positive for COVID-19 and never develop symptoms, isolate for at least 5 days. Day 0 is the day of your positive viral test (based on the date you were tested) and day 1 is the first full day after the specimen was collected for your positive test. You can leave isolation after 5 full days. If you continue to have no symptoms, you can end isolation after at least 5 days. You should continue to wear a well-fitting mask around others at home and in public until day 10 (day 6 through day 10). If you are unable to wear a well-fitting mask when around others, you should continue to isolate for 10 days. Avoid people who are immunocompromised or at high risk for severe disease, and nursing homes and other high-risk settings, until after at least 10 days. -Do not go to places where you are unable to wear a mask, such as restaurants and some gyms, and avoid eating around others at home and at work until after 10 days   If you develop symptoms after testing positive, your 5-day isolation period should start over. Day 0 is your first day of symptoms. Follow the recommendations above for ending isolation for people who had COVID-19 and had symptoms. Do not travel during your 5-day isolation period.  After you end isolation, avoid travel until 10 days after the day of your positive test. If you must travel on days 6-10, wear a well-fitting mask when you are around others for the entire duration of travel. If you are unable to wear a mask, you should not travel during the 10 days after your positive test.  Do not go to places where you are unable to wear a mask, such as restaurants and some gyms, and avoid eating around others at home and at work until 10 days after the day of your positive test.  If an individual has access to a test and wants to test, the best approach is to use an antigen test (see comment below) towards the end of the 5-day isolation period. If your test result is positive, you should continue to isolate until day 10. If your test result is negative, you can end isolation, but continue to wear a well-fitting mask around others at home and in public until day 10. Follow additional recommendations for masking and restricting travel described above. Comment-Negative results should be treated as presumptive. Negative results do not rule out SARS-CoV-2 infection and should not be used as the sole basis for treatment or patient management decisions, including infection control decisions. To improve results, antigen tests should be used twice over a three-day period with at least 24 hours and no more than 48 hours between tests. Scenario 3:    Ending isolation for people who were severely ill with COVID-19 or have a weakened immune system (immunocompromised)  People who are severely ill with COVID-19 (including those who were hospitalized or required intensive care or ventilation support) and people with compromised immune systems might need to isolate at home longer. They may also require testing with a viral test to determine when they can be around others.  CDC recommends an isolation period of at least 10 and up to 20 days for people who were severely ill with COVID-19 and for people with weakened immune systems. Consult with your healthcare provider about when you can resume being around other people. People who are immunocompromised should talk to their healthcare provider about the potential for reduced immune responses to COVID-19 vaccines and the need to continue to follow? current prevention measures? (including wearing a well-fitting mask, staying 6 feet apart from others they dont live with, and avoiding crowds and poorly ventilated indoor spaces) to protect themselves against COVID-19 until advised otherwise by their healthcare provider. Close contacts of immunocompromised people - including household members - should also be encouraged to receive all recommended COVID-19 vaccine doses to help protect these people. COVID-19 EXPOSURE    Close Contact Definition:    Someone who was less than 6 feet away from an infected person (laboratory-confirmed or a clinical diagnosis) for a cumulative total of 15 minutes or more over a 24-hour period (for example, three individual 5-minute exposures for a total of 15 minutes). Scenario 4:    Who does NOT need to quarantine after COVID-19 exposure? 1. You are age 25 or older and have received all recommended vaccine doses, including boosters and additional primary shots for some immunocompromised people. 2. You are ages 5-17 years and completed the primary series of COVID-19 vaccines. 3. You had confirmed COVID-19 within the last 90 days (you tested positive using a viral test). You should wear a well-fitting mask around others for 10 days from the date of your last close contact with someone with COVID-19 (the date of last close contact is considered day 0). Get tested at least 5 days after you last had close contact with someone with COVID-19. If you test positive or develop COVID-19 symptoms, isolate from other people and follow recommendations in the Isolation section below.  If you tested positive for COVID-19 with a viral test within the previous 90 days and subsequently recovered and remain without COVID-19 symptoms, you do not need to quarantine or get tested after close contact. You should wear a well-fitting mask around others for 10 days from the date of your last close contact with someone with COVID-19 (the date of last close contact is considered day 0). -Avoid people who are immunocompromised or at high risk for severe disease, and nursing homes and other high-risk settings, until after at least 10 days. Scenario 5:    Who should quarantine after COVID-19 exposure? If you come into close contact with someone with COVID-19, you should quarantine if you are in one of the following groups: 1. You are ages 25 or older and completed the primary series of recommended vaccine, but have not received a recommended booster shot when eligible. 2.You received the single-dose [employer] vaccine (completing the primary series) over 2 months ago and have not received a recommended booster shot. 3.You are not vaccinated or have not completed a primary vaccine series.    -Stay home and away from other people for at least 5 days (day 0 through day 5) after your last contact with a person who has COVID-19. The date of your exposure is considered day 0. Wear a well-fitting mask when around others at home, if possible.  -For 10 days after your last close contact with someone with COVID-19, watch for fever (100. 4? F or greater), cough, shortness of breath, or other COVID-19 symptoms . -If you develop symptoms, get tested immediately and isolate until you receive your test results.  If you test positive, follow isolation recommendations.  -If you do not develop symptoms, get tested at least 5 days after you last had close contact with someone with COVID-19.  -If you test negative, you can leave your home, but continue to wear a well-fitting mask when around others at home and in public until 10 days after your last close contact ith   someone with COVID-19.  -If you test positive, you should isolate for at least 5 days from the date of your positive test (if you do not have symptoms). If you do develop COVID-19 symptoms, isolate for at least 5 days from the date your symptoms began (the date the symptoms started is day 0). Follow recommendations in the isolation section below.  -If you are unable to get a test 5 days after last close contact with someone with COVID-19, you can leave your home after day 5 if you have been without COVID-19 symptoms throughout the 5-day period. Wear a well-fitting mask for 10 days after your date of last close contact when around others at home and in public.  -Avoid people who are immunocompromised or at high risk for severe disease, and nursing homes and other high-risk settings, until after at least 10 days. -If possible, stay away from people you live with, especially people who are at higher risk for getting very sick from COVID-19, as well as others outside your home throughout the full 10 days after your last close contact with someone with COVID-19.  -If you are unable to quarantine, you should wear a well-fitting mask for 10 days when around others at home and in public.  -If you are unable to wear a mask when around others, you should continue to quarantine for 10 days. Avoid people who are immunocompromised or at high risk for severe disease, and nursing homes and other high-risk settings, until after at least 10 days.  -Do not travel during your 5-day quarantine period. Get tested at least 5 days after your last close contact and make sure your test result is negative and you remain without symptoms before traveling. If you dont get tested, delay travel until 10 days after your last close contact with a person with COVID-19. If you must travel before the 10 days are completed, wear a well-fitting mask when you are around others for the entire duration of travel during the 10 days.  If you are unable to wear a mask, you should not travel during the 10 days.  -Do not go to places where you are unable to wear a mask, such as restaurants and some gyms, and avoid eating around others at home and at work until after 10 days after your last close contact with someone with COVID-19. Prevention steps for People with positive or inconclusive test results or suspected  COVID-19 (including persons under investigation) who do not need to be hospitalized  and   People with confirmed COVID-19 who were hospitalized and determined to be medically stable to go home      Your healthcare provider and public health staff will evaluate whether you can be cared for at home. If it is determined that you do not need to be hospitalized and can be isolated at home, you will be monitored by staff from your health department. You should follow the prevention steps below until a healthcare provider or local or [facility] health department says you can return to your normal activities. Stay home except to get medical care  People who are mildly ill with COVID-19 are able to isolate at home during their illness. You should restrict activities outside your home, except for getting medical care. Do not go to work, school, or public areas. Avoid using public transportation, ride-sharing, or taxis. Separate yourself from other people and animals in your home  People: As much as possible, you should stay in a specific room (sick room) and away from other people in your home. Also, you should use a separate bathroom, if available. Animals: You should restrict contact with pets and other animals while you are sick with COVID-19, just like you would around other people. When possible, have another member of your household care for your animals while you are sick. If you are sick with COVID-19, avoid contact with your pet, including petting, snuggling, being kissed or licked, and sharing food.  If you must care for your pet or be around emergency medical services arrive. Kuldeep Rubalcava- keeps someone who might have been exposed to the virus away from others  Isolation - keeps someone who is infected with the virus away from others, even in their homes    Example 1    You have close contact with an individual who has COVID-19. Your patient will not have further contact. Plan - Your patient is quarantined from the last day of contact for 5-10 days    Example 2   You live with someone who has COVID-19 but can avoid further contact. Plan - Your patient is quarantined for 5-10 days starting when the person with COVID-19 begins home isolation. Example 3    You are under quarantine for COVID-19 exposure, and have additional close contact with someone else who has COVID-19. Plan - Your patient must restart quarantine from the last COVID-19 exposure. Example 4   You live with someone who has COVID-19 and cannot avoid close contact from them. Plan - Your patient is quarantined while the other person is isolating and for 5-10 days after covid-19 person meets the criteria to end home isolation. Treatments are under investigation for outpatients and can be considered for patients with mild to moderate COVID-19 who are not on supplemental oxygen and are not hospitalized but who are at [address] [place] [address] for clinical progression have had onset of symptoms within the past 3-10 days. HIGH RISK is defined as patients who meet at least one of the following criteria:    Have a body mass index (BMI) ? 35    Have chronic kidney disease    Have diabetes    Have immunosuppressive disease    Are currently receiving immunosuppressive treatment    Are ?72years of age    Are ?54years of age AND have  *cardiovascular disease, OR  *hypertension, OR  *chronic obstructive pulmonary disease/other chronic respiratory disease. Are 15- 16years of age AND have  *BMI ? 85th percentile for their age and gender based on CDC growth charts, [email or web address] , OR  *sickle cell disease, OR  *congenital or acquired heart disease, OR  *neurodevelopmental disorders, for example, cerebral palsy, OR  *a medical-related technological dependence, for example, tracheostomy, gastrostomy, or positive pressure ventilation (not related to   COVID-19), OR  *asthma, reactive airway or other chronic respiratory disease that requires daily medication for control. Other risk factors: Moderate/severe dementia  Cancer being treated with palliative care  Cirrhosis  Pregnancy  Breast feeding  Weight less than 40Kg (88lbs)        WELL FITTING MASK      Cloth Mask    Cloth Masks can be made from a variety of fabrics and many types of cloth masks are available. Wear cloth masks with  A proper fit over your nose and mouth to prevent leaks  Multiple layers of tightly woven, breathable fabric  Nose wire  Fabric that blocks light when held up to bright light source    Do NOT wear cloth masks with  Gaps around the sides of the face or nose  Exhalation valves, vents, or other openings (see example)  Single-layer fabric or those made of thin fabric that dont block light      Disposable Masks    Disposable face masks are widely available. They are sometimes referred to as surgical masks or medical procedure masks. Wear disposable masks with  A proper fit over your nose and mouth to prevent leaks  Multiple layers of non-woven material  Nose wire    Do NOT wear disposable masks with  Gaps around the sides of the face or nose (see example)  Wet or dirty material    Ways to have better fit and extra protection with cloth and disposable masks  Wear two masks (disposable mask underneath AND cloth mask on top)  Combine either a cloth mask or disposable mask with a fitter or brace  Knot and tuck ear loops of a 3-ply mask where they join the edge of the mask  For disposable masks, fold and tuck the unneeded material under the edges.  (For instructions, see the following https://youtu. be/GzTAZDsNBe0)  Use masks that attach behind the neck and head with either elastic bands or ties (instead of ear loops)

## 2022-10-25 NOTE — PROGRESS NOTES
The patient was seen in the office with the nurse schedule for [facility] Testing. Writer verified this with the patient after taking her in to an exam room.

## 2022-10-26 ENCOUNTER — TELEMEDICINE (OUTPATIENT)
Dept: BEHAVIORAL/MENTAL HEALTH CLINIC | Age: 47
End: 2022-10-26
Payer: MEDICARE

## 2022-10-26 DIAGNOSIS — Z11.52 ENCOUNTER FOR SCREENING FOR COVID-19: ICD-10-CM

## 2022-10-26 DIAGNOSIS — F41.9 ANXIETY: ICD-10-CM

## 2022-10-26 DIAGNOSIS — F33.1 MODERATE EPISODE OF RECURRENT MAJOR DEPRESSIVE DISORDER (HCC): Primary | ICD-10-CM

## 2022-10-26 LAB
SARS-COV-2: NORMAL
SARS-COV-2: NOT DETECTED
SOURCE: NORMAL

## 2022-10-26 PROCEDURE — 90834 PSYTX W PT 45 MINUTES: CPT | Performed by: SOCIAL WORKER

## 2022-10-26 PROCEDURE — 1036F TOBACCO NON-USER: CPT | Performed by: SOCIAL WORKER

## 2022-10-26 NOTE — PROGRESS NOTES
ADULT BEHAVIORAL HEALTH FOLLOW UP  CAMMIE Mccracken  Licensed Independent          Visit Date: 10/26/2022   Time of appointment: 11:10 AM      Time spent with Patient: 40 minutes. Patient Location: [facility]/Clinician Location: [address] [place] [address]   This was a tele-health visit conducted via interactive/real time audio and video. Reason for Consult:  Depression and Anxiety     PCP:  JUSTICE John CNP      Pt provided informed consent for the behavioral health program. Discussed with patient model of service to include the limits of confidentiality (i.e. abuse reporting, suicide intervention, etc.) and short-term intervention focused approach. Pt indicated understanding. Pt provided verbal consent to engage in tele-health psychotherapy, visit was completed using My Chart and patient was alone at time of visit, as the visit was held in a private space in patient's home ([place], [place]). Eduard Bosworth is a 52 y.o. female who presents for follow up of depression and anxiety. Kenisha Farrell presented to visit with increased anxious symptoms and continuation of depression. She has recently applied to social security for benefits to gain financial benefit due to not working. She reported calling [employer] to get assistance with this, but was told they no longer work with them. She then spoke with someone from [place] which is the [employer] advocate but they told her they would charge several grand. She shared this has been so difficult as it is her son's senior year and she has had to say no to things that make her feel sad. She has a lot of worry about saying or doing the right thing and getting through the social security process successfully. Kenisha Farrell shared in July 2020 she stopped working and has been running out of savings because of this.  She elaborated on the emotional challenges that have come with facing her health concerns alone, as she hid from her children that she was going through various conditions due to not wanting to worry them. She shared this led to a year of sleepless nights and anxiety leading up until she had surgery. She has been experiencing hopelessness, constant worry, guilt related to feelings of letting her children down, and anger/irritability. Kellie Riggs has been having a difficult time navigating all of this alone as she shared the more overwhelmed she gets, the less she gets done. Because of this, we discussed [facility] referral for assistance and Kellie Riggs was in agreement and gave verbal permission for [facility] to place referral.     Previous Recommendations: Kellie Riggs was recommended to continue plan of care to establish with ongoing therapist. [facility] will send Kellie Riggs possible referrals and she can also contact customer service phone # on the back of her card to locate in network providers. MENTAL STATUS EXAM  Mood was depressed and sad with anxious affect. Suicidal ideation was not reported. Homicidal ideation was not reported. Hygiene was good . Dress was appropriate. Behavior was Within Normal Limits with self-report of difficulties ambulating. Attitude was Engageable. Eye-contact was good. Speech: rate - WNL, rhythm - WNL, volume - WNL. Verbalizations were goal directed. Thought processes were intact and goal-oriented without evidence of delusions, hallucinations, obsessions, or allie; with moderate cognitive distortions. Associations were characterized by intact cognitive processes. Pt was oriented to person, place, time, and general circumstances;  recent:  good. Insight and judgment were estimated to be good, AEB, a fair  understanding of cyclical maladaptive patterns, and the ability to use insight to inform behavior change. ASSESSMENT  Tan Kebede presented to the appointment today for evaluation and treatment of symptoms of depression and anxiety.  She is currently deemed no risk to herself or others and meets criteria for Major Depressive Disorder and Anxiety. Theodore Smith was in agreement with recommendations to continue psychotherapy with [facility] at this time and referral to [employer] worked for assistance. Plan is for Theodore Smith to establish with ongoing clinician whom she can meet with for regularly scheduled visits due to [facility] program being short-term and solution-focused, as Theodore Smith would benefit from having ongoing support as she navigates changes in her health physically and emotionally. PHQ Scores 8/31/2022 9/14/2021 1/9/2017   PHQ2 Score 6 6 1   PHQ9 Score 26 18 1     Interpretation of Total Score Depression Severity: 1-4 = Minimal depression, 5-9 = Mild depression, 10-14 = Moderate depression, 15-19 = Moderately severe depression, 20-27 = Severe depression    How often pt has had thoughts of death or hurting self (if PHQ positive for depression):       No flowsheet data found. Interpretation of FAISAL-7 score: 5-9 = mild anxiety, 10-14 = moderate anxiety, 15+ = severe anxiety. Recommend referral to behavioral health for scores 10 or greater. DIAGNOSIS  Theodore Smith was seen today for depression and anxiety. Diagnoses and all orders for this visit:    Moderate episode of recurrent major depressive disorder ([facility] [address]    Anxiety      INTERVENTION  Discussed various factors related to the development and maintenance of  depression and anxiety (including biological, cognitive, behavioral, and environmental factors), Trained in strategies for increasing balanced thinking, Supportive techniques, CBT to target cognitive distortions & negative automatic thoughts, Identified maladaptive thoughts, and Problem-solving re: link to [employer] worker for support to help with social security process and link to any alternative financial/community resources . Reynold Askew was scheduled for follow-up in 1 week to discuss progress, sent referral to [facility] staff for additional support and guidance.  Theodore Smith is continuing care with [facility] until she can establish with ongoing counselor. INTERACTIVE COMPLEXITY  Is interactive complexity present?   No  Reason:  N/A  Additional Supporting Information:  N/A       Electronically signed by Noelle Mcknight on 11/8/2022 at 12:26 PM

## 2022-10-29 ENCOUNTER — APPOINTMENT (OUTPATIENT)
Dept: GENERAL RADIOLOGY | Age: 47
End: 2022-10-29
Payer: MEDICARE

## 2022-10-29 ENCOUNTER — HOSPITAL ENCOUNTER (EMERGENCY)
Age: 47
Discharge: HOME | End: 2022-10-30
Attending: EMERGENCY MEDICINE
Payer: MEDICARE

## 2022-10-29 DIAGNOSIS — J06.9 VIRAL URI WITH COUGH: Primary | ICD-10-CM

## 2022-10-29 LAB
ABSOLUTE EOS #: 0.1 K/UL (ref 0–0.4)
ABSOLUTE LYMPH #: 2 K/UL (ref 1–4.8)
ABSOLUTE MONO #: 0.5 K/UL (ref 0.1–1.2)
ALBUMIN SERPL-MCNC: 4.3 G/DL (ref 3.5–5.2)
ALBUMIN/GLOBULIN RATIO: 1.2 (ref 1–2.5)
ALP BLD-CCNC: 98 U/L (ref 35–104)
ALT SERPL-CCNC: 48 U/L (ref 5–33)
ANION GAP SERPL CALCULATED.3IONS-SCNC: 12 MMOL/L (ref 9–17)
AST SERPL-CCNC: 27 U/L
BASOPHILS # BLD: 1 % (ref 0–2)
BASOPHILS ABSOLUTE: 0.1 K/UL (ref 0–0.2)
BILIRUB SERPL-MCNC: 0.2 MG/DL (ref 0.3–1.2)
BUN BLDV-MCNC: 10 MG/DL (ref 6–20)
CALCIUM SERPL-MCNC: 9.7 MG/DL (ref 8.6–10.4)
CHLORIDE BLD-SCNC: 104 MMOL/L (ref 98–107)
CO2: 25 MMOL/L (ref 20–31)
CREAT SERPL-MCNC: 0.81 MG/DL (ref 0.5–0.9)
D-DIMER QUANTITATIVE: 0.56 MG/L FEU
EOSINOPHILS RELATIVE PERCENT: 2 % (ref 1–4)
FLU A ANTIGEN: NEGATIVE
FLU B ANTIGEN: NEGATIVE
GFR SERPL CREATININE-BSD FRML MDRD: >60 ML/MIN/1.73M2
GLUCOSE BLD-MCNC: 179 MG/DL (ref 70–99)
HCT VFR BLD CALC: 44.8 % (ref 36–46)
HEMOGLOBIN: 14.8 G/DL (ref 12–16)
LYMPHOCYTES # BLD: 26 % (ref 24–44)
MCH RBC QN AUTO: 28.7 PG (ref 26–34)
MCHC RBC AUTO-ENTMCNC: 33 G/DL (ref 31–37)
MCV RBC AUTO: 86.9 FL (ref 80–100)
MONOCYTES # BLD: 6 % (ref 2–11)
MONONUCLEOSIS SCREEN: NEGATIVE
PDW BLD-RTO: 14.5 % (ref 12.5–15.4)
PLATELET # BLD: 178 K/UL (ref 140–450)
PMV BLD AUTO: 10.1 FL (ref 6–12)
POTASSIUM SERPL-SCNC: 4 MMOL/L (ref 3.7–5.3)
RBC # BLD: 5.16 M/UL (ref 4–5.2)
SEG NEUTROPHILS: 65 % (ref 36–66)
SEGMENTED NEUTROPHILS ABSOLUTE COUNT: 5.1 K/UL (ref 1.8–7.7)
SODIUM BLD-SCNC: 141 MMOL/L (ref 135–144)
TOTAL PROTEIN: 8 G/DL (ref 6.4–8.3)
TROPONIN, HIGH SENSITIVITY: 7 NG/L (ref 0–14)
WBC # BLD: 7.8 K/UL (ref 3.5–11)

## 2022-10-29 PROCEDURE — 93005 ELECTROCARDIOGRAM TRACING: CPT | Performed by: EMERGENCY MEDICINE

## 2022-10-29 PROCEDURE — 86308 HETEROPHILE ANTIBODY SCREEN: CPT

## 2022-10-29 PROCEDURE — 99285 EMERGENCY DEPT VISIT HI MDM: CPT

## 2022-10-29 PROCEDURE — 85025 COMPLETE CBC W/AUTO DIFF WBC: CPT

## 2022-10-29 PROCEDURE — 80053 COMPREHEN METABOLIC PANEL: CPT

## 2022-10-29 PROCEDURE — 71045 X-RAY EXAM CHEST 1 VIEW: CPT

## 2022-10-29 PROCEDURE — 87804 INFLUENZA ASSAY W/OPTIC: CPT

## 2022-10-29 PROCEDURE — 2580000003 HC RX 258: Performed by: EMERGENCY MEDICINE

## 2022-10-29 PROCEDURE — 84484 ASSAY OF TROPONIN QUANT: CPT

## 2022-10-29 PROCEDURE — 6360000002 HC RX W HCPCS: Performed by: EMERGENCY MEDICINE

## 2022-10-29 PROCEDURE — 85379 FIBRIN DEGRADATION QUANT: CPT

## 2022-10-29 PROCEDURE — 36415 COLL VENOUS BLD VENIPUNCTURE: CPT

## 2022-10-29 RX ORDER — 0.9 % SODIUM CHLORIDE 0.9 %
1000 INTRAVENOUS SOLUTION INTRAVENOUS ONCE
Status: COMPLETED | OUTPATIENT
Start: 2022-10-29 | End: 2022-10-30

## 2022-10-29 RX ORDER — ONDANSETRON 2 MG/ML
4 INJECTION INTRAMUSCULAR; INTRAVENOUS ONCE
Status: COMPLETED | OUTPATIENT
Start: 2022-10-29 | End: 2022-10-29

## 2022-10-29 RX ADMIN — ONDANSETRON 4 MG: 2 INJECTION INTRAMUSCULAR; INTRAVENOUS at 23:16

## 2022-10-29 RX ADMIN — SODIUM CHLORIDE 1000 ML: 9 INJECTION, SOLUTION INTRAVENOUS at 23:15

## 2022-10-29 SDOH — ECONOMIC STABILITY: FOOD INSECURITY: WITHIN THE PAST 12 MONTHS, THE FOOD YOU BOUGHT JUST DIDN'T LAST AND YOU DIDN'T HAVE MONEY TO GET MORE.: NEVER TRUE

## 2022-10-29 ASSESSMENT — PAIN - FUNCTIONAL ASSESSMENT
PAIN_FUNCTIONAL_ASSESSMENT: 0-10
PAIN_FUNCTIONAL_ASSESSMENT: 0-10

## 2022-10-29 ASSESSMENT — PAIN DESCRIPTION - PAIN TYPE
TYPE: ACUTE PAIN
TYPE: ACUTE PAIN

## 2022-10-29 ASSESSMENT — PAIN DESCRIPTION - FREQUENCY
FREQUENCY: INTERMITTENT
FREQUENCY: INTERMITTENT

## 2022-10-29 ASSESSMENT — PAIN SCALES - GENERAL
PAINLEVEL_OUTOF10: 5
PAINLEVEL_OUTOF10: 5

## 2022-10-29 ASSESSMENT — PAIN DESCRIPTION - LOCATION
LOCATION: CHEST
LOCATION: CHEST

## 2022-10-29 ASSESSMENT — PAIN DESCRIPTION - DESCRIPTORS: DESCRIPTORS: PRESSURE

## 2022-10-30 VITALS
DIASTOLIC BLOOD PRESSURE: 82 MMHG | HEART RATE: 86 BPM | OXYGEN SATURATION: 95 % | WEIGHT: 290 LBS | BODY MASS INDEX: 49.51 KG/M2 | SYSTOLIC BLOOD PRESSURE: 134 MMHG | HEIGHT: 64 IN | RESPIRATION RATE: 17 BRPM | TEMPERATURE: 98.7 F

## 2022-10-30 LAB — TROPONIN, HIGH SENSITIVITY: <6 NG/L (ref 0–14)

## 2022-10-30 PROCEDURE — 84484 ASSAY OF TROPONIN QUANT: CPT

## 2022-10-30 PROCEDURE — 6370000000 HC RX 637 (ALT 250 FOR IP): Performed by: EMERGENCY MEDICINE

## 2022-10-30 PROCEDURE — 94640 AIRWAY INHALATION TREATMENT: CPT

## 2022-10-30 RX ORDER — ALBUTEROL SULFATE 90 UG/1
2 AEROSOL, METERED RESPIRATORY (INHALATION) ONCE
Status: COMPLETED | OUTPATIENT
Start: 2022-10-30 | End: 2022-10-30

## 2022-10-30 RX ADMIN — ALBUTEROL SULFATE 2 PUFF: 90 AEROSOL, METERED RESPIRATORY (INHALATION) at 00:29

## 2022-10-30 NOTE — ED PROVIDER NOTES
[address] Emergency Department  [id number] [facility] [address] [facility] [address]  Phone: [phone]  Fax: [phone]      Pt Name: Sada Howell  URU:[id number]  [place] 1975  Date of evaluation: 10/29/2022      CHIEF COMPLAINT       Chief Complaint   Patient presents with    Fever    Pharyngitis    Shortness of Breath     Tested neg for COVID and strept on Tuesday        HISTORY OF PRESENT ILLNESS   Sada Howell is a 52 y.o. female with history of scleroderma, anxiety, endometriosis, May Thurner syndrome, anemia with multiple drug transfusions, and depression who presents for evaluation of persistent URI symptoms. The patient reports that starting on 10/23/2022 she developed gradual onset, constant, progressive, sore throat, upper respiratory congestion, productive cough with clear, malaise, subjective fever and fatigue. She was seen by her PCP for her symptoms on 10/25/2022 and tested negative for strep and COVID. The patient was prescribed Tessalon Perles and Flonase. She states that she has been using these medications without improvement. She has also tried DayQuil and Tylenol without improvement. She states that she is unable to take NSAIDs secondary to gastric bypass surgery. The patient reports that her nephew is sick with the same symptoms and has been sick for months. The patient states that over the past 2 hours she has developed worsening shortness of breath, intermittent dizziness, nausea, an episode of posttussive emesis, and chest congestion that feels like chest pressure. She has not taken any medications for her symptoms in the past few hours. She feels like her shortness of breath is worse with exertion. She reports that she has history of cholecystectomy,  x3, hysterectomy, and gastric bypass surgery. The patient denies any personal or family history of CAD or blood clots.   The patient states that she has been checked for blood clots multiple times and has always been negative. She did drive 2 hours each way to a wedding in [facility] last week. The patient is vaccinated against COVID x2. She has not yet received her flu shot. She states that she has been slightly off balance since she lost her glasses a few days ago but has new glasses being mailed her. The patient has history of chronic anemia and has received multiple blood transfusions with last transfusion in 2022. She denies neck pain, changes to her chronic back pain, abdominal pain, focal weakness, numbness, tingling, syncope, recent injury or falls. REVIEW OF SYSTEMS     Ten point review of systems was reviewed and is negative unless otherwise noted in the HPI    [address]    has a past medical history of Anemia, Anxiety, Carpal tunnel syndrome, Depression, Endometriosis, Gestational diabetes, Headache, History of blood transfusion, Iron deficiency anemia, Kidney stones, May-Thurner syndrome, Obesity, Scleroderma ([facility] [address] Snores, Under care of team, Under care of team, Under care of team, and Wears glasses. SURGICAL HISTORY      has a past surgical history that includes Gastric bypass surgery ();  section (x 3); toenail excision; other surgical history; Cholecystectomy; pelvic laparoscopy; vascular surgery (); Dilation and curettage of uterus (N/A, 2021); Dilation and curettage of uterus (N/A, 2021); Endometrial ablation (2021); Total abdominal hysterectomy w/ bilateral salpingoophorectomy (10/26/2021); and Hysterectomy (N/A, 10/26/2021).     CURRENT MEDICATIONS       Discharge Medication List as of 10/30/2022  1:16 AM        CONTINUE these medications which have NOT CHANGED    Details   benzonatate (TESSALON PERLES) 100 MG capsule Take 2 capsules by mouth 3 times daily as needed for Cough, Disp-21 capsule, R-0Normal      !! fluticasone (FLONASE) 50 MCG/ACT nasal spray 2 sprays by Nasal route daily, Disp-16 g, R-3Normal      !! fluticasone (FLONASE) 50 MCG/ACT nasal spray 2 sprays by Nasal route daily, Disp-16 g, R-3Normal      losartan (COZAAR) 50 MG tablet Take 1 tablet by mouth daily, Disp-90 tablet, R-3Normal      Oyster Shell 500 MG TABS Take 1 tablet by mouth 3 times daily, Disp-270 tablet, R-3Normal      Multiple Vitamins-Minerals (THERAPEUTIC MULTIVITAMIN-MINERALS) tablet Take 1 tablet by mouth daily, Disp-90 tablet, R-3Normal      vitamin B-12 (CYANOCOBALAMIN) 1000 MCG tablet Take 1,000 mcg by mouth dailyHistorical Med      zinc 50 MG CAPS Take 50 mg by mouth daily, Disp-90 capsule, R-1Normal      busPIRone (BUSPAR) 10 MG tablet TAKE 1 TABLET BY MOUTH THREE TIMES A DAY AS NEEDED FOR ANXIETY, Disp-90 tablet, R-5Normal      vitamin D3 (CHOLECALCIFEROL) 25 MCG (1000 UT) TABS tablet Take 1 tablet by mouth daily, Disp-90 tablet, R-1Normal      ondansetron (ZOFRAN ODT) 4 MG disintegrating tablet Take 1 tablet by mouth every 8 hours as needed for Nausea, Disp-10 tablet, R-0Normal      Nutritional Supplements (ESTROVEN PO) Take by mouth dailyHistorical Med      acetaminophen (AMINOFEN) 325 MG tablet Take 2 tablets by mouth every 6 hours as needed for Pain, Disp-120 tablet, R-3Print      Elastic Bandages & Supports (JOBST ULTRASHEER 20-30MMHG LG) MISC Disp-3 each, R-3, PrintWear daily, ok to remove in the evenings      Multiple Vitamins-Minerals (MULTI-JULIO CÉSAR PO) Take by mouth dailyHistorical Med      calcium carbonate (OYSTER SHELL CALCIUM 500 MG) 1250 (500 Ca) MG tablet Take 1 tablet by mouth dailyHistorical Med       !! - Potential duplicate medications found. Please discuss with provider. ALLERGIES     is allergic to contrast [iodides], trileptal [oxcarbazepine], and morphine. FAMILY HISTORY     She indicated that her mother is . She indicated that her father is . She indicated that both of her sisters are alive. She indicated that her brother is alive.      family history includes Colon Cancer in her mother; Diabetes in her brother, sister, and sister; Early Death in her father; High Blood Pressure in her brother, sister, and sister. SOCIAL HISTORY      reports that she has never smoked. She has never used smokeless tobacco. She reports that she does not drink alcohol and does not use drugs. PHYSICAL EXAM     INITIAL VITALS:  height is 5' 4\" (1.626 m) and weight is 131.5 kg (290 lb). Her oral temperature is 98.7 °F (37.1 °C). Her blood pressure is 134/82 and her pulse is 86. Her respiration is 17 and oxygen saturation is 95%. CONSTITUTIONAL: no apparent distress, well appearing  SKIN: warm, dry, no jaundice, hives or petechiae  EYES: clear conjunctiva, non-icteric sclera, pupils 3 mm, equal, round reactive to light, extraocular movements intact  HENT: normocephalic, atraumatic, moist mucus membranes  NECK: Nontender and supple with no nuchal rigidity, full range of motion  PULMONARY: clear to auscultation without wheezes, rhonchi, or rales, normal excursion, no accessory muscle use and no stridor  CARDIOVASCULAR: regular rate, rhythm. Strong radial pulses with intact distal perfusion. Capillary refill <2 seconds. GASTROINTESTINAL: soft, non-tender, non-distended, no palpable masses, no rebound or guarding   GENITOURINARY: No costovertebral angle tenderness to palpation  MUSCULOSKELETAL: No midline spinal tenderness, step off or deformity. Extremities are otherwise nontender to palpation and nonerythematous. Compartments soft. No peripheral edema. NEUROLOGIC: alert and oriented x 3, GCS 15, normal mentation and speech. Moves all extremities x 4 without motor or sensory deficit, gait is stable without ataxia. Cranial nerves II through XII intact. No cerebellar signs. No pronator drift. Normal finger-to-nose. Normal heel-to-shin.   PSYCHIATRIC: normal mood and affect, thought process is clear and linear    DIAGNOSTIC RESULTS     EKG:  EKG 2248 sinus rhythm, rate 80 bpm, leftward axis, normal intervals, no ST elevation or depression, no T wave inversions, good R wave progression, Q wave in aVR, no change of EKG on 5/12/2021    RADIOLOGY:   XR CHEST PORTABLE    Result Date: 10/29/2022  EXAMINATION: ONE XRAY VIEW OF THE CHEST 10/29/2022 11:29 pm COMPARISON: April 8, 2022 HISTORY: ORDERING SYSTEM PROVIDED HISTORY: chest pain, shortness of breath TECHNOLOGIST PROVIDED HISTORY: chest pain, shortness of breath Reason for Exam: sob FINDINGS: No infiltrate or consolidation or effusion is identified. The heart size is normal.     No acute abnormality visualized.         LABS:  Results for orders placed or performed during the hospital encounter of 10/29/22   Rapid Influenza A/B Antigens    Specimen: Nasopharyngeal   Result Value Ref Range    Flu A Antigen NEGATIVE NEGATIVE    Flu B Antigen NEGATIVE NEGATIVE   CBC with Auto Differential   Result Value Ref Range    WBC 7.8 3.5 - 11.0 k/uL    RBC 5.16 4.0 - 5.2 m/uL    Hemoglobin 14.8 12.0 - 16.0 g/dL    Hematocrit 44.8 36 - 46 %    MCV 86.9 80 - 100 fL    MCH 28.7 26 - 34 pg    MCHC 33.0 31 - 37 g/dL    RDW 14.5 12.5 - 15.4 %    Platelets 188 820 - 081 k/uL    MPV 10.1 6.0 - 12.0 fL    Seg Neutrophils 65 36 - 66 %    Lymphocytes 26 24 - 44 %    Monocytes 6 2 - 11 %    Eosinophils % 2 1 - 4 %    Basophils 1 0 - 2 %    Segs Absolute 5.10 1.8 - 7.7 k/uL    Absolute Lymph # 2.00 1.0 - 4.8 k/uL    Absolute Mono # 0.50 0.1 - 1.2 k/uL    Absolute Eos # 0.10 0.0 - 0.4 k/uL    Basophils Absolute 0.10 0.0 - 0.2 k/uL   CMP   Result Value Ref Range    Glucose 179 (H) 70 - 99 mg/dL    BUN 10 6 - 20 mg/dL    Creatinine 0.81 0.50 - 0.90 mg/dL    Est, Glom Filt Rate >60 >60 mL/min/1.73m2    Calcium 9.7 8.6 - 10.4 mg/dL    Sodium 141 135 - 144 mmol/L    Potassium 4.0 3.7 - 5.3 mmol/L    Chloride 104 98 - 107 mmol/L    CO2 25 20 - 31 mmol/L    Anion Gap 12 9 - 17 mmol/L    Alkaline Phosphatase 98 35 - 104 U/L    ALT 48 (H) 5 - 33 U/L    AST 27 <32 U/L    Total Bilirubin 0.2 (L) 0.3 - 1.2 mg/dL    Total Protein 8.0 6.4 - 8.3 g/dL    Albumin 4.3 3.5 - 5.2 g/dL    Albumin/Globulin Ratio 1.2 1.0 - 2.5   Troponin   Result Value Ref Range    Troponin, High Sensitivity 7 0 - 14 ng/L   D-Dimer, Quantitative   Result Value Ref Range    D-Dimer, Quant 0.56 mg/L FEU   Mononucleosis Screen   Result Value Ref Range    Mononucleosis Screen NEGATIVE NEGATIVE   Troponin   Result Value Ref Range    Troponin, High Sensitivity <6 0 - 14 ng/L       EMERGENCY DEPARTMENT COURSE:        The patient was given the following medications:  Orders Placed This Encounter   Medications    0.9 % sodium chloride bolus    ondansetron (ZOFRAN) injection 4 mg    albuterol sulfate HFA (PROVENTIL;VENTOLIN;PROAIR) 108 (90 Base) MCG/ACT inhaler 2 puff     Order Specific Question:   Initiate RT Bronchodilator Protocol     Answer:   No        Vitals:    Vitals:    10/30/22 0058 10/30/22 0059 10/30/22 0113 10/30/22 0128   BP: 124/76 124/76 (!) 140/91 134/82   Pulse: 84 83 77 86   Resp: 15 12 14 17   Temp:       TempSrc:       SpO2: 96% 96% 97% 95%   Weight:       Height:         -------------------------  BP: 134/82, Temp: 98.7 °F (37.1 °C), Heart Rate: 86, Resp: 17    CONSULTS:  None    CRITICAL CARE:   None    PROCEDURES:  None    DIAGNOSIS/ MDM:   Marianna Weinstein is a 52 y.o. female who presents with upper respiratory symptoms and subjective fever. She tested negative for COVID and flu 2 days ago. Exam is grossly unremarkable. Troponin negative x2. CBC, CMP, and D-dimer are unremarkable. Mononucleosis screen, rapid flu and chest x-ray are negative. She was treated with an albuterol inhaler which she was given to take home with a spacer. She is also treated with IV fluids and Zofran which improved her symptoms. I suspect she has a viral URI. I have low suspicion for pneumonia, peritonsillar abscess, epiglottitis, Ludewig's angina, sepsis, or bacterial infection.   The patient was instructed to follow-up with her PCP within 1 to 2 days and to return to the ER for worsening symptoms or any other concern. The patient understands that at this time there is no evidence for a more malignant underlying process, but also understands that early in the process of an illness or injury, an emergency department work-up can be falsely reassuring. Routine discharge counseling was given, and the patient understands that worsening, changing or persistent symptoms should prompt a immediate call or follow-up with their primary care physician or return to the emergency department. The importance of appropriate follow-up was also discussed. I have reviewed the disposition diagnosis with the patient. I have answered their questions and given discharge instructions. They voiced understanding of these instructions and did not have any further questions or complaints. FINAL IMPRESSION      1. Viral URI with cough          DISPOSITION/PLAN   DISPOSITION Decision To Discharge 10/30/2022 01:16:15 AM        PATIENT REFERRED TO:  Trisha Cunningham, APRN - CNP  [address] [place] [address]  [address]  [facility] [address] [place] [phone]    Schedule an appointment as soon as possible for a visit in 2 days      [address] [place] [address] Emergency Department  [place] [address]  [phone]  Go to   If symptoms worsen    DISCHARGE MEDICATIONS:  Discharge Medication List as of 10/30/2022  1:16 AM          (Please note that portions of this note were completed with a voice recognitionprogram.  Efforts were made to edit the dictations but occasionally words are mis-transcribed.)    Abi Muse DO, [address] [facility]  Emergency Physician Attending          Abi Muse, [address] [facility]  10/31/22 8775

## 2022-10-31 DIAGNOSIS — F33.1 MODERATE EPISODE OF RECURRENT MAJOR DEPRESSIVE DISORDER (HCC): Primary | ICD-10-CM

## 2022-10-31 LAB
EKG ATRIAL RATE: 88 BPM
EKG P AXIS: 53 DEGREES
EKG P-R INTERVAL: 164 MS
EKG Q-T INTERVAL: 352 MS
EKG QRS DURATION: 78 MS
EKG QTC CALCULATION (BAZETT): 425 MS
EKG R AXIS: -25 DEGREES
EKG T AXIS: 43 DEGREES
EKG VENTRICULAR RATE: 88 BPM

## 2022-11-02 ENCOUNTER — TELEPHONE (OUTPATIENT)
Dept: FAMILY MEDICINE CLINIC | Age: 47
End: 2022-11-02

## 2022-11-02 NOTE — TELEPHONE ENCOUNTER
Raul Swan was contacted by a Samir Capps to discuss a referral for SDOH related needs. Writer spoke with: Patient and explained the services and assistance that can be provided through the [employer] program.     Patient agreeable to receiving resources and support from Jennifer Ugalde. Intake Notes:   Patient stated:  Not working due to medical issues. Approved for welfare from [facility], but it's not enough to afford housing. Living in  mom's home with sister. House is in sister's name. Sister is planning to move out. Patient concerned she can't afford home and doesn't want to move. Tiffani reached out to patient about applying for [employer] for her and her daughter. Tiffani   [phone]   $6,000 per person ? ? Social security has sent her information and Patient has an appointment with Social Security on . [email or web address]    Writer asked Patient if they could contact Jaja José on her behalf. Patient agreed. Plan of Care: N/A    Action steps to be completed by writer:  Writer will email Patient a Social Security Consent Form for the Patient to sign. The email will also include website link to [employer] mortgage assistance for Patient to consider with her sister. Action steps to be completed by patient:  Patient will sign and return Social Security Consent form.     Patient has given verbal permission to leave detailed messages regarding SDOH referral on their phone: N/A    Patient has given verbal permission to submit applications on their behalf: yes    Patient voiced understanding of action plan and responsibilities, was provided with writer's contact information, and agrees to call should they require additional assistance: yes      Shama Collins MA

## 2022-11-04 ENCOUNTER — TELEMEDICINE (OUTPATIENT)
Dept: BEHAVIORAL/MENTAL HEALTH CLINIC | Age: 47
End: 2022-11-04
Payer: MEDICARE

## 2022-11-04 DIAGNOSIS — F33.1 MODERATE EPISODE OF RECURRENT MAJOR DEPRESSIVE DISORDER (HCC): Primary | ICD-10-CM

## 2022-11-04 DIAGNOSIS — F41.9 ANXIETY: ICD-10-CM

## 2022-11-04 PROCEDURE — 90834 PSYTX W PT 45 MINUTES: CPT | Performed by: SOCIAL WORKER

## 2022-11-04 PROCEDURE — 1036F TOBACCO NON-USER: CPT | Performed by: SOCIAL WORKER

## 2022-11-04 NOTE — PROGRESS NOTES
ADULT BEHAVIORAL HEALTH FOLLOW UP  CAMMIE Clay  Licensed Independent          Visit Date: 11/4/2022   Time of appointment: 11:03 AM      Time spent with Patient: 40 minutes. Patient Location: [facility]/Clinician Location: [address] [place] [address]   This was a tele-health visit conducted via interactive/real time audio and video. Reason for Consult:  Depression and Anxiety     PCP:  JUSTICE Vargas CNP      Pt provided informed consent for the behavioral health program. Discussed with patient model of service to include the limits of confidentiality (i.e. abuse reporting, suicide intervention, etc.) and short-term intervention focused approach. Pt indicated understanding. Pt provided verbal consent to engage in tele-health psychotherapy, visit was completed using My Chart and patient was alone at time of visit, as the visit was held in a private space in patient's home ([place], [place]). Raúl Brownlee is a 52 y.o. female who presents for follow up of depression and anxiety. Fabian Lucero presented to session with continued report of heightened anxiety and depressive symptoms, feeling down, forgetfulness, and having a lack of support. She shared she has been having difficulty with mobility, weight gain, stress eating. She shared challenges with her mobility have also made it harder to lose weight due to activity limitations. She reported she was contacted by an [facility] worker and felt this was very helpful. She shared that [id number] worker is helping her coordinate things with the social security process, which Fabian Lucero had felt was overwhelming to navigate alone. Fabian Lucero shared she has noticed high anxiety since her cancer scare last year, she expressed she totally shut down emotionally and stopped trying to take care of things.  She is now in the processing of starting over, attending medical appointments again, and has felt stressed about locating documentation from up to almost 30 years ago to reflect the changes in health. Yair White has recently been very upset about the upcoming holiday due to familial conflict impacting the family tradition as her aunt has decided to cancel Thanksgiving. Yair White shared she believed this is related to feelings about same-sex relationships as Yair White felt very upset with this due to her daughter being in a same-sex relationship as well as multiple family members identifying within the LGBTQ+ community. Yair White expressed this has been devastating for her, hard for her children to cope with, and she shared she does not know how to discuss her feelings with her aunt. Previous Recommendations: Yair White was scheduled for follow-up in 1 week to discuss progress, sent referral to [facility] staff for additional support and guidance. Yair White is continuing care with [facility] until she can establish with ongoing counselor. MENTAL STATUS EXAM  Mood was depressed and sad with anxious affect. Suicidal ideation was not reported. Homicidal ideation was not reported. Hygiene was good . Dress was appropriate. Behavior was Within Normal Limits with self-report of difficulties ambulating. Attitude was Engageable. Eye-contact was good. Speech: rate - WNL, rhythm - WNL, volume - WNL. Verbalizations were goal directed. Thought processes were intact and goal-oriented without evidence of delusions, hallucinations, obsessions, or allie; with moderate cognitive distortions. Associations were characterized by intact cognitive processes. Pt was oriented to person, place, time, and general circumstances;  recent:  good. Insight and judgment were estimated to be good, AEB, a fair  understanding of cyclical maladaptive patterns, and the ability to use insight to inform behavior change. ASSESSMENT  Sanchez Juarez presented to the appointment today for evaluation and treatment of symptoms of depression and anxiety.  She is currently deemed no risk to herself or others and meets criteria for Major Depressive Disorder and Anxiety. Fabian Lucero reported increase in depressive and anxious symptoms, feeling down, anger/irritability, and having a lack of support. She did successfully connect with [facility] worker and felt this was beneficial. She is continuing to work with SDOH worker on their identified goals. Fabian Lucero also intends to establish care with an external counselor for management of ongoing care for therapy, she is continuing services with [facility] until she locates a clinician to avoid disruption of counseling and will discontinue with [facility] once she establishes care. Fabian Lucero would benefit from continued support through CBT based and solution-focused interventions to assist with management of stress, anxiety, and depression. Fabian Lucero was in agreement with recommendations. PHQ Scores 8/31/2022 9/14/2021 1/9/2017   PHQ2 Score 6 6 1   PHQ9 Score 26 18 1     Interpretation of Total Score Depression Severity: 1-4 = Minimal depression, 5-9 = Mild depression, 10-14 = Moderate depression, 15-19 = Moderately severe depression, 20-27 = Severe depression    How often pt has had thoughts of death or hurting self (if PHQ positive for depression):       No flowsheet data found. Interpretation of FAISAL-7 score: 5-9 = mild anxiety, 10-14 = moderate anxiety, 15+ = severe anxiety. Recommend referral to behavioral health for scores 10 or greater. DIAGNOSIS  Fabian Lucero was seen today for depression and anxiety. Diagnoses and all orders for this visit:    Moderate episode of recurrent major depressive disorder ([facility] [address]    Anxiety      INTERVENTION  Trained in strategies for increasing balanced thinking, Supportive techniques, Emphasized self-care as important for managing overall health, CBT to target depressive thoughts, cycle of anxious thoughts, and Identified maladaptive thoughts. PLAN  Plan is for Fabian Lucero to continue therapy with [facility] until transfer to ongoing clinician.  She is working with [facility] worker for additional support. Follow-up will be scheduled to discuss healthy communication skills and feeling identification. INTERACTIVE COMPLEXITY  Is interactive complexity present?   No  Reason:  N/A  Additional Supporting Information:  N/A       Electronically signed by CAMMIE Styles on 11/17/2022 at 1:18 PM

## 2022-11-09 ENCOUNTER — TELEMEDICINE (OUTPATIENT)
Dept: BEHAVIORAL/MENTAL HEALTH CLINIC | Age: 47
End: 2022-11-09
Payer: MEDICARE

## 2022-11-09 DIAGNOSIS — F41.9 ANXIETY: ICD-10-CM

## 2022-11-09 DIAGNOSIS — F33.1 MODERATE EPISODE OF RECURRENT MAJOR DEPRESSIVE DISORDER (HCC): Primary | ICD-10-CM

## 2022-11-09 PROCEDURE — 1036F TOBACCO NON-USER: CPT | Performed by: SOCIAL WORKER

## 2022-11-09 PROCEDURE — 90834 PSYTX W PT 45 MINUTES: CPT | Performed by: SOCIAL WORKER

## 2022-11-09 NOTE — PROGRESS NOTES
ADULT BEHAVIORAL HEALTH FOLLOW UP  CAMMIE Bryan  Licensed Independent          Visit Date: 11/9/2022   Time of appointment: 9:10 AM       Time spent with Patient: 50 minutes. Patient Location: [facility]/Clinician Location: [address] [facility]   This was a tele-health visit conducted via interactive/real time audio and video. Reason for Consult:  Depression and Anxiety     PCP:  Jerl Bernheim, APRN - CNP      Pt provided informed consent for the behavioral health program. Discussed with patient model of service to include the limits of confidentiality (i.e. abuse reporting, suicide intervention, etc.) and short-term intervention focused approach. Pt indicated understanding. Pt provided verbal consent to engage in tele-health psychotherapy, visit was completed using My Chart and patient was alone at time of visit, as the visit was held in a private space in patient's home ([facility]). Kevin Lombardi is a 52 y.o. female who presents for follow up of depression and anxiety. Suki Mandie shared she was feeling tired from being up late with her son who had been sick last night. She shared she had wanted to complete some projects around the house over the weekend and has been overwhelmed by feelings surrounding her aunt. She expressed she wants to talk with her but just can't. Suki Lockwood reported being worried about how to navigate the conversation and is afraid she will push her boundaries. She shared this has been especially upsetting for her as her daughter and many other family members are in same sex relationships and that she found out her daughter wasn't going because her aunt said their partner was not welcome. Suki Mandie shared feeling they are trying to act like Thanksgiving is being canceled for an alternative reason. She shared she now feels she is not good enough to sit at their table.  Suki Lockwood shared as a solution she has attempted to contact [place] to [place] herself and has been upset with this not progressing. Suki Lockwood shared she knows she is doing the right thing by protecting her daughter and feels awful this is happening. Suki Lockwood elaborated that she doesn't want traditions to end and she does not want to see her relationship with her aunt be ruined. Previous Recommendations: Plan is for Suki Lockwood to continue therapy with [facility] until transfer to ongoing clinician. She is working with [facility] worker for additional support. Follow-up will be scheduled to discuss healthy communication skills and feeling identification. MENTAL STATUS EXAM  Mood was depressed and sad with anxious affect. Suicidal ideation was not reported. Homicidal ideation was not reported. Hygiene was good . Dress was appropriate. Behavior was Within Normal Limits with self-report of difficulties ambulating. Attitude was Engageable. Eye-contact was good. Speech: rate - WNL, rhythm - WNL, volume - WNL. Verbalizations were goal directed. Thought processes were intact and goal-oriented without evidence of delusions, hallucinations, obsessions, or allie; with moderate cognitive distortions. Associations were characterized by intact cognitive processes. Pt was oriented to person, place, time, and general circumstances;  recent:  good. Insight and judgment were estimated to be good, AEB, a fair  understanding of cyclical maladaptive patterns, and the ability to use insight to inform behavior change. ASSESSMENT  Dion Scott presented to the appointment today for evaluation and treatment of symptoms of depression and anxiety. She is currently deemed no risk to herself or others and meets criteria for Major Depressive Disorder and Anxiety. Suki Lockwood expressed primary stress has been related to being upset with her aunt not holding Thanksgiving and how her aunt's beliefs have made her feel in addition to beginning steps to pursue social security.  Suki Lockwood shared she has felt afraid to share her feelings with her aunt, as she was nervous she would forget important things she wanted to say. Praneeth Luis and I discussed healthy communication strategies to navigate difficult conversations. Praneeth Luis was in agreement with continuing therapy with [facility] until she transfers to ongoing clinician. PHQ Scores 8/31/2022 9/14/2021 1/9/2017   PHQ2 Score 6 6 1   PHQ9 Score 26 18 1     Interpretation of Total Score Depression Severity: 1-4 = Minimal depression, 5-9 = Mild depression, 10-14 = Moderate depression, 15-19 = Moderately severe depression, 20-27 = Severe depression    How often pt has had thoughts of death or hurting self (if PHQ positive for depression):       No flowsheet data found. Interpretation of FAISAL-7 score: 5-9 = mild anxiety, 10-14 = moderate anxiety, 15+ = severe anxiety. Recommend referral to behavioral health for scores 10 or greater. DIAGNOSIS  Praneeth Luis was seen today for depression and anxiety. Diagnoses and all orders for this visit:    Moderate episode of recurrent major depressive disorder ([facility] [address]    Anxiety      INTERVENTION  Discussed various factors related to the development and maintenance of  depression and anxiety (including biological, cognitive, behavioral, and environmental factors), Discussed and set plan for behavioral activation, CBT to target negative automatic thoughts & cognitive distortions, Identified maladaptive thoughts, and Problem-solving re: completing one task at a time, focusing on staying in the present and identifying ways to address anxious thoughts with solution-focused planning . Roselia Garcia was scheduled for follow-up appointment to discuss progress with planning Thanksgiving dinner for her family, coping with feelings related to her relationship with aunt, and identifying goals. INTERACTIVE COMPLEXITY  Is interactive complexity present?   No  Reason:  N/A  Additional Supporting Information:  N/A       Electronically signed by CAMMIE Wolf on 11/22/2022 at 3:29 PM

## 2022-11-22 ENCOUNTER — TELEMEDICINE (OUTPATIENT)
Dept: BEHAVIORAL/MENTAL HEALTH CLINIC | Age: 47
End: 2022-11-22
Payer: MEDICARE

## 2022-11-22 DIAGNOSIS — F33.1 MODERATE EPISODE OF RECURRENT MAJOR DEPRESSIVE DISORDER (HCC): Primary | ICD-10-CM

## 2022-11-22 DIAGNOSIS — F41.9 ANXIETY: ICD-10-CM

## 2022-11-22 PROCEDURE — 1036F TOBACCO NON-USER: CPT | Performed by: SOCIAL WORKER

## 2022-11-22 PROCEDURE — 90832 PSYTX W PT 30 MINUTES: CPT | Performed by: SOCIAL WORKER

## 2022-11-22 NOTE — PROGRESS NOTES
ADULT BEHAVIORAL HEALTH FOLLOW UP  CAMMIE Malave  Licensed Independent          Visit Date: 11/22/2022   Time of appointment: 11:24 AM       Time spent with Patient: 34 minutes. Patient Location: [address] [facility]/Clinician Location: [address] [place] [address]    This was a tele-health visit conducted via interactive/real time audio and video. Reason for Consult:  Depression and Anxiety     PCP:  JUSTICE Martinez CNP      Pt provided informed consent for the behavioral health program. Discussed with patient model of service to include the limits of confidentiality (i.e. abuse reporting, suicide intervention, etc.) and short-term intervention focused approach. Pt indicated understanding. Pt provided verbal consent to engage in tele-health psychotherapy, visit was completed using My Chart and patient was alone at time of visit, as the visit was held in a private space in patient's home ([facility]). Lance Lu is a 52 y.o. female who presents for follow up of depression and anxiety. Maryse Stiles presented to visit with continuation of significant anxiety and feeling down, she expressed also feeling angry about the situation her sister has placed her in r/t housing concern. Maryse Stiles shared she did have a conversation about [employer] with her aunt and that she is attending the event at the [place]. Maryse Stiles was glad that her aunt would be participating, although Maryse Stiles appeared to be disappointed that she didn't get to discuss underlying frustrations with what led to changes to [employer] Maryse Stiles shared she is looking forward to [employer] and has had help from others to make this possible. She is also planning to host [employer] as well. She shared it has been a busy week planning [employer] her son being in [place], and she had a recent visit to the ER.  She shared both of her children have asked to see a counselor and she was seeking information to help them establish care with a provider as she has been worried about her son due to being withdrawn. Previous Recommendations: Lynda Sarmiento was scheduled for follow-up appointment to discuss progress with planning Thanksgiving dinner for her family, coping with feelings related to her relationship with aunt, and identifying goals. MENTAL STATUS EXAM  Mood was depressed and sad with anxious affect. Suicidal ideation was not reported. Homicidal ideation was not reported. Hygiene was good . Dress was appropriate. Behavior was Within Normal Limits with self-report of difficulties ambulating. Attitude was Engageable. Eye-contact was good. Speech: rate - WNL, rhythm - WNL, volume - WNL. Verbalizations were goal directed. Thought processes were intact and goal-oriented without evidence of delusions, hallucinations, obsessions, or allie; with moderate cognitive distortions. Associations were characterized by intact cognitive processes. Pt was oriented to person, place, time, and general circumstances;  recent:  good. Insight and judgment were estimated to be good, AEB, a fair  understanding of cyclical maladaptive patterns, and the ability to use insight to inform behavior change. ASSESSMENT  Gilford Jumbo presented to the appointment today for evaluation and treatment of symptoms of depression and anxiety. She is currently deemed no risk to herself or others and meets criteria for Major Depressive Disorder and Anxiety. Lynda Sarmiento expressed being happy about being able to host a Thanksgiving for her family at the [place] she has set up, she identified how this has still been stressful but she is looking forward to it and plans to do something similar for [place]. She had a conversation with her aunt and felt this helped improve their relationship, but expressed she was hoping to be able to address underlying frustrations with her which went unresolved.  Lynda Sarmiento continues to work on managing everything she has on her plate amidst challenges with her health. Harini Holliday is working on slowing her thoughts down, challenging anxiety, and taking things one day at a time. Harini Holliday was in agreement with continuing therapy with [facility] until she transfers to ongoing clinician. PHQ Scores 12/1/2022 8/31/2022 9/14/2021 1/9/2017   PHQ2 Score 0 6 6 1   PHQ9 Score 0 26 18 1     Interpretation of Total Score Depression Severity: 1-4 = Minimal depression, 5-9 = Mild depression, 10-14 = Moderate depression, 15-19 = Moderately severe depression, 20-27 = Severe depression    How often pt has had thoughts of death or hurting self (if PHQ positive for depression):       No flowsheet data found. Interpretation of FAISAL-7 score: 5-9 = mild anxiety, 10-14 = moderate anxiety, 15+ = severe anxiety. Recommend referral to behavioral health for scores 10 or greater. DIAGNOSIS  Harini Holliday was seen today for depression and anxiety. Diagnoses and all orders for this visit:    Moderate episode of recurrent major depressive disorder ([facility] [address]    Anxiety      INTERVENTION  Trained in strategies for increasing balanced thinking, Discussed and set plan for behavioral activation, Provided education, Discussed and problem-solved barriers in adhering to behavioral change plan, Motivational Interviewing to increase patient confidence and compliance with adhering to behavioral change plan, Discussed potential barriers to change, Emphasized self-care as important for managing overall health, and Identified maladaptive thoughts. PLAN  Follow-up to discuss progress with managing anxiety and depression. Continue working with SDOH worker for additional support. INTERACTIVE COMPLEXITY  Is interactive complexity present?   No  Reason:  N/A  Additional Supporting Information:  N/A       Electronically signed by CAMMIE Osborn on 12/2/2022 at 11:10 AM

## 2022-11-23 ENCOUNTER — HOSPITAL ENCOUNTER (EMERGENCY)
Age: 47
Discharge: HOME | End: 2022-11-23
Attending: EMERGENCY MEDICINE
Payer: MEDICARE

## 2022-11-23 ENCOUNTER — APPOINTMENT (OUTPATIENT)
Dept: CT IMAGING | Age: 47
End: 2022-11-23
Payer: MEDICARE

## 2022-11-23 ENCOUNTER — TELEPHONE (OUTPATIENT)
Dept: PRIMARY CARE CLINIC | Age: 47
End: 2022-11-23

## 2022-11-23 VITALS
WEIGHT: 290 LBS | DIASTOLIC BLOOD PRESSURE: 95 MMHG | RESPIRATION RATE: 15 BRPM | HEART RATE: 74 BPM | OXYGEN SATURATION: 96 % | TEMPERATURE: 98.1 F | HEIGHT: 64 IN | SYSTOLIC BLOOD PRESSURE: 135 MMHG | BODY MASS INDEX: 49.51 KG/M2

## 2022-11-23 DIAGNOSIS — N20.1 URETEROLITHIASIS: Primary | ICD-10-CM

## 2022-11-23 LAB
ABSOLUTE EOS #: 0.1 K/UL (ref 0–0.4)
ABSOLUTE LYMPH #: 1.6 K/UL (ref 1–4.8)
ABSOLUTE MONO #: 0.6 K/UL (ref 0.1–1.2)
ANION GAP SERPL CALCULATED.3IONS-SCNC: 8 MMOL/L (ref 9–17)
BASOPHILS # BLD: 1 % (ref 0–2)
BASOPHILS ABSOLUTE: 0.1 K/UL (ref 0–0.2)
BILIRUBIN URINE: NEGATIVE
BUN BLDV-MCNC: 13 MG/DL (ref 6–20)
CALCIUM SERPL-MCNC: 9.1 MG/DL (ref 8.6–10.4)
CHLORIDE BLD-SCNC: 100 MMOL/L (ref 98–107)
CO2: 25 MMOL/L (ref 20–31)
COLOR: YELLOW
COMMENT UA: NORMAL
CREAT SERPL-MCNC: 1.01 MG/DL (ref 0.5–0.9)
EOSINOPHILS RELATIVE PERCENT: 1 % (ref 1–4)
GFR SERPL CREATININE-BSD FRML MDRD: >60 ML/MIN/1.73M2
GLUCOSE BLD-MCNC: 104 MG/DL (ref 70–99)
GLUCOSE URINE: NEGATIVE
HCT VFR BLD CALC: 41.1 % (ref 36–46)
HEMOGLOBIN: 13.6 G/DL (ref 12–16)
KETONES, URINE: NEGATIVE
LEUKOCYTE ESTERASE, URINE: NEGATIVE
LYMPHOCYTES # BLD: 23 % (ref 24–44)
MCH RBC QN AUTO: 28.8 PG (ref 26–34)
MCHC RBC AUTO-ENTMCNC: 33.2 G/DL (ref 31–37)
MCV RBC AUTO: 86.8 FL (ref 80–100)
MONOCYTES # BLD: 9 % (ref 2–11)
NITRITE, URINE: NEGATIVE
PDW BLD-RTO: 14.7 % (ref 12.5–15.4)
PH UA: 5.5 (ref 5–8)
PLATELET # BLD: 159 K/UL (ref 140–450)
PMV BLD AUTO: 9.3 FL (ref 6–12)
POTASSIUM SERPL-SCNC: 4.4 MMOL/L (ref 3.7–5.3)
PROTEIN UA: NEGATIVE
RBC # BLD: 4.73 M/UL (ref 4–5.2)
SEG NEUTROPHILS: 66 % (ref 36–66)
SEGMENTED NEUTROPHILS ABSOLUTE COUNT: 4.5 K/UL (ref 1.8–7.7)
SODIUM BLD-SCNC: 133 MMOL/L (ref 135–144)
SPECIFIC GRAVITY UA: 1.03 (ref 1–1.03)
TURBIDITY: CLEAR
URINE HGB: NEGATIVE
UROBILINOGEN, URINE: NORMAL
WBC # BLD: 6.9 K/UL (ref 3.5–11)

## 2022-11-23 PROCEDURE — 74176 CT ABD & PELVIS W/O CONTRAST: CPT

## 2022-11-23 PROCEDURE — 85025 COMPLETE CBC W/AUTO DIFF WBC: CPT

## 2022-11-23 PROCEDURE — 99284 EMERGENCY DEPT VISIT MOD MDM: CPT

## 2022-11-23 PROCEDURE — 80048 BASIC METABOLIC PNL TOTAL CA: CPT

## 2022-11-23 PROCEDURE — 2580000003 HC RX 258: Performed by: EMERGENCY MEDICINE

## 2022-11-23 PROCEDURE — 96375 TX/PRO/DX INJ NEW DRUG ADDON: CPT

## 2022-11-23 PROCEDURE — 36415 COLL VENOUS BLD VENIPUNCTURE: CPT

## 2022-11-23 PROCEDURE — 96374 THER/PROPH/DIAG INJ IV PUSH: CPT

## 2022-11-23 PROCEDURE — 81003 URINALYSIS AUTO W/O SCOPE: CPT

## 2022-11-23 PROCEDURE — 6360000002 HC RX W HCPCS: Performed by: EMERGENCY MEDICINE

## 2022-11-23 RX ORDER — MORPHINE SULFATE 4 MG/ML
4 INJECTION, SOLUTION INTRAMUSCULAR; INTRAVENOUS ONCE
Status: COMPLETED | OUTPATIENT
Start: 2022-11-23 | End: 2022-11-23

## 2022-11-23 RX ORDER — KETOROLAC TROMETHAMINE 30 MG/ML
30 INJECTION, SOLUTION INTRAMUSCULAR; INTRAVENOUS ONCE
Status: COMPLETED | OUTPATIENT
Start: 2022-11-23 | End: 2022-11-23

## 2022-11-23 RX ORDER — 0.9 % SODIUM CHLORIDE 0.9 %
1000 INTRAVENOUS SOLUTION INTRAVENOUS ONCE
Status: COMPLETED | OUTPATIENT
Start: 2022-11-23 | End: 2022-11-23

## 2022-11-23 RX ORDER — ONDANSETRON 2 MG/ML
4 INJECTION INTRAMUSCULAR; INTRAVENOUS ONCE
Status: COMPLETED | OUTPATIENT
Start: 2022-11-23 | End: 2022-11-23

## 2022-11-23 RX ORDER — ONDANSETRON 4 MG/1
4 TABLET, ORALLY DISINTEGRATING ORAL EVERY 8 HOURS PRN
Qty: 12 TABLET | Refills: 0 | Status: SHIPPED | OUTPATIENT
Start: 2022-11-23

## 2022-11-23 RX ORDER — IBUPROFEN 800 MG/1
800 TABLET ORAL EVERY 8 HOURS PRN
Qty: 30 TABLET | Refills: 0 | Status: SHIPPED | OUTPATIENT
Start: 2022-11-23

## 2022-11-23 RX ORDER — FENTANYL CITRATE 50 UG/ML
50 INJECTION, SOLUTION INTRAMUSCULAR; INTRAVENOUS ONCE
Status: COMPLETED | OUTPATIENT
Start: 2022-11-23 | End: 2022-11-23

## 2022-11-23 RX ORDER — OXYCODONE HYDROCHLORIDE AND ACETAMINOPHEN 5; 325 MG/1; MG/1
1 TABLET ORAL EVERY 6 HOURS PRN
Qty: 12 TABLET | Refills: 0 | Status: SHIPPED | OUTPATIENT
Start: 2022-11-23 | End: 2022-11-26

## 2022-11-23 RX ORDER — TAMSULOSIN HYDROCHLORIDE 0.4 MG/1
0.4 CAPSULE ORAL DAILY
Qty: 30 CAPSULE | Refills: 0 | Status: ON HOLD | OUTPATIENT
Start: 2022-11-23 | End: 2022-11-29

## 2022-11-23 RX ADMIN — FENTANYL CITRATE 50 MCG: 50 INJECTION INTRAMUSCULAR; INTRAVENOUS at 11:13

## 2022-11-23 RX ADMIN — ONDANSETRON 4 MG: 2 INJECTION INTRAMUSCULAR; INTRAVENOUS at 10:21

## 2022-11-23 RX ADMIN — MORPHINE SULFATE 4 MG: 4 INJECTION INTRAVENOUS at 14:13

## 2022-11-23 RX ADMIN — KETOROLAC TROMETHAMINE 30 MG: 30 INJECTION, SOLUTION INTRAMUSCULAR at 10:21

## 2022-11-23 RX ADMIN — SODIUM CHLORIDE 1000 ML: 9 INJECTION, SOLUTION INTRAVENOUS at 10:20

## 2022-11-23 ASSESSMENT — PAIN DESCRIPTION - ORIENTATION
ORIENTATION: RIGHT
ORIENTATION: LEFT

## 2022-11-23 ASSESSMENT — PAIN DESCRIPTION - LOCATION
LOCATION: FLANK

## 2022-11-23 ASSESSMENT — PAIN SCALES - GENERAL
PAINLEVEL_OUTOF10: 8
PAINLEVEL_OUTOF10: 1
PAINLEVEL_OUTOF10: 10
PAINLEVEL_OUTOF10: 5

## 2022-11-23 ASSESSMENT — PAIN DESCRIPTION - PAIN TYPE: TYPE: ACUTE PAIN

## 2022-11-23 ASSESSMENT — PAIN - FUNCTIONAL ASSESSMENT: PAIN_FUNCTIONAL_ASSESSMENT: 0-10

## 2022-11-23 NOTE — TELEPHONE ENCOUNTER
Pt called c/o severe back and abdominal pain and nausea. States she was seen in [facility] ED 11/18 for swelling near C spine. Records requested. Pt states she is unable to walk at times and is doubled over. Pt report pain is near kidneys but is radiating to her abd. States she is very nauseous but is unable to vomit. Pt advised to be seen in  ED.  States she is going to [facility]

## 2022-11-23 NOTE — ED PROVIDER NOTES
[facility] Emergency Department  [id number] [facility] [address] [facility] [address]  Phone: [phone]  Fax: [phone]      Pt Name: Tan Kebede  XWI:[id number]  Justin 1975  Date of evaluation: 2022      CHIEF COMPLAINT       Chief Complaint   Patient presents with    Flank Pain     Left sided- radiates into LLQ abd- onset 65       HISTORY OF PRESENT ILLNESS   66-year-old female presented to the emergency department today complaining of cute onset of left-sided flank pain yesterday evening. It radiates down to her left lower quadrant. She reports nothing makes it feel better nothing feel worse. Pain is so severe that she has been nauseated and has been dry heaving. No dysuria hesitancy or frequency. No hematuria. She has a distant history of kidney stones and this feels similar to that as far she can recall. REVIEW OF SYSTEMS     Review of Systems   All other systems reviewed and are negative. PAST MEDICAL HISTORY    has a past medical history of Anemia, Anxiety, Carpal tunnel syndrome, Depression, Endometriosis, Gestational diabetes, Headache, History of blood transfusion, Iron deficiency anemia, Kidney stones, May-Thurner syndrome, Obesity, Scleroderma ([address] Snores, Under care of team, Under care of team, Under care of team, and Wears glasses. SURGICAL HISTORY      has a past surgical history that includes Gastric bypass surgery ();  section (x 3); toenail excision; other surgical history; Cholecystectomy; pelvic laparoscopy; vascular surgery (2016); Dilation and curettage of uterus (N/A, 2021); Dilation and curettage of uterus (N/A, 2021); Endometrial ablation (2021); Total abdominal hysterectomy w/ bilateral salpingoophorectomy (10/26/2021); and Hysterectomy (N/A, 10/26/2021).     CURRENT MEDICATIONS       Previous Medications    ACETAMINOPHEN (AMINOFEN) 325 MG TABLET    Take 2 tablets by mouth every 6 hours as needed for Pain BUSPIRONE (BUSPAR) 10 MG TABLET    TAKE 1 TABLET BY MOUTH THREE TIMES A DAY AS NEEDED FOR ANXIETY    CALCIUM CARBONATE (OYSTER SHELL CALCIUM 500 MG) 1250 (500 CA) MG TABLET    Take 1 tablet by mouth daily    ELASTIC BANDAGES & SUPPORTS (JOBST ULTRASHEER 20-30MMHG LG) MISC    Wear daily, ok to remove in the evenings    FLUTICASONE (FLONASE) 50 MCG/ACT NASAL SPRAY    2 sprays by Nasal route daily    FLUTICASONE (FLONASE) 50 MCG/ACT NASAL SPRAY    2 sprays by Nasal route daily    LOSARTAN (COZAAR) 50 MG TABLET    Take 1 tablet by mouth daily    MULTIPLE VITAMINS-MINERALS (MULTI-JULIO CÉSAR PO)    Take by mouth daily    MULTIPLE VITAMINS-MINERALS (THERAPEUTIC MULTIVITAMIN-MINERALS) TABLET    Take 1 tablet by mouth daily    NUTRITIONAL SUPPLEMENTS (ESTROVEN PO)    Take by mouth daily    OYSTER SHELL 500 MG TABS    Take 1 tablet by mouth 3 times daily    VITAMIN B-12 (CYANOCOBALAMIN) 1000 MCG TABLET    Take 1,000 mcg by mouth daily    VITAMIN D3 (CHOLECALCIFEROL) 25 MCG (1000 UT) TABS TABLET    Take 1 tablet by mouth daily    ZINC 50 MG CAPS    Take 50 mg by mouth daily       ALLERGIES     is allergic to contrast [iodides], trileptal [oxcarbazepine], and morphine. FAMILY HISTORY     She indicated that her mother is . She indicated that her father is . She indicated that both of her sisters are alive. She indicated that her brother is alive. family history includes Colon Cancer in her mother; Diabetes in her brother, sister, and sister; Early Death in her father; High Blood Pressure in her brother, sister, and sister. SOCIAL HISTORY      reports that she has never smoked. She has never used smokeless tobacco. She reports that she does not drink alcohol and does not use drugs. PHYSICAL EXAM     INITIAL VITALS:  height is 5' 4\" (1.626 m) and weight is 131.5 kg (290 lb). Her oral temperature is 98.1 °F (36.7 °C). Her blood pressure is 164/114 (abnormal) and her pulse is 97.  Her respiration is 16 and oxygen saturation is 98%. Physical Exam  Vitals reviewed. Constitutional:       General: She is not in acute distress. Appearance: She is well-developed. Comments: Patient is rocking back and forth on the exam table. She is uncomfortable appearing. HENT:      Head: Normocephalic and atraumatic. Eyes:      Conjunctiva/sclera: Conjunctivae normal.      Pupils: Pupils are equal, round, and reactive to light. Neck:      Trachea: No tracheal deviation. Cardiovascular:      Rate and Rhythm: Normal rate and regular rhythm. Pulmonary:      Effort: No respiratory distress. Breath sounds: Normal breath sounds. Abdominal:      General: Bowel sounds are normal. There is no distension. Palpations: Abdomen is soft. Tenderness: There is no abdominal tenderness. Musculoskeletal:         General: No tenderness. Normal range of motion. Cervical back: Normal range of motion and neck supple. Skin:     General: Skin is warm and dry. Neurological:      Mental Status: She is alert and oriented to person, place, and time. Psychiatric:         Behavior: Behavior normal.         Thought Content: Thought content normal.         Judgment: Judgment normal.         DIFFERENTIAL DIAGNOSIS/ MDM:   I am concerned of ureterolithiasis with this patient. DIAGNOSTIC RESULTS     EKG:  None    RADIOLOGY:   CT ABDOMEN PELVIS WO CONTRAST Additional Contrast? None    Result Date: 11/23/2022  EXAMINATION: CT OF THE ABDOMEN AND PELVIS WITHOUT CONTRAST 11/23/2022 10:20 am TECHNIQUE: CT of the abdomen and pelvis was performed without the administration of intravenous contrast. Multiplanar reformatted images are provided for review. Automated exposure control, iterative reconstruction, and/or weight based adjustment of the mA/kV was utilized to reduce the radiation dose to as low as reasonably achievable.  COMPARISON: May 3, 2021 HISTORY: ORDERING SYSTEM PROVIDED HISTORY: Flank pain rule out ureterolithiasis TECHNOLOGIST PROVIDED HISTORY: Flank pain rule out ureterolithiasis Is the patient pregnant?->No Reason for Exam: Left side pain x 1 day FINDINGS: Lower Chest: Elevated left hemidiaphragm redemonstrated. Organs: Exam is limited by the absence of intravenous contrast. There is a 7-8 mm calculus in the left ureteropelvic junction which results in minimal hydronephrosis and mild left renal swelling. No other urinary tract calculus. No right urinary collecting system dilatation. No focal renal lesion. No focal abnormality in the liver. No calcified gallstones or biliary ductal dilatation. The spleen is normal in size without focal lesion. The pancreas and the adrenal glands are unremarkable. Left GI/Bowel: The appendix is normal.  No bowel obstruction. Mild scattered colonic diverticulosis without evidence for diverticulitis. There has been prior gastric bypass surgery. Pelvis: Remote hysterectomy. The nondistended bladder is within normal limits. Peritoneum/Retroperitoneum: No abdominal lymphadenopathy or ascites. Bones/Soft Tissues: No acute osseous abnormality. Obstructing 7-8 mm left ureteropelvic junction calculus with resultant minimal left hydronephrosis. XR CHEST PORTABLE    Result Date: 10/29/2022  EXAMINATION: ONE XRAY VIEW OF THE CHEST 10/29/2022 11:29 pm COMPARISON: April 8, 2022 HISTORY: ORDERING SYSTEM PROVIDED HISTORY: chest pain, shortness of breath TECHNOLOGIST PROVIDED HISTORY: chest pain, shortness of breath Reason for Exam: sob FINDINGS: No infiltrate or consolidation or effusion is identified. The heart size is normal.     No acute abnormality visualized.          LABS:  Results for orders placed or performed during the hospital encounter of 11/23/22   Urinalysis with Reflex to Culture    Specimen: Urine, clean catch   Result Value Ref Range    Color, UA Yellow Yellow    Turbidity UA Clear Clear    Glucose, Ur NEGATIVE NEGATIVE    Bilirubin Urine NEGATIVE NEGATIVE Ketones, Urine NEGATIVE NEGATIVE    Specific Gravity, UA 1.026 1.005 - 1.030    Urine Hgb NEGATIVE NEGATIVE    pH, UA 5.5 5.0 - 8.0    Protein, UA NEGATIVE NEGATIVE    Urobilinogen, Urine Normal Normal    Nitrite, Urine NEGATIVE NEGATIVE    Leukocyte Esterase, Urine NEGATIVE NEGATIVE    Urinalysis Comments       Microscopic exam not performed based on chemical results unless requested in original order. Urinalysis Comments          Urinalysis Comments       Utilizing a urinalysis as the only screening method to exclude a potential uropathogen can be unreliable in many patient populations. Rapid screening tests are less sensitive than culture and if UTI is a clinical possibility, culture should be considered despite a negative urinalysis.      CBC with Auto Differential   Result Value Ref Range    WBC 6.9 3.5 - 11.0 k/uL    RBC 4.73 4.0 - 5.2 m/uL    Hemoglobin 13.6 12.0 - 16.0 g/dL    Hematocrit 41.1 36 - 46 %    MCV 86.8 80 - 100 fL    MCH 28.8 26 - 34 pg    MCHC 33.2 31 - 37 g/dL    RDW 14.7 12.5 - 15.4 %    Platelets 988 887 - 722 k/uL    MPV 9.3 6.0 - 12.0 fL    Seg Neutrophils 66 36 - 66 %    Lymphocytes 23 (L) 24 - 44 %    Monocytes 9 2 - 11 %    Eosinophils % 1 1 - 4 %    Basophils 1 0 - 2 %    Segs Absolute 4.50 1.8 - 7.7 k/uL    Absolute Lymph # 1.60 1.0 - 4.8 k/uL    Absolute Mono # 0.60 0.1 - 1.2 k/uL    Absolute Eos # 0.10 0.0 - 0.4 k/uL    Basophils Absolute 0.10 0.0 - 0.2 k/uL   Basic Metabolic Panel w/ Reflex to MG   Result Value Ref Range    Glucose 104 (H) 70 - 99 mg/dL    BUN 13 6 - 20 mg/dL    Creatinine 1.01 (H) 0.50 - 0.90 mg/dL    Est, Glom Filt Rate >60 >60 mL/min/1.73m2    Calcium 9.1 8.6 - 10.4 mg/dL    Sodium 133 (L) 135 - 144 mmol/L    Potassium 4.4 3.7 - 5.3 mmol/L    Chloride 100 98 - 107 mmol/L    CO2 25 20 - 31 mmol/L    Anion Gap 8 (L) 9 - 17 mmol/L       EMERGENCY DEPARTMENT COURSE:     Thepatient was given the following medications:  Orders Placed This Encounter   Medications 0.9 % sodium chloride bolus    ketorolac (TORADOL) injection 30 mg    ondansetron (ZOFRAN) injection 4 mg    fentaNYL (SUBLIMAZE) injection 50 mcg    morphine injection 4 mg    oxyCODONE-acetaminophen (PERCOCET) 5-325 MG per tablet     Sig: Take 1 tablet by mouth every 6 hours as needed for Pain for up to 3 days. Intended supply: 3 days. Take lowest dose possible to manage pain     Dispense:  12 tablet     Refill:  0    ondansetron (ZOFRAN ODT) 4 MG disintegrating tablet     Sig: Take 1 tablet by mouth every 8 hours as needed for Nausea     Dispense:  12 tablet     Refill:  0    tamsulosin (FLOMAX) 0.4 MG capsule     Sig: Take 1 capsule by mouth daily for 5 doses     Dispense:  30 capsule     Refill:  0    ibuprofen (ADVIL;MOTRIN) 800 MG tablet     Sig: Take 1 tablet by mouth every 8 hours as needed for Pain     Dispense:  30 tablet     Refill:  0        Vitals:    Vitals:    11/23/22 0959 11/23/22 1001   BP: (!) 164/114    Pulse: 97    Resp: 16    Temp:  98.1 °F (36.7 °C)   TempSrc:  Oral   SpO2: 98%    Weight: 131.5 kg (290 lb)    Height: 5' 4\" (1.626 m)      -------------------------  BP: (!) 164/114, Temp: 98.1 °F (36.7 °C), Heart Rate: 97, Resp: 16      Re-evaluation Notes  1:43 PM EST  Late entry. Toradol minimally improves her pain. She was given fentanyl which is caused transient relief of her pain greatly however on reevaluation currently, pain is starting to return. I will give her morphine. I do understand she has a history of being allergic to morphine. It was more of a very transient flushing that lasted seconds to minutes. No urticaria tongue swelling throat swelling or shortness of breath. I have been in conversation with urology who recommends outpatient management and close follow-up. Patient is in agreement this plan. If pain is not controlled or she is vomiting her medications up she is been told to return here immediately.   Patient voices understanding of this.    CONSULTS:  None    CRITICAL CARE:   None    PROCEDURES:  None    FINAL IMPRESSION      1.  Ureterolithiasis          DISPOSITION/PLAN   DISPOSITION Decision To Discharge 11/23/2022 01:44:39 PM      Condition on Disposition  Improved    PATIENT REFERRED TO:  JUSTICE Galan - CNP  [address] [place] [address]  [phone]    Schedule an appointment as soon as possible for a visit in 2 days      Roberta Rubni MD  [facility] [facility]  [phone]    Schedule an appointment as soon as possible for a visit on 11/28/2022      DISCHARGE MEDICATIONS:  [unfilled]    (Please note that portions of this note were completed with a voice recognitionprogram.  Efforts were made to edit the dictations but occasionally words are mis-transcribed.)    Gordon Gardiner MD MD, F.A.C.E.P, F.A.A.E.M  Emergency Physician Attending         Gordon Gardiner MD  11/23/22 [phone]

## 2022-11-28 PROBLEM — N20.1 LEFT URETERAL CALCULUS: Status: ACTIVE | Noted: 2022-11-28

## 2022-11-28 PROBLEM — Z84.1 FAMILY HISTORY OF KIDNEY STONES: Status: ACTIVE | Noted: 2022-11-28

## 2022-11-29 ENCOUNTER — HOSPITAL ENCOUNTER (OUTPATIENT)
Age: 47
Setting detail: OUTPATIENT SURGERY
Discharge: HOME | End: 2022-11-29
Attending: SPECIALIST | Admitting: SPECIALIST
Payer: MEDICARE

## 2022-11-29 ENCOUNTER — APPOINTMENT (OUTPATIENT)
Dept: GENERAL RADIOLOGY | Age: 47
End: 2022-11-29
Attending: SPECIALIST
Payer: MEDICARE

## 2022-11-29 ENCOUNTER — ANESTHESIA (OUTPATIENT)
Dept: OPERATING ROOM | Age: 47
End: 2022-11-29
Payer: MEDICARE

## 2022-11-29 ENCOUNTER — ANESTHESIA EVENT (OUTPATIENT)
Dept: OPERATING ROOM | Age: 47
End: 2022-11-29
Payer: MEDICARE

## 2022-11-29 VITALS
HEART RATE: 78 BPM | RESPIRATION RATE: 22 BRPM | OXYGEN SATURATION: 96 % | SYSTOLIC BLOOD PRESSURE: 122 MMHG | DIASTOLIC BLOOD PRESSURE: 78 MMHG | TEMPERATURE: 98.2 F

## 2022-11-29 DIAGNOSIS — N20.1 LEFT URETERAL STONE: ICD-10-CM

## 2022-11-29 LAB — GLUCOSE BLD-MCNC: 103 MG/DL (ref 65–105)

## 2022-11-29 PROCEDURE — C2617 STENT, NON-COR, TEM W/O DEL: HCPCS | Performed by: SPECIALIST

## 2022-11-29 PROCEDURE — 7100000000 HC PACU RECOVERY - FIRST 15 MIN: Performed by: SPECIALIST

## 2022-11-29 PROCEDURE — 3600000013 HC SURGERY LEVEL 3 ADDTL 15MIN: Performed by: SPECIALIST

## 2022-11-29 PROCEDURE — 7100000001 HC PACU RECOVERY - ADDTL 15 MIN: Performed by: SPECIALIST

## 2022-11-29 PROCEDURE — 2580000003 HC RX 258: Performed by: STUDENT IN AN ORGANIZED HEALTH CARE EDUCATION/TRAINING PROGRAM

## 2022-11-29 PROCEDURE — 6360000002 HC RX W HCPCS: Performed by: STUDENT IN AN ORGANIZED HEALTH CARE EDUCATION/TRAINING PROGRAM

## 2022-11-29 PROCEDURE — 6370000000 HC RX 637 (ALT 250 FOR IP): Performed by: STUDENT IN AN ORGANIZED HEALTH CARE EDUCATION/TRAINING PROGRAM

## 2022-11-29 PROCEDURE — C1769 GUIDE WIRE: HCPCS | Performed by: SPECIALIST

## 2022-11-29 PROCEDURE — 7100000011 HC PHASE II RECOVERY - ADDTL 15 MIN: Performed by: SPECIALIST

## 2022-11-29 PROCEDURE — 87086 URINE CULTURE/COLONY COUNT: CPT

## 2022-11-29 PROCEDURE — C1758 CATHETER, URETERAL: HCPCS | Performed by: SPECIALIST

## 2022-11-29 PROCEDURE — 6360000002 HC RX W HCPCS: Performed by: NURSE ANESTHETIST, CERTIFIED REGISTERED

## 2022-11-29 PROCEDURE — 3700000001 HC ADD 15 MINUTES (ANESTHESIA): Performed by: SPECIALIST

## 2022-11-29 PROCEDURE — 3209999900 FLUORO FOR SURGICAL PROCEDURES

## 2022-11-29 PROCEDURE — 2709999900 HC NON-CHARGEABLE SUPPLY: Performed by: SPECIALIST

## 2022-11-29 PROCEDURE — 7100000010 HC PHASE II RECOVERY - FIRST 15 MIN: Performed by: SPECIALIST

## 2022-11-29 PROCEDURE — 6370000000 HC RX 637 (ALT 250 FOR IP): Performed by: SPECIALIST

## 2022-11-29 PROCEDURE — 6360000002 HC RX W HCPCS: Performed by: SPECIALIST

## 2022-11-29 PROCEDURE — 82947 ASSAY GLUCOSE BLOOD QUANT: CPT

## 2022-11-29 PROCEDURE — 2500000003 HC RX 250 WO HCPCS: Performed by: NURSE ANESTHETIST, CERTIFIED REGISTERED

## 2022-11-29 PROCEDURE — 3700000000 HC ANESTHESIA ATTENDED CARE: Performed by: SPECIALIST

## 2022-11-29 PROCEDURE — 3600000003 HC SURGERY LEVEL 3 BASE: Performed by: SPECIALIST

## 2022-11-29 PROCEDURE — 2580000003 HC RX 258: Performed by: SPECIALIST

## 2022-11-29 DEVICE — URETERAL STENT
Type: IMPLANTABLE DEVICE | Status: FUNCTIONAL
Brand: CONTOUR™

## 2022-11-29 RX ORDER — GLYCOPYRROLATE 0.2 MG/ML
INJECTION INTRAMUSCULAR; INTRAVENOUS PRN
Status: DISCONTINUED | OUTPATIENT
Start: 2022-11-29 | End: 2022-11-29 | Stop reason: SDUPTHER

## 2022-11-29 RX ORDER — ONDANSETRON 2 MG/ML
INJECTION INTRAMUSCULAR; INTRAVENOUS PRN
Status: DISCONTINUED | OUTPATIENT
Start: 2022-11-29 | End: 2022-11-29 | Stop reason: SDUPTHER

## 2022-11-29 RX ORDER — ACETAMINOPHEN 325 MG/1
650 TABLET ORAL
Status: COMPLETED | OUTPATIENT
Start: 2022-11-29 | End: 2022-11-29

## 2022-11-29 RX ORDER — SULFAMETHOXAZOLE AND TRIMETHOPRIM 800; 160 MG/1; MG/1
1 TABLET ORAL 2 TIMES DAILY
Qty: 14 TABLET | Refills: 0 | Status: SHIPPED | OUTPATIENT
Start: 2022-11-29 | End: 2022-12-01 | Stop reason: SINTOL

## 2022-11-29 RX ORDER — FENTANYL CITRATE 50 UG/ML
INJECTION, SOLUTION INTRAMUSCULAR; INTRAVENOUS PRN
Status: DISCONTINUED | OUTPATIENT
Start: 2022-11-29 | End: 2022-11-29 | Stop reason: SDUPTHER

## 2022-11-29 RX ORDER — ACETAMINOPHEN 160 MG/5ML
650 SOLUTION ORAL ONCE
Status: DISCONTINUED | OUTPATIENT
Start: 2022-11-29 | End: 2022-11-29

## 2022-11-29 RX ORDER — CEFADROXIL 500 MG/1
500 CAPSULE ORAL 2 TIMES DAILY
Qty: 10 CAPSULE | Refills: 0 | Status: SHIPPED | OUTPATIENT
Start: 2022-11-29 | End: 2022-11-29 | Stop reason: HOSPADM

## 2022-11-29 RX ORDER — DIPHENHYDRAMINE HYDROCHLORIDE 50 MG/ML
INJECTION INTRAMUSCULAR; INTRAVENOUS PRN
Status: DISCONTINUED | OUTPATIENT
Start: 2022-11-29 | End: 2022-11-29 | Stop reason: SDUPTHER

## 2022-11-29 RX ORDER — FENTANYL CITRATE 50 UG/ML
INJECTION, SOLUTION INTRAMUSCULAR; INTRAVENOUS
Status: DISCONTINUED
Start: 2022-11-29 | End: 2022-11-29 | Stop reason: HOSPADM

## 2022-11-29 RX ORDER — FENTANYL CITRATE 50 UG/ML
50 INJECTION, SOLUTION INTRAMUSCULAR; INTRAVENOUS ONCE
Status: COMPLETED | OUTPATIENT
Start: 2022-11-29 | End: 2022-11-29

## 2022-11-29 RX ORDER — MAGNESIUM HYDROXIDE 1200 MG/15ML
LIQUID ORAL CONTINUOUS PRN
Status: COMPLETED | OUTPATIENT
Start: 2022-11-29 | End: 2022-11-29

## 2022-11-29 RX ORDER — SCOLOPAMINE TRANSDERMAL SYSTEM 1 MG/1
1 PATCH, EXTENDED RELEASE TRANSDERMAL
Status: DISCONTINUED | OUTPATIENT
Start: 2022-11-29 | End: 2022-11-29 | Stop reason: HOSPADM

## 2022-11-29 RX ORDER — ONDANSETRON 2 MG/ML
4 INJECTION INTRAMUSCULAR; INTRAVENOUS
Status: COMPLETED | OUTPATIENT
Start: 2022-11-29 | End: 2022-11-29

## 2022-11-29 RX ORDER — SODIUM CHLORIDE, SODIUM LACTATE, POTASSIUM CHLORIDE, CALCIUM CHLORIDE 600; 310; 30; 20 MG/100ML; MG/100ML; MG/100ML; MG/100ML
INJECTION, SOLUTION INTRAVENOUS CONTINUOUS
Status: DISCONTINUED | OUTPATIENT
Start: 2022-11-29 | End: 2022-11-29 | Stop reason: HOSPADM

## 2022-11-29 RX ORDER — LIDOCAINE HYDROCHLORIDE 10 MG/ML
INJECTION, SOLUTION EPIDURAL; INFILTRATION; INTRACAUDAL; PERINEURAL PRN
Status: DISCONTINUED | OUTPATIENT
Start: 2022-11-29 | End: 2022-11-29 | Stop reason: SDUPTHER

## 2022-11-29 RX ORDER — HYDRALAZINE HYDROCHLORIDE 20 MG/ML
10 INJECTION INTRAMUSCULAR; INTRAVENOUS
Status: DISCONTINUED | OUTPATIENT
Start: 2022-11-29 | End: 2022-11-29 | Stop reason: HOSPADM

## 2022-11-29 RX ORDER — ROCURONIUM BROMIDE 10 MG/ML
INJECTION, SOLUTION INTRAVENOUS PRN
Status: DISCONTINUED | OUTPATIENT
Start: 2022-11-29 | End: 2022-11-29 | Stop reason: SDUPTHER

## 2022-11-29 RX ORDER — SODIUM CHLORIDE 9 MG/ML
INJECTION, SOLUTION INTRAVENOUS PRN
Status: DISCONTINUED | OUTPATIENT
Start: 2022-11-29 | End: 2022-11-29 | Stop reason: HOSPADM

## 2022-11-29 RX ORDER — SODIUM CHLORIDE 0.9 % (FLUSH) 0.9 %
5-40 SYRINGE (ML) INJECTION EVERY 12 HOURS SCHEDULED
Status: DISCONTINUED | OUTPATIENT
Start: 2022-11-29 | End: 2022-11-29 | Stop reason: HOSPADM

## 2022-11-29 RX ORDER — ACETAMINOPHEN 160 MG/5ML
1000 SOLUTION ORAL ONCE
Status: DISCONTINUED | OUTPATIENT
Start: 2022-11-29 | End: 2022-11-29

## 2022-11-29 RX ORDER — NEOSTIGMINE METHYLSULFATE 5 MG/5 ML
SYRINGE (ML) INTRAVENOUS PRN
Status: DISCONTINUED | OUTPATIENT
Start: 2022-11-29 | End: 2022-11-29 | Stop reason: SDUPTHER

## 2022-11-29 RX ORDER — TAMSULOSIN HYDROCHLORIDE 0.4 MG/1
0.4 CAPSULE ORAL DAILY
Qty: 30 CAPSULE | Refills: 0 | Status: SHIPPED | OUTPATIENT
Start: 2022-11-29 | End: 2022-12-04

## 2022-11-29 RX ORDER — PROPOFOL 10 MG/ML
INJECTION, EMULSION INTRAVENOUS PRN
Status: DISCONTINUED | OUTPATIENT
Start: 2022-11-29 | End: 2022-11-29 | Stop reason: SDUPTHER

## 2022-11-29 RX ORDER — SODIUM CHLORIDE 0.9 % (FLUSH) 0.9 %
5-40 SYRINGE (ML) INJECTION PRN
Status: DISCONTINUED | OUTPATIENT
Start: 2022-11-29 | End: 2022-11-29 | Stop reason: HOSPADM

## 2022-11-29 RX ORDER — OXYBUTYNIN CHLORIDE 5 MG/1
5 TABLET ORAL 3 TIMES DAILY PRN
Qty: 90 TABLET | Refills: 3 | Status: SHIPPED | OUTPATIENT
Start: 2022-11-29

## 2022-11-29 RX ORDER — TAMSULOSIN HYDROCHLORIDE 0.4 MG/1
0.4 CAPSULE ORAL DAILY
Qty: 30 CAPSULE | Refills: 0 | Status: SHIPPED | OUTPATIENT
Start: 2022-11-29 | End: 2022-12-29

## 2022-11-29 RX ADMIN — FENTANYL CITRATE 50 MCG: 50 INJECTION INTRAMUSCULAR; INTRAVENOUS at 08:39

## 2022-11-29 RX ADMIN — GLYCOPYRROLATE 0.4 MG: 0.2 INJECTION INTRAMUSCULAR; INTRAVENOUS at 09:39

## 2022-11-29 RX ADMIN — ONDANSETRON 4 MG: 2 INJECTION INTRAMUSCULAR; INTRAVENOUS at 09:39

## 2022-11-29 RX ADMIN — ONDANSETRON 4 MG: 2 INJECTION INTRAMUSCULAR; INTRAVENOUS at 09:59

## 2022-11-29 RX ADMIN — Medication 3000 MG: at 09:18

## 2022-11-29 RX ADMIN — FENTANYL CITRATE 100 MCG: 50 INJECTION, SOLUTION INTRAMUSCULAR; INTRAVENOUS at 09:12

## 2022-11-29 RX ADMIN — ACETAMINOPHEN 650 MG: 325 TABLET ORAL at 10:32

## 2022-11-29 RX ADMIN — SODIUM CHLORIDE, POTASSIUM CHLORIDE, SODIUM LACTATE AND CALCIUM CHLORIDE: 600; 310; 30; 20 INJECTION, SOLUTION INTRAVENOUS at 08:51

## 2022-11-29 RX ADMIN — LIDOCAINE HYDROCHLORIDE 50 MG: 10 INJECTION, SOLUTION EPIDURAL; INFILTRATION; INTRACAUDAL; PERINEURAL at 09:12

## 2022-11-29 RX ADMIN — DIPHENHYDRAMINE HYDROCHLORIDE 12.5 MG: 50 INJECTION, SOLUTION INTRAMUSCULAR; INTRAVENOUS at 09:20

## 2022-11-29 RX ADMIN — PROPOFOL 150 MG: 10 INJECTION, EMULSION INTRAVENOUS at 09:12

## 2022-11-29 RX ADMIN — Medication 4 MG: at 09:39

## 2022-11-29 RX ADMIN — ROCURONIUM BROMIDE 30 MG: 10 INJECTION, SOLUTION INTRAVENOUS at 09:12

## 2022-11-29 ASSESSMENT — PAIN DESCRIPTION - ORIENTATION
ORIENTATION: LEFT
ORIENTATION: ANTERIOR

## 2022-11-29 ASSESSMENT — PAIN SCALES - GENERAL
PAINLEVEL_OUTOF10: 3
PAINLEVEL_OUTOF10: 3
PAINLEVEL_OUTOF10: 7
PAINLEVEL_OUTOF10: 3
PAINLEVEL_OUTOF10: 5

## 2022-11-29 ASSESSMENT — PAIN DESCRIPTION - LOCATION
LOCATION: BACK;GROIN
LOCATION: HEAD
LOCATION: HEAD

## 2022-11-29 ASSESSMENT — PAIN - FUNCTIONAL ASSESSMENT: PAIN_FUNCTIONAL_ASSESSMENT: 0-10

## 2022-11-29 ASSESSMENT — PAIN DESCRIPTION - DESCRIPTORS: DESCRIPTORS: OTHER (COMMENT)

## 2022-11-29 NOTE — ANESTHESIA PRE PROCEDURE
Department of Anesthesiology  Preprocedure Note       Name:  Maya Ambriz   Age:  52 y.o.  :  1975                                          MRN:  [id number]         Date:  2022      Surgeon: Marilee Grajeda):  Jenni Adler MD    Procedure: Procedure(s):  HOLMIUM LASER, CYSTOSCOPY, URETEROSCOPY, STENT PLACEMENT    Medications prior to admission:   Prior to Admission medications    Medication Sig Start Date End Date Taking?  Authorizing Provider   ondansetron (ZOFRAN ODT) 4 MG disintegrating tablet Take 1 tablet by mouth every 8 hours as needed for Nausea 22   Uzma Delacruz MD   tamsulosin [facility]) 0.4 MG capsule Take 1 capsule by mouth daily for 5 doses  Patient not taking: Reported on 2022  Uzma Delacruz MD   ibuprofen (ADVIL;MOTRIN) 800 MG tablet Take 1 tablet by mouth every 8 hours as needed for Pain 22   Uzma Delacruz MD   fluticasone Judi Grieves) 50 MCG/ACT nasal spray 2 sprays by Nasal route daily 10/25/22   JUSTICE Kellogg CNP   fluticasone (FLONASE) 50 MCG/ACT nasal spray 2 sprays by Nasal route daily 9/15/22   Jannifer Eisenmenger, APRN - CNP   losartan (COZAAR) 50 MG tablet Take 1 tablet by mouth daily 22   JUSTICE Bailey CNP   Oyster Shell 500 MG TABS Take 1 tablet by mouth 3 times daily 22   JUSTICE Bailey CNP   Multiple Vitamins-Minerals (THERAPEUTIC MULTIVITAMIN-MINERALS) tablet Take 1 tablet by mouth daily 22  JUSTICE Bailey CNP   vitamin B-12 (CYANOCOBALAMIN) 1000 MCG tablet Take 1,000 mcg by mouth daily    Historical Provider, MD   zinc 50 MG CAPS Take 50 mg by mouth daily 22  Binu Flores MD   busPIRone (BUSPAR) 10 MG tablet TAKE 1 TABLET BY MOUTH THREE TIMES A DAY AS NEEDED FOR ANXIETY 22   JUSTICE Bailey CNP   vitamin D3 (CHOLECALCIFEROL) 25 MCG (1000 UT) TABS tablet Take 1 tablet by mouth daily 4/15/22   Binu Flores MD   Nutritional Supplements (Pradip Camejo PO) Take by mouth daily    Historical Provider, MD   acetaminophen (AMINOFEN) 325 MG tablet Take 2 tablets by mouth every 6 hours as needed for Pain 10/26/21   Casey Harden, DO   Elastic Bandages & Supports (Imelda Flanagan 20-30MMHG LG) MISC Wear daily, ok to remove in the evenings 1/4/21   Sanford Villegas MD   Multiple Vitamins-Minerals (MULTI-JULIO CÉSAR PO) Take by mouth daily    Historical Provider, MD   calcium carbonate (OYSTER SHELL CALCIUM 500 MG) 1250 (500 Ca) MG tablet Take 1 tablet by mouth daily    Historical Provider, MD       Current medications:    Current Outpatient Medications   Medication Sig Dispense Refill    ondansetron (ZOFRAN ODT) 4 MG disintegrating tablet Take 1 tablet by mouth every 8 hours as needed for Nausea 12 tablet 0    tamsulosin (FLOMAX) 0.4 MG capsule Take 1 capsule by mouth daily for 5 doses (Patient not taking: Reported on 11/28/2022) 30 capsule 0    ibuprofen (ADVIL;MOTRIN) 800 MG tablet Take 1 tablet by mouth every 8 hours as needed for Pain 30 tablet 0    fluticasone (FLONASE) 50 MCG/ACT nasal spray 2 sprays by Nasal route daily 16 g 3    fluticasone (FLONASE) 50 MCG/ACT nasal spray 2 sprays by Nasal route daily 16 g 3    losartan (COZAAR) 50 MG tablet Take 1 tablet by mouth daily 90 tablet 3    Oyster Shell 500 MG TABS Take 1 tablet by mouth 3 times daily 270 tablet 3    Multiple Vitamins-Minerals (THERAPEUTIC MULTIVITAMIN-MINERALS) tablet Take 1 tablet by mouth daily 90 tablet 3    vitamin B-12 (CYANOCOBALAMIN) 1000 MCG tablet Take 1,000 mcg by mouth daily      zinc 50 MG CAPS Take 50 mg by mouth daily 90 capsule 1    busPIRone (BUSPAR) 10 MG tablet TAKE 1 TABLET BY MOUTH THREE TIMES A DAY AS NEEDED FOR ANXIETY 90 tablet 5    vitamin D3 (CHOLECALCIFEROL) 25 MCG (1000 UT) TABS tablet Take 1 tablet by mouth daily 90 tablet 1    Nutritional Supplements (ESTROVEN PO) Take by mouth daily      acetaminophen (AMINOFEN) 325 MG tablet Take 2 tablets by mouth every 6 hours as needed for Pain 120 tablet 3    Elastic Bandages & Supports (JOBST ULTRASHEER 20-30MMHG LG) MISC Wear daily, ok to remove in the evenings 3 each 3    Multiple Vitamins-Minerals (MULTI-JULIO CÉSAR PO) Take by mouth daily      calcium carbonate (OYSTER SHELL CALCIUM 500 MG) 1250 (500 Ca) MG tablet Take 1 tablet by mouth daily       No current facility-administered medications for this visit. Allergies: Allergies   Allergen Reactions    Contrast [Iodides] Hives     Chest pain, HTN  Able to handle if the pre-medication prep is followed.  Trileptal [Oxcarbazepine] Shortness Of Breath     Red neck rash/ visual changes    Morphine Other (See Comments)     Red streak up arm at iv site/ and hives    Nsaids Other (See Comments)     Not supposed to take d/t gastric bypass       Problem List:    Patient Active Problem List   Diagnosis Code    Iron deficiency anemia D50.9    Intestinal malabsorption K90.9    Anxiety F41.9    Depression F32. A    Malabsorption of iron K90.9    Vitamin D deficiency E55.9    Zinc deficiency E60    Peripheral arterial occlusive disease (HCC) I77.9    Endometriosis N80.9    History of scleroderma Z87.39    May-Thurner syndrome I87.1    Bariatric surgery status Z98.84    Calculus of kidney N20.0    Suspected COVID-19 virus infection Z20.822    Hysteroscopy, D&C, Polypectomy 5/12/21 Z98.890    Post-op pain G89.18    Abnormal uterine bleeding due to endometrial polyp N93.9, N84.0    RALH, BSO, Cytologic washings, cystoscopy Z90.710    Menometrorrhagia N92.1    Lesion of uterus N85.9    Encounter for screening for COVID-19 Z11.52    Left ureteral calculus N20.1    Family history of kidney stones Z84.1       Past Medical History:        Diagnosis Date    Anxiety     Carpal tunnel syndrome     COVID-19 11/06/2021    Depression     Endometriosis     Gestational diabetes     x 3 children    Headache     migraines    History of blood transfusion     Iron deficiency anemia     Kidney stones     Dr. Betsy Drake seen 2022    May-Thurner syndrome     Obesity     Scleroderma ([facility] [address]     Snores     Under care of team     PCP - Brigitte Castro CNP - LAST VISIT 2022    Under care of team     VASCULAR  - DR. RIBEIRO - 2021 - SEES FOR MAY-THURNER SYNDROME    Under care of team     HEM/ONC - DR. PERKINS - DINAH WHITMORE - LAST VISIT - 2022    URI (upper respiratory infection) 10/23/2022    starting 10/23/2022 cough, fever, fatigue, sorethroat, congestion, seen by urgent care 10/25/22, ER on 10/29/22 for SOB, dizziness and N&V    Wears glasses     Wellness examination     follows with Behavioral Health, last seen 2022       Past Surgical History:        Procedure Laterality Date     SECTION  x 3    CHOLECYSTECTOMY      DILATION AND CURETTAGE OF UTERUS N/A 2021    uterine ablation    GASTRIC BYPASS SURGERY  2009    PABLO- EN- Y    HYSTERECTOMY (CERVIX STATUS UNKNOWN) N/A 10/26/2021    XI ROBOTIC LAPAROSCOPIC MODIFIED RADICAL HYSTERECTOMY, BILATERAL SALPINGO OOPHORECTOMY, CYTOLOGIC WASHINGS, CYSTOSCOPY performed by Felice Camejo MD at [facility] V[facility]ULAR SURGERY  2016    to evaluate May Rader syndrome effects on  blood vessels       Social History:    Social History     Tobacco Use    Smoking status: Never    Smokeless tobacco: Never   Substance Use Topics    Alcohol use: No                                Counseling given: Not Answered      Vital Signs (Current): There were no vitals filed for this visit.                                            BP Readings from Last 3 Encounters:   22 (!) 135/95   10/30/22 134/82   10/25/22 134/86       NPO Status:                                                                                 BMI:   Wt Readings from Last 3 Encounters:   22 290 lb (131.5 kg)   22 290 lb (131.5 kg)   10/29/22 290 lb (131.5 kg)     There is no height or weight on file to calculate BMI.    CBC:   Lab Results   Component Value Date/Time    WBC 6.9 11/23/2022 01:04 PM    RBC 4.73 11/23/2022 01:04 PM    HGB 13.6 11/23/2022 01:04 PM    HCT 41.1 11/23/2022 01:04 PM    MCV 86.8 11/23/2022 01:04 PM    RDW 14.7 11/23/2022 01:04 PM     11/23/2022 01:04 PM       CMP:   Lab Results   Component Value Date/Time     11/23/2022 01:04 PM    K 4.4 11/23/2022 01:04 PM     11/23/2022 01:04 PM    CO2 25 11/23/2022 01:04 PM    BUN 13 11/23/2022 01:04 PM    CREATININE 1.01 11/23/2022 01:04 PM    GFRAA >60 04/08/2022 03:30 PM    LABGLOM >60 11/23/2022 01:04 PM    GLUCOSE 104 11/23/2022 01:04 PM    PROT 8.0 10/29/2022 10:49 PM    CALCIUM 9.1 11/23/2022 01:04 PM    BILITOT 0.2 10/29/2022 10:49 PM    ALKPHOS 98 10/29/2022 10:49 PM    AST 27 10/29/2022 10:49 PM    ALT 48 10/29/2022 10:49 PM       POC Tests: No results for input(s): POCGLU, POCNA, POCK, POCCL, POCBUN, POCHEMO, POCHCT in the last 72 hours.     Coags: No results found for: PROTIME, INR, APTT    HCG (If Applicable):   Lab Results   Component Value Date    HCG NEGATIVE 10/26/2021    HCGQUANT <1 03/18/2021        ABGs: No results found for: PHART, PO2ART, JHU1TJF, JUZ7DFJ, BEART, J5JMZTHX     Type & Screen (If Applicable):  No results found for: LABABO, LABRH    Drug/Infectious Status (If Applicable):  No results found for: HIV, HEPCAB    COVID-19 Screening (If Applicable):   Lab Results   Component Value Date/Time    COVID19 Not Detected 10/25/2022 12:58 AM           Anesthesia Evaluation  Patient summary reviewed and Nursing notes reviewed history of anesthetic complications:   Airway: Mallampati: II  TM distance: >3 FB   Neck ROM: full  Mouth opening: > = 3 FB   Dental: normal exam         Pulmonary:Negative Pulmonary ROS and normal exam    (+) recent URI: resolved,                             Cardiovascular:  Exercise tolerance: good (>4 METS),       (-) past MI, CAD, CABG/stent and  angina    ECG reviewed  Rhythm: regular  Rate: normal           Beta Blocker:  Not on Beta Blocker         Neuro/Psych:   (+) neuromuscular disease:, headaches:, psychiatric history:depression/anxiety             GI/Hepatic/Renal: Neg GI/Hepatic/Renal ROS  (+) renal disease: kidney stones, morbid obesity          Endo/Other:    (+) : arthritis:., .                 Abdominal:   (+) obese,           Vascular:           ROS comment: May Thurner syndrome. Other Findings:             Anesthesia Plan      general     ASA 3     (Scopolamine patch)  Induction: intravenous. MIPS: Postoperative opioids intended and Prophylactic antiemetics administered. Anesthetic plan and risks discussed with patient. Plan discussed with CRNA.                     Jenny Ayala MD   11/29/2022

## 2022-11-29 NOTE — OP NOTE
Operative Note      Patient: Maria G Glez  YOB: 1975  MRN: [id number]    Date of Procedure: 11/29/2022    Pre-Op Diagnosis: LEFT URETERAL CALCULUS    Post-Op Diagnosis: Same       Procedure(s):  Cystoscopy  Left ureteroscopy  Left ureteral stent insertion    Surgeon(s):  Brandon Collet, MD    Assistant:   Aixa Castañeda MD    Anesthesia: General    Estimated Blood Loss (mL): Minimal    Complications: None    Specimens:   ID Type Source Tests Collected by Time Destination   1 : URINE FOR CULTURE Urine Urine, Cystoscopic CULTURE, URINE Brandon Collet, MD 11/29/2022 0567        Implants:  Implant Name Type Inv. Item Serial No.  Lot No. LRB No. Used Action   STENT URET 6FR L22CM PERCFLX HYDR+ SFT PGTL TAPR TIP GRAD - [id number]  STENT URET 6FR L22CM PERCFLX HYDR+ SFT PGTL TAPR TIP GRAD  [email or web address] [facility]-WD [id number] Left 1 Implanted         Drains: * No LDAs found *    Findings:   Left ureteroscopy: left ureteropelvic junction stone    Indications: The patient is a 52 y.o. female who presents with 8 mm left UPJ calculus on CT. Patient was explained the risks and benefits of the procedure and he/she elected to proceed. Informed consent was obtained. Narrative of the Procedure:    After informed consent was obtained in the preoperative area, the patient was taken back to the operating room and transferred to the operating table in supine position. EPC cuffs were placed. The machine was turned on. Anesthesia was induced and antibiotics were given. The patient was placed in modified dorsal lithotomy position and sterilely prepped and draped in a standard fashion. A timeout occurred. Two patient identifiers were used. We entered the urethra with a 22 Frisian cystoscope with 30 degree lens. The left ureteral orifice was visualized and carefully cannulated with a straight 0.035 Glidewire. This was advanced into the kidney with fluoroscopic guidance.  A dual lumen ureteral catheter was then used to place a second 0.035 Glidewire into the kidney, also using fluoroscopic guidance. The dual-lumen catheter was also advanced to the left proximal ureter to dilate the left ureter. Then the dual-lumen catheter was removed. The flexible ureteroscope was assembled, place over the Glidewire, and advanced up the left ureter. The second wire remained in place as a safety. There was an 8 mm ureteral stone at the left ureteropelvic junction seen on ureteroscopy, however distal to it there was narrowing in the ureter that does not allow the ureteroscope to pass. Thus the decision was made to place a stent and then bring the patient back for ureteroscopy with holmium laser lithotripsy. To place the stent a 22 Turkmen rigid cystoscope was back loaded over the wire. Under direct visualization a 6Fr x 22 cm stent was advanced until it was in proper location. The Glidewire was then removed. A curl could be seen in the right renal pelvis under using fluoroscopic vision, and in the bladder under direct visualization. The patient's bladder was drained. All instrumentation was removed. A string was not left on the stent. The patient was then awakened, extubated, and discharged back to the PACU in good and stable condition. Dr. Betsy Drake was scrubbed and present for the entirety of the surgery. Disposition: Patient is discharged in stable condition from the PACU with oral antibiotics, oral pain medications, flomax, and oxybutynin. Patient is instructed t patient will follow-up for definitive stone treatment. Smith Jacobs MD  Urology Resident, PGY-3    Electronically signed on 11/29/2022 at 1:53 PM      Electronically signed by Jeannette Granados MD on 11/29/2022 at 1:53 PM    I was present and scrubbed for the critical portions or entire case.   Electronically signed by Layla Daigle MD on 11/29/2022 at 2:26 PM

## 2022-11-29 NOTE — BRIEF OP NOTE
Brief Postoperative Note      Patient: Sada Howell  YOB: 1975  MRN: [id number]    Date of Procedure: 11/29/2022    Pre-Op Diagnosis: LEFT URETERAL CALCULUS    Post-Op Diagnosis: Same       Procedure(s):  CYSTOSCOPY, URETEROSCOPY, STENT PLACEMENT, LEFT    Surgeon(s):  Iman Koo MD    Assistant:  Shannon Cortes MD    Anesthesia: General    Estimated Blood Loss (mL): Minimal    Complications: None    Specimens:   ID Type Source Tests Collected by Time Destination   1 : URINE FOR CULTURE Urine Urine, Cystoscopic CULTURE, URINE Iman Koo MD 11/29/2022 4485        Implants:  Implant Name Type Inv.  Item Serial No.  Lot No. LRB No. Used Action   STENT URET 6FR L22CM PERCFLX HYDR+ SFT PGTL TAPR TIP GRAD - [id number]  STENT URET 6FR L22CM PERCFLX HYDR+ SFT PGTL TAPR TIP GRAD  [facility]OLOG[facility]- [id number] Left 1 Implanted         Drains: * No LDAs found *    Findings:   Left ureteroscopy: left ureteropelvic junction stone    Electronically signed by Shannon Cortes MD on 11/29/2022 at 9:46 AM

## 2022-11-29 NOTE — DISCHARGE INSTRUCTIONS
Ureteroscopy Discharge Instructions: Take prescriptions as directed, ensuring you take entire course of antibiotic. No driving while taking narcotic pain medication. Wean off narcotics as soon as able. OK to shower after discharge. May resume regular diet. No heavy lifting >10lbs day of procedure, avoid strenuous activity, may walk. You may have a string taped to your pelvis, genitalia, or inner thigh. Please take care while showering/wiping that you do not tug on the string and dislodge your indwelling stent early. OK to remove stent by pulling the string straight out in 5 days. Please call the office if you have difficulty removing your stent  You may see blood in the urine after the procedure and entire time stent is in place. This should resolve over the next couple days. Please stay hydrated. You may experience flank pain, and/or frequency/urgency of urination while the stent is in place. Please use Flomax (Tamsulosin) to help with these symptoms. Please call attending physician or hospital  with questions. Please call or present to ED for fever >101 F, intractable nausea and vomiting, or uncontrolled pain. Follow up with Dr. Inocencio Marcos in 6 weeks with renal ultrasound prior to appointment. Call office to confirm appointment. Pt should pull stent in the morning of 12/4. There may be some pain associated with the stent removal, which is usually self limiting. We suggest using the pain medication prescribed for you and a nonsteroidal anti-inflammatory such as Ibuprofen, if you are able to take this medication, to control symptoms. Take Ibuprofen as directed for 24 hrs after stent pull. Please stay hydrated. Please call with questions.

## 2022-11-29 NOTE — LETTER
Gamaliel Tapia MD [address], [address]  [facility], [address] [facility]  P: [phone] / F: [phone]    Brief Postoperative Note  Surgical Facility: [facility]   11/29/22    Juliet Kelly  [id number]  1975    Dear Lisa Gabriel, JUSTICE - CNP,    I've enclosed a Brief Op note on a procedure performed on your patient, Juliet Kelly today. Pre-operative Diagnosis: Proximal left ureteral calculus  Post-operative Diagnosis: Same    Procedure: Cystoscopy and Left ureteral stent insertion ([id number]), Left ureteroscopy     Anesthesia: General  Surgeon: Venora Cushing; Resident: Chris Aldrich MD     Findings: Unable to reach stone in proximal ureter due to narrowing of ureter in area of stone. Urine a little cloudy after placement of a wire. Urine sent for C&S to rule out infection. Plan: Set up Cystoscopy, Left ureteroscopy and Holmium laser lithotripsy with ureteral stent exchange in 1-2 weeks depending on C&S results. Thank you for allowing me to participate in the care of this patient. I will keep you updated on this patient's follow up and I look forward to serving you and your patients again in the future.         Electronically signed by Jorge Luis Jerome MD, FACS

## 2022-11-29 NOTE — H&P
History and Physical    Patient:  Tan Kebede  MRN: [id number]  YOB: 1975    CHIEF COMPLAINT:  Left UPJ calculus    HISTORY OF PRESENT ILLNESS:   The patient is a 52 y.o. female who presents with 8 mm left UPJ calculus on CT. Past Medical History:    Past Medical History:   Diagnosis Date    Anxiety     Carpal tunnel syndrome     COVID-19 2021    Depression     Endometriosis     Gestational diabetes     x 3 children    Headache     migraines    History of blood transfusion     Iron deficiency anemia     Kidney stones     Dr. Saba Maki seen 2022    May-Thurner syndrome     Obesity     Scleroderma ([address]     Snores     Under care of team     PCP - Onelia Vail CNP - LAST VISIT 2022    Under care of team     VASCULAR  - DR. RIBEIRO - 2021 - SEES FOR MAY-THURNER SYNDROME    Under care of team     HEM/ONC - DR. PERKINS - DINAH WHITMORE - LAST VISIT - 2022    URI (upper respiratory infection) 10/23/2022    starting 10/23/2022 cough, fever, fatigue, sorethroat, congestion, seen by urgent care 10/25/22, ER on 10/29/22 for SOB, dizziness and N&V    Wears glasses     Wellness examination     follows with Behavioral Health, last seen 2022       Past Surgical History:    Past Surgical History:   Procedure Laterality Date     SECTION  x 3    CHOLECYSTECTOMY      DILATION AND CURETTAGE OF UTERUS N/A 2021    uterine ablation    GASTRIC BYPASS SURGERY  2009    PABLO- EN- Y    HYSTERECTOMY (CERVIX STATUS UNKNOWN) N/A 10/26/2021    XI ROBOTIC LAPAROSCOPIC MODIFIED RADICAL HYSTERECTOMY, BILATERAL SALPINGO OOPHORECTOMY, CYTOLOGIC WASHINGS, CYSTOSCOPY performed by Sonali Faith MD at [facility]      endometriosis    TOENAIL EXCISION      VASCULAR SURGERY  2016    to evaluate May Rader syndrome effects on  blood vessels       Medications Prior to Admission:    Prior to Admission medications    Medication Sig Start Date End Date Taking?  Authorizing Provider   ondansetron (ZOFRAN ODT) 4 MG disintegrating tablet Take 1 tablet by mouth every 8 hours as needed for Nausea 11/23/22   Vandana Rodriguez MD   tamsulosin [facility]) 0.4 MG capsule Take 1 capsule by mouth daily for 5 doses  Patient not taking: Reported on 11/28/2022 11/23/22 11/28/22  Vandana Rodriguez MD   ibuprofen (ADVIL;MOTRIN) 800 MG tablet Take 1 tablet by mouth every 8 hours as needed for Pain 11/23/22   Vandana Rodriguez MD   fluticasone Jennifer Sails) 50 MCG/ACT nasal spray 2 sprays by Nasal route daily 10/25/22   JUSTICE Kellogg - CNP   fluticasone (FLONASE) 50 MCG/ACT nasal spray 2 sprays by Nasal route daily 9/15/22   Reinier Stacy APRLESIA - CNP   losartan (COZAAR) 50 MG tablet Take 1 tablet by mouth daily 8/31/22   Oscar Delay, APRN - CNP   Oyster Shell 500 MG TABS Take 1 tablet by mouth 3 times daily 8/31/22   Oscar Delay, APRN - CNP   Multiple Vitamins-Minerals (THERAPEUTIC MULTIVITAMIN-MINERALS) tablet Take 1 tablet by mouth daily 8/31/22 8/31/23  Oscar Delay, APRN - CNP   vitamin B-12 (CYANOCOBALAMIN) 1000 MCG tablet Take 1,000 mcg by mouth daily    Historical Provider, MD   zinc 50 MG CAPS Take 50 mg by mouth daily 8/19/22 11/17/22  Juarez Khan MD   busPIRone (BUSPAR) 10 MG tablet TAKE 1 TABLET BY MOUTH THREE TIMES A DAY AS NEEDED FOR ANXIETY 6/7/22   Luda Ledezma, APRN - CNP   vitamin D3 (CHOLECALCIFEROL) 25 MCG (1000 UT) TABS tablet Take 1 tablet by mouth daily 4/15/22   Juarez Khan MD   Nutritional Supplements (ESTROVEN PO) Take by mouth daily    Historical Provider, MD   acetaminophen (AMINOFEN) 325 MG tablet Take 2 tablets by mouth every 6 hours as needed for Pain 10/26/21   Evgeyn Singh DO   Elastic Bandages & Supports (JOBST ULTRASHEER 20-30MMHG LG) MISC Wear daily, ok to remove in the evenings 1/4/21   Di Gong MD   Multiple Vitamins-Minerals (MULTI-JULIO CÉSAR PO) Take by mouth daily    Historical Provider, MD   calcium carbonate (OYSTER SHELL CALCIUM 500 MG) 1250 (500 Ca) MG tablet Take 1 tablet by mouth daily    Historical Provider, MD       Allergies:  Contrast [iodides], Trileptal [oxcarbazepine], Morphine, and Nsaids    Social History:    Social History     Socioeconomic History    Marital status: Single     Spouse name: Not on file    Number of children: Not on file    Years of education: Not on file    Highest education level: Not on file   Occupational History    Not on file   Tobacco Use    Smoking status: Never    Smokeless tobacco: Never   Vaping Use    Vaping Use: Never used   Substance and Sexual Activity    Alcohol use: No    Drug use: No    Sexual activity: Not Currently   Other Topics Concern    Not on file   Social History Narrative    Not on file     Social Determinants of Health     Financial Resource Strain: High Risk    Difficulty of Paying Living Expenses: Very hard   Food Insecurity: Food Insecurity Present    Worried About Running Out of Food in the Last Year: Sometimes true    Ran Out of Food in the Last Year: Sometimes true   Transportation Needs: Not on file   Physical Activity: Not on file   Stress: Not on file   Social Connections: Not on file   Intimate Partner Violence: Not on file   Housing Stability: Not on file       Family History:    Family History   Problem Relation Age of Onset    Colon Cancer Mother     Early Death Father         accident    Diabetes Sister     High Blood Pressure Sister     Diabetes Brother     High Blood Pressure Brother     Diabetes Sister     High Blood Pressure Sister        REVIEW OF SYSTEMS:  Constitutional: negative  Eyes: negative  Respiratory: negative  Cardiovascular: negative  Gastrointestinal: negative  Genitourinary: see HPI  Musculoskeletal: negative  Skin: negative   Neurological: negative  Hematological/Lymphatic: negative  Psychological: negative      Physical Exam:      No data found. Constitutional: Patient in no acute distress;    Neuro: alert and oriented to person place and time.    Psych: Mood and affect normal.  Lungs: Respiratory effort normal  Cardiovascular:  Normal peripheral pulses. Regular rate. Abdomen: Soft, non-tender, non-distended        LABS:   No results for input(s): WBC, HGB, HCT, MCV, PLT in the last 72 hours. No results for input(s): NA, K, CL, CO2, PHOS, BUN, CREATININE, CA in the last 72 hours. No results found for: PSA    Additional Lab/culture results:    Urinalysis:   Recent Labs     11/28/22  1319   COLORU yellow   CLARITYU clear   LEUKOCYTESUR neg   BLOODU trace        -----------------------------------------------------------------  Imaging Results:    Assessment and Plan   Impression:    52 y.o. female with left 8 mm UPJ calculus    Plan:   OR today for Cystoscopy, Left ureteroscopy and Holmium laser lithotripsy with ureteral stent insertion ([id number]) under GA.     Hema Mcghee MD  9:22 PM 11/28/2022

## 2022-11-30 LAB
CULTURE: NO GROWTH
SPECIMEN DESCRIPTION: NORMAL

## 2022-12-01 ENCOUNTER — OFFICE VISIT (OUTPATIENT)
Dept: PRIMARY CARE CLINIC | Age: 47
End: 2022-12-01
Payer: MEDICARE

## 2022-12-01 ENCOUNTER — HOSPITAL ENCOUNTER (OUTPATIENT)
Age: 47
Setting detail: SPECIMEN
Discharge: HOME | End: 2022-12-01

## 2022-12-01 ENCOUNTER — TELEPHONE (OUTPATIENT)
Dept: PRIMARY CARE CLINIC | Age: 47
End: 2022-12-01

## 2022-12-01 VITALS
HEART RATE: 100 BPM | HEIGHT: 64 IN | SYSTOLIC BLOOD PRESSURE: 115 MMHG | WEIGHT: 290 LBS | OXYGEN SATURATION: 95 % | DIASTOLIC BLOOD PRESSURE: 70 MMHG | BODY MASS INDEX: 49.51 KG/M2

## 2022-12-01 DIAGNOSIS — R17 YELLOW EYES: ICD-10-CM

## 2022-12-01 DIAGNOSIS — F33.1 MODERATE EPISODE OF RECURRENT MAJOR DEPRESSIVE DISORDER (HCC): ICD-10-CM

## 2022-12-01 DIAGNOSIS — F41.9 ANXIETY: ICD-10-CM

## 2022-12-01 DIAGNOSIS — N28.1 INFECTED KIDNEY CYST: ICD-10-CM

## 2022-12-01 DIAGNOSIS — I10 MILD HYPERTENSION: ICD-10-CM

## 2022-12-01 DIAGNOSIS — Z96.0 URETERAL STENT PRESENT: ICD-10-CM

## 2022-12-01 DIAGNOSIS — N28.1 INFECTED KIDNEY CYST: Primary | ICD-10-CM

## 2022-12-01 DIAGNOSIS — N20.0 KIDNEY STONE: ICD-10-CM

## 2022-12-01 DIAGNOSIS — R74.8 ELEVATED LIVER ENZYMES: ICD-10-CM

## 2022-12-01 LAB
ALBUMIN SERPL-MCNC: 4.2 G/DL (ref 3.5–5.2)
ALBUMIN/GLOBULIN RATIO: 1.1 (ref 1–2.5)
ALP BLD-CCNC: 78 U/L (ref 35–104)
ALT SERPL-CCNC: 55 U/L (ref 5–33)
ANION GAP SERPL CALCULATED.3IONS-SCNC: 14 MMOL/L (ref 9–17)
AST SERPL-CCNC: 36 U/L
BILIRUB SERPL-MCNC: 0.3 MG/DL (ref 0.3–1.2)
BUN BLDV-MCNC: 12 MG/DL (ref 6–20)
CALCIUM SERPL-MCNC: 9.4 MG/DL (ref 8.6–10.4)
CHLORIDE BLD-SCNC: 100 MMOL/L (ref 98–107)
CO2: 26 MMOL/L (ref 20–31)
CREAT SERPL-MCNC: 1.08 MG/DL (ref 0.5–0.9)
GFR SERPL CREATININE-BSD FRML MDRD: >60 ML/MIN/1.73M2
GLUCOSE BLD-MCNC: 103 MG/DL (ref 70–99)
POTASSIUM SERPL-SCNC: 4.1 MMOL/L (ref 3.7–5.3)
SODIUM BLD-SCNC: 140 MMOL/L (ref 135–144)
TOTAL PROTEIN: 8.1 G/DL (ref 6.4–8.3)

## 2022-12-01 PROCEDURE — G8484 FLU IMMUNIZE NO ADMIN: HCPCS | Performed by: NURSE PRACTITIONER

## 2022-12-01 PROCEDURE — 3074F SYST BP LT 130 MM HG: CPT | Performed by: NURSE PRACTITIONER

## 2022-12-01 PROCEDURE — G8417 CALC BMI ABV UP PARAM F/U: HCPCS | Performed by: NURSE PRACTITIONER

## 2022-12-01 PROCEDURE — 3078F DIAST BP <80 MM HG: CPT | Performed by: NURSE PRACTITIONER

## 2022-12-01 PROCEDURE — 99214 OFFICE O/P EST MOD 30 MIN: CPT | Performed by: NURSE PRACTITIONER

## 2022-12-01 PROCEDURE — 1036F TOBACCO NON-USER: CPT | Performed by: NURSE PRACTITIONER

## 2022-12-01 PROCEDURE — G8427 DOCREV CUR MEDS BY ELIG CLIN: HCPCS | Performed by: NURSE PRACTITIONER

## 2022-12-01 RX ORDER — CIPROFLOXACIN 500 MG/1
500 TABLET, FILM COATED ORAL 2 TIMES DAILY
Qty: 14 TABLET | Refills: 0 | Status: SHIPPED | OUTPATIENT
Start: 2022-12-01 | End: 2022-12-08

## 2022-12-01 ASSESSMENT — ENCOUNTER SYMPTOMS
SINUS PRESSURE: 0
COUGH: 0
CONSTIPATION: 0
ABDOMINAL PAIN: 1
WHEEZING: 0
BLOOD IN STOOL: 0
NAUSEA: 0
TROUBLE SWALLOWING: 0
SHORTNESS OF BREATH: 0
SORE THROAT: 0
DIARRHEA: 0
VOMITING: 0

## 2022-12-01 ASSESSMENT — PATIENT HEALTH QUESTIONNAIRE - PHQ9
SUM OF ALL RESPONSES TO PHQ9 QUESTIONS 1 & 2: 0
5. POOR APPETITE OR OVEREATING: 0
4. FEELING TIRED OR HAVING LITTLE ENERGY: 0
2. FEELING DOWN, DEPRESSED OR HOPELESS: 0
8. MOVING OR SPEAKING SO SLOWLY THAT OTHER PEOPLE COULD HAVE NOTICED. OR THE OPPOSITE, BEING SO FIGETY OR RESTLESS THAT YOU HAVE BEEN MOVING AROUND A LOT MORE THAN USUAL: 0
1. LITTLE INTEREST OR PLEASURE IN DOING THINGS: 0
7. TROUBLE CONCENTRATING ON THINGS, SUCH AS READING THE NEWSPAPER OR WATCHING TELEVISION: 0
SUM OF ALL RESPONSES TO PHQ QUESTIONS 1-9: 0
6. FEELING BAD ABOUT YOURSELF - OR THAT YOU ARE A FAILURE OR HAVE LET YOURSELF OR YOUR FAMILY DOWN: 0
SUM OF ALL RESPONSES TO PHQ QUESTIONS 1-9: 0
10. IF YOU CHECKED OFF ANY PROBLEMS, HOW DIFFICULT HAVE THESE PROBLEMS MADE IT FOR YOU TO DO YOUR WORK, TAKE CARE OF THINGS AT HOME, OR GET ALONG WITH OTHER PEOPLE: 0
9. THOUGHTS THAT YOU WOULD BE BETTER OFF DEAD, OR OF HURTING YOURSELF: 0
3. TROUBLE FALLING OR STAYING ASLEEP: 0
SUM OF ALL RESPONSES TO PHQ QUESTIONS 1-9: 0
SUM OF ALL RESPONSES TO PHQ QUESTIONS 1-9: 0

## 2022-12-01 NOTE — PROGRESS NOTES
Pertinent negatives include no chest pain, headaches, palpitations or shortness of breath. Risk factors for coronary artery disease include obesity, post-menopausal state and sedentary lifestyle. Past treatments include angiotensin blockers. The current treatment provides significant improvement. There are no compliance problems. There is no history of CAD/MI or CVA. Anxiety  Patient reports no chest pain, confusion, dizziness, nausea, nervous/anxious behavior, palpitations or shortness of breath. DepressionPatient is not experiencing: confusion, nervousness/anxiety, palpitations and shortness of breath. Eye Problem   Pertinent negatives include no fever, nausea, vomiting or weakness. Hemoglobin A1C (%)   Date Value   10/18/2019 5.5             ( goal A1C is < 7)   No results found for: LABMICR  LDL Cholesterol (mg/dL)   Date Value   03/18/2021 84   12/03/2020 69   10/18/2019 105     LDL Calculated (mg/dL)   Date Value   01/13/2017 89       (goal LDL is <100)   AST (U/L)   Date Value   10/29/2022 27     ALT (U/L)   Date Value   10/29/2022 48 (H)     BUN (mg/dL)   Date Value   11/23/2022 13     BP Readings from Last 3 Encounters:   12/01/22 115/70   11/29/22 122/78   11/23/22 (!) 135/95          ([id number])    Past Medical History:   Diagnosis Date    Anxiety     Carpal tunnel syndrome     COVID-19 11/06/2021    Depression     Endometriosis     Gestational diabetes     x 3 children    Headache     migraines    History of blood transfusion     Iron deficiency anemia     Kidney stones     Dr. Levi Do seen 11/28/2022    May-Thurner syndrome     Obesity     Scler[address] ([facility] [address]     Snores     Under care of team     PCP - Sherley Gentile CNP - LAST VISIT 8/31/2022    Under care of team     VASCULAR  - DR. RIBEIRO - 8/2021 - SEES FOR MAY-THURNER SYNDROME    Under care of team     HEM/ONC - DR. PERKINS - DINAH WHITMORE - LAST VISIT - 8/19/2022    URI (upper respiratory infection) 10/23/2022    starting 10/23/2022 cough, fever, fatigue, sorethroat, congestion, seen by urgent care 10/25/22, ER on 10/29/22 for SOB, dizziness and N&V    Wears glasses     Wellness examination     follows with Behavioral Health, last seen 2022      Past Surgical History:   Procedure Laterality Date     SECTION  x 3    CHOLECYSTECTOMY      CYSTOSCOPY Left 2022    CYSTOSCOPY, URETEROSCOPY, STENT PLACEMENT    CYSTOSCOPY Left 2022    CYSTOSCOPY, URETEROSCOPY, STENT PLACEMENT performed by Jyothi oRjas MD at [facility] N/A 2021    uterine ablation    GASTRIC BYPASS SURGERY  2009    PABLO- EN- Y    HYSTERECTOMY (CERVIX STATUS UNKNOWN) N/A 10/26/2021    XI ROBOTIC LAPAROSCOPIC MODIFIED RADICAL HYSTERECTOMY, BILATERAL SALPINGO OOPHORECTOMY, CYTOLOGIC WASHINGS, CYSTOSCOPY performed by Cristin Abreu MD at [address] [facility]      endometriosis    TOENAIL EXCISION      VASCULAR SURGERY      to evaluate May Rader syndrome effects on  blood vessels       Family History   Problem Relation Age of Onset    Colon Cancer Mother     Early Death Father         accident    Diabetes Sister     High Blood Pressure Sister     Diabetes Brother     High Blood Pressure Brother     Diabetes Sister     High Blood Pressure Sister           Social History     Tobacco Use    Smoking status: Never    Smokeless tobacco: Never   Substance Use Topics    Alcohol use: No      Current Outpatient Medications   Medication Sig Dispense Refill    ciprofloxacin (CIPRO) 500 MG tablet Take 1 tablet by mouth 2 times daily for 7 days 14 tablet 0    tamsulosin (FLOMAX) 0.4 MG capsule Take 1 capsule by mouth daily 30 capsule 0    tamsulosin (FLOMAX) 0.4 MG capsule Take 1 capsule by mouth daily for 5 doses 30 capsule 0    oxybutynin (DITROPAN) 5 MG tablet Take 1 tablet by mouth 3 times daily as needed (bladder spasms, stent pain) 90 tablet 3    ondansetron (ZOFRAN ODT) 4 MG disintegrating tablet Take 1 tablet by mouth every 8 hours as needed for Nausea 12 tablet 0    ibuprofen (ADVIL;MOTRIN) 800 MG tablet Take 1 tablet by mouth every 8 hours as needed for Pain 30 tablet 0    fluticasone (FLONASE) 50 MCG/ACT nasal spray 2 sprays by Nasal route daily 16 g 3    fluticasone (FLONASE) 50 MCG/ACT nasal spray 2 sprays by Nasal route daily 16 g 3    losartan (COZAAR) 50 MG tablet Take 1 tablet by mouth daily 90 tablet 3    Oyster Shell 500 MG TABS Take 1 tablet by mouth 3 times daily 270 tablet 3    Multiple Vitamins-Minerals (THERAPEUTIC MULTIVITAMIN-MINERALS) tablet Take 1 tablet by mouth daily 90 tablet 3    vitamin B-12 (CYANOCOBALAMIN) 1000 MCG tablet Take 1,000 mcg by mouth daily      zinc 50 MG CAPS Take 50 mg by mouth daily 90 capsule 1    busPIRone (BUSPAR) 10 MG tablet TAKE 1 TABLET BY MOUTH THREE TIMES A DAY AS NEEDED FOR ANXIETY 90 tablet 5    vitamin D3 (CHOLECALCIFEROL) 25 MCG (1000 UT) TABS tablet Take 1 tablet by mouth daily 90 tablet 1    Nutritional Supplements (ESTROVEN PO) Take by mouth daily      acetaminophen (AMINOFEN) 325 MG tablet Take 2 tablets by mouth every 6 hours as needed for Pain 120 tablet 3    Elastic Bandages & Supports (JOBST ULTRASHEER 20-30MMHG LG) MISC Wear daily, ok to remove in the evenings 3 each 3    Multiple Vitamins-Minerals (MULTI-JULIO CÉSAR PO) Take by mouth daily      calcium carbonate (OYSTER SHELL CALCIUM 500 MG) 1250 (500 Ca) MG tablet Take 1 tablet by mouth daily       No current facility-administered medications for this visit. Allergies   Allergen Reactions    Contrast [Iodides] Hives     Chest pain, HTN  Able to handle if the pre-medication prep is followed.     Trileptal [Oxcarbazepine] Shortness Of Breath     Red neck rash/ visual changes    Morphine Other (See Comments)     Red streak up arm at iv site/ and hives    Bactrim [Sulfamethoxazole-Trimethoprim] Other (See Comments)     Yellowing of sclera    Nsaids Other (See Comments)     Not supposed to take d/t gastric to light. Comments: Faint yellow coloring to bilateral lower sclera   Cardiovascular:      Rate and Rhythm: Normal rate and regular rhythm. Heart sounds: Normal heart sounds. No murmur heard. Pulmonary:      Effort: Pulmonary effort is normal.      Breath sounds: Normal breath sounds. No wheezing. Abdominal:      General: Bowel sounds are normal. There is no distension. Palpations: Abdomen is soft. Musculoskeletal:         General: Normal range of motion. Cervical back: Normal range of motion. Skin:     General: Skin is warm and dry. Neurological:      Mental Status: She is alert and oriented to person, place, and time. Psychiatric:         Behavior: Behavior normal.         Thought Content: Thought content normal.         Judgment: Judgment normal.     /70   Pulse 100   Ht 5' 4\" (1.626 m)   Wt 290 lb (131.5 kg)   LMP 09/15/2021 Comment: VERY HEAVY PERIODS  SpO2 95%   BMI 49.78 kg/m²     Assessment:       Diagnosis Orders   1. Infected kidney cyst  Comprehensive Metabolic Panel    ciprofloxacin (CIPRO) 500 MG tablet      2. Yellow eyes  Comprehensive Metabolic Panel      3. Moderate episode of recurrent major depressive disorder ([address]        4. Anxiety        5. Mild hypertension        6. Kidney stone        7. Ureteral stent present                  Plan:      Return in about 6 months (around 6/1/2023) for hypertension check. Infected kidney cyst, yellow eyes, kidney stone with ureteral stent-lengthy review of chart and most recent notes per urology from procedure 11/29. Mild to moderate concern for possible adverse reaction to Bactrim, advised patient to stop antibiotic, will switch to Cipro, check CMP to review hepatic function. Also advised to notify her urologist office of antibiotic change.   She will follow-up with them as planned, she is scheduled for cystoscopy 1214  Depression, anxiety-some improvement with regular counseling sessions, she has continued Miguel Angel, she is planning to pursue psychiatric evaluation once her kidney stone is resolved  Hypertension-well-controlled on losartan  Orders Placed This Encounter   Procedures    Comprehensive Metabolic Panel     Standing Status:   Future     Number of Occurrences:   1     Standing Expiration Date:   12/1/2023        Orders Placed This Encounter   Medications    ciprofloxacin (CIPRO) 500 MG tablet     Sig: Take 1 tablet by mouth 2 times daily for 7 days     Dispense:  14 tablet     Refill:  0       Patient given educational materials - see patient instructions. Discussed use, benefit, and side effects of prescribed medications. All patientquestions answered. Pt voiced understanding. Reviewed health maintenance. Instructedto continue current medications, diet and exercise. Patient agreed with treatmentplan. Follow up as directed.      Electronicallysigned by JUSTICE Kay CNP on 12/1/2022 at 4:00 PM

## 2022-12-01 NOTE — TELEPHONE ENCOUNTER
Patient calling into office, states that she recently had surgery on 11/29/22. She has a kidney stone that was left, patient states that she woke up today and the whites of her eyes are yellow. She contacted urology office and they advised her to contact PCP. Patient does have an appt tomorrow, but she wanted to make sure this wasn't something urgent that needed to be dealt with today. Please advise.     Last Visit Date: 8/31/2022   Next Visit Date: 12/2/2022

## 2022-12-02 ENCOUNTER — HOSPITAL ENCOUNTER (OUTPATIENT)
Age: 47
Discharge: HOME | End: 2022-12-02
Payer: MEDICARE

## 2022-12-02 DIAGNOSIS — N20.1 LEFT URETERAL CALCULUS: ICD-10-CM

## 2022-12-02 DIAGNOSIS — R35.0 URINARY FREQUENCY: ICD-10-CM

## 2022-12-02 LAB
ALBUMIN SERPL-MCNC: 3.9 G/DL (ref 3.5–5.2)
ALBUMIN/GLOBULIN RATIO: 1.2 (ref 1–2.5)
ALP BLD-CCNC: 77 U/L (ref 35–104)
ALT SERPL-CCNC: 49 U/L (ref 5–33)
AST SERPL-CCNC: 27 U/L
BILIRUB SERPL-MCNC: 0.2 MG/DL (ref 0.3–1.2)
BILIRUBIN DIRECT: <0.1 MG/DL
BILIRUBIN, INDIRECT: ABNORMAL MG/DL (ref 0–1)
TOTAL PROTEIN: 7.2 G/DL (ref 6.4–8.3)

## 2022-12-02 PROCEDURE — 36415 COLL VENOUS BLD VENIPUNCTURE: CPT

## 2022-12-02 PROCEDURE — 80076 HEPATIC FUNCTION PANEL: CPT

## 2022-12-07 NOTE — PROGRESS NOTES
DAY OF SURGERY/PROCEDURE  GUIDELINES    As a patient at the [facility], you can expect quality medical and nursing care that is centered on your individual needs. It is our goal to make your surgical experience as comfortable and excellent as possible.  ________________________________________________________________________    The following instructions are general guidelines, if any information on this sheet is different from what your doctor has instructed you to do, please follow your doctor's instructions. Please arrive on 12/14 @ 1000      Enter through entrance C. Check in at registration     Upon arrival you will be taken to the pre-operative area to get ready for surgery, your family will stay in the waiting room and visit with you once you are ready for surgery. Due to special limitations please limit visitation to 1-2 members of your family at a time. When it is time for surgery your family will return to the waiting room. Nothing to eat, drink, smoke, suck or chew after midnight (no water, gum, mints, cigarettes, cigars, pipes, snuff, chewing tobacco, etc.) or your surgery may be canceled. Take a shower or bath on the morning of your surgery/procedure (Hibiclens if directed) Do not apply any lotions. Brush your teeth, but do not swallow any water    IN CASE OF ILLNESS - If you have a cold or flu symptoms (high fever, runny nose, sore throat, cough, etc.) rash, nausea, vomiting, loose stools, and/or recent contact with someone who has a contagious disease (chick pox, measles, etc.) please call your doctor before coming to the surgery center    Take a small sip of water with heart, blood pressure, and/or seizure medication the morning of surgery.      If applicable bring your:  Eyeglasses and Case (If you wear contacts they have to be removed before surgery, bring case and solution)    DO NOT take anticoagulants (blood thinners, aspirin or aspirin-containing products) as instructed by your physician. Leave all jewelry at home and wear loose, comfortable clothing that is easy to put on and take off. If you will be returning home the same day as your surgery, you will need to have a responsible adult (25years of age or older) present to drive you home. You will need someone stay with you at home for the first 24 hours following your surgery. This is due to the anesthesia and the medication given to you during surgery and recovery.

## 2022-12-12 ENCOUNTER — ANESTHESIA EVENT (OUTPATIENT)
Dept: OPERATING ROOM | Age: 47
End: 2022-12-12
Payer: MEDICARE

## 2022-12-13 ENCOUNTER — HOSPITAL ENCOUNTER (OUTPATIENT)
Age: 47
Discharge: HOME | End: 2022-12-13
Payer: MEDICARE

## 2022-12-13 DIAGNOSIS — K90.9 IRON MALABSORPTION: ICD-10-CM

## 2022-12-13 DIAGNOSIS — D50.9 IRON DEFICIENCY ANEMIA, UNSPECIFIED IRON DEFICIENCY ANEMIA TYPE: Primary | ICD-10-CM

## 2022-12-13 DIAGNOSIS — D50.9 IRON DEFICIENCY ANEMIA, UNSPECIFIED IRON DEFICIENCY ANEMIA TYPE: ICD-10-CM

## 2022-12-13 DIAGNOSIS — R53.83 OTHER FATIGUE: ICD-10-CM

## 2022-12-13 DIAGNOSIS — D50.0 IRON DEFICIENCY ANEMIA DUE TO CHRONIC BLOOD LOSS: ICD-10-CM

## 2022-12-13 LAB
ABSOLUTE EOS #: 0.2 K/UL (ref 0–0.4)
ABSOLUTE LYMPH #: 1.7 K/UL (ref 1–4.8)
ABSOLUTE MONO #: 0.3 K/UL (ref 0.1–1.2)
BASOPHILS # BLD: 2 % (ref 0–2)
BASOPHILS ABSOLUTE: 0.1 K/UL (ref 0–0.2)
EOSINOPHILS RELATIVE PERCENT: 3 % (ref 1–4)
HCT VFR BLD CALC: 42.1 % (ref 36–46)
HEMOGLOBIN: 13.8 G/DL (ref 12–16)
LYMPHOCYTES # BLD: 35 % (ref 24–44)
MCH RBC QN AUTO: 28.6 PG (ref 26–34)
MCHC RBC AUTO-ENTMCNC: 32.8 G/DL (ref 31–37)
MCV RBC AUTO: 87.2 FL (ref 80–100)
MONOCYTES # BLD: 6 % (ref 2–11)
PDW BLD-RTO: 14.4 % (ref 12.5–15.4)
PLATELET # BLD: 173 K/UL (ref 140–450)
PMV BLD AUTO: 9.4 FL (ref 6–12)
RBC # BLD: 4.82 M/UL (ref 4–5.2)
SEG NEUTROPHILS: 54 % (ref 36–66)
SEGMENTED NEUTROPHILS ABSOLUTE COUNT: 2.7 K/UL (ref 1.8–7.7)
WBC # BLD: 4.8 K/UL (ref 3.5–11)

## 2022-12-13 PROCEDURE — 84630 ASSAY OF ZINC: CPT

## 2022-12-13 PROCEDURE — 82607 VITAMIN B-12: CPT

## 2022-12-13 PROCEDURE — 82746 ASSAY OF FOLIC ACID SERUM: CPT

## 2022-12-13 PROCEDURE — 36415 COLL VENOUS BLD VENIPUNCTURE: CPT

## 2022-12-13 PROCEDURE — 86304 IMMUNOASSAY TUMOR CA 125: CPT

## 2022-12-13 PROCEDURE — 83550 IRON BINDING TEST: CPT

## 2022-12-13 PROCEDURE — 83540 ASSAY OF IRON: CPT

## 2022-12-13 PROCEDURE — 85025 COMPLETE CBC W/AUTO DIFF WBC: CPT

## 2022-12-14 ENCOUNTER — ANESTHESIA (OUTPATIENT)
Dept: OPERATING ROOM | Age: 47
End: 2022-12-14
Payer: MEDICARE

## 2022-12-14 ENCOUNTER — APPOINTMENT (OUTPATIENT)
Dept: GENERAL RADIOLOGY | Age: 47
End: 2022-12-14
Attending: SPECIALIST
Payer: MEDICARE

## 2022-12-14 ENCOUNTER — HOSPITAL ENCOUNTER (OUTPATIENT)
Age: 47
Setting detail: OUTPATIENT SURGERY
Discharge: HOME | End: 2022-12-14
Attending: SPECIALIST | Admitting: SPECIALIST
Payer: MEDICARE

## 2022-12-14 VITALS
DIASTOLIC BLOOD PRESSURE: 67 MMHG | WEIGHT: 293 LBS | RESPIRATION RATE: 17 BRPM | HEART RATE: 65 BPM | BODY MASS INDEX: 50.02 KG/M2 | SYSTOLIC BLOOD PRESSURE: 120 MMHG | OXYGEN SATURATION: 98 % | HEIGHT: 64 IN | TEMPERATURE: 96.9 F

## 2022-12-14 PROBLEM — N20.0 RENAL CALCULUS, LEFT: Status: ACTIVE | Noted: 2022-12-14

## 2022-12-14 PROBLEM — N20.1 LEFT URETERAL CALCULUS: Status: RESOLVED | Noted: 2022-11-28 | Resolved: 2022-12-14

## 2022-12-14 LAB
CA 125: 19 U/ML
EKG ATRIAL RATE: 52 BPM
EKG P AXIS: 36 DEGREES
EKG P-R INTERVAL: 166 MS
EKG Q-T INTERVAL: 422 MS
EKG QRS DURATION: 80 MS
EKG QTC CALCULATION (BAZETT): 392 MS
EKG R AXIS: -21 DEGREES
EKG T AXIS: 12 DEGREES
EKG VENTRICULAR RATE: 52 BPM
FOLATE: 10.2 NG/ML
IRON SATURATION: 21 % (ref 20–55)
IRON: 64 UG/DL (ref 37–145)
TOTAL IRON BINDING CAPACITY: 308 UG/DL (ref 250–450)
UNSATURATED IRON BINDING CAPACITY: 244 UG/DL (ref 112–347)
VITAMIN B-12: 836 PG/ML (ref 232–1245)

## 2022-12-14 PROCEDURE — 6360000002 HC RX W HCPCS: Performed by: SPECIALIST

## 2022-12-14 PROCEDURE — C1769 GUIDE WIRE: HCPCS | Performed by: SPECIALIST

## 2022-12-14 PROCEDURE — 2720000010 HC SURG SUPPLY STERILE: Performed by: SPECIALIST

## 2022-12-14 PROCEDURE — 6360000002 HC RX W HCPCS: Performed by: ANESTHESIOLOGY

## 2022-12-14 PROCEDURE — 7100000001 HC PACU RECOVERY - ADDTL 15 MIN: Performed by: SPECIALIST

## 2022-12-14 PROCEDURE — 6370000000 HC RX 637 (ALT 250 FOR IP)

## 2022-12-14 PROCEDURE — 6360000002 HC RX W HCPCS: Performed by: NURSE ANESTHETIST, CERTIFIED REGISTERED

## 2022-12-14 PROCEDURE — 3700000000 HC ANESTHESIA ATTENDED CARE: Performed by: SPECIALIST

## 2022-12-14 PROCEDURE — 7100000010 HC PHASE II RECOVERY - FIRST 15 MIN: Performed by: SPECIALIST

## 2022-12-14 PROCEDURE — 3600000013 HC SURGERY LEVEL 3 ADDTL 15MIN: Performed by: SPECIALIST

## 2022-12-14 PROCEDURE — 3700000001 HC ADD 15 MINUTES (ANESTHESIA): Performed by: SPECIALIST

## 2022-12-14 PROCEDURE — 2500000003 HC RX 250 WO HCPCS: Performed by: NURSE ANESTHETIST, CERTIFIED REGISTERED

## 2022-12-14 PROCEDURE — 7100000000 HC PACU RECOVERY - FIRST 15 MIN: Performed by: SPECIALIST

## 2022-12-14 PROCEDURE — 7100000011 HC PHASE II RECOVERY - ADDTL 15 MIN: Performed by: SPECIALIST

## 2022-12-14 PROCEDURE — 3209999900 FLUORO FOR SURGICAL PROCEDURES

## 2022-12-14 PROCEDURE — 2580000003 HC RX 258: Performed by: ANESTHESIOLOGY

## 2022-12-14 PROCEDURE — C2617 STENT, NON-COR, TEM W/O DEL: HCPCS | Performed by: SPECIALIST

## 2022-12-14 PROCEDURE — C1758 CATHETER, URETERAL: HCPCS | Performed by: SPECIALIST

## 2022-12-14 PROCEDURE — 2709999900 HC NON-CHARGEABLE SUPPLY: Performed by: SPECIALIST

## 2022-12-14 PROCEDURE — 3600000003 HC SURGERY LEVEL 3 BASE: Performed by: SPECIALIST

## 2022-12-14 PROCEDURE — 6360000002 HC RX W HCPCS

## 2022-12-14 DEVICE — URETERAL STENT
Type: IMPLANTABLE DEVICE | Site: URETER | Status: FUNCTIONAL
Brand: POLARIS™ ULTRA

## 2022-12-14 RX ORDER — FENTANYL CITRATE 50 UG/ML
INJECTION, SOLUTION INTRAMUSCULAR; INTRAVENOUS PRN
Status: DISCONTINUED | OUTPATIENT
Start: 2022-12-14 | End: 2022-12-14 | Stop reason: SDUPTHER

## 2022-12-14 RX ORDER — ONDANSETRON 2 MG/ML
4 INJECTION INTRAMUSCULAR; INTRAVENOUS
Status: COMPLETED | OUTPATIENT
Start: 2022-12-14 | End: 2022-12-14

## 2022-12-14 RX ORDER — SODIUM CHLORIDE 9 MG/ML
INJECTION, SOLUTION INTRAVENOUS CONTINUOUS
Status: DISCONTINUED | OUTPATIENT
Start: 2022-12-14 | End: 2022-12-14 | Stop reason: HOSPADM

## 2022-12-14 RX ORDER — SCOLOPAMINE TRANSDERMAL SYSTEM 1 MG/1
1 PATCH, EXTENDED RELEASE TRANSDERMAL ONCE
Status: DISCONTINUED | OUTPATIENT
Start: 2022-12-14 | End: 2022-12-14 | Stop reason: HOSPADM

## 2022-12-14 RX ORDER — SODIUM CHLORIDE, SODIUM LACTATE, POTASSIUM CHLORIDE, CALCIUM CHLORIDE 600; 310; 30; 20 MG/100ML; MG/100ML; MG/100ML; MG/100ML
INJECTION, SOLUTION INTRAVENOUS CONTINUOUS
Status: DISCONTINUED | OUTPATIENT
Start: 2022-12-14 | End: 2022-12-14 | Stop reason: HOSPADM

## 2022-12-14 RX ORDER — HYDROCODONE BITARTRATE AND ACETAMINOPHEN 5; 325 MG/1; MG/1
TABLET ORAL
Status: COMPLETED
Start: 2022-12-14 | End: 2022-12-14

## 2022-12-14 RX ORDER — SCOLOPAMINE TRANSDERMAL SYSTEM 1 MG/1
PATCH, EXTENDED RELEASE TRANSDERMAL
Status: DISCONTINUED
Start: 2022-12-14 | End: 2022-12-14 | Stop reason: HOSPADM

## 2022-12-14 RX ORDER — MIDAZOLAM HYDROCHLORIDE 1 MG/ML
INJECTION INTRAMUSCULAR; INTRAVENOUS PRN
Status: DISCONTINUED | OUTPATIENT
Start: 2022-12-14 | End: 2022-12-14 | Stop reason: SDUPTHER

## 2022-12-14 RX ORDER — LIDOCAINE HYDROCHLORIDE 10 MG/ML
INJECTION, SOLUTION INFILTRATION; PERINEURAL PRN
Status: DISCONTINUED | OUTPATIENT
Start: 2022-12-14 | End: 2022-12-14 | Stop reason: SDUPTHER

## 2022-12-14 RX ORDER — FENTANYL CITRATE 50 UG/ML
25 INJECTION, SOLUTION INTRAMUSCULAR; INTRAVENOUS EVERY 5 MIN PRN
Status: DISCONTINUED | OUTPATIENT
Start: 2022-12-14 | End: 2022-12-14 | Stop reason: HOSPADM

## 2022-12-14 RX ORDER — MEPERIDINE HYDROCHLORIDE 50 MG/ML
12.5 INJECTION INTRAMUSCULAR; INTRAVENOUS; SUBCUTANEOUS ONCE
Status: DISCONTINUED | OUTPATIENT
Start: 2022-12-14 | End: 2022-12-14 | Stop reason: HOSPADM

## 2022-12-14 RX ORDER — SODIUM CHLORIDE 0.9 % (FLUSH) 0.9 %
5-40 SYRINGE (ML) INJECTION EVERY 12 HOURS SCHEDULED
Status: DISCONTINUED | OUTPATIENT
Start: 2022-12-14 | End: 2022-12-14 | Stop reason: HOSPADM

## 2022-12-14 RX ORDER — ONDANSETRON 2 MG/ML
INJECTION INTRAMUSCULAR; INTRAVENOUS
Status: COMPLETED
Start: 2022-12-14 | End: 2022-12-14

## 2022-12-14 RX ORDER — SODIUM CHLORIDE 0.9 % (FLUSH) 0.9 %
5-40 SYRINGE (ML) INJECTION PRN
Status: DISCONTINUED | OUTPATIENT
Start: 2022-12-14 | End: 2022-12-14 | Stop reason: HOSPADM

## 2022-12-14 RX ORDER — SODIUM CHLORIDE 9 MG/ML
INJECTION, SOLUTION INTRAVENOUS PRN
Status: DISCONTINUED | OUTPATIENT
Start: 2022-12-14 | End: 2022-12-14 | Stop reason: HOSPADM

## 2022-12-14 RX ORDER — HYDROCODONE BITARTRATE AND ACETAMINOPHEN 5; 325 MG/1; MG/1
1 TABLET ORAL ONCE
Status: COMPLETED | OUTPATIENT
Start: 2022-12-14 | End: 2022-12-14

## 2022-12-14 RX ORDER — SODIUM CHLORIDE 9 MG/ML
25 INJECTION, SOLUTION INTRAVENOUS PRN
Status: DISCONTINUED | OUTPATIENT
Start: 2022-12-14 | End: 2022-12-14 | Stop reason: HOSPADM

## 2022-12-14 RX ORDER — DIPHENHYDRAMINE HYDROCHLORIDE 50 MG/ML
12.5 INJECTION INTRAMUSCULAR; INTRAVENOUS
Status: DISCONTINUED | OUTPATIENT
Start: 2022-12-14 | End: 2022-12-14 | Stop reason: HOSPADM

## 2022-12-14 RX ORDER — DEXAMETHASONE SODIUM PHOSPHATE 10 MG/ML
INJECTION, SOLUTION INTRAMUSCULAR; INTRAVENOUS PRN
Status: DISCONTINUED | OUTPATIENT
Start: 2022-12-14 | End: 2022-12-14 | Stop reason: SDUPTHER

## 2022-12-14 RX ORDER — PROPOFOL 10 MG/ML
INJECTION, EMULSION INTRAVENOUS PRN
Status: DISCONTINUED | OUTPATIENT
Start: 2022-12-14 | End: 2022-12-14 | Stop reason: SDUPTHER

## 2022-12-14 RX ORDER — ONDANSETRON 2 MG/ML
INJECTION INTRAMUSCULAR; INTRAVENOUS PRN
Status: DISCONTINUED | OUTPATIENT
Start: 2022-12-14 | End: 2022-12-14 | Stop reason: SDUPTHER

## 2022-12-14 RX ORDER — FENTANYL CITRATE 50 UG/ML
INJECTION, SOLUTION INTRAMUSCULAR; INTRAVENOUS
Status: COMPLETED
Start: 2022-12-14 | End: 2022-12-14

## 2022-12-14 RX ADMIN — FENTANYL CITRATE 50 MCG: 50 INJECTION, SOLUTION INTRAMUSCULAR; INTRAVENOUS at 11:36

## 2022-12-14 RX ADMIN — Medication 3000 MG: at 11:14

## 2022-12-14 RX ADMIN — HYDROCODONE BITARTRATE AND ACETAMINOPHEN 1 TABLET: 5; 325 TABLET ORAL at 13:28

## 2022-12-14 RX ADMIN — FENTANYL CITRATE 100 MCG: 50 INJECTION, SOLUTION INTRAMUSCULAR; INTRAVENOUS at 11:16

## 2022-12-14 RX ADMIN — FENTANYL CITRATE 25 MCG: 50 INJECTION, SOLUTION INTRAMUSCULAR; INTRAVENOUS at 12:24

## 2022-12-14 RX ADMIN — ONDANSETRON 4 MG: 2 INJECTION INTRAMUSCULAR; INTRAVENOUS at 11:22

## 2022-12-14 RX ADMIN — FENTANYL CITRATE 50 MCG: 50 INJECTION, SOLUTION INTRAMUSCULAR; INTRAVENOUS at 11:40

## 2022-12-14 RX ADMIN — MIDAZOLAM 2 MG: 1 INJECTION INTRAMUSCULAR; INTRAVENOUS at 11:18

## 2022-12-14 RX ADMIN — PROPOFOL 200 MG: 10 INJECTION, EMULSION INTRAVENOUS at 11:18

## 2022-12-14 RX ADMIN — LIDOCAINE HYDROCHLORIDE 40 MG: 10 INJECTION, SOLUTION INFILTRATION; PERINEURAL at 11:18

## 2022-12-14 RX ADMIN — ONDANSETRON 4 MG: 2 INJECTION INTRAMUSCULAR; INTRAVENOUS at 12:25

## 2022-12-14 RX ADMIN — FENTANYL CITRATE 25 MCG: 50 INJECTION, SOLUTION INTRAMUSCULAR; INTRAVENOUS at 12:45

## 2022-12-14 RX ADMIN — DEXAMETHASONE SODIUM PHOSPHATE 4 MG: 10 INJECTION INTRAMUSCULAR; INTRAVENOUS at 11:22

## 2022-12-14 RX ADMIN — SODIUM CHLORIDE, POTASSIUM CHLORIDE, SODIUM LACTATE AND CALCIUM CHLORIDE: 600; 310; 30; 20 INJECTION, SOLUTION INTRAVENOUS at 10:47

## 2022-12-14 ASSESSMENT — PAIN DESCRIPTION - DESCRIPTORS
DESCRIPTORS: SHOOTING
DESCRIPTORS: ACHING

## 2022-12-14 ASSESSMENT — PAIN - FUNCTIONAL ASSESSMENT: PAIN_FUNCTIONAL_ASSESSMENT: 0-10

## 2022-12-14 ASSESSMENT — PAIN DESCRIPTION - ORIENTATION
ORIENTATION: LEFT
ORIENTATION: LEFT

## 2022-12-14 ASSESSMENT — PAIN SCALES - GENERAL
PAINLEVEL_OUTOF10: 5
PAINLEVEL_OUTOF10: 8

## 2022-12-14 ASSESSMENT — PAIN DESCRIPTION - LOCATION
LOCATION: ABDOMEN
LOCATION: CHEST
LOCATION: ABDOMEN

## 2022-12-14 NOTE — OP NOTE
Operative Note      Patient: Lee Frias  YOB: 1975  MRN: [id number]    Date of Procedure: 12/14/2022    Pre-Op Diagnosis: Left ureteral calculus [N20.1]    Post-Op Diagnosis:  Same and Left kidney stone       Procedure(s):  CYSTOSCOPY LEFT URETEROSCOPY HOLMIUM LASER WITH LEFT STENT EXCHANGE    Surgeon(s):  Hector Landa MD    Assistant:   * No surgical staff found *    Anesthesia: General    Estimated Blood Loss (mL): Minimal    Complications: None    Specimens:   * No specimens in log *    Implants:  Implant Name Type Inv. Item Serial No.  Lot No. LRB No. Used Action   STENT URET 6FR L22CM PERCFLX HYDR+ DBL PGTL THRD 2 - [id number]  STENT URET 6FR L22CM PERCFLX HYDR+ DBL PGTL THRD 2  [employer] [facility] UROLOGY- [id number] Left 1 Implanted         Drains: * No LDAs found *    Findings: see below     Detailed Description of Procedure:   INDICATIONS FOR PROCEDURE:  Lee Frias is a 52 y.o. female presents for Left sided calculus. After the risks, benefits, alternatives, of the procedure were discussed with the patient, informed consent was obtained. The patient elected to proceed. Patient given Ancef 3 gm IV on call to OR. DETAILS OF THE PROCEDURE:  The patient was brought back from the preoperative holding area to the  operating suite, and was transferred to the operating table where the patient lay in  supine position. EPC's were in place, connected to the machine and the machine was turned on before induction. General endotracheal anesthesia was induced, and patient was prepped and draped in standard surgical fashion after being placed in dorsolithotomy position. A proper timeout was performed, preoperative antibiotics were given. We began by inserting a cystoscope with a 22 French sheath and 30 degree lens into the patient's urethral meatus and advancing into the bladder without complication. A pan cystoscopy was preformed and the bladder appeared unremarkable.   We then focused our attention on the Left ureteral orifice and the previously placed stent was grasped and brought out through the urethra. A glidewire was placed through the stent and advanced up to renal pelvis. We then used a dual lumen catheter to place a second wire. Once in good position, we advanced our flexible ureteroscope over the working wire to the renal pelvis under direct visualization. We identified the proximal ureteral calculus which migrated into the kidney and an additional renal calculus and using a 200 micron holmium laser fiber we fragmented both calcului. It was dusted and the fragments appeared to be under 1 millimeter and could pass easily. At this time a complete pyeloscopy was preformed and no other substantial fragments were identified. We withdrew the ureteroscope and visualized the entire ureter. No stone fragments were identified. No damage to the ureter was identified. At this time, over the remaining glidewire, we placed a [id number] double J ureteral stent in the usual fashion, and we noted appropriate placement in the upper collecting system using fluoroscopy. There was a good curl noted in the bladder. We decided to leave a string at the end of the stent, which was attached with benzoin and steri-strips. The patient's bladder was drained and removed the scope and the procedure was subsequently terminated. Plan:  Discharge home in good condition  The patient can pull the stent in 5 days and follow up in 6 weeks with a KUB prior.      Electronically signed by Yesi Motley MD on 12/14/2022 at 11:59 AM

## 2022-12-14 NOTE — DISCHARGE INSTRUCTIONS
Remove stent with string in 5 days. 12/19/2022  Get a 24 hour urine collection to determine etiology of stone disease in next 2-3 weeks; Dr. Sabina Pedraza office will send information about this to you directly. Follow up in George Thomas M.D.'s office in 6 weeks with GERRI prior. Patient instructed to drink at least 10 eight ounce glasses of water a day to facilitate passage of any stones. Expect to see intermittent visible blood in urine as long as stent is in place. You may experience increased urgency and frequency of urination and pain (especially at the end of urination) associated with the stent. Take the prescribed medications to help alleviate these symptoms. Activity  You have had anesthesia today  Do not drive, operate heavy equipment, consume alcoholic beverages, or make any important decisions  for 24 hours   If you are taking pain medication: Do not drive or consume alcohol. Take your time changing positions today. You may feel light headed or dizzy if you move too quickly. Continue your home medications as ordered by your physician. Diet   You can eat your normal diet when you feel well. You should start off with bland foods like chicken soup, toast, or yogurt. Then advance as tolerated. Drink plenty of fluids (unless your doctor tells you not to). Your urine should be very lightly colored without a strong odor.

## 2022-12-14 NOTE — ANESTHESIA POSTPROCEDURE EVALUATION
POST- ANESTHESIA EVALUATION       Pt Name: Vasquez Voss  MRN: [id number]  YOB: 1975  Date of evaluation: 12/14/2022  Time:  1:15 PM      /67   Pulse 58   Temp 96.9 °F (36.1 °C) (Temporal)   Resp 17   Ht 5' 4\" (1.626 m)   Wt 293 lb 6.4 oz (133.1 kg)   LMP 09/15/2021 Comment: VERY HEAVY PERIODS  SpO2 94%   BMI 50.36 kg/m²      Consciousness Level  Awake  Cardiopulmonary Status  Stable  Pain Adequately Treated YES  Nausea / Vomiting  NO  Adequate Hydration  YES  Anesthesia Related Complications NONE    Patient c/p chest pain in PACU, ECG done in PACU is within normal limit. After having fentanyl IV, she states no more chest pain. Electronically signed by Avie Oppenheim, MD on 12/14/2022 at 1:15 PM       Department of Anesthesiology  Postprocedure Note    Patient: Vasquez Voss  MRN: [id number]  [place]: 1975  Date of evaluation: 12/14/2022      Procedure Summary     Date: 12/14/22 Room / Location: [address]    Anesthesia Start: 8233 Anesthesia Stop: 2719    Procedure: CYSTOSCOPY LEFT URETEROSCOPY HOLMIUM LASER WITH LEFT STENT EXCHANGE (Left: Ureter) Diagnosis:       Left ureteral calculus      (Left ureteral calculus [N20.1])    Surgeons: Chiki Edge MD Responsible Provider: Avie Oppenheim, MD    Anesthesia Type: general ASA Status: 3          Anesthesia Type: No value filed.     Miguel Angel Phase I: Miguel Angel Score: 7    Miguel Angel Phase II:        Anesthesia Post Evaluation

## 2022-12-14 NOTE — H&P
Dinesh Delacruz MD [facility]    History and Physical    Patient:  Lilian Dash  MRN: [id number]  YOB: 1975    CHIEF COMPLAINT:  Left ureteral calculus     HISTORY OF PRESENT ILLNESS:   The patient is a 52 y.o. female who presents with 8 mm left UPJ calculus on CT. She had a [id number] stent placed on 22 due to some narrowing in the ureter and inability to reach the stone. Patient here today for definitive Rx. Patient's old records, notes and chart reviewed and summarized above. Past Medical History:    Past Medical History:   Diagnosis Date    Anxiety     Carpal tunnel syndrome     COVID-19 2021    Depression     Endometriosis     Gestational diabetes     x 3 children    Headache     migraines    History of blood transfusion     Hypertension     Iron deficiency anemia     Kidney stones     Dr. Bernarda Delacruz seen 2022    May-Thurner syndrome     Obesity     Scleroderma ([address]     Snores     Under care of team     PCP - Candelaria Rosa CNP - LAST VISIT 2022    Under care of team     VASCULAR  - DR. RIBEIRO - 2021 - SEES FOR MAY-THURNER SYNDROME    Under care of team     HEM/ONC - DR. PERKINS - DINAH WHITMORE - LAST VISIT - 2022    URI (upper respiratory infection) 10/23/2022    starting 10/23/2022 cough, fever, fatigue, sorethroat, congestion, seen by urgent care 10/25/22, ER on 10/29/22 for SOB, dizziness and N&V    Wears glasses     Wellness examination     follows with Behavioral Health, last seen 2022       Past Surgical History:    Past Surgical History:   Procedure Laterality Date     SECTION  x 3    CHOLECYSTECTOMY      CYSTOSCOPY Left 2022    CYSTOSCOPY, URETEROSCOPY, STENT PLACEMENT    CYSTOSCOPY Left 2022    CYSTOSCOPY, URETEROSCOPY, STENT PLACEMENT performed by Beryl Powers MD at [place] [address] N/A 2021    uterine ablation    GASTRIC BYPASS SURGERY  2009    PABLO- EN- Y    HYSTERECTOMY (CERVIX STATUS UNKNOWN) N/A 10/26/2021    XI ROBOTIC LAPAROSCOPIC MODIFIED RADICAL HYSTERECTOMY, BILATERAL SALPINGO OOPHORECTOMY, CYTOLOGIC WASHINGS, CYSTOSCOPY performed by Luis Kay MD at [address] [facility]      endometriosis    TOENAIL EXCISION      VASCULAR SURGERY  2016    to evaluate May Rader syndrome effects on  blood vessels     Previous Urologic Surgery:  see above  Medications Prior to Admission:    Prior to Admission medications    Medication Sig Start Date End Date Taking?  Authorizing Provider   tamsulosin (FLOMAX) 0.4 MG capsule Take 1 capsule by mouth daily 11/29/22 12/29/22  Kika Horn MD   oxybutynin (DITROPAN) 5 MG tablet Take 1 tablet by mouth 3 times daily as needed (bladder spasms, stent pain) 11/29/22   Kika Horn MD   ibuprofen (ADVIL;MOTRIN) 800 MG tablet Take 1 tablet by mouth every 8 hours as needed for Pain 11/23/22   Gordon Gardiner MD   fluticasone Jody Mullins) 50 MCG/ACT nasal spray 2 sprays by Nasal route daily 9/15/22   JUSTICE Kellogg - CNP   losartan (COZAAR) 50 MG tablet Take 1 tablet by mouth daily 8/31/22   JUSTICE Galan - CNP   Oyster Shell 500 MG TABS Take 1 tablet by mouth 3 times daily 8/31/22   JUSTICE Galan - CNP   vitamin B-12 (CYANOCOBALAMIN) 1000 MCG tablet Take 1,000 mcg by mouth daily    Historical Provider, MD   zinc 50 MG CAPS Take 50 mg by mouth daily 8/19/22 12/7/22  [facility], MD   busPIRone (BUSPAR) 10 MG tablet TAKE 1 TABLET BY MOUTH THREE TIMES A DAY AS NEEDED FOR ANXIETY 6/7/22   JUSTICE Galan - CNP   vitamin D3 (CHOLECALCIFEROL) 25 MCG (1000 UT) TABS tablet Take 1 tablet by mouth daily 4/15/22   [facility], MD   acetaminophen (AMINOFEN) 325 MG tablet Take 2 tablets by mouth every 6 hours as needed for Pain 10/26/21   Tasha Rodrigues, DO   Elastic Bandages & Supports (JOBST ULTRASHEER 20-30MMHG LG) MISC Wear daily, ok to remove in the evenings 1/4/21   Frankey Olea, MD   Multiple Vitamins-Minerals (MULTI-JULIO CÉSAR PO) Take by mouth daily    Historical Provider, MD   calcium carbonate (OYSTER SHELL CALCIUM 500 MG) 1250 (500 Ca) MG tablet Take 1 tablet by mouth daily    Historical Provider, MD       Allergies:  Contrast [iodides], Trileptal [oxcarbazepine], Morphine, Bactrim [sulfamethoxazole-trimethoprim], and Nsaids    Social History:    Social History     Socioeconomic History    Marital status: Single     Spouse name: Not on file    Number of children: Not on file    Years of education: Not on file    Highest education level: Not on file   Occupational History    Not on file   Tobacco Use    Smoking status: Never    Smokeless tobacco: Never   Vaping Use    Vaping Use: Never used   Substance and Sexual Activity    Alcohol use: No    Drug use: No    Sexual activity: Not Currently   Other Topics Concern    Not on file   Social History Narrative    Not on file     Social Determinants of Health     Financial Resource Strain: High Risk    Difficulty of Paying Living Expenses: Very hard   Food Insecurity: Food Insecurity Present    Worried About Running Out of Food in the Last Year: Sometimes true    Ran Out of Food in the Last Year: Sometimes true   Transportation Needs: Not on file   Physical Activity: Not on file   Stress: Not on file   Social Connections: Not on file   Intimate Partner Violence: Not on file   Housing Stability: Not on file     Family History:    Family History   Problem Relation Age of Onset    Colon Cancer Mother     Early Death Father         accident    Diabetes Sister     High Blood Pressure Sister     Diabetes Brother     High Blood Pressure Brother     Diabetes Sister     High Blood Pressure Sister      Previous Urologic Family history: none  REVIEW OF SYSTEMS:  Constitutional: negative  Eyes: negative  Respiratory: negative  Cardiovascular: negative  Gastrointestinal: negative  Genitourinary: see HPI  Musculoskeletal: negative  Skin: negative   Neurological: negative  Hematological/Lymphatic: negative  Psychological: negative    Physical Exam:      This a 52 y.o. female   No data found. Constitutional: Patient in no acute distress. Neuro: Alert and oriented to person, place and time. Psych: mood and affect normal  HEENT negative  Lungs: Respiratory effort is normal; CTA  Cardiovascular: Normal peripheral pulses; R3 wo murmur  Abdomen: Soft, non-tender, non-distended with no CVA, flank pain or hepatosplenomegaly. Lymphatics: No palpable lymphadenopathy. Bladder non-tender and not distended. LABS:   Recent Labs     12/13/22  1352   WBC 4.8   HGB 13.8   HCT 42.1   MCV 87.2        No results for input(s): NA, K, CL, CO2, PHOS, BUN, CREATININE, CA in the last 72 hours. Additional Lab/culture results:    Urinalysis: No results for input(s): COLORU, PHUR, LABCAST, WBCUA, RBCUA, MUCUS, TRICHOMONAS, YEAST, BACTERIA, CLARITYU, SPECGRAV, LEUKOCYTESUR, UROBILINOGEN, BILIRUBINUR, BLOODU in the last 72 hours.     Invalid input(s): NITRATE, GLUCOSEUKETONESUAMORPHOUS     -----------------------------------------------------------------  Imaging Results:      Assessment and Plan   Impression:    Patient Active Problem List   Diagnosis    Iron deficiency anemia    Intestinal malabsorption    Anxiety    Depression    Malabsorption of iron    Vitamin D deficiency    Zinc deficiency    Peripheral arterial occlusive disease ([facility] [address]    Endometriosis    History of scleroderma    May-Thurner syndrome    Bariatric surgery status    Calculus of kidney    Suspected COVID-19 virus infection    Hysteroscopy, D&C, Polypectomy 5/12/21    Post-op pain    Abnormal uterine bleeding due to endometrial polyp    RALH, BSO, Cytologic washings, cystoscopy    Menometrorrhagia    Lesion of uterus    Encounter for screening for COVID-19    Left ureteral calculus    Family history of kidney stones       Plan: Cystoscopy, Left ureteroscopy and Holmium laser lithotripsy with ureteral stent exchange ([id number]) with string under GA.      Yesi Motley MD  7:16 AM 12/14/2022

## 2022-12-14 NOTE — ANESTHESIA PRE PROCEDURE
Department of Anesthesiology  Preprocedure Note       Name:  Lora Freeman   Age:  52 y.o.  :  1975                                          MRN:  [id number]         Date:  2022      Surgeon: Cristhian Mitchell):  Yesi Motley MD    Procedure: Procedure(s):  CYSTOSCOPY LEFT URETEROSCOPY HOLMIUM LASER WITH LEFT STENT EXCHANGE    Medications prior to admission:   Prior to Admission medications    Medication Sig Start Date End Date Taking?  Authorizing Provider   tamsulosin (FLOMAX) 0.4 MG capsule Take 1 capsule by mouth daily 22  Mati Bear MD   oxybutynin (DITROPAN) 5 MG tablet Take 1 tablet by mouth 3 times daily as needed (bladder spasms, stent pain) 22   Mati Bear MD   fluticasone [facility]) 50 MCG/ACT nasal spray 2 sprays by Nasal route daily  Patient not taking: Reported on 2022 9/15/22   JUSTICE Kellogg CNP   losartan (COZAAR) 50 MG tablet Take 1 tablet by mouth daily 22   JUSTICE Viveros - CNP   Oyster Shell 500 MG TABS Take 1 tablet by mouth 3 times daily  Patient not taking: Reported on 2022   JUSTICE Viveros CNP   vitamin B-12 (CYANOCOBALAMIN) 1000 MCG tablet Take 1,000 mcg by mouth daily    Historical Provider, MD   zinc 50 MG CAPS Take 50 mg by mouth daily 22  [facility], MD   busPIRone (BUSPAR) 10 MG tablet TAKE 1 TABLET BY MOUTH THREE TIMES A DAY AS NEEDED FOR ANXIETY 22   JUSTICE Viveros CNP   vitamin D3 (CHOLECALCIFEROL) 25 MCG (1000 UT) TABS tablet Take 1 tablet by mouth daily 4/15/22   [facility] MD SANJUANA   acetaminophen (AMINOFEN) 325 MG tablet Take 2 tablets by mouth every 6 hours as needed for Pain  Patient not taking: Reported on 2022 10/26/21   Mary Ann Dimas DO   Elastic Bandages & Supports (JOBST ULTRASHEER 20-30MMHG LG) MISC Wear daily, ok to remove in the evenings 21   Ernestina Marr MD   Multiple Vitamins-Minerals (MULTI-JULIO CÉSAR PO) Take by mouth daily Historical Provider, MD   calcium carbonate (OYSTER SHELL CALCIUM 500 MG) 1250 (500 Ca) MG tablet Take 1 tablet by mouth daily    Historical Provider, MD       Current medications:    Current Facility-Administered Medications   Medication Dose Route Frequency Provider Last Rate Last Admin    ceFAZolin (ANCEF) 3000 mg in dextrose 5 % 100 mL IVPB  3,000 mg IntraVENous Once Devin MD Livia        0.9 % sodium chloride infusion   IntraVENous Continuous Gabriela Mcghee MD        lactated ringers infusion   IntraVENous Continuous Gabriela Mcghee MD        sodium chloride flush 0.9 % injection 5-40 mL  5-40 mL IntraVENous 2 times per day Gabriela Mcghee MD        sodium chloride flush 0.9 % injection 5-40 mL  5-40 mL IntraVENous PRN Gabriela Mcghee MD        0.9 % sodium chloride infusion   IntraVENous PRN Gabriela Mcghee MD        ceFAZolin (ANCEF) IVPB                Allergies: Allergies   Allergen Reactions    Contrast [Iodides] Hives     Chest pain, HTN  Able to handle if the pre-medication prep is followed.  Trileptal [Oxcarbazepine] Shortness Of Breath     Red neck rash/ visual changes    Morphine Other (See Comments)     Red streak up arm at iv site/ and hives    Bactrim [Sulfamethoxazole-Trimethoprim] Other (See Comments)     Yellowing of sclera    Nsaids Other (See Comments)     Not supposed to take d/t gastric bypass       Problem List:    Patient Active Problem List   Diagnosis Code    Iron deficiency anemia D50.9    Intestinal malabsorption K90.9    Anxiety F41.9    Depression F32. A    Malabsorption of iron K90.9    Vitamin D deficiency E55.9    Zinc deficiency E60    Peripheral arterial occlusive disease (HCC) I77.9    Endometriosis N80.9    History of scleroderma Z87.39    May-Thurner syndrome I87.1    Bariatric surgery status Z98.84    Calculus of kidney N20.0    Suspected COVID-19 virus infection Z20.822    Hysteroscopy, D&C, Polypectomy 5/12/21 Z98.890    Post-op pain G89.18    Abnormal uterine bleeding due to endometrial polyp N93.9, N84.0    RALH, BSO, Cytologic washings, cystoscopy Z90.710    Menometrorrhagia N92.1    Lesion of uterus N85.9    Encounter for screening for COVID-19 Z11.52    Left ureteral calculus N20.1    Family history of kidney stones Z84.1       Past Medical History:        Diagnosis Date    Anxiety     Carpal tunnel syndrome     COVID-19 2021    Depression     Endometriosis     Gestational diabetes     x 3 children    Headache     migraines    History of blood transfusion     Hypertension     Iron deficiency anemia     Kidney stones     Dr. Leny Conway seen 2022    May-Thurner syndrome     Obesity     Scleroderma ([facility] [place] [address]     Snores     Under care of team     PCP - Juan Hernandez CNP - LAST VISIT 2022    Under care of team     VASCULAR  - DR. RIBEIRO - 2021 - SEES FOR MAY-THURNER SYNDROME    Under care of team     HEM/ONC - DR. MADDIE WHITMORE - LAST VISIT - 2022    URI (upper respiratory infection) 10/23/2022    starting 10/23/2022 cough, fever, fatigue, sorethroat, congestion, seen by urgent care 10/25/22, ER on 10/29/22 for SOB, dizziness and N&V    Wears glasses     Wellness examination     follows with Behavioral Health, last seen 2022       Past Surgical History:        Procedure Laterality Date     SECTION  x 3    CHOLECYSTECTOMY      CYSTOSCOPY Left 2022    CYSTOSCOPY, URETEROSCOPY, STENT PLACEMENT    CYSTOSCOPY Left 2022    CYSTOSCOPY, URETEROSCOPY, STENT PLACEMENT performed by Cliff Lim MD at [address] [facility] N/A 2021    uterine ablation    GASTRIC BYPASS SURGERY  2009    PABLO- EN- Y    HYSTERECTOMY (CERVIX STATUS UNKNOWN) N/A 10/26/2021    XI ROBOTIC LAPAROSCOPIC MODIFIED RADICAL HYSTERECTOMY, BILATERAL SALPINGO OOPHORECTOMY, CYTOLOGIC WASHINGS, CYSTOSCOPY performed by Ana Lilia Montejo MD at [facility]      endometriosis  TOENAIL EXCISION      VASCULAR SURGERY  2016    to evaluate May Rader syndrome effects on  blood vessels       Social History:    Social History     Tobacco Use    Smoking status: Never    Smokeless tobacco: Never   Substance Use Topics    Alcohol use: No                                Counseling given: Not Answered      Vital Signs (Current):   Vitals:    12/07/22 1532   Weight: 290 lb (131.5 kg)   Height: 5' 4\" (1.626 m)                                              BP Readings from Last 3 Encounters:   12/01/22 115/70   11/29/22 122/78   11/23/22 (!) 135/95       NPO Status:                                                                                 BMI:   Wt Readings from Last 3 Encounters:   12/07/22 290 lb (131.5 kg)   12/01/22 290 lb (131.5 kg)   11/28/22 290 lb (131.5 kg)     Body mass index is 49.78 kg/m². CBC:   Lab Results   Component Value Date/Time    WBC 4.8 12/13/2022 01:52 PM    RBC 4.82 12/13/2022 01:52 PM    HGB 13.8 12/13/2022 01:52 PM    HCT 42.1 12/13/2022 01:52 PM    MCV 87.2 12/13/2022 01:52 PM    RDW 14.4 12/13/2022 01:52 PM     12/13/2022 01:52 PM       CMP:   Lab Results   Component Value Date/Time     12/01/2022 03:12 PM    K 4.1 12/01/2022 03:12 PM     12/01/2022 03:12 PM    CO2 26 12/01/2022 03:12 PM    BUN 12 12/01/2022 03:12 PM    CREATININE 1.08 12/01/2022 03:12 PM    GFRAA >60 04/08/2022 03:30 PM    LABGLOM >60 12/01/2022 03:12 PM    GLUCOSE 103 12/01/2022 03:12 PM    PROT 7.2 12/02/2022 01:51 PM    CALCIUM 9.4 12/01/2022 03:12 PM    BILITOT 0.2 12/02/2022 01:51 PM    ALKPHOS 77 12/02/2022 01:51 PM    AST 27 12/02/2022 01:51 PM    ALT 49 12/02/2022 01:51 PM       POC Tests: No results for input(s): POCGLU, POCNA, POCK, POCCL, POCBUN, POCHEMO, POCHCT in the last 72 hours.     Coags: No results found for: PROTIME, INR, APTT    HCG (If Applicable):   Lab Results   Component Value Date    HCG NEGATIVE 10/26/2021    HCGQUANT <1 03/18/2021        ABGs: No results found for: PHART, PO2ART, MFQ8JEJ, HPJ7KXS, BEART, D7SKHINC     Type & Screen (If Applicable):  No results found for: LABABO, LABRH    Drug/Infectious Status (If Applicable):  No results found for: HIV, HEPCAB    COVID-19 Screening (If Applicable):   Lab Results   Component Value Date/Time    COVID19 Not Detected 10/25/2022 12:58 AM           Anesthesia Evaluation  Patient summary reviewed and Nursing notes reviewed   history of anesthetic complications: PONV. Airway: Mallampati: II  TM distance: >3 FB   Neck ROM: full  Mouth opening: > = 3 FB   Dental: normal exam         Pulmonary:Negative Pulmonary ROS and normal exam  breath sounds clear to auscultation                             Cardiovascular:  Exercise tolerance: no interval change,   (+) hypertension: no interval change,         Rhythm: regular  Rate: normal                    Neuro/Psych:   (+) neuromuscular disease:, headaches:, psychiatric history:depression/anxiety             GI/Hepatic/Renal:   (+) renal disease: kidney stones and no interval change, morbid obesity          Endo/Other:    (+) DiabetesType II DM, no interval change, , : arthritis: OA., .                  ROS comment: May-Thurner syndrome Abdominal:   (+) obese,     Abdomen: soft. Vascular:   + PVD, aortic or cerebral, . ROS comment: Peripheral arterial occlusive disease. Other Findings:           Anesthesia Plan      general     ASA 3       Induction: intravenous. MIPS: Prophylactic antiemetics administered. Anesthetic plan and risks discussed with patient. Plan discussed with CRNA.                     Gabriela Mcghee MD   12/14/2022

## 2022-12-16 ENCOUNTER — TELEPHONE (OUTPATIENT)
Dept: ONCOLOGY | Age: 47
End: 2022-12-16

## 2022-12-16 ENCOUNTER — OFFICE VISIT (OUTPATIENT)
Dept: ONCOLOGY | Age: 47
End: 2022-12-16
Payer: MEDICARE

## 2022-12-16 VITALS
TEMPERATURE: 97.5 F | BODY MASS INDEX: 50.59 KG/M2 | HEART RATE: 67 BPM | WEIGHT: 293 LBS | DIASTOLIC BLOOD PRESSURE: 80 MMHG | SYSTOLIC BLOOD PRESSURE: 143 MMHG

## 2022-12-16 DIAGNOSIS — K90.9 IRON MALABSORPTION: ICD-10-CM

## 2022-12-16 DIAGNOSIS — Z98.84 HISTORY OF BARIATRIC SURGERY: ICD-10-CM

## 2022-12-16 DIAGNOSIS — K90.9 MALABSORPTION OF IRON: ICD-10-CM

## 2022-12-16 DIAGNOSIS — D50.0 IRON DEFICIENCY ANEMIA DUE TO CHRONIC BLOOD LOSS: Primary | ICD-10-CM

## 2022-12-16 LAB
REASON FOR REJECTION: NORMAL
ZZ NTE CLEAN UP: ORDERED TEST: NORMAL
ZZ NTE WITH NAME CLEAN UP: SPECIMEN SOURCE: NORMAL

## 2022-12-16 PROCEDURE — G8427 DOCREV CUR MEDS BY ELIG CLIN: HCPCS | Performed by: INTERNAL MEDICINE

## 2022-12-16 PROCEDURE — G8484 FLU IMMUNIZE NO ADMIN: HCPCS | Performed by: INTERNAL MEDICINE

## 2022-12-16 PROCEDURE — 99211 OFF/OP EST MAY X REQ PHY/QHP: CPT | Performed by: INTERNAL MEDICINE

## 2022-12-16 PROCEDURE — 1036F TOBACCO NON-USER: CPT | Performed by: INTERNAL MEDICINE

## 2022-12-16 PROCEDURE — 99214 OFFICE O/P EST MOD 30 MIN: CPT | Performed by: INTERNAL MEDICINE

## 2022-12-16 PROCEDURE — G8417 CALC BMI ABV UP PARAM F/U: HCPCS | Performed by: INTERNAL MEDICINE

## 2022-12-16 RX ORDER — MELATONIN
1000 DAILY
Qty: 90 TABLET | Refills: 1 | Status: SHIPPED | OUTPATIENT
Start: 2022-12-16

## 2022-12-16 RX ORDER — IBUPROFEN 200 MG
1 CAPSULE ORAL DAILY
Qty: 90 TABLET | Refills: 1 | Status: SHIPPED | OUTPATIENT
Start: 2022-12-16

## 2022-12-16 RX ORDER — LANOLIN ALCOHOL/MO/W.PET/CERES
1000 CREAM (GRAM) TOPICAL DAILY
Qty: 90 TABLET | Refills: 1 | Status: SHIPPED | OUTPATIENT
Start: 2022-12-16

## 2022-12-16 NOTE — TELEPHONE ENCOUNTER
AVS from 12/16/22    Labs in 6 weeks and the rv       Rv scheduled for 12/16/22  Pt will have labs drawn one week prior    PT was given AVS and appt schedule

## 2022-12-16 NOTE — PROGRESS NOTES
Dion Scott                                                                                                                  12/16/2022  MRN:   [id number]  YOB: 1975  PCP:                           Jerl Bernheim, APRN - CNP  Referring Physician: No ref. provider found  Treating Physician Name: Doris Faria MD      Reason for visit:  Chief Complaint   Patient presents with    Follow-up     Review status of disease    Discuss Labs    Fatigue       Current problems:  Iron deficiency anemia with malabsorption component     Active and recent treatments:  Parenteral iron-3/2021    Summary of Case/History:    Dion Scott a 52 y. o.female is a patient with anemia. She has history of anemia and was managed previously by Dr. Barbie Lilly with IV iron. Her most recent lab work shows recurrent iron deficiency anemia and she reports she historically received monthly B12 injections and IV iron every 3-6 months. She has history of scleroderma and gastric by-pass, done in 2009, contributing to iron malabsorption. She reports she has had zinc and D deficiencies, she does not have any bariatric vitamins. She is very symptomatic with dizziness, cold intolerance and feeling very fatigued. She denies any bleeding. She has history of menorrhagia with irregular periods, she had benign growth removed from outside of the uterus in 2012. She has family history of uterine fibroids and cancer. Interim History:     Patient presents to the clinic for a follow-up visit and to discuss results of her lab work-up. Patient is complaining of progressive fatigue. Patient hemoglobin is within range. Iron stores are adequate. Patient is also getting treatment for kidney infection and kidney stones. During this visit patient's allergy, social, medical, surgical history and medications were reviewed and updated.     Past Medical History:   Past Medical History:   Diagnosis Date    Anxiety     Carpal tunnel syndrome COVID-19 2021    Depression     Endometriosis     Gestational diabetes     x 3 children    Headache     migraines    History of blood transfusion     Hypertension     Iron deficiency anemia     Kidney stones     Dr. Tj Brock seen 2022    May-Thurner syndrome     Obesity     Scleroderma ([address]     Snores     Under care of team     PCP - Ana Maria Franco CNP - LAST VISIT 2022    Under care of team     VASCULAR  - DR. RIBEIRO - 2021 - SEES FOR MAY-THURNER SYNDROME    Under care of team     HEM/ONC - DR. PERKINS - JANESSRUFUS WHITMORE - LAST VISIT - 2022    URI (upper respiratory infection) 10/23/2022    starting 10/23/2022 cough, fever, fatigue, sorethroat, congestion, seen by urgent care 10/25/22, ER on 10/29/22 for SOB, dizziness and N&V    Wears glasses     Wellness examination     follows with Behavioral Health, last seen 2022       Past Surgical History:     Past Surgical History:   Procedure Laterality Date     SECTION  x 3    CHOLECYSTECTOMY      CYSTOSCOPY Left 2022    CYSTOSCOPY, URETEROSCOPY, STENT PLACEMENT    CYSTOSCOPY Left 2022    CYSTOSCOPY, URETEROSCOPY, STENT PLACEMENT performed by Lucy Burnette MD at [address] [place] [address] Left 2022    CYSTOSCOPY LEFT URETEROSCOPY HOLMIUM LASER WITH LEFT STENT EXCHANGE    CYSTOSCOPY Left 2022    CYSTOSCOPY LEFT URETEROSCOPY HOLMIUM LASER WITH LEFT STENT EXCHANGE performed by Lucy Burnette MD at [facility] N/A 2021    uterine ablation    GASTRIC BYPASS SURGERY  2009    PABLO- EN- Y    HYSTERECTOMY (CERVIX STATUS UNKNOWN) N/A 10/26/2021    XI ROBOTIC LAPAROSCOPIC MODIFIED RADICAL HYSTERECTOMY, BILATERAL SALPINGO OOPHORECTOMY, CYTOLOGIC WASHINGS, CYSTOSCOPY performed by Elena Oneill MD at [facility]      endometriosis    TOENAIL EXCISION      VASCULAR SURGERY  2016    to evaluate May Rader syndrome effects on  blood vessels Patient Family Social History:     Social History     Socioeconomic History    Marital status: Single     Spouse name: None    Number of children: None    Years of education: None    Highest education level: None   Tobacco Use    Smoking status: Never    Smokeless tobacco: Never   Vaping Use    Vaping Use: Never used   Substance and Sexual Activity    Alcohol use: No    Drug use: No    Sexual activity: Not Currently     Social Determinants of Health     Financial Resource Strain: High Risk    Difficulty of Paying Living Expenses: Very hard   Food Insecurity: Food Insecurity Present    Worried About Running Out of Food in the Last Year: Sometimes true    Ran Out of Food in the Last Year: Sometimes true     Family History   Problem Relation Age of Onset    Colon Cancer Mother     Early Death Father         accident    Diabetes Sister     High Blood Pressure Sister     Diabetes Brother     High Blood Pressure Brother     Diabetes Sister     High Blood Pressure Sister      Current Medications:     Current Outpatient Medications   Medication Sig Dispense Refill    tamsulosin (FLOMAX) 0.4 MG capsule Take 1 capsule by mouth daily 30 capsule 0    oxybutynin (DITROPAN) 5 MG tablet Take 1 tablet by mouth 3 times daily as needed (bladder spasms, stent pain) 90 tablet 3    losartan (COZAAR) 50 MG tablet Take 1 tablet by mouth daily 90 tablet 3    vitamin B-12 (CYANOCOBALAMIN) 1000 MCG tablet Take 1,000 mcg by mouth daily      busPIRone (BUSPAR) 10 MG tablet TAKE 1 TABLET BY MOUTH THREE TIMES A DAY AS NEEDED FOR ANXIETY 90 tablet 5    vitamin D3 (CHOLECALCIFEROL) 25 MCG (1000 UT) TABS tablet Take 1 tablet by mouth daily 90 tablet 1    Elastic Bandages & Supports (JOBST ULTRASHEER 20-30MMHG LG) MISC Wear daily, ok to remove in the evenings 3 each 3    Multiple Vitamins-Minerals (MULTI-JULIO CÉSAR PO) Take by mouth daily      calcium carbonate (OYSTER SHELL CALCIUM 500 MG) 1250 (500 Ca) MG tablet Take 1 tablet by mouth daily entry  Heart - normal rate, regular rhythm, normal S1, S2, no murmurs  Abdomen - soft, nontender, nondistended, no masses or organomegaly  Neurological - alert, oriented, normal speech, no focal findings or movement disorder noted  Extremities - peripheral pulses normal, no pedal edema, no clubbing or cyanosis  Skin - normal coloration and turgor, no rashes, no suspicious skin lesions noted       DATA:      Lab Results   Component Value Date    WBC 4.8 12/13/2022    HGB 13.8 12/13/2022    HCT 42.1 12/13/2022    MCV 87.2 12/13/2022     12/13/2022     Lab Results   Component Value Date    IRON 64 12/13/2022    TIBC 308 12/13/2022    FERRITIN 107 08/19/2022       Chemistry        Component Value Date/Time     12/01/2022 1512    K 4.1 12/01/2022 1512     12/01/2022 1512    CO2 26 12/01/2022 1512    BUN 12 12/01/2022 1512    CREATININE 1.08 (H) 12/01/2022 1512        Component Value Date/Time    CALCIUM 9.4 12/01/2022 1512    ALKPHOS 77 12/02/2022 1351    AST 27 12/02/2022 1351    ALT 49 (H) 12/02/2022 1351    BILITOT 0.2 (L) 12/02/2022 1351        Lab Results   Component Value Date    [id number] 836 12/13/2022     Lab Results   Component Value Date     19 12/13/2022       Impression:  Iron deficiency anemia with malabsorption component  HX gastric bypass, 2009  HX scleroderma   Vitamin D deficiency  Fatigue  Elevated CA-125  May Thurner syndrome    Plan:  Personally reviewed results of lab work-up and the relevant clinical data  Patient hemoglobin is within range. She has adequate iron stores  Recommend continued surveillance. Patient is concerned about her fatigue and is concerned that if we wait too long her iron levels will be too low. We initially offered to follow-up in 3 months but patient requesting her lab to be done sooner. Given history of gastric bypass surgery patient has acquired malabsorption.   Patient given refill on zinc vitamin D and vitamin B12  Continue active surveillance for anemia iron and iron deficiency  Patient multiple questions which answered the best my ability. Malcolm Asher MD            This note is created with the assistance of a speech recognition program.  While intending to generate a document that actually reflects the content of the visit, the document can still have some errors including those of syntax and sound a like substitutions which may escape proof reading. It such instances, actual meaning can be extrapolated by contextual diversion. Statement Selected

## 2023-01-10 ENCOUNTER — TELEMEDICINE (OUTPATIENT)
Dept: BEHAVIORAL/MENTAL HEALTH CLINIC | Age: 48
End: 2023-01-10
Payer: MEDICARE

## 2023-01-10 ENCOUNTER — CLINICAL DOCUMENTATION (OUTPATIENT)
Dept: BEHAVIORAL/MENTAL HEALTH CLINIC | Age: 48
End: 2023-01-10

## 2023-01-10 DIAGNOSIS — F33.1 MODERATE EPISODE OF RECURRENT MAJOR DEPRESSIVE DISORDER (HCC): Primary | ICD-10-CM

## 2023-01-10 DIAGNOSIS — F41.9 ANXIETY: ICD-10-CM

## 2023-01-10 PROCEDURE — 1036F TOBACCO NON-USER: CPT | Performed by: SOCIAL WORKER

## 2023-01-10 PROCEDURE — 90832 PSYTX W PT 30 MINUTES: CPT | Performed by: SOCIAL WORKER

## 2023-01-10 NOTE — PROGRESS NOTES
ADULT BEHAVIORAL HEALTH FOLLOW UP  CAMMIE Eason  Licensed Independent          Visit Date: 1/10/2023   Time of appointment: 11:16 AM       Time spent with Patient: 37 minutes. Patient Location: hospitals/Clinician Location: Home Office; [facility]   This was a tele-health visit conducted via interactive/real time audio and video. Reason for Consult:  Depression and Anxiety     PCP:  JUSTICE Crespo CNP      Pt provided informed consent for the behavioral health program. Discussed with patient model of service to include the limits of confidentiality (i.e. abuse reporting, suicide intervention, etc.) and short-term intervention focused approach. Pt indicated understanding. Pt provided verbal consent to engage in tele-health psychotherapy, visit was completed using My Chart and patient was alone at time of visit, as the visit was held in a private space in patient's home ([place], [place]). Brittaney Cobian is a 52 y.o. female who presents for follow up of depression and anxiety. Lisandra San presented to session with continued anxious and depressive symptoms. She explained that she has been having a difficult time trying to manage everything on her own and has been worried about her children lately due to issues they experience at school. Lisandra San shared she has been worried about her son who has been feeling depressed and her daughter who has been having a lot of anxiety. She shared she was able to enroll her daughter in counseling but is working on helping her son get established. She shared this has become difficult due to him being 25, as she needs him to call to schedule the appointment even though he has expressed desire for help. Lisandra San shared it has been hard to watch her children face challenges at school without having the ability to change certain things. Previous Recommendations: Follow-up to discuss progress with managing anxiety and depression.  Continue working with [facility] worker for additional support. MENTAL STATUS EXAM  Mood was depressed and sad with anxious affect. Suicidal ideation was not reported. Homicidal ideation was not reported. Hygiene was good . Dress was appropriate. Behavior was Within Normal Limits with self-report of difficulties ambulating. Attitude was Engageable. Eye-contact was good. Speech: rate - WNL, rhythm - WNL, volume - WNL. Verbalizations were goal directed. Thought processes were intact and goal-oriented without evidence of delusions, hallucinations, obsessions, or allie; with moderate cognitive distortions. Associations were characterized by intact cognitive processes. Pt was oriented to person, place, time, and general circumstances;  recent:  good. Insight and judgment were estimated to be good, AEB, a fair  understanding of cyclical maladaptive patterns, and the ability to use insight to inform behavior change. ASSESSMENT  Trinidad Mauro presented to the appointment today for evaluation and treatment of symptoms of depression and anxiety. She is currently deemed no risk to herself or others and meets criteria for Major Depressive Disorder and Anxiety. Salina Ferguson continues to work on managing depressive and anxious symptoms. Plan is for Salina Ferguson to establish care with an ongoing counselor whom she can meet with for regularly scheduled appointments. At this time, [facility] as she works on locating a new clinician. Salina Ferguson was recommended to continue psychotherapy. PHQ Scores 12/1/2022 8/31/2022 9/14/2021 1/9/2017   PHQ2 Score 0 6 6 1   PHQ9 Score 0 26 18 1     Interpretation of Total Score Depression Severity: 1-4 = Minimal depression, 5-9 = Mild depression, 10-14 = Moderate depression, 15-19 = Moderately severe depression, 20-27 = Severe depression    How often pt has had thoughts of death or hurting self (if PHQ positive for depression):       No flowsheet data found.   Interpretation of FAISAL-7 score: 5-9 = mild anxiety, 10-14 = moderate anxiety, 15+ = severe anxiety. Recommend referral to behavioral health for scores 10 or greater. DIAGNOSIS  Humera Dang was seen today for depression and anxiety. Diagnoses and all orders for this visit:    Moderate episode of recurrent major depressive disorder ([facility] [address]    Anxiety      INTERVENTION  Provided education, Supportive techniques, Emphasized self-care as important for managing overall health, CBT to target depression, and Problem-solving re: discussed creative ways to help children by utilizing school resources such as guidance counselor, , etc for support . Jv Gore was recommended to be scheduled for follow-up appointment. Plan is to transfer to ongoing clinician for therapy at external agency of her choice. INTERACTIVE COMPLEXITY  Is interactive complexity present?   No  Reason:  N/A  Additional Supporting Information:  N/A       Electronically signed by CAMMIE Foster on 1/18/2023 at 3:22 PM

## 2023-01-19 ENCOUNTER — TELEPHONE (OUTPATIENT)
Dept: PRIMARY CARE CLINIC | Age: 48
End: 2023-01-19

## 2023-01-19 ENCOUNTER — TELEMEDICINE (OUTPATIENT)
Age: 48
End: 2023-01-19
Payer: MEDICARE

## 2023-01-19 DIAGNOSIS — F33.1 MODERATE EPISODE OF RECURRENT MAJOR DEPRESSIVE DISORDER (HCC): Primary | ICD-10-CM

## 2023-01-19 DIAGNOSIS — F41.9 ANXIETY: ICD-10-CM

## 2023-01-19 PROCEDURE — 90834 PSYTX W PT 45 MINUTES: CPT | Performed by: SOCIAL WORKER

## 2023-01-19 PROCEDURE — 1036F TOBACCO NON-USER: CPT | Performed by: SOCIAL WORKER

## 2023-01-19 NOTE — PROGRESS NOTES
ADULT BEHAVIORAL HEALTH FOLLOW UP  CAMMIE Ulloa  Licensed Independent          Visit Date: 1/19/2023   Time of appointment: 1:06 PM       Time spent with Patient: 50 minutes.   Patient Location: [facility]/Clinician Location: [facility]; [place], [place]   This was a tele-health visit conducted via interactive/real time audio and video.     Reason for Consult:  Anxiety and Depression     PCP:  Luda Landry, JUSTICE - CNP      Pt provided informed consent for the behavioral health program. Discussed with patient model of service to include the limits of confidentiality (i.e. abuse reporting, suicide intervention, etc.) and short-term intervention focused approach. Pt indicated understanding. Pt provided verbal consent to engage in tele-health psychotherapy, visit was completed using My Chart and patient was alone at time of visit, as the visit was held in a private space in patient's home ([place], [place]).     SUBJECTIVE  Noelle is a 47 y.o. female who presents for follow up of depression and anxiety.     Noelle presented to session with report of increased anxiety and feelings of hopelessness. Noelle reported she is currently taking Buspar, was prescribed Ativan before Buspar. She reported she has tried Prozac & Xanax in the past - Noelle stated she became suicidal as a teenager when taking these. She reported she believes she was allergic to Trileptal, previously going to [facility], completed gene-sight with PCP.     She reported feeling social security process has been stalling , she feels this is because of duplicate forms & lack of communication within determined timeframe. She reported feeling fatigued and down, associated this with low iron reporting she was unable to get a transfusion due to her levels not being low enough. Noelle expressed feeling she has been living in crisis mode for 13 years - only doing what she has to do. She identified this began when her  mother became sick and Cuate Killian took on her care amidst also navigating a toxic relationship with ex-. She reported she still notices herself isolative because of him, leaving curtains closed because of his parents living nearby. Cuate Killian was also able to recognize that while her home was once a place of comfort, it has now been a place where she has many triggers & memories from past experiences, in addition to anxiety due to unknown of future housing situation, that make it difficult to not think about hard moments she lived through in the home. Previous Recommendations: Cuate Killian was recommended to be scheduled for follow-up appointment. Plan is to transfer to ongoing clinician for therapy at external agency of her choice. MENTAL STATUS EXAM  Mood was depressed and sad with anxious affect. Suicidal ideation was denied. However, Pt did identify passive thoughts of suicidal ideation/hopelessness with no plan to act on these feelings. No plan to self-harm. Homicidal ideation was not reported. Hygiene was good . Dress was appropriate. Behavior was Within Normal Limits with self-report of difficulties ambulating. Attitude was Engageable. Eye-contact was good. Speech: rate - WNL, rhythm - WNL, volume - WNL. Verbalizations were goal directed. Thought processes were intact and goal-oriented without evidence of delusions, hallucinations, obsessions, or allie; with moderate cognitive distortions. Associations were characterized by intact cognitive processes. Pt was oriented to person, place, time, and general circumstances;  recent:  good. Insight and judgment were estimated to be good, AEB, a fair  understanding of cyclical maladaptive patterns, and the ability to use insight to inform behavior change. ASSESSMENT  Sarah Mao presented to the appointment today for evaluation and treatment of symptoms of depression and anxiety.  She is currently deemed no risk to herself or others and meets criteria for Major Depressive Disorder and Anxiety. Fabian Lucero endorsed continuation of depressive and anxious symptoms, feels more down related to fear of the unknown as well as memories/triggers from past frequently resurfacing. She is unsure what will happen in the future due to her sister getting , is worried about where the family will go and has not been approved for social security yet. Fabian Lucero is continuing to work with Nahomy Nieto to help her with this application process. Fabian Lucero is recommended to continue psychotherapy and is being referred to external therapist of choice, recommended Fabian Lucero also consider medication evaluation - psychiatry due to increased depression and hopelessness. Fabian Lucero was in agreement with recommendations. PHQ Scores 12/1/2022 8/31/2022 9/14/2021 1/9/2017   PHQ2 Score 0 6 6 1   PHQ9 Score 0 26 18 1     Interpretation of Total Score Depression Severity: 1-4 = Minimal depression, 5-9 = Mild depression, 10-14 = Moderate depression, 15-19 = Moderately severe depression, 20-27 = Severe depression    How often pt has had thoughts of death or hurting self (if PHQ positive for depression):       No flowsheet data found. Interpretation of FAISAL-7 score: 5-9 = mild anxiety, 10-14 = moderate anxiety, 15+ = severe anxiety. Recommend referral to behavioral health for scores 10 or greater. DIAGNOSIS  Fabian Lucero was seen today for anxiety and depression. Diagnoses and all orders for this visit:    Moderate episode of recurrent major depressive disorder ([facility] [address]    Anxiety      INTERVENTION  Discussed various factors related to the development and maintenance of  depression and anxiety (including biological, cognitive, behavioral, and environmental factors), CBT to target self-defeating thoughts & depression, and Identified maladaptive thoughts.      Crisis Resources:  Call or text \"876\" [address]: [phone]  Return to office sooner than next scheduled appointment if symptoms worsen. PLAN  Follow-up appointment scheduled for 2/1 @ 10 am. Referral placed for SDOH to assist patient with social security. INTERACTIVE COMPLEXITY  Is interactive complexity present?   No  Reason:  N/A  Additional Supporting Information:  N/A       Electronically signed by CAMMIE Dash on 1/30/2023 at 2:18 PM

## 2023-01-19 NOTE — TELEPHONE ENCOUNTER
Pt called into office, states she has still not received the results of her GeneSight testing. They are not scanned into media either. Writer told pt she would speak with the MA in our office that has access to the GeneSight testing to see if he can see her results online.

## 2023-01-19 NOTE — TELEPHONE ENCOUNTER
THOMAS Orellana was able to view, print & scan the Good Seed results to pts chart if PCP would review.

## 2023-01-20 NOTE — TELEPHONE ENCOUNTER
Please let her know we have received her results. She does have several medications that, based on the testing, may not work well for her. She may  a copy to share with her psychiatrist at her convenience.   Document located under media tab  Thanks

## 2023-01-23 ENCOUNTER — HOSPITAL ENCOUNTER (OUTPATIENT)
Dept: GENERAL RADIOLOGY | Age: 48
Discharge: HOME | End: 2023-01-25
Payer: MEDICARE

## 2023-01-23 ENCOUNTER — HOSPITAL ENCOUNTER (OUTPATIENT)
Age: 48
Discharge: HOME | End: 2023-01-25
Payer: MEDICARE

## 2023-01-23 DIAGNOSIS — N20.1 LEFT URETERAL CALCULUS: ICD-10-CM

## 2023-01-23 PROCEDURE — 74018 RADEX ABDOMEN 1 VIEW: CPT

## 2023-01-24 DIAGNOSIS — F33.2 SEVERE EPISODE OF RECURRENT MAJOR DEPRESSIVE DISORDER, WITHOUT PSYCHOTIC FEATURES (HCC): Primary | ICD-10-CM

## 2023-01-25 ENCOUNTER — HOSPITAL ENCOUNTER (OUTPATIENT)
Age: 48
Setting detail: SPECIMEN
Discharge: HOME | End: 2023-01-25
Payer: MEDICARE

## 2023-01-25 PROCEDURE — 83945 ASSAY OF OXALATE: CPT

## 2023-01-25 PROCEDURE — 84105 ASSAY OF URINE PHOSPHORUS: CPT

## 2023-01-25 PROCEDURE — 81050 URINALYSIS VOLUME MEASURE: CPT

## 2023-01-25 PROCEDURE — 82507 ASSAY OF CITRATE: CPT

## 2023-01-25 PROCEDURE — 84560 ASSAY OF URINE/URIC ACID: CPT

## 2023-01-25 PROCEDURE — 82340 ASSAY OF CALCIUM IN URINE: CPT

## 2023-01-25 PROCEDURE — 84300 ASSAY OF URINE SODIUM: CPT

## 2023-01-26 ENCOUNTER — TELEPHONE (OUTPATIENT)
Age: 48
End: 2023-01-26

## 2023-01-26 LAB
CALCIUM URINE: 6.1 MG/DL
CALCIUM, URINE: 90 MG/24 H (ref 20–275)
HOURS COLLECTED: 24 H
PHOSPHORUS  URINE: 72.7 MG/DL
PHOSPHORUS, 24H UR: 1067 MG/24 H (ref 400–1300)
SODIUM 24 HOUR URINE: 173 MMOL/24 H (ref 40–220)
SODIUM URINE: 118 MMOL/L
URIC ACID 24 HOUR URINE: 750 MG/24 H (ref 250–750)
URIC ACID, UR: 51.1 MG/DL
VOLUME: 1468 ML

## 2023-01-26 NOTE — TELEPHONE ENCOUNTER
Debbie Bianchi was contacted by Aiden Travis MA, a Samir Capps, regarding a Social Determinants of Health referral.     A message was left with the writer's contact information. First contact attempt.

## 2023-01-27 LAB
CITRIC ACID, U, 24HR: 427 MG/D (ref 320–1240)
CITRIC ACID, URINE: 291 MG/L
CITRIC ACID/CREATININE RATIO URINE: 245 MG/G
CREATININE URINE /24 HR: 1747 MG/D (ref 700–1600)
CREATININE URINE /VOLUME: 119 MG/DL
HOURS COLLECTED: 24
URINE VOLUME: 1468

## 2023-01-29 LAB
CREATININE URINE /24 HR: 1762 MG/D (ref 700–1600)
CREATININE URINE /VOLUME: 120 MG/DL
HOURS COLLECTED: 24
OXALATE 24 HOUR URINE: 62 MG/D (ref 13–40)
OXALATE URINE: 42 MG/L
URINE VOLUME: 1468

## 2023-01-29 NOTE — ANESTHESIA POSTPROCEDURE EVALUATION
Department of Anesthesiology  Postprocedure Note    Patient: Gris Bryson  MRN: [id number]  YOB: 1975  Date of evaluation: 11/29/2022      Procedure Summary     Date: 11/29/22 Room / Location: [address]    Anesthesia Start: 3575 Anesthesia Stop: 3042    Procedure: CYSTOSCOPY, URETEROSCOPY, STENT PLACEMENT (Left: Ureter) Diagnosis:       Left ureteral stone      (LEFT URETERAL CALCULUS)    Surgeons: Kailash Berumen MD Responsible Provider: Gabriele Adams MD    Anesthesia Type: general ASA Status: 3          Anesthesia Type: No value filed.     Miguel Angel Phase I: Miguel Angel Score: 10    Miguel Angel Phase II:        Anesthesia Post Evaluation    Patient location during evaluation: bedside  Patient participation: complete - patient participated  Level of consciousness: awake  Airway patency: patent  Nausea & Vomiting: no nausea and no vomiting  Complications: no  Cardiovascular status: blood pressure returned to baseline  Respiratory status: acceptable  Hydration status: euvolemic  Comments: /83   Pulse 73   Temp 97.9 °F (36.6 °C) (Temporal)   Resp 12   LMP 09/15/2021 Comment: VERY HEAVY PERIODS  SpO2 92% ED ATTENDING SIGN OUT NOTE  Pt was signed out to myself by Dr. Hall at 1800. Please see his note for full details. Patient with non specific abdominal pain. Self caths at home. History of CKD. UA suggestive of infection. Plan to follow up on CT scan results    ED COURSE/MEDICAL DECISION MAKING    Labs Reviewed   COMPREHENSIVE METABOLIC PANEL - Abnormal; Notable for the following components:       Result Value    Glucose 117 (*)     BUN 67 (*)     Creatinine 2.08 (*)     Glomerular Filtration Rate 30 (*)     BUN/ Creatinine Ratio 32 (*)     Alkaline Phosphatase 132 (*)     Albumin 3.3 (*)     A/G Ratio 0.9 (*)     All other components within normal limits   LIPASE - Abnormal; Notable for the following components:    Lipase 72 (*)     All other components within normal limits   URINALYSIS & REFLEX MICROSCOPY WITH CULTURE IF INDICATED - Abnormal; Notable for the following components:    OCCULT BLOOD, URINALYSIS Trace (*)     NITRITE, URINALYSIS Positive (*)     LEUKOCYTE ESTERASE, URINALYSIS Moderate (*)     ERYTHROCYTES, URINALYSIS 11 to 25 (*)     LEUKOCYTES, URINALYSIS 11 to 25 (*)     BACTERIA, URINALYSIS Few (*)     All other components within normal limits   CBC WITH AUTOMATED DIFFERENTIAL (PERFORMABLE ONLY) - Abnormal; Notable for the following components:    RBC 3.41 (*)     HGB 10.8 (*)     HCT 34.6 (*)     .5 (*)     MCHC 31.2 (*)     RDW-SD 50.5 (*)      (*)     All other components within normal limits    Narrative:     This is an appended report.  These results have been appended to a previously verified report.   TROPONIN I, HIGH SENSITIVITY - Normal   CBC WITH DIFFERENTIAL    Narrative:     The following orders were created for panel order CBC with Automated Differential.  Procedure                               Abnormality         Status                     ---------                               -----------         ------                     CBC with Automated Dif...[[id number]]   Abnormal            Final result                 Please view results for these tests on the individual orders.   RAINBOW DRAW    Narrative:     The following orders were created for panel order [place] Draw Light Blue Top Collected? 1 Label; Red Top Collected? No Labels; Light Green Top Collected? 1 Label; Lavender Top Collected? 1 Label; Gold Top Collected? 1 Label; Pink Top Collected? No Labels; Grey Top Collected? 1 Label.  Procedure                               Abnormality         Status                     ---------                               -----------         ------                     Light Blue Top[[id number]]                                 In process                 Light Green Top[[id number]]                                In process                 Lavender Top[[id number]]                                   In process                 Gold Top[[id number]]                                       In process                 Sosa Top Tube[[id number]]                                  In process                   Please view results for these tests on the individual orders.   LIGHT BLUE TOP   LIGHT GREEN TOP   LAVENDER TOP   GOLD TOP   GREY TOP TUBE   URINE, BACTERIAL CULTURE   BLOOD CULTURE   BLOOD CULTURE       ED Medication Orders (From admission, onward)    Ordered Start     Status Ordering Provider    01/28/23 1528 01/28/23 1529  sodium chloride (NORMAL SALINE) 0.9 % bolus 1,000 mL  ONCE         Last MAR action: Completed JOHN REED    01/28/23 1528 01/28/23 1529  fentaNYL (SUBLIMAZE) injection 100 mcg  ONCE         Last MAR action: Given JOHN REED          All above images were independently reviewed by myself, and radiology report was reviewed by myself as well.    CT scan with diffuse lymphadenopathy. Possible metastatic disease without primary source of suspected cancer. Discussed with patient and family. No previous history of cancer. Given the uti, may represent infection  so will start treatment with ceftriaxone. Hospitalist consulted for admission    DIAGNOSIS  Problem List Items Addressed This Visit    None      DISPOSITION  Admit  New Prescriptions    No medications on file       Follow up:  No follow-up provider specified.    Electronically signed by: Siva Richard MD on 1/28/2023 at 7:05 PM       Siva Richard MD  01/28/23 0975

## 2023-01-31 ENCOUNTER — OFFICE VISIT (OUTPATIENT)
Dept: VASCULAR SURGERY | Age: 48
End: 2023-01-31
Payer: MEDICARE

## 2023-01-31 ENCOUNTER — HOSPITAL ENCOUNTER (OUTPATIENT)
Age: 48
Discharge: HOME | End: 2023-01-31
Payer: MEDICARE

## 2023-01-31 VITALS
RESPIRATION RATE: 17 BRPM | OXYGEN SATURATION: 98 % | BODY MASS INDEX: 50.02 KG/M2 | HEIGHT: 64 IN | DIASTOLIC BLOOD PRESSURE: 88 MMHG | HEART RATE: 74 BPM | TEMPERATURE: 97.9 F | SYSTOLIC BLOOD PRESSURE: 135 MMHG | WEIGHT: 293 LBS

## 2023-01-31 DIAGNOSIS — I89.0 LYMPHEDEMA OF EXTREMITY: ICD-10-CM

## 2023-01-31 DIAGNOSIS — D50.0 IRON DEFICIENCY ANEMIA DUE TO CHRONIC BLOOD LOSS: ICD-10-CM

## 2023-01-31 DIAGNOSIS — M79.89 PAIN AND SWELLING OF LEFT LOWER LEG: ICD-10-CM

## 2023-01-31 DIAGNOSIS — I87.2 LYMPHEDEMA DUE TO VENOUS INSUFFICIENCY: Primary | ICD-10-CM

## 2023-01-31 DIAGNOSIS — I89.0 LYMPHEDEMA DUE TO VENOUS INSUFFICIENCY: Primary | ICD-10-CM

## 2023-01-31 DIAGNOSIS — D50.9 IRON DEFICIENCY ANEMIA, UNSPECIFIED IRON DEFICIENCY ANEMIA TYPE: ICD-10-CM

## 2023-01-31 DIAGNOSIS — K90.9 IRON MALABSORPTION: ICD-10-CM

## 2023-01-31 DIAGNOSIS — M79.662 PAIN AND SWELLING OF LEFT LOWER LEG: ICD-10-CM

## 2023-01-31 LAB
ABSOLUTE EOS #: 0.1 K/UL (ref 0–0.4)
ABSOLUTE LYMPH #: 1.6 K/UL (ref 1–4.8)
ABSOLUTE MONO #: 0.4 K/UL (ref 0.1–1.2)
BASOPHILS # BLD: 1 % (ref 0–2)
BASOPHILS ABSOLUTE: 0.1 K/UL (ref 0–0.2)
CA 125: 19 U/ML
EOSINOPHILS RELATIVE PERCENT: 2 % (ref 1–4)
FERRITIN: 228 NG/ML (ref 13–150)
FOLATE: 9.8 NG/ML
HCT VFR BLD CALC: 41.9 % (ref 36–46)
HEMOGLOBIN: 13.8 G/DL (ref 12–16)
IRON SATURATION: 30 % (ref 20–55)
IRON: 84 UG/DL (ref 37–145)
LYMPHOCYTES # BLD: 32 % (ref 24–44)
MCH RBC QN AUTO: 28.7 PG (ref 26–34)
MCHC RBC AUTO-ENTMCNC: 33 G/DL (ref 31–37)
MCV RBC AUTO: 86.8 FL (ref 80–100)
MONOCYTES # BLD: 9 % (ref 2–11)
PDW BLD-RTO: 14.7 % (ref 12.5–15.4)
PLATELET # BLD: 166 K/UL (ref 140–450)
PMV BLD AUTO: 9.6 FL (ref 6–12)
RBC # BLD: 4.83 M/UL (ref 4–5.2)
SEG NEUTROPHILS: 56 % (ref 36–66)
SEGMENTED NEUTROPHILS ABSOLUTE COUNT: 2.7 K/UL (ref 1.8–7.7)
TOTAL IRON BINDING CAPACITY: 284 UG/DL (ref 250–450)
UNSATURATED IRON BINDING CAPACITY: 200 UG/DL (ref 112–347)
VITAMIN B-12: 1167 PG/ML (ref 232–1245)
WBC # BLD: 4.9 K/UL (ref 3.5–11)

## 2023-01-31 PROCEDURE — 36415 COLL VENOUS BLD VENIPUNCTURE: CPT

## 2023-01-31 PROCEDURE — 1036F TOBACCO NON-USER: CPT | Performed by: SURGERY

## 2023-01-31 PROCEDURE — 82607 VITAMIN B-12: CPT

## 2023-01-31 PROCEDURE — 86304 IMMUNOASSAY TUMOR CA 125: CPT

## 2023-01-31 PROCEDURE — G8417 CALC BMI ABV UP PARAM F/U: HCPCS | Performed by: SURGERY

## 2023-01-31 PROCEDURE — 82728 ASSAY OF FERRITIN: CPT

## 2023-01-31 PROCEDURE — 85025 COMPLETE CBC W/AUTO DIFF WBC: CPT

## 2023-01-31 PROCEDURE — 84630 ASSAY OF ZINC: CPT

## 2023-01-31 PROCEDURE — G8484 FLU IMMUNIZE NO ADMIN: HCPCS | Performed by: SURGERY

## 2023-01-31 PROCEDURE — 83540 ASSAY OF IRON: CPT

## 2023-01-31 PROCEDURE — 99214 OFFICE O/P EST MOD 30 MIN: CPT | Performed by: SURGERY

## 2023-01-31 PROCEDURE — 82746 ASSAY OF FOLIC ACID SERUM: CPT

## 2023-01-31 PROCEDURE — 83550 IRON BINDING TEST: CPT

## 2023-01-31 PROCEDURE — G8427 DOCREV CUR MEDS BY ELIG CLIN: HCPCS | Performed by: SURGERY

## 2023-01-31 NOTE — PROGRESS NOTES
Division of Vascular Surgery        Follow Up      Chief Complaint:      Leg swelling and pain    History of Present Illness:      Diane Schaffer is a 52 y.o. woman who presents for follow up regarding chronic lower extremity swelling and pain. Legs continue to swell leading to pain, discomfort and unsteadiness. She fears going places where she may have to walk long distances. Has become more sedentary because of this and developing depression. She was emotional at today's visit. She wants to be able to see her son graduate in may and walk her daughter down the aisle when she gets  in November of this year. She does not have any open wounds or sores on her feet. She uses a cane to get around and a wheel chair if she can for far distances. She use to wear high heels standing at work all day. (7/25/21) Diane Schaffer is a 39 y.o. woman who presents for 6-month follow up regarding chronic swelling and pain in her left leg. It continues to cause her pain and discomfort. She tried compression socks but they would cause too much discomfort. Due to the pain and discomfort she does not walk as much anymore and has become more sedentary. She does not have any open wounds or sores on her legs. She never got to the lymphedema clinic for evaluation. (12/21/20) Frank Palacio is a 39 y.o. woman who presents with chronic swelling and pain in her left leg for over 20 years. She denies history of DVT and has had multiple venous duplex for evaluation. Pain is worse when she has been on her feet for long period of time. Denies any trauma to her lower extremities. She was worked up by Dr. Suman Mckeon for May Thurner Syndrome and even underwent venogram which she was told only had about 60% narrowing so a stent was not placed, this was a year or two ago. She has difficulty wearing compression stockings due to tightness around her ankle. She has good and bad days based on how swollen her legs are.   She noticed increased swelling when she was pregnant and each time it got worse. Medical History:     Past Medical History:   Diagnosis Date    Anxiety     Carpal tunnel syndrome     COVID-19 2021    Depression     Endometriosis     Gestational diabetes     x 3 children    Headache     migraines    History of blood transfusion     Hypertension     Iron deficiency anemia     Kidney stones     Dr. Lona Walker seen 2022    May-Thurner syndrome     Obesity     Scleroderma ([address]     Snores     Under care of team     PCP - Raheem Mary CNP - LAST VISIT 2022    Under care of team     VASCULAR  - DR. RIBEIRO - 2021 - SEES FOR MAY-THURNER SYNDROME    Under care of team     HEM/ONC - DR. MADDIE WHITMORE - LAST VISIT - 2022    URI (upper respiratory infection) 10/23/2022    starting 10/23/2022 cough, fever, fatigue, sorethroat, congestion, seen by urgent care 10/25/22, ER on 10/29/22 for SOB, dizziness and N&V    Wears glasses     Wellness examination     follows with Behavioral Health, last seen 2022       Surgical History:     Past Surgical History:   Procedure Laterality Date     SECTION  x 3    CHOLECYSTECTOMY      CYSTOSCOPY Left 2022    CYSTOSCOPY, URETEROSCOPY, STENT PLACEMENT    CYSTOSCOPY Left 2022    CYSTOSCOPY, URETEROSCOPY, STENT PLACEMENT performed by Cari Hernandez MD at [place] [address] Left 2022    CYSTOSCOPY LEFT URETEROSCOPY HOLMIUM LASER WITH LEFT STENT EXCHANGE    CYSTOSCOPY Left 2022    CYSTOSCOPY LEFT URETEROSCOPY HOLMIUM LASER WITH LEFT STENT EXCHANGE performed by Cari Hernandez MD at [facility] N/A 2021    uterine ablation    GASTRIC BYPASS SURGERY  2009    PABLO- EN- Y    HYSTERECTOMY (CERVIX STATUS UNKNOWN) N/A 10/26/2021    XI ROBOTIC LAPAROSCOPIC MODIFIED RADICAL HYSTERECTOMY, BILATERAL SALPINGO OOPHORECTOMY, CYTOLOGIC WASHINGS, CYSTOSCOPY performed by Bev Shepherd MD at [facility]      endometriosis    TOENAIL EXCISION      VASCULAR SURGERY  2016    to evaluate May Rader syndrome effects on  blood vessels       Family History:     Family History   Problem Relation Age of Onset    Colon Cancer Mother     Early Death Father         accident    Diabetes Sister     High Blood Pressure Sister     Diabetes Brother     High Blood Pressure Brother     Diabetes Sister     High Blood Pressure Sister        Allergies:       Contrast [iodides], Trileptal [oxcarbazepine], Morphine, Bactrim [sulfamethoxazole-trimethoprim], and Nsaids    Medications:      Current Outpatient Medications   Medication Sig Dispense Refill    calcium carbonate (OYSTER SHELL CALCIUM 500 MG) 1250 (500 Ca) MG tablet Take 1 tablet by mouth daily 90 tablet 1    vitamin D3 (CHOLECALCIFEROL) 25 MCG (1000 UT) TABS tablet Take 1 tablet by mouth daily 90 tablet 1    zinc 50 MG CAPS Take 50 mg by mouth daily 90 capsule 1    vitamin B-12 (CYANOCOBALAMIN) 1000 MCG tablet Take 1 tablet by mouth daily 90 tablet 1    oxybutynin (DITROPAN) 5 MG tablet Take 1 tablet by mouth 3 times daily as needed (bladder spasms, stent pain) 90 tablet 3    losartan (COZAAR) 50 MG tablet Take 1 tablet by mouth daily 90 tablet 3    busPIRone (BUSPAR) 10 MG tablet TAKE 1 TABLET BY MOUTH THREE TIMES A DAY AS NEEDED FOR ANXIETY 90 tablet 5    Elastic Bandages & Supports (JOBST ULTRASHEER 20-30MMHG LG) MISC Wear daily, ok to remove in the evenings 3 each 3    Multiple Vitamins-Minerals (MULTI-JULIO CÉSAR PO) Take by mouth daily      tamsulosin (FLOMAX) 0.4 MG capsule Take 1 capsule by mouth daily 30 capsule 0     No current facility-administered medications for this visit. Social History:     Tobacco:    reports that she has never smoked. She has never used smokeless tobacco.  Alcohol:      reports no history of alcohol use. Drug Use:  reports no history of drug use.   Occupation:  [employer], 10-12 hour shifts, [employer], always on feet.  Worked at [employer] as well. Stopped working due to pain and swelling in legs    Review of Systems:     Review of Systems   Constitutional:  Negative for chills and fever. HENT:  Negative for congestion. Eyes:  Negative for visual disturbance. Respiratory:  Negative for chest tightness and shortness of breath. Cardiovascular:  Positive for leg swelling. Negative for chest pain. Gastrointestinal:  Negative for abdominal pain. Endocrine: Negative. Genitourinary: Negative. Musculoskeletal:  Positive for arthralgias and gait problem. Skin:  Negative for color change and wound. Allergic/Immunologic: Negative. Neurological:  Positive for weakness. Negative for facial asymmetry, speech difficulty and numbness. Hematological: Negative. Psychiatric/Behavioral: Negative. Physical Exam:     Vitals:  /88 (Site: Left Lower Arm, Position: Sitting, Cuff Size: Medium Adult)   Pulse 74   Temp 97.9 °F (36.6 °C) (Temporal)   Resp 17   Ht 5' 4\" (1.626 m)   Wt 296 lb (134.3 kg)   LMP 09/15/2021 Comment: VERY HEAVY PERIODS  SpO2 98%   BMI 50.81 kg/m²     Physical Exam  Constitutional:       Appearance: She is well-developed and well-groomed. Eyes:      Extraocular Movements: Extraocular movements intact. Conjunctiva/sclera: Conjunctivae normal.   Neck:      Vascular: No carotid bruit. Cardiovascular:      Rate and Rhythm: Normal rate and regular rhythm. Pulses:           Dorsalis pedis pulses are 2+ on the right side and 2+ on the left side. Posterior tibial pulses are 2+ on the right side and 2+ on the left side. Pulmonary:      Effort: Pulmonary effort is normal. No respiratory distress. Abdominal:      Palpations: Abdomen is soft. Tenderness: There is no abdominal tenderness. Musculoskeletal:      Cervical back: Full passive range of motion without pain. Right lower leg: No swelling or tenderness. Left lower leg: Swelling present.  No tenderness. No edema. Right foot: Normal capillary refill. No swelling or tenderness. Left foot: Normal capillary refill. Swelling present. No tenderness. Feet:      Right foot:      Skin integrity: No ulcer or skin breakdown. Left foot:      Skin integrity: No ulcer or skin breakdown. Skin:     General: Skin is warm. Capillary Refill: Capillary refill takes less than 2 seconds. Neurological:      Mental Status: She is alert and oriented to person, place, and time. GCS: GCS eye subscore is 4. GCS verbal subscore is 5. GCS motor subscore is 6. Sensory: Sensation is intact. Motor: Motor function is intact. Psychiatric:         Mood and Affect: Mood normal.         Speech: Speech normal.         Behavior: Behavior normal.         Thought Content: Thought content normal.     January 2023 December 2020    Imaging/Labs:     Venous duplex reveals no superficial or deep venous thrombosis     CT does not reveal severe narrowing of left common iliac vein to suggest May Thurner's Syndrome    Assessment and Plan:     Chronic lower extremity swelling and pain, venous insufficiency, lymphedema  Continue compression therapy  Needs to increase activity and exercise to improve overall cardiovascular health  Advised her to consider water aerobics at gym, that will help with weight loss and increase activity, increase cardiovascular health  Will refer to lymphedema clinic for evaluation of lymphedema pumps  Patient has tried and failed 4 weeks of conservative treatments including elevation, compression, and exercise and significant symptoms still remain. Patient has proximal swelling leading into the groin and abdomen area. A basic pump could exacerbate symptoms and cause an increase in proximal swelling. I am recommending this patient receive a flexitouch to address truncal swelling and safely and efficiently move lymphatic fluid.    Hopefull she can get healthier and less sedentary and she works on goals to get to her son's graduation and walk her daughter down the aisle for her wedding  Follow up in 1 year, no testing    Electronically signed by Juan C Moore MD on 1/31/23 at 10:15 AM EST      [address] [place] [address],[address]: [phone]  C: [phone]  Email: [email or web address]

## 2023-01-31 NOTE — TELEPHONE ENCOUNTER
Arun Napoles was contacted by Simone Johnston MA, a Samir Capps, regarding a Social Determinants of Health referral.     A message was left with the writer's contact information. Second contact attempt.

## 2023-02-01 ENCOUNTER — TELEMEDICINE (OUTPATIENT)
Age: 48
End: 2023-02-01
Payer: MEDICARE

## 2023-02-01 DIAGNOSIS — F33.1 MODERATE EPISODE OF RECURRENT MAJOR DEPRESSIVE DISORDER (HCC): Primary | ICD-10-CM

## 2023-02-01 DIAGNOSIS — F41.9 ANXIETY: ICD-10-CM

## 2023-02-01 LAB — ZINC: 87.6 UG/DL (ref 60–120)

## 2023-02-01 PROCEDURE — 1036F TOBACCO NON-USER: CPT | Performed by: SOCIAL WORKER

## 2023-02-01 PROCEDURE — 90834 PSYTX W PT 45 MINUTES: CPT | Performed by: SOCIAL WORKER

## 2023-02-01 NOTE — PROGRESS NOTES
ADULT BEHAVIORAL HEALTH FOLLOW UP  CAMMIE Martin  Licensed Independent          Visit Date: 2/1/2023   Time of appointment: 1:10 PM       Time spent with Patient: 52 minutes. [facility]Clinician Location: Home Office; [facility]   This was a tele-health visit conducted via interactive/real time audio and video. Reason for Consult:  Anxiety and Depression     PCP:  Leonarda Mohs, APRN - CNP      Pt provided informed consent for the behavioral health program. Discussed with patient model of service to include the limits of confidentiality (i.e. abuse reporting, suicide intervention, etc.) and short-term intervention focused approach. Pt indicated understanding. Pt provided verbal consent to engage in tele-health psychotherapy, visit was completed using My Chart and patient was alone at time of visit, as the visit was held in a private space [place] Yuli Wheatley is a 52 y.o. female who presents for follow up of depression and anxiety. Rosemarie Langley presented to visit with continued anxious and depressive symptoms, exacerbated by fear of the unknown as her health changes and worry about application being acceptable for social security benefits once evaluated. Rosemarie Langley shared after seeing her vascular physician recently, he expressed the importance of Rosemarie Langley becoming more physically active. Rosemarie Langley shared this is important to her as well because she wants to be able to walk her daughter down the aisle in November. Rosemarie Langley shared she has been thinking about starting to work on this goal by doing water aerobics at [employer] and working her way up to walking on the track. Rosemarie Langley was able to recognize that anxiety surrounding what she will do if she becomes unable to complete the activity, often resorts her to avoid the activity completely. Rosemarie Langley expressed she does feel more motivated to begin working on improving her mobility through small steps. Noelle admits that a large challenge of addressing her physical health revolves around not feeling she has a concrete diagnosis with specific interventions to focus on. Noelle is also scheduled for an appointment on 3/6 to establish care with [facility] for ongoing services.     Previous Recommendations: Follow-up appointment scheduled for 2/1 @ 10 am. Referral placed for [facility] to assist patient with social security.     MENTAL STATUS EXAM  Mood was within normal limits with anxious affect.   Suicidal ideation was denied.   Homicidal ideation was not reported.   Hygiene was good .  Dress was appropriate.   Behavior was Within Normal Limits with self-report of difficulties ambulating.   Attitude was Engageable.  Eye-contact was good.  Speech: rate - WNL, rhythm - WNL, volume - WNL.  Verbalizations were goal directed.  Thought processes were intact and goal-oriented without evidence of delusions, hallucinations, obsessions, or allie; with moderate cognitive distortions.   Associations were characterized by intact cognitive processes.  Pt was oriented to person, place, time, and general circumstances;  recent:  good.  Insight and judgment were estimated to be good, AEB, a fair  understanding of cyclical maladaptive patterns, and the ability to use insight to inform behavior change.     ASSESSMENT  Lisa K Cogan presented to the appointment today for evaluation and treatment of symptoms of depression and anxiety. She is currently deemed no risk to herself or others and meets criteria for Major Depressive Disorder and Anxiety. Noelle elaborated on how the history of her physical health changes have promoted worsening of depressive symptoms and motivation. She shared since she stopped working in July 2020 due to her health challenges, she has noticed a decline in her activity level. She expressed feeling discouraged due to not having a specific plan to address her health. [facility] validated patient's feelings of  discouragement and disappointment with not having a clear solution to manage her health condition due to the complexity of her health. Jay Pfeifferchucho and [facility] discussed the importance of honoring small steps that she has been taking toward her larger goals, placing emphasis on any efforts being positive steps in the right direction. Jay Mccloud was in agreement with recommendations. PHQ Scores 12/1/2022 8/31/2022 9/14/2021 1/9/2017   PHQ2 Score 0 6 6 1   PHQ9 Score 0 26 18 1     Interpretation of Total Score Depression Severity: 1-4 = Minimal depression, 5-9 = Mild depression, 10-14 = Moderate depression, 15-19 = Moderately severe depression, 20-27 = Severe depression    How often pt has had thoughts of death or hurting self (if PHQ positive for depression):       No flowsheet data found. Interpretation of FAISAL-7 score: 5-9 = mild anxiety, 10-14 = moderate anxiety, 15+ = severe anxiety. Recommend referral to behavioral health for scores 10 or greater. DIAGNOSIS  Jay Mccloud was seen today for anxiety and depression. Diagnoses and all orders for this visit:    Moderate episode of recurrent major depressive disorder ([facility] [address]    Anxiety      INTERVENTION  Trained in strategies for increasing balanced thinking, Discussed and set plan for behavioral activation, Discussed self-care (sleep, nutrition, rewarding activities, social support, exercise), Motivational Interviewing to increase patient confidence and compliance with adhering to behavioral change plan, Motivational Interviewing to determine importance and readiness for change, CBT to target anxiety provoking thoughts, challenging cognitive distortions & encouraged planning/solution-focused interventions to combat worries associated with activities related to mobility, and Identified maladaptive thoughts. Katy Xiao was scheduled for follow-up with [facility] to continue psychotherapy, will transfer to [facility] for ongoing care on 3/6.  Jay Mccloud is continuing to work with Sachi from [facility] team on [address] application/process. Mattie Cooper shared she plans to call Latisha Rosales for additional follow-up. INTERACTIVE COMPLEXITY  Is interactive complexity present?   No  Reason:  N/A  Additional Supporting Information:  N/A       Electronically signed by CAMMIE Starks on 2/7/2023 at 10:38 AM

## 2023-02-01 NOTE — TELEPHONE ENCOUNTER
Asif Kendrick was contacted by a Samir Capps for follow-up regarding SDOH related needs. Writer spoke with: Patient    Progress Notes:   Patient doesn't want to lose the house she lives in with her sister. Their dad built the house. Sister is 21years older than patient. Sister wants to get  in summer 2023 and move out. Patient can't afford the mortgage; although she also said she doesn't know how much the mortgage is because her sister has been paying it since their mom passed in 2010. I posed the idea of changing the mortgage from her sister's name to her name; then she can apply for mortgage assistance. Patient has been avoiding talking with sister about the house since they have been at odds with each other. Patient wonders if she needs a , but she says she can't afford a . I let her know that I can help her complete a  application online. She wants to talk with her sister first.    Patient hasn't been working since 2020. She saw a vascular surgeon yesterday. She has swelling in her legs that she says surgery and medicine won't help. Patient says she has two more months of welfare help from the state and then nothing. Action steps to be completed by writer:  I will follow up with Patient if I hear anything from [employer]. Action steps to be completed by patient:  Patient will call me after she has a conversation with her sister about housing.     Patient has given verbal permission to leave detailed messages regarding SDOH referral on their phone: N/A    Patient has given verbal permission to submit applications on their behalf: yes    Jessica Lorenzo MA

## 2023-02-03 ENCOUNTER — TELEPHONE (OUTPATIENT)
Dept: ONCOLOGY | Age: 48
End: 2023-02-03

## 2023-02-03 ENCOUNTER — OFFICE VISIT (OUTPATIENT)
Dept: ONCOLOGY | Age: 48
End: 2023-02-03
Payer: MEDICARE

## 2023-02-03 VITALS
BODY MASS INDEX: 50.53 KG/M2 | TEMPERATURE: 98.3 F | HEART RATE: 86 BPM | SYSTOLIC BLOOD PRESSURE: 160 MMHG | DIASTOLIC BLOOD PRESSURE: 95 MMHG | WEIGHT: 293 LBS

## 2023-02-03 DIAGNOSIS — R53.83 OTHER FATIGUE: ICD-10-CM

## 2023-02-03 DIAGNOSIS — Z98.84 HISTORY OF BARIATRIC SURGERY: ICD-10-CM

## 2023-02-03 DIAGNOSIS — D50.8 IRON DEFICIENCY ANEMIA SECONDARY TO INADEQUATE DIETARY IRON INTAKE: ICD-10-CM

## 2023-02-03 DIAGNOSIS — K90.9 MALABSORPTION OF IRON: Primary | ICD-10-CM

## 2023-02-03 PROCEDURE — G8427 DOCREV CUR MEDS BY ELIG CLIN: HCPCS | Performed by: INTERNAL MEDICINE

## 2023-02-03 PROCEDURE — 99213 OFFICE O/P EST LOW 20 MIN: CPT | Performed by: INTERNAL MEDICINE

## 2023-02-03 PROCEDURE — G8417 CALC BMI ABV UP PARAM F/U: HCPCS | Performed by: INTERNAL MEDICINE

## 2023-02-03 PROCEDURE — 99211 OFF/OP EST MAY X REQ PHY/QHP: CPT | Performed by: INTERNAL MEDICINE

## 2023-02-03 PROCEDURE — 1036F TOBACCO NON-USER: CPT | Performed by: INTERNAL MEDICINE

## 2023-02-03 PROCEDURE — G8484 FLU IMMUNIZE NO ADMIN: HCPCS | Performed by: INTERNAL MEDICINE

## 2023-02-03 NOTE — TELEPHONE ENCOUNTER
AVS from 2/3/23      get lab reuslts for chart (cbc,iron,tibc,ferritin)    *rv is sched for 4 months w labs 1 week prior to rv  [email or web address]    PT was given AVS and appt schedule

## 2023-02-03 NOTE — PROGRESS NOTES
Shauna Taylor                                                                                                                  2/3/2023  MRN:   [id number]  YOB: 1975  PCP:                           Trisha Cunningham APRN - CNP  Referring Physician: No ref. provider found  Treating Physician Name: Anitra Vences MD      Reason for visit:  Chief Complaint   Patient presents with    Follow-up     Review status of disease    Discuss Labs       Current problems:  Iron deficiency anemia with malabsorption component     Active and recent treatments:  Parenteral iron-3/2021    Summary of Case/History:    Shauna bolanos 52 y. o.female is a patient with anemia. She has history of anemia and was managed previously by Dr. Lacey Orona with IV iron. Her most recent lab work shows recurrent iron deficiency anemia and she reports she historically received monthly B12 injections and IV iron every 3-6 months. She has history of scleroderma and gastric by-pass, done in 2009, contributing to iron malabsorption. She reports she has had zinc and D deficiencies, she does not have any bariatric vitamins. She is very symptomatic with dizziness, cold intolerance and feeling very fatigued. She denies any bleeding. She has history of menorrhagia with irregular periods, she had benign growth removed from outside of the uterus in 2012. She has family history of uterine fibroids and cancer. Interim History:     Patient presents to the clinic for a follow-up visit and to discuss results of her lab work-up. Continues to be very tired. Her iron studies show adequate iron stores. Hemoglobin within range. Patient is following up with urology team for treatment of kidney stones. During this visit patient's allergy, social, medical, surgical history and medications were reviewed and updated.     Past Medical History:   Past Medical History:   Diagnosis Date    Anxiety     Carpal tunnel syndrome     COVID-19 11/06/2021 Depression     Endometriosis     Gestational diabetes     x 3 children    Headache     migraines    History of blood transfusion     Hypertension     Iron deficiency anemia     Kidney stones     Dr. Head Aver seen 2022    May-Thurner syndrome     Obesity     Scleroderma ([address]     Snores     Under care of team     PCP - Mahin De León CNP - LAST VISIT 2022    Under care of team     VASCULAR  - DR. RIBEIRO - 2021 - SEES FOR MAY-THURNER SYNDROME    Under care of team     HEM/ONC - DR. PERKINS - DINAH WHITMORE - LAST VISIT - 2022    URI (upper respiratory infection) 10/23/2022    starting 10/23/2022 cough, fever, fatigue, sorethroat, congestion, seen by urgent care 10/25/22, ER on 10/29/22 for SOB, dizziness and N&V    Wears glasses     Wellness examination     follows with Behavioral Health, last seen 2022       Past Surgical History:     Past Surgical History:   Procedure Laterality Date     SECTION  x 3    CHOLECYSTECTOMY      CYSTOSCOPY Left 2022    CYSTOSCOPY, URETEROSCOPY, STENT PLACEMENT    CYSTOSCOPY Left 2022    CYSTOSCOPY, URETEROSCOPY, STENT PLACEMENT performed by Kristin De Los Santos MD at [place] [id number] Left 2022    CYSTOSCOPY LEFT URETEROSCOPY HOLMIUM LASER WITH LEFT STENT EXCHANGE    CYSTOSCOPY Left 2022    CYSTOSCOPY LEFT URETEROSCOPY HOLMIUM LASER WITH LEFT STENT EXCHANGE performed by Kristin De Los Santos MD at [facility] N/A 2021    uterine ablation    GASTRIC BYPASS SURGERY  2009    PABLO- EN- Y    HYSTERECTOMY (CERVIX STATUS UNKNOWN) N/A 10/26/2021    XI ROBOTIC LAPAROSCOPIC MODIFIED RADICAL HYSTERECTOMY, BILATERAL SALPINGO OOPHORECTOMY, CYTOLOGIC WASHINGS, CYSTOSCOPY performed by Zulma Flower MD at [address] [place] [address]      endometriosis    TOENAIL EXCISION      VASCULAR SURGERY  2016    to evaluate May Rader syndrome effects on  blood vessels       Patient Family Social History:     Social History     Socioeconomic History    Marital status: Single     Spouse name: None    Number of children: None    Years of education: None    Highest education level: None   Tobacco Use    Smoking status: Never    Smokeless tobacco: Never   Vaping Use    Vaping Use: Never used   Substance and Sexual Activity    Alcohol use: No    Drug use: No    Sexual activity: Not Currently     Social Determinants of Health     Financial Resource Strain: High Risk    Difficulty of Paying Living Expenses: Very hard   Food Insecurity: Food Insecurity Present    Worried About Running Out of Food in the Last Year: Sometimes true    Ran Out of Food in the Last Year: Sometimes true     Family History   Problem Relation Age of Onset    Colon Cancer Mother     Early Death Father         accident    Diabetes Sister     High Blood Pressure Sister     Diabetes Brother     High Blood Pressure Brother     Diabetes Sister     High Blood Pressure Sister      Current Medications:     Current Outpatient Medications   Medication Sig Dispense Refill    calcium carbonate (OYSTER SHELL CALCIUM 500 MG) 1250 (500 Ca) MG tablet Take 1 tablet by mouth daily 90 tablet 1    vitamin D3 (CHOLECALCIFEROL) 25 MCG (1000 UT) TABS tablet Take 1 tablet by mouth daily 90 tablet 1    zinc 50 MG CAPS Take 50 mg by mouth daily 90 capsule 1    vitamin B-12 (CYANOCOBALAMIN) 1000 MCG tablet Take 1 tablet by mouth daily 90 tablet 1    oxybutynin (DITROPAN) 5 MG tablet Take 1 tablet by mouth 3 times daily as needed (bladder spasms, stent pain) 90 tablet 3    losartan (COZAAR) 50 MG tablet Take 1 tablet by mouth daily 90 tablet 3    busPIRone (BUSPAR) 10 MG tablet TAKE 1 TABLET BY MOUTH THREE TIMES A DAY AS NEEDED FOR ANXIETY 90 tablet 5    Elastic Bandages & Supports (JOBST ULTRASHEER 20-30MMHG LG) MISC Wear daily, ok to remove in the evenings 3 each 3    Multiple Vitamins-Minerals (MULTI-JULIO CÉSAR PO) Take by mouth daily      tamsulosin (FLOMAX) 0.4 MG capsule Take 1 capsule by mouth daily 30 capsule 0     No current facility-administered medications for this visit. Allergies:   Contrast [iodides], Trileptal [oxcarbazepine], Morphine, Bactrim [sulfamethoxazole-trimethoprim], and Nsaids    Review of Systems:    Constitutional: No fever or chills. No night sweats, no weight loss. Positive fatigue  Eyes: No eye discharge, double vision, or eye pain   HEENT: negative for sore mouth, sore throat, hoarseness and voice change   Respiratory: negative for cough , sputum, wheezing, hemoptysis, chest pain  Cardiovascular: negative for chest pain, dyspnea, palpitations, orthopnea, PND   Gastrointestinal: negative for nausea, vomiting, diarrhea, constipation, abdominal pain, Dysphagia, hematemesis and hematochezia   Genitourinary: negative for frequency, dysuria, nocturia, urinary incontinence, and hematuria   Gynecological: + menorrhagia with irregular periods  Integument: negative for rash, skin lesions, bruises.    Hematologic/Lymphatic: negative for easy bruising, bleeding, lymphadenopathy, or petechiae   Endocrine: negative for heat or cold intolerance,weight changes, change in bowel habits and hair loss   Musculoskeletal: negative for myalgias, arthralgias, pain, joint swelling,and bone pain   Neurological: negative for headaches, dizziness, seizures, weakness, numbness        Physical Exam:    Vitals: BP (!) 160/95   Pulse 86   Temp 98.3 °F (36.8 °C) (Temporal)   Wt 294 lb 6.4 oz (133.5 kg)   LMP 09/15/2021 Comment: VERY HEAVY PERIODS  BMI 50.53 kg/m²   General appearance - well appearing, no in pain or distress  Mental status - AAO X3  Eyes - pupils equal and reactive, extraocular eye movements intact  Mouth - mucous membranes moist, pharynx normal without lesions  Neck - supple, no significant adenopathy  Lymphatics - no palpable lymphadenopathy, no hepatosplenomegaly  Chest - clear to auscultation, no wheezes, rales or rhonchi, symmetric air entry  Heart - normal rate, regular rhythm, normal S1, S2, no murmurs  Abdomen - soft, nontender, nondistended, no masses or organomegaly  Neurological - alert, oriented, normal speech, no focal findings or movement disorder noted  Extremities - peripheral pulses normal, no pedal edema, no clubbing or cyanosis  Skin - normal coloration and turgor, no rashes, no suspicious skin lesions noted       DATA:      Lab Results   Component Value Date    WBC 4.9 01/31/2023    HGB 13.8 01/31/2023    HCT 41.9 01/31/2023    MCV 86.8 01/31/2023     01/31/2023     Lab Results   Component Value Date    IRON 84 01/31/2023    TIBC 284 01/31/2023    FERRITIN 228 (H) 01/31/2023       Chemistry        Component Value Date/Time     12/01/2022 1512    K 4.1 12/01/2022 1512     12/01/2022 1512    CO2 26 12/01/2022 1512    BUN 12 12/01/2022 1512    CREATININE 1.08 (H) 12/01/2022 1512        Component Value Date/Time    CALCIUM 9.4 12/01/2022 1512    ALKPHOS 77 12/02/2022 1351    AST 27 12/02/2022 1351    ALT 49 (H) 12/02/2022 1351    BILITOT 0.2 (L) 12/02/2022 1351        Lab Results   Component Value Date    CWKCAKAV33 1167 01/31/2023     Lab Results   Component Value Date     19 01/31/2023       Impression:  Iron deficiency anemia with malabsorption component  HX gastric bypass, 2009  HX scleroderma   Vitamin D deficiency  Fatigue  Elevated CA-125  May Thurner syndrome    Plan:  Personally reviewed results of lab work-up and the relevant clinical data  Patient work-up shows hemoglobin to be within good range. Iron stores are also in good range. No indication for iron supplementation. Given unremarkable iron studies and hemoglobin within normal range I do not believe patient's progressive fatigue is due to anemia or iron deficiency. Patient remains at risk of iron deficiency and other nutritional deficiencies  Recommend continued surveillance.   Patient is concerned about her fatigue and is concerned that if we wait too long her iron levels will be too low. Patient multiple questions which answered the best my ability. Jerrica Lanier MD            This note is created with the assistance of a speech recognition program.  While intending to generate a document that actually reflects the content of the visit, the document can still have some errors including those of syntax and sound a like substitutions which may escape proof reading. It such instances, actual meaning can be extrapolated by contextual diversion.

## 2023-02-06 PROBLEM — R82.991 HYPOCITRATURIA: Status: ACTIVE | Noted: 2023-02-06

## 2023-02-06 PROBLEM — R82.992 HYPEROXALURIA: Status: ACTIVE | Noted: 2023-02-06

## 2023-02-06 PROBLEM — N32.81 OAB (OVERACTIVE BLADDER): Status: ACTIVE | Noted: 2023-02-06

## 2023-02-06 PROBLEM — Z87.442 PERSONAL HISTORY OF KIDNEY STONES: Status: ACTIVE | Noted: 2023-02-06

## 2023-02-09 ENCOUNTER — TELEMEDICINE (OUTPATIENT)
Age: 48
End: 2023-02-09

## 2023-02-09 DIAGNOSIS — F33.1 MODERATE EPISODE OF RECURRENT MAJOR DEPRESSIVE DISORDER (HCC): Primary | ICD-10-CM

## 2023-02-09 DIAGNOSIS — F41.9 ANXIETY: ICD-10-CM

## 2023-02-09 PROCEDURE — 90837 PSYTX W PT 60 MINUTES: CPT | Performed by: SOCIAL WORKER

## 2023-02-09 NOTE — PROGRESS NOTES
ADULT BEHAVIORAL HEALTH FOLLOW UP  CAMMIE Mckenzie  Licensed Independent          Visit Date: 2/9/2023   Time of appointment: 11:05 AM       Time spent with Patient: 56 minutes. Patient Location: \[facility]\""/Clinician Location: [facility]; [facility]   This was a tele-health visit conducted via interactive/real time audio and video. Reason for Consult:  Depression and Anxiety     PCP:  JUSTICE Mendoza CNP      Pt provided informed consent for the behavioral health program. Discussed with patient model of service to include the limits of confidentiality (i.e. abuse reporting, suicide intervention, etc.) and short-term intervention focused approach. Pt indicated understanding. Pt provided verbal consent to engage in tele-health psychotherapy, visit was completed using My Chart and patient was alone at time of visit, as the visit was held in a private space in patient's home ([place], [place]). Luke Izaguirre is a 52 y.o. female who presents for follow up of depression and anxiety. Zofia Rosa presented to session with report of continued anxious and depressive symptoms, exacerbated by physical health concerns and anxiety about the future. Zofia Rosa shared she has still been feeling overwhelmed all the time and has had a very busy week. She reported she was referred to a lymphaedema clinic when she saw provider the other day and is beginning to think about steps to manage physical health again. Zofia Rosa stated, \"It's my year of change\". She expressed she has been having real feeling nightmares/dreams lately that are difficult to wake up from - her mom has frequently been in her dreams. She was able to recognize the connection between these dreams and her grief, as Zofia Rosa shared her mother had passed on the 19th. She reported feeling being in the same home that her mother resided in often prompts a lot of memories and that this makes some days harder than others.  She explained that she has also been feeling anxious about the future, social security process, and financial security as she worries about what will happen depending on her sister's decision making r/t housing. Previous Recommendations: Follow-up appointment scheduled for 2/1 @ 10 am. Referral placed for [facility] to assist patient with social security. MENTAL STATUS EXAM  Mood was within normal limits with anxious affect. Suicidal ideation was denied. Homicidal ideation was not reported. Hygiene was good . Dress was appropriate. Behavior was Within Normal Limits with self-report of difficulties ambulating. Attitude was Engageable. Eye-contact was good. Speech: rate - WNL, rhythm - WNL, volume - WNL. Verbalizations were goal directed. Thought processes were intact and goal-oriented without evidence of delusions, hallucinations, obsessions, or allie; with moderate cognitive distortions. Associations were characterized by intact cognitive processes. Pt was oriented to person, place, time, and general circumstances;  recent:  good. Insight and judgment were estimated to be good, AEB, a fair  understanding of cyclical maladaptive patterns, and the ability to use insight to inform behavior change. ASSESSMENT  Gris Bryson presented to the appointment today for evaluation and treatment of symptoms of depression and anxiety. She is currently deemed no risk to herself or others and meets criteria for Major Depressive Disorder and Anxiety. Reji Levine expressed she has been feeling overwhelmed due to the unknowns associated with her health, finances, and housing. Delmaryris Julianna shared she has been having thoughts about her mother and this has prompted memories about the past and things she has gone through. Delmaryris Julianna has developed insight to recognize how her past experiences have related to her living in survival mode for many years.  Reji Levine expressed she wants to continue working on managing overall anxious and depressive symptoms and intends to meet with a trauma specialist for ongoing therapy. She has scheduled an appointment with [address] [place] [address] on 3/6. Kellie Riggs was in agreement with recommendations to establish with external referral to begin meeting with a clinician whom she can see for regularly scheduled visits. PHQ Scores 12/1/2022 8/31/2022 9/14/2021 1/9/2017   PHQ2 Score 0 6 6 1   PHQ9 Score 0 26 18 1     Interpretation of Total Score Depression Severity: 1-4 = Minimal depression, 5-9 = Mild depression, 10-14 = Moderate depression, 15-19 = Moderately severe depression, 20-27 = Severe depression    How often pt has had thoughts of death or hurting self (if PHQ positive for depression):       No flowsheet data found. Interpretation of FAISAL-7 score: 5-9 = mild anxiety, 10-14 = moderate anxiety, 15+ = severe anxiety. Recommend referral to behavioral health for scores 10 or greater. DIAGNOSIS  Kellie Riggs was seen today for depression and anxiety. Diagnoses and all orders for this visit:    Moderate episode of recurrent major depressive disorder ([facility] [address]    Anxiety      INTERVENTION  Discussed various factors related to the development and maintenance of  depression, anxiety, and stress (including biological, cognitive, behavioral, and environmental factors), Provided education, Discussed self-care (sleep, nutrition, rewarding activities, social support, exercise), Motivational Interviewing to increase patient confidence and compliance with adhering to behavioral change plan, Motivational Interviewing to determine importance and readiness for change, Discussed potential barriers to change, and CBT to target depressive thoughts/feelings . Yiim Hobbs was scheduled for follow-up on 2/21. Discussed plan to transition to [address] [place] [address] on 3/6. Kellie Riggs is continuing to work with Anny Chavez on social security application/process to assist with financial stability. INTERACTIVE COMPLEXITY  Is interactive complexity present?   No  Reason:  N/A  Additional Supporting Information:  N/A       Electronically signed by Sherlon Crigler, LISW on 2/21/2023 at 1:21 PM

## 2023-02-14 ENCOUNTER — HOSPITAL ENCOUNTER (OUTPATIENT)
Dept: OCCUPATIONAL THERAPY | Age: 48
Setting detail: THERAPIES SERIES
Discharge: HOME | End: 2023-02-14

## 2023-02-15 NOTE — SIGNIFICANT EVENT
[x] [address] [facility] [address] Occupational Therapy       [address] [facility], 1st [address]       Phone: [phone]       Fax: [phone] [] [facility] at [address] [place] [address].  [place], [place]       Phone: [phone]       Fax: [phone]          Occupational Therapy Cancel/No Show note    Date: 2/15/2023  Patient: Reinaldo Ding  : 1975  MRN: [id number]    Cancels/No Shows to date: 1    For today's appointment patient:    [x]  Cancelled    [x] Rescheduled appointment    [] No-show     Reason given by patient:    []  Patient ill    []  Conflicting appointment    [] No transportation      [] Conflict with work    [] No reason given    [] Weather related    [] COVID-19    [x] Other:      Comments: Son has COVID      [x] Next appointment was confirmed      Electronically signed by: Neetu Mi OT

## 2023-02-16 ENCOUNTER — TELEMEDICINE (OUTPATIENT)
Dept: PRIMARY CARE CLINIC | Age: 48
End: 2023-02-16
Payer: MEDICAID

## 2023-02-16 DIAGNOSIS — R79.89 ELEVATED FERRITIN LEVEL: Primary | ICD-10-CM

## 2023-02-16 DIAGNOSIS — D50.0 IRON DEFICIENCY ANEMIA DUE TO CHRONIC BLOOD LOSS: ICD-10-CM

## 2023-02-16 DIAGNOSIS — R53.83 FATIGUE, UNSPECIFIED TYPE: ICD-10-CM

## 2023-02-16 DIAGNOSIS — F32.A DEPRESSION, UNSPECIFIED DEPRESSION TYPE: ICD-10-CM

## 2023-02-16 DIAGNOSIS — R74.8 ELEVATED LIVER ENZYMES: ICD-10-CM

## 2023-02-16 PROCEDURE — 99214 OFFICE O/P EST MOD 30 MIN: CPT | Performed by: NURSE PRACTITIONER

## 2023-02-16 SDOH — ECONOMIC STABILITY: INCOME INSECURITY: HOW HARD IS IT FOR YOU TO PAY FOR THE VERY BASICS LIKE FOOD, HOUSING, MEDICAL CARE, AND HEATING?: VERY HARD

## 2023-02-16 SDOH — ECONOMIC STABILITY: FOOD INSECURITY: WITHIN THE PAST 12 MONTHS, YOU WORRIED THAT YOUR FOOD WOULD RUN OUT BEFORE YOU GOT MONEY TO BUY MORE.: OFTEN TRUE

## 2023-02-16 SDOH — ECONOMIC STABILITY: TRANSPORTATION INSECURITY
IN THE PAST 12 MONTHS, HAS LACK OF TRANSPORTATION KEPT YOU FROM MEETINGS, WORK, OR FROM GETTING THINGS NEEDED FOR DAILY LIVING?: YES

## 2023-02-16 SDOH — ECONOMIC STABILITY: HOUSING INSECURITY
IN THE LAST 12 MONTHS, WAS THERE A TIME WHEN YOU DID NOT HAVE A STEADY PLACE TO SLEEP OR SLEPT IN A SHELTER (INCLUDING NOW)?: NO

## 2023-02-16 SDOH — ECONOMIC STABILITY: FOOD INSECURITY: WITHIN THE PAST 12 MONTHS, THE FOOD YOU BOUGHT JUST DIDN'T LAST AND YOU DIDN'T HAVE MONEY TO GET MORE.: SOMETIMES TRUE

## 2023-02-16 ASSESSMENT — ENCOUNTER SYMPTOMS
CONSTIPATION: 0
SINUS PRESSURE: 0
VOMITING: 0
DIARRHEA: 0
TROUBLE SWALLOWING: 0
SORE THROAT: 0
WHEEZING: 0
ABDOMINAL PAIN: 0
COUGH: 0
NAUSEA: 0
SHORTNESS OF BREATH: 0
BLOOD IN STOOL: 0

## 2023-02-16 NOTE — PROGRESS NOTES
[facility] PRIMARY CARE  [facility] [address] [facility] [address]  [address] [address]  Dept: [phone]  Dept Fax: [phone]    Preston Gabriel is a 52 y.o. female who presentstoday for her medical conditions/complaints as noted below.   Preston Gabriel is c/o of  Chief Complaint   Patient presents with    Discuss Labs       HPI:     Presents today via video virtual visit for follow up  Voicing concern about recent elevation in ferritin level and persistent fatigue  Reports that her most recent visit with oncologist there was no indication for iron infusion  She states she is \"confused\" about how her blood work can all be in the normal range except for the elevated ferritin and yet she still struggles with fatigue she feels extreme  Has not required iron infusion for several months  Denies any other symptoms    Reports will be starting counseling through [facility],  will be getting scheduled with psych in March  She states her previous counselor feels she will need medication for persistent depressive symptoms, she denies any suicidal ideation  She did do [employer] testing as she has tried numerous drugs throughout the years that she either reacts adversely to are not helpful      Hemoglobin A1C (%)   Date Value   10/18/2019 5.5             ( goal A1C is < 7)   No results found for: LABMICR  LDL Cholesterol (mg/dL)   Date Value   2021 84   2020 69   10/18/2019 105     LDL Calculated (mg/dL)   Date Value   2017 89       (goal LDL is <100)   AST (U/L)   Date Value   2022 27     ALT (U/L)   Date Value   2022 49 (H)     BUN (mg/dL)   Date Value   2022 12     BP Readings from Last 3 Encounters:   23 (!) 160/95   23 135/88   22 (!) 143/80          (isic241/80)    Past Medical History:   Diagnosis Date    Anxiety     Carpal tunnel syndrome     COVID-19 2021    Depression     Endometriosis     Gestational diabetes     x 3 children    Headache migraines    History of blood transfusion     Hypertension     Iron deficiency anemia     Kidney stones     Dr. Denilson Mcnamara seen 2022    May-Thurner syndrome     Obesity     Scleroderma ([address]     Snores     Under care of team     PCP - Torie Dooley CNP - LAST VISIT 2022    Under care of team     VASCULAR  - DR. RIBEIRO - 2021 - SEES FOR MAY-THURNER SYNDROME    Under care of team     HEM/ONC - DR. PERKINS - DINAH WHITMORE - LAST VISIT - 2022    URI (upper respiratory infection) 10/23/2022    starting 10/23/2022 cough, fever, fatigue, sorethroat, congestion, seen by urgent care 10/25/22, ER on 10/29/22 for SOB, dizziness and N&V    Wears glasses     Wellness examination     follows with Behavioral Health, last seen 2022      Past Surgical History:   Procedure Laterality Date     SECTION  x 3    CHOLECYSTECTOMY      CYSTOSCOPY Left 2022    CYSTOSCOPY, URETEROSCOPY, STENT PLACEMENT    CYSTOSCOPY Left 2022    CYSTOSCOPY, URETEROSCOPY, STENT PLACEMENT performed by Radha Colvin MD at [facility] Left 2022    CYSTOSCOPY LEFT URETEROSCOPY HOLMIUM LASER WITH LEFT STENT EXCHANGE    CYSTOSCOPY Left 2022    CYSTOSCOPY LEFT URETEROSCOPY HOLMIUM LASER WITH LEFT STENT EXCHANGE performed by Radha Colvin MD at [facility] N/A 2021    uterine ablation    GASTRIC BYPASS SURGERY  2009    PABLO- EN- Y    HYSTERECTOMY (CERVIX STATUS UNKNOWN) N/A 10/26/2021    XI ROBOTIC LAPAROSCOPIC MODIFIED RADICAL HYSTERECTOMY, BILATERAL SALPINGO OOPHORECTOMY, CYTOLOGIC WASHINGS, CYSTOSCOPY performed by Bernardo Rodriguez MD at [address] [place] [address]      endometriosis    TOENAIL EXCISION      VASCULAR SURGERY  2016    to evaluate May Rader syndrome effects on  blood vessels       Family History   Problem Relation Age of Onset    Colon Cancer Mother     Early Death Father         accident    Diabetes Sister     High Blood Pressure Sister     Diabetes Brother     High Blood Pressure Brother     Diabetes Sister     High Blood Pressure Sister           Social History     Tobacco Use    Smoking status: Never    Smokeless tobacco: Never   Substance Use Topics    Alcohol use: No      Current Outpatient Medications   Medication Sig Dispense Refill    solifenacin (VESICARE) 5 MG tablet Take 1 tablet by mouth daily 30 tablet 11    CALCIUM CITRATE, BARIATRIC ADVANTAGE, 500MG LOZENGE Take 1 lozenge by mouth 2 times daily 60 lozenge 11    vitamin D3 (CHOLECALCIFEROL) 25 MCG (1000 UT) TABS tablet Take 1 tablet by mouth daily 90 tablet 1    zinc 50 MG CAPS Take 50 mg by mouth daily 90 capsule 1    vitamin B-12 (CYANOCOBALAMIN) 1000 MCG tablet Take 1 tablet by mouth daily 90 tablet 1    oxybutynin (DITROPAN) 5 MG tablet Take 1 tablet by mouth 3 times daily as needed (bladder spasms, stent pain) 90 tablet 3    losartan (COZAAR) 50 MG tablet Take 1 tablet by mouth daily 90 tablet 3    busPIRone (BUSPAR) 10 MG tablet TAKE 1 TABLET BY MOUTH THREE TIMES A DAY AS NEEDED FOR ANXIETY 90 tablet 5    Elastic Bandages & Supports (JOBST ULTRASHEER 20-30MMHG LG) MISC Wear daily, ok to remove in the evenings 3 each 3    Multiple Vitamins-Minerals (MULTI-JULIO CÉSAR PO) Take by mouth daily       No current facility-administered medications for this visit. Allergies   Allergen Reactions    Contrast [Iodides] Hives     Chest pain, HTN  Able to handle if the pre-medication prep is followed.     Trileptal [Oxcarbazepine] Shortness Of Breath     Red neck rash/ visual changes    Morphine Other (See Comments)     Red streak up arm at iv site/ and hives    Bactrim [Sulfamethoxazole-Trimethoprim] Other (See Comments)     Yellowing of sclera    Nsaids Other (See Comments)     Not supposed to take d/t gastric bypass       Health Maintenance   Topic Date Due    HIV screen  Never done    Hepatitis C screen  Never done    Colorectal Cancer Screen  Never done    COVID-19 Vaccine (3 - Booster for Pfizer series) 06/25/2021    Flu vaccine (1) 08/01/2022    Depression Monitoring  12/01/2023    Diabetes screen  03/18/2024    Lipids  03/18/2026    DTaP/Tdap/Td vaccine (4 - Td or Tdap) 11/15/2029    Hepatitis A vaccine  Aged Out    Hib vaccine  Aged Out    Meningococcal (ACWY) vaccine  Aged Out    Pneumococcal 0-64 years Vaccine  Aged Out       Subjective:      Review of Systems   Constitutional:  Positive for fatigue. Negative for activity change, appetite change, chills, fever and unexpected weight change. HENT:  Negative for congestion, ear pain, hearing loss, sinus pressure, sore throat and trouble swallowing. Eyes:  Negative for visual disturbance. Respiratory:  Negative for cough, shortness of breath and wheezing. Cardiovascular:  Negative for chest pain, palpitations and leg swelling. Gastrointestinal:  Negative for abdominal pain, blood in stool, constipation, diarrhea, nausea and vomiting. Endocrine: Negative for cold intolerance, heat intolerance, polydipsia, polyphagia and polyuria. Genitourinary:  Negative for difficulty urinating, frequency, hematuria and urgency. Musculoskeletal:  Negative for arthralgias and myalgias. Skin:  Negative for rash. Allergic/Immunologic: Negative for environmental allergies. Neurological:  Negative for dizziness, weakness, light-headedness and headaches. Psychiatric/Behavioral:  Positive for dysphoric mood. Negative for confusion. The patient is not nervous/anxious. Objective:     Physical Exam  Constitutional:       Appearance: Normal appearance. She is well-developed. HENT:      Head: Normocephalic. Eyes:      Conjunctiva/sclera: Conjunctivae normal.      Pupils: Pupils are equal, round, and reactive to light. Cardiovascular:      Rate and Rhythm: Normal rate and regular rhythm. Heart sounds: Normal heart sounds. No murmur heard.   Pulmonary:      Effort: Pulmonary effort is normal.      Breath sounds: Normal breath sounds. No wheezing. Abdominal:      General: Bowel sounds are normal. There is no distension. Palpations: Abdomen is soft. Musculoskeletal:         General: Normal range of motion. Cervical back: Normal range of motion. Skin:     General: Skin is warm and dry. Neurological:      Mental Status: She is alert and oriented to person, place, and time. Psychiatric:         Behavior: Behavior normal.         Thought Content: Thought content normal.         Judgment: Judgment normal.     LMP 09/15/2021 Comment: VERY HEAVY PERIODS    Assessment:       Diagnosis Orders   1. Elevated ferritin level  CBC with Auto Differential    Iron and TIBC    Comprehensive Metabolic Panel    Ferritin      2. Elevated liver enzymes  Comprehensive Metabolic Panel      3. Iron deficiency anemia due to chronic blood loss  CBC with Auto Differential      4. Fatigue, unspecified type                  Plan:      Return in about 15 weeks (around 6/1/2023) for as scheduled. Elevated ferritin, elevated liver enz, anemia, fatigue-lengthy discussion on current symptoms. Reviewed most recent visit with hematology as well as recent labs which show high ferritin level, other iron levels and CBC were normal.  As patient is concerned we will repeat labs to check for stability and rule out potential underlying disorder like liver or renal issues. Previous liver enzymes have been intermittently elevated, most recent kidney function appears normal  Past TSH levels have been normal with most recent less than 1 year ago  Depression- her uncontrolled depression is likely a contributor to her symptoms of fatigue. Reviewed recent [employer] testing. This will be sent to [facility] for further evaluation and consideration of medications per psychiatry. She will be scheduling at her intake appointment in March to see both psychiatry and counseling services.   She denies any suicidal ideation  Orders Placed This Encounter Procedures    CBC with Auto Differential     Standing Status:   Future     Standing Expiration Date:   2/16/2024    Iron and TIBC     Standing Status:   Future     Standing Expiration Date:   2/16/2024    Comprehensive Metabolic Panel     Standing Status:   Future     Standing Expiration Date:   2/16/2024    Ferritin     Standing Status:   Future     Standing Expiration Date:   2/16/2024     Marlon Kitchen, was evaluated through a synchronous (real-time) audio-video encounter. The patient (or guardian if applicable) is aware that this is a billable service, which includes applicable co-pays. This Virtual Visit was conducted with patient's (and/or legal guardian's) consent. The visit was conducted pursuant to the emergency declaration under the [facility] authority and the [employer] and [employer] Act. Patient identification was verified, and a caregiver was present when appropriate. The patient was located at Home: [address] [place] [address].  Provider was located at [employer] [facility] [address] [place] [address].         Total time spent for this encounter: Not billed by time    --JUSTICE Yepez CNP on 2/16/2023 at 1:35 PM    An electronic signature was used to authenticate this note. Patient given educational materials - see patient instructions. Discussed use, benefit, and side effects of prescribed medications. All patientquestions answered. Pt voiced understanding. Reviewed health maintenance. Instructedto continue current medications, diet and exercise. Patient agreed with treatmentplan. Follow up as directed.      Electronicallysigned by JUSTICE Yepez CNP on 2/16/2023 at 1:34 PM

## 2023-02-21 ENCOUNTER — TELEMEDICINE (OUTPATIENT)
Age: 48
End: 2023-02-21

## 2023-02-21 DIAGNOSIS — F33.1 MODERATE EPISODE OF RECURRENT MAJOR DEPRESSIVE DISORDER (HCC): Primary | ICD-10-CM

## 2023-02-21 DIAGNOSIS — F41.9 ANXIETY: ICD-10-CM

## 2023-02-21 PROCEDURE — 90834 PSYTX W PT 45 MINUTES: CPT | Performed by: SOCIAL WORKER

## 2023-02-21 NOTE — PROGRESS NOTES
ADULT BEHAVIORAL HEALTH FOLLOW UP  Ry RenettaCAMMIE  Licensed Independent          Visit Date: 2/21/2023   Time of appointment: 11:10 AM       Time spent with Patient: 40 minutes. Patient Location: [facility]/Clinician Location: [facility]; [facility]   This was a tele-health visit conducted via interactive/real time audio and video. Reason for Consult:  Anxiety and Depression     PCP:  JUSTICE Yu CNP      Pt provided informed consent for the behavioral health program. Discussed with patient model of service to include the limits of confidentiality (i.e. abuse reporting, suicide intervention, etc.) and short-term intervention focused approach. Pt indicated understanding. Pt provided verbal consent to engage in tele-health psychotherapy, visit was completed using My Chart and patient was alone at time of visit, as the visit was held in a private space in patient's home ([place], [place]). Janell Pineda is a 52 y.o. female who presents for follow up of depression and anxiety. Delmaryris Julianna presented to visit with continuation of depressive and anxious symptoms, she has been home with her children who have been sick, she shared this has been overwhelming and tiring. Due to the kids being sick, this has led to Noelle's sister being out of the house the last 10 days. She has gotten herself scheduled for the lymphedema clinic recommended for her doctor and was feeling hopeful about this. She is planning to repeat blood work with her provider after receiving results she shared was related to her iron, that were unclear. Reji Levine expressed this concern, as well as being unsure what the plan will be has led to more anxiety because of the uncertainty. She admitted it is difficult when \"Only thing to go off of, is how you are feeling, so you are constantly analyzing\".  She also has anxiety for the future as we have discussed due to not being sure where the family will go and if they will have adequate financial support if she is not approved for social security or if this does not happen prior to their move. She expressed frustration with this being heavily dependent on her sister's decision making, if her sister decides to leave the home with her plans to get . Kellie Riggs shared she recently had meeting with [employer] [facility]  who confirmed she still has benefits for 4 more months. She also identified transportation as a barrier, as she is hoping she could find a program to assist her with a car. She has been home with kids who have both have been sick. She shared this has been overwhelming and tiring. She has been scheduled for a lymphedema clinic. She is planning to repeat labs because of concern her providers have due to recent. Only thing to go off of is how you are feeling so you are constantly analyzing. Related to conern with iron. Her sister had been gone for the last ten days due to her son being sick, things have not moved forward. She had meeting with [employer] [facility], continued assistance for 4 months. She is hoping social security will be approved by then, Hoping to find a car program.       Previous Recommendations: Kellie Riggs was scheduled for follow-up on 2/21. Discussed plan to transition to [facility] on 3/6. Kellie Riggs is continuing to work with Anny Chavez on social security application/process to assist with financial stability. MENTAL STATUS EXAM  Mood was within normal limits with anxious affect. Suicidal ideation was denied. Homicidal ideation was not reported. Hygiene was good . Dress was appropriate. Behavior was Within Normal Limits with self-report of difficulties ambulating. Attitude was Engageable. Eye-contact was good. Speech: rate - WNL, rhythm - WNL, volume - WNL. Verbalizations were goal directed. Thought processes were intact and goal-oriented without evidence of delusions, hallucinations, obsessions, or allie; with moderate cognitive distortions. Associations were characterized by intact cognitive processes. Pt was oriented to person, place, time, and general circumstances;  recent:  good. Insight and judgment were estimated to be good, AEB, a fair  understanding of cyclical maladaptive patterns, and the ability to use insight to inform behavior change. ASSESSMENT  Lora Freeman presented to the appointment today for evaluation and treatment of symptoms of depression and anxiety. She is currently deemed no risk to herself or others and meets criteria for Major Depressive Disorder and Anxiety. Theme of today's session focused on Noelle's feelings related to situational stressors (children being ill, financial stress for future, and health). Theodore Smith identified feeling discouraged due to not having the stamina to move forward, stating \"Everything is extra hard right now\". We discussed taking things one day at a time, setting small goals, and focusing on the present to decrease feelings of overwhelm. [facility] also recommended Theodore Smith consider looking into community resources available to assist with housing, if she finds herself in a position where the family does have to move, [facility] explained that she may be able to look into a housing voucher program and encouraged her to speak with [facility] regarding this if needed. Theodore Katzain still plans to establish with ongoing mental health provider [place] on 3/6. Theodore Luis was in agreement with recommendations to establish with external referral to begin meeting with a clinician whom she can see for regularly scheduled visits.      PHQ Scores 12/1/2022 8/31/2022 9/14/2021 1/9/2017   PHQ2 Score 0 6 6 1   PHQ9 Score 0 26 18 1     Interpretation of Total Score Depression Severity: 1-4 = Minimal depression, 5-9 = Mild depression, 10-14 = Moderate depression, 15-19 = Moderately severe depression, 20-27 = Severe depression    How often pt has had thoughts of death or hurting self (if PHQ positive for depression):       No flowsheet data found.  Interpretation of FAISAL-7 score: 5-9 = mild anxiety, 10-14 = moderate anxiety, 15+ = severe anxiety. Recommend referral to behavioral health for scores 10 or greater. DIAGNOSIS  Kyra Rooney was seen today for anxiety and depression. Diagnoses and all orders for this visit:    Moderate episode of recurrent major depressive disorder ([facility] [address]    Anxiety      INTERVENTION  Trained in strategies for increasing balanced thinking, Discussed and set plan for behavioral activation, Discussed potential barriers to change, CBT to target depression & feelings of self-defeat, cycle of anxious thoughts, and Identified maladaptive thoughts. Keira Rodger was scheduled for follow-up session for final visit with [facility] to prepare for successful transfer to new [facility] at [facility] for ongoing care on 3/6. INTERACTIVE COMPLEXITY  Is interactive complexity present?   No  Reason:  N/A  Additional Supporting Information:  N/A       Electronically signed by CAMMIE Gloria on 2/24/2023 at 8:58 AM

## 2023-02-23 ENCOUNTER — HOSPITAL ENCOUNTER (OUTPATIENT)
Dept: OCCUPATIONAL THERAPY | Age: 48
Setting detail: THERAPIES SERIES
Discharge: HOME | End: 2023-02-23
Payer: MEDICAID

## 2023-02-23 ENCOUNTER — HOSPITAL ENCOUNTER (OUTPATIENT)
Age: 48
Discharge: HOME | End: 2023-02-23
Payer: MEDICAID

## 2023-02-23 DIAGNOSIS — D50.0 IRON DEFICIENCY ANEMIA DUE TO CHRONIC BLOOD LOSS: ICD-10-CM

## 2023-02-23 DIAGNOSIS — R74.8 ELEVATED LIVER ENZYMES: ICD-10-CM

## 2023-02-23 DIAGNOSIS — R79.89 ELEVATED FERRITIN LEVEL: ICD-10-CM

## 2023-02-23 LAB
ABSOLUTE EOS #: 0.1 K/UL (ref 0–0.4)
ABSOLUTE LYMPH #: 2.3 K/UL (ref 1–4.8)
ABSOLUTE MONO #: 0.5 K/UL (ref 0.1–1.2)
ALBUMIN SERPL-MCNC: 4.3 G/DL (ref 3.5–5.2)
ALBUMIN/GLOBULIN RATIO: 1.2 (ref 1–2.5)
ALP SERPL-CCNC: 87 U/L (ref 35–104)
ALT SERPL-CCNC: 38 U/L (ref 5–33)
ANION GAP SERPL CALCULATED.3IONS-SCNC: 8 MMOL/L (ref 9–17)
AST SERPL-CCNC: 21 U/L
BASOPHILS # BLD: 1 % (ref 0–2)
BASOPHILS ABSOLUTE: 0 K/UL (ref 0–0.2)
BILIRUB SERPL-MCNC: 0.2 MG/DL (ref 0.3–1.2)
BUN SERPL-MCNC: 13 MG/DL (ref 6–20)
CALCIUM SERPL-MCNC: 10 MG/DL (ref 8.6–10.4)
CHLORIDE SERPL-SCNC: 103 MMOL/L (ref 98–107)
CO2 SERPL-SCNC: 28 MMOL/L (ref 20–31)
CREAT SERPL-MCNC: 0.64 MG/DL (ref 0.5–0.9)
EOSINOPHILS RELATIVE PERCENT: 2 % (ref 1–4)
FERRITIN SERPL-MCNC: 227 NG/ML (ref 13–150)
GFR SERPL CREATININE-BSD FRML MDRD: >60 ML/MIN/1.73M2
GLUCOSE SERPL-MCNC: 132 MG/DL (ref 70–99)
HCT VFR BLD AUTO: 44.8 % (ref 36–46)
HGB BLD-MCNC: 14.6 G/DL (ref 12–16)
IRON SATURATION: 17 % (ref 20–55)
IRON SERPL-MCNC: 49 UG/DL (ref 37–145)
LYMPHOCYTES # BLD: 36 % (ref 24–44)
MCH RBC QN AUTO: 28.4 PG (ref 26–34)
MCHC RBC AUTO-ENTMCNC: 32.6 G/DL (ref 31–37)
MCV RBC AUTO: 87.2 FL (ref 80–100)
MONOCYTES # BLD: 7 % (ref 2–11)
PDW BLD-RTO: 14.8 % (ref 12.5–15.4)
PLATELET # BLD AUTO: 184 K/UL (ref 140–450)
PMV BLD AUTO: 9.9 FL (ref 6–12)
POTASSIUM SERPL-SCNC: 4.7 MMOL/L (ref 3.7–5.3)
PROT SERPL-MCNC: 7.8 G/DL (ref 6.4–8.3)
RBC # BLD: 5.14 M/UL (ref 4–5.2)
SEG NEUTROPHILS: 54 % (ref 36–66)
SEGMENTED NEUTROPHILS ABSOLUTE COUNT: 3.6 K/UL (ref 1.8–7.7)
SODIUM SERPL-SCNC: 139 MMOL/L (ref 135–144)
TIBC SERPL-MCNC: 291 UG/DL (ref 250–450)
UNSATURATED IRON BINDING CAPACITY: 242 UG/DL (ref 112–347)
WBC # BLD AUTO: 6.6 K/UL (ref 3.5–11)

## 2023-02-23 PROCEDURE — 83550 IRON BINDING TEST: CPT

## 2023-02-23 PROCEDURE — 97166 OT EVAL MOD COMPLEX 45 MIN: CPT

## 2023-02-23 PROCEDURE — 85025 COMPLETE CBC W/AUTO DIFF WBC: CPT

## 2023-02-23 PROCEDURE — 36415 COLL VENOUS BLD VENIPUNCTURE: CPT

## 2023-02-23 PROCEDURE — 83540 ASSAY OF IRON: CPT

## 2023-02-23 PROCEDURE — 82728 ASSAY OF FERRITIN: CPT

## 2023-02-23 PROCEDURE — 97535 SELF CARE MNGMENT TRAINING: CPT

## 2023-02-23 PROCEDURE — 80053 COMPREHEN METABOLIC PANEL: CPT

## 2023-02-23 NOTE — CONSULTS
TREATMENT LOCATION:   [] [facility]  [address], [address]  P: [phone]  F: [phone] [x] [address]: [phone]  F: [phone]      Lymphedema Services - Initial Evaluation for Lower Extremity    Date:  2023  Patient: Cris Leonard  : 1975             MRN: [id number]  Referring Provider: Darron Joe       Phone: [phone]  Fax: [phone]  Insurance: *** (ID:***) - Barrington Bernal restrictions/#of visits/etc here ***]  Medical Diagnosis: ***  Rehab Codes: I89.0, ***   Onset Date: ***  Visit# / total visits: 1/*** scheduled; Progress note due at visit *** per POC. ( Certification Dates: 2023 - ***)    Info for garment VOB: ***if needed  [address] [facility] [address] [phone]      Allergies:  Medications  Medications: See charted information in Epic    Past Medical History: See charted information in Epic     Restrictions: None  Fall Risk:   [x] No    [] Yes   If yes, intervention:       Overview: Patient is a 52year old female referred to the Lymphedema Clinic with a diagnosis of bilateral Lower Extremity Lymphedema. ***      Relevant provider notes from ***: ***      SUBJECTIVE:  Pt has been having problems with her legs since she has had her first baby. She has seen many vascular MD's over the years without clear answers. 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 Has seen Dr. Brenna Hickman for a few years, but \"starting over\" with him due to break in care due to cancer scare. Quit working in 2020 (ran a [employer]), constantly on her feet. Low endurance. Activity/overexertion is associated with chest pain.      Hx of Complete Decongestive Therapy:[]Yes - Date:        [x]No   Rate of onset:   [x] Slow   [] Rapid     [] Acute   Progression: [] Improving   [x]  Worsening  [] Consistent   Conservative Treatments Utilized in the Past:  [] None  [] Compression Garments   [] Bandaging  [] Elevation  [] Exercise   []Self MLD  [] Other:    Comments:   Current Lymphedmea Treatments:   [] None  [] Compression Garments   [] Bandaging  [] Elevation  [] Exercise   []Self MLD  [] Other:    Comments:   Aggravating factors:  [] None   [] Activity  [] Stress  [] Dependent Position [] Travel   [] Hot Temperatures [] Diet     [] Sleeping Position      [] Other:  Comments:   Relieving factors:   [] None [] Elevation  [] Activity  [] Compression    [] Rest  [] Cold Temperatures [] Diet   []  Other:  Comments:   Additional Comments:        Pain:  [] YES    [x] NO   Location: n/a     Pain Rating: ( 0-10 scale): 0/10  Comments:     History of Cancer: []Yes [x]No     Comorbidities:   [x] Obesity [] Dialysis  [x] Other: anemia   [] Asthma/COPD [] Dementia [x] Other: scleroderma   [] Stroke [] Sleep apnea [x] Other: hysterectomy 2021   [] Vascular disease [] Rheumatic disease [x] Other: gastric bypass 2009   May-Thurner syndrome  Gall bladder sx 2009    Absolute Lymphedema Contraindications - treatement [x] NONE    [] CHF (only if patient is un-medicated or edema is solely d/t cardiac failure)    [] DVT- Acute, No MLD to limb      [] Advanced Renal Disease - Need Physician Clearance  [] Acute Skin Infection ( e.g. Cellulitis, Ersipelas)  [] Thyroid condition (caution with MLD to neck)  [] Cardiac Arrhythmia  [] Hypersensitive Carotid Sinus    Absolute Contraindications Regarding the Deep Abdomen: [] NONE   [] Pregnancy   [] Abdominal Aortic Aneurism - Current or history of   [x] Inflammatory Disease of bowel or intestines  [] Severe Arteriosclerosis   [] Intra-abdominal scar formations following surgery  [] Recent abdominal surgery  [] Pelvic DVT- Current or history of  [] Presence of clot prevention devices ( e.g. - [place] Filter)     Relative Lymphedema Contraindications [x] NONE     [] Malignant Lymphedema - Edema is being caused by active cancer. MLD and CDT considered palliative during active cancer treatments. Physician approval required. [] Age 61+   [] Limb paralysis     Home/Work Environment  Patient lives with: Sister and 2 kids (soon to be just 2 kids)   In what type of home [x]  One story   [] Two story   [] Split level  [] Apartment   Number of stairs to enter 2    With handrail on the []  Right to enter   [] Left to enter   Bathroom has a []  Tub only  [] Tub/shower combo   [x] Walk in shower    []  Grab bars   Washing machine is on []  Main level   [] Second level   [x] In garage with step to get in/out of garage   Employer N/a   Job Status []  Normal duty   [] Light duty   [x] Off due to condition    []  Retired   [] Not employed   [] Disability  [] Other:  []  Return to work:    Work activities/duties        ADL/IADL [x] Previously independent with all [] Currently independent with all Who currently assists the patient with task     [] Previously independent with all except: [x] Currently independent with all except:     Bathing  [] Assist [] Assist     Dress/grooming [] Assist [] Assist     Transfer/mobility [] Assist [] Assist     Feeding [] Assist [] Assist     Toileting [] Assist [] Assist     Driving [] Assist [] Assist     Housekeeping [] Assist [x] Assist  family PRN   Grocery shop/meal prep [] Assist [] Assist         Gait Prior level of function Current level of function    [x] Independent  [] Assist [x] Independent  [] Assist   Device: [] Independent [] Independent    [] Straight Cane [] Quad cane [x] Straight Cane [] Quad cane    [] Standard walker [] Rolling walker   [] 4 wheeled walker [] Standard walker [] Rolling walker   [] 4 wheeled walker    [] Wheelchair [] Wheelchair         OBJECTIVE  Physical Status:   Range of motion: [] WFL [x] Deficits         Comments:  Strength: [] WFL [x] Deficits          Comments:  Balance: [] [facility] [x] Deficits       Comments:    Presentation of Affected Areas  Location: bilateral Lower Extremity (LLE>RLE)   Description: Hyperplasia  Firm/Fibrotic     Scars ***   Wounds {Lymph Wounds:62203}   Sensation Numbness LLE   Additional Comments:        Circumferential Measurements  Measurements taken from nail base of D2 digit. Measurements (cm) cm Right Left   Dorsum    10 32.2 24.8   Inframalleolar  (Y girth)     15 32.0 35.1   Supramalleolar  (ankle)   20 27.8 30.7   Distal Calf    23 30.0 33.8   Mid Calf    30 37.0 40.5   Proximal Calf   40 47.4 47.5   Knee    49 43.7 44.0   Distal thigh        Mid thigh      Proximal Thigh      Initial Total 2/23/2023:  NA *** ***            ASSESSMENT: Patient would benefit from skilled occupational therapy services in order to: *** Decrease edema that has accumulated in the extremity, decrease risk of infection, improve limb ROM, increase ease and independence with ADL, educate on long term management of condition, and improve overall quality of life. Pt is provided with a folder which included educational hand out on the basics of lymphedema, program details and what to expect during treatment for further review at home. Writer educates on an impaired lymphatic system vs. a healthy lymphatic system and how it relates to swelling and skin integrity ***. Pt is edu on a POC that would be of benefit to them given findings made during evaluation, including the items checked below.      Treatment may include the following:     []Bandaging   []Self-Bandaging/Caregiver Training   [x]MLD    [x]Self-MLD   [x] Therapeutic Exercise    [x] Education/Instruction in home management program  [] Education and trial of a Vasopneumatic Pump    [x] Education and transition to long term management devices such as velcro compression garments and/or daytime compression sleeve/stocking(s)   [x]Discharge to Home Program     Response to treatment: Pt verbalizes and is agreeable to the instructions/ POC established at today's evaluation. PLAN FOR NEXT VISIT: ***      Lymphedema Stage per ISL Guidelines    [] 0: Subclinical with no evidence on physical exam  [] 1:  Early onset, swelling/heaviness, pitting edema, subsides with limb elevation  [] 2:  More advanced, fibrosis resulting in non-pitting edema, does not respond to elevation, thickening of the tissues  [] 3: Elephantiasis, pitting absent, huge limbs with dry/scaly, papillomatosis, hyperkeratosis, fluid may be leaking with recurrent cellulitis. Acanthosis (deep body folds). Elevation &   diuretics ineffective.        Therapy Goals  ***    Patient's Goal: ***     Response to Education Provided:  [x] Verbalized/demonstrated understanding   [] Demonstrates/verbalizes HEP/Education previously given      [] Needs continued review            [] No understanding   Learner(s): [x] Patient  [] Significant Other [] Family       [] Other:   Method(s): [x] Verbal   [] Demonstration [] Handouts [] Other:   Person(s) available to assist with home program if needed: [] Yes: *** [] No    FREQUENCY: Pt will be seen *** x/ week for *** visits    Rehabilitation Potential/Commitment: [] Good [] Fair     [] Poor    Functional Outcome Measure(s):  Lymphedema Life Impact Scale (LLIS) Score: 78% functionally impaired  Brief Fatigue Inventory: ***     Clearance needed:  []Yes    []No     Physicians/specialties giving clearance:    Referral needed to: [] Physical Therapy   [] Speech Therapy                 Treatment Charges   Minutes   Units   Evaluation                                                             $97.64/ $76.35            Low   (74445)            Moderate   (72411)  1          High   (75637)     Manual Therapy ([facility]):                                      $26.27 / $20.83     Therapeutic activities ():                             $35.63/ $25.68     Therapeutic Exercise (19004)                              $28.50/$ 22.29     Self care/home mgmt (13881)                              $31.61/ $23.84 ***    Vasopneumatic Device (61026)                           $8.99     Total Treatment Time    ***      Medicare Tracking - $2,230 cap Totals   Today    Previous     Grand Total        Time In:  ***  Time Out: ***      Electronically signed by Augustus Lawrence OT on 2/23/2023 at 2:07 PM      Physician Signature: _________________________        Date: _______________  By signing above or cosigning this note, I have reviewed this plan of care and certify a need to continue medically necessary rehabilitation services.       *PLEASE SIGN ABOVE AND FAX BACK [phone] WITH ALL PAGES FOR CONSENT TO CONTINUE LYMPHEDEMA TREATMENT*

## 2023-02-27 NOTE — TELEPHONE ENCOUNTER
Clint Banda was contacted by a Samir Capps for follow-up regarding SDOH related needs. Writer spoke with: Patient    Progress Notes:   Patient returned my voicemail. Patient stated:  Hasn't talked with sister yet about housing concerns. Sister lives with her fiance and works third shift. Has an evaluation scheduled with [facility] on 3/6 to continue therapy after last appointment with Garland Randall on 3/1. \"I need to put my big girl panties on and talk with my sister. \"  Is anticipating Social Security determination in March. Had been told determination may take 4 months from time application was submitted in November. Concerned will be denied for Social Security disability. Has been trying for three years. I expressed compassion for the difficult situation she is in. I explained the benefits of applying for  assistance in case Social Security disability is denied and patient would consider appealing the determination. Patient asked me to email them the website link to apply for  assistance. I confirmed her email address: [email or web address]      Action steps to be completed by writer:  I will email Patient the website link to apply for  assistance. Action steps to be completed by patient:  Patient will be on the lookout for an email from me.     Patient has given verbal permission to leave detailed messages regarding SDOH referral on their phone: N/A    Patient has given verbal permission to submit applications on their behalf: yes    Mandie Huntley MA

## 2023-02-27 NOTE — TELEPHONE ENCOUNTER
Leti Jones was contacted by Azucena Lino MA, a Samir Capps, regarding a Social Determinants of Health referral.     A message was left with the writer's contact information. Follow-up attempt.

## 2023-02-28 NOTE — TELEPHONE ENCOUNTER
Thanks for the coordination, Patricia Valdez, to assist [facility], I appreciate the consult.

## 2023-03-01 ENCOUNTER — OFFICE VISIT (OUTPATIENT)
Dept: ONCOLOGY | Age: 48
End: 2023-03-01
Payer: MEDICAID

## 2023-03-01 VITALS
DIASTOLIC BLOOD PRESSURE: 108 MMHG | HEART RATE: 76 BPM | TEMPERATURE: 97.6 F | BODY MASS INDEX: 50.64 KG/M2 | SYSTOLIC BLOOD PRESSURE: 169 MMHG | WEIGHT: 293 LBS

## 2023-03-01 DIAGNOSIS — R79.89 ELEVATED FERRITIN: ICD-10-CM

## 2023-03-01 DIAGNOSIS — R79.89 ABNORMAL LFTS: Primary | ICD-10-CM

## 2023-03-01 DIAGNOSIS — D50.8 IRON DEFICIENCY ANEMIA SECONDARY TO INADEQUATE DIETARY IRON INTAKE: ICD-10-CM

## 2023-03-01 PROCEDURE — 99214 OFFICE O/P EST MOD 30 MIN: CPT | Performed by: INTERNAL MEDICINE

## 2023-03-01 RX ORDER — SENNA PLUS 8.6 MG/1
1 TABLET ORAL 2 TIMES DAILY
Qty: 60 TABLET | Refills: 11 | Status: SHIPPED | OUTPATIENT
Start: 2023-03-01 | End: 2024-02-29

## 2023-03-01 RX ORDER — ASCORBIC ACID 250 MG
250 TABLET ORAL DAILY
Qty: 30 TABLET | Refills: 3 | Status: SHIPPED | OUTPATIENT
Start: 2023-03-01

## 2023-03-01 RX ORDER — FERROUS SULFATE 325(65) MG
325 TABLET ORAL
Qty: 90 TABLET | Refills: 1 | Status: SHIPPED | OUTPATIENT
Start: 2023-03-01

## 2023-03-01 NOTE — TELEPHONE ENCOUNTER
Patient asking if she needs IV iron. Dr. Kush Lauren ordered it. 5.1 This document is complete and the patient is ready for discharge.

## 2023-03-01 NOTE — PROGRESS NOTES
Benjamín Jean-Baptiste                                                                                                                  3/1/2023  MRN:   [id number]  YOB: 1975  PCP:                           JUSTICE Edward CNP  Referring Physician: No ref. provider found  Treating Physician Name: Daren Garcia MD      Reason for visit:  Chief Complaint   Patient presents with    Follow-up     Review status of disease    Discuss Labs    Fatigue    Other     Chest Pains     Constipation     Has been a couple weeks since she has a good bowl movement        Current problems:  Iron deficiency anemia with malabsorption component  Elevated ferritin    Active and recent treatments:  Parenteral iron-3/2021  Oral iron with vitamin C    Summary of Case/History:    Benjamín Jean-Baptiste a 52 y. o.female is a patient with anemia. She has history of anemia and was managed previously by Dr. Rodriguez Solorio with IV iron. Her most recent lab work shows recurrent iron deficiency anemia and she reports she historically received monthly B12 injections and IV iron every 3-6 months. She has history of scleroderma and gastric by-pass, done in 2009, contributing to iron malabsorption. She reports she has had zinc and D deficiencies, she does not have any bariatric vitamins. She is very symptomatic with dizziness, cold intolerance and feeling very fatigued. She denies any bleeding. She has history of menorrhagia with irregular periods, she had benign growth removed from outside of the uterus in 2012. She has family history of uterine fibroids and cancer. Interim History:     Patient presents to the clinic for a follow-up visit and to discuss results of her lab work-up. Patient complains of being very tired. Her ferritin continues to be elevated but stable. Patient iron saturation has dropped down to 17%. Patient is not anemic.   Patient complains of fatigue which is severe and persistent and affecting her quality of life.    During this visit patient's allergy, social, medical, surgical history and medications were reviewed and updated. Past Medical History:   Past Medical History:   Diagnosis Date    Anxiety     Carpal tunnel syndrome     COVID-19 2021    Depression     Endometriosis     Gestational diabetes     x 3 children    Headache     migraines    History of blood transfusion     Hypertension     Iron deficiency anemia     Kidney stones     Dr. Kandy Frankel seen 2022    May-Thurner syndrome     Obesity     Scleroderma ([address]     Snores     Under care of team     PCP - Rebel Resendiz CNP - LAST VISIT 2022    Under care of team     VASCULAR  - DR. RIBEIRO - 2021 - SEES FOR MAY-THURNER SYNDROME    Under care of team     HEM/ONC - DR. MADDIE WHITMORE - LAST VISIT - 2022    URI (upper respiratory infection) 10/23/2022    starting 10/23/2022 cough, fever, fatigue, sorethroat, congestion, seen by urgent care 10/25/22, ER on 10/29/22 for SOB, dizziness and N&V    Wears glasses     Wellness examination     follows with Behavioral Health, last seen 2022       Past Surgical History:     Past Surgical History:   Procedure Laterality Date     SECTION  x 3    CHOLECYSTECTOMY      CYSTOSCOPY Left 2022    CYSTOSCOPY, URETEROSCOPY, STENT PLACEMENT    CYSTOSCOPY Left 2022    CYSTOSCOPY, URETEROSCOPY, STENT PLACEMENT performed by Judy Logan MD at [place] [address] Left 2022    CYSTOSCOPY LEFT URETEROSCOPY HOLMIUM LASER WITH LEFT STENT EXCHANGE    CYSTOSCOPY Left 2022    CYSTOSCOPY LEFT URETEROSCOPY HOLMIUM LASER WITH LEFT STENT EXCHANGE performed by Judy Logan MD at [facility] N/A 2021    uterine ablation    GASTRIC BYPASS SURGERY  2009    PABLO- EN- Y    HYSTERECTOMY (CERVIX STATUS UNKNOWN) N/A 10/26/2021    XI ROBOTIC LAPAROSCOPIC MODIFIED RADICAL HYSTERECTOMY, BILATERAL SALPINGO OOPHORECTOMY, CYTOLOGIC WASHINGS, CYSTOSCOPY performed by Bette Pelaez MD at [facility]      endometriosis    TOENAIL EXCISION      VASCULAR SURGERY  2016    to evaluate May Rader syndrome effects on  blood vessels       Patient Family Social History:     Social History     Socioeconomic History    Marital status: Single     Spouse name: None    Number of children: None    Years of education: None    Highest education level: None   Tobacco Use    Smoking status: Never    Smokeless tobacco: Never   Vaping Use    Vaping Use: Never used   Substance and Sexual Activity    Alcohol use: No    Drug use: No    Sexual activity: Not Currently     Social Determinants of Health     Financial Resource Strain: High Risk    Difficulty of Paying Living Expenses: Very hard   Food Insecurity: Food Insecurity Present    Worried About Running Out of Food in the Last Year: Sometimes true    Ran Out of Food in the Last Year: Sometimes true     Family History   Problem Relation Age of Onset    Colon Cancer Mother     Early Death Father         accident    Diabetes Sister     High Blood Pressure Sister     Diabetes Brother     High Blood Pressure Brother     Diabetes Sister     High Blood Pressure Sister      Current Medications:     Current Outpatient Medications   Medication Sig Dispense Refill    solifenacin (VESICARE) 5 MG tablet Take 1 tablet by mouth daily 30 tablet 11    CALCIUM CITRATE, BARIATRIC ADVANTAGE, 500MG LOZENGE Take 1 lozenge by mouth 2 times daily 60 lozenge 11    vitamin D3 (CHOLECALCIFEROL) 25 MCG (1000 UT) TABS tablet Take 1 tablet by mouth daily 90 tablet 1    zinc 50 MG CAPS Take 50 mg by mouth daily 90 capsule 1    vitamin B-12 (CYANOCOBALAMIN) 1000 MCG tablet Take 1 tablet by mouth daily 90 tablet 1    oxybutynin (DITROPAN) 5 MG tablet Take 1 tablet by mouth 3 times daily as needed (bladder spasms, stent pain) 90 tablet 3    losartan (COZAAR) 50 MG tablet Take 1 tablet by mouth daily 90 tablet 3 busPIRone (BUSPAR) 10 MG tablet TAKE 1 TABLET BY MOUTH THREE TIMES A DAY AS NEEDED FOR ANXIETY 90 tablet 5    Elastic Bandages & Supports (JOBST ULTRASHEER 20-30MMHG LG) MISC Wear daily, ok to remove in the evenings 3 each 3    Multiple Vitamins-Minerals (MULTI-JULIO CÉSAR PO) Take by mouth daily       No current facility-administered medications for this visit. Allergies:   Contrast [iodides], Trileptal [oxcarbazepine], Morphine, Bactrim [sulfamethoxazole-trimethoprim], and Nsaids    Review of Systems:    Constitutional: No fever or chills. No night sweats, no weight loss. Positive fatigue  Eyes: No eye discharge, double vision, or eye pain   HEENT: negative for sore mouth, sore throat, hoarseness and voice change   Respiratory: negative for cough , sputum, wheezing, hemoptysis, chest pain  Cardiovascular: negative for chest pain, dyspnea, palpitations, orthopnea, PND   Gastrointestinal: negative for nausea, vomiting, diarrhea, constipation, abdominal pain, Dysphagia, hematemesis and hematochezia   Genitourinary: negative for frequency, dysuria, nocturia, urinary incontinence, and hematuria   Gynecological: + menorrhagia with irregular periods  Integument: negative for rash, skin lesions, bruises.    Hematologic/Lymphatic: negative for easy bruising, bleeding, lymphadenopathy, or petechiae   Endocrine: negative for heat or cold intolerance,weight changes, change in bowel habits and hair loss   Musculoskeletal: negative for myalgias, arthralgias, pain, joint swelling,and bone pain   Neurological: negative for headaches, dizziness, seizures, weakness, numbness        Physical Exam:    Vitals: BP (!) 169/108   Pulse 76   Temp 97.6 °F (36.4 °C) (Temporal)   Wt 295 lb (133.8 kg)   LMP 09/15/2021 Comment: VERY HEAVY PERIODS  BMI 50.64 kg/m²   General appearance - well appearing, no in pain or distress  Mental status - AAO X3  Eyes - pupils equal and reactive, extraocular eye movements intact  Mouth - mucous membranes moist, pharynx normal without lesions  Neck - supple, no significant adenopathy  Lymphatics - no palpable lymphadenopathy, no hepatosplenomegaly  Chest - clear to auscultation, no wheezes, rales or rhonchi, symmetric air entry  Heart - normal rate, regular rhythm, normal S1, S2, no murmurs  Abdomen - soft, nontender, nondistended, no masses or organomegaly  Neurological - alert, oriented, normal speech, no focal findings or movement disorder noted  Extremities - peripheral pulses normal, no pedal edema, no clubbing or cyanosis  Skin - normal coloration and turgor, no rashes, no suspicious skin lesions noted       DATA:      Lab Results   Component Value Date    WBC 6.6 02/23/2023    HGB 14.6 02/23/2023    HCT 44.8 02/23/2023    MCV 87.2 02/23/2023     02/23/2023     Lab Results   Component Value Date    IRON 49 02/23/2023    TIBC 291 02/23/2023    FERRITIN 227 (H) 02/23/2023       Chemistry        Component Value Date/Time     02/23/2023 1328    K 4.7 02/23/2023 1328     02/23/2023 1328    CO2 28 02/23/2023 1328    BUN 13 02/23/2023 1328    CREATININE 0.64 02/23/2023 1328        Component Value Date/Time    CALCIUM 10.0 02/23/2023 1328    ALKPHOS 87 02/23/2023 1328    AST 21 02/23/2023 1328    ALT 38 (H) 02/23/2023 1328    BILITOT 0.2 (L) 02/23/2023 1328        Lab Results   Component Value Date    AYPKUBPT98 1167 01/31/2023     Lab Results   Component Value Date     19 01/31/2023       Impression:  Iron deficiency anemia with malabsorption component  HX gastric bypass, 2009  HX scleroderma   Vitamin D deficiency  Fatigue  Elevated CA-125  May Thurner syndrome    Plan:  Personally reviewed results of lab work-up and the relevant clinical data  Patient repeat iron studies show persistently elevated ferritin but it is stable. Patient iron saturation has dropped to 17%. Patient is not anemic. I discussed in detail in simple terms interpretation of iron studies.   With a low iron saturation I believe patient has a functional iron deficiency but it is only mild patient is not anemic. I do not believe patient needs IV iron I will start patient on oral iron along with vitamin C to see if it helps with absorption of iron. I discussed differential diagnosis of elevated ferritin doubt patient has hemochromatosis. I believe elevated ferritin with a low iron saturation is most likely reactive in nature versus iron overload. I will order inflammatory markers as well as order ultrasound of liver to assess for etiology of patient's elevated ferritin. At the same time I am not convinced that patient's severe fatigue is caused by her mildly decreased iron saturation/functional iron deficiency. Patient is not anemic. Patient is overdue for a screening colonoscopy I will order for Cologuard testing for the patient. Advised patient to continue screening mammograms  We will also obtain transferrin level as a part of more detailed iron studies  We will obtain above labs in a month from now and see patient back in office  Patient multiple questions which I answered to the best of my ability    Mars Mijares MD            This note is created with the assistance of a speech recognition program.  While intending to generate a document that actually reflects the content of the visit, the document can still have some errors including those of syntax and sound a like substitutions which may escape proof reading. It such instances, actual meaning can be extrapolated by contextual diversion.

## 2023-03-09 NOTE — TELEPHONE ENCOUNTER
Edith Vail was contacted by Claude Dubin, MA, a [employer], regarding a Social Determinants of Health referral.     A message was left with the writer's contact information. Follow-up attempt.

## 2023-03-15 NOTE — TELEPHONE ENCOUNTER
Nikky Leong was contacted by Bear Butts MA, a [employer], regarding a Social Determinants of Health referral.     A message was left with the writer's contact information. Final follow-up attempt. Will close referral if no response in one week.

## 2023-03-19 PROBLEM — I89.0 LYMPHEDEMA DUE TO VENOUS INSUFFICIENCY: Status: ACTIVE | Noted: 2023-03-19

## 2023-03-19 PROBLEM — M79.662 PAIN AND SWELLING OF LEFT LOWER LEG: Status: ACTIVE | Noted: 2023-03-19

## 2023-03-19 PROBLEM — I89.0 LYMPHEDEMA OF EXTREMITY: Status: ACTIVE | Noted: 2023-03-19

## 2023-03-19 PROBLEM — M79.89 PAIN AND SWELLING OF LEFT LOWER LEG: Status: ACTIVE | Noted: 2023-03-19

## 2023-03-19 PROBLEM — I87.2 LYMPHEDEMA DUE TO VENOUS INSUFFICIENCY: Status: ACTIVE | Noted: 2023-03-19

## 2023-03-19 ASSESSMENT — ENCOUNTER SYMPTOMS
SHORTNESS OF BREATH: 0
ALLERGIC/IMMUNOLOGIC NEGATIVE: 1
COLOR CHANGE: 0
CHEST TIGHTNESS: 0
ABDOMINAL PAIN: 0

## 2023-03-21 ENCOUNTER — HOSPITAL ENCOUNTER (OUTPATIENT)
Age: 48
Discharge: HOME | End: 2023-03-21
Payer: MEDICAID

## 2023-03-21 ENCOUNTER — HOSPITAL ENCOUNTER (OUTPATIENT)
Dept: MAMMOGRAPHY | Age: 48
Discharge: HOME | End: 2023-03-23
Payer: MEDICAID

## 2023-03-21 ENCOUNTER — HOSPITAL ENCOUNTER (OUTPATIENT)
Dept: ULTRASOUND IMAGING | Age: 48
Discharge: HOME | End: 2023-03-23
Payer: MEDICAID

## 2023-03-21 VITALS — BODY MASS INDEX: 50.02 KG/M2 | HEIGHT: 64 IN | WEIGHT: 293 LBS

## 2023-03-21 DIAGNOSIS — R79.89 ELEVATED FERRITIN: ICD-10-CM

## 2023-03-21 DIAGNOSIS — R79.89 ABNORMAL LFTS: ICD-10-CM

## 2023-03-21 DIAGNOSIS — D50.8 IRON DEFICIENCY ANEMIA SECONDARY TO INADEQUATE DIETARY IRON INTAKE: ICD-10-CM

## 2023-03-21 DIAGNOSIS — Z12.31 BREAST CANCER SCREENING BY MAMMOGRAM: ICD-10-CM

## 2023-03-21 LAB
ABSOLUTE EOS #: 0.1 K/UL (ref 0–0.4)
ABSOLUTE LYMPH #: 2.4 K/UL (ref 1–4.8)
ABSOLUTE MONO #: 0.5 K/UL (ref 0.1–1.2)
BASOPHILS # BLD: 1 % (ref 0–2)
BASOPHILS ABSOLUTE: 0.1 K/UL (ref 0–0.2)
EOSINOPHILS RELATIVE PERCENT: 2 % (ref 1–4)
ERYTHROCYTE [SEDIMENTATION RATE] IN BLOOD BY WESTERGREN METHOD: 10 MM/HR (ref 0–20)
FERRITIN SERPL-MCNC: 193 NG/ML (ref 13–150)
HCT VFR BLD AUTO: 43.3 % (ref 36–46)
HGB BLD-MCNC: 14.1 G/DL (ref 12–16)
IRON SATURATION: 29 % (ref 20–55)
IRON SERPL-MCNC: 81 UG/DL (ref 37–145)
LYMPHOCYTES # BLD: 39 % (ref 24–44)
MCH RBC QN AUTO: 28.3 PG (ref 26–34)
MCHC RBC AUTO-ENTMCNC: 32.5 G/DL (ref 31–37)
MCV RBC AUTO: 87 FL (ref 80–100)
MONOCYTES # BLD: 8 % (ref 2–11)
PDW BLD-RTO: 14.7 % (ref 12.5–15.4)
PLATELET # BLD AUTO: 174 K/UL (ref 140–450)
PMV BLD AUTO: 9.6 FL (ref 6–12)
RBC # BLD: 4.98 M/UL (ref 4–5.2)
SEG NEUTROPHILS: 50 % (ref 36–66)
SEGMENTED NEUTROPHILS ABSOLUTE COUNT: 3 K/UL (ref 1.8–7.7)
TIBC SERPL-MCNC: 276 UG/DL (ref 250–450)
TRANSFERRIN SERPL-MCNC: 247 MG/DL (ref 200–360)
UNSATURATED IRON BINDING CAPACITY: 195 UG/DL (ref 112–347)
WBC # BLD AUTO: 6.1 K/UL (ref 3.5–11)

## 2023-03-21 PROCEDURE — 83540 ASSAY OF IRON: CPT

## 2023-03-21 PROCEDURE — 36415 COLL VENOUS BLD VENIPUNCTURE: CPT

## 2023-03-21 PROCEDURE — 82728 ASSAY OF FERRITIN: CPT

## 2023-03-21 PROCEDURE — 83550 IRON BINDING TEST: CPT

## 2023-03-21 PROCEDURE — 85025 COMPLETE CBC W/AUTO DIFF WBC: CPT

## 2023-03-21 PROCEDURE — 84466 ASSAY OF TRANSFERRIN: CPT

## 2023-03-21 PROCEDURE — 85652 RBC SED RATE AUTOMATED: CPT

## 2023-03-21 PROCEDURE — 76705 ECHO EXAM OF ABDOMEN: CPT

## 2023-03-21 PROCEDURE — 77063 BREAST TOMOSYNTHESIS BI: CPT

## 2023-03-22 NOTE — TELEPHONE ENCOUNTER
Alvaro Giron, did you receive a response from this patient? I have been out of office since 3/15, and returned today 3/22. When I had spoken with Anna Gillespie last week, I believe she had mentioned she wanted to continue services with you. Please advise.

## 2023-03-24 ENCOUNTER — OFFICE VISIT (OUTPATIENT)
Dept: ONCOLOGY | Age: 48
End: 2023-03-24
Payer: MEDICAID

## 2023-03-24 ENCOUNTER — TELEMEDICINE (OUTPATIENT)
Age: 48
End: 2023-03-24
Payer: MEDICAID

## 2023-03-24 VITALS
TEMPERATURE: 97.7 F | SYSTOLIC BLOOD PRESSURE: 124 MMHG | DIASTOLIC BLOOD PRESSURE: 86 MMHG | BODY MASS INDEX: 51.46 KG/M2 | WEIGHT: 293 LBS | HEART RATE: 94 BPM

## 2023-03-24 DIAGNOSIS — F33.1 MODERATE EPISODE OF RECURRENT MAJOR DEPRESSIVE DISORDER (HCC): Primary | ICD-10-CM

## 2023-03-24 DIAGNOSIS — R79.89 ABNORMAL LFTS: ICD-10-CM

## 2023-03-24 DIAGNOSIS — F41.9 ANXIETY: ICD-10-CM

## 2023-03-24 DIAGNOSIS — R79.89 ELEVATED FERRITIN: Primary | ICD-10-CM

## 2023-03-24 DIAGNOSIS — D50.9 IRON DEFICIENCY ANEMIA, UNSPECIFIED IRON DEFICIENCY ANEMIA TYPE: ICD-10-CM

## 2023-03-24 PROCEDURE — 90834 PSYTX W PT 45 MINUTES: CPT | Performed by: SOCIAL WORKER

## 2023-03-24 PROCEDURE — 99214 OFFICE O/P EST MOD 30 MIN: CPT | Performed by: INTERNAL MEDICINE

## 2023-03-24 PROCEDURE — 99211 OFF/OP EST MAY X REQ PHY/QHP: CPT | Performed by: INTERNAL MEDICINE

## 2023-03-24 RX ORDER — VITAMIN E 268 MG
400 CAPSULE ORAL DAILY
Qty: 90 CAPSULE | Refills: 1 | Status: SHIPPED | OUTPATIENT
Start: 2023-03-24

## 2023-03-24 NOTE — PROGRESS NOTES
have been navigating their own mental health conditions, as she has linked them with therapy at [facility] too. Previous Recommendations: Sherrlyn Olszewski was scheduled for follow-up session for final visit with [facility] to prepare for successful transfer to [facility], at [facility] for ongoing care on 3/6. MENTAL STATUS EXAM  Mood was within normal limits with anxious affect. Suicidal ideation was denied. Homicidal ideation was not reported. Hygiene was good . Dress was appropriate. Behavior was Within Normal Limits with self-report of difficulties ambulating. Attitude was Engageable. Eye-contact was good. Speech: rate - WNL, rhythm - WNL, volume - WNL. Verbalizations were goal directed. Thought processes were intact and goal-oriented without evidence of delusions, hallucinations, obsessions, or allie; with moderate cognitive distortions. Associations were characterized by intact cognitive processes. Pt was oriented to person, place, time, and general circumstances;  recent:  good. Insight and judgment were estimated to be good, AEB, a fair  understanding of cyclical maladaptive patterns, and the ability to use insight to inform behavior change. ASSESSMENT  Clint Banda presented to the appointment today for evaluation and treatment of symptoms of depression and anxiety. She is currently deemed no risk to herself or others and meets criteria for Major Depressive Disorder and Anxiety. Sherrlyn Olszewski explained continued worsening of anxious symptoms and feeling she has too much on her plate to address anything, often leading her to feel shut down emotionally and physically. Sherrlyn Olszewski shared she has a difficult time focusing on other tasks with social security application consuming time and energy, but also acknowledged that worrying about the application outcome has not been productive for her anxiety.  Sherrlyn Olszewski and [facility] discussed the importance of Sherrlyn Olszewski also remembering herself, as she cares for everyone else, and

## 2023-03-25 LAB — NONINV COLON CA DNA+OCC BLD SCRN STL QL: NEGATIVE

## 2023-03-28 ENCOUNTER — TELEPHONE (OUTPATIENT)
Dept: ONCOLOGY | Age: 48
End: 2023-03-28

## 2023-03-28 ENCOUNTER — HOSPITAL ENCOUNTER (OUTPATIENT)
Dept: OCCUPATIONAL THERAPY | Age: 48
Setting detail: THERAPIES SERIES
Discharge: HOME | End: 2023-03-28
Payer: MEDICAID

## 2023-03-28 PROCEDURE — 97535 SELF CARE MNGMENT TRAINING: CPT

## 2023-03-28 PROCEDURE — 97140 MANUAL THERAPY 1/> REGIONS: CPT

## 2023-03-28 NOTE — TELEPHONE ENCOUNTER
AVS from 3/24/23      Rv in 6 months with labs prior     Rv scheduled for 9/27 @ 11:00 am     Pt was given AVS and appointment schedule    Electronically signed by Cynthia Montes on 3/28/2023 at 7:53 AM

## 2023-03-28 NOTE — FLOWSHEET NOTE
Therapeutic Exercise (62969)                              $28.50/$ 22.29     Self care/home mgmt (47275)                              $31.61/ $23.84 40 3   Vasopneumatic Device (84047)                           $8.99     Total Treatment Time    60 4         Time In:  1300  Time Out: 1400      Electronically signed by Artur Jones OT on 3/28/2023 at 1:09 PM

## 2023-03-30 ENCOUNTER — HOSPITAL ENCOUNTER (OUTPATIENT)
Dept: OCCUPATIONAL THERAPY | Age: 48
Setting detail: THERAPIES SERIES
Discharge: HOME | End: 2023-03-30
Payer: MEDICAID

## 2023-03-30 PROCEDURE — 97535 SELF CARE MNGMENT TRAINING: CPT

## 2023-03-30 PROCEDURE — 97140 MANUAL THERAPY 1/> REGIONS: CPT

## 2023-03-30 NOTE — FLOWSHEET NOTE
TREATMENT LOCATION:   [] [address] [place] [address]. [address]: [phone]  F: [phone] [x] [address]: [phone]  F: [phone]        Lymphedema Services - Treatment Note of the Lower Extremity    Date:  3/30/2023  Patient: Rocio Lockwood  : 1975             MRN: [id number]  Referring Provider: Yahaira Grant*       Phone: [phone]  Fax: [phone]  Insurance: [facility] Medicaid (TK:[id number] ) -  [facility] VISIT, no patient liability  Medical Diagnosis: I89.0  Rehab Codes: I89.0     Onset Date: 23  Visit# / total visits: 3/7 scheduled; Progress note due at visit 10 per POC. ( Certification Dates: 2023 - 23)    Contraindications/Precautions:   [x] Inflammatory Disease of bowel or intestines      Subjective: Pt reports that college visit went well. States that her bandages slid a little bit. Pain: [x] YES    [] NO     Location: LLE      Pain Rating: ( 0-10 scale) : 1/10  Comments:     Plan for next session:  garment selection as able, follow up bandaging         Objective:   [x] Measurement [x] Bandaging [] MLD [] Skin care   [x] Education [] Self-bandaging [] Self-MLD [] Wound Care   [] Exercise [] Caregiver training [] Kinesiotaping [] Other:    [] Nutrition [] Garment fitting/training [] Vasopneumatic Pump       3/30/2023 observations: swelling reduced    Circumferential Measurements   Measurements taken from nail base of D2 digit.   Measurements (cm) cm Right Left   Dorsum    10 21.8 24.9   Inframalleolar  (Y girth)     15 32.1 35.4   Supramalleolar  (ankle)   20 26.6 29.7   Distal Calf    23 29.4 32.1   Mid Calf    30 36.0 39.4   Proximal Calf   40 45.7 46.5   Knee    49 42.3 42.4   Distal thigh           Mid thigh         Proximal Thigh         Current Total: 3/30/23    3/28/23    Initial Total 2023:      .9    236.2    240.1 250.4    257.6    256.4

## 2023-04-04 DIAGNOSIS — K90.9 IRON MALABSORPTION: ICD-10-CM

## 2023-04-04 DIAGNOSIS — D50.0 IRON DEFICIENCY ANEMIA DUE TO CHRONIC BLOOD LOSS: ICD-10-CM

## 2023-04-04 NOTE — TELEPHONE ENCOUNTER
Clarification request requested from pharmacy. New script was sent that stated it was a capsule, not a tablet. This was [facility] to [facility]. I was able to speak to the patient. She stated the pharmacy have been unable to get this medication. I explained that [facility] had sent a clarification request. She stated yes [facility] in [employer] is were she wanted the script to be sent.  I sent the new script to [facility] in [employer] per her request.

## 2023-04-18 ENCOUNTER — HOSPITAL ENCOUNTER (OUTPATIENT)
Dept: OCCUPATIONAL THERAPY | Age: 48
Setting detail: THERAPIES SERIES
Discharge: HOME | End: 2023-04-18
Payer: MEDICAID

## 2023-04-18 NOTE — FLOWSHEET NOTE
[x] [facility]) - [facility] &  Therapy  [address]    [phone]  F: [phone]   [] [address]  [facility] [address]   [address]  P: [phone]  F: [phone]  [] [address] &  Therapy  [address]  P: [phone]  F: [phone] [] [address]  P: [phone]  F: [phone]  [] [address]  [address] [facility] [address]   [address]   [place]: [phone]  F: [phone]   [] [facility]  [address] [facility] [address]  [place]: [phone]   F: [phone]     Therapy Cancel/No Show note    Date: 2023  Patient: Sakina Doyle  : 1975  MRN: [id number]    Cancels/No Shows to date: --    For today's appointment patient:    []  Cancelled    [] Rescheduled appointment    [] No-show     Reason given by patient:    []  Patient ill    []  Conflicting appointment    [] No transportation      [] Conflict with work    [] No reason given    [] Weather related    [] [id number]    [x] Other:      Comments:  Clinic cx due to provider with [facility]. - clinic left message reminding of cx and next visit.    Georgi Noland LM to cancel for this visit, informed of next visit      [] Next appointment was confirmed    Electronically signed by: Tan Ladd, PT

## 2023-04-20 ENCOUNTER — HOSPITAL ENCOUNTER (OUTPATIENT)
Dept: OCCUPATIONAL THERAPY | Age: 48
Setting detail: THERAPIES SERIES
Discharge: HOME | End: 2023-04-20
Payer: MEDICAID

## 2023-04-20 PROCEDURE — 97140 MANUAL THERAPY 1/> REGIONS: CPT

## 2023-04-20 PROCEDURE — 97535 SELF CARE MNGMENT TRAINING: CPT

## 2023-04-20 NOTE — FLOWSHEET NOTE
Time In:  1315  Time Out: 1355      Electronically signed by Dana Echeverria OT on 4/20/2023 at 1:15 PM

## 2023-05-02 ENCOUNTER — OFFICE VISIT (OUTPATIENT)
Dept: FAMILY MEDICINE CLINIC | Age: 48
End: 2023-05-02
Payer: MEDICAID

## 2023-05-02 ENCOUNTER — HOSPITAL ENCOUNTER (OUTPATIENT)
Age: 48
Setting detail: SPECIMEN
Discharge: HOME | End: 2023-05-02

## 2023-05-02 VITALS
WEIGHT: 293 LBS | OXYGEN SATURATION: 98 % | HEIGHT: 64 IN | DIASTOLIC BLOOD PRESSURE: 86 MMHG | BODY MASS INDEX: 50.02 KG/M2 | TEMPERATURE: 98.2 F | SYSTOLIC BLOOD PRESSURE: 128 MMHG | RESPIRATION RATE: 15 BRPM | HEART RATE: 74 BPM

## 2023-05-02 DIAGNOSIS — J06.9 ACUTE URI: Primary | ICD-10-CM

## 2023-05-02 DIAGNOSIS — Z11.52 ENCOUNTER FOR SCREENING FOR COVID-19: ICD-10-CM

## 2023-05-02 DIAGNOSIS — R11.0 NAUSEA: ICD-10-CM

## 2023-05-02 PROCEDURE — 99213 OFFICE O/P EST LOW 20 MIN: CPT | Performed by: REGISTERED NURSE

## 2023-05-02 RX ORDER — BENZONATATE 200 MG/1
200 CAPSULE ORAL 3 TIMES DAILY PRN
Qty: 30 CAPSULE | Refills: 0 | Status: SHIPPED | OUTPATIENT
Start: 2023-05-02 | End: 2023-05-12

## 2023-05-02 RX ORDER — SOLIFENACIN SUCCINATE 5 MG/1
5 TABLET, FILM COATED ORAL DAILY
COMMUNITY
Start: 2023-04-03

## 2023-05-02 RX ORDER — MULTIVITAMIN
1 TABLET ORAL DAILY
COMMUNITY
Start: 2023-04-22

## 2023-05-02 RX ORDER — ACETAMINOPHEN 500 MG
2 TABLET ORAL 2 TIMES DAILY
COMMUNITY
Start: 2023-04-22

## 2023-05-02 RX ORDER — BUSPIRONE HYDROCHLORIDE 15 MG/1
15 TABLET ORAL 2 TIMES DAILY
COMMUNITY
Start: 2023-04-21

## 2023-05-02 RX ORDER — ONDANSETRON 4 MG/1
4 TABLET, ORALLY DISINTEGRATING ORAL 3 TIMES DAILY PRN
Qty: 21 TABLET | Refills: 0 | Status: SHIPPED | OUTPATIENT
Start: 2023-05-02

## 2023-05-02 RX ORDER — ZINC ACETATE 50 MG/1
CAPSULE ORAL
COMMUNITY
Start: 2023-04-04

## 2023-05-02 RX ORDER — SERTRALINE HYDROCHLORIDE 25 MG/1
25 TABLET, FILM COATED ORAL DAILY
COMMUNITY
Start: 2023-04-21

## 2023-05-02 ASSESSMENT — ENCOUNTER SYMPTOMS
TROUBLE SWALLOWING: 0
DIARRHEA: 0
RHINORRHEA: 1
VOMITING: 0
NAUSEA: 1
ABDOMINAL PAIN: 0
COUGH: 1
WHEEZING: 0
SHORTNESS OF BREATH: 1
VOICE CHANGE: 0
SORE THROAT: 0
SINUS PRESSURE: 1

## 2023-05-02 ASSESSMENT — PATIENT HEALTH QUESTIONNAIRE - PHQ9
8. MOVING OR SPEAKING SO SLOWLY THAT OTHER PEOPLE COULD HAVE NOTICED. OR THE OPPOSITE, BEING SO FIGETY OR RESTLESS THAT YOU HAVE BEEN MOVING AROUND A LOT MORE THAN USUAL: 0
2. FEELING DOWN, DEPRESSED OR HOPELESS: 0
6. FEELING BAD ABOUT YOURSELF - OR THAT YOU ARE A FAILURE OR HAVE LET YOURSELF OR YOUR FAMILY DOWN: 0
SUM OF ALL RESPONSES TO PHQ QUESTIONS 1-9: 0
SUM OF ALL RESPONSES TO PHQ QUESTIONS 1-9: 0
3. TROUBLE FALLING OR STAYING ASLEEP: 0
7. TROUBLE CONCENTRATING ON THINGS, SUCH AS READING THE NEWSPAPER OR WATCHING TELEVISION: 0
4. FEELING TIRED OR HAVING LITTLE ENERGY: 0
SUM OF ALL RESPONSES TO PHQ QUESTIONS 1-9: 0
1. LITTLE INTEREST OR PLEASURE IN DOING THINGS: 0
10. IF YOU CHECKED OFF ANY PROBLEMS, HOW DIFFICULT HAVE THESE PROBLEMS MADE IT FOR YOU TO DO YOUR WORK, TAKE CARE OF THINGS AT HOME, OR GET ALONG WITH OTHER PEOPLE: 0
SUM OF ALL RESPONSES TO PHQ9 QUESTIONS 1 & 2: 0
5. POOR APPETITE OR OVEREATING: 0
SUM OF ALL RESPONSES TO PHQ QUESTIONS 1-9: 0
9. THOUGHTS THAT YOU WOULD BE BETTER OFF DEAD, OR OF HURTING YOURSELF: 0

## 2023-05-02 NOTE — PROGRESS NOTES
CALCIUM CITRATE+VIT D3 -6.25 MG-MCG TABS Take 2 tablets by mouth 2 times daily Yes Historical Provider, MD   Multiple Vitamin (MULTIVITAMIN) TABS Take 1 tablet by mouth daily Yes Historical Provider, MD   GALZIN 50 MG capsule  Yes Historical Provider, MD   solifenacin (VESICARE) 5 MG tablet Take 1 tablet by mouth daily Yes Historical Provider, MD   benzonatate (TESSALON) 200 MG capsule Take 1 capsule by mouth 3 times daily as needed for Cough Yes JUSTICE Kellogg CNP   ondansetron (ZOFRAN-ODT) 4 MG disintegrating tablet Take 1 tablet by mouth 3 times daily as needed for Nausea or Vomiting Yes JUSTICE Kellogg CNP   zinc 50 MG CAPS Take 50 mg by mouth daily Yes Jolly Sharma MD   vitamin E 400 UNIT capsule Take 1 capsule by mouth daily Yes Jolly Sharma MD   senna (SENOKOT) 8.6 MG tablet Take 1 tablet by mouth 2 times daily Yes Jolly Sharma MD   ferrous sulfate (IRON 325) 325 (65 Fe) MG tablet Take 1 tablet by mouth daily (with breakfast) Yes Jolly Sharma MD   ascorbic acid (V-R VITAMIN C) 250 MG tablet Take 1 tablet by mouth daily Yes Jolly Sharma MD   CALCIUM CITRATE, BARIATRIC ADVANTAGE, 500MG LOZENGE Take 1 lozenge by mouth 2 times daily Yes Porfirio Unger MD   vitamin D3 (CHOLECALCIFEROL) 25 MCG (1000 UT) TABS tablet Take 1 tablet by mouth daily Yes Jolly Sharma MD   vitamin B-12 (CYANOCOBALAMIN) 1000 MCG tablet Take 1 tablet by mouth daily Yes Jolly Sharma MD   oxybutynin (DITROPAN) 5 MG tablet Take 1 tablet by mouth 3 times daily as needed (bladder spasms, stent pain) Yes Summer Kennedy MD   losartan (COZAAR) 50 MG tablet Take 1 tablet by mouth daily Yes JUSTICE Adrian - CNP   Elastic Bandages & Supports (JOBST ULTRASHEER 20-30MMHG LG) MISC Wear daily, ok to remove in the evenings Yes Mac Johnston MD   Multiple Vitamins-Minerals (MULTI-JULIO CÉSAR PO) Take by mouth daily Yes Historical Provider, MD   busPIRone (BUSPAR) 10 MG tablet TAKE 1 TABLET BY MOUTH

## 2023-05-03 DIAGNOSIS — Z11.52 ENCOUNTER FOR SCREENING FOR COVID-19: ICD-10-CM

## 2023-05-04 LAB
SARS-COV-2 RNA RESP QL NAA+PROBE: NORMAL
SARS-COV-2 RNA RESP QL NAA+PROBE: NOT DETECTED
SOURCE: NORMAL

## 2023-06-02 ENCOUNTER — HOSPITAL ENCOUNTER (OUTPATIENT)
Age: 48
Discharge: HOME | End: 2023-06-02
Payer: MEDICAID

## 2023-06-02 DIAGNOSIS — R79.89 ABNORMAL LFTS: ICD-10-CM

## 2023-06-02 DIAGNOSIS — R79.89 ELEVATED FERRITIN: ICD-10-CM

## 2023-06-02 DIAGNOSIS — D50.9 IRON DEFICIENCY ANEMIA, UNSPECIFIED IRON DEFICIENCY ANEMIA TYPE: ICD-10-CM

## 2023-06-02 LAB
BASOPHILS # BLD: 0.1 K/UL (ref 0–0.2)
BASOPHILS NFR BLD: 1 % (ref 0–2)
EOSINOPHIL # BLD: 0.1 K/UL (ref 0–0.4)
EOSINOPHILS RELATIVE PERCENT: 2 % (ref 1–4)
ERYTHROCYTE [DISTWIDTH] IN BLOOD BY AUTOMATED COUNT: 14.2 % (ref 12.5–15.4)
HCT VFR BLD AUTO: 42.2 % (ref 36–46)
HGB BLD-MCNC: 14.1 G/DL (ref 12–16)
LYMPHOCYTES # BLD: 34 % (ref 24–44)
LYMPHOCYTES NFR BLD: 2.1 K/UL (ref 1–4.8)
MCH RBC QN AUTO: 29.1 PG (ref 26–34)
MCHC RBC AUTO-ENTMCNC: 33.4 G/DL (ref 31–37)
MCV RBC AUTO: 86.9 FL (ref 80–100)
MONOCYTES NFR BLD: 0.5 K/UL (ref 0.1–1.2)
MONOCYTES NFR BLD: 8 % (ref 2–11)
NEUTROPHILS NFR BLD: 55 % (ref 36–66)
NEUTS SEG NFR BLD: 3.3 K/UL (ref 1.8–7.7)
PLATELET # BLD AUTO: 171 K/UL (ref 140–450)
PMV BLD AUTO: 9.6 FL (ref 6–12)
RBC # BLD AUTO: 4.85 M/UL (ref 4–5.2)
WBC OTHER # BLD: 6 K/UL (ref 3.5–11)

## 2023-06-02 PROCEDURE — 83540 ASSAY OF IRON: CPT

## 2023-06-02 PROCEDURE — 82728 ASSAY OF FERRITIN: CPT

## 2023-06-02 PROCEDURE — 83550 IRON BINDING TEST: CPT

## 2023-06-02 PROCEDURE — 85027 COMPLETE CBC AUTOMATED: CPT

## 2023-06-02 PROCEDURE — 84466 ASSAY OF TRANSFERRIN: CPT

## 2023-06-02 PROCEDURE — 36415 COLL VENOUS BLD VENIPUNCTURE: CPT

## 2023-06-03 LAB
FERRITIN SERPL-MCNC: 219 NG/ML (ref 13–150)
IRON SATN MFR SERPL: 20 % (ref 20–55)
IRON SERPL-MCNC: 60 UG/DL (ref 37–145)
TIBC SERPL-MCNC: 293 UG/DL (ref 250–450)
TRANSFERRIN SERPL-MCNC: 238 MG/DL (ref 200–360)
UNSATURATED IRON BINDING CAPACITY: 233 UG/DL (ref 112–347)

## 2023-06-07 ENCOUNTER — HOSPITAL ENCOUNTER (OUTPATIENT)
Age: 48
Setting detail: SPECIMEN
Discharge: HOME | End: 2023-06-07

## 2023-06-07 ENCOUNTER — HOSPITAL ENCOUNTER (OUTPATIENT)
Dept: GENERAL RADIOLOGY | Age: 48
Discharge: HOME | End: 2023-06-09
Payer: MEDICAID

## 2023-06-07 ENCOUNTER — HOSPITAL ENCOUNTER (OUTPATIENT)
Age: 48
Discharge: HOME | End: 2023-06-09
Payer: MEDICAID

## 2023-06-07 ENCOUNTER — OFFICE VISIT (OUTPATIENT)
Dept: FAMILY MEDICINE CLINIC | Age: 48
End: 2023-06-07
Payer: MEDICAID

## 2023-06-07 VITALS
HEART RATE: 81 BPM | DIASTOLIC BLOOD PRESSURE: 88 MMHG | BODY MASS INDEX: 50.02 KG/M2 | SYSTOLIC BLOOD PRESSURE: 124 MMHG | HEIGHT: 64 IN | WEIGHT: 293 LBS | RESPIRATION RATE: 14 BRPM | OXYGEN SATURATION: 96 %

## 2023-06-07 DIAGNOSIS — R10.9 FLANK PAIN, ACUTE: ICD-10-CM

## 2023-06-07 DIAGNOSIS — R30.0 BURNING WITH URINATION: ICD-10-CM

## 2023-06-07 DIAGNOSIS — R10.9 FLANK PAIN, ACUTE: Primary | ICD-10-CM

## 2023-06-07 LAB
BILIRUBIN, POC: NEGATIVE
BLOOD URINE, POC: NEGATIVE
CLARITY, POC: NORMAL
COLOR, POC: NORMAL
GLUCOSE URINE, POC: NEGATIVE
KETONES, POC: NEGATIVE
LEUKOCYTE EST, POC: NEGATIVE
NITRITE, POC: NEGATIVE
PH, POC: 5
PROTEIN, POC: NEGATIVE
SPECIFIC GRAVITY, POC: 1.03
UROBILINOGEN, POC: 0.2

## 2023-06-07 PROCEDURE — 99213 OFFICE O/P EST LOW 20 MIN: CPT | Performed by: REGISTERED NURSE

## 2023-06-07 PROCEDURE — 74018 RADEX ABDOMEN 1 VIEW: CPT

## 2023-06-07 PROCEDURE — 81002 URINALYSIS NONAUTO W/O SCOPE: CPT | Performed by: REGISTERED NURSE

## 2023-06-07 ASSESSMENT — ENCOUNTER SYMPTOMS
NAUSEA: 0
SHORTNESS OF BREATH: 0
DIARRHEA: 0
WHEEZING: 0
ABDOMINAL PAIN: 0
VOMITING: 0

## 2023-06-07 NOTE — PROGRESS NOTES
daily Yes Historical Provider, MD   Multiple Vitamin (MULTIVITAMIN) TABS Take 1 tablet by mouth daily Yes Historical Provider, MD   GALZIN 50 MG capsule  Yes Historical Provider, MD   solifenacin (VESICARE) 5 MG tablet Take 1 tablet by mouth daily Yes Historical Provider, MD   ondansetron (ZOFRAN-ODT) 4 MG disintegrating tablet Take 1 tablet by mouth 3 times daily as needed for Nausea or Vomiting Yes JUSTICE Kellogg CNP   zinc 50 MG CAPS Take 50 mg by mouth daily Yes Daniel Swain MD   vitamin E 400 UNIT capsule Take 1 capsule by mouth daily Yes Daniel Swain MD   senna (SENOKOT) 8.6 MG tablet Take 1 tablet by mouth 2 times daily Yes Daniel Swain MD   ferrous sulfate (IRON 325) 325 (65 Fe) MG tablet Take 1 tablet by mouth daily (with breakfast)  Patient taking differently: Take 1 tablet by mouth daily (with breakfast) Only taking during the weekend Yes Daniel Swain MD   ascorbic acid (V-R VITAMIN C) 250 MG tablet Take 1 tablet by mouth daily Yes Daniel Swain MD   CALCIUM CITRATE, BARIATRIC ADVANTAGE, 500MG LOZENGE Take 1 lozenge by mouth 2 times daily Yes Jaclyn Barboza MD   vitamin D3 (CHOLECALCIFEROL) 25 MCG (1000 UT) TABS tablet Take 1 tablet by mouth daily Yes Daniel Swain MD   vitamin B-12 (CYANOCOBALAMIN) 1000 MCG tablet Take 1 tablet by mouth daily Yes Daniel Swain MD   oxybutynin (DITROPAN) 5 MG tablet Take 1 tablet by mouth 3 times daily as needed (bladder spasms, stent pain) Yes Bella Villanueva MD   losartan (COZAAR) 50 MG tablet Take 1 tablet by mouth daily Yes Mateo Peguero APRN - CNP   busPIRone (BUSPAR) 10 MG tablet TAKE 1 TABLET BY MOUTH THREE TIMES A DAY AS NEEDED FOR ANXIETY Yes Luda Kellynehemias Hoffmann APRN - CNP   Elastic Bandages & Supports (JOBST ULTRASHEER 20-30MMHG LG) MISC Wear daily, ok to remove in the evenings Yes Sanford Villegas MD   Multiple Vitamins-Minerals (MULTI-JULIO CÉSAR PO) Take by mouth daily Yes Historical Provider, MD       Allergies   Allergen

## 2023-06-08 DIAGNOSIS — R30.0 BURNING WITH URINATION: ICD-10-CM

## 2023-06-10 DIAGNOSIS — N30.01 ACUTE CYSTITIS WITH HEMATURIA: Primary | ICD-10-CM

## 2023-06-10 LAB
MICROORGANISM SPEC CULT: ABNORMAL
MICROORGANISM SPEC CULT: ABNORMAL
SPECIMEN DESCRIPTION: ABNORMAL

## 2023-06-10 RX ORDER — LEVOFLOXACIN 500 MG/1
500 TABLET, FILM COATED ORAL DAILY
Qty: 7 TABLET | Refills: 0 | Status: SHIPPED | OUTPATIENT
Start: 2023-06-10 | End: 2023-06-17

## 2023-06-10 NOTE — RESULT ENCOUNTER NOTE
Patient called to notify of her urine culture, states she has been drinking a lot of water which is helping her symptoms. Order placed for Levaquin - she is allergic to Bactrim. Advised she follow up if symptoms persists or worsen.

## 2023-06-19 ENCOUNTER — HOSPITAL ENCOUNTER (OUTPATIENT)
Age: 48
Setting detail: SPECIMEN
Discharge: HOME | End: 2023-06-19

## 2023-06-19 ENCOUNTER — OFFICE VISIT (OUTPATIENT)
Dept: PRIMARY CARE CLINIC | Age: 48
End: 2023-06-19
Payer: MEDICAID

## 2023-06-19 VITALS
HEART RATE: 88 BPM | BODY MASS INDEX: 50.02 KG/M2 | OXYGEN SATURATION: 98 % | SYSTOLIC BLOOD PRESSURE: 130 MMHG | DIASTOLIC BLOOD PRESSURE: 80 MMHG | WEIGHT: 293 LBS | HEIGHT: 64 IN

## 2023-06-19 DIAGNOSIS — M54.2 PAIN, NECK: ICD-10-CM

## 2023-06-19 DIAGNOSIS — N30.01 ACUTE CYSTITIS WITH HEMATURIA: ICD-10-CM

## 2023-06-19 DIAGNOSIS — E66.01 CLASS 3 SEVERE OBESITY WITH SERIOUS COMORBIDITY AND BODY MASS INDEX (BMI) OF 50.0 TO 59.9 IN ADULT, UNSPECIFIED OBESITY TYPE (HCC): ICD-10-CM

## 2023-06-19 DIAGNOSIS — G56.03 CARPAL TUNNEL SYNDROME, BILATERAL: ICD-10-CM

## 2023-06-19 DIAGNOSIS — Z98.84 HISTORY OF ROUX-EN-Y GASTRIC BYPASS: ICD-10-CM

## 2023-06-19 DIAGNOSIS — R20.0 NUMBNESS AND TINGLING OF UPPER EXTREMITY: ICD-10-CM

## 2023-06-19 DIAGNOSIS — I87.2 LYMPHEDEMA DUE TO VENOUS INSUFFICIENCY: ICD-10-CM

## 2023-06-19 DIAGNOSIS — G89.29 CHRONIC RIGHT SHOULDER PAIN: Primary | ICD-10-CM

## 2023-06-19 DIAGNOSIS — F32.A DEPRESSION, UNSPECIFIED DEPRESSION TYPE: ICD-10-CM

## 2023-06-19 DIAGNOSIS — I89.0 LYMPHEDEMA DUE TO VENOUS INSUFFICIENCY: ICD-10-CM

## 2023-06-19 DIAGNOSIS — R20.2 NUMBNESS AND TINGLING OF UPPER EXTREMITY: ICD-10-CM

## 2023-06-19 DIAGNOSIS — M25.511 CHRONIC RIGHT SHOULDER PAIN: Primary | ICD-10-CM

## 2023-06-19 DIAGNOSIS — I10 PRIMARY HYPERTENSION: ICD-10-CM

## 2023-06-19 PROBLEM — N80.9 ENDOMETRIOSIS: Status: RESOLVED | Noted: 2021-04-01 | Resolved: 2023-06-19

## 2023-06-19 PROBLEM — Z20.822 SUSPECTED COVID-19 VIRUS INFECTION: Status: RESOLVED | Noted: 2021-04-22 | Resolved: 2023-06-19

## 2023-06-19 PROBLEM — M79.662 PAIN AND SWELLING OF LEFT LOWER LEG: Status: RESOLVED | Noted: 2023-03-19 | Resolved: 2023-06-19

## 2023-06-19 PROBLEM — R82.991 HYPOCITRATURIA: Status: RESOLVED | Noted: 2023-02-06 | Resolved: 2023-06-19

## 2023-06-19 PROBLEM — R82.992 HYPEROXALURIA: Status: RESOLVED | Noted: 2023-02-06 | Resolved: 2023-06-19

## 2023-06-19 PROBLEM — N84.0 ABNORMAL UTERINE BLEEDING DUE TO ENDOMETRIAL POLYP: Status: RESOLVED | Noted: 2021-05-12 | Resolved: 2023-06-19

## 2023-06-19 PROBLEM — Z11.52 ENCOUNTER FOR SCREENING FOR COVID-19: Status: RESOLVED | Noted: 2021-11-06 | Resolved: 2023-06-19

## 2023-06-19 PROBLEM — Z90.710 S/P HYSTERECTOMY: Status: RESOLVED | Noted: 2021-10-26 | Resolved: 2023-06-19

## 2023-06-19 PROBLEM — Z98.890 S/P DILATION AND CURETTAGE: Status: RESOLVED | Noted: 2021-05-12 | Resolved: 2023-06-19

## 2023-06-19 PROBLEM — N93.9 ABNORMAL UTERINE BLEEDING DUE TO ENDOMETRIAL POLYP: Status: RESOLVED | Noted: 2021-05-12 | Resolved: 2023-06-19

## 2023-06-19 PROBLEM — N20.0 RENAL CALCULUS, LEFT: Status: RESOLVED | Noted: 2022-12-14 | Resolved: 2023-06-19

## 2023-06-19 PROBLEM — M79.89 PAIN AND SWELLING OF LEFT LOWER LEG: Status: RESOLVED | Noted: 2023-03-19 | Resolved: 2023-06-19

## 2023-06-19 PROCEDURE — 3079F DIAST BP 80-89 MM HG: CPT | Performed by: NURSE PRACTITIONER

## 2023-06-19 PROCEDURE — 3075F SYST BP GE 130 - 139MM HG: CPT | Performed by: NURSE PRACTITIONER

## 2023-06-19 PROCEDURE — 99214 OFFICE O/P EST MOD 30 MIN: CPT | Performed by: NURSE PRACTITIONER

## 2023-06-19 RX ORDER — MULTIVITAMIN
1 TABLET ORAL DAILY
Qty: 90 TABLET | Refills: 3 | Status: SHIPPED | OUTPATIENT
Start: 2023-06-19

## 2023-06-19 SDOH — ECONOMIC STABILITY: FOOD INSECURITY: WITHIN THE PAST 12 MONTHS, YOU WORRIED THAT YOUR FOOD WOULD RUN OUT BEFORE YOU GOT MONEY TO BUY MORE.: NEVER TRUE

## 2023-06-19 SDOH — ECONOMIC STABILITY: FOOD INSECURITY: WITHIN THE PAST 12 MONTHS, THE FOOD YOU BOUGHT JUST DIDN'T LAST AND YOU DIDN'T HAVE MONEY TO GET MORE.: NEVER TRUE

## 2023-06-19 SDOH — ECONOMIC STABILITY: INCOME INSECURITY: HOW HARD IS IT FOR YOU TO PAY FOR THE VERY BASICS LIKE FOOD, HOUSING, MEDICAL CARE, AND HEATING?: NOT HARD AT ALL

## 2023-06-19 ASSESSMENT — PATIENT HEALTH QUESTIONNAIRE - PHQ9
7. TROUBLE CONCENTRATING ON THINGS, SUCH AS READING THE NEWSPAPER OR WATCHING TELEVISION: 0
10. IF YOU CHECKED OFF ANY PROBLEMS, HOW DIFFICULT HAVE THESE PROBLEMS MADE IT FOR YOU TO DO YOUR WORK, TAKE CARE OF THINGS AT HOME, OR GET ALONG WITH OTHER PEOPLE: 0
3. TROUBLE FALLING OR STAYING ASLEEP: 0
SUM OF ALL RESPONSES TO PHQ QUESTIONS 1-9: 2
9. THOUGHTS THAT YOU WOULD BE BETTER OFF DEAD, OR OF HURTING YOURSELF: 0
5. POOR APPETITE OR OVEREATING: 0
1. LITTLE INTEREST OR PLEASURE IN DOING THINGS: 1
8. MOVING OR SPEAKING SO SLOWLY THAT OTHER PEOPLE COULD HAVE NOTICED. OR THE OPPOSITE, BEING SO FIGETY OR RESTLESS THAT YOU HAVE BEEN MOVING AROUND A LOT MORE THAN USUAL: 0
6. FEELING BAD ABOUT YOURSELF - OR THAT YOU ARE A FAILURE OR HAVE LET YOURSELF OR YOUR FAMILY DOWN: 0
SUM OF ALL RESPONSES TO PHQ QUESTIONS 1-9: 2
2. FEELING DOWN, DEPRESSED OR HOPELESS: 1
SUM OF ALL RESPONSES TO PHQ QUESTIONS 1-9: 2
SUM OF ALL RESPONSES TO PHQ9 QUESTIONS 1 & 2: 2
4. FEELING TIRED OR HAVING LITTLE ENERGY: 0
SUM OF ALL RESPONSES TO PHQ QUESTIONS 1-9: 2

## 2023-06-19 ASSESSMENT — ENCOUNTER SYMPTOMS
SORE THROAT: 0
SHORTNESS OF BREATH: 0
VOMITING: 0
WHEEZING: 0
BACK PAIN: 1
TROUBLE SWALLOWING: 0
SINUS PRESSURE: 0
BLOOD IN STOOL: 0
CONSTIPATION: 0
NAUSEA: 0
DIARRHEA: 0
COUGH: 0
ABDOMINAL PAIN: 0

## 2023-06-19 NOTE — PROGRESS NOTES
[address] PRIMARY CARE  [address] [facility]60  [address] [address]  Dept: [phone]  Dept Fax: [phone]    Ben Garvin is a 52 y.o. female who presentstoday for her medical conditions/complaints as noted below. Ben Garvin is c/o of  Chief Complaint   Patient presents with    6 Month Follow-Up    Hypertension    Weight Loss    Labs Only     Liver panel           HPI:     Here today for follow up  Reports intermittent elevation in home BPs due to stress with her daughter being in hospital  Today BP is well controlled    Has been having pain in her posterior shoulder and scapular region, radiates into her upper back and neck, her bilateral hands and arms will go numb  She has history of right shoulder injections years ago but is not sure that her symptoms were the same at that time   Currently has difficulty with full range of motion particularly with lateral arm raising, can only go 90 degrees  Also reports had an EMG and was told has bilateral carpal tunnel which she knows contributes to her current discomfort  She was wearing night splints for a period of time but has not for several years has found that interferes with sleep  Her May Thurner syndrome causes her to have to wear compression at night and between this and the splints she was getting no rest  She did not pursue treatment of carpal tunnel at that time  She has had no recent imaging of her shoulders or her neck    Continues to struggle with weight gain.   She feels this is largely attributable to her decreased activity level due to her May Thurner syndrome  Had gastric bypass  and initially lost 100 pounds but now has nearly gained all of that back    Has been off work since 2020 due to her May Thurner syndrome  She is unable to stand or walk for more than 5 minutes due to severe leg pain and weakness  She needs form signed for [employer] job and [employer] services supporting that she has not been

## 2023-06-20 LAB
MICROORGANISM SPEC CULT: NORMAL
SPECIMEN DESCRIPTION: NORMAL

## 2023-06-28 NOTE — PROGRESS NOTES
Pt here for B12 injection. Pt was treated without incident and discharged in stable condition. Will return in 1 mo for next B12 injection. 0 = swallows foods/liquids without difficulty

## 2023-08-29 ENCOUNTER — HOSPITAL ENCOUNTER (OUTPATIENT)
Age: 48
Discharge: HOME | End: 2023-08-31
Payer: MEDICAID

## 2023-08-29 ENCOUNTER — HOSPITAL ENCOUNTER (OUTPATIENT)
Dept: GENERAL RADIOLOGY | Age: 48
Discharge: HOME | End: 2023-08-31
Payer: MEDICAID

## 2023-08-29 DIAGNOSIS — Z87.442 PERSONAL HISTORY OF KIDNEY STONES: ICD-10-CM

## 2023-08-29 PROCEDURE — 74018 RADEX ABDOMEN 1 VIEW: CPT

## 2023-08-30 ENCOUNTER — OFFICE VISIT (OUTPATIENT)
Age: 48
End: 2023-08-30
Payer: MEDICAID

## 2023-08-30 DIAGNOSIS — N39.41 URGE INCONTINENCE: Primary | ICD-10-CM

## 2023-08-30 DIAGNOSIS — N32.81 OAB (OVERACTIVE BLADDER): ICD-10-CM

## 2023-08-30 DIAGNOSIS — Z87.442 PERSONAL HISTORY OF KIDNEY STONES: ICD-10-CM

## 2023-08-30 DIAGNOSIS — R82.991 HYPOCITRATURIA: ICD-10-CM

## 2023-08-30 LAB
BILIRUBIN, POC: NORMAL
BLOOD URINE, POC: NORMAL
CLARITY, POC: CLEAR
COLOR, POC: YELLOW
GLUCOSE URINE, POC: NORMAL
KETONES, POC: NORMAL
LEUKOCYTE EST, POC: NORMAL
NITRITE, POC: NORMAL
PH, POC: NORMAL
PROTEIN, POC: NORMAL
SPECIFIC GRAVITY, POC: NORMAL
UROBILINOGEN, POC: NORMAL

## 2023-08-30 PROCEDURE — 81003 URINALYSIS AUTO W/O SCOPE: CPT | Performed by: SPECIALIST

## 2023-08-30 PROCEDURE — 99214 OFFICE O/P EST MOD 30 MIN: CPT | Performed by: SPECIALIST

## 2023-08-30 RX ORDER — ESCITALOPRAM OXALATE 10 MG/1
10 TABLET ORAL DAILY
COMMUNITY
Start: 2023-08-09

## 2023-08-30 NOTE — PROGRESS NOTES
HGB 14.1 06/02/2023    HCT 42.2 06/02/2023    MCV 86.9 06/02/2023     06/02/2023       Additional Lab/Culture results:   Culture 6/7/23 Serratia Marcescens   Culture 6/19/23 Negative    Imaging Reviewed during this Office Visit: KUB 6/7/23    IMPRESSION:    No definite renal or ureteral stones       (results were independently reviewed by physician and radiology report verified)    Physical Exam:    There were no vitals filed for this visit. There is no height or weight on file to calculate BMI. Patient is a 52 y.o. female in no acute distress and alert and oriented to person, place and time. Assessment and Plan      1. Urge incontinence    2. Hypocitraturia    3. OAB (overactive bladder)    4. Personal history of kidney stones           Plan:      No flank pain or gross hematuria to suggest recurrent kidney stones. Patient's 6/7/23 KUB was negative. Patient using Ca citrate 500 mg po bid to correct hypercalcuria and to prevent kidney stones. She is still using 4-5 pads per day for Urge urinary incontinence despite Solifenacin (Vesicare) 5 mg po qd for OAB symptoms. Will add Gemtesa (vibegron) 75 mg po qd for OAB. She would like to proceed with Cystoscopy and transvesical Botox (100 IU) under MAC. Jerri Elaine MD FACS  8/30/2023 3:44 PM    2023 Quality Measure Documentation: N/A  Social History     Tobacco Use   Smoking Status Never   Smokeless Tobacco Never     (If patient a smoker, smoking cessation counseling offered)

## 2023-09-11 NOTE — PROGRESS NOTES
Preoperative Instructions:    Stop eating solid foods at midnight the night prior to your surgery. Stop drinking clear liquids at midnight the night prior to your surgery. Arrive at the surgery center (3rd entrance) on ____9-15-23___________ by ___1245____________. Please stop any blood thinning medications as directed by your surgeon or prescribing physician. Failure to stop certain medications may interfere with your scheduled surgery. These may include: Aspirin, Coumadin, Plavix, NSAIDS (Motrin, Aleve, Advil, Mobic, Celebrex), Eliquis, Pradaxa, Xarelto, Fish oil, and herbal supplements. Hold Vitamins as recommended by surgeon    You may continue the rest of your medications through the night before surgery unless instructed otherwise. Day of surgery please take only the following medication(s) with a small sip of water: Buspar      Please  shower with antibacterial soap and water the day of surgery. Reminders:  -If you are going home the day of your procedure, you will need a family member or friend to stay during the procedure and drive you home after your procedure. Your  must be 25years of age or older and able to sign off on your discharge instructions.    -If you are going home the same day of your surgery, someone must remain with you for the first 24 hours after your surgery if you receive sedation or anesthesia.      -Please do not wear any jewelery, lotions, contacts  or body piercing the day of surgery

## 2023-09-12 ENCOUNTER — ANESTHESIA EVENT (OUTPATIENT)
Dept: OPERATING ROOM | Age: 48
End: 2023-09-12
Payer: MEDICAID

## 2023-09-13 ENCOUNTER — HOSPITAL ENCOUNTER (OUTPATIENT)
Dept: MRI IMAGING | Age: 48
Discharge: HOME | End: 2023-09-15
Payer: MEDICAID

## 2023-09-13 ENCOUNTER — HOSPITAL ENCOUNTER (OUTPATIENT)
Age: 48
Discharge: HOME | End: 2023-09-13
Payer: MEDICAID

## 2023-09-13 DIAGNOSIS — R20.0 NUMBNESS AND TINGLING OF UPPER EXTREMITY: ICD-10-CM

## 2023-09-13 DIAGNOSIS — R20.2 NUMBNESS AND TINGLING OF UPPER EXTREMITY: ICD-10-CM

## 2023-09-13 DIAGNOSIS — Z98.84 HISTORY OF BARIATRIC SURGERY: ICD-10-CM

## 2023-09-13 DIAGNOSIS — M54.2 PAIN, NECK: ICD-10-CM

## 2023-09-13 DIAGNOSIS — D50.8 IRON DEFICIENCY ANEMIA SECONDARY TO INADEQUATE DIETARY IRON INTAKE: ICD-10-CM

## 2023-09-13 DIAGNOSIS — R53.83 OTHER FATIGUE: ICD-10-CM

## 2023-09-13 DIAGNOSIS — R79.89 ABNORMAL LFTS: ICD-10-CM

## 2023-09-13 DIAGNOSIS — M25.511 CHRONIC RIGHT SHOULDER PAIN: ICD-10-CM

## 2023-09-13 DIAGNOSIS — G89.29 CHRONIC RIGHT SHOULDER PAIN: ICD-10-CM

## 2023-09-13 DIAGNOSIS — K90.9 MALABSORPTION OF IRON: ICD-10-CM

## 2023-09-13 DIAGNOSIS — R79.89 ELEVATED FERRITIN: ICD-10-CM

## 2023-09-13 LAB
BASOPHILS # BLD: 0.06 K/UL (ref 0–0.2)
BASOPHILS NFR BLD: 1 % (ref 0–2)
EOSINOPHIL # BLD: 0.1 K/UL (ref 0–0.44)
EOSINOPHILS RELATIVE PERCENT: 1 % (ref 1–4)
ERYTHROCYTE [DISTWIDTH] IN BLOOD BY AUTOMATED COUNT: 13.5 % (ref 11.8–14.4)
HCT VFR BLD AUTO: 47.4 % (ref 36.3–47.1)
HGB BLD-MCNC: 14.5 G/DL (ref 11.9–15.1)
IMM GRANULOCYTES # BLD AUTO: <0.03 K/UL (ref 0–0.3)
IMM GRANULOCYTES NFR BLD: 0 %
LYMPHOCYTES NFR BLD: 2.65 K/UL (ref 1.1–3.7)
LYMPHOCYTES RELATIVE PERCENT: 38 % (ref 24–43)
MCH RBC QN AUTO: 28.3 PG (ref 25.2–33.5)
MCHC RBC AUTO-ENTMCNC: 30.6 G/DL (ref 28.4–34.8)
MCV RBC AUTO: 92.6 FL (ref 82.6–102.9)
MONOCYTES NFR BLD: 0.61 K/UL (ref 0.1–1.2)
MONOCYTES NFR BLD: 9 % (ref 3–12)
NEUTROPHILS NFR BLD: 51 % (ref 36–65)
NEUTS SEG NFR BLD: 3.49 K/UL (ref 1.5–8.1)
NRBC BLD-RTO: 0 PER 100 WBC
PLATELET # BLD AUTO: 206 K/UL (ref 138–453)
PMV BLD AUTO: 12.3 FL (ref 8.1–13.5)
RBC # BLD AUTO: 5.12 M/UL (ref 3.95–5.11)
WBC OTHER # BLD: 6.9 K/UL (ref 3.5–11.3)

## 2023-09-13 PROCEDURE — 73221 MRI JOINT UPR EXTREM W/O DYE: CPT

## 2023-09-13 PROCEDURE — 72141 MRI NECK SPINE W/O DYE: CPT

## 2023-09-13 PROCEDURE — 36415 COLL VENOUS BLD VENIPUNCTURE: CPT

## 2023-09-13 PROCEDURE — 82728 ASSAY OF FERRITIN: CPT

## 2023-09-13 PROCEDURE — 85025 COMPLETE CBC W/AUTO DIFF WBC: CPT

## 2023-09-13 PROCEDURE — 83550 IRON BINDING TEST: CPT

## 2023-09-13 PROCEDURE — 83540 ASSAY OF IRON: CPT

## 2023-09-14 DIAGNOSIS — I10 MILD HYPERTENSION: ICD-10-CM

## 2023-09-14 DIAGNOSIS — G89.29 CHRONIC RIGHT SHOULDER PAIN: ICD-10-CM

## 2023-09-14 DIAGNOSIS — M25.511 CHRONIC RIGHT SHOULDER PAIN: ICD-10-CM

## 2023-09-14 DIAGNOSIS — R93.6 ABNORMAL MRI, SHOULDER: Primary | ICD-10-CM

## 2023-09-14 LAB
FERRITIN SERPL-MCNC: 186 NG/ML (ref 13–150)
IRON SATN MFR SERPL: 25 % (ref 20–55)
IRON SERPL-MCNC: 72 UG/DL (ref 37–145)
TIBC SERPL-MCNC: 290 UG/DL (ref 250–450)
UNSATURATED IRON BINDING CAPACITY: 218 UG/DL (ref 112–347)

## 2023-09-14 RX ORDER — LOSARTAN POTASSIUM 50 MG/1
50 TABLET ORAL DAILY
Qty: 90 TABLET | Refills: 3 | Status: SHIPPED | OUTPATIENT
Start: 2023-09-14

## 2023-09-15 ENCOUNTER — ANESTHESIA (OUTPATIENT)
Dept: OPERATING ROOM | Age: 48
End: 2023-09-15
Payer: MEDICAID

## 2023-09-15 ENCOUNTER — HOSPITAL ENCOUNTER (OUTPATIENT)
Age: 48
Setting detail: OUTPATIENT SURGERY
Discharge: HOME | End: 2023-09-15
Attending: SPECIALIST | Admitting: SPECIALIST
Payer: MEDICAID

## 2023-09-15 VITALS
OXYGEN SATURATION: 100 % | HEART RATE: 66 BPM | WEIGHT: 293 LBS | HEIGHT: 64 IN | TEMPERATURE: 96.8 F | DIASTOLIC BLOOD PRESSURE: 82 MMHG | RESPIRATION RATE: 14 BRPM | BODY MASS INDEX: 50.02 KG/M2 | SYSTOLIC BLOOD PRESSURE: 146 MMHG

## 2023-09-15 LAB — GLUCOSE BLD-MCNC: 98 MG/DL (ref 65–105)

## 2023-09-15 PROCEDURE — 7100000011 HC PHASE II RECOVERY - ADDTL 15 MIN: Performed by: SPECIALIST

## 2023-09-15 PROCEDURE — 3600000013 HC SURGERY LEVEL 3 ADDTL 15MIN: Performed by: SPECIALIST

## 2023-09-15 PROCEDURE — 6360000002 HC RX W HCPCS: Performed by: SPECIALIST

## 2023-09-15 PROCEDURE — 6360000002 HC RX W HCPCS

## 2023-09-15 PROCEDURE — 3600000003 HC SURGERY LEVEL 3 BASE: Performed by: SPECIALIST

## 2023-09-15 PROCEDURE — 2580000003 HC RX 258: Performed by: ANESTHESIOLOGY

## 2023-09-15 PROCEDURE — 2500000003 HC RX 250 WO HCPCS

## 2023-09-15 PROCEDURE — 82947 ASSAY GLUCOSE BLOOD QUANT: CPT

## 2023-09-15 PROCEDURE — 2580000003 HC RX 258: Performed by: SPECIALIST

## 2023-09-15 PROCEDURE — 2709999900 HC NON-CHARGEABLE SUPPLY: Performed by: SPECIALIST

## 2023-09-15 PROCEDURE — 7100000010 HC PHASE II RECOVERY - FIRST 15 MIN: Performed by: SPECIALIST

## 2023-09-15 PROCEDURE — 52287 CYSTOSCOPY CHEMODENERVATION: CPT | Performed by: SPECIALIST

## 2023-09-15 PROCEDURE — 3700000001 HC ADD 15 MINUTES (ANESTHESIA): Performed by: SPECIALIST

## 2023-09-15 PROCEDURE — 3700000000 HC ANESTHESIA ATTENDED CARE: Performed by: SPECIALIST

## 2023-09-15 RX ORDER — OXYCODONE HYDROCHLORIDE AND ACETAMINOPHEN 5; 325 MG/1; MG/1
2 TABLET ORAL
Status: DISCONTINUED | OUTPATIENT
Start: 2023-09-15 | End: 2023-09-15 | Stop reason: HOSPADM

## 2023-09-15 RX ORDER — LABETALOL HYDROCHLORIDE 5 MG/ML
10 INJECTION, SOLUTION INTRAVENOUS
Status: DISCONTINUED | OUTPATIENT
Start: 2023-09-15 | End: 2023-09-15 | Stop reason: HOSPADM

## 2023-09-15 RX ORDER — SODIUM CHLORIDE 9 MG/ML
INJECTION, SOLUTION INTRAVENOUS PRN
Status: DISCONTINUED | OUTPATIENT
Start: 2023-09-15 | End: 2023-09-15 | Stop reason: HOSPADM

## 2023-09-15 RX ORDER — DIPHENHYDRAMINE HYDROCHLORIDE 50 MG/ML
12.5 INJECTION INTRAMUSCULAR; INTRAVENOUS
Status: DISCONTINUED | OUTPATIENT
Start: 2023-09-15 | End: 2023-09-15 | Stop reason: HOSPADM

## 2023-09-15 RX ORDER — IPRATROPIUM BROMIDE AND ALBUTEROL SULFATE 2.5; .5 MG/3ML; MG/3ML
1 SOLUTION RESPIRATORY (INHALATION)
Status: DISCONTINUED | OUTPATIENT
Start: 2023-09-15 | End: 2023-09-15 | Stop reason: HOSPADM

## 2023-09-15 RX ORDER — SODIUM CHLORIDE 9 MG/ML
INJECTION INTRAVENOUS
Status: DISCONTINUED
Start: 2023-09-15 | End: 2023-09-15 | Stop reason: HOSPADM

## 2023-09-15 RX ORDER — SODIUM CHLORIDE 0.9 % (FLUSH) 0.9 %
5-40 SYRINGE (ML) INJECTION PRN
Status: DISCONTINUED | OUTPATIENT
Start: 2023-09-15 | End: 2023-09-15 | Stop reason: HOSPADM

## 2023-09-15 RX ORDER — PROPOFOL 10 MG/ML
INJECTION, EMULSION INTRAVENOUS PRN
Status: DISCONTINUED | OUTPATIENT
Start: 2023-09-15 | End: 2023-09-15 | Stop reason: SDUPTHER

## 2023-09-15 RX ORDER — MIDAZOLAM HYDROCHLORIDE 1 MG/ML
INJECTION INTRAMUSCULAR; INTRAVENOUS PRN
Status: DISCONTINUED | OUTPATIENT
Start: 2023-09-15 | End: 2023-09-15 | Stop reason: SDUPTHER

## 2023-09-15 RX ORDER — METOCLOPRAMIDE HYDROCHLORIDE 5 MG/ML
10 INJECTION INTRAMUSCULAR; INTRAVENOUS
Status: DISCONTINUED | OUTPATIENT
Start: 2023-09-15 | End: 2023-09-15 | Stop reason: HOSPADM

## 2023-09-15 RX ORDER — NITROFURANTOIN 25; 75 MG/1; MG/1
100 CAPSULE ORAL 2 TIMES DAILY
Qty: 14 CAPSULE | Refills: 0 | Status: SHIPPED | OUTPATIENT
Start: 2023-09-15

## 2023-09-15 RX ORDER — SODIUM CHLORIDE 0.9 % (FLUSH) 0.9 %
5-40 SYRINGE (ML) INJECTION EVERY 12 HOURS SCHEDULED
Status: DISCONTINUED | OUTPATIENT
Start: 2023-09-15 | End: 2023-09-15 | Stop reason: HOSPADM

## 2023-09-15 RX ORDER — LIDOCAINE HYDROCHLORIDE 10 MG/ML
INJECTION, SOLUTION INFILTRATION; PERINEURAL PRN
Status: DISCONTINUED | OUTPATIENT
Start: 2023-09-15 | End: 2023-09-15 | Stop reason: SDUPTHER

## 2023-09-15 RX ORDER — GLYCOPYRROLATE 0.2 MG/ML
0.4 INJECTION INTRAMUSCULAR; INTRAVENOUS ONCE
Status: DISCONTINUED | OUTPATIENT
Start: 2023-09-15 | End: 2023-09-15 | Stop reason: HOSPADM

## 2023-09-15 RX ORDER — ONDANSETRON 2 MG/ML
4 INJECTION INTRAMUSCULAR; INTRAVENOUS
Status: DISCONTINUED | OUTPATIENT
Start: 2023-09-15 | End: 2023-09-15 | Stop reason: HOSPADM

## 2023-09-15 RX ORDER — SODIUM CHLORIDE 0.9 % (FLUSH) 0.9 %
SYRINGE (ML) INJECTION PRN
Status: DISCONTINUED | OUTPATIENT
Start: 2023-09-15 | End: 2023-09-15 | Stop reason: ALTCHOICE

## 2023-09-15 RX ORDER — HYDRALAZINE HYDROCHLORIDE 20 MG/ML
10 INJECTION INTRAMUSCULAR; INTRAVENOUS
Status: DISCONTINUED | OUTPATIENT
Start: 2023-09-15 | End: 2023-09-15 | Stop reason: HOSPADM

## 2023-09-15 RX ORDER — PROMETHAZINE HYDROCHLORIDE 25 MG/ML
6.25 INJECTION, SOLUTION INTRAMUSCULAR; INTRAVENOUS EVERY 5 MIN PRN
Status: DISCONTINUED | OUTPATIENT
Start: 2023-09-15 | End: 2023-09-15 | Stop reason: HOSPADM

## 2023-09-15 RX ORDER — CEFAZOLIN 2 G/1
INJECTION, POWDER, FOR SOLUTION INTRAMUSCULAR; INTRAVENOUS
Status: DISCONTINUED
Start: 2023-09-15 | End: 2023-09-15 | Stop reason: WASHOUT

## 2023-09-15 RX ORDER — MEPERIDINE HYDROCHLORIDE 50 MG/ML
12.5 INJECTION INTRAMUSCULAR; INTRAVENOUS; SUBCUTANEOUS EVERY 5 MIN PRN
Status: DISCONTINUED | OUTPATIENT
Start: 2023-09-15 | End: 2023-09-15 | Stop reason: HOSPADM

## 2023-09-15 RX ORDER — PHENAZOPYRIDINE HYDROCHLORIDE 100 MG/1
100 TABLET, FILM COATED ORAL 3 TIMES DAILY PRN
Qty: 15 TABLET | Refills: 0 | Status: SHIPPED | OUTPATIENT
Start: 2023-09-15

## 2023-09-15 RX ORDER — MIDAZOLAM HYDROCHLORIDE 2 MG/2ML
2 INJECTION, SOLUTION INTRAMUSCULAR; INTRAVENOUS
Status: DISCONTINUED | OUTPATIENT
Start: 2023-09-15 | End: 2023-09-15 | Stop reason: HOSPADM

## 2023-09-15 RX ORDER — SODIUM CHLORIDE, SODIUM LACTATE, POTASSIUM CHLORIDE, CALCIUM CHLORIDE 600; 310; 30; 20 MG/100ML; MG/100ML; MG/100ML; MG/100ML
INJECTION, SOLUTION INTRAVENOUS CONTINUOUS
Status: DISCONTINUED | OUTPATIENT
Start: 2023-09-15 | End: 2023-09-15 | Stop reason: HOSPADM

## 2023-09-15 RX ORDER — OXYCODONE HYDROCHLORIDE AND ACETAMINOPHEN 5; 325 MG/1; MG/1
1 TABLET ORAL
Status: DISCONTINUED | OUTPATIENT
Start: 2023-09-15 | End: 2023-09-15 | Stop reason: HOSPADM

## 2023-09-15 RX ADMIN — PROPOFOL 50 MG: 10 INJECTION, EMULSION INTRAVENOUS at 14:51

## 2023-09-15 RX ADMIN — PROPOFOL 30 MG: 10 INJECTION, EMULSION INTRAVENOUS at 14:47

## 2023-09-15 RX ADMIN — Medication 3000 MG: at 14:47

## 2023-09-15 RX ADMIN — SODIUM CHLORIDE, POTASSIUM CHLORIDE, SODIUM LACTATE AND CALCIUM CHLORIDE: 600; 310; 30; 20 INJECTION, SOLUTION INTRAVENOUS at 14:41

## 2023-09-15 RX ADMIN — PROPOFOL 100 MG: 10 INJECTION, EMULSION INTRAVENOUS at 14:54

## 2023-09-15 RX ADMIN — SODIUM CHLORIDE: 9 INJECTION, SOLUTION INTRAVENOUS at 14:15

## 2023-09-15 RX ADMIN — PROPOFOL 100 MG: 10 INJECTION, EMULSION INTRAVENOUS at 14:44

## 2023-09-15 RX ADMIN — LIDOCAINE HYDROCHLORIDE 40 MG: 10 INJECTION, SOLUTION INFILTRATION; PERINEURAL at 14:44

## 2023-09-15 RX ADMIN — MIDAZOLAM 2 MG: 1 INJECTION INTRAMUSCULAR; INTRAVENOUS at 14:40

## 2023-09-15 ASSESSMENT — PAIN - FUNCTIONAL ASSESSMENT: PAIN_FUNCTIONAL_ASSESSMENT: NONE - DENIES PAIN

## 2023-09-15 NOTE — DISCHARGE INSTRUCTIONS
Encourage patient to drink at least 64 ounces of water a day. Reassure patient it is common to see gross hematuria and this can last a few days. No driving for 24 hours. Eric Sullivan M.D.'s office will reach out to patient to set up a follow up  in 3 weeks to reassess symptoms and check Postvoid Residual .  Call office if fever > 101, shaking chills, inability to void. See Rx for antibiotics. Patient may use OTC Azo 1 po tid prn bladder pain or burning with urination. Activity  You have had anesthesia today  Do not drive, operate heavy equipment, consume alcoholic beverages, or make any important decisions  for 24 hours   If you are taking pain medication: Do not drive or consume alcohol. Take your time changing positions today. You may feel light headed or dizzy if you move too quickly. Continue your home medications as ordered by your physician. Diet   You can eat your normal diet when you feel well. You should start off with bland foods like chicken soup, toast, or yogurt. Then advance as tolerated. Drink plenty of fluids (unless your doctor tells you not to). Your urine should be very lightly colored without a strong odor.

## 2023-09-15 NOTE — OP NOTE
Operative Note      Patient: Brenden Ochoa  YOB: 1975  MRN: [id number]    Date of Procedure: 9/15/2023    Pre-Op Diagnosis Codes:     * Urge urinary incontinence [N39.41]    Post-Op Diagnosis: Same       Procedure(s):  CYSTOSCOPY BOTOX 100 IU INJECTION    Surgeon(s):  Haim Hardy MD    Assistant:   * No surgical staff found *    Anesthesia: Monitor Anesthesia Care    Estimated Blood Loss (mL): Minimal    Complications: None    Specimens:   * No specimens in log *    Implants:  * No surgical log found *      Drains: * No LDAs found *    Findings: see below         Detailed Description of Procedure:   Bonilla Samson:  This is a 52 y.o. female presents today for a cystoscopy and transvesical Botox injection to treat medically refractory urge urinary incontinence. After risks, benefits and alternatives of the procedure were discussed with the patient, informed consent was obtained and the patient elected to proceed. The patient was given Ancef 3 gm IV on call to OR for antibiotic prophylaxis. Patient had EPC cuffs placed for VTE prophylaxis. DETAILS OF PROCEDURE:  The patient was brought back to the operating room. She was prepped and draped in usual sterile surgical fashion after being placed in dorsal lithotomy position. A timeout was taken per protocol with everyone in agreement. The 22F rigid cystoscope was assembled using a 30 degree lens and passed per the urethra. The urethra was normal without any lesions. The bladder was entered with ease and inspected thoroughly and systematically which did not show any lesions or tumors, stone, fistulous tracts, diverticula. Both ureteral orifices were normal with clear efflux of urine. Once within the bladder the injection needle was advanced and purged of air. A total of 100 I. U. units of Botox were injected into the bladder wall via 20 injection sites (5 units/injection) using a volume of 0.5 mL at each site. The ureteral orifices were avoided.

## 2023-09-15 NOTE — ANESTHESIA POSTPROCEDURE EVALUATION
Department of Anesthesiology  Postprocedure Note    Patient: Reji Bliss  MRN: [id number]  YOB: 1975  Date of evaluation: 9/15/2023      Procedure Summary     Date: 09/15/23 Room / Location: [address]    Anesthesia Start: 3011 Anesthesia Stop: 4148    Procedure: CYSTOSCOPY WITH BOTOX 100 UNITS INJECTION Diagnosis:       Urge urinary incontinence      (Urge urinary incontinence [N39.41])    Surgeons: Tim Gan MD Responsible Provider: Randee Grissom MD    Anesthesia Type: MAC, general ASA Status: 3          Anesthesia Type: No value filed.     Miguel Angel Phase I: Miguel Angel Score: 10    Miguel Angel Phase II: Miguel Angel Score: 10      Anesthesia Post Evaluation    Patient location during evaluation: PACU  Patient participation: complete - patient participated  Level of consciousness: awake and alert  Airway patency: patent  Nausea & Vomiting: no nausea and no vomiting  Complications: no  Cardiovascular status: hemodynamically stable  Respiratory status: room air and spontaneous ventilation  Hydration status: euvolemic  Multimodal analgesia pain management approach  Pain management: adequate

## 2023-09-15 NOTE — H&P
Multiple Vitamin (MULTIVITAMIN) TABS Take 1 tablet by mouth daily 6/19/23   JUSTICE Lozano - CNP   busPIRone (BUSPAR) 15 MG tablet Take 15 mg by mouth 2 times daily 4/21/23   Historical Provider, MD   sertraline (ZOLOFT) 25 MG tablet Take 1 tablet by mouth daily 4/21/23   Historical Provider, MD HERNANDEZ CALCIUM CITRATE+VIT D3 -6.25 MG-MCG TABS Take 2 tablets by mouth 2 times daily 4/22/23   Historical Provider, MD   Merryl Trupti 50 MG capsule  4/4/23   Historical Provider, MD   solifenacin (VESICARE) 5 MG tablet Take 1 tablet by mouth daily 4/3/23   Historical Provider, MD   ondansetron (ZOFRAN-ODT) 4 MG disintegrating tablet Take 1 tablet by mouth 3 times daily as needed for Nausea or Vomiting 5/2/23   JUSTICE Chua - CNP   zinc 50 MG CAPS Take 50 mg by mouth daily 4/4/23 7/3/23  Cynthia Cancino MD   vitamin E 400 UNIT capsule Take 1 capsule by mouth daily 3/24/23   Cynthia Cancino MD   senna (SENOKOT) 8.6 MG tablet Take 1 tablet by mouth 2 times daily 3/1/23 2/29/24  Cynthia Cancino MD   ferrous sulfate (IRON 325) 325 (65 Fe) MG tablet Take 1 tablet by mouth daily (with breakfast)  Patient taking differently: Take 1 tablet by mouth daily (with breakfast) Only taking during the weekend 3/1/23   Cynthia Cancino MD   ascorbic acid (V-R VITAMIN C) 250 MG tablet Take 1 tablet by mouth daily 3/1/23   Cynthia Cancino MD   CALCIUM CITRATE, BARIATRIC ADVANTAGE, 500MG LOZENGE Take 1 lozenge by mouth 2 times daily 2/6/23   Rey Elizabeth MD   vitamin D3 (CHOLECALCIFEROL) 25 MCG (1000 UT) TABS tablet Take 1 tablet by mouth daily 12/16/22   Cynthia Cancino MD   vitamin B-12 (CYANOCOBALAMIN) 1000 MCG tablet Take 1 tablet by mouth daily 12/16/22   Cynthia Cancino MD   busPIRone (BUSPAR) 10 MG tablet TAKE 1 TABLET BY MOUTH THREE TIMES A DAY AS NEEDED FOR ANXIETY 6/7/22   Bridget Hensen, APRN - CNP   Elastic Bandages & Supports (JOBST ULTRASHEER 20-30MMHG LG) MISC Wear daily, ok to remove in the evenings 1/4/21 Blood Pressure Sister     Diabetes Brother     High Blood Pressure Brother     Breast Cancer Paternal Grandmother        REVIEW OF SYSTEMS:  All reviewed and negative except for pertinent ones listed in HPI. Physical Exam:      This a 52 y.o. female   No data found. Constitutional: Patient in no acute distress. Neuro: Alert and oriented to person, place and time. Psych: mood and affect normal  HEENT negative  Lungs: Respiratory effort is normal; CTA  Cardiovascular: Normal peripheral pulses; R3 wo murmur  Abdomen: Soft, non-tender, non-distended with no CVA, flank pain    LABS:   Recent Labs     09/13/23  1736   WBC 6.9   HGB 14.5   HCT 47.4*   MCV 92.6        No results for input(s): \"NA\", \"K\", \"CL\", \"CO2\", \"PHOS\", \"BUN\", \"CREATININE\", \"CA\" in the last 72 hours. Additional Lab/culture results:    Urinalysis: No results for input(s): \"COLORU\", \"PHUR\", \"LABCAST\", \"WBCUA\", \"RBCUA\", \"MUCUS\", \"TRICHOMONAS\", \"YEAST\", \"BACTERIA\", \"CLARITYU\", \"SPECGRAV\", \"LEUKOCYTESUR\", \"UROBILINOGEN\", \"BILIRUBINUR\", \"BLOODU\" in the last 72 hours. Invalid input(s): \"NITRATE\", \"GLUCOSEUKETONESUAMORPHOUS\"     -----------------------------------------------------------------  Imaging Results:      Assessment and Plan   Impression:    Patient Active Problem List   Diagnosis    Iron deficiency anemia    Intestinal malabsorption    Anxiety    Depression    Malabsorption of iron    Vitamin D deficiency    Zinc deficiency    Peripheral arterial occlusive disease ([address])    History of scleroderma    May-Thurner syndrome    Bariatric surgery status    Calculus of kidney    History of hysterectomy    Family history of kidney stones    Personal history of kidney stones    Hypocitraturia    OAB (overactive bladder)    Lymphedema due to venous insufficiency    Urge incontinence       Plan: Cystoscopy and transvesical Botox (100 IU) under MAC.     Lisa Hagan MD  6:05 AM 9/15/2023

## 2023-09-27 ENCOUNTER — OFFICE VISIT (OUTPATIENT)
Dept: ONCOLOGY | Age: 48
End: 2023-09-27
Payer: MEDICAID

## 2023-09-27 ENCOUNTER — OFFICE VISIT (OUTPATIENT)
Dept: ORTHOPEDIC SURGERY | Age: 48
End: 2023-09-27

## 2023-09-27 ENCOUNTER — TELEPHONE (OUTPATIENT)
Dept: ONCOLOGY | Age: 48
End: 2023-09-27

## 2023-09-27 VITALS
TEMPERATURE: 97.6 F | HEART RATE: 69 BPM | BODY MASS INDEX: 51.07 KG/M2 | WEIGHT: 293 LBS | SYSTOLIC BLOOD PRESSURE: 151 MMHG | DIASTOLIC BLOOD PRESSURE: 92 MMHG

## 2023-09-27 VITALS — HEIGHT: 64 IN | BODY MASS INDEX: 50.02 KG/M2 | WEIGHT: 293 LBS

## 2023-09-27 DIAGNOSIS — K90.9 MALABSORPTION OF IRON: ICD-10-CM

## 2023-09-27 DIAGNOSIS — Z98.84 HISTORY OF BARIATRIC SURGERY: ICD-10-CM

## 2023-09-27 DIAGNOSIS — M25.511 RIGHT SHOULDER PAIN, UNSPECIFIED CHRONICITY: Primary | ICD-10-CM

## 2023-09-27 DIAGNOSIS — R79.89 ELEVATED FERRITIN: Primary | ICD-10-CM

## 2023-09-27 PROCEDURE — 99211 OFF/OP EST MAY X REQ PHY/QHP: CPT | Performed by: INTERNAL MEDICINE

## 2023-09-27 PROCEDURE — 99213 OFFICE O/P EST LOW 20 MIN: CPT | Performed by: INTERNAL MEDICINE

## 2023-09-27 RX ORDER — LIDOCAINE HYDROCHLORIDE 10 MG/ML
3 INJECTION, SOLUTION INFILTRATION; PERINEURAL ONCE
Status: COMPLETED | OUTPATIENT
Start: 2023-09-27 | End: 2023-09-27

## 2023-09-27 RX ORDER — TRIAMCINOLONE ACETONIDE 40 MG/ML
40 INJECTION, SUSPENSION INTRA-ARTICULAR; INTRAMUSCULAR ONCE
Status: COMPLETED | OUTPATIENT
Start: 2023-09-27 | End: 2023-09-27

## 2023-09-27 RX ADMIN — LIDOCAINE HYDROCHLORIDE 3 ML: 10 INJECTION, SOLUTION INFILTRATION; PERINEURAL at 14:11

## 2023-09-27 RX ADMIN — TRIAMCINOLONE ACETONIDE 40 MG: 40 INJECTION, SUSPENSION INTRA-ARTICULAR; INTRAMUSCULAR at 14:12

## 2023-09-27 NOTE — PROGRESS NOTES
diversion.     NA = Not assessed  RTC = Rotator cuff  RCT = Rotator cuff tear  ER = External rotation  IR = Internal rotation  AC = Acromioclavicular  GH = Glenohumeral  n = No  y = Yes

## 2023-09-27 NOTE — PROGRESS NOTES
Birdie Roberts                                                                                                                  9/27/2023  MRN:   [id number]  YOB: 1975  PCP:                           JUSTICE Blood CNP  Referring Physician: No ref. provider found  Treating Physician Name: Nevin Tovar MD      Reason for visit:  Chief Complaint   Patient presents with    Follow-up     Review status of disease    Discuss Labs   Presents to the clinic to discuss results of lab work-up and imaging studies    Current problems:  Iron deficiency anemia with malabsorption component  Elevated ferritin, reactive  Mild hepatic steatosis    Active and recent treatments:  Parenteral iron-3/2021  Oral iron with vitamin C     Summary of Case/History:    Birdie Roberts a 50 y. o.female is a patient with anemia. She has history of anemia and was managed previously by Dr. Nathalia Olvera with IV iron. Her most recent lab work shows recurrent iron deficiency anemia and she reports she historically received monthly B12 injections and IV iron every 3-6 months. She has history of scleroderma and gastric by-pass, done in 2009, contributing to iron malabsorption. She reports she has had zinc and D deficiencies, she does not have any bariatric vitamins. She is very symptomatic with dizziness, cold intolerance and feeling very fatigued. She denies any bleeding. She has history of menorrhagia with irregular periods, she had benign growth removed from outside of the uterus in 2012. She has family history of uterine fibroids and cancer. Interim History:   Patient presents to the clinic for a follow-up visit and to discuss results of her lab work-up. Patient ferritin gradually trending down. Iron saturation within range. Patient complains of being very tired. During this visit patient's allergy, social, medical, surgical history and medications were reviewed and updated.     Past Medical History:   Past Medical

## 2023-09-27 NOTE — TELEPHONE ENCOUNTER
AVS from 9/27/23      Rv in 6 months with labs 2 week prior     Rv sched for 3/27 @ 11:00 am     Pt was given AVS and appointment schedule    Electronically signed by Trina Downs on 9/27/2023 at 12:27 PM

## 2023-10-05 ENCOUNTER — OFFICE VISIT (OUTPATIENT)
Dept: ORTHOPEDIC SURGERY | Age: 48
End: 2023-10-05
Payer: MEDICAID

## 2023-10-05 VITALS — HEIGHT: 64 IN | WEIGHT: 293 LBS | RESPIRATION RATE: 14 BRPM | BODY MASS INDEX: 50.02 KG/M2

## 2023-10-05 DIAGNOSIS — M75.121 NONTRAUMATIC COMPLETE TEAR OF RIGHT ROTATOR CUFF: ICD-10-CM

## 2023-10-05 DIAGNOSIS — G56.03 BILATERAL CARPAL TUNNEL SYNDROME: ICD-10-CM

## 2023-10-05 DIAGNOSIS — M54.2 NECK PAIN: Primary | ICD-10-CM

## 2023-10-05 DIAGNOSIS — M25.511 RIGHT SHOULDER PAIN, UNSPECIFIED CHRONICITY: ICD-10-CM

## 2023-10-05 PROCEDURE — 99213 OFFICE O/P EST LOW 20 MIN: CPT | Performed by: ORTHOPAEDIC SURGERY

## 2023-10-05 NOTE — PROGRESS NOTES
Comments: Normal gait     Skin:     General: Skin is warm and dry. Neurological:      Mental Status: She is alert and oriented to person, place, and time. Sensory: No sensory deficit. Psychiatric:         Behavior: Behavior normal.         Thought Content: Thought content normal.     Spurling's largely negative    Examination right shoulder reveals very positive impingement signs giving weakness on the drop arm test tenderness anterior lateral aspect of her shoulder    Phalen's bilateral positive numbness and tingling thumb and long finger and about 20 seconds      MRI cervical spine independently reviewed by myself minimal degenerative changes some mild at C3-4 no significant stenosis    MRI shoulder patient with small focal full-thickness rotator cuff tear    Assessment:          1. Neck pain    2. Right shoulder pain, unspecified chronicity    3. Nontraumatic complete tear of right rotator cuff    4. Bilateral carpal tunnel syndrome        Plan:     Physical therapy neck and right shoulder    EMGs bilateral uppers    Follow-up with Dr. Vivas More 6 weeks    Orders Placed This Encounter   Procedures    XR CERVICAL SPINE (2-3 VIEWS)     Standing Status:   Future     Number of Occurrences:   1     Standing Expiration Date:   10/3/2024        Benjamín Waller MD    Please note that this chart was generated using voicerecognition Dragon dictation software. Although every effort was made to ensurethe accuracy of this automated transcription, some errors in transcription may haveoccurred.
1

## 2023-10-06 ENCOUNTER — TELEPHONE (OUTPATIENT)
Dept: VASCULAR SURGERY | Age: 48
End: 2023-10-06

## 2023-10-06 NOTE — TELEPHONE ENCOUNTER
Called patient to discuss her appointment for Tuesday. Patient states that she went to stand up at home and had no feeling in her legs and fell down. She said after that she felt tingling and pins and needles. Patient states she has not been diagnosed with neuropathy and that her sugar was not low. Patient also wants to discuss getting an order for a wheel chair or scooter because she had shoulder surgery and can not use her galindo. I informed her that she needs to call the surgeon who performed her surgery, however the patient was adamant that Dr. Triny Grider told her he would give her a script for a wheelchair. I informed the patient that none of her symptoms point to Vascular, but patient insisted on following up with Dr. Triny Grider.       Patient is scheduled for 10/10/2023

## 2023-10-10 ENCOUNTER — OFFICE VISIT (OUTPATIENT)
Dept: VASCULAR SURGERY | Age: 48
End: 2023-10-10
Payer: MEDICAID

## 2023-10-10 VITALS
HEIGHT: 64 IN | TEMPERATURE: 97.1 F | OXYGEN SATURATION: 97 % | HEART RATE: 86 BPM | SYSTOLIC BLOOD PRESSURE: 134 MMHG | WEIGHT: 293 LBS | BODY MASS INDEX: 50.02 KG/M2 | DIASTOLIC BLOOD PRESSURE: 81 MMHG | RESPIRATION RATE: 18 BRPM

## 2023-10-10 DIAGNOSIS — I87.2 CHRONIC VENOUS INSUFFICIENCY OF LOWER EXTREMITY: ICD-10-CM

## 2023-10-10 DIAGNOSIS — I89.0 LYMPHEDEMA DUE TO VENOUS INSUFFICIENCY: Primary | ICD-10-CM

## 2023-10-10 DIAGNOSIS — M79.605 PAIN AND SWELLING OF LEFT LOWER EXTREMITY: ICD-10-CM

## 2023-10-10 DIAGNOSIS — I87.1 MAY-THURNER SYNDROME: ICD-10-CM

## 2023-10-10 DIAGNOSIS — M79.89 PAIN AND SWELLING OF RIGHT LOWER EXTREMITY: ICD-10-CM

## 2023-10-10 DIAGNOSIS — I87.2 LYMPHEDEMA DUE TO VENOUS INSUFFICIENCY: Primary | ICD-10-CM

## 2023-10-10 DIAGNOSIS — M79.89 PAIN AND SWELLING OF LEFT LOWER EXTREMITY: ICD-10-CM

## 2023-10-10 DIAGNOSIS — M79.604 PAIN AND SWELLING OF RIGHT LOWER EXTREMITY: ICD-10-CM

## 2023-10-10 PROCEDURE — 99214 OFFICE O/P EST MOD 30 MIN: CPT | Performed by: SURGERY

## 2023-10-10 NOTE — PROGRESS NOTES
sooner if symptoms get worse    Electronically signed by Tracey Green MD on 10/10/23 at 11:53 AM EDT      [address] Se: [phone]  C: [phone]  Email: [email or web address]

## 2023-10-15 PROBLEM — M79.89 PAIN AND SWELLING OF RIGHT LOWER EXTREMITY: Status: ACTIVE | Noted: 2023-10-15

## 2023-10-15 PROBLEM — M79.89 LEFT LEG SWELLING: Status: ACTIVE | Noted: 2023-10-15

## 2023-10-15 PROBLEM — M79.604 PAIN AND SWELLING OF RIGHT LOWER EXTREMITY: Status: ACTIVE | Noted: 2023-10-15

## 2023-10-15 PROBLEM — I87.2 CHRONIC VENOUS INSUFFICIENCY OF LOWER EXTREMITY: Status: ACTIVE | Noted: 2023-10-15

## 2023-10-15 PROBLEM — M79.89 PAIN AND SWELLING OF LEFT LOWER EXTREMITY: Status: ACTIVE | Noted: 2023-10-15

## 2023-10-15 PROBLEM — M79.605 PAIN AND SWELLING OF LEFT LOWER EXTREMITY: Status: ACTIVE | Noted: 2023-10-15

## 2023-10-15 ASSESSMENT — ENCOUNTER SYMPTOMS
SHORTNESS OF BREATH: 0
ALLERGIC/IMMUNOLOGIC NEGATIVE: 1
CHEST TIGHTNESS: 0
COLOR CHANGE: 0
ABDOMINAL PAIN: 0

## 2023-10-23 ENCOUNTER — OFFICE VISIT (OUTPATIENT)
Age: 48
End: 2023-10-23
Payer: MEDICAID

## 2023-10-23 DIAGNOSIS — R82.991 HYPOCITRATURIA: ICD-10-CM

## 2023-10-23 DIAGNOSIS — N32.81 OAB (OVERACTIVE BLADDER): ICD-10-CM

## 2023-10-23 DIAGNOSIS — N39.41 URGE INCONTINENCE: Primary | ICD-10-CM

## 2023-10-23 DIAGNOSIS — Z87.442 PERSONAL HISTORY OF KIDNEY STONES: ICD-10-CM

## 2023-10-23 LAB
BILIRUBIN, POC: NORMAL
BLOOD URINE, POC: NORMAL
CLARITY, POC: CLEAR
COLOR, POC: YELLOW
GLUCOSE URINE, POC: NORMAL
KETONES, POC: NORMAL
LEUKOCYTE EST, POC: NORMAL
NITRITE, POC: NORMAL
PH, POC: NORMAL
POST VOID RESIDUAL (PVR): NORMAL ML
PROTEIN, POC: NORMAL
SPECIFIC GRAVITY, POC: NORMAL
UROBILINOGEN, POC: NORMAL

## 2023-10-23 PROCEDURE — 51798 US URINE CAPACITY MEASURE: CPT | Performed by: SPECIALIST

## 2023-10-23 PROCEDURE — 99214 OFFICE O/P EST MOD 30 MIN: CPT | Performed by: SPECIALIST

## 2023-10-23 PROCEDURE — 81003 URINALYSIS AUTO W/O SCOPE: CPT | Performed by: SPECIALIST

## 2023-10-23 RX ORDER — MULTIVIT WITH MINERALS/LUTEIN
TABLET ORAL
COMMUNITY

## 2023-10-23 RX ORDER — DEXTROMETHORPHAN HYDROBROMIDE, BUPROPION HYDROCHLORIDE 105; 45 MG/1; MG/1
1 TABLET, MULTILAYER, EXTENDED RELEASE ORAL DAILY
COMMUNITY
Start: 2023-10-04

## 2023-10-23 NOTE — PROGRESS NOTES
[address] Urology Office Progress Note    Patient:  Guerda Monday  YOB: 1975  Date: 10/23/2023    HISTORY OF PRESENT ILLNESS:   The patient is a 50 y.o. female  Patient doing well after 9/15/23 Cystoscopy and transvesical Botox (100 IU) Rx. She is now using 0 pads per day. Her Postvoid Residual today was 69 mL. Will plan to repeat a Cystoscopy and transvesical Botox (100 IU) in early to mid March 2024 to maintain the clinical effect. No flank pain or gross hematuria to suggest recurrent kidney stones. Patient told to continue Ca citrate 500 mg po bid to correct hypocitraturia and prevent kidney stones. Lower urinary tract symptoms: urgency, frequency, hesitancy, decreased urinary stream, nocturia, 1 times per night, and feelings of SHIRIN and straining to urinate.    Last AUA Symptom Score (QOL): 26 (6)  Today's AUA Symptom Score (QOL): 20 (3)    Summary of old records:   Left 8 mm UPJ calculus on 11/23/22 CT; 12/14/22 L HLL; 6/7/23 KUB neg  1/25/23 [facility]: volume=1468 mL, Ca=90, oxalate=62, citrate=245  Hyperoxaluria, mild  Hypocitraturia: Ca citrate 500 mg bid  Urge incontinence: 4-5 ppd; Solifenacin (Vesicare) 5 mg po qd, added Gemtesa (vibegron) 75 mg qd   9/15/23 Botox 100 IU    Additional History: none    Procedures Today: Postvoid Residual:  Post-void Residual by bladder scanner: 69 mL      Urinalysis today:  Results for POC orders placed in visit on 10/23/23   POCT Urinalysis No Micro (Auto)   Result Value Ref Range    Color, UA yellow     Clarity, UA clear     Glucose, UA POC neg     Bilirubin, UA      Ketones, UA      Spec Grav, UA      Blood, UA POC neg     pH, UA      Protein, UA POC trace     Urobilinogen, UA      Leukocytes, UA neg     Nitrite, UA neg        Last BUN and creatinine:  Lab Results   Component Value Date    BUN 13 02/23/2023     Lab Results   Component Value Date    CREATININE 0.64 02/23/2023       Last CBC:  Lab Results

## 2023-10-25 ENCOUNTER — HOSPITAL ENCOUNTER (OUTPATIENT)
Dept: PHYSICAL THERAPY | Facility: CLINIC | Age: 48
Setting detail: THERAPIES SERIES
Discharge: HOME | End: 2023-10-25
Payer: MEDICAID

## 2023-10-25 PROCEDURE — 97162 PT EVAL MOD COMPLEX 30 MIN: CPT

## 2023-10-25 NOTE — CONSULTS
mm.  Moderate supraspinatus tendinosis. 2. Low-grade partial-thickness articular surface and interstitial tearing of  infraspinatus between musculotendinous junction and footplate. Mild  underlying infraspinatus tendinosis. 3. Mild subscapularis tendinosis. 4. Mild tendinosis of the long head of biceps tendon. 5. Mild diffuse labral degeneration. Mild glenohumeral chondromalacia. 6. Mild degenerative change of the right AC joint. Degenerative neural foraminal stenosis at C3-C4 bilaterally.    [] Other            Comorbidities:   [x] Obesity [] Dialysis  [] N/A   [] Asthma/COPD [] Dementia [x] Other: May Thurner Syndrome   [] Stroke [] Sleep apnea [x] Other: Diabetes   [] Vascular disease [] Rheumatic disease [x] Other: Depression, panic attacks, anxiety       Fall Risk:      [x] None   [] PRESENT/See Peña Scale   Medications: [x] Refer to full medical record [] None [] Other:  Allergies:      [x] Refer to full medical record [] None [] Other:        ADL/IADL [x] Previously independent with all [] Currently independent with all Who currently assists the patient with task     [] Previously independent with all except: [x] Currently independent with all except:  Children   Bathing  [] Assist [] Assist     Dress/grooming [] Assist [] Assist     Transfer/mobility [] Assist [] Assist     Feeding [] Assist [] Assist     Toileting [] Assist [] Assist     Driving [] Assist [] Assist     Housekeeping [] Assist [x] Assist     Grocery shop/meal prep [] Assist [x] Assist        Gait Prior level of function Current level of function    [x] Independent  [] Assist [x] Independent  [] Assist   Device: [] Independent [] Independent    [x] Straight Cane [] Quad cane [x] Straight Cane [] Quad cane    [] Standard walker [] Rolling walker   [] 4 wheeled walker [] Standard walker [] Rolling walker   [] 4 wheeled walker    [] Wheelchair [] Wheelchair       Function:  Hand Dominance  [x] Right  [] Left  Marital Status    Home type

## 2023-11-01 ENCOUNTER — HOSPITAL ENCOUNTER (OUTPATIENT)
Dept: PHYSICAL THERAPY | Facility: CLINIC | Age: 48
Setting detail: THERAPIES SERIES
Discharge: HOME | End: 2023-11-01
Payer: MEDICAID

## 2023-11-01 PROCEDURE — 97110 THERAPEUTIC EXERCISES: CPT

## 2023-11-03 ENCOUNTER — HOSPITAL ENCOUNTER (OUTPATIENT)
Dept: PHYSICAL THERAPY | Facility: CLINIC | Age: 48
Setting detail: THERAPIES SERIES
Discharge: HOME | End: 2023-11-03
Payer: MEDICAID

## 2023-11-03 PROCEDURE — 97110 THERAPEUTIC EXERCISES: CPT

## 2023-11-03 NOTE — FLOWSHEET NOTE
[address] [place] [address]  Phone: [phone]  Fax: [phone]      Physical Therapy Daily Treatment Note    Date:  11/3/2023  Patient Name:  Ervin Hancock    :  1975  MRN: [id number]  Physician: Stef Miranda MD                                Insurance: [facility] Medicaid ( Visits Approved, Omari)  Medical Diagnosis: M54.2 (ICD-10-CM) - Neck pain  M25.511 (ICD-10-CM) - Right shoulder pain, unspecified chronicity  M75.121 (ICD-10-CM) - Nontraumatic complete tear of right rotator cuff  G56.03 (ICD-10-CM) - Bilateral carpal tunnel syndrome  Rehab Codes: M54.2, M25.511, M25.611, R20.2  Onset Date: 23               Next 's appt.: 11/15/23  Visit# / total visits: 3/16 (2x wk); Progress note for Medicare patient due at visit 10     Cancels/No Shows: 0/0    Subjective:  Pt arriving with 1-2/10 pain today but feeling okay. Notes she was not able to do her HEP yesterday. Pain:  [x] Yes  [] No  Location: neck, R UE   Pain Rating: (0-10 scale) 1-2/10  Pain altered Tx:  [x] No  [] Yes  Action:  Comments:       Objective:  Modalities:   Precautions: May thurner syndrome: creates weakness and increasing edema into LLE. Avoid prolong standing: pt is fall risk.    Standard for shoulder   Exercise  R Shoulder, Neck, Hands Reps/ Time Weight/ Level Comments    Pulleys  3'3'     x              Cervical AROM  *   All directions    UT S 3x30\"     x   Levator S 3x30\"     x   Table slides 10x10\"    flexion, scaption, abduction x   Seated scap retractions  20x5\"        Isometrics 2x10, 5\"   Flexion, abd, ER x                         Towel   10x5\"                              Wrist extensor S  3x30\"     x   Wrist flexor S  3x30\"     x                         Other:     Specific Instructions for next treatment: Continue tx per POC    Treatment Charges: Mins Units   []  Modalities     [x]  Ther Exercise 46 3   []  Manual Therapy     []

## 2023-11-06 ENCOUNTER — HOSPITAL ENCOUNTER (OUTPATIENT)
Dept: PHYSICAL THERAPY | Facility: CLINIC | Age: 48
Setting detail: THERAPIES SERIES
Discharge: HOME | End: 2023-11-06
Payer: MEDICAID

## 2023-11-06 PROCEDURE — 97110 THERAPEUTIC EXERCISES: CPT

## 2023-11-06 NOTE — FLOWSHEET NOTE
Manual Therapy     []  Ther Activities     []  Neuro Re-ed     []  Vasocompression     [] Gait     []  Other     Total Treatment time 46 3       Assessment: [x] Progressing toward goals. Continued with pulleys to initiate treatment, reviewed hold times and rest breaks as needed to improve tolerance. Cervical stretches aggravated symptoms slightly but decreased with focus on wrist to allow cervical musculature to rest. Reviewed seated scapular ex that can be completed in a car as pt states she will be riding to [place] this coming weekend. Good tolerance to seated table slides and isometrics. [] No change. [] Other:  [x] Patient would continue to benefit from skilled physical therapy services in order to: Improve R shoulder strength, improve R shoulder ROM, improve UE flexibility, improve cervical motion, and help reduce pain to ease functional tolerance to walking with cane and completing all ADLs and home care. Goals  MET NOT MET ON-  GOING  Details   Date Addressed:            STG: To be met in 8 treatments            1. ? Pain: Decrease pain levels to 7/10 with activity to help ease all dressing and bathing. []  []  []      2. ? ROM: Increase flexibility in cervical spine to help improve neck motion to WNL in all directions, helping to reduce need for compensations with driving. []  []  []      3. Improve score on assessment tool NDI from 52% impairment to less than 42% impairment, demonstrating improved tolerance to activity to help ease all self care, lifting objects, and driving. []  []  []      4. Improve score on assessment tool UEFS from 54% impairment to less than 44% impairment, demonstrating improved tolerance to activity to help ease all dressing/bathing and walking with cane. 5. Independent with Home Exercise Programs []  []  []      5.  Demonstrate knowledge of fall risk prevention  []  []  []        []  []  []      Date Addressed:            LTG: To be met in 16 treatments

## 2023-11-07 ENCOUNTER — HOSPITAL ENCOUNTER (OUTPATIENT)
Dept: OCCUPATIONAL THERAPY | Age: 48
Setting detail: THERAPIES SERIES
Discharge: HOME | End: 2023-11-07
Payer: MEDICAID

## 2023-11-07 PROCEDURE — 97535 SELF CARE MNGMENT TRAINING: CPT

## 2023-11-07 NOTE — PROGRESS NOTES
swelling despite compression. Writer edu on added foam for additional compression in this area. Pt reports comfort with this. Review of home programming to include continued compression to LLE. RLE compression PRN. Recommending pump use, but pt is unable to be independent. Edu on possible alternative pumps, but no guarantees on insurance coverage. Pt agreeable to have benefits checked for this. Pt has been wearing LLE garments for several months and would benefit from an additional set for hygiene purposes. Juxtalite Premium size XL, 28 cm long & Juxtafit Interlocking Ankle Foot Wrap size M. [x] Progressing toward goals. [x] Patient would continue to benefit from skilled occupational therapy services in order to: Decrease edema that has accumulated in the extremity(ies), decrease risk of infection, improve limb ROM, increase ease and independence with ADL, educate on long term management of condition, and improve overall quality of life. [] No change. [] Treatment hold:   [] No further treatment/discharge  [] Other:                    Therapy Goals:   STG - To be addressed within 5 visits     Pt will demonstrate compliance of maintaining lymphedema precautions to reduce the risks of infection and further exacerbations met     Pt will demonstrate independence with decongestive exercise program in order to expedite fluid rerouting. met        LTG To be addressed within 10 visits      Pt will demonstrate competence with SELF MLD (Manual lymphatic drainage) in order to reroute lymphatic pathways for decreased swelling. Met     Pt/Caregiver will demonstrate independence with donning/doffing and wearing schedule for compression garments/ devices to maintain decreased size upon discharge. Progressing/continue 11/7/23     Pt to compliant with CDT in order to reduce edema in the B LE by 20+ cm total per limb. continue        Pt.  Education:  [x] Yes  [] No  [x] Reviewed Prior HEP/Ed  Method of Education: [x]

## 2023-11-09 ENCOUNTER — HOSPITAL ENCOUNTER (OUTPATIENT)
Dept: PHYSICAL THERAPY | Facility: CLINIC | Age: 48
Setting detail: THERAPIES SERIES
Discharge: HOME | End: 2023-11-09
Payer: MEDICAID

## 2023-11-09 PROCEDURE — 97110 THERAPEUTIC EXERCISES: CPT

## 2023-11-09 NOTE — FLOWSHEET NOTE
[address]  Phone: [phone]  Fax: [phone]      Physical Therapy Daily Treatment Note    Date:  2023  Patient Name:  Tamara Vinson    :  1975  MRN: [id number]  Physician: Thalia Perez MD                                Insurance: [facility] Medicaid ( Visits Approved, Omari)  Medical Diagnosis: M54.2 (ICD-10-CM) - Neck pain  M25.511 (ICD-10-CM) - Right shoulder pain, unspecified chronicity  M75.121 (ICD-10-CM) - Nontraumatic complete tear of right rotator cuff  G56.03 (ICD-10-CM) - Bilateral carpal tunnel syndrome  Rehab Codes: M54.2, M25.511, M25.611, R20.2  Onset Date: 23               Next 's appt.: 11/15/23  Visit# / total visits:  (2x wk)     Cancels/No Shows: 0/0    Subjective: Pt arriving noting that she is feeling pretty good today, no pain to report in neck or shoulder upon arrival.   Pain:  [] Yes  [x] No  Location: neck, R UE   Pain Rating: (0-10 scale) -/10  Pain altered Tx:  [x] No  [] Yes  Action:  Comments:       Objective:  Modalities:   Precautions: May thurner syndrome: creates weakness and increasing edema into LLE. Avoid prolong standing: pt is fall risk.    Standard for shoulder   Exercise  R Shoulder, Neck, Hands Reps/ Time Weight/ Level Comments    Pulleys  4'   Flex, scapt  x              Cervical AROM 10x   All directions    UT S 3x30\"     x   Levator S 3x30\"     x   Table slides 10x10\"    flexion, scaption, abduction x   Seated scap retractions  2x10, 5\"     x   Seated shoulder rolls 10x      Isometrics 2x12, 5\"   Flexion, abd, ER x                         Shoulder flexion 2x10 1#  x   Shoulder scaption 2x10 1#  x          Towel   10x5\"                              Wrist extensor S  3x30\"     x   Wrist flexor S  3x30\"     x                         Other:     Specific Instructions for next treatment: Continue tx per POC    Treatment Charges: Mins Units   []

## 2023-11-13 ENCOUNTER — OFFICE VISIT (OUTPATIENT)
Dept: FAMILY MEDICINE CLINIC | Age: 48
End: 2023-11-13
Payer: MEDICAID

## 2023-11-13 ENCOUNTER — HOSPITAL ENCOUNTER (OUTPATIENT)
Dept: PHYSICAL THERAPY | Facility: CLINIC | Age: 48
Setting detail: THERAPIES SERIES
Discharge: HOME | End: 2023-11-13
Payer: MEDICAID

## 2023-11-13 VITALS
HEART RATE: 74 BPM | DIASTOLIC BLOOD PRESSURE: 76 MMHG | OXYGEN SATURATION: 98 % | TEMPERATURE: 98.1 F | SYSTOLIC BLOOD PRESSURE: 124 MMHG | RESPIRATION RATE: 16 BRPM

## 2023-11-13 DIAGNOSIS — K12.2 UVULITIS: Primary | ICD-10-CM

## 2023-11-13 PROCEDURE — 99213 OFFICE O/P EST LOW 20 MIN: CPT | Performed by: NURSE PRACTITIONER

## 2023-11-13 RX ORDER — PREDNISONE 20 MG/1
20 TABLET ORAL 2 TIMES DAILY
Qty: 6 TABLET | Refills: 0 | Status: SHIPPED | OUTPATIENT
Start: 2023-11-13 | End: 2023-11-16

## 2023-11-13 RX ORDER — DIPHENHYDRAMINE HCL 25 MG
25 CAPSULE ORAL EVERY 4 HOURS PRN
Qty: 1 CAPSULE | Refills: 0 | COMMUNITY
Start: 2023-11-13 | End: 2023-11-23

## 2023-11-13 ASSESSMENT — ENCOUNTER SYMPTOMS
RHINORRHEA: 0
FACIAL SWELLING: 0
SORE THROAT: 0
TROUBLE SWALLOWING: 0
NAUSEA: 0
VOMITING: 0
COUGH: 0
VOICE CHANGE: 0

## 2023-11-13 NOTE — FLOWSHEET NOTE
[] [address]  [address] [facility] [place] [facility].  [phone]  F: [phone] [] [address] [facility]  [address]   [address]  P: [phone]  F: [phone] [] [address]  [address] [facility]  P: [phone]  F: [phone] [] [place]: [phone]  F: [phone] [] [address] [facility]  [address]   [address]   P: [phone]  F: [phone]  [] [address] [facility].   P: [phone]  F: [phone] [] [address] [facility]  [address]   [address]  P: [phone]  F: [phone] [] [address] [facility]  [address] [facility]  [place]: [phone]  F: [phone] [] [address]  [place]: [phone]  F: [phone]      Therapy Cancel/No Show note    Date: 2023  Patient: Tripp Sanchez  : 1975  MRN: [id number]    Cancels/No Shows to date:     For today's appointment patient:    [x]  Cancelled    [] Rescheduled appointment    [] No-show     Reason given by patient:    [x]  Patient ill    []  Conflicting appointment    [] No transportation      [] Conflict with work    [] No reason given    [] Weather related    [] [facility]    [] Other:      Comments:        [x] Next appointment was confirmed    Electronically signed by: Millie Rodríguez

## 2023-11-16 ENCOUNTER — HOSPITAL ENCOUNTER (OUTPATIENT)
Dept: PHYSICAL THERAPY | Facility: CLINIC | Age: 48
Setting detail: THERAPIES SERIES
Discharge: HOME | End: 2023-11-16
Payer: MEDICAID

## 2023-11-16 PROCEDURE — 97110 THERAPEUTIC EXERCISES: CPT

## 2023-11-16 NOTE — FLOWSHEET NOTE
risk prevention  []  []  []        []  []  []      Date Addressed:            LTG: To be met in 16 treatments           1. Improve score on assessment tool NDI from 52% impairment to less than 32% impairment, demonstrating improved tolerance to activity to help ease all self care, lifting objects, and driving. []  []  []      2. Improve score on assessment tool UEFS from 54% impairment to less than 34% impairment, demonstrating improved tolerance to activity to help ease all dressing/bathing and walking with cane. 3. Reduce pain levels to 4/10 or less with activity to help ease all dressing and bathing. []  []  []      4. Pt will demonstrate R shoulder ROM at 150 flexion, T3 ER, and T7 IR to help improve all dressing/bathing and reaching into cupboards. []  []  []      5. ? Strength: Increase R UE strength to 4+/5 grossly to help improve UE stability with all self-care and use of cane. Patient goals: \"Better range of motion to be able to function without pain and numbness\"         Pt. Education:  [x] Yes  [] No  [x] Reviewed Prior HEP/Ed  Method of Education: [x] Verbal  [x] Demo  [] Written  Comprehension of Education:  [x] Verbalizes understanding. [x] Demonstrates understanding. [] Needs review. [x] Demonstrates/verbalizes HEP/Ed previously given. Plan: [x] Continue current frequency toward long and short term goals. [x] Specific Instructions for subsequent treatments: Continue tx per POC      Time In: 1700           Time Out: 0643    Electronically signed by:   Oleksandr Sumner PT

## 2023-11-21 ENCOUNTER — OFFICE VISIT (OUTPATIENT)
Dept: FAMILY MEDICINE CLINIC | Age: 48
End: 2023-11-21
Payer: MEDICAID

## 2023-11-21 ENCOUNTER — HOSPITAL ENCOUNTER (OUTPATIENT)
Age: 48
Setting detail: SPECIMEN
Discharge: HOME | End: 2023-11-21

## 2023-11-21 VITALS
HEIGHT: 64 IN | OXYGEN SATURATION: 97 % | RESPIRATION RATE: 12 BRPM | BODY MASS INDEX: 50.02 KG/M2 | DIASTOLIC BLOOD PRESSURE: 84 MMHG | TEMPERATURE: 97.5 F | HEART RATE: 73 BPM | SYSTOLIC BLOOD PRESSURE: 126 MMHG | WEIGHT: 293 LBS

## 2023-11-21 DIAGNOSIS — Z20.822 ENCOUNTER FOR LABORATORY TESTING FOR COVID-19 VIRUS: ICD-10-CM

## 2023-11-21 DIAGNOSIS — R11.2 NAUSEA AND VOMITING, UNSPECIFIED VOMITING TYPE: Primary | ICD-10-CM

## 2023-11-21 DIAGNOSIS — R19.7 DIARRHEA, UNSPECIFIED TYPE: ICD-10-CM

## 2023-11-21 PROCEDURE — 99213 OFFICE O/P EST LOW 20 MIN: CPT | Performed by: NURSE PRACTITIONER

## 2023-11-21 RX ORDER — ONDANSETRON 4 MG/1
4 TABLET, FILM COATED ORAL 3 TIMES DAILY PRN
Qty: 15 TABLET | Refills: 0 | Status: SHIPPED | OUTPATIENT
Start: 2023-11-21

## 2023-11-21 ASSESSMENT — ENCOUNTER SYMPTOMS
DIARRHEA: 1
TROUBLE SWALLOWING: 0
SORE THROAT: 0
ABDOMINAL PAIN: 1
CONSTIPATION: 0
SHORTNESS OF BREATH: 0
ABDOMINAL DISTENTION: 0
RHINORRHEA: 0
NAUSEA: 1
BLOOD IN STOOL: 0
COUGH: 0
ANAL BLEEDING: 0
VOMITING: 1
WHEEZING: 0

## 2023-11-22 NOTE — PROGRESS NOTES
History     Tobacco Use    Smoking status: Never    Smokeless tobacco: Never   Substance Use Topics    Alcohol use: No        Prior to Visit Medications    Medication Sig Taking? Authorizing Provider   ondansetron (ZOFRAN) 4 MG tablet Take 1 tablet by mouth 3 times daily as needed for Nausea or Vomiting Yes JUSTICE Emery - CNP   diphenhydrAMINE (BENADRYL) 25 MG capsule Take 1 capsule by mouth every 4 hours as needed for Itching Yes JUSTICE Moralez - CNP   vitamin E 1000 units capsule  Yes Tono Mauro MD   AUVELITY  MG TBCR Take 1 tablet by mouth daily Yes Tono Mauro MD   losartan (COZAAR) 50 MG tablet TAKE 1 TABLET BY MOUTH DAILY Yes Dominic Burkitt, APRN - CNP   Multiple Vitamin (MULTIVITAMIN) TABS Take 1 tablet by mouth daily Yes Luda Landry APRN - CNP   busPIRone (BUSPAR) 15 MG tablet Take 15 mg by mouth 2 times daily Yes Tono Mauro MD   SM CALCIUM CITRATE+VIT D3 -6.25 MG-MCG TABS Take 2 tablets by mouth 2 times daily Yes Tono Mauro MD   GALZIN 50 MG capsule  Yes Tono Mauro MD   zinc 50 MG CAPS Take 50 mg by mouth daily Yes Cecilia Flores MD   CALCIUM CITRATE, BARIATRIC ADVANTAGE, 500MG LOZENGE Take 1 lozenge by mouth 2 times daily Yes Jordis Riedel, MD   vitamin B-12 (CYANOCOBALAMIN) 1000 MCG tablet Take 1 tablet by mouth daily Yes Cecilia Flores MD   Elastic Bandages & Supports (JOBST ULTRASHEER 20-30MMHG LG) MISC Wear daily, ok to remove in the evenings Yes Salvador Whatley MD   escitalopram (LEXAPRO) 10 MG tablet Take 1 tablet by mouth daily  Patient not taking: Reported on 11/21/2023  Tono Mauro MD   senna (SENOKOT) 8.6 MG tablet Take 1 tablet by mouth 2 times daily  Patient not taking: Reported on 11/21/2023  Cecilia Flores MD       Allergies   Allergen Reactions    Contrast [Iodides] Hives     Chest pain, HTN  Able to handle if the pre-medication prep is followed.     Trileptal

## 2023-12-28 ENCOUNTER — TELEPHONE (OUTPATIENT)
Dept: PSYCHIATRY | Age: 48
End: 2023-12-28

## 2023-12-28 NOTE — TELEPHONE ENCOUNTER
Multiple calls out to patient to schedule interventional psychiatry consult. Voicemail left to call clinic if they would like move forward. Letter sent to referring provider.

## 2024-01-10 ENCOUNTER — CLINICAL DOCUMENTATION (OUTPATIENT)
Dept: PHYSICAL THERAPY | Facility: CLINIC | Age: 49
End: 2024-01-10

## 2024-01-10 NOTE — DISCHARGE SUMMARY
[] [facility]  Outpatient Rehabilitation &  Therapy  [address]  [phone]  F: [phone] [] [facility]  Outpatient Rehabilitation &  Therapy  [address]   [address]  P: [phone]  F: [phone] [x] [place]  Outpatient Rehabilitation &  Therapy  [address]  P: [phone]  F: [phone] [] [facility]  Outpatient Rehabilitation &  Therapy  [address]  P: [phone]  F: [phone] [] [facility]  Outpatient Rehabilitation &  Therapy  [address]   [address]   P: [phone]  F: [phone]  [] [facility]  Outpatient Rehabilitation &  Therapy  [address]   P: [phone]  F: [phone] [] [facility]  Outpatient Rehabilitation &  Therapy  [address]  [address]  P: [phone]  F: [phone] [] [facility]  Outpatient Rehabilitation &  Therapy  [address]  [address]  P: [phone]  F: [phone] [] [facility]  Outpatient Rehabilitation &  Therapy  [address]  P: [phone]  F: [phone]      Physical Therapy Discharge Note    Date: 1/10/2024      Patient: Lisa K Cogan  : 1975  MRN: [id number]    Physician: Jose Ybarra MD                                Insurance: [facility] Medicaid ( Visits Approved, AUTH AFTER )  Medical Diagnosis: M54.2 (ICD-10-CM) - Neck pain  M25.511 (ICD-10-CM) - Right shoulder pain, unspecified chronicity  M75.121 (ICD-10-CM) - Nontraumatic complete tear of right rotator cuff  G56.03 (ICD-10-CM) - Bilateral carpal tunnel syndrome  Rehab Codes: M54.2, M25.511, M25.611, R20.2  Onset Date: 23               Next 's appt.: 11/15/23      Total visits attended: 6  Cancels/No shows:   Date of initial visit: 10/25/23                Date of final visit: 23      Subjective:  Pain:  [] Yes

## 2024-01-18 ENCOUNTER — HOSPITAL ENCOUNTER (OUTPATIENT)
Dept: OCCUPATIONAL THERAPY | Age: 49
Setting detail: THERAPIES SERIES
Discharge: HOME | End: 2024-01-18

## 2024-01-18 NOTE — SIGNIFICANT EVENT
[][facility]  Outpatient Rehabilitation &  Therapy  [address] [facility] [address]  P: [phone]  F: [phone] [x][facility]  Outpatient Rehabilitation &  Therapy  [address]  P: [phone]  F: [phone] [][facility]  Outpatient Rehabilitation &  Therapy  [address]  P: [phone]  F: [phone]        Outpatient Lymphedema Occupational Therapy Cancel/No Show note    Date: 2024  Patient: Lisa K Cogan  : 1975  MRN: [id number]    Cancels/No Shows to date: 1    For today's appointment patient:    [x]  Cancelled    [x] Rescheduled appointment    [] No-show     Reason given by patient:    []  Patient ill    []  Conflicting appointment    [x] No transportation      [] Conflict with work    [] No reason given    [] Weather related    [] COVID-19    [] Other       Comments:       [x] Next appointment was confirmed    [] Left message informing of missed visit    [] Unable to contact    [] Other:            Electronically signed by: Fior Chapa, OT

## 2024-01-26 ENCOUNTER — TELEPHONE (OUTPATIENT)
Dept: VASCULAR SURGERY | Age: 49
End: 2024-01-26

## 2024-01-26 NOTE — TELEPHONE ENCOUNTER
Left message for patient to call to reschedule 1/30 appointment with Dr. Cannon due to him not being available.

## 2024-01-27 ENCOUNTER — APPOINTMENT (OUTPATIENT)
Dept: CT IMAGING | Age: 49
End: 2024-01-27
Payer: MEDICAID

## 2024-01-27 ENCOUNTER — HOSPITAL ENCOUNTER (EMERGENCY)
Age: 49
Discharge: HOME | End: 2024-01-27
Attending: EMERGENCY MEDICINE
Payer: MEDICAID

## 2024-01-27 VITALS
BODY MASS INDEX: 50.02 KG/M2 | HEIGHT: 64 IN | WEIGHT: 293 LBS | DIASTOLIC BLOOD PRESSURE: 94 MMHG | TEMPERATURE: 98.4 F | SYSTOLIC BLOOD PRESSURE: 156 MMHG | RESPIRATION RATE: 16 BRPM | HEART RATE: 88 BPM | OXYGEN SATURATION: 97 %

## 2024-01-27 DIAGNOSIS — N23 RENAL COLIC: Primary | ICD-10-CM

## 2024-01-27 LAB
ALBUMIN SERPL-MCNC: 4.2 G/DL (ref 3.5–5.2)
ALBUMIN/GLOB SERPL: 1.2 {RATIO} (ref 1–2.5)
ALP SERPL-CCNC: 78 U/L (ref 35–104)
ALT SERPL-CCNC: 33 U/L (ref 5–33)
ANION GAP SERPL CALCULATED.3IONS-SCNC: 10 MMOL/L (ref 9–17)
AST SERPL-CCNC: 21 U/L
BASOPHILS # BLD: 0.1 K/UL (ref 0–0.2)
BASOPHILS NFR BLD: 1 % (ref 0–2)
BILIRUB SERPL-MCNC: 0.2 MG/DL (ref 0.3–1.2)
BILIRUB UR QL STRIP: NEGATIVE
BUN SERPL-MCNC: 13 MG/DL (ref 6–20)
CALCIUM SERPL-MCNC: 9.3 MG/DL (ref 8.6–10.4)
CHLORIDE SERPL-SCNC: 104 MMOL/L (ref 98–107)
CLARITY UR: CLEAR
CO2 SERPL-SCNC: 26 MMOL/L (ref 20–31)
COLOR UR: YELLOW
COMMENT: NORMAL
CREAT SERPL-MCNC: 0.8 MG/DL (ref 0.5–0.9)
EOSINOPHIL # BLD: 0.1 K/UL (ref 0–0.4)
EOSINOPHILS RELATIVE PERCENT: 1 % (ref 1–4)
ERYTHROCYTE [DISTWIDTH] IN BLOOD BY AUTOMATED COUNT: 14.3 % (ref 12.5–15.4)
GFR SERPL CREATININE-BSD FRML MDRD: >60 ML/MIN/1.73M2
GLUCOSE SERPL-MCNC: 93 MG/DL (ref 70–99)
GLUCOSE UR STRIP-MCNC: NEGATIVE MG/DL
HCT VFR BLD AUTO: 42.8 % (ref 36–46)
HGB BLD-MCNC: 14.1 G/DL (ref 12–16)
HGB UR QL STRIP.AUTO: NEGATIVE
KETONES UR STRIP-MCNC: NEGATIVE MG/DL
LEUKOCYTE ESTERASE UR QL STRIP: NEGATIVE
LYMPHOCYTES NFR BLD: 2.1 K/UL (ref 1–4.8)
LYMPHOCYTES RELATIVE PERCENT: 27 % (ref 24–44)
MCH RBC QN AUTO: 28.6 PG (ref 26–34)
MCHC RBC AUTO-ENTMCNC: 32.9 G/DL (ref 31–37)
MCV RBC AUTO: 86.9 FL (ref 80–100)
MONOCYTES NFR BLD: 0.6 K/UL (ref 0.1–1.2)
MONOCYTES NFR BLD: 7 % (ref 2–11)
NEUTROPHILS NFR BLD: 64 % (ref 36–66)
NEUTS SEG NFR BLD: 5 K/UL (ref 1.8–7.7)
NITRITE UR QL STRIP: NEGATIVE
PH UR STRIP: 6 [PH] (ref 5–8)
PLATELET # BLD AUTO: 193 K/UL (ref 140–450)
PMV BLD AUTO: 9.8 FL (ref 6–12)
POTASSIUM SERPL-SCNC: 4.4 MMOL/L (ref 3.7–5.3)
PROT SERPL-MCNC: 7.8 G/DL (ref 6.4–8.3)
PROT UR STRIP-MCNC: NEGATIVE MG/DL
RBC # BLD AUTO: 4.93 M/UL (ref 4–5.2)
SODIUM SERPL-SCNC: 140 MMOL/L (ref 135–144)
SP GR UR STRIP: 1.02 (ref 1–1.03)
UROBILINOGEN UR STRIP-ACNC: NORMAL EU/DL (ref 0–1)
WBC OTHER # BLD: 7.9 K/UL (ref 3.5–11)

## 2024-01-27 PROCEDURE — 96372 THER/PROPH/DIAG INJ SC/IM: CPT

## 2024-01-27 PROCEDURE — 80053 COMPREHEN METABOLIC PANEL: CPT

## 2024-01-27 PROCEDURE — 85025 COMPLETE CBC W/AUTO DIFF WBC: CPT

## 2024-01-27 PROCEDURE — 81003 URINALYSIS AUTO W/O SCOPE: CPT

## 2024-01-27 PROCEDURE — 6370000000 HC RX 637 (ALT 250 FOR IP): Performed by: NURSE PRACTITIONER

## 2024-01-27 PROCEDURE — 36415 COLL VENOUS BLD VENIPUNCTURE: CPT

## 2024-01-27 PROCEDURE — 99284 EMERGENCY DEPT VISIT MOD MDM: CPT

## 2024-01-27 PROCEDURE — 74176 CT ABD & PELVIS W/O CONTRAST: CPT

## 2024-01-27 PROCEDURE — 87086 URINE CULTURE/COLONY COUNT: CPT

## 2024-01-27 PROCEDURE — 6360000002 HC RX W HCPCS: Performed by: NURSE PRACTITIONER

## 2024-01-27 RX ORDER — KETOROLAC TROMETHAMINE 30 MG/ML
30 INJECTION, SOLUTION INTRAMUSCULAR; INTRAVENOUS ONCE
Status: COMPLETED | OUTPATIENT
Start: 2024-01-27 | End: 2024-01-27

## 2024-01-27 RX ORDER — KETOROLAC TROMETHAMINE 30 MG/ML
30 INJECTION, SOLUTION INTRAMUSCULAR; INTRAVENOUS ONCE
Status: DISCONTINUED | OUTPATIENT
Start: 2024-01-27 | End: 2024-01-27

## 2024-01-27 RX ORDER — OXYCODONE HYDROCHLORIDE AND ACETAMINOPHEN 5; 325 MG/1; MG/1
1 TABLET ORAL ONCE
Status: COMPLETED | OUTPATIENT
Start: 2024-01-27 | End: 2024-01-27

## 2024-01-27 RX ORDER — 0.9 % SODIUM CHLORIDE 0.9 %
1000 INTRAVENOUS SOLUTION INTRAVENOUS ONCE
Status: DISCONTINUED | OUTPATIENT
Start: 2024-01-27 | End: 2024-01-27

## 2024-01-27 RX ORDER — ONDANSETRON 4 MG/1
4 TABLET, ORALLY DISINTEGRATING ORAL 3 TIMES DAILY PRN
Qty: 21 TABLET | Refills: 0 | Status: SHIPPED | OUTPATIENT
Start: 2024-01-27

## 2024-01-27 RX ORDER — ONDANSETRON 2 MG/ML
4 INJECTION INTRAMUSCULAR; INTRAVENOUS ONCE
Status: DISCONTINUED | OUTPATIENT
Start: 2024-01-27 | End: 2024-01-27

## 2024-01-27 RX ORDER — OXYCODONE HYDROCHLORIDE AND ACETAMINOPHEN 5; 325 MG/1; MG/1
1 TABLET ORAL EVERY 6 HOURS PRN
Qty: 12 TABLET | Refills: 0 | Status: SHIPPED | OUTPATIENT
Start: 2024-01-27 | End: 2024-01-30

## 2024-01-27 RX ORDER — TAMSULOSIN HYDROCHLORIDE 0.4 MG/1
0.4 CAPSULE ORAL ONCE
Status: COMPLETED | OUTPATIENT
Start: 2024-01-27 | End: 2024-01-27

## 2024-01-27 RX ORDER — TAMSULOSIN HYDROCHLORIDE 0.4 MG/1
0.4 CAPSULE ORAL DAILY
Qty: 14 CAPSULE | Refills: 0 | Status: SHIPPED | OUTPATIENT
Start: 2024-01-27 | End: 2024-02-10

## 2024-01-27 RX ORDER — ONDANSETRON 4 MG/1
4 TABLET, ORALLY DISINTEGRATING ORAL ONCE
Status: COMPLETED | OUTPATIENT
Start: 2024-01-27 | End: 2024-01-27

## 2024-01-27 RX ADMIN — KETOROLAC TROMETHAMINE 30 MG: 30 INJECTION, SOLUTION INTRAMUSCULAR; INTRAVENOUS at 16:45

## 2024-01-27 RX ADMIN — ONDANSETRON 4 MG: 4 TABLET, ORALLY DISINTEGRATING ORAL at 16:45

## 2024-01-27 RX ADMIN — TAMSULOSIN HYDROCHLORIDE 0.4 MG: 0.4 CAPSULE ORAL at 18:14

## 2024-01-27 RX ADMIN — OXYCODONE HYDROCHLORIDE AND ACETAMINOPHEN 1 TABLET: 5; 325 TABLET ORAL at 18:14

## 2024-01-27 ASSESSMENT — PAIN SCALES - GENERAL: PAINLEVEL_OUTOF10: 7

## 2024-01-27 ASSESSMENT — PAIN DESCRIPTION - LOCATION: LOCATION: BACK

## 2024-01-27 ASSESSMENT — ENCOUNTER SYMPTOMS
BACK PAIN: 0
DIARRHEA: 0
NAUSEA: 1
SHORTNESS OF BREATH: 0
SORE THROAT: 0
ABDOMINAL PAIN: 1
VOMITING: 0
COUGH: 0

## 2024-01-27 ASSESSMENT — PAIN - FUNCTIONAL ASSESSMENT
PAIN_FUNCTIONAL_ASSESSMENT: 0-10
PAIN_FUNCTIONAL_ASSESSMENT: 0-10

## 2024-01-27 NOTE — ED PROVIDER NOTES
toxic-appearing.   HENT:      Head: Normocephalic and atraumatic.      Right Ear: External ear normal.      Left Ear: External ear normal.      Nose: Nose normal.      Mouth/Throat:      Mouth: Mucous membranes are moist.   Eyes:      General:         Right eye: No discharge.         Left eye: No discharge.      Conjunctiva/sclera: Conjunctivae normal.   Cardiovascular:      Rate and Rhythm: Normal rate and regular rhythm.      Pulses: Normal pulses.   Pulmonary:      Effort: Pulmonary effort is normal.      Breath sounds: Normal breath sounds.   Abdominal:      General: There is no distension.      Palpations: Abdomen is soft. There is no mass.      Tenderness: There is abdominal tenderness. There is no guarding.      Hernia: No hernia is present.      Comments: Tenderness across the lower abdomen without localized tenderness soft obese abdomen.  No peritoneal signs guarding or rigidity.   Musculoskeletal:         General: No deformity or signs of injury.      Cervical back: Normal range of motion and neck supple.      Comments: Reproducible left SI joint tenderness-patient has no radiculopathy, low left flank pain.  Normal inspection to the back no ecchymosis erythema or rashes.  No midline or thoracic tenderness.   Skin:     General: Skin is warm and dry.      Capillary Refill: Capillary refill takes less than 2 seconds.   Neurological:      General: No focal deficit present.      Mental Status: She is alert and oriented to person, place, and time. Mental status is at baseline.      Gait: Gait normal.   Psychiatric:         Mood and Affect: Mood normal.         Behavior: Behavior normal.         DIAGNOSTIC RESULTS     EKG: All EKG's are interpreted by the Emergency Department Physician who either signs or Co-signs this chart in the absence of a cardiologist.        RADIOLOGY:   Non-plain film images such as CT, Ultrasound and MRI are read by the radiologist. Plain radiographic images are visualized and  out renal colic were ordered.    A liter of normal saline was ordered 30 mg IV push Toradol patient does have Toradol listed as adverse reaction she has been instructed not to take NSAIDs because of her gastric bypass history, has no anaphylaxis response to NSAID medications, 4 mg IV push Zofran.     Patient was evaluated in the triage room, triage nurse requested medications be changed to IM and Toradol was changed to 30 mg IM and Zofran was changed to 4 mg ODT Zofran.    CBC negative for acute abnormalities no leukocytosis, CMP negative for acute abnormalities normal renal function, urinalysis does not show any evidence of urinary infection or significant dehydration.    CT abdomen and pelvis shows possible distal left ureteral calculus measuring 3 mm just proximal to the left UVJ without hydronephrosis evident no other urinary collecting system stone demonstrated hepatic Stata ptosis hepatomegaly.    Urine culture was added to the patient's workup at this time.    The patient was moved from triage to the main emergency department, still having mild pain was given 1 oral Percocet tab, was also started on Flomax the patient does have a history of icterus with Bactrim; however I discussed this with the patient and she states that she has been on Flomax previously without any issues, she was provided with a urine strainer she is already established with a urologist, I reviewed the patient's OARRS report; a short fill prescription for Percocet pain medication for severe pain, Zofran and ODT for nausea or vomiting, and Flomax was sent to the patient's pharmacy of choice patient is unable to use oral NSAIDs due to the previous history of gastric bypass.  The patient feels comfortable with going home I did recommend that she calls her urologist Monday morning updates on the ER visit and renal colic, reasons to return to the emergency department state on the discharge paperwork patient is discharged amatory in no acute

## 2024-01-27 NOTE — DISCHARGE INSTRUCTIONS
Take Flomax as prescribed.    Tylenol over-the-counter as directed for mild to moderate pain.    Zofran as prescribed for nausea or vomiting.    Percocet pain medication for severe pain no drinking alcohol or driving while taking this medication it can be habit-forming and cause constipation if you become constipated please use over-the-counter stool softener with stimulant; remember that this medication also has Tylenol/acetaminophen in it.    Return to the ER: Fevers, continued or worsening flank or abdominal pain, no urine output, weakness or confusion, or any other concerning symptoms.

## 2024-01-27 NOTE — ED PROVIDER NOTES
[facility] EMERGENCY DEPARTMENT  eMERGENCY dEPARTMENT eNCOUnter   Independent Attestation     Pt Name: Lisa K Cogan  MRN: [id number]  Birthdate 1975  Date of evaluation: 1/27/24       Lisa K Cogan is a 48 y.o. female who presents with Back Pain and Dysuria (Pt arrives with co lower back pain \"wrapping\" to bilateral sides and dysuria . Pt states she has had several kidney stones which needed intervention)        Based on the medical record, the care appears appropriate. I was personally available for consultation in the Emergency Department.    Polo Ludwig MD  Attending Emergency  Physician                Polo Ludwig MD  01/27/24 [id number]

## 2024-01-29 ENCOUNTER — TELEPHONE (OUTPATIENT)
Age: 49
End: 2024-01-29

## 2024-01-29 LAB
MICROORGANISM SPEC CULT: NORMAL
SPECIMEN DESCRIPTION: NORMAL

## 2024-01-29 NOTE — TELEPHONE ENCOUNTER
Patient instructed to drink at least 10 eight ounce glasses of water a day to facilitate passage of ureteral calculus.   Continue Flomax/tamsulosin 0.4 mg po qd until gone.  She has a 3 mm stone with a high likelihood of spontaneous passage.  Call us or go back to ER if fever >100.5, shaking chills, vomiting, unable to keep fluids down.

## 2024-01-29 NOTE — TELEPHONE ENCOUNTER
PT CALLED TO LET US KNOW SHE WENT TO THE [facility] ER OVER THE WEEKEND AND HAS ANOTHER KIDNEY STONE- IT IS ON THE LEFT, HER PAIN IS ABOUT A 4 AND SHE HAS A SLIGHT FEVER AND SOME NAUSEA- SHE THINKS SHE MIGHT HAVE PASSED THE STONE BECAUSE HER PAIN HAS CHANGED SINCE SHE WAS THERE- SHE IS JUST FOLLOWING UP WITH US BECAUSE THEY TOLD HER TO-PLEASE ADVISE-BÁRBARA

## 2024-01-30 ENCOUNTER — HOSPITAL ENCOUNTER (OUTPATIENT)
Dept: OCCUPATIONAL THERAPY | Age: 49
Setting detail: THERAPIES SERIES
Discharge: HOME | End: 2024-01-30
Payer: MEDICAID

## 2024-01-30 PROCEDURE — 97535 SELF CARE MNGMENT TRAINING: CPT

## 2024-01-30 PROCEDURE — 97016 VASOPNEUMATIC DEVICE THERAPY: CPT

## 2024-01-30 NOTE — FLOWSHEET NOTE
TREATMENT LOCATION:   [] [facility] [address]  [address]  P: [phone]  F: [phone] [x] [facility]   [address]  P: [phone]  F: [phone]        Lymphedema Services - Treatment Note of the Lower Extremity    Date:  2024  Patient: Lisa K Cogan  : 1975             MRN: [id number]  Referring Provider: Tasneem Cannon*       Phone: [phone]  Fax: [phone]  Insurance: [employer] (ID:[id number]) - pending benefits check following insurance change  Medical Diagnosis: I89.0  Rehab Codes: I89.0     Onset Date: 23  Visit# / total visits:  scheduled; Progress note due at visit 10 per POC.  ( Certification Dates: 2023 - 23)    Contraindications/Precautions:   [x] Inflammatory Disease of bowel or intestines      Subjective: Pt reports daughter has been sick and difficulty scheduling her own appts around that.     Pain: [x] YES    [] NO     Location: LLE      Pain Rating: ( 0-10 scale) : 1/10  Comments:     Plan for next session:  garment fit and train as able    Objective:   [x] Measurement [x] Bandaging [] MLD [] Skin care   [x] Education [] Self-bandaging [] Self-MLD [] Wound Care   [] Exercise [] Caregiver training [] Kinesiotaping [] Other:    [] Nutrition [] Garment fitting/training [] Vasopneumatic Pump       2024 observations: presents with BLE swelling below knees; continues to wear compression consistently; poor fit noted to R foot due to deformity     Circumferential Measurements   Measurements taken from nail base of D2 digit.  Measurements (cm) cm Right Left   Dorsum    10 21.0 24.0   Inframalleolar  (Y girth)     15 31.0 35.0   Supramalleolar  (ankle)   20 28.0 31.5   Distal Calf    23 31.0 35.5   Mid Calf    30 38.5 42.0   Proximal Calf   40 46.0 46.0   Knee    49 42.0 42.5   Distal thigh           Mid thigh         Proximal Thigh         Current Total:

## 2024-02-06 ENCOUNTER — OFFICE VISIT (OUTPATIENT)
Dept: VASCULAR SURGERY | Age: 49
End: 2024-02-06
Payer: MEDICAID

## 2024-02-06 VITALS
DIASTOLIC BLOOD PRESSURE: 84 MMHG | OXYGEN SATURATION: 96 % | HEIGHT: 64 IN | BODY MASS INDEX: 50.02 KG/M2 | WEIGHT: 293 LBS | SYSTOLIC BLOOD PRESSURE: 138 MMHG | RESPIRATION RATE: 18 BRPM | HEART RATE: 82 BPM

## 2024-02-06 DIAGNOSIS — I89.0 LYMPHEDEMA DUE TO VENOUS INSUFFICIENCY: Primary | ICD-10-CM

## 2024-02-06 DIAGNOSIS — M79.89 PAIN AND SWELLING OF LEFT LOWER EXTREMITY: ICD-10-CM

## 2024-02-06 DIAGNOSIS — M79.605 PAIN AND SWELLING OF LEFT LOWER EXTREMITY: ICD-10-CM

## 2024-02-06 DIAGNOSIS — I87.2 CHRONIC VENOUS INSUFFICIENCY OF LOWER EXTREMITY: ICD-10-CM

## 2024-02-06 DIAGNOSIS — M79.604 PAIN AND SWELLING OF RIGHT LOWER EXTREMITY: ICD-10-CM

## 2024-02-06 DIAGNOSIS — I87.2 LYMPHEDEMA DUE TO VENOUS INSUFFICIENCY: Primary | ICD-10-CM

## 2024-02-06 DIAGNOSIS — M79.89 PAIN AND SWELLING OF RIGHT LOWER EXTREMITY: ICD-10-CM

## 2024-02-06 PROCEDURE — 99213 OFFICE O/P EST LOW 20 MIN: CPT | Performed by: SURGERY

## 2024-02-06 NOTE — PROGRESS NOTES
carotid bruit.   Cardiovascular:      Rate and Rhythm: Normal rate and regular rhythm.      Pulses:           Dorsalis pedis pulses are 2+ on the right side and 2+ on the left side.        Posterior tibial pulses are 2+ on the right side and 2+ on the left side.   Pulmonary:      Effort: Pulmonary effort is normal. No respiratory distress.   Abdominal:      Palpations: Abdomen is soft.      Tenderness: There is no abdominal tenderness.   Musculoskeletal:      Cervical back: Full passive range of motion without pain.      Right lower leg: No swelling or tenderness.      Left lower leg: Swelling present. No tenderness. No edema.      Right foot: Normal capillary refill. No swelling or tenderness.      Left foot: Normal capillary refill. Swelling present. No tenderness.   Feet:      Right foot:      Skin integrity: No ulcer or skin breakdown.      Left foot:      Skin integrity: No ulcer or skin breakdown.   Skin:     General: Skin is warm.      Capillary Refill: Capillary refill takes less than 2 seconds.   Neurological:      Mental Status: She is alert and oriented to person, place, and time.      GCS: GCS eye subscore is 4. GCS verbal subscore is 5. GCS motor subscore is 6.      Sensory: Sensation is intact.      Motor: Motor function is intact.   Psychiatric:         Mood and Affect: Mood normal.         Speech: Speech normal.         Behavior: Behavior normal.         Thought Content: Thought content normal.     February 2024 October 2023 January 2023 December 2020      Imaging/Labs:     Venous duplex reveals no superficial or deep venous thrombosis     CT does not reveal severe narrowing of left common iliac vein to suggest May Thurner's Syndrome    Assessment and Plan:     Chronic venous insufficjnecy, leg swelling, lymphedema and pain of bilateral lower extremities   Continue compression and hopefully getting pumps soon  Yearly surveillance , sooner if symptoms get worse  Will work on trying to

## 2024-02-08 ASSESSMENT — ENCOUNTER SYMPTOMS
CHEST TIGHTNESS: 0
ABDOMINAL PAIN: 0
COLOR CHANGE: 0
SHORTNESS OF BREATH: 0
ALLERGIC/IMMUNOLOGIC NEGATIVE: 1

## 2024-02-20 ENCOUNTER — TELEPHONE (OUTPATIENT)
Dept: PRIMARY CARE CLINIC | Age: 49
End: 2024-02-20

## 2024-02-20 DIAGNOSIS — M79.604 PAIN AND SWELLING OF RIGHT LOWER EXTREMITY: ICD-10-CM

## 2024-02-20 DIAGNOSIS — I87.2 LYMPHEDEMA DUE TO VENOUS INSUFFICIENCY: ICD-10-CM

## 2024-02-20 DIAGNOSIS — M79.89 PAIN AND SWELLING OF RIGHT LOWER EXTREMITY: ICD-10-CM

## 2024-02-20 DIAGNOSIS — I89.0 LYMPHEDEMA DUE TO VENOUS INSUFFICIENCY: ICD-10-CM

## 2024-02-20 DIAGNOSIS — I87.1 MAY-THURNER SYNDROME: Primary | ICD-10-CM

## 2024-02-20 NOTE — TELEPHONE ENCOUNTER
Writer received a fax from [employer] wanting information from a Functional Capacity Exam for patients social security claim hearing that is scheduled for 4/9/24.    Writer called patient and patient states that she is filing for May-Rader syndrome, lymphedema, chronic anemia, scleroderma, treatment resistant depression and PTSD as well as possible carpal tunnel.    Please advise if you would like to complete this form, or if you would like patient to have a functional capacity test completed. Writer scanned form into patients .    Last Visit Date: 12/19/2023   Next Visit Date: Visit date not found

## 2024-02-20 NOTE — TELEPHONE ENCOUNTER
Pt states she was referred to a PT last year and was told they cannot help her with the carpal tunnel, so she is not sure if that will help now.

## 2024-02-20 NOTE — TELEPHONE ENCOUNTER
She will need to have a formal functional capacity exam to pursue disability.  I have placed referral to physical therapy for this.  Thanks

## 2024-02-20 NOTE — TELEPHONE ENCOUNTER
Writer called patient and explained this would not be for physical therapy. This is for the functional capacity exam. Patient states that she understands. Would like the information of the location and number sent to her [email or web address] as she is currently driving at this time.    Last Visit Date: 12/19/2023   Next Visit Date: Visit date not found

## 2024-02-23 ENCOUNTER — TELEPHONE (OUTPATIENT)
Dept: VASCULAR SURGERY | Age: 49
End: 2024-02-23

## 2024-02-23 NOTE — TELEPHONE ENCOUNTER
----- Message from Maria Antonia Jackson MA sent at 2/9/2024 11:14 AM EST -----  Left vm for patient to call office in regards to sending a script order for a scooter  ----- Message -----  From: Tasneem Cannon MD  Sent: 2/8/2024  11:03 AM EST  To: [facility]    So I think I can do the prescription for her scooter but would need to know where to send prescription to.  Can you guys ask her and see if we can get information put in chart so I can send prescription over.    thanks

## 2024-02-28 ENCOUNTER — HOSPITAL ENCOUNTER (OUTPATIENT)
Dept: NEUROLOGY | Age: 49
Discharge: HOME | End: 2024-02-28
Payer: MEDICAID

## 2024-02-28 DIAGNOSIS — M25.511 RIGHT SHOULDER PAIN, UNSPECIFIED CHRONICITY: ICD-10-CM

## 2024-02-28 DIAGNOSIS — G56.03 BILATERAL CARPAL TUNNEL SYNDROME: ICD-10-CM

## 2024-02-28 DIAGNOSIS — M54.2 NECK PAIN: ICD-10-CM

## 2024-02-28 DIAGNOSIS — M75.121 NONTRAUMATIC COMPLETE TEAR OF RIGHT ROTATOR CUFF: ICD-10-CM

## 2024-02-28 PROCEDURE — 95911 NRV CNDJ TEST 9-10 STUDIES: CPT | Performed by: PHYSICAL MEDICINE & REHABILITATION

## 2024-02-28 PROCEDURE — 95886 MUSC TEST DONE W/N TEST COMP: CPT | Performed by: PHYSICAL MEDICINE & REHABILITATION

## 2024-03-13 DIAGNOSIS — Z01.818 PRE-OP EVALUATION: Primary | ICD-10-CM

## 2024-03-13 PROCEDURE — 93000 ELECTROCARDIOGRAM COMPLETE: CPT | Performed by: SPECIALIST

## 2024-03-14 ENCOUNTER — HOSPITAL ENCOUNTER (OUTPATIENT)
Dept: PSYCHIATRY | Age: 49
Setting detail: THERAPIES SERIES
Discharge: HOME | End: 2024-03-14
Payer: MEDICAID

## 2024-03-14 DIAGNOSIS — F33.9 RECURRENT MAJOR DEPRESSION RESISTANT TO TREATMENT (HCC): Primary | ICD-10-CM

## 2024-03-14 PROCEDURE — 90792 PSYCH DIAG EVAL W/MED SRVCS: CPT | Performed by: PSYCHIATRY & NEUROLOGY

## 2024-03-14 NOTE — CONSULTS
Department of Psychiatry  Behavioral Health Consult    This was a virtual visit    REASON FOR CONSULT: Treatment resistant depression       History obtained from: Patient    HISTORY OF PRESENT ILLNESS:    The patient is a 48 y.o. female with significant past psychiatric history of treatment resistant depression who presents with treatment resistant depression. Feels tired all the time. Feels sad depressed all the time. She is unable to enjoy herself. She has constant negative thoughts. She has lots of anxiety most of the time and gets panic attacks on a daily basis.   She has a torn rotator. The pain and disability contribute to low mood.     Bipolar screen- Anger and irritability, lack of sleep (has always been a problem), thoughts race all the time. Episodes of talking a lot (different from baseline) while she is usually a quiet person. She tends to go off on tangents in conversations.   Denies excessive spending. Denies any risky behaviors. Denies episodes of increased energy levels and increased goal directed activity for any sustained periods.     No AVH. No delusions. She does worry about her ex who she sometimes feesl might want to watch her. She doesn't believe that he is.     Auvelity twice a day and Buspirone are currently prescribed.     The patient is currently receiving care for the above psychiatric illness.      Psychiatric Review of Systems           Obsessions and Compulsions: Denies       Yuliet or Hypomania: Denies     Hallucinations: Denies     Panic Attacks:  as above.      Delusions:  Denies     Phobias:  Denies     Trauma: Denies      Substance Abuse History:  ETOH: none  Marijuana: none  Opiates: none  Other Drugs: none      Past Psychiatric History:  Prior Diagnosis: Depression since age 12.   Had to be pulled out of school at the age of 15 to 17.   Currently on medication and therapy with [facility].   Hospitalization: no  Hx of Suicidal Attempts: yes- once as a teenager, she tried to slit her  Trileptal, Prozac, Ativan, Xanax and many others that she can't remember. Doesn't recall being on an antipsychotic.       Social History: Born and raised in [place]. Had a good childhood. At 12, she started to have depression. Grandmother's death in her teens was a huge setback. Has a GED. 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 Lives with sister and two kids age 19 and 17.     Past Medical History:        Diagnosis Date    Anxiety     Carpal tunnel syndrome     COVID-19 2021    Depression     Endometriosis     Fatty liver     Gestational diabetes     x 3 children    Headache     migraines    History of blood transfusion     Hypertension     CAITLIN (iron deficiency anemia)     iron infusions    Iron deficiency anemia     Kidney stones     Dr. Tapia seen 2022    May-Thurner syndrome     affects left side of body    Obesity     Scleroderma (HCC)     Snores     no cpap- never been teste for RAIN    Under care of team     PCP - RENETTA ALMARAZ CNP - LAST VISIT 2022    Under care of team     VASCULAR  - DR. RIBEIRO - 2021 - SEES FOR MAY-THURNER SYNDROME    Under care of team     HEM/ONC - DR. PERKINS - [place] - LAST VISIT - 2022    URI (upper respiratory infection) 10/23/2022    starting 10/23/2022 cough, fever, fatigue, sorethroat, congestion, seen by urgent care 10/25/22, ER on 10/29/22 for SOB, dizziness and N&V    Wears glasses     Wellness examination     follows with Behavioral Health, last seen 2022       Past Surgical History:        Procedure Laterality Date     SECTION  x 3    CHOLECYSTECTOMY      CYSTOSCOPY Left 2022    CYSTOSCOPY, URETEROSCOPY, STENT PLACEMENT    CYSTOSCOPY Left 2022    CYSTOSCOPY, URETEROSCOPY, STENT PLACEMENT performed by Doyle Tapia MD at [facility] OR    CYSTOSCOPY Left 2022    CYSTOSCOPY LEFT URETEROSCOPY HOLMIUM LASER WITH LEFT STENT EXCHANGE

## 2024-03-18 PROBLEM — F33.9 RECURRENT MAJOR DEPRESSION RESISTANT TO TREATMENT (HCC): Status: ACTIVE | Noted: 2024-03-18

## 2024-03-20 NOTE — ADDENDUM NOTE
Encounter addended by: Negar Hwang RN on: 3/20/2024 7:44 AM   Actions taken: Charge Capture section accepted

## 2024-03-21 ENCOUNTER — HOSPITAL ENCOUNTER (OUTPATIENT)
Age: 49
Discharge: HOME | End: 2024-03-21
Payer: MEDICAID

## 2024-03-21 DIAGNOSIS — K90.9 MALABSORPTION OF IRON: ICD-10-CM

## 2024-03-21 DIAGNOSIS — R79.89 ELEVATED FERRITIN: ICD-10-CM

## 2024-03-21 DIAGNOSIS — Z98.84 HISTORY OF BARIATRIC SURGERY: ICD-10-CM

## 2024-03-21 LAB
BASOPHILS # BLD: 0.1 K/UL (ref 0–0.2)
BASOPHILS NFR BLD: 2 % (ref 0–2)
EOSINOPHIL # BLD: 0.1 K/UL (ref 0–0.4)
EOSINOPHILS RELATIVE PERCENT: 2 % (ref 1–4)
ERYTHROCYTE [DISTWIDTH] IN BLOOD BY AUTOMATED COUNT: 14.5 % (ref 12.5–15.4)
FERRITIN SERPL-MCNC: 131 NG/ML (ref 13–150)
HCT VFR BLD AUTO: 43.7 % (ref 36–46)
HGB BLD-MCNC: 14.4 G/DL (ref 12–16)
IRON SATN MFR SERPL: 15 % (ref 20–55)
IRON SERPL-MCNC: 46 UG/DL (ref 37–145)
LYMPHOCYTES NFR BLD: 1.7 K/UL (ref 1–4.8)
LYMPHOCYTES RELATIVE PERCENT: 33 % (ref 24–44)
MCH RBC QN AUTO: 28.3 PG (ref 26–34)
MCHC RBC AUTO-ENTMCNC: 33 G/DL (ref 31–37)
MCV RBC AUTO: 85.6 FL (ref 80–100)
MONOCYTES NFR BLD: 0.4 K/UL (ref 0.1–1.2)
MONOCYTES NFR BLD: 8 % (ref 2–11)
NEUTROPHILS NFR BLD: 55 % (ref 36–66)
NEUTS SEG NFR BLD: 2.9 K/UL (ref 1.8–7.7)
PLATELET # BLD AUTO: 165 K/UL (ref 140–450)
PMV BLD AUTO: 9.8 FL (ref 6–12)
RBC # BLD AUTO: 5.1 M/UL (ref 4–5.2)
TIBC SERPL-MCNC: 312 UG/DL (ref 250–450)
UNSATURATED IRON BINDING CAPACITY: 266 UG/DL (ref 112–347)
WBC OTHER # BLD: 5.2 K/UL (ref 3.5–11)

## 2024-03-21 PROCEDURE — 83540 ASSAY OF IRON: CPT

## 2024-03-21 PROCEDURE — 85025 COMPLETE CBC W/AUTO DIFF WBC: CPT

## 2024-03-21 PROCEDURE — 83550 IRON BINDING TEST: CPT

## 2024-03-21 PROCEDURE — 81256 HFE GENE: CPT

## 2024-03-21 PROCEDURE — 36415 COLL VENOUS BLD VENIPUNCTURE: CPT

## 2024-03-21 PROCEDURE — 82728 ASSAY OF FERRITIN: CPT

## 2024-03-27 ENCOUNTER — HOSPITAL ENCOUNTER (OUTPATIENT)
Age: 49
Discharge: HOME | End: 2024-03-27
Payer: MEDICAID

## 2024-03-27 LAB
C282Y HEMOCHROMATOSIS MUT: NEGATIVE
H63D HEMOCHROMATOSIS MUT: NEGATIVE
HEMOCHROMATOSIS MUTATION INT: NORMAL
HEMOCHROMATOSIS SPECIMEN: NORMAL
S65C HEMOCHROMATOSIS MUT: NEGATIVE

## 2024-03-27 PROCEDURE — 93005 ELECTROCARDIOGRAM TRACING: CPT | Performed by: SPECIALIST

## 2024-03-28 LAB
EKG ATRIAL RATE: 75 BPM
EKG P AXIS: 49 DEGREES
EKG P-R INTERVAL: 152 MS
EKG Q-T INTERVAL: 372 MS
EKG QRS DURATION: 72 MS
EKG QTC CALCULATION (BAZETT): 415 MS
EKG R AXIS: -25 DEGREES
EKG T AXIS: 57 DEGREES
EKG VENTRICULAR RATE: 75 BPM

## 2024-03-28 PROCEDURE — 93010 ELECTROCARDIOGRAM REPORT: CPT | Performed by: INTERNAL MEDICINE

## 2024-04-03 ENCOUNTER — OFFICE VISIT (OUTPATIENT)
Dept: ORTHOPEDIC SURGERY | Age: 49
End: 2024-04-03
Payer: MEDICAID

## 2024-04-03 ENCOUNTER — OFFICE VISIT (OUTPATIENT)
Dept: ONCOLOGY | Age: 49
End: 2024-04-03
Payer: MEDICAID

## 2024-04-03 ENCOUNTER — TELEPHONE (OUTPATIENT)
Dept: ONCOLOGY | Age: 49
End: 2024-04-03

## 2024-04-03 VITALS — BODY MASS INDEX: 50.02 KG/M2 | RESPIRATION RATE: 14 BRPM | WEIGHT: 293 LBS | HEIGHT: 64 IN

## 2024-04-03 VITALS
WEIGHT: 293 LBS | RESPIRATION RATE: 18 BRPM | OXYGEN SATURATION: 97 % | BODY MASS INDEX: 52.07 KG/M2 | TEMPERATURE: 97.3 F | DIASTOLIC BLOOD PRESSURE: 96 MMHG | HEART RATE: 72 BPM | SYSTOLIC BLOOD PRESSURE: 143 MMHG

## 2024-04-03 DIAGNOSIS — K90.9 MALABSORPTION OF IRON: ICD-10-CM

## 2024-04-03 DIAGNOSIS — M75.101 TEAR OF RIGHT ROTATOR CUFF, UNSPECIFIED TEAR EXTENT, UNSPECIFIED WHETHER TRAUMATIC: Primary | ICD-10-CM

## 2024-04-03 DIAGNOSIS — E61.1 IRON DEFICIENCY: ICD-10-CM

## 2024-04-03 DIAGNOSIS — M25.511 RIGHT SHOULDER PAIN, UNSPECIFIED CHRONICITY: ICD-10-CM

## 2024-04-03 DIAGNOSIS — R79.89 ELEVATED FERRITIN: Primary | ICD-10-CM

## 2024-04-03 DIAGNOSIS — Z98.84 HISTORY OF BARIATRIC SURGERY: ICD-10-CM

## 2024-04-03 PROCEDURE — 20610 DRAIN/INJ JOINT/BURSA W/O US: CPT

## 2024-04-03 PROCEDURE — 99214 OFFICE O/P EST MOD 30 MIN: CPT | Performed by: INTERNAL MEDICINE

## 2024-04-03 PROCEDURE — 99213 OFFICE O/P EST LOW 20 MIN: CPT

## 2024-04-03 PROCEDURE — 99211 OFF/OP EST MAY X REQ PHY/QHP: CPT | Performed by: INTERNAL MEDICINE

## 2024-04-03 RX ORDER — FERROUS SULFATE 325(65) MG
325 TABLET ORAL
Qty: 90 TABLET | Refills: 1 | Status: SHIPPED | OUTPATIENT
Start: 2024-04-03

## 2024-04-03 RX ORDER — LIDOCAINE HYDROCHLORIDE 10 MG/ML
3 INJECTION, SOLUTION INFILTRATION; PERINEURAL ONCE
Status: COMPLETED | OUTPATIENT
Start: 2024-04-03 | End: 2024-04-03

## 2024-04-03 RX ORDER — TRIAMCINOLONE ACETONIDE 40 MG/ML
40 INJECTION, SUSPENSION INTRA-ARTICULAR; INTRAMUSCULAR ONCE
Status: COMPLETED | OUTPATIENT
Start: 2024-04-03 | End: 2024-04-03

## 2024-04-03 RX ADMIN — LIDOCAINE HYDROCHLORIDE 3 ML: 10 INJECTION, SOLUTION INFILTRATION; PERINEURAL at 10:55

## 2024-04-03 RX ADMIN — TRIAMCINOLONE ACETONIDE 40 MG: 40 INJECTION, SUSPENSION INTRA-ARTICULAR; INTRAMUSCULAR at 10:56

## 2024-04-03 NOTE — PROGRESS NOTES
CHEST PAIN
interventions. FINDINGS: The breasts are composed of scattered fibroglandular density.  No skin thickening, nipple contour changes, suspicious calcifications, areas of architectural distortion or significant interval changes are noted.     No evidence of malignancy.  Advise annual screening mammography. BI-RADS 1 BIRADS: BIRADS - CATEGORY 1 Negative, no evidence of malignancy.  Normal interval follow-up is recommended in 12 months. OVERALL ASSESSMENT - NEGATIVE A letter of notification will be sent to the patient regarding the results. The American College of Radiology recommends annual mammograms for women 40 years and older.     Impression:  Iron deficiency anemia with malabsorption component  HX gastric bypass, 2009  HX scleroderma   Vitamin D deficiency  Fatigue  Elevated CA-125  May Thurner syndrome    Plan:  Personally reviewed results of lab work-up and the relevant clinical data  Patient ferritin now within range.  Iron saturation at 15% consistent with functional iron deficiency.  Hemoglobin is within range.  Continue oral iron supplementation 1 tablet every other day with vitamin C.  I believe patient's fatigue is multifactorial.  Given history of gastric bypass patient will be at risk of nutritional deficiencies will continue to check periodically  Hereditary hemochromatosis panel came back negative  Return to clinic in 6 months with repeat labs  Patient multiple questions which I answered to the best of my ability    YOANA PERKINS MD            This note is created with the assistance of a speech recognition program.  While intending to generate a document that actually reflects the content of the visit, the document can still have some errors including those of syntax and sound a like substitutions which may escape proof reading.  It such instances, actual meaning can be extrapolated by contextual diversion.

## 2024-04-03 NOTE — TELEPHONE ENCOUNTER
Rv in 6 months with labs 1 week prior      Patient is scheduled on 10/9 with labs one week before      Patient was given AVS and schedule

## 2024-04-04 ENCOUNTER — ANESTHESIA EVENT (OUTPATIENT)
Dept: OPERATING ROOM | Age: 49
End: 2024-04-04
Payer: MEDICAID

## 2024-04-04 NOTE — FLOWSHEET NOTE
[] [facility]  Outpatient Rehabilitation &  Therapy  [address]  [phone]  [phone] [x] [facility]  Outpatient Rehabilitation &  Therapy  [address]  P: [phone]  F: [phone] [] [facility]  Outpatient Rehabilitation &  Therapy  [address]  P: [phone]  F: [phone] [] [facility]  Outpatient Rehabilitation &  Therapy  [address]  [phone]  [phone] [] [facility]  Outpatient Rehabilitation &  Therapy  [address]   P: [phone]  F: [phone]  [] [facility]  Outpatient Rehabilitation &  Therapy  [address]  P: [phone]  F: [phone] [] [facility]  Outpatient Rehabilitation &  Therapy  [address]  P: [phone]  F: [phone] [] [facility]  Outpatient Rehabilitation &  Therapy  [address]  P: [phone]  F: [phone] [] [facility]  Outpatient Rehabilitation &  Therapy  [address]  P: [phone]  F: [phone]  [] [facility] Outpatient Rehabilitation &  Therapy  [address]  P: [phone]  F: [phone]     Therapy Cancel/No Show note    Date: 2024  Patient: Lisa K Cogan  : 1975  MRN: [id number]    Cancels/No Shows to date: 1  0    For today's appointment patient:    [x]  Cancelled    [] Rescheduled appointment    [] No-show     Reason given by patient:    []  Patient ill    []  Conflicting appointment    [] No transportation      [] Conflict with work    [] No reason given    [] Weather related    [] COVID-19    [x] Other:      Comments:  I spoke with patient on 2024 she reports her hearing for disability is scheduled for the  day as her FCE and at 12:30.  She reports her  said hearing cannot be rescheduled at this

## 2024-04-04 NOTE — PROGRESS NOTES
DAY OF SURGERY/PROCEDURE  GUIDELINES    As a patient at the [facility], you can expect quality medical and nursing care that is centered on your individual needs. It is our goal to make your surgical experience as comfortable and excellent as possible.  ________________________________________________________________________    The following instructions are general guidelines, if any information on this sheet is different from what your doctor has instructed you to do, please follow your doctor's instructions.    Please arrive on 4/5 @ 1000 am       Enter through entrance C. Check in at registration     Upon arrival you will be taken to the pre-operative area to get ready for surgery, your family will stay in the waiting room and visit with you once you are ready for surgery. Due to special limitations please limit visitation to 1-2 members of your family at a time. When it is time for surgery your family will return to the waiting room.    Nothing to eat, drink, smoke, suck or chew after midnight (no water, gum, mints, cigarettes, cigars, pipes, snuff, chewing tobacco, etc.) or your surgery may be canceled.     Take a shower or bath on the morning of your surgery/procedure (Hibiclens if directed) Do not apply any lotions.    Brush your teeth, but do not swallow any water    IN CASE OF ILLNESS - If you have a cold or flu symptoms (high fever, runny nose, sore throat, cough, etc.) rash, nausea, vomiting, loose stools, and/or recent contact with someone who has a contagious disease (chick pox, measles, etc.) please call your doctor before coming to the surgery center    Take a small sip of water with heart, blood pressure, and/or seizure medication the morning of surgery.     DO NOT take anticoagulants (blood thinners, aspirin or aspirin-containing products) as instructed by your physician.    Leave all jewelry at home and wear loose, comfortable clothing that is easy to put on and take off.

## 2024-04-05 ENCOUNTER — ANESTHESIA (OUTPATIENT)
Dept: OPERATING ROOM | Age: 49
End: 2024-04-05
Payer: MEDICAID

## 2024-04-05 ENCOUNTER — HOSPITAL ENCOUNTER (OUTPATIENT)
Age: 49
Setting detail: OUTPATIENT SURGERY
Discharge: HOME | End: 2024-04-05
Attending: SPECIALIST | Admitting: SPECIALIST
Payer: MEDICAID

## 2024-04-05 VITALS
BODY MASS INDEX: 50.02 KG/M2 | RESPIRATION RATE: 20 BRPM | TEMPERATURE: 97.3 F | OXYGEN SATURATION: 97 % | SYSTOLIC BLOOD PRESSURE: 118 MMHG | HEART RATE: 67 BPM | WEIGHT: 293 LBS | HEIGHT: 64 IN | DIASTOLIC BLOOD PRESSURE: 74 MMHG

## 2024-04-05 PROCEDURE — 3700000001 HC ADD 15 MINUTES (ANESTHESIA): Performed by: SPECIALIST

## 2024-04-05 PROCEDURE — 2709999900 HC NON-CHARGEABLE SUPPLY: Performed by: SPECIALIST

## 2024-04-05 PROCEDURE — 3600000003 HC SURGERY LEVEL 3 BASE: Performed by: SPECIALIST

## 2024-04-05 PROCEDURE — 3700000000 HC ANESTHESIA ATTENDED CARE: Performed by: SPECIALIST

## 2024-04-05 PROCEDURE — 2500000003 HC RX 250 WO HCPCS: Performed by: NURSE ANESTHETIST, CERTIFIED REGISTERED

## 2024-04-05 PROCEDURE — 2580000003 HC RX 258: Performed by: ANESTHESIOLOGY

## 2024-04-05 PROCEDURE — 7100000010 HC PHASE II RECOVERY - FIRST 15 MIN: Performed by: SPECIALIST

## 2024-04-05 PROCEDURE — 7100000011 HC PHASE II RECOVERY - ADDTL 15 MIN: Performed by: SPECIALIST

## 2024-04-05 PROCEDURE — 2580000003 HC RX 258: Performed by: SPECIALIST

## 2024-04-05 PROCEDURE — 6360000002 HC RX W HCPCS: Performed by: NURSE ANESTHETIST, CERTIFIED REGISTERED

## 2024-04-05 PROCEDURE — 2580000003 HC RX 258: Performed by: NURSE ANESTHETIST, CERTIFIED REGISTERED

## 2024-04-05 PROCEDURE — 6360000002 HC RX W HCPCS: Performed by: SPECIALIST

## 2024-04-05 PROCEDURE — 52287 CYSTOSCOPY CHEMODENERVATION: CPT | Performed by: SPECIALIST

## 2024-04-05 PROCEDURE — 3600000013 HC SURGERY LEVEL 3 ADDTL 15MIN: Performed by: SPECIALIST

## 2024-04-05 RX ORDER — SODIUM CHLORIDE 9 MG/ML
INJECTION, SOLUTION INTRAVENOUS CONTINUOUS
Status: DISCONTINUED | OUTPATIENT
Start: 2024-04-05 | End: 2024-04-05 | Stop reason: HOSPADM

## 2024-04-05 RX ORDER — METOCLOPRAMIDE HYDROCHLORIDE 5 MG/ML
10 INJECTION INTRAMUSCULAR; INTRAVENOUS
Status: DISCONTINUED | OUTPATIENT
Start: 2024-04-05 | End: 2024-04-05 | Stop reason: HOSPADM

## 2024-04-05 RX ORDER — DIPHENHYDRAMINE HYDROCHLORIDE 50 MG/ML
12.5 INJECTION INTRAMUSCULAR; INTRAVENOUS
Status: DISCONTINUED | OUTPATIENT
Start: 2024-04-05 | End: 2024-04-05 | Stop reason: HOSPADM

## 2024-04-05 RX ORDER — KETAMINE HYDROCHLORIDE 10 MG/ML
INJECTION, SOLUTION INTRAMUSCULAR; INTRAVENOUS PRN
Status: DISCONTINUED | OUTPATIENT
Start: 2024-04-05 | End: 2024-04-05 | Stop reason: SDUPTHER

## 2024-04-05 RX ORDER — GLYCOPYRROLATE 0.2 MG/ML
0.4 INJECTION INTRAMUSCULAR; INTRAVENOUS ONCE
Status: DISCONTINUED | OUTPATIENT
Start: 2024-04-05 | End: 2024-04-05 | Stop reason: HOSPADM

## 2024-04-05 RX ORDER — LABETALOL HYDROCHLORIDE 5 MG/ML
10 INJECTION, SOLUTION INTRAVENOUS
Status: DISCONTINUED | OUTPATIENT
Start: 2024-04-05 | End: 2024-04-05 | Stop reason: HOSPADM

## 2024-04-05 RX ORDER — NITROFURANTOIN 25; 75 MG/1; MG/1
100 CAPSULE ORAL 2 TIMES DAILY
Qty: 10 CAPSULE | Refills: 0 | Status: SHIPPED | OUTPATIENT
Start: 2024-04-05

## 2024-04-05 RX ORDER — IPRATROPIUM BROMIDE AND ALBUTEROL SULFATE 2.5; .5 MG/3ML; MG/3ML
1 SOLUTION RESPIRATORY (INHALATION)
Status: DISCONTINUED | OUTPATIENT
Start: 2024-04-05 | End: 2024-04-05 | Stop reason: HOSPADM

## 2024-04-05 RX ORDER — SODIUM CHLORIDE, SODIUM LACTATE, POTASSIUM CHLORIDE, CALCIUM CHLORIDE 600; 310; 30; 20 MG/100ML; MG/100ML; MG/100ML; MG/100ML
INJECTION, SOLUTION INTRAVENOUS CONTINUOUS PRN
Status: DISCONTINUED | OUTPATIENT
Start: 2024-04-05 | End: 2024-04-05 | Stop reason: SDUPTHER

## 2024-04-05 RX ORDER — OXYCODONE HYDROCHLORIDE AND ACETAMINOPHEN 5; 325 MG/1; MG/1
2 TABLET ORAL
Status: DISCONTINUED | OUTPATIENT
Start: 2024-04-05 | End: 2024-04-05 | Stop reason: HOSPADM

## 2024-04-05 RX ORDER — SODIUM CHLORIDE 0.9 % (FLUSH) 0.9 %
SYRINGE (ML) INJECTION PRN
Status: DISCONTINUED | OUTPATIENT
Start: 2024-04-05 | End: 2024-04-05 | Stop reason: ALTCHOICE

## 2024-04-05 RX ORDER — SODIUM CHLORIDE 9 MG/ML
INJECTION, SOLUTION INTRAMUSCULAR; INTRAVENOUS; SUBCUTANEOUS
Status: DISCONTINUED
Start: 2024-04-05 | End: 2024-04-05 | Stop reason: HOSPADM

## 2024-04-05 RX ORDER — HYDRALAZINE HYDROCHLORIDE 20 MG/ML
10 INJECTION INTRAMUSCULAR; INTRAVENOUS
Status: DISCONTINUED | OUTPATIENT
Start: 2024-04-05 | End: 2024-04-05 | Stop reason: HOSPADM

## 2024-04-05 RX ORDER — SODIUM CHLORIDE 0.9 % (FLUSH) 0.9 %
5-40 SYRINGE (ML) INJECTION EVERY 12 HOURS SCHEDULED
Status: DISCONTINUED | OUTPATIENT
Start: 2024-04-05 | End: 2024-04-05 | Stop reason: HOSPADM

## 2024-04-05 RX ORDER — NALOXONE HYDROCHLORIDE 0.4 MG/ML
INJECTION, SOLUTION INTRAMUSCULAR; INTRAVENOUS; SUBCUTANEOUS PRN
Status: DISCONTINUED | OUTPATIENT
Start: 2024-04-05 | End: 2024-04-05 | Stop reason: HOSPADM

## 2024-04-05 RX ORDER — PROPOFOL 10 MG/ML
INJECTION, EMULSION INTRAVENOUS PRN
Status: DISCONTINUED | OUTPATIENT
Start: 2024-04-05 | End: 2024-04-05 | Stop reason: SDUPTHER

## 2024-04-05 RX ORDER — MIDAZOLAM HYDROCHLORIDE 1 MG/ML
INJECTION INTRAMUSCULAR; INTRAVENOUS PRN
Status: DISCONTINUED | OUTPATIENT
Start: 2024-04-05 | End: 2024-04-05 | Stop reason: SDUPTHER

## 2024-04-05 RX ORDER — SODIUM CHLORIDE, SODIUM LACTATE, POTASSIUM CHLORIDE, CALCIUM CHLORIDE 600; 310; 30; 20 MG/100ML; MG/100ML; MG/100ML; MG/100ML
INJECTION, SOLUTION INTRAVENOUS CONTINUOUS
Status: DISCONTINUED | OUTPATIENT
Start: 2024-04-05 | End: 2024-04-05 | Stop reason: HOSPADM

## 2024-04-05 RX ORDER — OXYCODONE HYDROCHLORIDE AND ACETAMINOPHEN 5; 325 MG/1; MG/1
1 TABLET ORAL
Status: DISCONTINUED | OUTPATIENT
Start: 2024-04-05 | End: 2024-04-05 | Stop reason: HOSPADM

## 2024-04-05 RX ORDER — SODIUM CHLORIDE 0.9 % (FLUSH) 0.9 %
5-40 SYRINGE (ML) INJECTION PRN
Status: DISCONTINUED | OUTPATIENT
Start: 2024-04-05 | End: 2024-04-05 | Stop reason: HOSPADM

## 2024-04-05 RX ORDER — MEPERIDINE HYDROCHLORIDE 50 MG/ML
12.5 INJECTION INTRAMUSCULAR; INTRAVENOUS; SUBCUTANEOUS EVERY 5 MIN PRN
Status: DISCONTINUED | OUTPATIENT
Start: 2024-04-05 | End: 2024-04-05 | Stop reason: HOSPADM

## 2024-04-05 RX ORDER — GLYCOPYRROLATE 0.2 MG/ML
INJECTION INTRAMUSCULAR; INTRAVENOUS PRN
Status: DISCONTINUED | OUTPATIENT
Start: 2024-04-05 | End: 2024-04-05 | Stop reason: SDUPTHER

## 2024-04-05 RX ORDER — SODIUM CHLORIDE 9 MG/ML
INJECTION, SOLUTION INTRAVENOUS PRN
Status: DISCONTINUED | OUTPATIENT
Start: 2024-04-05 | End: 2024-04-05 | Stop reason: HOSPADM

## 2024-04-05 RX ORDER — ONDANSETRON 2 MG/ML
4 INJECTION INTRAMUSCULAR; INTRAVENOUS
Status: DISCONTINUED | OUTPATIENT
Start: 2024-04-05 | End: 2024-04-05 | Stop reason: HOSPADM

## 2024-04-05 RX ORDER — PROPOFOL 10 MG/ML
INJECTION, EMULSION INTRAVENOUS CONTINUOUS PRN
Status: DISCONTINUED | OUTPATIENT
Start: 2024-04-05 | End: 2024-04-05 | Stop reason: SDUPTHER

## 2024-04-05 RX ORDER — MIDAZOLAM HYDROCHLORIDE 2 MG/2ML
2 INJECTION, SOLUTION INTRAMUSCULAR; INTRAVENOUS
Status: DISCONTINUED | OUTPATIENT
Start: 2024-04-05 | End: 2024-04-05 | Stop reason: HOSPADM

## 2024-04-05 RX ADMIN — Medication 3000 MG: at 12:06

## 2024-04-05 RX ADMIN — SODIUM CHLORIDE, POTASSIUM CHLORIDE, SODIUM LACTATE AND CALCIUM CHLORIDE: 600; 310; 30; 20 INJECTION, SOLUTION INTRAVENOUS at 10:52

## 2024-04-05 RX ADMIN — MIDAZOLAM 2 MG: 1 INJECTION INTRAMUSCULAR; INTRAVENOUS at 12:01

## 2024-04-05 RX ADMIN — MIDAZOLAM 2 MG: 1 INJECTION INTRAMUSCULAR; INTRAVENOUS at 12:04

## 2024-04-05 RX ADMIN — GLYCOPYRROLATE 0.2 MG: 0.2 INJECTION INTRAMUSCULAR; INTRAVENOUS at 12:01

## 2024-04-05 RX ADMIN — PROPOFOL 30 MG: 10 INJECTION, EMULSION INTRAVENOUS at 12:04

## 2024-04-05 RX ADMIN — PROPOFOL 120 MCG/KG/MIN: 10 INJECTION, EMULSION INTRAVENOUS at 12:04

## 2024-04-05 RX ADMIN — SODIUM CHLORIDE, POTASSIUM CHLORIDE, SODIUM LACTATE AND CALCIUM CHLORIDE: 600; 310; 30; 20 INJECTION, SOLUTION INTRAVENOUS at 12:04

## 2024-04-05 RX ADMIN — KETAMINE HYDROCHLORIDE 30 MG: 10 INJECTION INTRAMUSCULAR; INTRAVENOUS at 12:04

## 2024-04-05 ASSESSMENT — PAIN - FUNCTIONAL ASSESSMENT
PAIN_FUNCTIONAL_ASSESSMENT: NONE - DENIES PAIN
PAIN_FUNCTIONAL_ASSESSMENT: 0-10

## 2024-04-05 NOTE — ANESTHESIA POSTPROCEDURE EVALUATION
Department of Anesthesiology  Postprocedure Note    Patient: Lisa K Cogan  MRN: [id number]  YOB: 1975  Date of evaluation: 4/5/2024    Procedure Summary       Date: 04/05/24 Room / Location: [address]    Anesthesia Start: 1201 Anesthesia Stop: 1222    Procedure: CYSTOSCOPY INJECTION BOTOX 100 UNITS Diagnosis:       Urge urinary incontinence      (Urge urinary incontinence [N39.41])    Surgeons: Doyle Tapia MD Responsible Provider: Tomasz Smith MD    Anesthesia Type: MAC ASA Status: 3            Anesthesia Type: No value filed.    Miguel Angel Phase I: Miguel Angel Score: 10    Miguel Angel Phase II: Miguel Angel Score: 10    Anesthesia Post Evaluation    Patient location during evaluation: PACU  Patient participation: complete - patient participated  Level of consciousness: awake and alert  Airway patency: patent  Nausea & Vomiting: no nausea and no vomiting  Cardiovascular status: hemodynamically stable  Respiratory status: room air and spontaneous ventilation  Hydration status: euvolemic  Multimodal analgesia pain management approach  Pain management: adequate    No notable events documented.

## 2024-04-05 NOTE — ANESTHESIA PRE PROCEDURE
10:12 AM    HGB 14.4 03/21/2024 10:12 AM    HCT 43.7 03/21/2024 10:12 AM    MCV 85.6 03/21/2024 10:12 AM    RDW 14.5 03/21/2024 10:12 AM     03/21/2024 10:12 AM       CMP:   Lab Results   Component Value Date/Time     01/27/2024 04:50 PM    K 4.4 01/27/2024 04:50 PM     01/27/2024 04:50 PM    CO2 26 01/27/2024 04:50 PM    BUN 13 01/27/2024 04:50 PM    CREATININE 0.8 01/27/2024 04:50 PM    GFRAA >60 04/08/2022 03:30 PM    AGRATIO 1.2 01/27/2024 04:50 PM    LABGLOM >60 01/27/2024 04:50 PM    GLUCOSE 93 01/27/2024 04:50 PM    PROT 7.8 01/27/2024 04:50 PM    CALCIUM 9.3 01/27/2024 04:50 PM    BILITOT 0.2 01/27/2024 04:50 PM    ALKPHOS 78 01/27/2024 04:50 PM    AST 21 01/27/2024 04:50 PM    ALT 33 01/27/2024 04:50 PM       POC Tests: No results for input(s): \"POCGLU\", \"POCNA\", \"POCK\", \"POCCL\", \"POCBUN\", \"POCHEMO\", \"POCHCT\" in the last 72 hours.    Coags: No results found for: \"PROTIME\", \"INR\", \"APTT\"    HCG (If Applicable):   Lab Results   Component Value Date    HCG NEGATIVE 10/26/2021    HCGQUANT <1 03/18/2021        ABGs: No results found for: \"PHART\", \"PO2ART\", \"NPC6VEP\", \"KCJ5HRS\", \"BEART\", \"P5DTVUKU\"     Type & Screen (If Applicable):  No results found for: \"LABABO\", \"LABRH\"    Drug/Infectious Status (If Applicable):  No results found for: \"HIV\", \"HEPCAB\"    COVID-19 Screening (If Applicable):   Lab Results   Component Value Date/Time    COVID19 Not Detected 11/21/2023 08:50 PM           Anesthesia Evaluation  Patient summary reviewed and Nursing notes reviewed  Airway: Mallampati: I  TM distance: >3 FB   Neck ROM: full  Mouth opening: > = 3 FB   Dental: normal exam         Pulmonary:normal exam    (+)     sleep apnea: on noncompliant,                                  Cardiovascular:    (+) hypertension:      ECG reviewed                     ROS comment: -PERIPHERAL VASCULAR DISEASE  -EKG - SR @ 75     Neuro/Psych:   Negative Neuro/Psych ROS              GI/Hepatic/Renal:   (+) morbid obesity

## 2024-04-05 NOTE — DISCHARGE INSTRUCTIONS
Encourage patient to drink at least 64 ounces of water a day.  Reassure patient it is common to see gross hematuria and this can last a few days.  No driving for 24 hours.  Callum Tapia M.D.'s office will reach out to patient to set up a follow up  in 3 weeks to reassess symptoms and check Postvoid Residual .  Call office if fever > 101, shaking chills, inability to void.  See Rx for antibiotics.  Patient may use OTC Azo 1 po tid prn bladder pain or burning with urination.       Activity  You have had anesthesia today  Do not drive, operate heavy equipment, consume alcoholic beverages, or make any important decisions  for 24 hours   If you are taking pain medication: Do not drive or consume alcohol.  Take your time changing positions today. You may feel light headed or dizzy if you move too quickly.   Continue your home medications as ordered by your physician.  Diet   You can eat your normal diet when you feel well. You should start off with bland foods like chicken soup, toast, or yogurt. Then advance as tolerated.  Drink plenty of fluids (unless your doctor tells you not to). Your urine should be very lightly colored without a strong odor.

## 2024-04-05 NOTE — H&P
[facility] Urology  Doyle Tapia MD FACS    History and Physical    Patient:  Lisa K Cogan  MRN: [id number]  YOB: 1975    CHIEF COMPLAINT: Urge urinary incontinence     HISTORY OF PRESENT ILLNESS:   The patient is a 48 y.o. female who presents with:  Patient did well after 9/15/23 Cystoscopy and transvesical Botox (100 IU) Rx.  Patient here for a repeat Cystoscopy and transvesical Botox (100 IU) in early to mid March 2024 to maintain the clinical effect.   Last AUA Symptom Score (QOL): 26 (6)  Today's AUA Symptom Score (QOL): 20 (3)     Summary of old records:   Left 8 mm UPJ calculus on 11/23/22 CT; 12/14/22 L HLL; 6/7/23 KUB neg  1/25/23 Litholink: volume=1468 mL, Ca=90, oxalate=62, citrate=245  Hyperoxaluria, mild  Hypocitraturia: Ca citrate 500 mg bid  Urge incontinence: 4-5 ppd; Solifenacin (Vesicare) 5 mg po qd, added Gemtesa (vibegron) 75 mg qd   9/15/23 Botox 100 IU    Patient's old records, notes and chart reviewed and summarized above.    Past Medical History:    Past Medical History:   Diagnosis Date    Anxiety     Carpal tunnel syndrome     COVID-19 11/06/2021    Depression     Endometriosis     Fatty liver     Gestational diabetes     x 3 children    Headache     migraines    History of blood transfusion     Hypertension     CAITLIN (iron deficiency anemia)     iron infusions    Iron deficiency anemia     Kidney stones     Dr. Tapia seen 11/28/2022    May-Thurner syndrome     affects left side of body    Obesity     Rotator cuff injury     Scleroderma (HCC)     Snores     no cpap- never been teste for RAIN    Under care of team     PCP - RENETTA ALMARAZ CNP - LAST VISIT 8/31/2022    Under care of team     VASCULAR  - DR. RIBEIRO - 8/2021 - SEES FOR MAY-THURNER SYNDROME    Under care of team     [facility]    URI (upper respiratory infection) 10/23/2022    starting 10/23/2022 cough, fever, fatigue, sorethroat,

## 2024-04-05 NOTE — OP NOTE
Operative Note      Patient: Lisa K Cogan  YOB: 1975  MRN: [id number]    Date of Procedure: 4/5/2024    Pre-Op Diagnosis Codes:     * Urge urinary incontinence [N39.41]    Post-Op Diagnosis: Same       Procedure(s):  CYSTOSCOPY INJECTION BOTOX 100 UNITS    Surgeon(s):  Doyle Taipa MD    Assistant:   * No surgical staff found *    Anesthesia: Monitor Anesthesia Care    Estimated Blood Loss (mL): Minimal    Complications: None    Specimens:   * No specimens in log *    Implants:  * No implants in log *      Drains: * No LDAs found *    Findings:  Infection Present At Time Of Surgery (PATOS) (choose all levels that have infection present):  No infection present  Other Findings: see below     Detailed Description of Procedure:   NDICATIONS:  This is a 48 y.o. female presents today for a cystoscopy and transvesical Botox injection to treat medically refractory urge urinary incontinence.  After risks, benefits and alternatives of the procedure were discussed with the patient, informed consent was obtained and the patient elected to proceed.  The patient was given Ancef 3 gm IV on call to OR for antibiotic prophylaxis. Patient had EPC cuffs placed for VTE prophylaxis.    DETAILS OF PROCEDURE:  The patient was brought back to the operating room.  She was prepped and draped in usual sterile surgical fashion after being placed in dorsal lithotomy position.  A timeout was taken per protocol with everyone in agreement. The 22F rigid cystoscope was assembled using a 30 degree lens and passed per the urethra. The urethra was normal without any lesions. The bladder was entered with ease and inspected thoroughly and systematically which did not show any lesions or tumors, stone, fistulous tracts, diverticula. Both ureteral orifices were normal with clear efflux of urine. Once within the bladder the injection needle was advanced and purged of air.  A total of 100 I.U. units of Botox were injected into the  bladder wall via 20 injection sites (5 units/injection) using a volume of 0.5 mL at each site.  The ureteral orifices were avoided.  Once completed, the bladder was surveyed. There was no evidence of active bleeding from the injection sites.  The patient tolerated the procedure well.  The patient was taken to recovery period and discharged home in stable condition.       Plan:  Follow up in 3 weeks to reassess symptoms and check Postvoid Residual.     Electronically signed by Doyle Tapia MD on 4/5/2024 at 12:16 PM

## 2024-04-09 ENCOUNTER — HOSPITAL ENCOUNTER (OUTPATIENT)
Dept: PHYSICAL THERAPY | Facility: CLINIC | Age: 49
Setting detail: THERAPIES SERIES
Discharge: HOME | End: 2024-04-09

## 2024-05-08 ENCOUNTER — OFFICE VISIT (OUTPATIENT)
Age: 49
End: 2024-05-08
Payer: MEDICAID

## 2024-05-08 VITALS — BODY MASS INDEX: 50.02 KG/M2 | WEIGHT: 293 LBS | HEIGHT: 64 IN

## 2024-05-08 DIAGNOSIS — Z87.442 PERSONAL HISTORY OF KIDNEY STONES: ICD-10-CM

## 2024-05-08 DIAGNOSIS — N39.41 URGE INCONTINENCE: Primary | ICD-10-CM

## 2024-05-08 DIAGNOSIS — N32.81 OAB (OVERACTIVE BLADDER): ICD-10-CM

## 2024-05-08 DIAGNOSIS — R39.12 WEAK URINARY STREAM: ICD-10-CM

## 2024-05-08 LAB
BILIRUBIN, POC: NORMAL
BLOOD URINE, POC: NORMAL
CLARITY, POC: CLEAR
COLOR, POC: YELLOW
GLUCOSE URINE, POC: NORMAL
KETONES, POC: NORMAL
LEUKOCYTE EST, POC: NORMAL
NITRITE, POC: NORMAL
PH, POC: NORMAL
POST VOID RESIDUAL (PVR): 87 ML
PROTEIN, POC: NORMAL
SPECIFIC GRAVITY, POC: NORMAL
UROBILINOGEN, POC: NORMAL

## 2024-05-08 PROCEDURE — 51798 US URINE CAPACITY MEASURE: CPT | Performed by: SPECIALIST

## 2024-05-08 PROCEDURE — 99214 OFFICE O/P EST MOD 30 MIN: CPT | Performed by: SPECIALIST

## 2024-05-08 PROCEDURE — 81003 URINALYSIS AUTO W/O SCOPE: CPT | Performed by: SPECIALIST

## 2024-05-08 RX ORDER — ESCITALOPRAM OXALATE 10 MG/1
10 TABLET ORAL DAILY
Status: ON HOLD | COMMUNITY
Start: 2024-02-22

## 2024-05-08 NOTE — PROGRESS NOTES
Doyle Tapia MD [facility] Urology Office Progress Note    Patient:  Lisa K Cogan  YOB: 1975  Date: 5/8/2024    HISTORY OF PRESENT ILLNESS:   The patient is a 48 y.o. female  Patient doing well after 4/5/24 Cystoscopy and transvesical Botox (200 IU) Rx.  She is now using 0 pads per day.  Her Postvoid Residual today was 82 mL.  She does have increased difficult voiding and emptying bladder due to the Botox effect on her bladder.  A repeat Botox Rx may not be the best long term solution for her.  Discussed the role of the Interstim sacral neuromodulation device and she was given information to read.  She would like to proceed with stage I and II Interstim sacral neuromodulation device once her Botox Rx starts to wear off.  Lower urinary tract symptoms: urgency, frequency, hesitancy, decreased urinary stream, nocturia, 1 times per night, and incomplete emptying and straining.   Last AUA Symptom Score (QOL): 20 (3)  Today's AUA Symptom Score (QOL): 27 (3)    Summary of old records:   Left 8 mm UPJ calculus on 11/23/22 CT; 12/14/22 L HLL; 6/7/23 KUB neg  1/25/23 Litholink: volume=1468 mL, Ca=90, oxalate=62, citrate=245  Hyperoxaluria, mild  Hypocitraturia: Ca citrate 500 mg bid  Urge incontinence: 4-5 ppd; Solifenacin (Vesicare) 5 mg po qd, added Gemtesa (vibegron) 75 mg qd   9/15/23 Botox 100 IU, 4/5/24 Botox 200 IU; wants Interstim sacral neuromodulation device down the road    Additional History: none    Procedures Today: Postvoid Residual:  Post-void Residual by bladder scanner: 82 mL      Urinalysis today:  Results for POC orders placed in visit on 05/08/24   POCT Urinalysis No Micro (Auto)   Result Value Ref Range    Color, UA yellow     Clarity, UA clear     Glucose, UA POC neg     Bilirubin, UA      Ketones, UA      Spec Grav, UA      Blood, UA POC neg     pH, UA      Protein, UA POC neg     Urobilinogen, UA      Leukocytes, UA small     Nitrite, UA neg        Last BUN

## 2024-05-09 ENCOUNTER — APPOINTMENT (OUTPATIENT)
Dept: CT IMAGING | Age: 49
DRG: 058 | End: 2024-05-09
Payer: MEDICAID

## 2024-05-09 ENCOUNTER — APPOINTMENT (OUTPATIENT)
Dept: GENERAL RADIOLOGY | Age: 49
DRG: 058 | End: 2024-05-09
Payer: MEDICAID

## 2024-05-09 ENCOUNTER — TELEPHONE (OUTPATIENT)
Dept: INFUSION THERAPY | Age: 49
End: 2024-05-09

## 2024-05-09 ENCOUNTER — HOSPITAL ENCOUNTER (OUTPATIENT)
Age: 49
Discharge: HOME | End: 2024-05-09
Payer: MEDICAID

## 2024-05-09 ENCOUNTER — HOSPITAL ENCOUNTER (EMERGENCY)
Age: 49
Discharge: ANOTHER HOSPITAL | DRG: 058 | End: 2024-05-10
Attending: EMERGENCY MEDICINE
Payer: MEDICAID

## 2024-05-09 DIAGNOSIS — K90.9 MALABSORPTION OF IRON: ICD-10-CM

## 2024-05-09 DIAGNOSIS — H53.9 VISUAL CHANGES: Primary | ICD-10-CM

## 2024-05-09 DIAGNOSIS — R79.89 ELEVATED FERRITIN: ICD-10-CM

## 2024-05-09 DIAGNOSIS — Z98.84 HISTORY OF BARIATRIC SURGERY: ICD-10-CM

## 2024-05-09 DIAGNOSIS — E61.1 IRON DEFICIENCY: ICD-10-CM

## 2024-05-09 LAB
ALBUMIN SERPL-MCNC: 4 G/DL (ref 3.5–5.2)
ALBUMIN/GLOB SERPL: 1.1 {RATIO} (ref 1–2.5)
ALP SERPL-CCNC: 77 U/L (ref 35–104)
ALT SERPL-CCNC: 53 U/L (ref 5–33)
ANION GAP SERPL CALCULATED.3IONS-SCNC: 10 MMOL/L (ref 9–17)
AST SERPL-CCNC: 27 U/L
BACTERIA URNS QL MICRO: ABNORMAL
BASOPHILS # BLD: 0.04 K/UL (ref 0–0.2)
BASOPHILS # BLD: 0.1 K/UL (ref 0–0.2)
BASOPHILS NFR BLD: 1 % (ref 0–2)
BASOPHILS NFR BLD: 1 % (ref 0–2)
BILIRUB SERPL-MCNC: 0.2 MG/DL (ref 0.3–1.2)
BILIRUB UR QL STRIP: NEGATIVE
BUN SERPL-MCNC: 15 MG/DL (ref 6–20)
CALCIUM SERPL-MCNC: 9.3 MG/DL (ref 8.6–10.4)
CHLORIDE SERPL-SCNC: 106 MMOL/L (ref 98–107)
CLARITY UR: CLEAR
CO2 SERPL-SCNC: 25 MMOL/L (ref 20–31)
COLOR UR: YELLOW
CREAT SERPL-MCNC: 0.8 MG/DL (ref 0.5–0.9)
D DIMER PPP FEU-MCNC: 0.71 UG/ML FEU
EOSINOPHIL # BLD: 0.1 K/UL (ref 0–0.4)
EOSINOPHIL # BLD: 0.1 K/UL (ref 0–0.44)
EOSINOPHILS RELATIVE PERCENT: 1 % (ref 1–4)
EOSINOPHILS RELATIVE PERCENT: 2 % (ref 1–4)
EPI CELLS #/AREA URNS HPF: ABNORMAL /HPF (ref 0–5)
ERYTHROCYTE [DISTWIDTH] IN BLOOD BY AUTOMATED COUNT: 14.6 % (ref 11.8–14.4)
ERYTHROCYTE [DISTWIDTH] IN BLOOD BY AUTOMATED COUNT: 15.1 % (ref 12.5–15.4)
FERRITIN SERPL-MCNC: 116 NG/ML (ref 13–150)
GFR, ESTIMATED: >90 ML/MIN/1.73M2
GLUCOSE SERPL-MCNC: 81 MG/DL (ref 70–99)
GLUCOSE UR STRIP-MCNC: NEGATIVE MG/DL
HCT VFR BLD AUTO: 41.8 % (ref 36–46)
HCT VFR BLD AUTO: 44.5 % (ref 36.3–47.1)
HGB BLD-MCNC: 13.8 G/DL (ref 11.9–15.1)
HGB BLD-MCNC: 13.8 G/DL (ref 12–16)
HGB UR QL STRIP.AUTO: ABNORMAL
IMM GRANULOCYTES # BLD AUTO: <0.03 K/UL (ref 0–0.3)
IMM GRANULOCYTES NFR BLD: 0 %
IRON SATN MFR SERPL: 17 % (ref 20–55)
IRON SERPL-MCNC: 56 UG/DL (ref 37–145)
KETONES UR STRIP-MCNC: NEGATIVE MG/DL
LACTATE BLDV-SCNC: 1 MMOL/L (ref 0.5–2.2)
LEUKOCYTE ESTERASE UR QL STRIP: ABNORMAL
LYMPHOCYTES NFR BLD: 1.99 K/UL (ref 1.1–3.7)
LYMPHOCYTES NFR BLD: 2.2 K/UL (ref 1–4.8)
LYMPHOCYTES RELATIVE PERCENT: 30 % (ref 24–44)
LYMPHOCYTES RELATIVE PERCENT: 31 % (ref 24–43)
MCH RBC QN AUTO: 28.1 PG (ref 25.2–33.5)
MCH RBC QN AUTO: 28.4 PG (ref 26–34)
MCHC RBC AUTO-ENTMCNC: 31 G/DL (ref 28.4–34.8)
MCHC RBC AUTO-ENTMCNC: 33.1 G/DL (ref 31–37)
MCV RBC AUTO: 85.7 FL (ref 80–100)
MCV RBC AUTO: 90.6 FL (ref 82.6–102.9)
MONOCYTES NFR BLD: 0.43 K/UL (ref 0.1–1.2)
MONOCYTES NFR BLD: 0.6 K/UL (ref 0.1–1.2)
MONOCYTES NFR BLD: 7 % (ref 3–12)
MONOCYTES NFR BLD: 8 % (ref 2–11)
NEUTROPHILS NFR BLD: 59 % (ref 36–65)
NEUTROPHILS NFR BLD: 60 % (ref 36–66)
NEUTS SEG NFR BLD: 3.81 K/UL (ref 1.5–8.1)
NEUTS SEG NFR BLD: 4.3 K/UL (ref 1.8–7.7)
NITRITE UR QL STRIP: NEGATIVE
NRBC BLD-RTO: 0 PER 100 WBC
PH UR STRIP: 6 [PH] (ref 5–8)
PLATELET # BLD AUTO: 186 K/UL (ref 140–450)
PLATELET # BLD AUTO: 192 K/UL (ref 138–453)
PMV BLD AUTO: 12.5 FL (ref 8.1–13.5)
PMV BLD AUTO: 9.5 FL (ref 6–12)
POTASSIUM SERPL-SCNC: 4.4 MMOL/L (ref 3.7–5.3)
PROT SERPL-MCNC: 7.5 G/DL (ref 6.4–8.3)
PROT UR STRIP-MCNC: NEGATIVE MG/DL
RBC # BLD AUTO: 4.88 M/UL (ref 4–5.2)
RBC # BLD AUTO: 4.91 M/UL (ref 3.95–5.11)
RBC # BLD: ABNORMAL 10*6/UL
RBC #/AREA URNS HPF: ABNORMAL /HPF (ref 0–2)
SODIUM SERPL-SCNC: 141 MMOL/L (ref 135–144)
SP GR UR STRIP: 1.01 (ref 1–1.03)
T4 FREE SERPL-MCNC: 1.1 NG/DL (ref 0.92–1.68)
TIBC SERPL-MCNC: 321 UG/DL (ref 250–450)
TROPONIN I SERPL HS-MCNC: <6 NG/L (ref 0–14)
TSH SERPL DL<=0.05 MIU/L-ACNC: 2.39 UIU/ML (ref 0.3–5)
UNSATURATED IRON BINDING CAPACITY: 265 UG/DL (ref 112–347)
UROBILINOGEN UR STRIP-ACNC: NORMAL EU/DL (ref 0–1)
WBC #/AREA URNS HPF: ABNORMAL /HPF (ref 0–5)
WBC OTHER # BLD: 6.4 K/UL (ref 3.5–11.3)
WBC OTHER # BLD: 7.2 K/UL (ref 3.5–11)

## 2024-05-09 PROCEDURE — 70450 CT HEAD/BRAIN W/O DYE: CPT

## 2024-05-09 PROCEDURE — 96376 TX/PRO/DX INJ SAME DRUG ADON: CPT

## 2024-05-09 PROCEDURE — 84439 ASSAY OF FREE THYROXINE: CPT

## 2024-05-09 PROCEDURE — 96375 TX/PRO/DX INJ NEW DRUG ADDON: CPT

## 2024-05-09 PROCEDURE — 93005 ELECTROCARDIOGRAM TRACING: CPT | Performed by: PHYSICIAN ASSISTANT

## 2024-05-09 PROCEDURE — 2580000003 HC RX 258: Performed by: PHYSICIAN ASSISTANT

## 2024-05-09 PROCEDURE — 81001 URINALYSIS AUTO W/SCOPE: CPT

## 2024-05-09 PROCEDURE — 96374 THER/PROPH/DIAG INJ IV PUSH: CPT

## 2024-05-09 PROCEDURE — 85025 COMPLETE CBC W/AUTO DIFF WBC: CPT

## 2024-05-09 PROCEDURE — 6360000004 HC RX CONTRAST MEDICATION: Performed by: PHYSICIAN ASSISTANT

## 2024-05-09 PROCEDURE — 83550 IRON BINDING TEST: CPT

## 2024-05-09 PROCEDURE — 84443 ASSAY THYROID STIM HORMONE: CPT

## 2024-05-09 PROCEDURE — 6360000002 HC RX W HCPCS: Performed by: PHYSICIAN ASSISTANT

## 2024-05-09 PROCEDURE — 83540 ASSAY OF IRON: CPT

## 2024-05-09 PROCEDURE — 84484 ASSAY OF TROPONIN QUANT: CPT

## 2024-05-09 PROCEDURE — 82728 ASSAY OF FERRITIN: CPT

## 2024-05-09 PROCEDURE — 71260 CT THORAX DX C+: CPT

## 2024-05-09 PROCEDURE — 85379 FIBRIN DEGRADATION QUANT: CPT

## 2024-05-09 PROCEDURE — 99285 EMERGENCY DEPT VISIT HI MDM: CPT

## 2024-05-09 PROCEDURE — 80053 COMPREHEN METABOLIC PANEL: CPT

## 2024-05-09 PROCEDURE — 36415 COLL VENOUS BLD VENIPUNCTURE: CPT

## 2024-05-09 PROCEDURE — 83605 ASSAY OF LACTIC ACID: CPT

## 2024-05-09 RX ORDER — DIPHENHYDRAMINE HYDROCHLORIDE 50 MG/ML
25 INJECTION INTRAMUSCULAR; INTRAVENOUS ONCE
Status: COMPLETED | OUTPATIENT
Start: 2024-05-09 | End: 2024-05-09

## 2024-05-09 RX ORDER — ACETAZOLAMIDE 500 MG/1
500 CAPSULE, EXTENDED RELEASE ORAL ONCE
Status: DISCONTINUED | OUTPATIENT
Start: 2024-05-10 | End: 2024-05-09

## 2024-05-09 RX ORDER — ACETAZOLAMIDE 250 MG/1
500 TABLET ORAL ONCE
Status: COMPLETED | OUTPATIENT
Start: 2024-05-10 | End: 2024-05-10

## 2024-05-09 RX ORDER — 0.9 % SODIUM CHLORIDE 0.9 %
80 INTRAVENOUS SOLUTION INTRAVENOUS ONCE
Status: DISCONTINUED | OUTPATIENT
Start: 2024-05-09 | End: 2024-05-10 | Stop reason: HOSPADM

## 2024-05-09 RX ORDER — SODIUM CHLORIDE 0.9 % (FLUSH) 0.9 %
10 SYRINGE (ML) INJECTION 2 TIMES DAILY
Status: DISCONTINUED | OUTPATIENT
Start: 2024-05-09 | End: 2024-05-10 | Stop reason: HOSPADM

## 2024-05-09 RX ORDER — METOCLOPRAMIDE HYDROCHLORIDE 5 MG/ML
5 INJECTION INTRAMUSCULAR; INTRAVENOUS ONCE
Status: COMPLETED | OUTPATIENT
Start: 2024-05-09 | End: 2024-05-09

## 2024-05-09 RX ORDER — 0.9 % SODIUM CHLORIDE 0.9 %
1000 INTRAVENOUS SOLUTION INTRAVENOUS ONCE
Status: COMPLETED | OUTPATIENT
Start: 2024-05-09 | End: 2024-05-09

## 2024-05-09 RX ADMIN — METOCLOPRAMIDE 5 MG: 5 INJECTION, SOLUTION INTRAMUSCULAR; INTRAVENOUS at 23:43

## 2024-05-09 RX ADMIN — SODIUM CHLORIDE, PRESERVATIVE FREE 10 ML: 5 INJECTION INTRAVENOUS at 21:07

## 2024-05-09 RX ADMIN — IOPAMIDOL 75 ML: 755 INJECTION, SOLUTION INTRAVENOUS at 21:07

## 2024-05-09 RX ADMIN — DIPHENHYDRAMINE HYDROCHLORIDE 25 MG: 50 INJECTION INTRAMUSCULAR; INTRAVENOUS at 23:43

## 2024-05-09 RX ADMIN — METHYLPREDNISOLONE SODIUM SUCCINATE 40 MG: 40 INJECTION, POWDER, FOR SOLUTION INTRAMUSCULAR; INTRAVENOUS at 19:55

## 2024-05-09 RX ADMIN — DIPHENHYDRAMINE HYDROCHLORIDE 25 MG: 50 INJECTION INTRAMUSCULAR; INTRAVENOUS at 19:55

## 2024-05-09 RX ADMIN — SODIUM CHLORIDE 1000 ML: 9 INJECTION, SOLUTION INTRAVENOUS at 19:03

## 2024-05-09 RX ADMIN — Medication 80 ML: at 21:08

## 2024-05-09 ASSESSMENT — ENCOUNTER SYMPTOMS
VOMITING: 0
NAUSEA: 0
COUGH: 0
DIARRHEA: 0
ABDOMINAL PAIN: 0

## 2024-05-09 ASSESSMENT — PAIN SCALES - GENERAL: PAINLEVEL_OUTOF10: 8

## 2024-05-09 ASSESSMENT — PAIN DESCRIPTION - PAIN TYPE: TYPE: ACUTE PAIN

## 2024-05-09 ASSESSMENT — PAIN DESCRIPTION - LOCATION: LOCATION: HEAD

## 2024-05-09 ASSESSMENT — PAIN - FUNCTIONAL ASSESSMENT: PAIN_FUNCTIONAL_ASSESSMENT: 0-10

## 2024-05-09 NOTE — ED PROVIDER NOTES
ultrasound images (except ED bedside ultrasound) are read by the radiologist and the images and interpretations are directly viewed by the emergency physician.     .  No acute pulmonary embolism.  2. No acute pulmonary disease.  3. Nonspecific asymmetric elevation left hemidiaphragm unchanged from  multiple prior studies.  Left hemidiaphragm paresis/paralysis is a  consideration versus hemidiaphragm eventration.  If not performed previously,  fluoroscopic sniff test could differentiate between the two.    RESSION:  Possible tiny left frontal acute intracranial abnormality.  Another  consideration is artifact.  Recommend close clinical observation and  follow-up CT head without contrast at 12 hours to ensure no progression, CT  imaging earlier if symptomatology progresses.     Other portions of the CT head appear unremarkable.     The results were called by Dr. Salvador Art to ANA M BRASHER on  5/9/2024 at 21:52.     LABS:All lab results were reviewed by myself, and all abnormals are listed below.  Labs Reviewed   COMPREHENSIVE METABOLIC PANEL - Abnormal; Notable for the following components:       Result Value    Total Bilirubin 0.2 (*)     ALT 53 (*)     All other components within normal limits   URINALYSIS WITH MICROSCOPIC - Abnormal; Notable for the following components:    Urine Hgb SMALL (*)     Leukocyte Esterase, Urine TRACE (*)     Bacteria, UA FEW (*)     All other components within normal limits   CBC WITH AUTO DIFFERENTIAL   D-DIMER, QUANTITATIVE   LACTIC ACID   TROPONIN   TSH   T4, FREE         EMERGENCY DEPARTMENT COURSE:   Vitals:    Vitals:    05/09/24 2054 05/09/24 2344 05/10/24 0045 05/10/24 0046   BP: (!) 142/93 (!) 154/93 (!) 152/91    Pulse:  81     Resp:  18     Temp:       TempSrc:       SpO2:  94%  94%   Weight:       Height:           The patient was given the following medications while in the emergency department:  Orders Placed This Encounter   Medications    sodium chloride 0.9 %  bolus 1,000 mL    diphenhydrAMINE (BENADRYL) injection 25 mg    methylPREDNISolone sodium (PF) (SOLU-MEDROL PF) injection 40 mg    sodium chloride 0.9 % bolus 80 mL    iopamidol (ISOVUE-370) 76 % injection 75 mL    sodium chloride flush 0.9 % injection 10 mL    diphenhydrAMINE (BENADRYL) injection 25 mg    metoclopramide (REGLAN) injection 5 mg    DISCONTD: acetaZOLAMIDE (DIAMOX) extended release capsule 500 mg    acetaZOLAMIDE (DIAMOX) tablet 500 mg       -------------------------      CRITICAL CARE:    CONSULTS:  None    PROCEDURES:  Procedures    FINAL IMPRESSION      1. Visual changes          DISPOSITION/PLAN   DISPOSITION Decision To Transfer 05/10/2024 12:10:37 AM      PATIENT REFERREDTO:  No follow-up provider specified.    DISCHARGEMEDICATIONS:  New Prescriptions    No medications on file       (Please note that portions of this note were completed with a voice recognition program.  Efforts were made to edit thedictations but occasionally words are mis-transcribed.)    CRISTOBAL Tse Steven D, PA-C  05/10/24 0110

## 2024-05-09 NOTE — TELEPHONE ENCOUNTER
Pt called stating she feels \"off\", and is wondering if her iron is low. She states she is very fatigued, and no amount of sleep helps. Writer advised fot pt to have cbc and iron panel drawn, and we can review results with MD. She also states she has been having brain fog for the past few days, and is forgetting conversations she has had with her daughter. She states the past few days she wakes up and cannot see out of one her eyes; she states its just \"black\" or like something is covering her eye, but eventually the sight returns. She also c/o hearing a \"woosh\" sound in her ears when she moves. Writer advised for pt to contact PCP for evaluation for above sx, and to go to ER if se cannot get a hold of PCP. Pt verbalized understanding.

## 2024-05-10 ENCOUNTER — HOSPITAL ENCOUNTER (INPATIENT)
Age: 49
LOS: 3 days | Discharge: HOME | DRG: 058 | End: 2024-05-13
Attending: PSYCHIATRY & NEUROLOGY | Admitting: PSYCHIATRY & NEUROLOGY
Payer: MEDICAID

## 2024-05-10 ENCOUNTER — APPOINTMENT (OUTPATIENT)
Dept: MRI IMAGING | Age: 49
DRG: 058 | End: 2024-05-10
Attending: PSYCHIATRY & NEUROLOGY
Payer: MEDICAID

## 2024-05-10 ENCOUNTER — APPOINTMENT (OUTPATIENT)
Dept: INTERVENTIONAL RADIOLOGY/VASCULAR | Age: 49
DRG: 058 | End: 2024-05-10
Attending: PSYCHIATRY & NEUROLOGY
Payer: MEDICAID

## 2024-05-10 VITALS
TEMPERATURE: 98.2 F | SYSTOLIC BLOOD PRESSURE: 136 MMHG | WEIGHT: 293 LBS | RESPIRATION RATE: 18 BRPM | OXYGEN SATURATION: 94 % | HEART RATE: 81 BPM | DIASTOLIC BLOOD PRESSURE: 90 MMHG | BODY MASS INDEX: 50.02 KG/M2 | HEIGHT: 64 IN

## 2024-05-10 PROBLEM — G93.2 IIH (IDIOPATHIC INTRACRANIAL HYPERTENSION): Status: ACTIVE | Noted: 2024-05-10

## 2024-05-10 PROBLEM — G93.2 INTRACRANIAL HYPERTENSION: Status: ACTIVE | Noted: 2024-05-10

## 2024-05-10 PROBLEM — R51.9 ACHING HEADACHE: Status: ACTIVE | Noted: 2024-05-10

## 2024-05-10 PROBLEM — I10 HYPERTENSION: Status: ACTIVE | Noted: 2024-05-10

## 2024-05-10 LAB
ALBUMIN CSF-MCNC: 228 MG/L (ref 70–350)
ALBUMIN INDEX: 57
ALBUMIN SERPL-MCNC: 4 G/DL (ref 3.5–5.2)
APPEARANCE CSF: CLEAR
C GATTII+NEOFOR DNA CSF QL NAA+NON-PROBE: NOT DETECTED
CMV DNA CSF QL NAA+NON-PROBE: NOT DETECTED
E COLI K1 DNA CSF QL NAA+NON-PROBE: NOT DETECTED
EKG ATRIAL RATE: 70 BPM
EKG P AXIS: 35 DEGREES
EKG P-R INTERVAL: 152 MS
EKG Q-T INTERVAL: 362 MS
EKG QRS DURATION: 74 MS
EKG QTC CALCULATION (BAZETT): 390 MS
EKG R AXIS: -22 DEGREES
EKG T AXIS: 50 DEGREES
EKG VENTRICULAR RATE: 70 BPM
EV RNA CSF QL NAA+NON-PROBE: NOT DETECTED
GLUCOSE CSF-MCNC: 101 MG/DL (ref 40–70)
GP B STREP DNA CSF QL NAA+NON-PROBE: NOT DETECTED
HAEM INFLU DNA CSF QL NAA+NON-PROBE: NOT DETECTED
HHV6 DNA CSF QL NAA+NON-PROBE: NOT DETECTED
HSV1 DNA CSF QL NAA+NON-PROBE: NOT DETECTED
HSV2 DNA CSF QL NAA+NON-PROBE: NOT DETECTED
IGG CSF-MCNC: 4.5 MG/DL (ref 0.5–7)
IGG INDEX CSF: 0.54
IGG SERPL-MCNC: 1450 MG/DL (ref 700–1600)
IGG SYNTHESIS RATE CSF: < 3.3 MG/24 H
INR PPP: 1
L MONOCYTOG DNA CSF QL NAA+NON-PROBE: NOT DETECTED
LYMPHOCYTES NFR CSF: 99 %
N MEN DNA CSF QL NAA+NON-PROBE: NOT DETECTED
NEUTROPHILS NFR CSF: 0 %
NUC CELL # FLD MANUAL: 11 CELLS/UL
OLIGOCLONAL BANDS: ABNORMAL
PARECHOVIRUS A RNA CSF QL NAA+NON-PROBE: NOT DETECTED
PROT CSF-MCNC: 40.8 MG/DL (ref 15–45)
PROTHROMBIN TIME: 13.4 SEC (ref 11.7–14.9)
RBC # FLD MANUAL: 0 CELLS/UL
S PNEUM DNA CSF QL NAA+NON-PROBE: NOT DETECTED
SPECIMEN DESCRIPTION: NORMAL
SPECIMEN VOL CSF: 12 ML
TUBE # CSF: 3
UNIDENT CELLS NFR FLD: NORMAL %
VZV DNA CSF QL NAA+NON-PROBE: NOT DETECTED
XANTHOCHROMIA CSF QL: ABNORMAL

## 2024-05-10 PROCEDURE — 99222 1ST HOSP IP/OBS MODERATE 55: CPT | Performed by: PSYCHIATRY & NEUROLOGY

## 2024-05-10 PROCEDURE — 6360000002 HC RX W HCPCS

## 2024-05-10 PROCEDURE — 70544 MR ANGIOGRAPHY HEAD W/O DYE: CPT

## 2024-05-10 PROCEDURE — 6360000002 HC RX W HCPCS: Performed by: STUDENT IN AN ORGANIZED HEALTH CARE EDUCATION/TRAINING PROGRAM

## 2024-05-10 PROCEDURE — 82945 GLUCOSE OTHER FLUID: CPT

## 2024-05-10 PROCEDURE — 84157 ASSAY OF PROTEIN OTHER: CPT

## 2024-05-10 PROCEDURE — 83916 OLIGOCLONAL BANDS: CPT

## 2024-05-10 PROCEDURE — 82784 ASSAY IGA/IGD/IGG/IGM EACH: CPT

## 2024-05-10 PROCEDURE — 94760 N-INVAS EAR/PLS OXIMETRY 1: CPT

## 2024-05-10 PROCEDURE — 009U3ZX DRAINAGE OF SPINAL CANAL, PERCUTANEOUS APPROACH, DIAGNOSTIC: ICD-10-PCS | Performed by: RADIOLOGY

## 2024-05-10 PROCEDURE — 82040 ASSAY OF SERUM ALBUMIN: CPT

## 2024-05-10 PROCEDURE — 6370000000 HC RX 637 (ALT 250 FOR IP): Performed by: EMERGENCY MEDICINE

## 2024-05-10 PROCEDURE — 87205 SMEAR GRAM STAIN: CPT

## 2024-05-10 PROCEDURE — 36415 COLL VENOUS BLD VENIPUNCTURE: CPT

## 2024-05-10 PROCEDURE — 62328 DX LMBR SPI PNXR W/FLUOR/CT: CPT

## 2024-05-10 PROCEDURE — 87483 CNS DNA AMP PROBE TYPE 12-25: CPT

## 2024-05-10 PROCEDURE — 85610 PROTHROMBIN TIME: CPT

## 2024-05-10 PROCEDURE — A9576 INJ PROHANCE MULTIPACK: HCPCS

## 2024-05-10 PROCEDURE — 6370000000 HC RX 637 (ALT 250 FOR IP)

## 2024-05-10 PROCEDURE — 2580000003 HC RX 258

## 2024-05-10 PROCEDURE — 2060000000 HC ICU INTERMEDIATE R&B

## 2024-05-10 PROCEDURE — 70553 MRI BRAIN STEM W/O & W/DYE: CPT

## 2024-05-10 PROCEDURE — 6370000000 HC RX 637 (ALT 250 FOR IP): Performed by: STUDENT IN AN ORGANIZED HEALTH CARE EDUCATION/TRAINING PROGRAM

## 2024-05-10 PROCEDURE — 6360000004 HC RX CONTRAST MEDICATION

## 2024-05-10 PROCEDURE — 82042 OTHER SOURCE ALBUMIN QUAN EA: CPT

## 2024-05-10 PROCEDURE — 87070 CULTURE OTHR SPECIMN AEROBIC: CPT

## 2024-05-10 PROCEDURE — 87015 SPECIMEN INFECT AGNT CONCNTJ: CPT

## 2024-05-10 PROCEDURE — 2580000003 HC RX 258: Performed by: STUDENT IN AN ORGANIZED HEALTH CARE EDUCATION/TRAINING PROGRAM

## 2024-05-10 PROCEDURE — B01B1ZZ FLUOROSCOPY OF SPINAL CORD USING LOW OSMOLAR CONTRAST: ICD-10-PCS | Performed by: RADIOLOGY

## 2024-05-10 PROCEDURE — 89051 BODY FLUID CELL COUNT: CPT

## 2024-05-10 PROCEDURE — 2709999900 IR LUMBAR PUNCTURE FOR DIAGNOSIS

## 2024-05-10 RX ORDER — POTASSIUM CHLORIDE 7.45 MG/ML
10 INJECTION INTRAVENOUS PRN
Status: DISCONTINUED | OUTPATIENT
Start: 2024-05-10 | End: 2024-05-13 | Stop reason: HOSPADM

## 2024-05-10 RX ORDER — SODIUM CHLORIDE 0.9 % (FLUSH) 0.9 %
5-40 SYRINGE (ML) INJECTION EVERY 12 HOURS SCHEDULED
Status: DISCONTINUED | OUTPATIENT
Start: 2024-05-10 | End: 2024-05-13 | Stop reason: HOSPADM

## 2024-05-10 RX ORDER — ONDANSETRON 4 MG/1
4 TABLET, ORALLY DISINTEGRATING ORAL EVERY 8 HOURS PRN
Status: DISCONTINUED | OUTPATIENT
Start: 2024-05-10 | End: 2024-05-13 | Stop reason: HOSPADM

## 2024-05-10 RX ORDER — MULTIVITAMIN WITH IRON
1 TABLET ORAL DAILY
Status: DISCONTINUED | OUTPATIENT
Start: 2024-05-10 | End: 2024-05-13 | Stop reason: HOSPADM

## 2024-05-10 RX ORDER — POLYETHYLENE GLYCOL 3350 17 G/17G
17 POWDER, FOR SOLUTION ORAL DAILY PRN
Status: DISCONTINUED | OUTPATIENT
Start: 2024-05-10 | End: 2024-05-13 | Stop reason: HOSPADM

## 2024-05-10 RX ORDER — ACETAMINOPHEN 325 MG/1
650 TABLET ORAL EVERY 6 HOURS PRN
Status: DISCONTINUED | OUTPATIENT
Start: 2024-05-10 | End: 2024-05-13 | Stop reason: HOSPADM

## 2024-05-10 RX ORDER — ONDANSETRON 2 MG/ML
4 INJECTION INTRAMUSCULAR; INTRAVENOUS EVERY 6 HOURS PRN
Status: DISCONTINUED | OUTPATIENT
Start: 2024-05-10 | End: 2024-05-13 | Stop reason: HOSPADM

## 2024-05-10 RX ORDER — LANOLIN ALCOHOL/MO/W.PET/CERES
3 CREAM (GRAM) TOPICAL NIGHTLY PRN
Status: DISCONTINUED | OUTPATIENT
Start: 2024-05-10 | End: 2024-05-13 | Stop reason: HOSPADM

## 2024-05-10 RX ORDER — LORAZEPAM 2 MG/ML
4 INJECTION INTRAMUSCULAR EVERY 5 MIN PRN
Status: DISCONTINUED | OUTPATIENT
Start: 2024-05-10 | End: 2024-05-13 | Stop reason: HOSPADM

## 2024-05-10 RX ORDER — MAGNESIUM SULFATE IN WATER 40 MG/ML
2000 INJECTION, SOLUTION INTRAVENOUS PRN
Status: DISCONTINUED | OUTPATIENT
Start: 2024-05-10 | End: 2024-05-13 | Stop reason: HOSPADM

## 2024-05-10 RX ORDER — POTASSIUM CHLORIDE 20 MEQ/1
40 TABLET, EXTENDED RELEASE ORAL PRN
Status: DISCONTINUED | OUTPATIENT
Start: 2024-05-10 | End: 2024-05-13 | Stop reason: HOSPADM

## 2024-05-10 RX ORDER — LOSARTAN POTASSIUM 50 MG/1
50 TABLET ORAL DAILY
Status: DISCONTINUED | OUTPATIENT
Start: 2024-05-10 | End: 2024-05-13 | Stop reason: HOSPADM

## 2024-05-10 RX ORDER — SODIUM CHLORIDE 0.9 % (FLUSH) 0.9 %
5-40 SYRINGE (ML) INJECTION PRN
Status: DISCONTINUED | OUTPATIENT
Start: 2024-05-10 | End: 2024-05-13 | Stop reason: HOSPADM

## 2024-05-10 RX ORDER — SODIUM CHLORIDE 9 MG/ML
INJECTION, SOLUTION INTRAVENOUS PRN
Status: DISCONTINUED | OUTPATIENT
Start: 2024-05-10 | End: 2024-05-13 | Stop reason: HOSPADM

## 2024-05-10 RX ORDER — BUSPIRONE HYDROCHLORIDE 15 MG/1
15 TABLET ORAL 3 TIMES DAILY
Status: DISCONTINUED | OUTPATIENT
Start: 2024-05-10 | End: 2024-05-13 | Stop reason: HOSPADM

## 2024-05-10 RX ORDER — ENOXAPARIN SODIUM 100 MG/ML
30 INJECTION SUBCUTANEOUS 2 TIMES DAILY
Status: DISCONTINUED | OUTPATIENT
Start: 2024-05-10 | End: 2024-05-13 | Stop reason: HOSPADM

## 2024-05-10 RX ORDER — ACETAMINOPHEN 650 MG/1
650 SUPPOSITORY RECTAL EVERY 6 HOURS PRN
Status: DISCONTINUED | OUTPATIENT
Start: 2024-05-10 | End: 2024-05-13 | Stop reason: HOSPADM

## 2024-05-10 RX ORDER — SODIUM CHLORIDE 0.9 % (FLUSH) 0.9 %
10 SYRINGE (ML) INJECTION PRN
Status: DISCONTINUED | OUTPATIENT
Start: 2024-05-10 | End: 2024-05-13 | Stop reason: HOSPADM

## 2024-05-10 RX ORDER — TOPIRAMATE 25 MG/1
25 TABLET ORAL 2 TIMES DAILY
Status: DISCONTINUED | OUTPATIENT
Start: 2024-05-10 | End: 2024-05-11

## 2024-05-10 RX ADMIN — BUSPIRONE HYDROCHLORIDE 15 MG: 15 TABLET ORAL at 15:05

## 2024-05-10 RX ADMIN — GADOTERIDOL 20 ML: 279.3 INJECTION, SOLUTION INTRAVENOUS at 07:50

## 2024-05-10 RX ADMIN — METHYLPREDNISOLONE SODIUM SUCCINATE 250 MG: 500 INJECTION INTRAMUSCULAR; INTRAVENOUS at 12:37

## 2024-05-10 RX ADMIN — SODIUM CHLORIDE, PRESERVATIVE FREE 10 ML: 5 INJECTION INTRAVENOUS at 07:51

## 2024-05-10 RX ADMIN — ALCOHOL 1 TABLET: 70.47 GEL TOPICAL at 09:22

## 2024-05-10 RX ADMIN — SODIUM CHLORIDE, PRESERVATIVE FREE 10 ML: 5 INJECTION INTRAVENOUS at 22:12

## 2024-05-10 RX ADMIN — ACETAMINOPHEN 650 MG: 325 TABLET ORAL at 06:56

## 2024-05-10 RX ADMIN — Medication 3 MG: at 22:22

## 2024-05-10 RX ADMIN — ENOXAPARIN SODIUM 30 MG: 100 INJECTION SUBCUTANEOUS at 08:54

## 2024-05-10 RX ADMIN — LOSARTAN POTASSIUM 50 MG: 50 TABLET, FILM COATED ORAL at 08:54

## 2024-05-10 RX ADMIN — BUSPIRONE HYDROCHLORIDE 15 MG: 15 TABLET ORAL at 22:10

## 2024-05-10 RX ADMIN — BUSPIRONE HYDROCHLORIDE 15 MG: 15 TABLET ORAL at 09:22

## 2024-05-10 RX ADMIN — ACETAMINOPHEN 650 MG: 325 TABLET ORAL at 20:01

## 2024-05-10 RX ADMIN — SODIUM CHLORIDE, PRESERVATIVE FREE 10 ML: 5 INJECTION INTRAVENOUS at 08:56

## 2024-05-10 RX ADMIN — ACETAZOLAMIDE 500 MG: 250 TABLET ORAL at 00:45

## 2024-05-10 RX ADMIN — TOPIRAMATE 25 MG: 25 TABLET, FILM COATED ORAL at 22:10

## 2024-05-10 RX ADMIN — TOPIRAMATE 25 MG: 25 TABLET, FILM COATED ORAL at 12:34

## 2024-05-10 ASSESSMENT — PAIN SCALES - WONG BAKER: WONGBAKER_NUMERICALRESPONSE: NO HURT

## 2024-05-10 ASSESSMENT — PAIN DESCRIPTION - DESCRIPTORS
DESCRIPTORS: ACHING;SQUEEZING;PRESSURE
DESCRIPTORS: PRESSURE;DULL

## 2024-05-10 ASSESSMENT — PAIN SCALES - GENERAL
PAINLEVEL_OUTOF10: 7
PAINLEVEL_OUTOF10: 3
PAINLEVEL_OUTOF10: 7
PAINLEVEL_OUTOF10: 6

## 2024-05-10 ASSESSMENT — PAIN - FUNCTIONAL ASSESSMENT
PAIN_FUNCTIONAL_ASSESSMENT: ACTIVITIES ARE NOT PREVENTED
PAIN_FUNCTIONAL_ASSESSMENT: PREVENTS OR INTERFERES SOME ACTIVE ACTIVITIES AND ADLS

## 2024-05-10 ASSESSMENT — PAIN DESCRIPTION - LOCATION
LOCATION: HEAD
LOCATION: HEAD

## 2024-05-10 ASSESSMENT — PAIN DESCRIPTION - ORIENTATION
ORIENTATION: POSTERIOR
ORIENTATION: POSTERIOR

## 2024-05-10 NOTE — ED PROVIDER NOTES
Emergency Department     Faculty Attestation    I performed a history and physical examination of the patient and discussed management with the mid level provider. I reviewed the mid level provider's note and agree with the documented findings and plan of care. Any areas of disagreement are noted on the chart. I was personally present for the key portions of any procedures. I have documented in the chart those procedures where I was not present during the key portions. I have reviewed the emergency nurses triage note. I agree with the chief complaint, past medical history, past surgical history, allergies, medications, social and family history as documented unless otherwise noted below. Documentation of the HPI, Physical Exam and Medical Decision Making performed by medical students or scribes is based on my personal performance of the HPI, PE and MDM. For Physician Assistant/ Nurse Practitioner cases/documentation I have personally evaluated this patient and have completed at least one if not all key elements of the E/M (history, physical exam, and MDM). Additional findings are as noted.      Primary Care Physician:  Luda Landry, APRN - CNP    CHIEF COMPLAINT       Chief Complaint   Patient presents with    Fatigue     Extreme fatigue, a lot of stress recently, persistent migraine headaches, intermittent visual disturbances, dizziness, pt can hear blood pressure/pulsing in ears and also having memory issues.   Pt has history of low Iron and feels this way when Iron levels drop.        RECENT VITALS:   Temp: 98.2 °F (36.8 °C),  Pulse: 72, Respirations: 15, BP: (!) 142/93    LABS:  Labs Reviewed   COMPREHENSIVE METABOLIC PANEL - Abnormal; Notable for the following components:       Result Value    Total Bilirubin 0.2 (*)     ALT 53 (*)     All other components within normal limits   URINALYSIS WITH MICROSCOPIC - Abnormal; Notable for the following components:    Urine Hgb SMALL (*)     Leukocyte Esterase,  Urine TRACE (*)     Bacteria, UA FEW (*)     All other components within normal limits   CBC WITH AUTO DIFFERENTIAL   D-DIMER, QUANTITATIVE   LACTIC ACID   TROPONIN   TSH   T4, FREE         PERTINENT ATTENDING PHYSICIAN COMMENTS:    48-year-old female here with multiple complaints, most notably describing a persistent headache for the past week, transient vision changes over the past several weeks to months, pulsatile tinnitus, and multiple other cognitive complaints.  She has had no recent acute illness, no fever.  Has no neck pain or stiffness.  No recent trauma.  She suspected this was all secondary to anemia and low iron, however this does not appear to be an issue today.  Noncontrast CT of the brain initially with some concern for a frontal intraparenchymal hemorrhage, although this was suspected to be artifact.  We spoke with the radiologist about this and decided to repeat the noncontrast scan.  In the meantime, point-of-care ultrasound was performed to estimate optic nerve sheath diameter and this was measured to be 6 mm on the left, 6.5mm.  There is also suspicion for papilledema.  With all of this, I have high concern for idiopathic intracranial hypertension.  Will consult with neurology about this and if the repeat CT is negative for any bleed, we will go ahead and start acetazolamide.         Sukumar Abrams MD  05/09/24 4611

## 2024-05-10 NOTE — FLOWSHEET NOTE
Writer notified Maria Antonia VALENCIA, that oligoclonal banding was ordered and a yellow top lab tube needs to be sent to the lab.

## 2024-05-10 NOTE — PLAN OF CARE
Problem: Discharge Planning  Goal: Discharge to home or other facility with appropriate resources  Outcome: Progressing  Flowsheets (Taken 5/10/2024 0611 by Vanessa Gutierrez, RN)  Discharge to home or other facility with appropriate resources:   Identify barriers to discharge with patient and caregiver   Arrange for needed discharge resources and transportation as appropriate     Problem: Pain  Goal: Verbalizes/displays adequate comfort level or baseline comfort level  Outcome: Progressing     Problem: Safety - Adult  Goal: Free from fall injury  Outcome: Progressing     Problem: Chronic Conditions and Co-morbidities  Goal: Patient's chronic conditions and co-morbidity symptoms are monitored and maintained or improved  Outcome: Progressing

## 2024-05-10 NOTE — H&P
[facility] Neurology   IN-PATIENT SERVICE   [facility]    HISTORY AND PHYSICAL EXAMINATION            Date:   5/10/2024  Patient name:  Lisa K Cogan  Date of admission:  5/10/2024  4:58 AM  MRN:   [id number]  Account:  [id number]  YOB: 1975  PCP:    Luda Landry APRN - CNP  Room:   [address] [place] [address]  Code Status:    Full Code    Chief Complaint:     No chief complaint on file.      History Obtained From:     patient, electronic medical record    History of Present Illness:     Patient is a 48-year-old female with past medical history of obesity, hypertension, anxiety, persistent headaches with vision disturbances presents for worsening headaches over the past week.    As per the patient, she has been experiencing headaches for the past year, occurring 6-7 times per month with occasional visual disturbances.  She describes the visual disturbances as \"seeing through transparent paper.\"  The visual disturbances occur in both eyes alternatively.  Headaches are typically frontal, rated at 8/10, with associated photophobia, phonophobia.  She denies experiencing vomiting or nausea.  However, she has experienced a worsening of her headaches during the past week.  These new headaches occur in her occipital area, radiates to bilateral frontal head.  Patient states she can hear her pulse in her ears.  She denies experiencing weakness although she does have chronic mild left-sided weakness.  Patient was transferred to [facility] for further management, and she endorses ongoing headache in the occipital region rated at 8/10.  She has no other focal neurological deficits.  She has never seen a neurologist before, nor has she been treated for her headaches.    CT head without IV contrast done on 5/9/2024 shows no acute intracranial abnormality, although prior CT head showed artifact.        Past Medical History:     Past Medical History:   Diagnosis Date    Anxiety     Carpal tunnel

## 2024-05-10 NOTE — BRIEF OP NOTE
Brief Postoperative Note Lumbar Puncture    Lisa K Cogan  YOB: 1975  [id number]    Pre-operative Diagnosis: IIH    Post-operative Diagnosis: Same    Procedure: Fluoro guided Lumbar Puncture    Anesthesia: 1% Lidocaine    Surgeons/Assistants: Amado Trujillo PA-C    Complications: None    EBL: Minimal    Specimens: Obtained and sent to lab    Fluoroscopy guided lumbar puncture. 20 gauge spinal needle. L3-L4. 15 ml clear CSF obtained.  OP 16 cm of H2O.  Dressing applied. Instructions given.    Electronically signed by MELONY Squires on 5/10/2024 at 11:09 AM

## 2024-05-10 NOTE — CARE COORDINATION
Case Management Assessment  Initial Evaluation    Date/Time of Evaluation: 5/10/2024 11:07 AM  Assessment Completed by: Noelle Bhandari RN    If patient is discharged prior to next notation, then this note serves as note for discharge by case management.    Patient Name: Lisa K Cogan                   YOB: 1975  Diagnosis: Intracranial hypertension [G93.2]  IIH (idiopathic intracranial hypertension) [G93.2]                   Date / Time: 5/10/2024  4:58 AM    Patient Admission Status: Inpatient   Readmission Risk (Low < 19, Mod (19-27), High > 27): Readmission Risk Score: 7.1    Current PCP: Luda Landry, APRLESIA - CNP  PCP verified by CM? (P) Yes (Luda RENDON)    Chart Reviewed: Yes      History Provided by: (P) Patient  Patient Orientation: (P) Alert and Oriented, Person, Place, Situation    Patient Cognition: (P) Alert    Hospitalization in the last 30 days (Readmission):  No    If yes, Readmission Assessment in CM Navigator will be completed.    Advance Directives:      Code Status: Full Code   Patient's Primary Decision Maker is: (P) Patient Declined (Legal Next of Kin Remains as Decision Maker)      Discharge Planning:    Patient lives with: (P) Children Type of Home: (P) House  Primary Care Giver: (P) Self  Patient Support Systems include: (P) Children, Family Members   Current Financial resources: (P) Medicaid  Current community resources: (P) None  Current services prior to admission: (P) Durable Medical Equipment            Current DME: (P) Cane            Type of Home Care services:  (P) None    ADLS  Prior functional level: (P) Independent in ADLs/IADLs  Current functional level: (P) Assistance with the following:, Mobility    PT AM-PAC:   /24  OT AM-PAC:   /24    Family can provide assistance at DC: (P) Yes  Would you like Case Management to discuss the discharge plan with any other family members/significant others, and if so, who? (P) No  Plans to Return to Present Housing:

## 2024-05-11 LAB
ANION GAP SERPL CALCULATED.3IONS-SCNC: 12 MMOL/L (ref 9–16)
BASOPHILS # BLD: <0.03 K/UL (ref 0–0.2)
BASOPHILS NFR BLD: 0 % (ref 0–2)
BUN SERPL-MCNC: 18 MG/DL (ref 6–20)
CALCIUM SERPL-MCNC: 9.6 MG/DL (ref 8.6–10.4)
CHLORIDE SERPL-SCNC: 106 MMOL/L (ref 98–107)
CO2 SERPL-SCNC: 19 MMOL/L (ref 20–31)
CREAT SERPL-MCNC: 0.8 MG/DL (ref 0.5–0.9)
CRP SERPL HS-MCNC: 3.7 MG/L (ref 0–5)
EOSINOPHIL # BLD: <0.03 K/UL (ref 0–0.44)
EOSINOPHILS RELATIVE PERCENT: 0 % (ref 1–4)
ERYTHROCYTE [DISTWIDTH] IN BLOOD BY AUTOMATED COUNT: 14.5 % (ref 11.8–14.4)
ERYTHROCYTE [SEDIMENTATION RATE] IN BLOOD BY PHOTOMETRIC METHOD: 17 MM/HR (ref 0–20)
GFR, ESTIMATED: 86 ML/MIN/1.73M2
GLUCOSE BLD-MCNC: 200 MG/DL (ref 65–105)
GLUCOSE BLD-MCNC: 243 MG/DL (ref 65–105)
GLUCOSE SERPL-MCNC: 162 MG/DL (ref 74–99)
HCT VFR BLD AUTO: 45.4 % (ref 36.3–47.1)
HGB BLD-MCNC: 14.2 G/DL (ref 11.9–15.1)
IMM GRANULOCYTES # BLD AUTO: 0.06 K/UL (ref 0–0.3)
IMM GRANULOCYTES NFR BLD: 1 %
LYMPHOCYTES NFR BLD: 1.27 K/UL (ref 1.1–3.7)
LYMPHOCYTES RELATIVE PERCENT: 11 % (ref 24–43)
MCH RBC QN AUTO: 28.6 PG (ref 25.2–33.5)
MCHC RBC AUTO-ENTMCNC: 31.3 G/DL (ref 28.4–34.8)
MCV RBC AUTO: 91.5 FL (ref 82.6–102.9)
MONOCYTES NFR BLD: 0.38 K/UL (ref 0.1–1.2)
MONOCYTES NFR BLD: 3 % (ref 3–12)
NEUTROPHILS NFR BLD: 86 % (ref 36–65)
NEUTS SEG NFR BLD: 10.18 K/UL (ref 1.5–8.1)
NRBC BLD-RTO: 0 PER 100 WBC
PLATELET # BLD AUTO: 217 K/UL (ref 138–453)
PMV BLD AUTO: 12 FL (ref 8.1–13.5)
POTASSIUM SERPL-SCNC: 4.5 MMOL/L (ref 3.7–5.3)
RBC # BLD AUTO: 4.96 M/UL (ref 3.95–5.11)
RBC # BLD: ABNORMAL 10*6/UL
SODIUM SERPL-SCNC: 137 MMOL/L (ref 136–145)
TROPONIN I SERPL HS-MCNC: <6 NG/L (ref 0–14)
WBC OTHER # BLD: 11.9 K/UL (ref 3.5–11.3)

## 2024-05-11 PROCEDURE — 6370000000 HC RX 637 (ALT 250 FOR IP): Performed by: STUDENT IN AN ORGANIZED HEALTH CARE EDUCATION/TRAINING PROGRAM

## 2024-05-11 PROCEDURE — 93005 ELECTROCARDIOGRAM TRACING: CPT

## 2024-05-11 PROCEDURE — 99232 SBSQ HOSP IP/OBS MODERATE 35: CPT | Performed by: PSYCHIATRY & NEUROLOGY

## 2024-05-11 PROCEDURE — 36415 COLL VENOUS BLD VENIPUNCTURE: CPT

## 2024-05-11 PROCEDURE — 6360000002 HC RX W HCPCS: Performed by: STUDENT IN AN ORGANIZED HEALTH CARE EDUCATION/TRAINING PROGRAM

## 2024-05-11 PROCEDURE — 80048 BASIC METABOLIC PNL TOTAL CA: CPT

## 2024-05-11 PROCEDURE — 85652 RBC SED RATE AUTOMATED: CPT

## 2024-05-11 PROCEDURE — 2580000003 HC RX 258

## 2024-05-11 PROCEDURE — 6360000002 HC RX W HCPCS

## 2024-05-11 PROCEDURE — 2500000003 HC RX 250 WO HCPCS: Performed by: STUDENT IN AN ORGANIZED HEALTH CARE EDUCATION/TRAINING PROGRAM

## 2024-05-11 PROCEDURE — 84484 ASSAY OF TROPONIN QUANT: CPT

## 2024-05-11 PROCEDURE — 2060000000 HC ICU INTERMEDIATE R&B

## 2024-05-11 PROCEDURE — 85025 COMPLETE CBC W/AUTO DIFF WBC: CPT

## 2024-05-11 PROCEDURE — 82947 ASSAY GLUCOSE BLOOD QUANT: CPT

## 2024-05-11 PROCEDURE — 86140 C-REACTIVE PROTEIN: CPT

## 2024-05-11 PROCEDURE — 2580000003 HC RX 258: Performed by: STUDENT IN AN ORGANIZED HEALTH CARE EDUCATION/TRAINING PROGRAM

## 2024-05-11 PROCEDURE — 6370000000 HC RX 637 (ALT 250 FOR IP)

## 2024-05-11 RX ORDER — MAGNESIUM SULFATE IN WATER 40 MG/ML
2000 INJECTION, SOLUTION INTRAVENOUS ONCE
Status: COMPLETED | OUTPATIENT
Start: 2024-05-11 | End: 2024-05-11

## 2024-05-11 RX ORDER — PANTOPRAZOLE SODIUM 40 MG/1
40 TABLET, DELAYED RELEASE ORAL
Status: DISCONTINUED | OUTPATIENT
Start: 2024-05-12 | End: 2024-05-13 | Stop reason: HOSPADM

## 2024-05-11 RX ORDER — PROCHLORPERAZINE EDISYLATE 5 MG/ML
10 INJECTION INTRAMUSCULAR; INTRAVENOUS ONCE
Status: DISCONTINUED | OUTPATIENT
Start: 2024-05-11 | End: 2024-05-13 | Stop reason: HOSPADM

## 2024-05-11 RX ORDER — TOPIRAMATE 25 MG/1
50 TABLET ORAL 2 TIMES DAILY
Status: COMPLETED | OUTPATIENT
Start: 2024-05-11 | End: 2024-05-12

## 2024-05-11 RX ADMIN — MAGNESIUM SULFATE HEPTAHYDRATE 2000 MG: 40 INJECTION, SOLUTION INTRAVENOUS at 07:04

## 2024-05-11 RX ADMIN — BUSPIRONE HYDROCHLORIDE 15 MG: 15 TABLET ORAL at 16:07

## 2024-05-11 RX ADMIN — SODIUM CHLORIDE, PRESERVATIVE FREE 10 ML: 5 INJECTION INTRAVENOUS at 08:26

## 2024-05-11 RX ADMIN — ONDANSETRON 4 MG: 2 INJECTION INTRAMUSCULAR; INTRAVENOUS at 23:28

## 2024-05-11 RX ADMIN — ENOXAPARIN SODIUM 30 MG: 100 INJECTION SUBCUTANEOUS at 20:53

## 2024-05-11 RX ADMIN — LOSARTAN POTASSIUM 50 MG: 50 TABLET, FILM COATED ORAL at 08:25

## 2024-05-11 RX ADMIN — Medication 3 MG: at 22:30

## 2024-05-11 RX ADMIN — ALCOHOL 1 TABLET: 70.47 GEL TOPICAL at 08:25

## 2024-05-11 RX ADMIN — ACETAMINOPHEN 650 MG: 325 TABLET ORAL at 04:35

## 2024-05-11 RX ADMIN — METHYLPREDNISOLONE SODIUM SUCCINATE 250 MG: 1 INJECTION INTRAMUSCULAR; INTRAVENOUS at 18:07

## 2024-05-11 RX ADMIN — TOPIRAMATE 50 MG: 25 TABLET, FILM COATED ORAL at 20:49

## 2024-05-11 RX ADMIN — METHYLPREDNISOLONE SODIUM SUCCINATE 250 MG: 1 INJECTION INTRAMUSCULAR; INTRAVENOUS at 12:00

## 2024-05-11 RX ADMIN — WATER 60 MG: 1 INJECTION INTRAMUSCULAR; INTRAVENOUS; SUBCUTANEOUS at 06:56

## 2024-05-11 RX ADMIN — BUSPIRONE HYDROCHLORIDE 15 MG: 15 TABLET ORAL at 20:49

## 2024-05-11 RX ADMIN — ACETAMINOPHEN 650 MG: 325 TABLET ORAL at 20:50

## 2024-05-11 RX ADMIN — TOPIRAMATE 25 MG: 25 TABLET, FILM COATED ORAL at 08:25

## 2024-05-11 RX ADMIN — METHYLPREDNISOLONE SODIUM SUCCINATE 250 MG: 1 INJECTION INTRAMUSCULAR; INTRAVENOUS at 20:57

## 2024-05-11 RX ADMIN — ACETAMINOPHEN 650 MG: 325 TABLET ORAL at 11:47

## 2024-05-11 RX ADMIN — ONDANSETRON 4 MG: 4 TABLET, ORALLY DISINTEGRATING ORAL at 05:24

## 2024-05-11 RX ADMIN — SODIUM CHLORIDE, PRESERVATIVE FREE 10 ML: 5 INJECTION INTRAVENOUS at 20:58

## 2024-05-11 RX ADMIN — BUSPIRONE HYDROCHLORIDE 15 MG: 15 TABLET ORAL at 08:25

## 2024-05-11 RX ADMIN — VALPROATE SODIUM 1000 MG: 100 INJECTION, SOLUTION INTRAVENOUS at 13:25

## 2024-05-11 ASSESSMENT — PAIN - FUNCTIONAL ASSESSMENT
PAIN_FUNCTIONAL_ASSESSMENT: PREVENTS OR INTERFERES SOME ACTIVE ACTIVITIES AND ADLS

## 2024-05-11 ASSESSMENT — PAIN DESCRIPTION - LOCATION
LOCATION: HEAD

## 2024-05-11 ASSESSMENT — PAIN DESCRIPTION - ORIENTATION
ORIENTATION: POSTERIOR
ORIENTATION: POSTERIOR
ORIENTATION: ANTERIOR;POSTERIOR
ORIENTATION: POSTERIOR
ORIENTATION: POSTERIOR;ANTERIOR
ORIENTATION: POSTERIOR

## 2024-05-11 ASSESSMENT — PAIN DESCRIPTION - PAIN TYPE
TYPE: ACUTE PAIN;CHRONIC PAIN

## 2024-05-11 ASSESSMENT — PAIN SCALES - GENERAL
PAINLEVEL_OUTOF10: 4
PAINLEVEL_OUTOF10: 3
PAINLEVEL_OUTOF10: 7
PAINLEVEL_OUTOF10: 6
PAINLEVEL_OUTOF10: 7
PAINLEVEL_OUTOF10: 7
PAINLEVEL_OUTOF10: 6

## 2024-05-11 ASSESSMENT — PAIN DESCRIPTION - DESCRIPTORS
DESCRIPTORS: CRUSHING;STABBING
DESCRIPTORS: ACHING
DESCRIPTORS: CRUSHING;STABBING

## 2024-05-11 ASSESSMENT — PAIN DESCRIPTION - ONSET
ONSET: ON-GOING
ONSET: ON-GOING

## 2024-05-11 ASSESSMENT — PAIN DESCRIPTION - FREQUENCY: FREQUENCY: CONTINUOUS

## 2024-05-11 NOTE — PLAN OF CARE
Problem: Safety - Adult  Goal: Free from fall injury  5/11/2024 1655 by Ludy Murdock, RN  Outcome: Progressing  5/11/2024 0533 by Vanessa Gutierrez, RN  Outcome: Progressing

## 2024-05-11 NOTE — PLAN OF CARE
Problem: Discharge Planning  Goal: Discharge to home or other facility with appropriate resources  5/11/2024 0533 by Vanessa Gutierrez, RN  Outcome: Progressing     Problem: Pain  Goal: Verbalizes/displays adequate comfort level or baseline comfort level  5/11/2024 0533 by Vanessa Gutierrez, RN  Outcome: Progressing     Problem: Safety - Adult  Goal: Free from fall injury  5/11/2024 0533 by Vanessa Gutierrez, RN  Outcome: Progressing     Problem: Chronic Conditions and Co-morbidities  Goal: Patient's chronic conditions and co-morbidity symptoms are monitored and maintained or improved  5/11/2024 0533 by Vanessa Gutierrez, RN  Outcome: Progressing

## 2024-05-11 NOTE — PROGRESS NOTES
[facility] Neurology   IN-PATIENT SERVICE   [facility]    Resident Progress Note             Date:   5/11/2024  Patient name:  Lisa K Cogan  Date of admission:  5/10/2024  4:58 AM  MRN:   [id number]  Account:  [id number]  YOB: 1975  PCP:    Luda Landry APRN - CNP  Room:   [address] [place] [address]  Code Status:    Full Code      Interval Hx & subjective:      The patient was seen and examined today and the chart was reviewed.     Hospitalization day: 1    New acute events & interval changes: Still has headaches today. Will schedule steroids (IVMP 250 mg QID) in addition to IV depacon 1000 mg and re-evaluate.   Vital signs last shift reviewed: Unremarkable     Brief HPI:     Patient is a 48-year-old female with past medical history of obesity, hypertension, anxiety, persistent headaches with vision disturbances presents for worsening headaches over the past week.     As per the patient, she has been experiencing headaches for the past year, occurring 6-7 times per month with occasional visual disturbances. She describes the visual disturbances as \"seeing through transparent paper.\" The visual disturbances occur in both eyes alternatively. Headaches are typically frontal, rated at 8/10, with associated photophobia, phonophobia. She denies experiencing vomiting or nausea. However, she has experienced a worsening of her headaches during the past week. These new headaches occur in her occipital area, radiates to bilateral frontal head. Patient states she can hear her pulse in her ears. She denies experiencing weakness although she does have chronic mild left-sided weakness. Patient was transferred to [facility] for further management, and she endorses ongoing headache in the occipital region rated at 8/10. She has no other focal neurological deficits. She has never seen a neurologist before, nor has she been treated for her headaches. CT head without IV contrast done on 5/9/2024

## 2024-05-12 LAB
EKG ATRIAL RATE: 60 BPM
EKG P AXIS: 49 DEGREES
EKG P-R INTERVAL: 156 MS
EKG Q-T INTERVAL: 406 MS
EKG QRS DURATION: 84 MS
EKG QTC CALCULATION (BAZETT): 406 MS
EKG R AXIS: -22 DEGREES
EKG T AXIS: 40 DEGREES
EKG VENTRICULAR RATE: 60 BPM
GLUCOSE BLD-MCNC: 160 MG/DL (ref 65–105)
GLUCOSE BLD-MCNC: 163 MG/DL (ref 65–105)
GLUCOSE BLD-MCNC: 217 MG/DL (ref 65–105)
GLUCOSE BLD-MCNC: 352 MG/DL (ref 65–105)

## 2024-05-12 PROCEDURE — 6360000002 HC RX W HCPCS: Performed by: STUDENT IN AN ORGANIZED HEALTH CARE EDUCATION/TRAINING PROGRAM

## 2024-05-12 PROCEDURE — 6370000000 HC RX 637 (ALT 250 FOR IP): Performed by: STUDENT IN AN ORGANIZED HEALTH CARE EDUCATION/TRAINING PROGRAM

## 2024-05-12 PROCEDURE — 6360000002 HC RX W HCPCS

## 2024-05-12 PROCEDURE — 6370000000 HC RX 637 (ALT 250 FOR IP)

## 2024-05-12 PROCEDURE — 2580000003 HC RX 258: Performed by: STUDENT IN AN ORGANIZED HEALTH CARE EDUCATION/TRAINING PROGRAM

## 2024-05-12 PROCEDURE — 93010 ELECTROCARDIOGRAM REPORT: CPT | Performed by: INTERNAL MEDICINE

## 2024-05-12 PROCEDURE — 2500000003 HC RX 250 WO HCPCS: Performed by: STUDENT IN AN ORGANIZED HEALTH CARE EDUCATION/TRAINING PROGRAM

## 2024-05-12 PROCEDURE — 99232 SBSQ HOSP IP/OBS MODERATE 35: CPT | Performed by: PSYCHIATRY & NEUROLOGY

## 2024-05-12 PROCEDURE — 2580000003 HC RX 258

## 2024-05-12 PROCEDURE — 2060000000 HC ICU INTERMEDIATE R&B

## 2024-05-12 PROCEDURE — 93005 ELECTROCARDIOGRAM TRACING: CPT | Performed by: PSYCHIATRY & NEUROLOGY

## 2024-05-12 PROCEDURE — 82947 ASSAY GLUCOSE BLOOD QUANT: CPT

## 2024-05-12 RX ORDER — DIPHENHYDRAMINE HYDROCHLORIDE 50 MG/ML
25 INJECTION INTRAMUSCULAR; INTRAVENOUS ONCE
Status: COMPLETED | OUTPATIENT
Start: 2024-05-12 | End: 2024-05-12

## 2024-05-12 RX ORDER — SENNA AND DOCUSATE SODIUM 50; 8.6 MG/1; MG/1
2 TABLET, FILM COATED ORAL DAILY PRN
Status: DISCONTINUED | OUTPATIENT
Start: 2024-05-12 | End: 2024-05-13 | Stop reason: HOSPADM

## 2024-05-12 RX ORDER — LIDOCAINE 4 G/G
1 PATCH TOPICAL DAILY
Status: DISCONTINUED | OUTPATIENT
Start: 2024-05-12 | End: 2024-05-13 | Stop reason: HOSPADM

## 2024-05-12 RX ORDER — PROCHLORPERAZINE EDISYLATE 5 MG/ML
10 INJECTION INTRAMUSCULAR; INTRAVENOUS ONCE
Status: COMPLETED | OUTPATIENT
Start: 2024-05-12 | End: 2024-05-12

## 2024-05-12 RX ORDER — KETOROLAC TROMETHAMINE 30 MG/ML
30 INJECTION, SOLUTION INTRAMUSCULAR; INTRAVENOUS ONCE
Status: COMPLETED | OUTPATIENT
Start: 2024-05-12 | End: 2024-05-12

## 2024-05-12 RX ORDER — ONDANSETRON 2 MG/ML
4 INJECTION INTRAMUSCULAR; INTRAVENOUS ONCE
Status: COMPLETED | OUTPATIENT
Start: 2024-05-12 | End: 2024-05-12

## 2024-05-12 RX ORDER — OXYCODONE HYDROCHLORIDE 5 MG/1
5 TABLET ORAL ONCE
Status: DISCONTINUED | OUTPATIENT
Start: 2024-05-12 | End: 2024-05-12

## 2024-05-12 RX ORDER — FENTANYL CITRATE 50 UG/ML
25 INJECTION, SOLUTION INTRAMUSCULAR; INTRAVENOUS ONCE
Status: COMPLETED | OUTPATIENT
Start: 2024-05-12 | End: 2024-05-12

## 2024-05-12 RX ADMIN — ENOXAPARIN SODIUM 30 MG: 100 INJECTION SUBCUTANEOUS at 09:29

## 2024-05-12 RX ADMIN — DIPHENHYDRAMINE HYDROCHLORIDE 25 MG: 50 INJECTION, SOLUTION INTRAMUSCULAR; INTRAVENOUS at 12:32

## 2024-05-12 RX ADMIN — BUSPIRONE HYDROCHLORIDE 15 MG: 15 TABLET ORAL at 16:21

## 2024-05-12 RX ADMIN — METHYLPREDNISOLONE SODIUM SUCCINATE 250 MG: 1 INJECTION INTRAMUSCULAR; INTRAVENOUS at 12:35

## 2024-05-12 RX ADMIN — ONDANSETRON 4 MG: 2 INJECTION INTRAMUSCULAR; INTRAVENOUS at 03:56

## 2024-05-12 RX ADMIN — FENTANYL CITRATE 25 MCG: 50 INJECTION INTRAMUSCULAR; INTRAVENOUS at 05:55

## 2024-05-12 RX ADMIN — PROCHLORPERAZINE EDISYLATE 10 MG: 5 INJECTION INTRAMUSCULAR; INTRAVENOUS at 12:29

## 2024-05-12 RX ADMIN — METHYLPREDNISOLONE SODIUM SUCCINATE 250 MG: 1 INJECTION INTRAMUSCULAR; INTRAVENOUS at 16:41

## 2024-05-12 RX ADMIN — ENOXAPARIN SODIUM 30 MG: 100 INJECTION SUBCUTANEOUS at 20:47

## 2024-05-12 RX ADMIN — SODIUM CHLORIDE, PRESERVATIVE FREE 10 ML: 5 INJECTION INTRAVENOUS at 20:47

## 2024-05-12 RX ADMIN — ALCOHOL 1 TABLET: 70.47 GEL TOPICAL at 09:29

## 2024-05-12 RX ADMIN — SODIUM CHLORIDE, PRESERVATIVE FREE 10 ML: 5 INJECTION INTRAVENOUS at 09:30

## 2024-05-12 RX ADMIN — BUSPIRONE HYDROCHLORIDE 15 MG: 15 TABLET ORAL at 09:29

## 2024-05-12 RX ADMIN — BUSPIRONE HYDROCHLORIDE 15 MG: 15 TABLET ORAL at 20:47

## 2024-05-12 RX ADMIN — METHYLPREDNISOLONE SODIUM SUCCINATE 250 MG: 1 INJECTION INTRAMUSCULAR; INTRAVENOUS at 20:57

## 2024-05-12 RX ADMIN — ONDANSETRON 4 MG: 2 INJECTION INTRAMUSCULAR; INTRAVENOUS at 04:02

## 2024-05-12 RX ADMIN — KETOROLAC TROMETHAMINE 30 MG: 30 INJECTION, SOLUTION INTRAMUSCULAR; INTRAVENOUS at 12:26

## 2024-05-12 RX ADMIN — METHYLPREDNISOLONE SODIUM SUCCINATE 250 MG: 1 INJECTION INTRAMUSCULAR; INTRAVENOUS at 10:09

## 2024-05-12 RX ADMIN — PANTOPRAZOLE SODIUM 40 MG: 40 TABLET, DELAYED RELEASE ORAL at 08:05

## 2024-05-12 RX ADMIN — TOPIRAMATE 50 MG: 25 TABLET, FILM COATED ORAL at 20:47

## 2024-05-12 RX ADMIN — ONDANSETRON 4 MG: 2 INJECTION INTRAMUSCULAR; INTRAVENOUS at 20:47

## 2024-05-12 RX ADMIN — VALPROATE SODIUM 500 MG: 100 INJECTION, SOLUTION INTRAVENOUS at 16:20

## 2024-05-12 RX ADMIN — TOPIRAMATE 50 MG: 25 TABLET, FILM COATED ORAL at 09:29

## 2024-05-12 RX ADMIN — LOSARTAN POTASSIUM 50 MG: 50 TABLET, FILM COATED ORAL at 09:29

## 2024-05-12 RX ADMIN — ACETAMINOPHEN 650 MG: 325 TABLET ORAL at 09:29

## 2024-05-12 ASSESSMENT — PAIN SCALES - GENERAL
PAINLEVEL_OUTOF10: 9
PAINLEVEL_OUTOF10: 4
PAINLEVEL_OUTOF10: 8
PAINLEVEL_OUTOF10: 8
PAINLEVEL_OUTOF10: 5
PAINLEVEL_OUTOF10: 4

## 2024-05-12 ASSESSMENT — PAIN DESCRIPTION - LOCATION
LOCATION: HEAD;ABDOMEN
LOCATION: BACK;ABDOMEN
LOCATION: HEAD
LOCATION: HEAD
LOCATION: BACK;ABDOMEN

## 2024-05-12 ASSESSMENT — PAIN - FUNCTIONAL ASSESSMENT: PAIN_FUNCTIONAL_ASSESSMENT: PREVENTS OR INTERFERES SOME ACTIVE ACTIVITIES AND ADLS

## 2024-05-12 ASSESSMENT — PAIN DESCRIPTION - DESCRIPTORS
DESCRIPTORS: ACHING;DULL
DESCRIPTORS: ACHING

## 2024-05-12 ASSESSMENT — PAIN DESCRIPTION - ORIENTATION
ORIENTATION: RIGHT
ORIENTATION: ANTERIOR;POSTERIOR;UPPER
ORIENTATION: RIGHT

## 2024-05-12 ASSESSMENT — PAIN DESCRIPTION - FREQUENCY: FREQUENCY: CONTINUOUS

## 2024-05-12 ASSESSMENT — PAIN DESCRIPTION - ONSET: ONSET: ON-GOING

## 2024-05-12 ASSESSMENT — PAIN DESCRIPTION - PAIN TYPE: TYPE: ACUTE PAIN;CHRONIC PAIN

## 2024-05-12 NOTE — PLAN OF CARE
Problem: Discharge Planning  Goal: Discharge to home or other facility with appropriate resources  Outcome: Progressing  Flowsheets (Taken 5/11/2024 2000)  Discharge to home or other facility with appropriate resources: Identify barriers to discharge with patient and caregiver     Problem: Pain  Goal: Verbalizes/displays adequate comfort level or baseline comfort level  Outcome: Progressing     Problem: Safety - Adult  Goal: Free from fall injury  5/12/2024 0607 by Olamide Taylor, RN  Outcome: Progressing  Flowsheets (Taken 5/11/2024 2000)  Free From Fall Injury: Instruct family/caregiver on patient safety  5/11/2024 1655 by Ludy Murdock, RN  Outcome: Progressing

## 2024-05-12 NOTE — PLAN OF CARE
Problem: Safety - Adult  Goal: Free from fall injury  5/12/2024 1954 by Ludy Murdock, RN  Outcome: Progressing  5/12/2024 0607 by Olamied Taylor, RN  Outcome: Progressing  Flowsheets (Taken 5/11/2024 2000)  Free From Fall Injury: Instruct family/caregiver on patient safety

## 2024-05-12 NOTE — PROGRESS NOTES
[facility] Neurology   IN-PATIENT SERVICE   [facility]    Progress Note             Date:   5/12/2024  Patient name:  Lisa K Cogan  Date of admission:  5/10/2024  4:58 AM  MRN:   [id number]  Account:  [id number]  YOB: 1975  PCP:    Luda Landry APRN - CNP  Room:   [address] [place] [address]  Code Status:    Full Code    Chief Complaint:     Headaches    Interval hx:     The patient was seen and examined at bedside. Is vitally stable, alert and oriented x 3. No acute events overnight.  The patient stated that she currently has a 10/10 headache similar to yesterday with no improvement/ Endorses poor sleep and significant psychosocial stressors.     Patient was started on steroids yesterday 250 mg every 6 hours in addition to IV Depacon 1000 mg.  She is also receiving magnesium oxide 400 mg twice daily, vitamin B2 daily, and Topamax 50 mg twice daily with plan for up titration.    She complained of back pain, chest pain overnight and nausea and received fentanyl 25 mcg and Zofran.  EKG and trops unremarkable. She received Tylenol at 8:50 PM yesterday.    Brief History of Present Illness:     The patient is a 48 y.o.  Non- / non  female with a past medical history of HTN, obesity, anxiety, persistent headaches with visual disturbance who presents with worsening headaches   and she is admitted to the hospital for the management of status migrainosus    As per the patient, she has been experiencing headaches for the past year occurring 6-7 times per month with occasional visual disturbance.  She describes the visual disturbance as seeing through transparent paper.  Visual disturbance occurs in both eyes alternatively.  Headaches are frontal, 8 out of 10 with associated photophobia, phonophobia.  Denies N/V.  She notices worsening headache for the past week which are in a new location.  Headaches occur in the occipital region and radiate to bilateral frontal head and states that

## 2024-05-13 VITALS
WEIGHT: 293 LBS | BODY MASS INDEX: 50.02 KG/M2 | TEMPERATURE: 98.3 F | SYSTOLIC BLOOD PRESSURE: 161 MMHG | RESPIRATION RATE: 13 BRPM | DIASTOLIC BLOOD PRESSURE: 87 MMHG | OXYGEN SATURATION: 96 % | HEIGHT: 64 IN | HEART RATE: 66 BPM

## 2024-05-13 LAB
CSF ISOELECTRIC FOCUSING INTERPRETATION: ABNORMAL
EKG ATRIAL RATE: 66 BPM
EKG P AXIS: 66 DEGREES
EKG P-R INTERVAL: 170 MS
EKG Q-T INTERVAL: 378 MS
EKG QRS DURATION: 84 MS
EKG QTC CALCULATION (BAZETT): 396 MS
EKG R AXIS: -25 DEGREES
EKG T AXIS: 60 DEGREES
EKG VENTRICULAR RATE: 66 BPM
GLUCOSE BLD-MCNC: 156 MG/DL (ref 65–105)
GLUCOSE BLD-MCNC: 207 MG/DL (ref 65–105)
MICROORGANISM SPEC CULT: NORMAL
MICROORGANISM/AGENT SPEC: NORMAL
OLIGOCLONAL BANDS CSF IEF: 3 BANDS (ref 0–1)
OLIGOCLONAL BANDS: POSITIVE
SPECIMEN DESCRIPTION: NORMAL
TROPONIN I SERPL HS-MCNC: <6 NG/L (ref 0–14)

## 2024-05-13 PROCEDURE — 2580000003 HC RX 258

## 2024-05-13 PROCEDURE — 6360000002 HC RX W HCPCS: Performed by: STUDENT IN AN ORGANIZED HEALTH CARE EDUCATION/TRAINING PROGRAM

## 2024-05-13 PROCEDURE — 6370000000 HC RX 637 (ALT 250 FOR IP)

## 2024-05-13 PROCEDURE — 36415 COLL VENOUS BLD VENIPUNCTURE: CPT

## 2024-05-13 PROCEDURE — 6370000000 HC RX 637 (ALT 250 FOR IP): Performed by: STUDENT IN AN ORGANIZED HEALTH CARE EDUCATION/TRAINING PROGRAM

## 2024-05-13 PROCEDURE — 82947 ASSAY GLUCOSE BLOOD QUANT: CPT

## 2024-05-13 PROCEDURE — 2580000003 HC RX 258: Performed by: STUDENT IN AN ORGANIZED HEALTH CARE EDUCATION/TRAINING PROGRAM

## 2024-05-13 PROCEDURE — 6360000002 HC RX W HCPCS

## 2024-05-13 PROCEDURE — 93010 ELECTROCARDIOGRAM REPORT: CPT | Performed by: INTERNAL MEDICINE

## 2024-05-13 PROCEDURE — 84484 ASSAY OF TROPONIN QUANT: CPT

## 2024-05-13 PROCEDURE — 99239 HOSP IP/OBS DSCHRG MGMT >30: CPT | Performed by: PSYCHIATRY & NEUROLOGY

## 2024-05-13 PROCEDURE — 93005 ELECTROCARDIOGRAM TRACING: CPT | Performed by: PSYCHIATRY & NEUROLOGY

## 2024-05-13 RX ORDER — TOPIRAMATE 25 MG/1
50 TABLET ORAL 2 TIMES DAILY
Status: DISCONTINUED | OUTPATIENT
Start: 2024-05-13 | End: 2024-05-13 | Stop reason: HOSPADM

## 2024-05-13 RX ORDER — SENNOSIDES A AND B 8.6 MG/1
1 TABLET, FILM COATED ORAL 2 TIMES DAILY
Qty: 60 TABLET | Refills: 0 | Status: SHIPPED | OUTPATIENT
Start: 2024-05-13 | End: 2024-06-12

## 2024-05-13 RX ORDER — LANOLIN ALCOHOL/MO/W.PET/CERES
400 CREAM (GRAM) TOPICAL 2 TIMES DAILY
Status: DISCONTINUED | OUTPATIENT
Start: 2024-05-13 | End: 2024-05-13 | Stop reason: HOSPADM

## 2024-05-13 RX ORDER — POLYETHYLENE GLYCOL 3350 17 G/17G
17 POWDER, FOR SOLUTION ORAL DAILY
Qty: 85 G | Refills: 0 | Status: SHIPPED | OUTPATIENT
Start: 2024-05-13 | End: 2024-05-18

## 2024-05-13 RX ORDER — TOPIRAMATE 50 MG/1
75 TABLET, FILM COATED ORAL 2 TIMES DAILY
Qty: 60 TABLET | Refills: 2 | Status: SHIPPED | OUTPATIENT
Start: 2024-05-13 | End: 2024-07-12

## 2024-05-13 RX ORDER — RIBOFLAVIN (VITAMIN B2) 100 MG
100 TABLET ORAL DAILY
Qty: 15 TABLET | Refills: 0 | Status: SHIPPED | OUTPATIENT
Start: 2024-05-14

## 2024-05-13 RX ORDER — PREDNISONE 20 MG/1
TABLET ORAL
Qty: 26 TABLET | Refills: 0 | Status: SHIPPED | OUTPATIENT
Start: 2024-05-13 | End: 2024-05-29

## 2024-05-13 RX ORDER — RIBOFLAVIN (VITAMIN B2) 100 MG
100 TABLET ORAL DAILY
Status: DISCONTINUED | OUTPATIENT
Start: 2024-05-13 | End: 2024-05-13 | Stop reason: HOSPADM

## 2024-05-13 RX ADMIN — METHYLPREDNISOLONE SODIUM SUCCINATE 250 MG: 1 INJECTION INTRAMUSCULAR; INTRAVENOUS at 14:16

## 2024-05-13 RX ADMIN — DOCUSATE SODIUM 50 MG AND SENNOSIDES 8.6 MG 2 TABLET: 8.6; 5 TABLET, FILM COATED ORAL at 10:50

## 2024-05-13 RX ADMIN — SODIUM CHLORIDE, PRESERVATIVE FREE 10 ML: 5 INJECTION INTRAVENOUS at 08:46

## 2024-05-13 RX ADMIN — MAGNESIUM GLUCONATE 500 MG ORAL TABLET 400 MG: 500 TABLET ORAL at 10:41

## 2024-05-13 RX ADMIN — BUSPIRONE HYDROCHLORIDE 15 MG: 15 TABLET ORAL at 08:27

## 2024-05-13 RX ADMIN — METHYLPREDNISOLONE SODIUM SUCCINATE 250 MG: 1 INJECTION INTRAMUSCULAR; INTRAVENOUS at 10:43

## 2024-05-13 RX ADMIN — ALCOHOL 1 TABLET: 70.47 GEL TOPICAL at 08:27

## 2024-05-13 RX ADMIN — POLYETHYLENE GLYCOL 3350 17 G: 17 POWDER, FOR SOLUTION ORAL at 10:50

## 2024-05-13 RX ADMIN — ENOXAPARIN SODIUM 30 MG: 100 INJECTION SUBCUTANEOUS at 08:28

## 2024-05-13 RX ADMIN — BUSPIRONE HYDROCHLORIDE 15 MG: 15 TABLET ORAL at 14:15

## 2024-05-13 RX ADMIN — TOPIRAMATE 50 MG: 25 TABLET, FILM COATED ORAL at 08:28

## 2024-05-13 RX ADMIN — ONDANSETRON 4 MG: 2 INJECTION INTRAMUSCULAR; INTRAVENOUS at 08:28

## 2024-05-13 RX ADMIN — LOSARTAN POTASSIUM 50 MG: 50 TABLET, FILM COATED ORAL at 08:28

## 2024-05-13 RX ADMIN — Medication 100 MG: at 10:41

## 2024-05-13 RX ADMIN — PANTOPRAZOLE SODIUM 40 MG: 40 TABLET, DELAYED RELEASE ORAL at 06:20

## 2024-05-13 ASSESSMENT — ENCOUNTER SYMPTOMS
COUGH: 0
CHEST TIGHTNESS: 0
ABDOMINAL PAIN: 0
VOMITING: 0
SHORTNESS OF BREATH: 0
ABDOMINAL DISTENTION: 0
NAUSEA: 0
DIARRHEA: 0

## 2024-05-13 NOTE — DISCHARGE SUMMARY
capsule       AUVELITY  MG TBCR Take 1 tablet by mouth daily      losartan (COZAAR) 50 MG tablet TAKE 1 TABLET BY MOUTH DAILY  Qty: 90 tablet, Refills: 3    Associated Diagnoses: Mild hypertension      Multiple Vitamin (MULTIVITAMIN) TABS Take 1 tablet by mouth daily  Qty: 90 tablet, Refills: 3      busPIRone (BUSPAR) 15 MG tablet Take 15 mg by mouth 3 times daily      SM CALCIUM CITRATE+VIT D3 -6.25 MG-MCG TABS Take 2 tablets by mouth 2 times daily      zinc 50 MG CAPS Take 50 mg by mouth daily  Qty: 90 capsule, Refills: 1    Associated Diagnoses: Iron deficiency anemia due to chronic blood loss; Iron malabsorption      vitamin B-12 (CYANOCOBALAMIN) 1000 MCG tablet Take 1 tablet by mouth daily  Qty: 90 tablet, Refills: 1    Associated Diagnoses: Iron deficiency anemia due to chronic blood loss; Iron malabsorption           STOP taking these medications       nitrofurantoin, macrocrystal-monohydrate, (MACROBID) 100 MG capsule Comments:   Reason for Stopping:               Activity: activity as tolerated    Restrictions: Driving restriction No, Swimming restriction No, Operating heavy machinery restriction No, Compromising heights restriction No    Diet: regular diet    Follow-up:    Sharri Keating MD  [address] [address] [address]  [facility] [id number] [phone]    Call in 1 month(s)      Tam Jesus MD  [address]  [facility] [id number] [phone]    Follow up in 1 week(s)        Follow up labs: None    Follow up imaging: None    Note that over 30 minutes was spent in preparing discharge papers, discussing discharge with patient, medication review, etc.      Carlos Castillo MD,  Neurology Resident PGY-1  Department of Neurology  [place], [place]  5/13/2024, 2:01 PM

## 2024-05-13 NOTE — PROGRESS NOTES
[facility] Neurology   IN-PATIENT SERVICE   [facility]    Progress Note             Date:   5/13/2024  Patient name:  Lisa K Cogan  Date of admission:  5/10/2024  4:58 AM  MRN:   [id number]  Account:  [id number]  YOB: 1975  PCP:    Luda Landry APRN - CNP  Room:   [address] [place] [address]  Code Status:    Full Code    Chief Complaint:     Headaches    Interval hx:     Patient seen and examined at bedside  Vitals stable overnight  Still complaining of headache 3 out of 10 in severity, bifrontal area  Patient is complaining of central chest tightness, for which troponin and EKG was ordered and are normal      Brief History of Present Illness:     The patient is a 48 y.o.  Non- / non  female with a past medical history of HTN, obesity, anxiety, persistent headaches with visual disturbance who presents with worsening headaches   and she is admitted to the hospital for the management of status migrainosus    As per the patient, she has been experiencing headaches for the past year occurring 6-7 times per month with occasional visual disturbance.  She describes the visual disturbance as seeing through transparent paper.  Visual disturbance occurs in both eyes alternatively.  Headaches are frontal, 8 out of 10 with associated photophobia, phonophobia.  Denies N/V.  She notices worsening headache for the past week which are in a new location.  Headaches occur in the occipital region and radiate to bilateral frontal head and states that she can hear her pulse in her ears.  She has chronic mild left-sided weakness.  She is never seen a neurologist before nor she been treated for headache.  CT head on 5/9 showed no acute intracranial abnormality.    Lumbar puncture 5/11 showed opening pressure of 16 cm H2O.  MRI and MRV unremarkable.      Past Medical History:     Past Medical History:   Diagnosis Date    Anxiety     Carpal tunnel syndrome     COVID-19 11/06/2021    Depression

## 2024-05-13 NOTE — PLAN OF CARE
Problem: Discharge Planning  Goal: Discharge to home or other facility with appropriate resources  Outcome: Progressing     Problem: Pain  Goal: Verbalizes/displays adequate comfort level or baseline comfort level  Outcome: Progressing     Problem: Safety - Adult  Goal: Free from fall injury  5/12/2024 2224 by Tammie Connolly, RN  Outcome: Progressing  5/12/2024 1954 by Ludy Murdock RN  Outcome: Progressing     Problem: Chronic Conditions and Co-morbidities  Goal: Patient's chronic conditions and co-morbidity symptoms are monitored and maintained or improved  Outcome: Progressing

## 2024-05-13 NOTE — DISCHARGE INSTRUCTIONS
Please follow up with neurologist in 4 weeks  Please schedule visit with ophthalmology for ruling out papilledema  Please ensure compliance with migraine prophylactic meds and take tynelol and ibuprofen as needed for acute attacks

## 2024-05-13 NOTE — CARE COORDINATION
Transitional Planning  Spoke with patient, plan to return home with children, has transportation, no further needs.     355 pm Notified per Radha RN, patient needs transportation home.      Spoke with patient, states no available person to assist with transportation, no means to pay for cab.  Called [facility] at [phone], spoke with Bela, unable to set up cab at this time states no benefit. Black and white set up confirmation [phone].

## 2024-05-13 NOTE — PROGRESS NOTES
CLINICAL PHARMACY NOTE: MEDS TO BEDS    Total # of Prescriptions Filled: 3   The following medications were delivered to the patient:  Topiramate  Vit b 2 100mg  prednisone    Additional Documentation:  No copay writer delivered to bedside

## 2024-05-14 ENCOUNTER — TELEPHONE (OUTPATIENT)
Dept: PRIMARY CARE CLINIC | Age: 49
End: 2024-05-14

## 2024-05-14 DIAGNOSIS — R10.13 EPIGASTRIC PAIN: ICD-10-CM

## 2024-05-14 DIAGNOSIS — R11.0 NAUSEA: Primary | ICD-10-CM

## 2024-05-14 LAB
EKG ATRIAL RATE: 64 BPM
EKG P AXIS: 54 DEGREES
EKG P-R INTERVAL: 162 MS
EKG Q-T INTERVAL: 380 MS
EKG QRS DURATION: 84 MS
EKG QTC CALCULATION (BAZETT): 392 MS
EKG R AXIS: -23 DEGREES
EKG T AXIS: 57 DEGREES
EKG VENTRICULAR RATE: 64 BPM

## 2024-05-14 PROCEDURE — 93010 ELECTROCARDIOGRAM REPORT: CPT | Performed by: INTERNAL MEDICINE

## 2024-05-14 RX ORDER — ONDANSETRON 4 MG/1
4 TABLET, ORALLY DISINTEGRATING ORAL 3 TIMES DAILY PRN
Qty: 60 TABLET | Refills: 0 | Status: SHIPPED | OUTPATIENT
Start: 2024-05-14

## 2024-05-14 RX ORDER — OMEPRAZOLE 20 MG/1
20 CAPSULE, DELAYED RELEASE ORAL 2 TIMES DAILY
Qty: 60 CAPSULE | Refills: 2 | Status: SHIPPED | OUTPATIENT
Start: 2024-05-14

## 2024-05-14 NOTE — TELEPHONE ENCOUNTER
Care Transitions Initial Follow Up Call    Outreach made within 2 business days of discharge: Yes    Patient: Lisa K Cogan Patient : 1975   MRN: [id number]  Reason for Admission: There are no discharge diagnoses documented for the most recent discharge.  Discharge Date: 24       Spoke with: left voice mail      Scheduled appointment with PCP within 7-14 days    Follow Up  Future Appointments   Date Time Provider Department Center   2024  2:30 PM Haider Guerra, DO Neuro [facility] Neurology -   2024  1:30 PM [facility]   10/9/2024 10:00 AM Dio Holden MD [facility]       Yoly Connolly MA

## 2024-05-14 NOTE — TELEPHONE ENCOUNTER
Given her nausea, I suspect her pain in the morning is related to overproduction of stomach acid  I have refilled the Zofran for the nausea.  I have also sent in omeprazole, she is advised to take this upon awakening and at bedtime  See if she can come in Friday at 8 or 9 AM  Thanks

## 2024-05-14 NOTE — TELEPHONE ENCOUNTER
Patient returned call for hospital f/u call, she states she has been woken up every morning with chest pain-feels like elephant is sitting on her chest and EKG's done multiple times a day and is \"okay\" but still feels this way today-painful.  She states she has history of anxiety but this feels different and is just concentrated in chest.  She has headache and nausea-requesting a medication for nausea.  Had a lumbar puncture and would like results of this explained further to her.    Please advise on nausea medication and hospital follow appt appt day/time, no openings currently available.

## 2024-05-14 NOTE — TELEPHONE ENCOUNTER
Care Transitions Initial Follow Up Call    Outreach made within 2 business days of discharge: Yes    Patient: Lisa K Cogan Patient : 1975   MRN: [id number]  Reason for Admission: There are no discharge diagnoses documented for the most recent discharge.  Discharge Date: 24       Spoke with: patient    Discharge department/facility: [facility] Interactive Patient Contact:  Was patient able to fill all prescriptions: Yes  Was patient instructed to bring all medications to the follow-up visit: Yes  Is patient taking all medications as directed in the discharge summary? Yes  Does patient understand their discharge instructions: Yes  Does patient have questions or concerns that need addressed prior to 7-14 day follow up office visit: yes - having headache, nausea-requesting med for this, had EKG x 3-4 days told was normal but again today feels like elephant is sitting on her chest.  Had lumbar puncture and wants results explained from that.  Sending note to provider.    Scheduled appointment with PCP within 7-14 days    Follow Up  Future Appointments   Date Time Provider Department Center   2024  2:30 PM Haider Guerra,  Neuro [facility] Neurology -   2024  1:30 PM Doyle Tapia MD [facility]   10/9/2024 10:00 AM Dio Holden MD [facility]       Yoly Connolly MA

## 2024-05-14 NOTE — TELEPHONE ENCOUNTER
Patient advised. Verbalized understanding and will schedule for this Friday at 9    Last Visit Date: 12/19/2023   Next Visit Date: Visit date not found

## 2024-05-17 ENCOUNTER — OFFICE VISIT (OUTPATIENT)
Dept: PRIMARY CARE CLINIC | Age: 49
End: 2024-05-17
Payer: MEDICAID

## 2024-05-17 VITALS — HEART RATE: 92 BPM | DIASTOLIC BLOOD PRESSURE: 94 MMHG | OXYGEN SATURATION: 99 % | SYSTOLIC BLOOD PRESSURE: 146 MMHG

## 2024-05-17 DIAGNOSIS — Z09 HOSPITAL DISCHARGE FOLLOW-UP: Primary | ICD-10-CM

## 2024-05-17 DIAGNOSIS — R10.13 EPIGASTRIC PAIN: ICD-10-CM

## 2024-05-17 DIAGNOSIS — R11.0 NAUSEA: ICD-10-CM

## 2024-05-17 DIAGNOSIS — R51.9 ACHING HEADACHE: ICD-10-CM

## 2024-05-17 DIAGNOSIS — G93.2 INTRACRANIAL HYPERTENSION: ICD-10-CM

## 2024-05-17 DIAGNOSIS — R73.09 ELEVATED GLUCOSE: ICD-10-CM

## 2024-05-17 DIAGNOSIS — I10 PRIMARY HYPERTENSION: ICD-10-CM

## 2024-05-17 LAB — HBA1C MFR BLD: 6.1 %

## 2024-05-17 PROCEDURE — 3077F SYST BP >= 140 MM HG: CPT | Performed by: NURSE PRACTITIONER

## 2024-05-17 PROCEDURE — 3079F DIAST BP 80-89 MM HG: CPT | Performed by: NURSE PRACTITIONER

## 2024-05-17 PROCEDURE — 1111F DSCHRG MED/CURRENT MED MERGE: CPT | Performed by: NURSE PRACTITIONER

## 2024-05-17 PROCEDURE — 83036 HEMOGLOBIN GLYCOSYLATED A1C: CPT | Performed by: NURSE PRACTITIONER

## 2024-05-17 PROCEDURE — 99215 OFFICE O/P EST HI 40 MIN: CPT | Performed by: NURSE PRACTITIONER

## 2024-05-17 RX ORDER — LOSARTAN POTASSIUM 100 MG/1
100 TABLET ORAL DAILY
Qty: 90 TABLET | Refills: 1 | Status: SHIPPED | OUTPATIENT
Start: 2024-05-17

## 2024-05-17 RX ORDER — LOSARTAN POTASSIUM 100 MG/1
100 TABLET ORAL DAILY
Qty: 90 TABLET | Refills: 1 | Status: SHIPPED | OUTPATIENT
Start: 2024-05-17 | End: 2024-05-17 | Stop reason: SDUPTHER

## 2024-05-17 ASSESSMENT — PATIENT HEALTH QUESTIONNAIRE - PHQ9
SUM OF ALL RESPONSES TO PHQ QUESTIONS 1-9: 1
10. IF YOU CHECKED OFF ANY PROBLEMS, HOW DIFFICULT HAVE THESE PROBLEMS MADE IT FOR YOU TO DO YOUR WORK, TAKE CARE OF THINGS AT HOME, OR GET ALONG WITH OTHER PEOPLE: NOT DIFFICULT AT ALL
8. MOVING OR SPEAKING SO SLOWLY THAT OTHER PEOPLE COULD HAVE NOTICED. OR THE OPPOSITE, BEING SO FIGETY OR RESTLESS THAT YOU HAVE BEEN MOVING AROUND A LOT MORE THAN USUAL: NOT AT ALL
1. LITTLE INTEREST OR PLEASURE IN DOING THINGS: SEVERAL DAYS
6. FEELING BAD ABOUT YOURSELF - OR THAT YOU ARE A FAILURE OR HAVE LET YOURSELF OR YOUR FAMILY DOWN: NOT AT ALL
SUM OF ALL RESPONSES TO PHQ QUESTIONS 1-9: 1
2. FEELING DOWN, DEPRESSED OR HOPELESS: NOT AT ALL
9. THOUGHTS THAT YOU WOULD BE BETTER OFF DEAD, OR OF HURTING YOURSELF: NOT AT ALL
5. POOR APPETITE OR OVEREATING: NOT AT ALL
7. TROUBLE CONCENTRATING ON THINGS, SUCH AS READING THE NEWSPAPER OR WATCHING TELEVISION: NOT AT ALL
4. FEELING TIRED OR HAVING LITTLE ENERGY: NOT AT ALL
3. TROUBLE FALLING OR STAYING ASLEEP: NOT AT ALL
SUM OF ALL RESPONSES TO PHQ9 QUESTIONS 1 & 2: 1
SUM OF ALL RESPONSES TO PHQ QUESTIONS 1-9: 1
SUM OF ALL RESPONSES TO PHQ QUESTIONS 1-9: 1

## 2024-05-17 ASSESSMENT — ENCOUNTER SYMPTOMS
ABDOMINAL PAIN: 0
SORE THROAT: 0
TROUBLE SWALLOWING: 0
CONSTIPATION: 0
COUGH: 0
BLOOD IN STOOL: 0
SHORTNESS OF BREATH: 0
DIARRHEA: 0
NAUSEA: 1
VOMITING: 0

## 2024-05-17 NOTE — PROGRESS NOTES
week  She also see psych and states that she feels fairly well controlled, does not feel any of her current sx are anxiety driven  She is asking when she should consider ED as today in office she does not feel much different than she did in hospital  She continues to have nausea and epigastric pain. She started omeprazole last night and took an additional dose this morning.  She states she is noticing a mild improvement.  She also has used Zofran intermittently.  Denies vomiting    Asking about elevation in glucose levels in hospital.  States Dr there was concerned about development of diabetes.            Hemoglobin A1C (%)   Date Value   05/17/2024 6.1   10/18/2019 5.5             ( goal A1C is < 7)   No components found for: \"LABMICR\"  No components found for: \"LDLCHOLESTEROL\", \"LDLCALC\"    (goal LDL is <100)   AST (U/L)   Date Value   05/09/2024 27     ALT (U/L)   Date Value   05/09/2024 53 (H)     BUN (mg/dL)   Date Value   05/11/2024 18     BP Readings from Last 3 Encounters:   05/17/24 (!) 146/94   05/13/24 (!) 161/87   05/10/24 (!) 136/90          ([id number])    Past Medical History:   Diagnosis Date    Anxiety     Carpal tunnel syndrome     COVID-19 11/06/2021    Depression     Endometriosis     Fatty liver     Gestational diabetes     x 3 children    Headache     migraines    History of blood transfusion     Hypertension     CAITLIN (iron deficiency anemia)     iron infusions    Iron deficiency anemia     Kidney stones     Dr. Tapia seen 11/28/2022    May-Thurner syndrome     affects left side of body    Obesity     Rotator cuff injury     Scleroderma (HCC)     Snores     no cpap- never been teste for RAIN    Under care of team     PCP - RENETTA ALMARAZ CNP - LAST VISIT 8/31/2022    Under care of team     VASCULAR  - DR. RIBEIRO - 8/2021 - SEES FOR MAY-THURNER SYNDROME    Under care of team     HEM/ONC - DR. PERKINS - DINAH WHITMORE - LAST VISIT - 8/19/2022    URI (upper respiratory infection) 10/23/2022

## 2024-05-20 ENCOUNTER — OFFICE VISIT (OUTPATIENT)
Dept: NEUROLOGY | Age: 49
End: 2024-05-20
Payer: MEDICAID

## 2024-05-20 VITALS
SYSTOLIC BLOOD PRESSURE: 134 MMHG | HEIGHT: 64 IN | WEIGHT: 293 LBS | DIASTOLIC BLOOD PRESSURE: 91 MMHG | HEART RATE: 80 BPM | BODY MASS INDEX: 50.02 KG/M2

## 2024-05-20 DIAGNOSIS — M54.2 CERVICALGIA: Primary | ICD-10-CM

## 2024-05-20 DIAGNOSIS — R40.0 DAYTIME SOMNOLENCE: ICD-10-CM

## 2024-05-20 DIAGNOSIS — R06.83 SNORING: ICD-10-CM

## 2024-05-20 DIAGNOSIS — M54.81 BILATERAL OCCIPITAL NEURALGIA: ICD-10-CM

## 2024-05-20 PROCEDURE — 3080F DIAST BP >= 90 MM HG: CPT | Performed by: PSYCHIATRY & NEUROLOGY

## 2024-05-20 PROCEDURE — 99214 OFFICE O/P EST MOD 30 MIN: CPT | Performed by: PSYCHIATRY & NEUROLOGY

## 2024-05-20 PROCEDURE — 3075F SYST BP GE 130 - 139MM HG: CPT | Performed by: PSYCHIATRY & NEUROLOGY

## 2024-05-20 ASSESSMENT — ENCOUNTER SYMPTOMS
EYE PAIN: 0
PHOTOPHOBIA: 0
SORE THROAT: 0
ABDOMINAL PAIN: 0
WHEEZING: 0
CHEST TIGHTNESS: 0
RHINORRHEA: 0
SHORTNESS OF BREATH: 0
EYE ITCHING: 0
COUGH: 0
EYE DISCHARGE: 0
DIARRHEA: 0
BACK PAIN: 0
ABDOMINAL DISTENTION: 0
CONSTIPATION: 0
NAUSEA: 0
CHOKING: 0
TROUBLE SWALLOWING: 0
VOICE CHANGE: 0
EYE REDNESS: 0

## 2024-05-20 NOTE — PATIENT INSTRUCTIONS
Nerve block  Take magnesium at night  Stop topamax  Neck physical therapy   Sleep study  We will see you in three months

## 2024-05-20 NOTE — PROGRESS NOTES
[address] [facility] [place] [facility] [address]  [place] [address]  P: [phone]  F: [phone]              Patient: Lisa K Cogan : 1975  MRN: [id number]   Date: 2024  PCP: Luda Landry APRN - CNP  Reason for visit: headaches   Information obtained from: patient, chart review  Allergies: Contrast [iodides], Trileptal [oxcarbazepine], Morphine, Bactrim [sulfamethoxazole-trimethoprim], Iodinated contrast media, Prozac [fluoxetine hcl], and Nsaids    History of Presenting Illness:  Lisa K Cogan is a 48 y.o. right handed female with a history of iron deficiency anemia who was recently admitted for generalized fatigue developed a severe headache while inpatient and underwent a thorough workup for possible pseudotumor cerebri syndrome.  Patient reports that despite increased social stressors and recent months, she has developed significant fatigue and was previously found to have low hemoglobin levels when she has felt this way before.  She presented herself to the hospital and her hemoglobin was found to be within normal range.  While inpatient, patient developed severe headache of bioccipital origin with anterior radiation.  She underwent a lumbar puncture with normal opening pressure at 15.  She also underwent an MRI brain as well as an MR venogram, both were not remarkable.  She was given 3 doses of Solu-Medrol and was discharged on Topamax up titration.  Since discharge, patient reports a progressive onset of worsening cognitive fatigue, full body paresthesias, loss of taste and appetite.  Her symptoms have been quite alarming for her and was inquiring if whether or not she has multiple sclerosis.  A lengthy discussion was had with the patient and her neuro images were reviewed with her.  At this time, there is no clinical or radiographic evidence of multiple sclerosis.  The patient was advised likely her symptoms are related to Topamax.    Persistent fatigue and cognitive clouding may also be

## 2024-05-21 ENCOUNTER — HOSPITAL ENCOUNTER (OUTPATIENT)
Age: 49
Setting detail: OBSERVATION
Discharge: HOME | End: 2024-05-22
Attending: EMERGENCY MEDICINE | Admitting: STUDENT IN AN ORGANIZED HEALTH CARE EDUCATION/TRAINING PROGRAM
Payer: MEDICAID

## 2024-05-21 ENCOUNTER — TELEPHONE (OUTPATIENT)
Dept: NEUROLOGY | Age: 49
End: 2024-05-21

## 2024-05-21 DIAGNOSIS — G43.711 INTRACTABLE CHRONIC MIGRAINE WITHOUT AURA AND WITH STATUS MIGRAINOSUS: Primary | ICD-10-CM

## 2024-05-21 PROCEDURE — 99284 EMERGENCY DEPT VISIT MOD MDM: CPT

## 2024-05-21 PROCEDURE — 96374 THER/PROPH/DIAG INJ IV PUSH: CPT

## 2024-05-21 PROCEDURE — 96375 TX/PRO/DX INJ NEW DRUG ADDON: CPT

## 2024-05-21 ASSESSMENT — LIFESTYLE VARIABLES
HOW OFTEN DO YOU HAVE A DRINK CONTAINING ALCOHOL: NEVER
HOW MANY STANDARD DRINKS CONTAINING ALCOHOL DO YOU HAVE ON A TYPICAL DAY: PATIENT DOES NOT DRINK

## 2024-05-21 ASSESSMENT — PAIN DESCRIPTION - LOCATION
LOCATION: HEAD
LOCATION: HEAD

## 2024-05-21 ASSESSMENT — PAIN SCALES - GENERAL
PAINLEVEL_OUTOF10: 9
PAINLEVEL_OUTOF10: 9

## 2024-05-21 ASSESSMENT — PAIN - FUNCTIONAL ASSESSMENT: PAIN_FUNCTIONAL_ASSESSMENT: 0-10

## 2024-05-21 NOTE — TELEPHONE ENCOUNTER
PT called back stated she needs a referral for her nerve block you and her discuss and she wanting to know if she can get anything for the pain she is experiencing. Please advise.

## 2024-05-21 NOTE — TELEPHONE ENCOUNTER
Pt states migraines are coming back in full force. She states its to the point that she recently cut her hair ( which was down to her waist) to her shoulders due to the increased tension. She may return to the ER this evening. Is there anything she can be prescribed for abortive therapy ? Please Advise

## 2024-05-21 NOTE — TELEPHONE ENCOUNTER
PT called in regarding how to titrate off Topamax per visit recommendations, and per Dr. Guerra she is ok 'd to discontinue the medication, no titration needed due to it being a lower dose.

## 2024-05-22 ENCOUNTER — TELEPHONE (OUTPATIENT)
Dept: NEUROLOGY | Age: 49
End: 2024-05-22

## 2024-05-22 VITALS
DIASTOLIC BLOOD PRESSURE: 90 MMHG | SYSTOLIC BLOOD PRESSURE: 132 MMHG | RESPIRATION RATE: 18 BRPM | HEIGHT: 64 IN | BODY MASS INDEX: 50.02 KG/M2 | TEMPERATURE: 97.8 F | OXYGEN SATURATION: 98 % | WEIGHT: 293 LBS | HEART RATE: 72 BPM

## 2024-05-22 PROBLEM — R51.9 CHRONIC DAILY HEADACHE: Status: ACTIVE | Noted: 2024-05-22

## 2024-05-22 PROBLEM — G43.711 INTRACTABLE CHRONIC MIGRAINE WITHOUT AURA AND WITH STATUS MIGRAINOSUS: Status: RESOLVED | Noted: 2024-05-22 | Resolved: 2024-05-22

## 2024-05-22 PROBLEM — G43.711 INTRACTABLE CHRONIC MIGRAINE WITHOUT AURA AND WITH STATUS MIGRAINOSUS: Status: ACTIVE | Noted: 2024-05-22

## 2024-05-22 PROBLEM — K21.9 GERD (GASTROESOPHAGEAL REFLUX DISEASE): Status: ACTIVE | Noted: 2024-05-22

## 2024-05-22 PROBLEM — G43.019 INTRACTABLE COMMON MIGRAINE: Status: ACTIVE | Noted: 2024-05-22

## 2024-05-22 PROBLEM — M54.81 OCCIPITAL NEURALGIA OF RIGHT SIDE: Status: ACTIVE | Noted: 2024-05-22

## 2024-05-22 PROCEDURE — 64405 NJX AA&/STRD GR OCPL NRV: CPT | Performed by: STUDENT IN AN ORGANIZED HEALTH CARE EDUCATION/TRAINING PROGRAM

## 2024-05-22 PROCEDURE — 99254 IP/OBS CNSLTJ NEW/EST MOD 60: CPT | Performed by: STUDENT IN AN ORGANIZED HEALTH CARE EDUCATION/TRAINING PROGRAM

## 2024-05-22 PROCEDURE — 6370000000 HC RX 637 (ALT 250 FOR IP): Performed by: STUDENT IN AN ORGANIZED HEALTH CARE EDUCATION/TRAINING PROGRAM

## 2024-05-22 PROCEDURE — 6360000002 HC RX W HCPCS

## 2024-05-22 PROCEDURE — G0378 HOSPITAL OBSERVATION PER HR: HCPCS

## 2024-05-22 PROCEDURE — 6370000000 HC RX 637 (ALT 250 FOR IP)

## 2024-05-22 PROCEDURE — 2580000003 HC RX 258

## 2024-05-22 PROCEDURE — APPSS30 APP SPLIT SHARED TIME 16-30 MINUTES

## 2024-05-22 PROCEDURE — 96374 THER/PROPH/DIAG INJ IV PUSH: CPT

## 2024-05-22 PROCEDURE — 96372 THER/PROPH/DIAG INJ SC/IM: CPT

## 2024-05-22 PROCEDURE — 96375 TX/PRO/DX INJ NEW DRUG ADDON: CPT

## 2024-05-22 PROCEDURE — 99223 1ST HOSP IP/OBS HIGH 75: CPT | Performed by: STUDENT IN AN ORGANIZED HEALTH CARE EDUCATION/TRAINING PROGRAM

## 2024-05-22 PROCEDURE — 6360000002 HC RX W HCPCS: Performed by: STUDENT IN AN ORGANIZED HEALTH CARE EDUCATION/TRAINING PROGRAM

## 2024-05-22 RX ORDER — BUPIVACAINE HYDROCHLORIDE 5 MG/ML
2 INJECTION, SOLUTION EPIDURAL; INTRACAUDAL ONCE
Status: COMPLETED | OUTPATIENT
Start: 2024-05-22 | End: 2024-05-22

## 2024-05-22 RX ORDER — POTASSIUM CHLORIDE 20 MEQ/1
40 TABLET, EXTENDED RELEASE ORAL PRN
Status: DISCONTINUED | OUTPATIENT
Start: 2024-05-22 | End: 2024-05-22 | Stop reason: HOSPADM

## 2024-05-22 RX ORDER — KETOROLAC TROMETHAMINE 15 MG/ML
15 INJECTION, SOLUTION INTRAMUSCULAR; INTRAVENOUS ONCE
Status: COMPLETED | OUTPATIENT
Start: 2024-05-22 | End: 2024-05-22

## 2024-05-22 RX ORDER — 0.9 % SODIUM CHLORIDE 0.9 %
500 INTRAVENOUS SOLUTION INTRAVENOUS ONCE
Status: COMPLETED | OUTPATIENT
Start: 2024-05-22 | End: 2024-05-22

## 2024-05-22 RX ORDER — SODIUM CHLORIDE 9 MG/ML
INJECTION, SOLUTION INTRAVENOUS CONTINUOUS
Status: DISCONTINUED | OUTPATIENT
Start: 2024-05-22 | End: 2024-05-22 | Stop reason: HOSPADM

## 2024-05-22 RX ORDER — BUPIVACAINE HYDROCHLORIDE 5 MG/ML
2 INJECTION, SOLUTION PERINEURAL ONCE
Status: DISCONTINUED | OUTPATIENT
Start: 2024-05-22 | End: 2024-05-22

## 2024-05-22 RX ORDER — GABAPENTIN 300 MG/1
300 CAPSULE ORAL 3 TIMES DAILY
Status: DISCONTINUED | OUTPATIENT
Start: 2024-05-22 | End: 2024-05-22 | Stop reason: HOSPADM

## 2024-05-22 RX ORDER — ONDANSETRON 2 MG/ML
4 INJECTION INTRAMUSCULAR; INTRAVENOUS EVERY 6 HOURS PRN
Status: DISCONTINUED | OUTPATIENT
Start: 2024-05-22 | End: 2024-05-22 | Stop reason: HOSPADM

## 2024-05-22 RX ORDER — PANTOPRAZOLE SODIUM 40 MG/1
40 TABLET, DELAYED RELEASE ORAL
Status: DISCONTINUED | OUTPATIENT
Start: 2024-05-22 | End: 2024-05-22 | Stop reason: HOSPADM

## 2024-05-22 RX ORDER — GABAPENTIN 300 MG/1
300 CAPSULE ORAL 3 TIMES DAILY
Qty: 90 CAPSULE | Refills: 0 | Status: SHIPPED | OUTPATIENT
Start: 2024-05-22 | End: 2024-06-21

## 2024-05-22 RX ORDER — PROCHLORPERAZINE EDISYLATE 5 MG/ML
10 INJECTION INTRAMUSCULAR; INTRAVENOUS ONCE
Status: COMPLETED | OUTPATIENT
Start: 2024-05-22 | End: 2024-05-22

## 2024-05-22 RX ORDER — BUSPIRONE HYDROCHLORIDE 5 MG/1
15 TABLET ORAL 3 TIMES DAILY
Status: DISCONTINUED | OUTPATIENT
Start: 2024-05-22 | End: 2024-05-22 | Stop reason: HOSPADM

## 2024-05-22 RX ORDER — MULTIVITAMIN WITH IRON
1 TABLET ORAL DAILY
Status: DISCONTINUED | OUTPATIENT
Start: 2024-05-22 | End: 2024-05-22 | Stop reason: HOSPADM

## 2024-05-22 RX ORDER — SODIUM CHLORIDE 9 MG/ML
INJECTION, SOLUTION INTRAVENOUS PRN
Status: DISCONTINUED | OUTPATIENT
Start: 2024-05-22 | End: 2024-05-22 | Stop reason: HOSPADM

## 2024-05-22 RX ORDER — POLYETHYLENE GLYCOL 3350 17 G/17G
17 POWDER, FOR SOLUTION ORAL DAILY PRN
Status: DISCONTINUED | OUTPATIENT
Start: 2024-05-22 | End: 2024-05-22 | Stop reason: HOSPADM

## 2024-05-22 RX ORDER — ACETAMINOPHEN 325 MG/1
650 TABLET ORAL EVERY 6 HOURS PRN
Status: DISCONTINUED | OUTPATIENT
Start: 2024-05-22 | End: 2024-05-22 | Stop reason: HOSPADM

## 2024-05-22 RX ORDER — PROCHLORPERAZINE EDISYLATE 5 MG/ML
10 INJECTION INTRAMUSCULAR; INTRAVENOUS EVERY 6 HOURS PRN
Status: DISCONTINUED | OUTPATIENT
Start: 2024-05-22 | End: 2024-05-22 | Stop reason: HOSPADM

## 2024-05-22 RX ORDER — MAGNESIUM SULFATE IN WATER 40 MG/ML
2000 INJECTION, SOLUTION INTRAVENOUS PRN
Status: DISCONTINUED | OUTPATIENT
Start: 2024-05-22 | End: 2024-05-22 | Stop reason: HOSPADM

## 2024-05-22 RX ORDER — UREA 10 %
325 LOTION (ML) TOPICAL
Status: DISCONTINUED | OUTPATIENT
Start: 2024-05-22 | End: 2024-05-22 | Stop reason: HOSPADM

## 2024-05-22 RX ORDER — SODIUM CHLORIDE 0.9 % (FLUSH) 0.9 %
5-40 SYRINGE (ML) INJECTION EVERY 12 HOURS SCHEDULED
Status: DISCONTINUED | OUTPATIENT
Start: 2024-05-22 | End: 2024-05-22 | Stop reason: HOSPADM

## 2024-05-22 RX ORDER — SUMATRIPTAN 6 MG/.5ML
6 INJECTION, SOLUTION SUBCUTANEOUS 2 TIMES DAILY PRN
Status: DISCONTINUED | OUTPATIENT
Start: 2024-05-22 | End: 2024-05-22 | Stop reason: HOSPADM

## 2024-05-22 RX ORDER — ACETAMINOPHEN 650 MG/1
650 SUPPOSITORY RECTAL EVERY 6 HOURS PRN
Status: DISCONTINUED | OUTPATIENT
Start: 2024-05-22 | End: 2024-05-22 | Stop reason: HOSPADM

## 2024-05-22 RX ORDER — SUMATRIPTAN 6 MG/.5ML
6 INJECTION, SOLUTION SUBCUTANEOUS 2 TIMES DAILY PRN
Qty: 0.5 ML | Refills: 3 | Status: SHIPPED | OUTPATIENT
Start: 2024-05-22

## 2024-05-22 RX ORDER — ONDANSETRON 4 MG/1
4 TABLET, ORALLY DISINTEGRATING ORAL EVERY 8 HOURS PRN
Status: DISCONTINUED | OUTPATIENT
Start: 2024-05-22 | End: 2024-05-22 | Stop reason: HOSPADM

## 2024-05-22 RX ORDER — SODIUM CHLORIDE 0.9 % (FLUSH) 0.9 %
5-40 SYRINGE (ML) INJECTION PRN
Status: DISCONTINUED | OUTPATIENT
Start: 2024-05-22 | End: 2024-05-22 | Stop reason: HOSPADM

## 2024-05-22 RX ORDER — ENOXAPARIN SODIUM 100 MG/ML
30 INJECTION SUBCUTANEOUS 2 TIMES DAILY
Status: DISCONTINUED | OUTPATIENT
Start: 2024-05-22 | End: 2024-05-22 | Stop reason: HOSPADM

## 2024-05-22 RX ORDER — DIPHENHYDRAMINE HYDROCHLORIDE 50 MG/ML
25 INJECTION INTRAMUSCULAR; INTRAVENOUS ONCE
Status: COMPLETED | OUTPATIENT
Start: 2024-05-22 | End: 2024-05-22

## 2024-05-22 RX ORDER — LANOLIN ALCOHOL/MO/W.PET/CERES
400 CREAM (GRAM) TOPICAL NIGHTLY
Status: DISCONTINUED | OUTPATIENT
Start: 2024-05-22 | End: 2024-05-22 | Stop reason: HOSPADM

## 2024-05-22 RX ORDER — DEXAMETHASONE SODIUM PHOSPHATE 4 MG/ML
4 INJECTION, SOLUTION INTRA-ARTICULAR; INTRALESIONAL; INTRAMUSCULAR; INTRAVENOUS; SOFT TISSUE ONCE
Status: COMPLETED | OUTPATIENT
Start: 2024-05-22 | End: 2024-05-22

## 2024-05-22 RX ORDER — KETOROLAC TROMETHAMINE 15 MG/ML
15 INJECTION, SOLUTION INTRAMUSCULAR; INTRAVENOUS ONCE
Status: DISCONTINUED | OUTPATIENT
Start: 2024-05-22 | End: 2024-05-22

## 2024-05-22 RX ORDER — DIPHENHYDRAMINE HYDROCHLORIDE 50 MG/ML
25 INJECTION INTRAMUSCULAR; INTRAVENOUS EVERY 6 HOURS PRN
Status: DISCONTINUED | OUTPATIENT
Start: 2024-05-22 | End: 2024-05-22 | Stop reason: HOSPADM

## 2024-05-22 RX ORDER — LOSARTAN POTASSIUM 25 MG/1
100 TABLET ORAL DAILY
Status: DISCONTINUED | OUTPATIENT
Start: 2024-05-22 | End: 2024-05-22 | Stop reason: HOSPADM

## 2024-05-22 RX ORDER — POTASSIUM CHLORIDE 7.45 MG/ML
10 INJECTION INTRAVENOUS PRN
Status: DISCONTINUED | OUTPATIENT
Start: 2024-05-22 | End: 2024-05-22 | Stop reason: HOSPADM

## 2024-05-22 RX ADMIN — BUSPIRONE HYDROCHLORIDE 15 MG: 5 TABLET ORAL at 08:58

## 2024-05-22 RX ADMIN — PANTOPRAZOLE SODIUM 40 MG: 40 TABLET, DELAYED RELEASE ORAL at 08:59

## 2024-05-22 RX ADMIN — SODIUM CHLORIDE: 9 INJECTION, SOLUTION INTRAVENOUS at 04:42

## 2024-05-22 RX ADMIN — LOSARTAN POTASSIUM 100 MG: 25 TABLET, FILM COATED ORAL at 08:58

## 2024-05-22 RX ADMIN — PROCHLORPERAZINE EDISYLATE 10 MG: 5 INJECTION INTRAMUSCULAR; INTRAVENOUS at 00:54

## 2024-05-22 RX ADMIN — SODIUM CHLORIDE 500 ML: 9 INJECTION, SOLUTION INTRAVENOUS at 00:53

## 2024-05-22 RX ADMIN — KETOROLAC TROMETHAMINE 15 MG: 15 INJECTION, SOLUTION INTRAMUSCULAR; INTRAVENOUS at 00:55

## 2024-05-22 RX ADMIN — THERA TABS 1 TABLET: TAB at 08:58

## 2024-05-22 RX ADMIN — GABAPENTIN 300 MG: 300 CAPSULE ORAL at 12:03

## 2024-05-22 RX ADMIN — DEXAMETHASONE SODIUM PHOSPHATE 4 MG: 4 INJECTION, SOLUTION INTRA-ARTICULAR; INTRALESIONAL; INTRAMUSCULAR; INTRAVENOUS; SOFT TISSUE at 11:35

## 2024-05-22 RX ADMIN — BUPIVACAINE HYDROCHLORIDE 10 MG: 5 INJECTION, SOLUTION EPIDURAL; INTRACAUDAL; PERINEURAL at 11:35

## 2024-05-22 RX ADMIN — DIPHENHYDRAMINE HYDROCHLORIDE 25 MG: 50 INJECTION INTRAMUSCULAR; INTRAVENOUS at 00:54

## 2024-05-22 RX ADMIN — ENOXAPARIN SODIUM 30 MG: 100 INJECTION SUBCUTANEOUS at 09:02

## 2024-05-22 ASSESSMENT — PAIN - FUNCTIONAL ASSESSMENT
PAIN_FUNCTIONAL_ASSESSMENT: 0-10
PAIN_FUNCTIONAL_ASSESSMENT: 0-10

## 2024-05-22 ASSESSMENT — PAIN SCALES - GENERAL
PAINLEVEL_OUTOF10: 9
PAINLEVEL_OUTOF10: 2
PAINLEVEL_OUTOF10: 3

## 2024-05-22 ASSESSMENT — ENCOUNTER SYMPTOMS
COUGH: 0
CONSTIPATION: 0
SHORTNESS OF BREATH: 0
ABDOMINAL PAIN: 0
DIARRHEA: 1
VOMITING: 0
WHEEZING: 0
BLOOD IN STOOL: 0
CHEST TIGHTNESS: 1
NAUSEA: 1

## 2024-05-22 ASSESSMENT — PAIN DESCRIPTION - LOCATION: LOCATION: HEAD

## 2024-05-22 NOTE — H&P
[facility]  Office: [phone]  Bill Crenshaw DO, Luis Bernal DO, Ronni Brito DO, Khris Bourgeois DO, Erinn Franks MD, Sudha Ocampo MD, Marry Pearson MD, Danii Beck MD,  Pratik Cr MD, El Anderson MD, Cait Loya MD,  Kapil Deluca DO, Yao Thurston MD, Chris Sidhu MD, Panda Crenshaw DO, Fransisca Aldana MD,  Alberto Quintanilla DO, Leah Paulino MD, Lauryn Ribera MD, Chantel Mccoy MD, Chris Portillo MD,  Lenin Arriola MD, Juan Langley MD, Neel Stewart MD, Karlee Albarran MD, Malcom Fuller MD, James Goldsmith MD, Paulo Garcia DO, Jake Win DO, Millicent Clarke MD,  Aric Jane MD, Shirley Waterhouse, CNP,  Radha Gonzalez CNP, Sukumar Holder, CNP,  Radha Miller, DNP, Nai Hutchinson, CNP, Abi Mccarthy, CNP, Sachi Hastings, CNP, Jessenia Camp, CNP, Sharmaine Dodge, PA-C, Xochitl Nettles PA-C, Leia Horton, CNP, Any Carlson, CNP, Twin Connolly, CNP, Yamilka Duran, CNP, Mary Kate Mckeon, CNP, Mena Combs, CNS, Fiona Reyes, CNP, Savana Trent, CNP, Tracy Schwab, CNP         [facility]   IN-PATIENT SERVICE   [facility]    HISTORY AND PHYSICAL EXAMINATION            Date:   5/22/2024  Patient name:  Lisa K Cogan  Date of admission:  5/21/2024 10:41 PM  MRN:   [id number]  Account:  [id number]  YOB: 1975  PCP:    Luda Landry APRN - CNP  Room:   ER05/ER05  Code Status:    Prior    Chief Complaint:     Chief Complaint   Patient presents with    Migraine     Pt states was diagnosed with migraine last week and sent to [facility]'s. states was d/c'd and told if migriane came back and got worse to come back to ED. Pt states she had an occipital nerve block ordered but the doctor forgot to send the referral so she didn't get it done. Pt c/o extreme head and facial pain.      History Obtained From:     patient, electronic medical record    History of Present Illness:     Lisa K Cogan is a 48 y.o. Non- / non    All other systems reviewed and are negative.      Physical Exam:   BP (!) 146/109   Pulse 75   Temp 98 °F (36.7 °C)   Resp 18   Ht 1.626 m (5' 4\")   Wt (!) 138.8 kg (306 lb)   LMP 09/15/2021 Comment: VERY HEAVY PERIODS  SpO2 98%   BMI 52.52 kg/m²   Temp (24hrs), Av °F (36.7 °C), Min:98 °F (36.7 °C), Max:98 °F (36.7 °C)    No results for input(s): \"POCGLU\" in the last 72 hours.  No intake or output data in the 24 hours ending 24 0340    Physical Exam  Vitals reviewed.   Constitutional:       Appearance: Normal appearance. She is obese.   HENT:      Head: Normocephalic and atraumatic.      Mouth/Throat:      Mouth: Mucous membranes are moist.      Pharynx: Oropharynx is clear.   Eyes:      Extraocular Movements: Extraocular movements intact.      Conjunctiva/sclera: Conjunctivae normal.   Cardiovascular:      Rate and Rhythm: Normal rate and regular rhythm.      Pulses: Normal pulses.      Heart sounds: Normal heart sounds.   Pulmonary:      Effort: Pulmonary effort is normal.      Breath sounds: Normal breath sounds.   Abdominal:      General: Abdomen is flat. Bowel sounds are normal.      Palpations: Abdomen is soft.   Skin:     General: Skin is warm and dry.      Capillary Refill: Capillary refill takes less than 2 seconds.   Neurological:      General: No focal deficit present.      Mental Status: She is alert and oriented to person, place, and time.   Psychiatric:         Mood and Affect: Mood normal.         Behavior: Behavior normal.         Investigations:      Laboratory Testing:  No results found for this or any previous visit (from the past 24 hour(s)).    Imaging/Diagnostics:    No results found.    Assessment :      Hospital Problems             Last Modified POA    * (Principal) Intractable common migraine 2024 Yes    Anxiety 2024 Yes    Hypertension 2024 Yes    GERD (gastroesophageal reflux disease) 2024 Yes       Plan:     Patient status observation in the

## 2024-05-22 NOTE — TELEPHONE ENCOUNTER
Dr. Guerra saw her in clinic and he would like to have her receive ONB. Need to do PA for this with her ins. He would like the ONB set up with Dr. Logan.

## 2024-05-22 NOTE — DISCHARGE SUMMARY
[facility]  Office: [phone]  Bill Crenshaw DO, Luis Bernal DO, Ronni Brito DO, Khris Bourgeois DO, Erinn Franks MD, Sudha Ocampo MD, Marry Pearson MD, Danii Beck MD,  Pratik Cr MD, El Anderson MD, Jake Win DO, Cait Loya MD,  Kapil Deluca DO, Yao Thurston MD, Chris Sidhu MD, Panda Crenshaw DO, Fransisca Aldana MD,  Alberto Quintanilla DO, Leah Paulino MD, Lauryn Ribera MD, Chantel Mccoy MD, Chris Portillo MD,  Lenin Arriola MD, Juan Langley MD, Neel Stewart MD, Paulo Garcia DO, Millicnet Clarke MD,  Aric Jane MD, Shirley Waterhouse, CNP,  Radha Gonzalez, CNP, Yamilka Duran, CNP, Sukumar Holder, CNP,  Radha Miller, DNP, Nai Hutchinson, CNP, Abi Mccarthy, CNP, Mary Kate Mckeon, CNP, Sachi Hastings, CNP, Jessenia Camp, CNP, Jaguar Phelps PA-C, Mena Combs, CNS, Fiona Reyes, CNP, Savana Trent, CNP         [facility]   IN-PATIENT SERVICE   [facility] - {Location:66152::\"[place]\",\"[facility]"}    Discharge Summary     Patient ID: Lisa K Cogan  :  1975   MRN: [id number]     ACCOUNT:  [id number]   Patient's PCP: Luda Landry APRN - CNP  Admit Date: 2024   Discharge Date: 2024  Length of Stay: 0  Code Status:  Full Code  Admitting Physician: Malcom Fuller MD  Discharge Physician: Malcom Fuller MD     Active Discharge Diagnoses:     Hospital Problem Lists:  Principal Problem (Resolved):    Intractable chronic migraine without aura and with status migrainosus  Active Problems:    Anxiety    Hypertension    GERD (gastroesophageal reflux disease)      Admission Condition:  {condition:42728}     Discharged Condition: {condition:77867}    Hospital Stay:     Hospital Course:  Lisa K Cogan is a 48 y.o. female who was admitted for the management of Intractable chronic migraine without aura and with status migrainosus , presented to ER with Migraine (Pt states was diagnosed with  Calcium Citrate+Vit D3 Max 315-6.25 MG-MCG Tabs  Generic drug: Calcium Citrate-Vitamin D3     vitamin B-2 100 MG Tabs tablet  Commonly known as: RIBOFLAVIN     vitamin E 1000 units capsule     zinc 50 MG Caps            ASK your doctor about these medications      senna 8.6 MG tablet  Commonly known as: Senokot  Take 1 tablet by mouth 2 times daily     topiramate 50 MG tablet  Commonly known as: TOPAMAX  Take 1.5 tablets by mouth 2 times daily     vitamin B-12 1000 MCG tablet  Commonly known as: CYANOCOBALAMIN  Take 1 tablet by mouth daily               Where to Get Your Medications        These medications were sent to [id number] - [place], [place] - [address] [facility] - P [phone] - F [phone] [address] [facility] [address]      Phone: [phone]   gabapentin 300 MG capsule  SUMAtriptan 6 MG/0.5ML Soln injection         Time spent on discharge is {Time:02323::\"15 mins\",\"17 mins\",\"20 mins\",\"31 mins\",\"32 mins\",\"33 mins\",\"34 mins\",\"35 mins\",\"36 mins\",\"37 mins\",\"38 mins\",\"39 mins\",\"40 mins\",\"41 mins\",\"43 mins\",\"45 mins\",\"50 mins\",\"***\"} in patient examination, evaluation, counseling as well as medication reconciliation, prescriptions for required medications, discharge plan and follow up.    Electronically signed by   Malcom Fuller MD  5/22/2024  2:05 PM      Thank you Luda Bower, APRN - CNP for the opportunity to be involved in this patient's care.

## 2024-05-22 NOTE — CONSULTS
ear discharge, ear pain, nosebleed    Eyes  Negative for photophobia, pain and discharge    Respiratory  Negative for hemoptysis and sputum    Cardiovascular  Negative for orthopnea, claudication and PND    Gastrointestinal  Negative for abdominal pain, diarrhea, blood in stool    Musculoskeletal  Negative for joint pain, negative for myalgia    Neurology Negative for seizures, loss of consciousness positive for intractable right occipital pain radiation into right parietal and temporal region    Skin  Negative for rash or itching    Endo/heme/allergies  Negative for polydipsia, environmental allergy    Psychiatric/behavioral  Negative for suicidal ideation. Patient is not anxious        Physical Exam:   BP (!) 145/93   Pulse 75   Temp 98 °F (36.7 °C)   Resp 18   Ht 1.626 m (5' 4\")   Wt (!) 138.8 kg (306 lb)   LMP 09/15/2021 Comment: VERY HEAVY PERIODS  SpO2 98%   BMI 52.52 kg/m²   Temp (24hrs), Av °F (36.7 °C), Min:98 °F (36.7 °C), Max:98 °F (36.7 °C)    No results for input(s): \"POCGLU\" in the last 72 hours.  No intake or output data in the 24 hours ending 24 0601      NEUROLOGIC EXAMINATION  GENERAL  Appears comfortable and in no distress   HEENT  NC/ AT   NECK  Supple and no bruits heard   MENTAL STATUS:  Alert, oriented, poor short term memory, no confusion, normal speech, normal language, no hallucination or delusion   CRANIAL NERVES: II     -      Visual fields intact to confrontation  III,IV,VI -  EOMs full, no afferent defect, no MARIIA, no ptosis  V     -     Normal facial sensation  VII    -     Normal facial symmetry  VIII   -     Intact hearing  IX,X -     Symmetrical palate  XI    -     Symmetrical shoulder shrug  XII   -     Midline tongue, no atrophy    MOTOR FUNCTION:  significant for good strength of grade 5/5 in bilateral proximal and distal muscle groups of both upper and lower extremities with normal bulk, normal tone and no involuntary movements, no tremor   SENSORY FUNCTION:   co-morbidities listed above, severity of signs and symptoms as outlined, requirement for current medical therapies and most importantly because of direct risk to patient if care not provided in a hospital setting.    Jude Perez MD  5/22/2024  6:01 AM    Copy sent to Dr. Landry, Luda RODRÍGUEZ, APRN - CNP

## 2024-05-22 NOTE — ED NOTES
Writer to room- pt states she needs to know if she will be admitted or discharged. Her sister is available to take home. Manjinder served Dr Fuller to relay. Family arrives at bedside

## 2024-05-22 NOTE — ED PROVIDER NOTES
[facility] EMERGENCY DEPARTMENT  Emergency Department Encounter  Mid Level Provider     Pt Name: Lisa K Cogan  MRN: [id number]  Birthdate 1975  Date of evaluation: 5/22/24  PCP:  Luda Landry APRN - JENNIFER    CHIEF COMPLAINT       Chief Complaint   Patient presents with    Migraine     Pt states was diagnosed with migraine last week and sent to [facility]. states was d/c'd and told if migriane came back and got worse to come back to ED. Pt states she had an occipital nerve block ordered but the doctor forgot to send the referral so she didn't get it done. Pt c/o extreme head and facial pain.        HISTORY OF PRESENT ILLNESS  (Location/Symptom, Timing/Onset,Context/Setting, Quality, Duration, Modifying Factors, Severity.)      Lisa K Cogan is a 48 y.o. female who presents with complaints of migraine headache. Reports recent hospitalization for ongoing symptoms of feelings of head rushing, intermittent vision changes, headaches. Had a recent hospitalization at [facility] for these symptoms and was diagnosed with cervicalgia, migraine headaches, and increased intracranial pressure. She was placed on topamax which she has not tolerated well. She is to have occipital nerve block however these have not been accomplished yet. She developed a migraine headache described as a pressure that began at the base of the skull and has since radiated to the entire head, with pulsing sensations and dizziness.     PAST MEDICAL /SURGICAL / SOCIAL / FAMILY HISTORY      has a past medical history of Anxiety, Carpal tunnel syndrome, COVID-19, Depression, Endometriosis, Fatty liver, Gestational diabetes, Headache, History of blood transfusion, Hypertension, CAITLIN (iron deficiency anemia), Iron deficiency anemia, Kidney stones, May-Thurner syndrome, Obesity, Rotator cuff injury, Scleroderma (HCC), Snores, Under care of team, Under care of team, Under care of team, URI (upper respiratory infection), Wears glasses, and

## 2024-05-22 NOTE — DISCHARGE INSTRUCTIONS
Follow-up outpatient with PCP and neurology.  Follow-up outpatient with ophthalmology as per schedule.  Continue medications as prescribed.

## 2024-05-22 NOTE — ED PROVIDER NOTES
[facility] EMERGENCY DEPARTMENT     Emergency Department     Faculty Attestation        I performed a history and physical examination of the patient and discussed management with the resident. I reviewed the resident’s note and agree with the documented findings and plan of care. Any areas of disagreement are noted on the chart. I was personally present for the key portions of any procedures. I have documented in the chart those procedures where I was not present during the key portions. I have reviewed the emergency nurses triage note. I agree with the chief complaint, past medical history, past surgical history, allergies, medications, social and family history as documented unless otherwise noted below. Documentation of the HPI, Physical Exam and Medical Decision Making performed by medical students or scribes is based on my personal performance of the HPI, PE and MDM. For Physician Assistant/ Nurse Practitioner cases/documentation I have have had a face to face evaluation with this patient and have completed at least one if not all key elements of the E/M (history, physical exam, and MDM). Additional findings are as noted.    Vital Signs: BP (!) 146/109   Pulse 75   Temp 98 °F (36.7 °C)   Resp 18   Ht 1.626 m (5' 4\")   Wt (!) 138.8 kg (306 lb)   LMP 09/15/2021 Comment: VERY HEAVY PERIODS  SpO2 98%   BMI 52.52 kg/m²   PCP:  Luda Landry APRN - CNP    Pertinent Comments:     History: This is a 48-year-old female who relates that she was diagnosed with migraine last week and was admitted at [facility].  She relates that she was discharged after a number of days there and quite a bit of testing.  She was told that if the migraine came back or got worse to come back to the ED.  She relates she did have an occipital nerve block ordered but the doctor did forget to send the referral so she has not been able to get it done.  She relates that she

## 2024-05-22 NOTE — PROGRESS NOTES
Physical Therapy Cancel Note      DATE: 2024    NAME: Lisa K Cogan  MRN: [id number]   : 1975      Patient not seen this date for Physical Therapy due to:    Other: The patient is independent at this time and there are no therapy needs; If the patient is still in the hospital on , therapy will check back on patient to determine if there are any therapy needs.       Electronically signed by Zainab Pathak PT on 2024 at 2:57 PM

## 2024-05-22 NOTE — PROCEDURES
Jude Perez MD      Procedure Note    Procedure: Right Greater Occipital Nerve Block     Indications:  right occipital neuralgia     Informed consent was obtained (explaining the procedure and risks and benefits) from patient. The signed consent form was placed in the medical record.    A time out was completed, verifying correct patient, procedure,site, positioning, and implants or special equipment.    Patient's right occipital area was palpated to identify location of the greater and the lesser occipital nerve. Using a 5 ml syringe, 2 ml .5% bupivacaine was drawn up and 2ml of 2mg/mL dexamethasone was aspirated.  Alcohol was applied topically to the skin. Using a 25 gauge needle (aspirating during insertion),  4 ml was injected on the right greater occipital nerve. Pressure with a gauze pad was held briefly upon the site of puncture to minimize bleeding and to further spread anaesthetic subcutaneously.  There were no complications.  Patient was comfortable and left without complaint.    Empty vial of Bupivacaine and Dexamethasone was discarded.      Jude Perez MD  Attending Physician   [address] [place] [address]

## 2024-05-23 ASSESSMENT — ENCOUNTER SYMPTOMS
RESPIRATORY NEGATIVE: 1
PHOTOPHOBIA: 1
EYE ITCHING: 0
GASTROINTESTINAL NEGATIVE: 1
ALLERGIC/IMMUNOLOGIC NEGATIVE: 1
EYE PAIN: 0
EYE DISCHARGE: 0
EYE REDNESS: 0

## 2024-05-24 ENCOUNTER — TELEPHONE (OUTPATIENT)
Dept: PRIMARY CARE CLINIC | Age: 49
End: 2024-05-24

## 2024-05-24 NOTE — TELEPHONE ENCOUNTER
Care Transitions Initial Follow Up Call    Outreach made within 2 business days of discharge: Yes    Patient: Lisa K Cogan Patient : 1975   MRN: [id number]  Reason for Admission: There are no discharge diagnoses documented for the most recent discharge.  Discharge Date: 24       Spoke with: Patient    Discharge department/facility: [place] [facility]      TCM Interactive Patient Contact:  Was patient able to fill all prescriptions: Yes  Was patient instructed to bring all medications to the follow-up visit: Yes  Is patient taking all medications as directed in the discharge summary? Yes  Does patient understand their discharge instructions: Yes  Does patient have questions or concerns that need addressed prior to 7-14 day follow up office visit: no    Scheduled appointment with PCP within 7-14 days    Follow Up  Future Appointments   Date Time Provider Department Center   2024  8:00 AM Luda Landry APRN - CNP [facility]   2024  7:30 PM [facility] [place]   2024  1:40 PM Luda Landry APRN - CNP [facility]   2024  4:00 PM Haider Guerra, DO Neuro [facility] -   2024  1:30 PM Doyle Tapia MD [facility]   10/9/2024 10:00 AM Dio Holden MD [facility]       Laure Hicks MA

## 2024-05-28 ENCOUNTER — TELEPHONE (OUTPATIENT)
Dept: NEUROLOGY | Age: 49
End: 2024-05-28

## 2024-05-28 NOTE — TELEPHONE ENCOUNTER
----- Message from Cuate Logan MD sent at 5/23/2024  9:49 PM EDT -----  Please schedule her for occipital nerve block injections at our clinic with me.  Thanks

## 2024-06-04 ENCOUNTER — HOSPITAL ENCOUNTER (OUTPATIENT)
Age: 49
Setting detail: SPECIMEN
Discharge: HOME | End: 2024-06-04

## 2024-06-04 ENCOUNTER — OFFICE VISIT (OUTPATIENT)
Dept: PRIMARY CARE CLINIC | Age: 49
End: 2024-06-04
Payer: MEDICAID

## 2024-06-04 VITALS
SYSTOLIC BLOOD PRESSURE: 132 MMHG | OXYGEN SATURATION: 98 % | BODY MASS INDEX: 50.02 KG/M2 | DIASTOLIC BLOOD PRESSURE: 82 MMHG | HEIGHT: 64 IN | WEIGHT: 293 LBS | HEART RATE: 88 BPM

## 2024-06-04 DIAGNOSIS — G43.E01 CHRONIC MIGRAINE WITH AURA AND WITH STATUS MIGRAINOSUS, NOT INTRACTABLE: Primary | ICD-10-CM

## 2024-06-04 DIAGNOSIS — E66.01 CLASS 3 SEVERE OBESITY WITH SERIOUS COMORBIDITY AND BODY MASS INDEX (BMI) OF 50.0 TO 59.9 IN ADULT, UNSPECIFIED OBESITY TYPE (HCC): ICD-10-CM

## 2024-06-04 DIAGNOSIS — R82.90 ABNORMAL URINALYSIS: ICD-10-CM

## 2024-06-04 DIAGNOSIS — R30.0 DYSURIA: ICD-10-CM

## 2024-06-04 DIAGNOSIS — R82.90 FOUL SMELLING URINE: ICD-10-CM

## 2024-06-04 DIAGNOSIS — R35.0 FREQUENCY OF MICTURITION: ICD-10-CM

## 2024-06-04 DIAGNOSIS — M54.81 BILATERAL OCCIPITAL NEURALGIA: ICD-10-CM

## 2024-06-04 LAB
BILIRUBIN, POC: NORMAL
BLOOD URINE, POC: NORMAL
CLARITY, POC: NORMAL
COLOR, POC: YELLOW
GLUCOSE URINE, POC: NORMAL
KETONES, POC: NORMAL
LEUKOCYTE EST, POC: NORMAL
NITRITE, POC: POSITIVE
PH, POC: 5.5
PROTEIN, POC: NORMAL
SPECIFIC GRAVITY, POC: >=1.03
UROBILINOGEN, POC: NORMAL

## 2024-06-04 PROCEDURE — 81002 URINALYSIS NONAUTO W/O SCOPE: CPT | Performed by: NURSE PRACTITIONER

## 2024-06-04 PROCEDURE — 99214 OFFICE O/P EST MOD 30 MIN: CPT | Performed by: NURSE PRACTITIONER

## 2024-06-04 PROCEDURE — 3079F DIAST BP 80-89 MM HG: CPT | Performed by: NURSE PRACTITIONER

## 2024-06-04 PROCEDURE — 3075F SYST BP GE 130 - 139MM HG: CPT | Performed by: NURSE PRACTITIONER

## 2024-06-04 RX ORDER — CEPHALEXIN 500 MG/1
500 CAPSULE ORAL 3 TIMES DAILY
Qty: 21 CAPSULE | Refills: 0 | Status: SHIPPED | OUTPATIENT
Start: 2024-06-04 | End: 2024-06-11

## 2024-06-04 SDOH — ECONOMIC STABILITY: FOOD INSECURITY: WITHIN THE PAST 12 MONTHS, YOU WORRIED THAT YOUR FOOD WOULD RUN OUT BEFORE YOU GOT MONEY TO BUY MORE.: NEVER TRUE

## 2024-06-04 SDOH — ECONOMIC STABILITY: INCOME INSECURITY: HOW HARD IS IT FOR YOU TO PAY FOR THE VERY BASICS LIKE FOOD, HOUSING, MEDICAL CARE, AND HEATING?: NOT HARD AT ALL

## 2024-06-04 SDOH — ECONOMIC STABILITY: FOOD INSECURITY: WITHIN THE PAST 12 MONTHS, THE FOOD YOU BOUGHT JUST DIDN'T LAST AND YOU DIDN'T HAVE MONEY TO GET MORE.: NEVER TRUE

## 2024-06-04 ASSESSMENT — PATIENT HEALTH QUESTIONNAIRE - PHQ9
10. IF YOU CHECKED OFF ANY PROBLEMS, HOW DIFFICULT HAVE THESE PROBLEMS MADE IT FOR YOU TO DO YOUR WORK, TAKE CARE OF THINGS AT HOME, OR GET ALONG WITH OTHER PEOPLE: NOT DIFFICULT AT ALL
8. MOVING OR SPEAKING SO SLOWLY THAT OTHER PEOPLE COULD HAVE NOTICED. OR THE OPPOSITE, BEING SO FIGETY OR RESTLESS THAT YOU HAVE BEEN MOVING AROUND A LOT MORE THAN USUAL: NOT AT ALL
5. POOR APPETITE OR OVEREATING: NOT AT ALL
SUM OF ALL RESPONSES TO PHQ QUESTIONS 1-9: 0
1. LITTLE INTEREST OR PLEASURE IN DOING THINGS: NOT AT ALL
9. THOUGHTS THAT YOU WOULD BE BETTER OFF DEAD, OR OF HURTING YOURSELF: NOT AT ALL
3. TROUBLE FALLING OR STAYING ASLEEP: NOT AT ALL
2. FEELING DOWN, DEPRESSED OR HOPELESS: NOT AT ALL
SUM OF ALL RESPONSES TO PHQ QUESTIONS 1-9: 0
SUM OF ALL RESPONSES TO PHQ9 QUESTIONS 1 & 2: 0
6. FEELING BAD ABOUT YOURSELF - OR THAT YOU ARE A FAILURE OR HAVE LET YOURSELF OR YOUR FAMILY DOWN: NOT AT ALL
4. FEELING TIRED OR HAVING LITTLE ENERGY: NOT AT ALL
7. TROUBLE CONCENTRATING ON THINGS, SUCH AS READING THE NEWSPAPER OR WATCHING TELEVISION: NOT AT ALL

## 2024-06-04 ASSESSMENT — ENCOUNTER SYMPTOMS
TROUBLE SWALLOWING: 0
WHEEZING: 0
CONSTIPATION: 0
NAUSEA: 0
DIARRHEA: 0
SHORTNESS OF BREATH: 0
BACK PAIN: 1
VOMITING: 0
ABDOMINAL PAIN: 0
BLOOD IN STOOL: 0
SINUS PRESSURE: 0
COUGH: 0
SORE THROAT: 0

## 2024-06-04 NOTE — PROGRESS NOTES
thoughts    Topamax [Topiramate]     Nsaids Other (See Comments)     Not supposed to take d/t gastric bypass       Health Maintenance   Topic Date Due    Hepatitis B vaccine (1 of 3 - 3-dose series) Never done    HIV screen  Never done    Hepatitis C screen  Never done    COVID-19 Vaccine (3 - 2023-24 season) 09/01/2023    Flu vaccine (Season Ended) 08/01/2024    Breast cancer screen  03/21/2025    A1C test (Diabetic or Prediabetic)  05/17/2025    Depression Monitoring  05/17/2025    Colorectal Cancer Screen  03/17/2026    Lipids  03/18/2026    DTaP/Tdap/Td vaccine (4 - Td or Tdap) 11/15/2029    Hepatitis A vaccine  Aged Out    Hib vaccine  Aged Out    Polio vaccine  Aged Out    Meningococcal (ACWY) vaccine  Aged Out    Pneumococcal 0-64 years Vaccine  Aged Out    Diabetes screen  Discontinued    Cervical cancer screen  Discontinued       Subjective:      Review of Systems   Constitutional:  Negative for activity change, appetite change, chills, fatigue, fever and unexpected weight change.   HENT:  Negative for congestion, ear pain, hearing loss, sinus pressure, sore throat and trouble swallowing.    Eyes:  Negative for visual disturbance.   Respiratory:  Negative for cough, shortness of breath and wheezing.    Cardiovascular:  Negative for chest pain, palpitations and leg swelling.   Gastrointestinal:  Negative for abdominal pain, blood in stool, constipation, diarrhea, nausea and vomiting.   Endocrine: Negative for cold intolerance, heat intolerance, polydipsia, polyphagia and polyuria.   Genitourinary:  Positive for dysuria. Negative for difficulty urinating, frequency, hematuria and urgency.        Dark, foul smelling urine   Musculoskeletal:  Positive for arthralgias, back pain and gait problem. Negative for myalgias.   Skin:  Negative for rash.   Allergic/Immunologic: Negative for environmental allergies.   Neurological:  Negative for dizziness, weakness, light-headedness and headaches.

## 2024-06-06 LAB
MICROORGANISM SPEC CULT: ABNORMAL
MICROORGANISM SPEC CULT: ABNORMAL
SERVICE CMNT-IMP: ABNORMAL
SPECIMEN DESCRIPTION: ABNORMAL

## 2024-06-10 ENCOUNTER — HOSPITAL ENCOUNTER (OUTPATIENT)
Dept: PHYSICAL THERAPY | Facility: CLINIC | Age: 49
Setting detail: THERAPIES SERIES
Discharge: HOME | End: 2024-06-10
Payer: MEDICAID

## 2024-06-10 PROCEDURE — 97162 PT EVAL MOD COMPLEX 30 MIN: CPT

## 2024-06-10 NOTE — CONSULTS
[x] Dry Needling, 1 or 2 muscles  20560   [] Biofeedback, first 15 minutes   90912  [] Biofeedback, additional 15 minutes   90913 [x] Dry Needling, 3 or more muscles  20561         Frequency:  2 x/week for 16 visits        Today’s Treatment:  Modalities:   Precautions: May-Thurner Syndrome   Exercises:  Exercise Reps/ Time Weight/ Level Comments                                 Other:    Specific Instructions for next treatment:  - Initiate aquatic therapy  - Gentle cervical and R shoulder ROM/stretching  - Deep neck flexors and BUE strengthening   - Manual (gentle)  - CP/HP/vaso prn     Evaluation Complexity:  History (Personal factors, comorbidities) [] 0 [] 1-2 [x] 3+   Exam (limitations, restrictions) [] 1-2 [] 3 [x] 4+   Clinical presentation (progression) [] Stable [x] Evolving  [] Unstable   Decision Making [] Low [x] Moderate [] High    [] Low Complexity [x] Moderate Complexity [] High Complexity       Treatment Charges: Mins Units   [x] Evaluation       [x]  Low       []  Moderate       []  High 35 1   []  Modalities     []  Ther Exercise     []  Manual Therapy     []  Ther Activities     []  Aquatics     []  Vasocompression     []  Other       TOTAL TREATMENT TIME: 35 mins     Time in: 10:22 am       Time out: 11:00 am    Electronically signed by: Yessica Martini PT        Physician Signature:________________________________Date:__________________  By signing above or cosigning this note, I have reviewed this plan of care and certify a need for medically necessary rehabilitation services.     *PLEASE SIGN ABOVE AND FAX BACK ALL PAGES*

## 2024-06-11 ENCOUNTER — HOSPITAL ENCOUNTER (OUTPATIENT)
Dept: PHYSICAL THERAPY | Facility: CLINIC | Age: 49
Setting detail: THERAPIES SERIES
Discharge: HOME | End: 2024-06-11
Payer: MEDICAID

## 2024-06-11 NOTE — FLOWSHEET NOTE
[] [facility]  Outpatient Rehabilitation &  Therapy  [address]  [phone]  [phone] [] [facility]  Outpatient Rehabilitation &  Therapy  [address]  P: [phone]  F: [phone] [] [facility]  Outpatient Rehabilitation &  Therapy  [address]  P: [phone]  F: [phone] [] [facility]  Outpatient Rehabilitation &  Therapy  [address]  [phone]  [phone] [] [facility]  Outpatient Rehabilitation &  Therapy  [address]   P: [phone]  F: [phone]  [] [facility]  Outpatient Rehabilitation &  Therapy  [address]  P: [phone]  F: [phone] [] [facility]  Outpatient Rehabilitation &  Therapy  [address]  P: [phone]  F: [phone] [] [facility]  Outpatient Rehabilitation &  Therapy  [address]  P: [phone]  F: [phone] [] [facility]  Outpatient Rehabilitation &  Therapy  [address]  P: [phone]  F: [phone]  [] [facility] Outpatient Rehabilitation &  Therapy  [address]  P: [phone]  F: [phone]     Therapy Cancel/No Show note    Date: 2024  Patient: Lisa K Cogan  : 1975  MRN: [id number]    Cancels/No Shows to date: 0    For today's appointment patient:    [x]  Cancelled    [] Rescheduled appointment    [] No-show     Reason given by patient:    [x]  Patient ill    []  Conflicting appointment    [] No transportation      [] Conflict with work    [] No reason given    [] Weather related    [] COVID-19    [] Other:      Comments:        [] Next appointment was confirmed    Electronically signed by: Michell Das PTA

## 2024-06-19 ENCOUNTER — TELEPHONE (OUTPATIENT)
Dept: NEUROLOGY | Age: 49
End: 2024-06-19

## 2024-06-19 NOTE — TELEPHONE ENCOUNTER
Pt called and stated she is still experiencing hearing her blood pressure in her ears. She stated she has been experiencing this when she was in the hospital but its getting worst. Please advise

## 2024-06-26 ENCOUNTER — HOSPITAL ENCOUNTER (OUTPATIENT)
Dept: SLEEP CENTER | Age: 49
Discharge: HOME | End: 2024-06-28
Payer: MEDICAID

## 2024-06-26 DIAGNOSIS — R40.0 DAYTIME SOMNOLENCE: ICD-10-CM

## 2024-06-26 DIAGNOSIS — R06.83 SNORING: ICD-10-CM

## 2024-06-26 PROCEDURE — 95810 POLYSOM 6/> YRS 4/> PARAM: CPT

## 2024-06-27 VITALS
RESPIRATION RATE: 12 BRPM | HEIGHT: 64 IN | HEART RATE: 73 BPM | WEIGHT: 293 LBS | OXYGEN SATURATION: 93 % | BODY MASS INDEX: 50.02 KG/M2

## 2024-06-27 ASSESSMENT — SLEEP AND FATIGUE QUESTIONNAIRES
HOW LIKELY ARE YOU TO NOD OFF OR FALL ASLEEP WHILE SITTING INACTIVE IN A PUBLIC PLACE: WOULD NEVER DOZE
ESS TOTAL SCORE: 16
HOW LIKELY ARE YOU TO NOD OFF OR FALL ASLEEP WHILE WATCHING TV: HIGH CHANCE OF DOZING
HOW LIKELY ARE YOU TO NOD OFF OR FALL ASLEEP WHEN YOU ARE A PASSENGER IN A CAR FOR AN HOUR WITHOUT A BREAK: MODERATE CHANCE OF DOZING
HOW LIKELY ARE YOU TO NOD OFF OR FALL ASLEEP WHILE SITTING AND TALKING TO SOMEONE: MODERATE CHANCE OF DOZING
HOW LIKELY ARE YOU TO NOD OFF OR FALL ASLEEP WHILE LYING DOWN TO REST IN THE AFTERNOON WHEN CIRCUMSTANCES PERMIT: HIGH CHANCE OF DOZING
HOW LIKELY ARE YOU TO NOD OFF OR FALL ASLEEP WHILE SITTING QUIETLY AFTER LUNCH WITHOUT ALCOHOL: HIGH CHANCE OF DOZING
HOW LIKELY ARE YOU TO NOD OFF OR FALL ASLEEP WHILE SITTING AND READING: HIGH CHANCE OF DOZING
HOW LIKELY ARE YOU TO NOD OFF OR FALL ASLEEP IN A CAR, WHILE STOPPED FOR A FEW MINUTES IN TRAFFIC: WOULD NEVER DOZE

## 2024-07-09 PROBLEM — R40.0 DAYTIME SOMNOLENCE: Status: ACTIVE | Noted: 2024-07-09

## 2024-07-09 PROBLEM — R06.83 SNORING: Status: ACTIVE | Noted: 2024-07-09

## 2024-07-09 LAB — STATUS: NORMAL

## 2024-07-15 ENCOUNTER — TELEPHONE (OUTPATIENT)
Dept: PRIMARY CARE CLINIC | Age: 49
End: 2024-07-15

## 2024-07-15 ENCOUNTER — OFFICE VISIT (OUTPATIENT)
Dept: FAMILY MEDICINE CLINIC | Age: 49
End: 2024-07-15
Payer: MEDICAID

## 2024-07-15 VITALS
TEMPERATURE: 98.7 F | DIASTOLIC BLOOD PRESSURE: 82 MMHG | BODY MASS INDEX: 51.77 KG/M2 | HEART RATE: 104 BPM | OXYGEN SATURATION: 97 % | WEIGHT: 293 LBS | SYSTOLIC BLOOD PRESSURE: 130 MMHG

## 2024-07-15 DIAGNOSIS — L30.9 DERMATITIS: Primary | ICD-10-CM

## 2024-07-15 DIAGNOSIS — K12.2 UVULITIS: ICD-10-CM

## 2024-07-15 DIAGNOSIS — J02.9 SORE THROAT: ICD-10-CM

## 2024-07-15 LAB
Lab: NORMAL
QC PASS/FAIL: NORMAL
SARS-COV-2 RDRP RESP QL NAA+PROBE: NEGATIVE
STREP PYOGENES DNA, POC: NEGATIVE
VALID INTERNAL CONTROL, POC: NORMAL

## 2024-07-15 PROCEDURE — 3075F SYST BP GE 130 - 139MM HG: CPT | Performed by: NURSE PRACTITIONER

## 2024-07-15 PROCEDURE — 3079F DIAST BP 80-89 MM HG: CPT | Performed by: NURSE PRACTITIONER

## 2024-07-15 PROCEDURE — 99214 OFFICE O/P EST MOD 30 MIN: CPT | Performed by: NURSE PRACTITIONER

## 2024-07-15 PROCEDURE — 87635 SARS-COV-2 COVID-19 AMP PRB: CPT | Performed by: NURSE PRACTITIONER

## 2024-07-15 PROCEDURE — 87651 STREP A DNA AMP PROBE: CPT | Performed by: NURSE PRACTITIONER

## 2024-07-15 RX ORDER — TRIAMCINOLONE ACETONIDE 40 MG/ML
40 INJECTION, SUSPENSION INTRA-ARTICULAR; INTRAMUSCULAR ONCE
Status: COMPLETED | OUTPATIENT
Start: 2024-07-15 | End: 2024-07-15

## 2024-07-15 RX ORDER — METHYLPREDNISOLONE 4 MG/1
TABLET ORAL
Qty: 1 KIT | Refills: 0 | Status: SHIPPED | OUTPATIENT
Start: 2024-07-15 | End: 2024-07-21

## 2024-07-15 RX ADMIN — TRIAMCINOLONE ACETONIDE 40 MG: 40 INJECTION, SUSPENSION INTRA-ARTICULAR; INTRAMUSCULAR at 15:11

## 2024-07-15 ASSESSMENT — ENCOUNTER SYMPTOMS
SORE THROAT: 1
SHORTNESS OF BREATH: 0
VOMITING: 0
COUGH: 1
VOICE CHANGE: 0
TROUBLE SWALLOWING: 0
NAUSEA: 0
EYE DISCHARGE: 0
EYE REDNESS: 0

## 2024-07-15 NOTE — PROGRESS NOTES
[facility] WALK-IN  [address]  [address]  [facility] [address]  Dept: [phone]  Dept Fax: [phone]    Lisa K Cogan is a 48 y.o. female who presents today for her medical conditions/complaints of   Chief Complaint   Patient presents with    Pharyngitis     X 5 days    Hemorrhoids    Rash     Started on right foot then left foot, now up legs and arm. Using Benedryl pills and spray, clothes hurts her skin.    Cough     Non productive-dry          HPI:     /82   Pulse (!) 104   Temp 98.7 °F (37.1 °C)   Wt (!) 136.8 kg (301 lb 9.6 oz)   LMP 09/15/2021 Comment: VERY HEAVY PERIODS  SpO2 97%   BMI 51.77 kg/m²       HPI  Pt presented to the clinic today with c/o sore throat This is a new problem. The current episode started 5 days ago. The problem has been unchanged since onset. Associated symptoms include: dry cough, headache .  Pertinent negatives include: No fever, chills, SOB, chest pain.  Rash on feet started 6 days ago.  Spreading to her arms, neck and face.  Itching/fire everywhere.  Hemorrhoids acting up.           Past Medical History:   Diagnosis Date    Anxiety     Carpal tunnel syndrome     Chronic back pain     COVID-19 11/06/2021    Depression     Endometriosis     Fatty liver     Gestational diabetes     x 3 children    Headache     migraines    History of blood transfusion     Hypertension     CAITLIN (iron deficiency anemia)     iron infusions    Iron deficiency anemia     Kidney stones     Dr. Tapia seen 11/28/2022    May-Thurner syndrome     affects left side of body    Obesity     Peripheral vascular disease (HCC)     Rotator cuff injury     Scleroderma (HCC)     Sleep apnea     Snores     no cpap- never been teste for RAIN    Under care of team     PCP - RENETTA ALMARAZ CNP - LAST VISIT 8/31/2022    Under care of team     VASCULAR  - DR. RIBEIRO - 8/2021 - SEES FOR MAY-THURNER SYNDROME    Under care of team

## 2024-07-15 NOTE — TELEPHONE ENCOUNTER
Her strep was negative- normal.  It was entered in incorrectly by the MA.  I have had this corrected.

## 2024-07-15 NOTE — TELEPHONE ENCOUNTER
Pt called to ask about the result for her strep coming back as \"abnormal\" if they are negative?    Please advise

## 2024-07-25 ENCOUNTER — OFFICE VISIT (OUTPATIENT)
Dept: PRIMARY CARE CLINIC | Age: 49
End: 2024-07-25
Payer: MEDICAID

## 2024-07-25 VITALS
HEART RATE: 97 BPM | OXYGEN SATURATION: 96 % | WEIGHT: 293 LBS | DIASTOLIC BLOOD PRESSURE: 80 MMHG | BODY MASS INDEX: 51.74 KG/M2 | SYSTOLIC BLOOD PRESSURE: 134 MMHG

## 2024-07-25 DIAGNOSIS — G93.2 INTRACRANIAL HYPERTENSION: ICD-10-CM

## 2024-07-25 DIAGNOSIS — G43.E01 CHRONIC MIGRAINE WITH AURA AND WITH STATUS MIGRAINOSUS, NOT INTRACTABLE: Primary | ICD-10-CM

## 2024-07-25 DIAGNOSIS — H65.193 ACUTE EFFUSION OF BOTH MIDDLE EARS: ICD-10-CM

## 2024-07-25 DIAGNOSIS — M54.81 BILATERAL OCCIPITAL NEURALGIA: ICD-10-CM

## 2024-07-25 DIAGNOSIS — L29.9 EAR ITCHING: ICD-10-CM

## 2024-07-25 PROCEDURE — 3079F DIAST BP 80-89 MM HG: CPT | Performed by: NURSE PRACTITIONER

## 2024-07-25 PROCEDURE — 99214 OFFICE O/P EST MOD 30 MIN: CPT | Performed by: NURSE PRACTITIONER

## 2024-07-25 PROCEDURE — 3075F SYST BP GE 130 - 139MM HG: CPT | Performed by: NURSE PRACTITIONER

## 2024-07-25 RX ORDER — FLUTICASONE PROPIONATE 50 MCG
2 SPRAY, SUSPENSION (ML) NASAL DAILY
Qty: 48 G | Refills: 1 | Status: SHIPPED | OUTPATIENT
Start: 2024-07-25

## 2024-07-25 RX ORDER — CIPROFLOXACIN AND DEXAMETHASONE 3; 1 MG/ML; MG/ML
4 SUSPENSION/ DROPS AURICULAR (OTIC) 2 TIMES DAILY
Qty: 7.5 ML | Refills: 1 | Status: SHIPPED | OUTPATIENT
Start: 2024-07-25

## 2024-07-25 ASSESSMENT — ENCOUNTER SYMPTOMS
SHORTNESS OF BREATH: 0
TROUBLE SWALLOWING: 0
WHEEZING: 0
NAUSEA: 0
DIARRHEA: 0
CONSTIPATION: 0
SORE THROAT: 0
COUGH: 0
BLOOD IN STOOL: 0
ABDOMINAL PAIN: 0
VOMITING: 0
SINUS PRESSURE: 0

## 2024-07-25 NOTE — PROGRESS NOTES
[facility]  [facility] PRIMARY CARE  [address] [place] [address] [address]  [address]  [facility] [address]  Dept: [phone]  Dept Fax: [phone]    Lisa K Cogan is a 48 y.o. female who presentstoday for her medical conditions/complaints as noted below.  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 (Pt doesn't remember which) eye with headache.  Told has Htn in skull.    Headache     When lays down at night can hear BP in ears and loses vision in one or both eyes    Other     Supposed to have a occipital nerve block but isn't scheduled yet since may       HPI:     Here today for recurrence of vision changes in left eye  Occurs intermittently over the past year   Was hospitalized in may with severe migraine and had the vision problem then along with pounding sensation in ears  She states her symptoms improved for a few weeks but now have returned  She has seen neurology and attempted to reach out to their office  However, she states she feels that they are not addressing her concern  She is supposed to be scheduled for occipital nerve injection and this keeps getting delayed or postponed  She was recently advised to return to primary care  She finds that sensation of hearing her blood pressure/pulsations in her ear is very disconcerting.  Seems to be worse at night and often interferes with sleep  She has migraine headaches several times a week that are often accompanied by the sensation as well as intermittent loss of vision in the left eye  She did have recent eye exam in June that was negative for any significant structural abnormalities        Hemoglobin A1C (%)   Date Value   05/17/2024 6.1   10/18/2019 5.5             ( goal A1C is < 7)   No components found for: \"LABMICR\"  No components found for: \"LDLCHOLESTEROL\", \"LDLCALC\"    (goal LDL is <100)   AST (U/L)   Date Value   05/09/2024 27     ALT (U/L)   Date Value

## 2024-08-20 ENCOUNTER — OFFICE VISIT (OUTPATIENT)
Dept: PRIMARY CARE CLINIC | Age: 49
End: 2024-08-20
Payer: MEDICAID

## 2024-08-20 VITALS
HEART RATE: 77 BPM | DIASTOLIC BLOOD PRESSURE: 88 MMHG | SYSTOLIC BLOOD PRESSURE: 132 MMHG | RESPIRATION RATE: 20 BRPM | BODY MASS INDEX: 52.01 KG/M2 | WEIGHT: 293 LBS | OXYGEN SATURATION: 98 %

## 2024-08-20 DIAGNOSIS — I87.2 LYMPHEDEMA DUE TO VENOUS INSUFFICIENCY: ICD-10-CM

## 2024-08-20 DIAGNOSIS — R73.03 PREDIABETES: ICD-10-CM

## 2024-08-20 DIAGNOSIS — I10 PRIMARY HYPERTENSION: Primary | ICD-10-CM

## 2024-08-20 DIAGNOSIS — M54.81 BILATERAL OCCIPITAL NEURALGIA: ICD-10-CM

## 2024-08-20 DIAGNOSIS — F33.9 RECURRENT MAJOR DEPRESSION RESISTANT TO TREATMENT (HCC): ICD-10-CM

## 2024-08-20 DIAGNOSIS — G43.E01 CHRONIC MIGRAINE WITH AURA AND WITH STATUS MIGRAINOSUS, NOT INTRACTABLE: ICD-10-CM

## 2024-08-20 DIAGNOSIS — I89.0 LYMPHEDEMA DUE TO VENOUS INSUFFICIENCY: ICD-10-CM

## 2024-08-20 DIAGNOSIS — E66.01 CLASS 3 SEVERE OBESITY WITH SERIOUS COMORBIDITY AND BODY MASS INDEX (BMI) OF 50.0 TO 59.9 IN ADULT, UNSPECIFIED OBESITY TYPE (HCC): ICD-10-CM

## 2024-08-20 DIAGNOSIS — M54.81 OCCIPITAL NEURALGIA OF RIGHT SIDE: ICD-10-CM

## 2024-08-20 LAB — HBA1C MFR BLD: 5.7 %

## 2024-08-20 PROCEDURE — 3075F SYST BP GE 130 - 139MM HG: CPT | Performed by: NURSE PRACTITIONER

## 2024-08-20 PROCEDURE — 3079F DIAST BP 80-89 MM HG: CPT | Performed by: NURSE PRACTITIONER

## 2024-08-20 PROCEDURE — 99214 OFFICE O/P EST MOD 30 MIN: CPT | Performed by: NURSE PRACTITIONER

## 2024-08-20 PROCEDURE — 83036 HEMOGLOBIN GLYCOSYLATED A1C: CPT | Performed by: NURSE PRACTITIONER

## 2024-08-20 ASSESSMENT — ENCOUNTER SYMPTOMS
DIARRHEA: 0
COUGH: 0
VOMITING: 0
TROUBLE SWALLOWING: 0
SORE THROAT: 0
BACK PAIN: 1
SHORTNESS OF BREATH: 0
WHEEZING: 0
SINUS PRESSURE: 0
CONSTIPATION: 0
NAUSEA: 0
ABDOMINAL PAIN: 0
BLOOD IN STOOL: 0

## 2024-08-20 ASSESSMENT — PATIENT HEALTH QUESTIONNAIRE - PHQ9: DEPRESSION UNABLE TO ASSESS: PT REFUSES

## 2024-08-20 NOTE — PROGRESS NOTES
8. Recurrent major depression resistant to treatment (HCC)                  Plan:   Assessment & Plan   Return in about 3 months (around 11/20/2024) for depression/anxiety.  Hypertension-well-controlled on losartan, continues to have sensation of \"hearing\" blood pressure in your ears but seems to be improved with nightly Flonase  Prediabetes, obesity-A1c less than 6.  Discussed with patient she is not a candidate for GLP-1's as her insurance will not cover and she does not have diabetes.  Offered new referral to weight loss clinic but she claims was told she could not go there because her surgery was done at a different location.  She is not sure her previous surgeon is still practicing.  She is advised to reach out to her insurance company regarding coverage for weight loss procedures/counseling/medications.  Can make referral if covered otherwise advise continuing to work on diet modification and increasing physical activity as tolerated.  Also urged to consider weight management when she goes to obtain new insurance plan for the next year  Migraines, occipital neuralgia-provided with referral to different neurologist per her request.  Advised her if insurance does not cover this provider that she will need to check with her insurance company prior to any additional referrals being made.  She is also urged to keep her current appointment in September with her existing neurologist in case it takes several weeks to get in with a new provider.  Advise she contact location where she had sleep study done to have results sent as unable to locate in chart though it appears test was completed.  Lymphedema-remains problematic most days, interferes with ambulation  Depression-she continues to see psychiatry and therapist, will continue current medication regimen   Orders Placed This Encounter   Procedures    [facility] - Gricelda Jane MD, Neurology, [place]     Referral Priority:   Routine     Referral Type:   Eval and

## 2024-08-27 ENCOUNTER — PROCEDURE VISIT (OUTPATIENT)
Dept: NEUROLOGY | Age: 49
End: 2024-08-27
Payer: MEDICAID

## 2024-08-27 DIAGNOSIS — M54.81 BILATERAL OCCIPITAL NEURALGIA: Primary | ICD-10-CM

## 2024-08-27 PROCEDURE — 96372 THER/PROPH/DIAG INJ SC/IM: CPT | Performed by: PSYCHIATRY & NEUROLOGY

## 2024-08-27 RX ORDER — METHYLPREDNISOLONE SODIUM SUCCINATE 40 MG/ML
40 INJECTION, POWDER, LYOPHILIZED, FOR SOLUTION INTRAMUSCULAR; INTRAVENOUS ONCE
Status: COMPLETED | OUTPATIENT
Start: 2024-08-27 | End: 2024-08-27

## 2024-08-27 RX ADMIN — METHYLPREDNISOLONE SODIUM SUCCINATE 40 MG: 40 INJECTION, POWDER, LYOPHILIZED, FOR SOLUTION INTRAMUSCULAR; INTRAVENOUS at 13:02

## 2024-08-27 NOTE — PROGRESS NOTES
Procedure Note    Procedure: b/l Greater and Lesser Occipital Nerve Block (CPT code 95931, 42052)    Indications:  Severe b/l occipital neuralgia (ICD 10 M54.81)    Informed consent was obtained (explaining the procedure and risks and benefits) from patient. The signed consent form was placed in the medical record.    A time out was completed, verifying correct patient, procedure,site, positioning, and implants or special equipment.    Patient's b/l occipital area was palpated to identify location of the greater and the lesser occipital nerve. Using a 10 ml syringe, 7 ml of b/l (from a 20 ml bottle) and 1 ml (40 mg) of methylprednisolone was aspirated. Alcohol was applied topically to the skin. Using a 27 gauge needle (aspirating during insertion),  2 ml was injected on the greater and lesser occipital nerve on the left side. Pressure with a gauze pad was held briefly upon the site of puncture to minimize bleeding and to further spread anaesthetic subcutaneously. The procedure was repeated on the right side. There were no complications.  Patient was comfortable and left without complaint.    Empty vial of methylprednisolone was discarded.    Electronically signed by Cuate Logan MD on 8/27/2024 at 1:33 PM

## 2024-09-18 DIAGNOSIS — K90.9 MALABSORPTION OF IRON: ICD-10-CM

## 2024-09-18 DIAGNOSIS — R79.89 ELEVATED FERRITIN: ICD-10-CM

## 2024-09-18 DIAGNOSIS — E61.1 IRON DEFICIENCY: ICD-10-CM

## 2024-09-18 DIAGNOSIS — Z98.84 HISTORY OF BARIATRIC SURGERY: ICD-10-CM

## 2024-09-18 RX ORDER — MULTIVIT WITH MINERALS/LUTEIN
TABLET ORAL
Qty: 30 TABLET | Refills: 0 | Status: SHIPPED | OUTPATIENT
Start: 2024-09-18

## 2024-09-25 ENCOUNTER — TELEPHONE (OUTPATIENT)
Dept: ONCOLOGY | Age: 49
End: 2024-09-25

## 2024-09-25 DIAGNOSIS — R79.89 ELEVATED FERRITIN: Primary | ICD-10-CM

## 2024-10-07 DIAGNOSIS — R79.89 ELEVATED FERRITIN: Primary | ICD-10-CM

## 2024-10-07 DIAGNOSIS — K90.9 MALABSORPTION OF IRON: ICD-10-CM

## 2024-10-07 DIAGNOSIS — Z98.84 HISTORY OF BARIATRIC SURGERY: ICD-10-CM

## 2024-10-08 ENCOUNTER — APPOINTMENT (OUTPATIENT)
Dept: GENERAL RADIOLOGY | Age: 49
End: 2024-10-08
Payer: MEDICAID

## 2024-10-08 ENCOUNTER — HOSPITAL ENCOUNTER (OUTPATIENT)
Age: 49
Setting detail: SPECIMEN
Discharge: HOME | End: 2024-10-08

## 2024-10-08 ENCOUNTER — HOSPITAL ENCOUNTER (EMERGENCY)
Age: 49
Discharge: HOME | End: 2024-10-08
Attending: EMERGENCY MEDICINE
Payer: MEDICAID

## 2024-10-08 VITALS
WEIGHT: 293 LBS | HEART RATE: 79 BPM | DIASTOLIC BLOOD PRESSURE: 85 MMHG | OXYGEN SATURATION: 98 % | TEMPERATURE: 98.1 F | RESPIRATION RATE: 14 BRPM | HEIGHT: 64 IN | SYSTOLIC BLOOD PRESSURE: 153 MMHG | BODY MASS INDEX: 50.02 KG/M2

## 2024-10-08 DIAGNOSIS — S80.02XA CONTUSION OF LEFT KNEE, INITIAL ENCOUNTER: Primary | ICD-10-CM

## 2024-10-08 DIAGNOSIS — Z98.84 HISTORY OF BARIATRIC SURGERY: ICD-10-CM

## 2024-10-08 DIAGNOSIS — K90.9 MALABSORPTION OF IRON: ICD-10-CM

## 2024-10-08 DIAGNOSIS — R79.89 ELEVATED FERRITIN: ICD-10-CM

## 2024-10-08 DIAGNOSIS — S90.32XA CONTUSION OF LEFT FOOT, INITIAL ENCOUNTER: ICD-10-CM

## 2024-10-08 LAB
BASOPHILS # BLD: 0.04 K/UL (ref 0–0.2)
BASOPHILS NFR BLD: 1 % (ref 0–2)
EOSINOPHIL # BLD: 0.09 K/UL (ref 0–0.44)
EOSINOPHILS RELATIVE PERCENT: 1 % (ref 1–4)
ERYTHROCYTE [DISTWIDTH] IN BLOOD BY AUTOMATED COUNT: 13.5 % (ref 11.8–14.4)
FERRITIN SERPL-MCNC: 153 NG/ML (ref 13–150)
HCT VFR BLD AUTO: 49.7 % (ref 36.3–47.1)
HGB BLD-MCNC: 15.2 G/DL (ref 11.9–15.1)
IMM GRANULOCYTES # BLD AUTO: <0.03 K/UL (ref 0–0.3)
IMM GRANULOCYTES NFR BLD: 0 %
IRON SATN MFR SERPL: 15 % (ref 20–55)
IRON SERPL-MCNC: 49 UG/DL (ref 37–145)
LYMPHOCYTES NFR BLD: 2.21 K/UL (ref 1.1–3.7)
LYMPHOCYTES RELATIVE PERCENT: 34 % (ref 24–43)
MCH RBC QN AUTO: 28.3 PG (ref 25.2–33.5)
MCHC RBC AUTO-ENTMCNC: 30.6 G/DL (ref 28.4–34.8)
MCV RBC AUTO: 92.4 FL (ref 82.6–102.9)
MONOCYTES NFR BLD: 0.5 K/UL (ref 0.1–1.2)
MONOCYTES NFR BLD: 8 % (ref 3–12)
NEUTROPHILS NFR BLD: 56 % (ref 36–65)
NEUTS SEG NFR BLD: 3.73 K/UL (ref 1.5–8.1)
NRBC BLD-RTO: 0 PER 100 WBC
PLATELET # BLD AUTO: 207 K/UL (ref 138–453)
PMV BLD AUTO: 12.3 FL (ref 8.1–13.5)
RBC # BLD AUTO: 5.38 M/UL (ref 3.95–5.11)
TIBC SERPL-MCNC: 335 UG/DL (ref 250–450)
UNSATURATED IRON BINDING CAPACITY: 286 UG/DL (ref 112–347)
WBC OTHER # BLD: 6.6 K/UL (ref 3.5–11.3)

## 2024-10-08 PROCEDURE — 73630 X-RAY EXAM OF FOOT: CPT

## 2024-10-08 PROCEDURE — 6370000000 HC RX 637 (ALT 250 FOR IP): Performed by: EMERGENCY MEDICINE

## 2024-10-08 PROCEDURE — 99283 EMERGENCY DEPT VISIT LOW MDM: CPT

## 2024-10-08 PROCEDURE — 73562 X-RAY EXAM OF KNEE 3: CPT

## 2024-10-08 RX ORDER — HYDROCODONE BITARTRATE AND ACETAMINOPHEN 5; 325 MG/1; MG/1
1 TABLET ORAL ONCE
Status: COMPLETED | OUTPATIENT
Start: 2024-10-08 | End: 2024-10-08

## 2024-10-08 RX ADMIN — HYDROCODONE BITARTRATE AND ACETAMINOPHEN 1 TABLET: 5; 325 TABLET ORAL at 15:01

## 2024-10-08 ASSESSMENT — PAIN SCALES - GENERAL: PAINLEVEL_OUTOF10: 8

## 2024-10-08 ASSESSMENT — PAIN DESCRIPTION - LOCATION: LOCATION: FOOT

## 2024-10-08 ASSESSMENT — PAIN - FUNCTIONAL ASSESSMENT: PAIN_FUNCTIONAL_ASSESSMENT: 0-10

## 2024-10-08 ASSESSMENT — PAIN DESCRIPTION - ORIENTATION: ORIENTATION: LEFT

## 2024-10-08 NOTE — ED PROVIDER NOTES
[facility] Emergency Department  [address]  [facility] [address]  Phone: [phone]  Fax: [phone]  EMERGENCY DEPARTMENT ENCOUNTER      Pt Name: Lisa K Cogan  MRN: [id number]  Birthdate 1975  Date of evaluation: 10/8/2024    CHIEF COMPLAINT       Chief Complaint   Patient presents with    Fall    Knee Pain    Foot Pain     Pt arrives dt co falling last night. Pt states she fell directly onto bilateral knees and today feels like her left foot is injured especially her toe       HISTORY OF PRESENT ILLNESS    Lisa K Cogan is a 49 y.o. female who presents to the emergency department complaining of fall.  Patient states she fell last night directly onto bilateral knees and rolled her left foot.  She states today she is having pain worse to the left fourth toe.  She denies any erythema, or weakness.  She denies any paresthesias.  She uses a cane currently.  She did not hit her head or have any loss of consciousness.  I know the nurse  REVIEW OF SYSTEMS     Review of Systems   All other systems reviewed and are negative.    PAST MEDICAL HISTORY    has a past medical history of Anxiety, Carpal tunnel syndrome, Chronic back pain, COVID-19, Depression, Endometriosis, Fatty liver, Gestational diabetes, Headache, History of blood transfusion, Hypertension, CAITLIN (iron deficiency anemia), Iron deficiency anemia, Kidney stones, May-Thurner syndrome, Obesity, Peripheral vascular disease (HCC), Rotator cuff injury, Scleroderma (HCC), Sleep apnea, Snores, Under care of team, Under care of team, Under care of team, URI (upper respiratory infection), Urinary incontinence, Wears glasses, and Wellness examination.    SURGICAL HISTORY      has a past surgical history that includes Gastric bypass surgery ();  section (x 3); toenail excision; Cholecystectomy; pelvic laparoscopy; vascular surgery (); Dilation and curettage of uterus (N/A, 2021); Hysterectomy (N/A, 10/26/2021);

## 2024-10-08 NOTE — DISCHARGE INSTRUCTIONS
Motrin as needed for pain.  Use the fracture shoe for comfort.  Apply ice.  Follow-up with your primary care doctor for reevaluation.  Return to the emergency department with any probs or concerns as discussed.

## 2024-10-11 ENCOUNTER — TELEPHONE (OUTPATIENT)
Dept: ONCOLOGY | Age: 49
End: 2024-10-11

## 2024-10-11 ENCOUNTER — OFFICE VISIT (OUTPATIENT)
Dept: ONCOLOGY | Age: 49
End: 2024-10-11
Payer: MEDICAID

## 2024-10-11 VITALS
DIASTOLIC BLOOD PRESSURE: 90 MMHG | HEART RATE: 76 BPM | RESPIRATION RATE: 18 BRPM | OXYGEN SATURATION: 97 % | TEMPERATURE: 97.4 F | WEIGHT: 293 LBS | SYSTOLIC BLOOD PRESSURE: 136 MMHG | BODY MASS INDEX: 53.14 KG/M2

## 2024-10-11 DIAGNOSIS — R79.89 ELEVATED FERRITIN: ICD-10-CM

## 2024-10-11 DIAGNOSIS — Z98.84 HISTORY OF BARIATRIC SURGERY: ICD-10-CM

## 2024-10-11 DIAGNOSIS — K90.9 MALABSORPTION OF IRON: Primary | ICD-10-CM

## 2024-10-11 PROCEDURE — 3080F DIAST BP >= 90 MM HG: CPT | Performed by: INTERNAL MEDICINE

## 2024-10-11 PROCEDURE — 3075F SYST BP GE 130 - 139MM HG: CPT | Performed by: INTERNAL MEDICINE

## 2024-10-11 PROCEDURE — 99211 OFF/OP EST MAY X REQ PHY/QHP: CPT | Performed by: INTERNAL MEDICINE

## 2024-10-11 PROCEDURE — 99214 OFFICE O/P EST MOD 30 MIN: CPT | Performed by: INTERNAL MEDICINE

## 2024-10-11 RX ORDER — FERROUS SULFATE 325(65) MG
325 TABLET ORAL
Qty: 30 TABLET | Refills: 2 | Status: SHIPPED | OUTPATIENT
Start: 2024-10-11

## 2024-10-11 NOTE — TELEPHONE ENCOUNTER
Instructions   from Dr. Dio Holden MD    Rv in 6 months with labs 1 week before rv      RV 4/11/25 at 2:15 pm. Labs to be done the week before.

## 2024-10-11 NOTE — PROGRESS NOTES
BOTOX 100 UNITS INJECTION performed by Doyle Tapia MD at [facility] OR    CYSTOSCOPY N/A 04/05/2024    CYSTOSCOPY INJECTION BOTOX 100 UNITS performed by Doyle Tapia MD at [facility] OR    DILATION AND CURETTAGE OF UTERUS N/A 05/12/2021    uterine ablation    GASTRIC BYPASS SURGERY  2009    PABLO- EN- Y    HYSTERECTOMY (CERVIX STATUS UNKNOWN) N/A 10/26/2021    XI ROBOTIC LAPAROSCOPIC MODIFIED RADICAL HYSTERECTOMY, BILATERAL SALPINGO OOPHORECTOMY, CYTOLOGIC WASHINGS, CYSTOSCOPY performed by Carlos Lizama MD at [facility] OR    HYSTERECTOMY, VAGINAL      PELVIC LAPAROSCOPY      endometriosis    SKIN BIOPSY      age 12- scleroderma    TOENAIL EXCISION      VASCULAR SURGERY  2016    to evaluate May Rader syndrome effects on  blood vessels- left side       Patient Family Social History:     Social History     Socioeconomic History    Marital status: Single     Spouse name: None    Number of children: None    Years of education: None    Highest education level: None   Tobacco Use    Smoking status: Never    Smokeless tobacco: Never   Vaping Use    Vaping status: Never Used   Substance and Sexual Activity    Alcohol use: Never    Drug use: Never    Sexual activity: Not Currently     Partners: Male     Social Determinants of Health     Financial Resource Strain: Low Risk  (6/4/2024)    Overall Financial Resource Strain (CARDIA)     Difficulty of Paying Living Expenses: Not hard at all   Food Insecurity: No Food Insecurity (6/4/2024)    Hunger Vital Sign     Worried About Running Out of Food in the Last Year: Never true     Ran Out of Food in the Last Year: Never true   Transportation Needs: Unknown (6/4/2024)    PRAPARE - Transportation     Lack of Transportation (Non-Medical): No   Housing Stability: Unknown (6/4/2024)    Housing Stability Vital Sign     Unstable Housing in the Last Year: No     Family History   Problem Relation Age of Onset    Colon Cancer Mother     Arthritis Mother     Cancer

## 2024-10-12 ENCOUNTER — TELEPHONE (OUTPATIENT)
Dept: PRIMARY CARE CLINIC | Age: 49
End: 2024-10-12

## 2024-10-12 NOTE — TELEPHONE ENCOUNTER
----- Message from JUSTICE Wood CNP sent at 10/12/2024 10:26 AM EDT -----  Please have her schedule appt to review if she would like  ----- Message -----  From: Dio Holden MD  Sent: 10/11/2024   5:22 PM EDT  To: JUSTICE Jolley CNP  During our follow-up visit Noelle and I were discussing active lifestyle and weight loss.  Medication to help with weight loss such as Ozempic and Mounjaro came up.  I told her that I am not very familiar with these medication since I do not deal with them.  Patient wanted me to reach out to you to see if she would be a candidate for such medication.  I believe she is seeing new in near future    Have a good weekend    Dio Holden

## 2024-10-14 ENCOUNTER — OFFICE VISIT (OUTPATIENT)
Age: 49
End: 2024-10-14
Payer: MEDICAID

## 2024-10-14 DIAGNOSIS — Z87.442 PERSONAL HISTORY OF KIDNEY STONES: ICD-10-CM

## 2024-10-14 DIAGNOSIS — R39.12 WEAK URINARY STREAM: ICD-10-CM

## 2024-10-14 DIAGNOSIS — N39.41 URGE INCONTINENCE: Primary | ICD-10-CM

## 2024-10-14 DIAGNOSIS — N32.81 OAB (OVERACTIVE BLADDER): ICD-10-CM

## 2024-10-14 LAB
BILIRUBIN, POC: NORMAL
BLOOD URINE, POC: NORMAL
CLARITY, POC: CLEAR
COLOR, POC: YELLOW
GLUCOSE URINE, POC: NORMAL MG/DL
KETONES, POC: NORMAL
LEUKOCYTE EST, POC: NORMAL
NITRITE, POC: NORMAL
PH, POC: NORMAL
PROTEIN, POC: NORMAL MG/DL
SPECIFIC GRAVITY, POC: NORMAL
UROBILINOGEN, POC: NORMAL

## 2024-10-14 PROCEDURE — 81003 URINALYSIS AUTO W/O SCOPE: CPT | Performed by: SPECIALIST

## 2024-10-14 PROCEDURE — 99214 OFFICE O/P EST MOD 30 MIN: CPT | Performed by: SPECIALIST

## 2024-10-14 RX ORDER — TRAZODONE HYDROCHLORIDE 100 MG/1
TABLET ORAL
COMMUNITY
Start: 2024-09-12

## 2024-10-14 NOTE — PROGRESS NOTES
Doyle Tapia MD [facility] Urology Office Progress Note    Patient:  Lisa K Cogan  YOB: 1975  Date: 10/14/24    HISTORY OF PRESENT ILLNESS:   The patient is a 49 y.o. female  Patient's Urge urinary incontinence has returned/worsened s/p 4/5/24 Cystoscopy and transvesical Botox (100 IU).   She would like a longer term solution and I have recommended the Interstim sacral neuromodulation device-stage I and II under MAC.  No flank pain or gross hematuria to suggest recurrent kidney stones.    Lower urinary tract symptoms: urgency, frequency, hesitancy, decreased urinary stream, nocturia, 2 times per night, and straining to urinate and feelings of SHIRIN.    Last AUA Symptom Score (QOL): 27 (3)  Today's AUA Symptom Score (QOL): 30 (6)    Summary of old records:   Left 8 mm UPJ calculus on 11/23/22 CT; 12/14/22 L HLL; 6/7/23 KUB neg  1/25/23 Litholink: volume=1468 mL, Ca=90, oxalate=62, citrate=245  Hyperoxaluria, mild  Hypocitraturia: Ca citrate 500 mg bid  Urge incontinence: 4-5 ppd; Solifenacin (Vesicare) 5 mg po qd, added Gemtesa (vibegron) 75 mg qd   9/15/23 Botox 100 IU, 4/5/24 Botox 100 IU; wants Interstim sacral neuromodulation device down the road     Additional History: none    Procedures Today: N/A    Urinalysis today:  Results for orders placed or performed in visit on 10/14/24   POCT Urinalysis No Micro (Auto)   Result Value Ref Range    Color, UA yellow     Clarity, UA clear     Glucose, UA POC neg mg/dL    Bilirubin, UA      Ketones, UA      Spec Grav, UA      Blood, UA POC trace-intact     pH, UA      Protein, UA POC neg mg/dL    Urobilinogen, UA      Leukocytes, UA small     Nitrite, UA neg        Last BUN and creatinine:  Lab Results   Component Value Date    BUN 18 05/11/2024     Lab Results   Component Value Date    CREATININE 0.8 05/11/2024       Last CBC:  Lab Results   Component Value Date    WBC 6.6 10/08/2024    HGB 15.2 (H) 10/08/2024    HCT 49.7 (H)

## 2024-10-29 ENCOUNTER — PROCEDURE VISIT (OUTPATIENT)
Dept: NEUROLOGY | Age: 49
End: 2024-10-29

## 2024-10-29 DIAGNOSIS — M54.81 BILATERAL OCCIPITAL NEURALGIA: Primary | ICD-10-CM

## 2024-10-29 RX ORDER — METHYLPREDNISOLONE SODIUM SUCCINATE 40 MG/ML
40 INJECTION, POWDER, LYOPHILIZED, FOR SOLUTION INTRAMUSCULAR; INTRAVENOUS ONCE
Status: COMPLETED | OUTPATIENT
Start: 2024-10-29 | End: 2024-10-29

## 2024-10-29 RX ORDER — LIDOCAINE HYDROCHLORIDE 20 MG/ML
7 INJECTION, SOLUTION INFILTRATION; PERINEURAL ONCE
Status: COMPLETED | OUTPATIENT
Start: 2024-10-29 | End: 2024-10-29

## 2024-10-29 RX ADMIN — METHYLPREDNISOLONE SODIUM SUCCINATE 40 MG: 40 INJECTION, POWDER, LYOPHILIZED, FOR SOLUTION INTRAMUSCULAR; INTRAVENOUS at 14:53

## 2024-10-29 RX ADMIN — LIDOCAINE HYDROCHLORIDE 7 ML: 20 INJECTION, SOLUTION INFILTRATION; PERINEURAL at 14:51

## 2024-10-30 NOTE — PROGRESS NOTES
Procedure Note    Procedure: b/l Greater and Lesser Occipital Nerve Block (CPT code 48253, 20912)    Indications:  Severe b/l occipital neuralgia (ICD 10 M54.81)    Informed consent was obtained (explaining the procedure and risks and benefits) from patient. The signed consent form was placed in the medical record.    A time out was completed, verifying correct patient, procedure,site, positioning, and implants or special equipment.    Patient's b/l occipital area was palpated to identify location of the greater and the lesser occipital nerve. Using a 10 ml syringe, 7 ml of lidocaine (from a 20 ml bottle) and 1 ml (40 mg) of methylprednisolone was aspirated. Alcohol was applied topically to the skin. Using a 27 gauge needle (aspirating during insertion),  2 ml was injected on the greater and lesser occipital nerve on the left side. Pressure with a gauze pad was held briefly upon the site of puncture to minimize bleeding and to further spread anaesthetic subcutaneously. The procedure was repeated on the right side. There were no complications.  Patient was comfortable and left without complaint.    Empty vial of methylprednisolone was discarded.    Electronically signed by Cuate Logan MD on 10/30/2024 at 9:17 AM

## 2024-11-03 ENCOUNTER — HOSPITAL ENCOUNTER (EMERGENCY)
Age: 49
Discharge: HOME | End: 2024-11-03
Attending: EMERGENCY MEDICINE
Payer: MEDICAID

## 2024-11-03 VITALS
WEIGHT: 293 LBS | TEMPERATURE: 98.2 F | HEIGHT: 64 IN | HEART RATE: 87 BPM | OXYGEN SATURATION: 96 % | DIASTOLIC BLOOD PRESSURE: 98 MMHG | BODY MASS INDEX: 50.02 KG/M2 | SYSTOLIC BLOOD PRESSURE: 144 MMHG | RESPIRATION RATE: 18 BRPM

## 2024-11-03 DIAGNOSIS — K12.2 UVULITIS: ICD-10-CM

## 2024-11-03 DIAGNOSIS — J01.90 ACUTE SINUSITIS, RECURRENCE NOT SPECIFIED, UNSPECIFIED LOCATION: Primary | ICD-10-CM

## 2024-11-03 LAB
FLUAV AG SPEC QL: NEGATIVE
FLUBV AG SPEC QL: NEGATIVE
SARS-COV-2 RDRP RESP QL NAA+PROBE: NOT DETECTED
SPECIMEN DESCRIPTION: NORMAL
SPECIMEN SOURCE: NORMAL
STREP A, MOLECULAR: NEGATIVE

## 2024-11-03 PROCEDURE — 87804 INFLUENZA ASSAY W/OPTIC: CPT

## 2024-11-03 PROCEDURE — 6360000002 HC RX W HCPCS: Performed by: NURSE PRACTITIONER

## 2024-11-03 PROCEDURE — 87635 SARS-COV-2 COVID-19 AMP PRB: CPT

## 2024-11-03 PROCEDURE — 96372 THER/PROPH/DIAG INJ SC/IM: CPT

## 2024-11-03 PROCEDURE — 6370000000 HC RX 637 (ALT 250 FOR IP): Performed by: NURSE PRACTITIONER

## 2024-11-03 PROCEDURE — 87651 STREP A DNA AMP PROBE: CPT

## 2024-11-03 PROCEDURE — 99284 EMERGENCY DEPT VISIT MOD MDM: CPT

## 2024-11-03 RX ORDER — DEXAMETHASONE SODIUM PHOSPHATE 10 MG/ML
10 INJECTION, SOLUTION INTRAMUSCULAR; INTRAVENOUS ONCE
Status: COMPLETED | OUTPATIENT
Start: 2024-11-03 | End: 2024-11-03

## 2024-11-03 RX ADMIN — AMOXICILLIN AND CLAVULANATE POTASSIUM 1 TABLET: 875; 125 TABLET, FILM COATED ORAL at 13:22

## 2024-11-03 RX ADMIN — DEXAMETHASONE SODIUM PHOSPHATE 10 MG: 10 INJECTION, SOLUTION INTRAMUSCULAR; INTRAVENOUS at 12:32

## 2024-11-03 ASSESSMENT — LIFESTYLE VARIABLES
HOW MANY STANDARD DRINKS CONTAINING ALCOHOL DO YOU HAVE ON A TYPICAL DAY: PATIENT DOES NOT DRINK
HOW OFTEN DO YOU HAVE A DRINK CONTAINING ALCOHOL: NEVER

## 2024-11-03 ASSESSMENT — PAIN - FUNCTIONAL ASSESSMENT
PAIN_FUNCTIONAL_ASSESSMENT: 0-10
PAIN_FUNCTIONAL_ASSESSMENT: 0-10

## 2024-11-03 ASSESSMENT — PAIN SCALES - GENERAL
PAINLEVEL_OUTOF10: 5
PAINLEVEL_OUTOF10: 5

## 2024-11-03 ASSESSMENT — PAIN DESCRIPTION - DESCRIPTORS: DESCRIPTORS: SORE;TIGHTNESS

## 2024-11-03 ASSESSMENT — PAIN DESCRIPTION - LOCATION
LOCATION: THROAT
LOCATION: THROAT

## 2024-11-03 ASSESSMENT — PAIN DESCRIPTION - PAIN TYPE: TYPE: ACUTE PAIN

## 2024-11-03 NOTE — ED PROVIDER NOTES
[facility] Emergency Department    [address]  [facility] [address]  Phone: [phone]  Fax: [phone]  Emergency Department  Faculty Attestation    I performed a history and physical examination of the patient and discussed management with the mid level provider. I reviewed the mid level provider's note and agree with the documented findings and plan of care. Any areas of disagreement are noted on the chart. I was personally present for the key portions of any procedures. I have documented in the chart those procedures where I was not present during the key portions. I have reviewed the emergency nurses triage note. I agree with the chief complaint, past medical history, past surgical history, allergies, medications, social and family history as documented unless otherwise noted below. Documentation of the HPI, Physical Exam and Medical Decision Making performed by medical students or scribes is based on my personal performance of the HPI, PE and MDM. For Physician Assistant/ Nurse Practitioner cases/documentation I have personally evaluated this patient and have completed at least one if not all key elements of the E/M (history, physical exam, and MDM). Additional findings are as noted.      Primary Care Physician:  Luda Landry, APRN - CNP    CHIEF COMPLAINT       Chief Complaint   Patient presents with    Nasal Congestion     Started a couple days ago       Pharyngitis       RECENT VITALS:   Temp: 98.2 °F (36.8 °C),  Pulse: 87, Respirations: 18, BP: (!) 144/98    LABS:  Labs Reviewed   RAPID STREP SCREEN   COVID-19, RAPID   RAPID INFLUENZA A/B ANTIGENS         PERTINENT ATTENDING PHYSICIAN COMMENTS:    The patient presents with a swollen uvula.  She has had recent nasal congestion.  She has a history of uvulitis.  She denies fever or chills.    On exam, the patient has mild uvular swelling and minimal redness  She is phonating normally and has clear lungs to

## 2024-11-03 NOTE — ED PROVIDER NOTES
[facility] EMERGENCY DEPARTMENT  EMERGENCY DEPARTMENT ENCOUNTER      Pt Name: Lisa K Cogan  MRN: [id number]  Birthdate 1975  Date of evaluation: 11/3/2024  Provider: JUSTICE Boles CNP  4:59 PM    CHIEF COMPLAINT       Chief Complaint   Patient presents with    Nasal Congestion     Started a couple days ago       Pharyngitis         HISTORY OF PRESENT ILLNESS    Lisa K Cogan is a 49 y.o. female who presents to the emergency department for evaluation of nasal congestion that started a few days ago, last night, she states she was really not able to breathe through her nose.  Woke up this morning with a significant feeling of mucus in her throat and looked in the mirror and noticed that her uvula was swollen.  She is a history of uvulitis.  She states that this typically happens when she gets a cold.  No fevers, no chills.  She has a sore throat and feels like there are \"mashed potato stuck in the back of her throat\".  No difficulty swallowing.  She has been using over-the-counter medications with some relief of the congestion    HPI    Nursing Notes were reviewed.    REVIEW OF SYSTEMS       Review of Systems   All other systems reviewed and are negative.      Except as noted above the remainder of the review of systems was reviewed and negative.       PAST MEDICAL HISTORY     Past Medical History:   Diagnosis Date    Anxiety     Carpal tunnel syndrome     Chronic back pain     COVID-19 11/06/2021    Depression     Endometriosis     Fatty liver     Gestational diabetes     x 3 children    Headache     migraines    History of blood transfusion     Hypertension     CAITLIN (iron deficiency anemia)     iron infusions    Iron deficiency anemia     Kidney stones     Dr. Tapia seen 11/28/2022    May-Thurner syndrome     affects left side of body    Obesity     Peripheral vascular disease (HCC)     Rotator cuff injury     Scleroderma (HCC)     Sleep apnea     Snores     no cpap- never been teste

## 2024-11-03 NOTE — DISCHARGE INSTRUCTIONS
Augmentin as prescribed.  Use Elaine pot, saline rinses, Flonase over-the-counter as directed, finish antibiotics.  Return for any worsening symptoms or concerns including difficulty swallowing difficulty breathing or any other concerns

## 2024-11-13 ENCOUNTER — PREP FOR PROCEDURE (OUTPATIENT)
Age: 49
End: 2024-11-13

## 2024-11-13 DIAGNOSIS — N39.41 URGE URINARY INCONTINENCE: ICD-10-CM

## 2024-11-20 ENCOUNTER — TELEPHONE (OUTPATIENT)
Dept: FAMILY MEDICINE CLINIC | Age: 49
End: 2024-11-20

## 2024-11-20 ENCOUNTER — OFFICE VISIT (OUTPATIENT)
Dept: PRIMARY CARE CLINIC | Age: 49
End: 2024-11-20

## 2024-11-20 VITALS
WEIGHT: 293 LBS | BODY MASS INDEX: 52.28 KG/M2 | HEART RATE: 104 BPM | DIASTOLIC BLOOD PRESSURE: 84 MMHG | SYSTOLIC BLOOD PRESSURE: 134 MMHG | OXYGEN SATURATION: 96 %

## 2024-11-20 DIAGNOSIS — R53.83 FATIGUE, UNSPECIFIED TYPE: ICD-10-CM

## 2024-11-20 DIAGNOSIS — Z13.29 SCREENING FOR THYROID DISORDER: ICD-10-CM

## 2024-11-20 DIAGNOSIS — I87.1 MAY-THURNER SYNDROME: ICD-10-CM

## 2024-11-20 DIAGNOSIS — I10 PRIMARY HYPERTENSION: Primary | ICD-10-CM

## 2024-11-20 DIAGNOSIS — Z98.84 WEIGHT GAIN STATUS POST GASTRIC BYPASS: ICD-10-CM

## 2024-11-20 DIAGNOSIS — K76.0 FATTY LIVER: ICD-10-CM

## 2024-11-20 DIAGNOSIS — Z13.1 SCREENING FOR DIABETES MELLITUS: ICD-10-CM

## 2024-11-20 DIAGNOSIS — M79.604 LEG PAIN, BILATERAL: ICD-10-CM

## 2024-11-20 DIAGNOSIS — E66.813 CLASS 3 SEVERE OBESITY WITH SERIOUS COMORBIDITY AND BODY MASS INDEX (BMI) OF 50.0 TO 59.9 IN ADULT, UNSPECIFIED OBESITY TYPE: ICD-10-CM

## 2024-11-20 DIAGNOSIS — N39.41 URGE INCONTINENCE: ICD-10-CM

## 2024-11-20 DIAGNOSIS — M54.81 BILATERAL OCCIPITAL NEURALGIA: ICD-10-CM

## 2024-11-20 DIAGNOSIS — I87.2 CHRONIC VENOUS INSUFFICIENCY OF LOWER EXTREMITY: ICD-10-CM

## 2024-11-20 DIAGNOSIS — E66.01 CLASS 3 SEVERE OBESITY WITH SERIOUS COMORBIDITY AND BODY MASS INDEX (BMI) OF 50.0 TO 59.9 IN ADULT, UNSPECIFIED OBESITY TYPE: ICD-10-CM

## 2024-11-20 DIAGNOSIS — M79.605 LEG PAIN, BILATERAL: ICD-10-CM

## 2024-11-20 DIAGNOSIS — R63.5 WEIGHT GAIN STATUS POST GASTRIC BYPASS: ICD-10-CM

## 2024-11-20 DIAGNOSIS — R73.03 PREDIABETES: ICD-10-CM

## 2024-11-20 DIAGNOSIS — G43.E01 CHRONIC MIGRAINE WITH AURA AND WITH STATUS MIGRAINOSUS, NOT INTRACTABLE: ICD-10-CM

## 2024-11-20 RX ORDER — MULTIVITAMIN
1 TABLET ORAL DAILY
Qty: 90 TABLET | Refills: 3 | Status: SHIPPED | OUTPATIENT
Start: 2024-11-20

## 2024-11-20 RX ORDER — MELOXICAM 15 MG/1
15 TABLET ORAL DAILY
Qty: 30 TABLET | Refills: 2 | Status: SHIPPED | OUTPATIENT
Start: 2024-11-20

## 2024-11-20 RX ORDER — SUMATRIPTAN 6 MG/.5ML
6 INJECTION, SOLUTION SUBCUTANEOUS 2 TIMES DAILY PRN
Qty: 2 ML | Refills: 1 | Status: SHIPPED | OUTPATIENT
Start: 2024-11-20

## 2024-11-20 ASSESSMENT — ENCOUNTER SYMPTOMS
WHEEZING: 0
SORE THROAT: 0
TROUBLE SWALLOWING: 0
BACK PAIN: 1
VOMITING: 0
SINUS PRESSURE: 0
ABDOMINAL PAIN: 0
SHORTNESS OF BREATH: 0
NAUSEA: 0
BLOOD IN STOOL: 0
CONSTIPATION: 0
DIARRHEA: 0
COUGH: 0

## 2024-11-20 NOTE — TELEPHONE ENCOUNTER
Patient called into office stating she is currently at the pharmacy and PCP sent medication but no needles. Patient states that she is leaving for [place] tomorrow and her migraines are very painful. Patient states the pharmacist advised her to ask for a bigger needle to draw the medication from the vile then a smaller needle to inject.

## 2024-11-20 NOTE — PROGRESS NOTES
[facility]  [facility] PRIMARY CARE  [address] [place] [address] [address]  [address]  [facility] [address]  Dept: [phone]  Dept Fax: [phone]    Lisa K Cogan is a 49 y.o. female who presentstoday for her medical conditions/complaints as noted below.  Lisa K Cogan is c/o of  Chief Complaint   Patient presents with    Anxiety    Depression     Following with [place] [facility]    Other     Discuss possible Mounjaro per hem/onc check with PCP for wt loss and fatty liver dx    Fatigue     Started going blind right eye again, feels fuzzy, hearing BP in ears-had all this when in hosp in May    Leg Swelling     Some days she can barely walk to bathroom, does not want med to take daily just a prn medication       HPI:     Here today for follow up  Reports having feeling of swelling at base of head. She has had 2 occipital nerve blocks that did help to decrease frequency and severity of her migraines  However recently she has been feeling worse. She was treated in early Nov for sinus infection and sore throat  These sx have resolved but she is now feeling fatigued  She goes on to describe intermittent episodes of significant scalp tenderness/pain, feels as though the scalp is tender in areas posteriorly, also having a recurrence of right eye vision changes.  She states she was having similar feelings prior to requiring hospital admission this past summer  At 1 point she was given Imitrex injections which did seem to help her other migraine headaches symptoms though these are different  She no longer has any of the Imitrex left    Will be having bladder stim surgery next month  She is hopeful that this will be helpful as she has great difficulty into the bathroom in a timely manner particularly on days where her legs are bothering her  She goes on to describe increased leg pain secondary to her May-Thurner syndrome and swelling.  She has historically used Tylenol as has avoided NSAIDs since her bypass surgery in 2009  She is

## 2024-12-03 ENCOUNTER — HOSPITAL ENCOUNTER (OUTPATIENT)
Age: 49
Setting detail: SPECIMEN
Discharge: HOME | End: 2024-12-03

## 2024-12-03 ENCOUNTER — TELEPHONE (OUTPATIENT)
Dept: PRIMARY CARE CLINIC | Age: 49
End: 2024-12-03

## 2024-12-03 ENCOUNTER — TELEPHONE (OUTPATIENT)
Dept: NEUROLOGY | Age: 49
End: 2024-12-03

## 2024-12-03 DIAGNOSIS — Z13.1 SCREENING FOR DIABETES MELLITUS: ICD-10-CM

## 2024-12-03 DIAGNOSIS — I10 PRIMARY HYPERTENSION: ICD-10-CM

## 2024-12-03 DIAGNOSIS — Z13.29 SCREENING FOR THYROID DISORDER: ICD-10-CM

## 2024-12-03 LAB
ALBUMIN SERPL-MCNC: 4.1 G/DL (ref 3.5–5.2)
ALBUMIN/GLOB SERPL: 1.3 {RATIO} (ref 1–2.5)
ALP SERPL-CCNC: 86 U/L (ref 35–104)
ALT SERPL-CCNC: 36 U/L (ref 10–35)
ANION GAP SERPL CALCULATED.3IONS-SCNC: 9 MMOL/L (ref 9–16)
AST SERPL-CCNC: 27 U/L (ref 10–35)
BILIRUB SERPL-MCNC: 0.3 MG/DL (ref 0–1.2)
BUN SERPL-MCNC: 10 MG/DL (ref 6–20)
CALCIUM SERPL-MCNC: 9.1 MG/DL (ref 8.6–10.4)
CHLORIDE SERPL-SCNC: 106 MMOL/L (ref 98–107)
CHOLEST SERPL-MCNC: 192 MG/DL (ref 0–199)
CHOLESTEROL/HDL RATIO: 4.6
CO2 SERPL-SCNC: 27 MMOL/L (ref 20–31)
CREAT SERPL-MCNC: 0.7 MG/DL (ref 0.6–0.9)
EST. AVERAGE GLUCOSE BLD GHB EST-MCNC: 123 MG/DL
GFR, ESTIMATED: >90 ML/MIN/1.73M2
GLUCOSE SERPL-MCNC: 106 MG/DL (ref 74–99)
HBA1C MFR BLD: 5.9 % (ref 4–6)
HDLC SERPL-MCNC: 42 MG/DL
LDLC SERPL CALC-MCNC: 127 MG/DL (ref 0–100)
POTASSIUM SERPL-SCNC: 4.3 MMOL/L (ref 3.7–5.3)
PROT SERPL-MCNC: 7.3 G/DL (ref 6.6–8.7)
SODIUM SERPL-SCNC: 142 MMOL/L (ref 136–145)
TRIGL SERPL-MCNC: 117 MG/DL
TSH SERPL DL<=0.05 MIU/L-ACNC: 2.18 UIU/ML (ref 0.27–4.2)
VLDLC SERPL CALC-MCNC: 23 MG/DL (ref 1–30)

## 2024-12-03 NOTE — TELEPHONE ENCOUNTER
12 03 2024 called the patient times 3 at  to reschedule 12 19 2024 ONB appointment with Dr Logan, left message on machine, no response.  I cancelled the appointment and mailed the patient a letter asking them to call the office back to reschedule this appointment.  KS

## 2024-12-03 NOTE — TELEPHONE ENCOUNTER
Patient called and spoke with Clint regarding need for Wegovy PA.  Patient's phone is not working so would like her sister number called to contact her. ([phone])  Writer will start PA.

## 2024-12-04 ENCOUNTER — TELEPHONE (OUTPATIENT)
Age: 49
End: 2024-12-04

## 2024-12-04 DIAGNOSIS — Z01.818 PRE-OP EVALUATION: Primary | ICD-10-CM

## 2024-12-04 PROCEDURE — 93000 ELECTROCARDIOGRAM COMPLETE: CPT | Performed by: SPECIALIST

## 2024-12-04 NOTE — TELEPHONE ENCOUNTER
Maximo from [facility] [employer] [facility] called and is requesting information for prior auth for CPT 39252 upcoming surgery/procedure.  Maximo neeeds statement (can also just be a phone call to her stating the following:  If patient's symptoms improved  How much improvement with the stimulator? - Maximo mentioned a statement like \"patient's symptoms improved 50% or more during the testing phase\" would suffice    Not entirely sure what to do with this; hoping you would.

## 2024-12-05 ENCOUNTER — HOSPITAL ENCOUNTER (OUTPATIENT)
Age: 49
Discharge: HOME | End: 2024-12-05

## 2024-12-05 ENCOUNTER — ANESTHESIA EVENT (OUTPATIENT)
Dept: OPERATING ROOM | Age: 49
End: 2024-12-05
Payer: MEDICAID

## 2024-12-06 ENCOUNTER — ANESTHESIA (OUTPATIENT)
Dept: OPERATING ROOM | Age: 49
End: 2024-12-06
Payer: MEDICAID

## 2024-12-06 ENCOUNTER — APPOINTMENT (OUTPATIENT)
Dept: GENERAL RADIOLOGY | Age: 49
End: 2024-12-06
Attending: SPECIALIST
Payer: MEDICAID

## 2024-12-06 ENCOUNTER — HOSPITAL ENCOUNTER (OUTPATIENT)
Age: 49
Setting detail: OUTPATIENT SURGERY
Discharge: HOME | End: 2024-12-06
Attending: SPECIALIST | Admitting: SPECIALIST
Payer: MEDICAID

## 2024-12-06 VITALS
WEIGHT: 293 LBS | RESPIRATION RATE: 17 BRPM | HEART RATE: 80 BPM | TEMPERATURE: 98.1 F | BODY MASS INDEX: 50.02 KG/M2 | SYSTOLIC BLOOD PRESSURE: 125 MMHG | HEIGHT: 64 IN | DIASTOLIC BLOOD PRESSURE: 81 MMHG | OXYGEN SATURATION: 97 %

## 2024-12-06 DIAGNOSIS — G89.18 POST-OP PAIN: Primary | ICD-10-CM

## 2024-12-06 PROCEDURE — 3700000000 HC ANESTHESIA ATTENDED CARE: Performed by: SPECIALIST

## 2024-12-06 PROCEDURE — 3600000012 HC SURGERY LEVEL 2 ADDTL 15MIN: Performed by: SPECIALIST

## 2024-12-06 PROCEDURE — 2580000003 HC RX 258: Performed by: STUDENT IN AN ORGANIZED HEALTH CARE EDUCATION/TRAINING PROGRAM

## 2024-12-06 PROCEDURE — 3700000001 HC ADD 15 MINUTES (ANESTHESIA): Performed by: SPECIALIST

## 2024-12-06 PROCEDURE — 6370000000 HC RX 637 (ALT 250 FOR IP)

## 2024-12-06 PROCEDURE — C1883 ADAPT/EXT, PACING/NEURO LEAD: HCPCS | Performed by: SPECIALIST

## 2024-12-06 PROCEDURE — 2580000003 HC RX 258: Performed by: SPECIALIST

## 2024-12-06 PROCEDURE — 2720000010 HC SURG SUPPLY STERILE: Performed by: SPECIALIST

## 2024-12-06 PROCEDURE — C1778 LEAD, NEUROSTIMULATOR: HCPCS | Performed by: SPECIALIST

## 2024-12-06 PROCEDURE — 64561 IMPLANT NEUROELECTRODES: CPT | Performed by: SPECIALIST

## 2024-12-06 PROCEDURE — 6360000002 HC RX W HCPCS: Performed by: SPECIALIST

## 2024-12-06 PROCEDURE — 3600000002 HC SURGERY LEVEL 2 BASE: Performed by: SPECIALIST

## 2024-12-06 PROCEDURE — 7100000010 HC PHASE II RECOVERY - FIRST 15 MIN: Performed by: SPECIALIST

## 2024-12-06 PROCEDURE — 2709999900 HC NON-CHARGEABLE SUPPLY: Performed by: SPECIALIST

## 2024-12-06 PROCEDURE — 7100000011 HC PHASE II RECOVERY - ADDTL 15 MIN: Performed by: SPECIALIST

## 2024-12-06 DEVICE — LEAD NERVE STIM 4.32 MM INTERSTIM SURESCAN: Type: IMPLANTABLE DEVICE | Site: BACK | Status: FUNCTIONAL

## 2024-12-06 RX ORDER — LIDOCAINE HYDROCHLORIDE 10 MG/ML
INJECTION, SOLUTION EPIDURAL; INFILTRATION; INTRACAUDAL; PERINEURAL
Status: DISCONTINUED | OUTPATIENT
Start: 2024-12-06 | End: 2024-12-06 | Stop reason: SDUPTHER

## 2024-12-06 RX ORDER — SODIUM CHLORIDE 9 MG/ML
INJECTION, SOLUTION INTRAVENOUS PRN
Status: DISCONTINUED | OUTPATIENT
Start: 2024-12-06 | End: 2024-12-06 | Stop reason: HOSPADM

## 2024-12-06 RX ORDER — GENTAMICIN SULFATE 80 MG/50ML
80 INJECTION, SOLUTION INTRAVENOUS
Status: COMPLETED | OUTPATIENT
Start: 2024-12-06 | End: 2024-12-06

## 2024-12-06 RX ORDER — SODIUM CHLORIDE 0.9 % (FLUSH) 0.9 %
5-40 SYRINGE (ML) INJECTION EVERY 12 HOURS SCHEDULED
Status: DISCONTINUED | OUTPATIENT
Start: 2024-12-06 | End: 2024-12-06 | Stop reason: HOSPADM

## 2024-12-06 RX ORDER — TRAMADOL HYDROCHLORIDE 50 MG/1
50 TABLET ORAL EVERY 6 HOURS PRN
Qty: 12 TABLET | Refills: 0 | Status: SHIPPED | OUTPATIENT
Start: 2024-12-06 | End: 2024-12-09

## 2024-12-06 RX ORDER — MEPERIDINE HYDROCHLORIDE 50 MG/ML
12.5 INJECTION INTRAMUSCULAR; INTRAVENOUS; SUBCUTANEOUS ONCE
Status: DISCONTINUED | OUTPATIENT
Start: 2024-12-06 | End: 2024-12-06 | Stop reason: HOSPADM

## 2024-12-06 RX ORDER — FENTANYL CITRATE 50 UG/ML
INJECTION, SOLUTION INTRAMUSCULAR; INTRAVENOUS
Status: DISCONTINUED | OUTPATIENT
Start: 2024-12-06 | End: 2024-12-06 | Stop reason: SDUPTHER

## 2024-12-06 RX ORDER — MIDAZOLAM HYDROCHLORIDE 2 MG/2ML
2 INJECTION, SOLUTION INTRAMUSCULAR; INTRAVENOUS
Status: DISCONTINUED | OUTPATIENT
Start: 2024-12-06 | End: 2024-12-06 | Stop reason: HOSPADM

## 2024-12-06 RX ORDER — LIDOCAINE HYDROCHLORIDE AND EPINEPHRINE 10; 10 MG/ML; UG/ML
INJECTION, SOLUTION INFILTRATION; PERINEURAL PRN
Status: DISCONTINUED | OUTPATIENT
Start: 2024-12-06 | End: 2024-12-06 | Stop reason: ALTCHOICE

## 2024-12-06 RX ORDER — METOCLOPRAMIDE HYDROCHLORIDE 5 MG/ML
10 INJECTION INTRAMUSCULAR; INTRAVENOUS
Status: DISCONTINUED | OUTPATIENT
Start: 2024-12-06 | End: 2024-12-06 | Stop reason: HOSPADM

## 2024-12-06 RX ORDER — SODIUM CHLORIDE 0.9 % (FLUSH) 0.9 %
5-40 SYRINGE (ML) INJECTION PRN
Status: DISCONTINUED | OUTPATIENT
Start: 2024-12-06 | End: 2024-12-06 | Stop reason: HOSPADM

## 2024-12-06 RX ORDER — TRAMADOL HYDROCHLORIDE 50 MG/1
50 TABLET ORAL ONCE
Status: COMPLETED | OUTPATIENT
Start: 2024-12-06 | End: 2024-12-06

## 2024-12-06 RX ORDER — ONDANSETRON 2 MG/ML
4 INJECTION INTRAMUSCULAR; INTRAVENOUS
Status: DISCONTINUED | OUTPATIENT
Start: 2024-12-06 | End: 2024-12-06 | Stop reason: HOSPADM

## 2024-12-06 RX ORDER — LIDOCAINE HYDROCHLORIDE 10 MG/ML
1 INJECTION, SOLUTION EPIDURAL; INFILTRATION; INTRACAUDAL; PERINEURAL
Status: DISCONTINUED | OUTPATIENT
Start: 2024-12-06 | End: 2024-12-06 | Stop reason: HOSPADM

## 2024-12-06 RX ORDER — GENTAMICIN SULFATE 80 MG/50ML
INJECTION, SOLUTION INTRAVENOUS
Status: COMPLETED
Start: 2024-12-06 | End: 2024-12-06

## 2024-12-06 RX ORDER — GENTAMICIN 40 MG/ML
INJECTION, SOLUTION INTRAMUSCULAR; INTRAVENOUS
Status: DISCONTINUED
Start: 2024-12-06 | End: 2024-12-06 | Stop reason: HOSPADM

## 2024-12-06 RX ORDER — CEPHALEXIN 500 MG/1
500 CAPSULE ORAL 3 TIMES DAILY
Qty: 10 CAPSULE | Refills: 0 | Status: SHIPPED | OUTPATIENT
Start: 2024-12-06

## 2024-12-06 RX ORDER — DIPHENHYDRAMINE HYDROCHLORIDE 50 MG/ML
12.5 INJECTION INTRAMUSCULAR; INTRAVENOUS
Status: DISCONTINUED | OUTPATIENT
Start: 2024-12-06 | End: 2024-12-06 | Stop reason: HOSPADM

## 2024-12-06 RX ORDER — LABETALOL HYDROCHLORIDE 5 MG/ML
10 INJECTION, SOLUTION INTRAVENOUS
Status: DISCONTINUED | OUTPATIENT
Start: 2024-12-06 | End: 2024-12-06 | Stop reason: HOSPADM

## 2024-12-06 RX ORDER — TRAMADOL HYDROCHLORIDE 50 MG/1
TABLET ORAL
Status: COMPLETED
Start: 2024-12-06 | End: 2024-12-06

## 2024-12-06 RX ORDER — LIDOCAINE HYDROCHLORIDE AND EPINEPHRINE 10; 10 MG/ML; UG/ML
INJECTION, SOLUTION INFILTRATION; PERINEURAL
Status: DISCONTINUED
Start: 2024-12-06 | End: 2024-12-06 | Stop reason: HOSPADM

## 2024-12-06 RX ORDER — PROPOFOL 10 MG/ML
INJECTION, EMULSION INTRAVENOUS
Status: DISCONTINUED | OUTPATIENT
Start: 2024-12-06 | End: 2024-12-06 | Stop reason: SDUPTHER

## 2024-12-06 RX ORDER — SODIUM CHLORIDE, SODIUM LACTATE, POTASSIUM CHLORIDE, CALCIUM CHLORIDE 600; 310; 30; 20 MG/100ML; MG/100ML; MG/100ML; MG/100ML
INJECTION, SOLUTION INTRAVENOUS CONTINUOUS
Status: DISCONTINUED | OUTPATIENT
Start: 2024-12-06 | End: 2024-12-06 | Stop reason: HOSPADM

## 2024-12-06 RX ORDER — MIDAZOLAM HYDROCHLORIDE 1 MG/ML
INJECTION, SOLUTION INTRAMUSCULAR; INTRAVENOUS
Status: DISCONTINUED | OUTPATIENT
Start: 2024-12-06 | End: 2024-12-06 | Stop reason: SDUPTHER

## 2024-12-06 RX ORDER — OXYCODONE HYDROCHLORIDE 5 MG/1
10 TABLET ORAL PRN
Status: DISCONTINUED | OUTPATIENT
Start: 2024-12-06 | End: 2024-12-06 | Stop reason: HOSPADM

## 2024-12-06 RX ORDER — NALOXONE HYDROCHLORIDE 0.4 MG/ML
INJECTION, SOLUTION INTRAMUSCULAR; INTRAVENOUS; SUBCUTANEOUS PRN
Status: DISCONTINUED | OUTPATIENT
Start: 2024-12-06 | End: 2024-12-06 | Stop reason: HOSPADM

## 2024-12-06 RX ORDER — HYDRALAZINE HYDROCHLORIDE 20 MG/ML
10 INJECTION INTRAMUSCULAR; INTRAVENOUS
Status: DISCONTINUED | OUTPATIENT
Start: 2024-12-06 | End: 2024-12-06 | Stop reason: HOSPADM

## 2024-12-06 RX ORDER — OXYCODONE HYDROCHLORIDE 5 MG/1
5 TABLET ORAL PRN
Status: DISCONTINUED | OUTPATIENT
Start: 2024-12-06 | End: 2024-12-06 | Stop reason: HOSPADM

## 2024-12-06 RX ADMIN — FENTANYL CITRATE 50 MCG: 50 INJECTION, SOLUTION INTRAMUSCULAR; INTRAVENOUS at 10:42

## 2024-12-06 RX ADMIN — MIDAZOLAM 2 MG: 1 INJECTION INTRAMUSCULAR; INTRAVENOUS at 10:36

## 2024-12-06 RX ADMIN — TRAMADOL HYDROCHLORIDE 50 MG: 50 TABLET ORAL at 12:17

## 2024-12-06 RX ADMIN — SODIUM CHLORIDE, POTASSIUM CHLORIDE, SODIUM LACTATE AND CALCIUM CHLORIDE: 600; 310; 30; 20 INJECTION, SOLUTION INTRAVENOUS at 09:31

## 2024-12-06 RX ADMIN — SODIUM CHLORIDE, PRESERVATIVE FREE 10 ML: 5 INJECTION INTRAVENOUS at 09:31

## 2024-12-06 RX ADMIN — FENTANYL CITRATE 25 MCG: 50 INJECTION, SOLUTION INTRAMUSCULAR; INTRAVENOUS at 10:48

## 2024-12-06 RX ADMIN — PROPOFOL 30 MG: 10 INJECTION, EMULSION INTRAVENOUS at 10:41

## 2024-12-06 RX ADMIN — PROPOFOL 20 MG: 10 INJECTION, EMULSION INTRAVENOUS at 11:07

## 2024-12-06 RX ADMIN — GENTAMICIN SULFATE 80 MG: 80 INJECTION, SOLUTION INTRAVENOUS at 10:45

## 2024-12-06 RX ADMIN — FENTANYL CITRATE 25 MCG: 50 INJECTION, SOLUTION INTRAMUSCULAR; INTRAVENOUS at 10:54

## 2024-12-06 RX ADMIN — PROPOFOL 25 MCG/KG/MIN: 10 INJECTION, EMULSION INTRAVENOUS at 10:42

## 2024-12-06 RX ADMIN — LIDOCAINE HYDROCHLORIDE 50 MG: 10 INJECTION, SOLUTION EPIDURAL; INFILTRATION; INTRACAUDAL; PERINEURAL at 10:41

## 2024-12-06 RX ADMIN — Medication 2000 MG: at 10:42

## 2024-12-06 ASSESSMENT — PAIN - FUNCTIONAL ASSESSMENT
PAIN_FUNCTIONAL_ASSESSMENT: 0-10
PAIN_FUNCTIONAL_ASSESSMENT: 0-10
PAIN_FUNCTIONAL_ASSESSMENT: PREVENTS OR INTERFERES SOME ACTIVE ACTIVITIES AND ADLS

## 2024-12-06 ASSESSMENT — PAIN DESCRIPTION - LOCATION: LOCATION: BACK

## 2024-12-06 ASSESSMENT — PAIN SCALES - GENERAL
PAINLEVEL_OUTOF10: 5
PAINLEVEL_OUTOF10: 3

## 2024-12-06 ASSESSMENT — PAIN DESCRIPTION - DESCRIPTORS
DESCRIPTORS: ACHING;SORE
DESCRIPTORS: ACHING
DESCRIPTORS: ACHING

## 2024-12-06 ASSESSMENT — PAIN DESCRIPTION - ORIENTATION: ORIENTATION: LEFT

## 2024-12-06 NOTE — ANESTHESIA POSTPROCEDURE EVALUATION
Department of Anesthesiology  Postprocedure Note    Patient: Lisa K Cogan  MRN: [id number]  YOB: 1975  Date of evaluation: 12/6/2024    Procedure Summary       Date: 12/06/24 Room / Location: [address]    Anesthesia Start: 1036 Anesthesia Stop: 1147    Procedure: SACRAL NERVE STIMULATOR IMPLANT STAGE ONE [employer] INTERSTIM Diagnosis:       Urge urinary incontinence      (Urge urinary incontinence [N39.41])    Surgeons: Doyle Tapia MD Responsible Provider: Anupama Prater MD    Anesthesia Type: MAC ASA Status: 3            Anesthesia Type: No value filed.    Miguel Angel Phase I: Miguel Angel Score: 10    Miguel Angel Phase II: Miguel Angel Score: 10    Anesthesia Post Evaluation    Patient location during evaluation: PACU  Patient participation: complete - patient participated  Level of consciousness: awake and alert  Airway patency: patent  Nausea & Vomiting: no nausea and no vomiting  Cardiovascular status: blood pressure returned to baseline  Respiratory status: acceptable and room air  Hydration status: euvolemic  Pain management: adequate and satisfactory to patient    No notable events documented.

## 2024-12-06 NOTE — H&P
[facility] Urology  Doyle Tapia MD [facility]    History and Physical    Patient:  Lisa K Cogan  MRN: [id number]  YOB: 1975    CHIEF COMPLAINT:  Urge urinary incontinence     HISTORY OF PRESENT ILLNESS:   The patient is a 49 y.o. female who presents with:  Patient's Urge urinary incontinence has returned/worsened s/p 4/5/24 Cystoscopy and transvesical Botox (100 IU).   She would like a longer term solution and here for a Stage I Interstim sacral neuromodulation device under MAC.  No flank pain or gross hematuria to suggest recurrent kidney stones.       Summary of old records:   Left 8 mm UPJ calculus on 11/23/22 CT; 12/14/22 L HLL; 6/7/23 KUB neg  1/25/23 Litholink: volume=1468 mL, Ca=90, oxalate=62, citrate=245  Hyperoxaluria, mild  Hypocitraturia: Ca citrate 500 mg bid  Urge incontinence: 4-5 ppd; Solifenacin (Vesicare) 5 mg po qd, added Gemtesa (vibegron) 75 mg qd   9/15/23 Botox 100 IU, 4/5/24 Botox 100 IU; wants Interstim sacral neuromodulation device down the road    Patient's old records, notes and chart reviewed and summarized above.    Past Medical History:    Past Medical History:   Diagnosis Date    Anxiety     Carpal tunnel syndrome     Chronic back pain     COVID-19 11/06/2021    Depression     Endometriosis     Fatty liver     Gestational diabetes     x 3 children    Headache     migraines    History of blood transfusion     Hypertension     CAITLIN (iron deficiency anemia)     iron infusions    Iron deficiency anemia     Kidney stones     Dr. Tapia seen 11/28/2022    May-Thurner syndrome     affects left side of body    Obesity     Peripheral vascular disease (HCC)     Rotator cuff injury     Scleroderma (HCC)     Sleep apnea     Snores     no cpap- never been teste for RAIN    Under care of team     PCP - RENETTA ALMARAZ CNP - LAST VISIT 8/31/2022    Under care of team     VASCULAR  - DR. RIBEIRO - 8/2021 - SEES FOR MAY-THURNER SYNDROME    Under care of team     HEM/ONC -  History:    Family History   Problem Relation Age of Onset    Colon Cancer Mother     Arthritis Mother     Cancer Mother         Pancreatic and colon    Obesity Mother     Early Death Father         accident    Obesity Father     Diabetes Sister     High Blood Pressure Sister     Arthritis Sister     Arrhythmia Sister     Atrial Fibrillation Sister     Depression Sister     Heart Disease Sister     High Cholesterol Sister     Mental Illness Sister         Depression/anxiety    Obesity Sister     Diabetes Sister     High Blood Pressure Sister     Arthritis Sister     Depression Sister     High Cholesterol Sister     Mental Illness Sister         Depression/anxiety    Obesity Sister     Diabetes Brother     High Blood Pressure Brother     Arthritis Brother     Arrhythmia Brother     Depression Brother     Heart Disease Brother     High Cholesterol Brother     Obesity Brother     Breast Cancer Paternal Grandmother         Paternal grandma    Heart Attack Paternal Grandmother     Heart Disease Paternal Grandmother     Stroke Maternal Grandmother     Miscarriages / Stillbirths Paternal Aunt        REVIEW OF SYSTEMS:  All reviewed and negative except for pertinent ones listed in HPI.    Physical Exam:      This a 49 y.o. female   No data found.  Constitutional: Patient in no acute distress.  Neuro: Alert and oriented to person, place and time.  Psych: mood and affect normal  HEENT negative  Lungs: Respiratory effort is normal; CTA  Cardiovascular: Normal peripheral pulses; R3 wo murmur  Abdomen: Soft, non-tender, non-distended with no CVA, flank pain    LABS:   No results for input(s): \"WBC\", \"HGB\", \"HCT\", \"MCV\", \"PLT\" in the last 72 hours.  Recent Labs     12/03/24  1358      K 4.3      CO2 27   BUN 10   CREATININE 0.7       Additional Lab/culture results:    Urinalysis: No results for input(s): \"COLORU\", \"PHUR\", \"LABCAST\", \"WBCUA\", \"RBCUA\", \"MUCUS\", \"TRICHOMONAS\", \"YEAST\", \"BACTERIA\", \"CLARITYU\", \"SPECGRAV\",

## 2024-12-06 NOTE — ANESTHESIA PRE PROCEDURE
Department of Anesthesiology  Preprocedure Note       Name:  Lisa K Cogan   Age:  49 y.o.  :  1975                                          MRN:  [id number]         Date:  2024      Surgeon: Surgeon(s):  Doyle Tapia MD    Procedure: Procedure(s):  SACRAL NERVE STIMULATOR IMPLANT STAGE ONE [employer] INTERSTIM    Medications prior to admission:   Prior to Admission medications    Medication Sig Start Date End Date Taking? Authorizing Provider   Multiple Vitamin (MULTIVITAMIN) TABS Take 1 tablet by mouth daily 24  Yes Luda Landry APRN - CNP   meloxicam (MOBIC) 15 MG tablet Take 1 tablet by mouth daily 24  Yes Luda Landry APRN - CNP   traZODone (DESYREL) 100 MG tablet TAKE 1/2 to 1 TABLET BY MOUTH AT BEDTIME 24  Yes Tono Mauro MD   ferrous sulfate (IRON 325) 325 (65 Fe) MG tablet Take 1 tablet by mouth three times a week 10/11/24  Yes Dio Holden MD   Ascorbic Acid (VITAMIN C) 250 MG tablet Take 1 tablet by mouth three times a week 24  Yes Dio Holden MD   fluticasone (FLONASE) 50 MCG/ACT nasal spray 2 sprays by Each Nostril route daily 24  Yes Luda Landry APRN - CNP   losartan (COZAAR) 100 MG tablet Take 1 tablet by mouth daily 24  Yes Luda Landry APRN - CNP   ferrous sulfate (IRON 325) 325 (65 Fe) MG tablet Take 1 tablet by mouth three times a week 4/3/24  Yes Dio Holden MD   AUVELITY  MG TBCR Take 1 tablet by mouth in the morning and at bedtime 10/4/23  Yes Tono Mauro MD   busPIRone (BUSPAR) 15 MG tablet Take 15 mg by mouth 3 times daily 23  Yes Tono Mauro MD   vitamin B-12 (CYANOCOBALAMIN) 1000 MCG tablet Take 1 tablet by mouth daily 22  Yes Dio Holden MD   SUMAtriptan (IMITREX) 6 MG/0.5ML SOLN injection Inject 0.5 mLs into the skin 2 times daily as needed (migraines) 24   Luda Landry, APRN - CNP   Semaglutide-Weight Management (WEGOVY) 0.25 MG/0.5ML

## 2024-12-06 NOTE — OP NOTE
Operative Note      Patient: Lisa K Cogan  YOB: 1975  MRN: [id number]    Date of Procedure: 12/6/2024    Pre-Op Diagnosis Codes:      * Urge urinary incontinence [N39.41]    Post-Op Diagnosis: Same       Procedure(s):  SACRAL NERVE STIMULATOR IMPLANT STAGE ONE [employer] INTERSTIM    Surgeon(s):  Doyle Tapia MD    Assistant:   * No surgical staff found *    Anesthesia: Monitor Anesthesia Care    Estimated Blood Loss (mL): Minimal    Complications: None    Specimens:   * No specimens in log *    Implants:  * No surgical log found *      Drains: * No LDAs found *    Findings:  Infection Present At Time Of Surgery (PATOS) (choose all levels that have infection present):  No infection present  Other Findings: see below     Detailed Description of Procedure:   INDICATIONS FOR THE PROCEDURE:  Lisa K Cogan is a 49 y.o. female with a history of urinary frequency and urgency and Urge urinary incontinence which has persistent despite medical therapy and previous Rx with Cystoscopy and transvesical Botox injections. After treatment options were discussed including risks, benefits, alternatives, goals and possible complications, the patient elected to proceed with today's procedure.  Patient given Ancef 2 gm and Gentamicin 80 mg IV on call to the OR.      NARRATIVE SUMMARY OF THE PROCEDURE:  The patient was brought to the OR. The patient was placed in prone position. A pillow was placed underneath her pelvis area to slightly lift the pelvis up. The patient was awake, was given some MAC anesthesia. The patient's back was prepped and draped in the usual sterile fashion. We used fluoroscopy to measure our sacral landmarks. Lidocaine 1% was applied on the right side near the S3 foramen. Under fluoroscopy, the needle placement was confirmed. We tested the placement of needle and noted the patient did have plantar flexion of the great toe and little response indicating the proper positioning of the needle. A

## 2024-12-06 NOTE — DISCHARGE INSTRUCTIONS
Refrain from excessive straining and heavy lifting for 2 weeks.  The [employer] representative will be reaching out to patient to adjust Interstim stimulation parameters as needed.  Follow up in 2 weeks for Interstim sacral neuromodulation device stage II implant.  Keep dressing over the implant exit site intact and dry until the next procedure in 2 weeks. --No showering or tub bath, sponge bath only       Call for fever, sweats/chills, redness, warmth or abnormal drainage from site, persistent nausea or vomiting, increased pain not controlled with current meds or other comfort measures        Activity  You have had anesthesia today  Do not drive, operate heavy equipment, consume alcoholic beverages, or make any important decisions  for 24 hours   If you are taking pain medication: Do not drive or consume alcohol.  Take your time changing positions today. You may feel light headed or dizzy if you move too quickly.   Continue your home medications as ordered by your physician.  Diet   You can eat your normal diet when you feel well. You should start off with bland foods like chicken soup, toast, or yogurt. Then advance as tolerated.  Drink plenty of fluids (unless your doctor tells you not to). Your urine should be very lightly colored without a strong odor.

## 2024-12-09 LAB
EKG ATRIAL RATE: 74 BPM
EKG P AXIS: 27 DEGREES
EKG P-R INTERVAL: 156 MS
EKG Q-T INTERVAL: 366 MS
EKG QRS DURATION: 74 MS
EKG QTC CALCULATION (BAZETT): 406 MS
EKG R AXIS: -19 DEGREES
EKG T AXIS: 61 DEGREES
EKG VENTRICULAR RATE: 74 BPM

## 2024-12-18 ENCOUNTER — ANESTHESIA EVENT (OUTPATIENT)
Dept: OPERATING ROOM | Age: 49
End: 2024-12-18
Payer: MEDICAID

## 2024-12-20 ENCOUNTER — HOSPITAL ENCOUNTER (OUTPATIENT)
Age: 49
Setting detail: OUTPATIENT SURGERY
Discharge: HOME | End: 2024-12-20
Attending: SPECIALIST | Admitting: SPECIALIST
Payer: MEDICAID

## 2024-12-20 ENCOUNTER — ANESTHESIA (OUTPATIENT)
Dept: OPERATING ROOM | Age: 49
End: 2024-12-20
Payer: MEDICAID

## 2024-12-20 VITALS
TEMPERATURE: 97.3 F | HEIGHT: 64 IN | WEIGHT: 293 LBS | RESPIRATION RATE: 18 BRPM | HEART RATE: 80 BPM | SYSTOLIC BLOOD PRESSURE: 123 MMHG | OXYGEN SATURATION: 95 % | BODY MASS INDEX: 50.02 KG/M2 | DIASTOLIC BLOOD PRESSURE: 82 MMHG

## 2024-12-20 DIAGNOSIS — G89.18 POST-OP PAIN: Primary | ICD-10-CM

## 2024-12-20 PROCEDURE — 6360000002 HC RX W HCPCS: Performed by: NURSE ANESTHETIST, CERTIFIED REGISTERED

## 2024-12-20 PROCEDURE — 6360000002 HC RX W HCPCS

## 2024-12-20 PROCEDURE — 2500000003 HC RX 250 WO HCPCS: Performed by: SPECIALIST

## 2024-12-20 PROCEDURE — 2580000003 HC RX 258: Performed by: STUDENT IN AN ORGANIZED HEALTH CARE EDUCATION/TRAINING PROGRAM

## 2024-12-20 PROCEDURE — 2500000003 HC RX 250 WO HCPCS: Performed by: ANESTHESIOLOGY

## 2024-12-20 PROCEDURE — 3600000012 HC SURGERY LEVEL 2 ADDTL 15MIN: Performed by: SPECIALIST

## 2024-12-20 PROCEDURE — 3600000002 HC SURGERY LEVEL 2 BASE: Performed by: SPECIALIST

## 2024-12-20 PROCEDURE — 6360000002 HC RX W HCPCS: Performed by: SPECIALIST

## 2024-12-20 PROCEDURE — 2709999900 HC NON-CHARGEABLE SUPPLY: Performed by: SPECIALIST

## 2024-12-20 PROCEDURE — 7100000010 HC PHASE II RECOVERY - FIRST 15 MIN: Performed by: SPECIALIST

## 2024-12-20 PROCEDURE — C1787 PATIENT PROGR, NEUROSTIM: HCPCS | Performed by: SPECIALIST

## 2024-12-20 PROCEDURE — 3700000001 HC ADD 15 MINUTES (ANESTHESIA): Performed by: SPECIALIST

## 2024-12-20 PROCEDURE — C1767 GENERATOR, NEURO NON-RECHARG: HCPCS | Performed by: SPECIALIST

## 2024-12-20 PROCEDURE — 64590 INS/RPL PRPH SAC/GSTR NPG/R: CPT | Performed by: SPECIALIST

## 2024-12-20 PROCEDURE — 3700000000 HC ANESTHESIA ATTENDED CARE: Performed by: SPECIALIST

## 2024-12-20 PROCEDURE — 7100000011 HC PHASE II RECOVERY - ADDTL 15 MIN: Performed by: SPECIALIST

## 2024-12-20 PROCEDURE — 6370000000 HC RX 637 (ALT 250 FOR IP)

## 2024-12-20 DEVICE — NEUROSTIMULATOR INTERSTIM X RECHRGE FREE TORQ WRNCH PROD: Type: IMPLANTABLE DEVICE | Status: FUNCTIONAL

## 2024-12-20 RX ORDER — FAMOTIDINE 10 MG/ML
INJECTION, SOLUTION INTRAVENOUS
Status: DISCONTINUED
Start: 2024-12-20 | End: 2024-12-20 | Stop reason: HOSPADM

## 2024-12-20 RX ORDER — ONDANSETRON 2 MG/ML
INJECTION INTRAMUSCULAR; INTRAVENOUS
Status: DISCONTINUED | OUTPATIENT
Start: 2024-12-20 | End: 2024-12-20 | Stop reason: SDUPTHER

## 2024-12-20 RX ORDER — GENTAMICIN SULFATE 80 MG/50ML
80 INJECTION, SOLUTION INTRAVENOUS
Status: COMPLETED | OUTPATIENT
Start: 2024-12-20 | End: 2024-12-20

## 2024-12-20 RX ORDER — LIDOCAINE HYDROCHLORIDE AND EPINEPHRINE 10; 10 MG/ML; UG/ML
INJECTION, SOLUTION INFILTRATION; PERINEURAL PRN
Status: DISCONTINUED | OUTPATIENT
Start: 2024-12-20 | End: 2024-12-20 | Stop reason: ALTCHOICE

## 2024-12-20 RX ORDER — PROPOFOL 10 MG/ML
INJECTION, EMULSION INTRAVENOUS
Status: DISCONTINUED | OUTPATIENT
Start: 2024-12-20 | End: 2024-12-20 | Stop reason: SDUPTHER

## 2024-12-20 RX ORDER — MEPERIDINE HYDROCHLORIDE 50 MG/ML
12.5 INJECTION INTRAMUSCULAR; INTRAVENOUS; SUBCUTANEOUS EVERY 5 MIN PRN
Status: DISCONTINUED | OUTPATIENT
Start: 2024-12-20 | End: 2024-12-20 | Stop reason: HOSPADM

## 2024-12-20 RX ORDER — APREPITANT 40 MG/1
CAPSULE ORAL
Status: COMPLETED
Start: 2024-12-20 | End: 2024-12-20

## 2024-12-20 RX ORDER — APREPITANT 40 MG/1
40 CAPSULE ORAL ONCE
Status: COMPLETED | OUTPATIENT
Start: 2024-12-20 | End: 2024-12-20

## 2024-12-20 RX ORDER — SODIUM CHLORIDE 9 MG/ML
INJECTION, SOLUTION INTRAVENOUS PRN
Status: DISCONTINUED | OUTPATIENT
Start: 2024-12-20 | End: 2024-12-20 | Stop reason: HOSPADM

## 2024-12-20 RX ORDER — NALOXONE HYDROCHLORIDE 0.4 MG/ML
INJECTION, SOLUTION INTRAMUSCULAR; INTRAVENOUS; SUBCUTANEOUS PRN
Status: DISCONTINUED | OUTPATIENT
Start: 2024-12-20 | End: 2024-12-20 | Stop reason: HOSPADM

## 2024-12-20 RX ORDER — OXYCODONE HYDROCHLORIDE 5 MG/1
5 TABLET ORAL EVERY 6 HOURS PRN
Qty: 12 TABLET | Refills: 0 | Status: SHIPPED | OUTPATIENT
Start: 2024-12-20 | End: 2024-12-23

## 2024-12-20 RX ORDER — OXYCODONE AND ACETAMINOPHEN 5; 325 MG/1; MG/1
1 TABLET ORAL
Status: DISCONTINUED | OUTPATIENT
Start: 2024-12-20 | End: 2024-12-20 | Stop reason: HOSPADM

## 2024-12-20 RX ORDER — SCOLOPAMINE TRANSDERMAL SYSTEM 1 MG/1
1 PATCH, EXTENDED RELEASE TRANSDERMAL ONCE
Status: DISCONTINUED | OUTPATIENT
Start: 2024-12-20 | End: 2024-12-20 | Stop reason: HOSPADM

## 2024-12-20 RX ORDER — FENTANYL CITRATE 50 UG/ML
INJECTION, SOLUTION INTRAMUSCULAR; INTRAVENOUS
Status: DISCONTINUED | OUTPATIENT
Start: 2024-12-20 | End: 2024-12-20 | Stop reason: SDUPTHER

## 2024-12-20 RX ORDER — CEPHALEXIN 500 MG/1
500 CAPSULE ORAL 3 TIMES DAILY
Qty: 15 CAPSULE | Refills: 0 | Status: SHIPPED | OUTPATIENT
Start: 2024-12-20

## 2024-12-20 RX ORDER — MIDAZOLAM HYDROCHLORIDE 1 MG/ML
INJECTION, SOLUTION INTRAMUSCULAR; INTRAVENOUS
Status: DISCONTINUED | OUTPATIENT
Start: 2024-12-20 | End: 2024-12-20 | Stop reason: SDUPTHER

## 2024-12-20 RX ORDER — GLYCOPYRROLATE 0.2 MG/ML
0.4 INJECTION INTRAMUSCULAR; INTRAVENOUS ONCE
Status: DISCONTINUED | OUTPATIENT
Start: 2024-12-20 | End: 2024-12-20 | Stop reason: HOSPADM

## 2024-12-20 RX ORDER — TRAMADOL HYDROCHLORIDE 50 MG/1
50 TABLET ORAL EVERY 6 HOURS PRN
Qty: 12 TABLET | Refills: 0 | Status: SHIPPED | OUTPATIENT
Start: 2024-12-20 | End: 2024-12-20 | Stop reason: ALTCHOICE

## 2024-12-20 RX ORDER — MIDAZOLAM HYDROCHLORIDE 2 MG/2ML
2 INJECTION, SOLUTION INTRAMUSCULAR; INTRAVENOUS
Status: DISCONTINUED | OUTPATIENT
Start: 2024-12-20 | End: 2024-12-20 | Stop reason: HOSPADM

## 2024-12-20 RX ORDER — OXYCODONE AND ACETAMINOPHEN 5; 325 MG/1; MG/1
2 TABLET ORAL
Status: DISCONTINUED | OUTPATIENT
Start: 2024-12-20 | End: 2024-12-20 | Stop reason: HOSPADM

## 2024-12-20 RX ORDER — SODIUM CHLORIDE 0.9 % (FLUSH) 0.9 %
5-40 SYRINGE (ML) INJECTION EVERY 12 HOURS SCHEDULED
Status: DISCONTINUED | OUTPATIENT
Start: 2024-12-20 | End: 2024-12-20 | Stop reason: HOSPADM

## 2024-12-20 RX ORDER — SCOLOPAMINE TRANSDERMAL SYSTEM 1 MG/1
PATCH, EXTENDED RELEASE TRANSDERMAL
Status: DISCONTINUED
Start: 2024-12-20 | End: 2024-12-20 | Stop reason: HOSPADM

## 2024-12-20 RX ORDER — DEXAMETHASONE SODIUM PHOSPHATE 10 MG/ML
INJECTION, SOLUTION INTRAMUSCULAR; INTRAVENOUS
Status: DISCONTINUED | OUTPATIENT
Start: 2024-12-20 | End: 2024-12-20 | Stop reason: SDUPTHER

## 2024-12-20 RX ORDER — LIDOCAINE HYDROCHLORIDE AND EPINEPHRINE 10; 10 MG/ML; UG/ML
INJECTION, SOLUTION INFILTRATION; PERINEURAL
Status: DISCONTINUED
Start: 2024-12-20 | End: 2024-12-20 | Stop reason: HOSPADM

## 2024-12-20 RX ORDER — METOCLOPRAMIDE HYDROCHLORIDE 5 MG/ML
10 INJECTION INTRAMUSCULAR; INTRAVENOUS
Status: DISCONTINUED | OUTPATIENT
Start: 2024-12-20 | End: 2024-12-20 | Stop reason: HOSPADM

## 2024-12-20 RX ORDER — LIDOCAINE HYDROCHLORIDE 10 MG/ML
INJECTION, SOLUTION EPIDURAL; INFILTRATION; INTRACAUDAL; PERINEURAL
Status: DISCONTINUED | OUTPATIENT
Start: 2024-12-20 | End: 2024-12-20 | Stop reason: SDUPTHER

## 2024-12-20 RX ORDER — HYDRALAZINE HYDROCHLORIDE 20 MG/ML
10 INJECTION INTRAMUSCULAR; INTRAVENOUS
Status: DISCONTINUED | OUTPATIENT
Start: 2024-12-20 | End: 2024-12-20 | Stop reason: HOSPADM

## 2024-12-20 RX ORDER — DIPHENHYDRAMINE HYDROCHLORIDE 50 MG/ML
12.5 INJECTION INTRAMUSCULAR; INTRAVENOUS
Status: DISCONTINUED | OUTPATIENT
Start: 2024-12-20 | End: 2024-12-20 | Stop reason: HOSPADM

## 2024-12-20 RX ORDER — SODIUM CHLORIDE 0.9 % (FLUSH) 0.9 %
5-40 SYRINGE (ML) INJECTION PRN
Status: DISCONTINUED | OUTPATIENT
Start: 2024-12-20 | End: 2024-12-20 | Stop reason: HOSPADM

## 2024-12-20 RX ORDER — IPRATROPIUM BROMIDE AND ALBUTEROL SULFATE 2.5; .5 MG/3ML; MG/3ML
1 SOLUTION RESPIRATORY (INHALATION)
Status: DISCONTINUED | OUTPATIENT
Start: 2024-12-20 | End: 2024-12-20 | Stop reason: HOSPADM

## 2024-12-20 RX ORDER — ONDANSETRON 2 MG/ML
4 INJECTION INTRAMUSCULAR; INTRAVENOUS
Status: DISCONTINUED | OUTPATIENT
Start: 2024-12-20 | End: 2024-12-20 | Stop reason: HOSPADM

## 2024-12-20 RX ORDER — SODIUM CHLORIDE, SODIUM LACTATE, POTASSIUM CHLORIDE, CALCIUM CHLORIDE 600; 310; 30; 20 MG/100ML; MG/100ML; MG/100ML; MG/100ML
INJECTION, SOLUTION INTRAVENOUS CONTINUOUS
Status: DISCONTINUED | OUTPATIENT
Start: 2024-12-20 | End: 2024-12-20 | Stop reason: HOSPADM

## 2024-12-20 RX ORDER — LIDOCAINE HYDROCHLORIDE 10 MG/ML
1 INJECTION, SOLUTION EPIDURAL; INFILTRATION; INTRACAUDAL; PERINEURAL
Status: DISCONTINUED | OUTPATIENT
Start: 2024-12-20 | End: 2024-12-20 | Stop reason: HOSPADM

## 2024-12-20 RX ORDER — GENTAMICIN 40 MG/ML
INJECTION, SOLUTION INTRAMUSCULAR; INTRAVENOUS
Status: DISCONTINUED
Start: 2024-12-20 | End: 2024-12-20 | Stop reason: HOSPADM

## 2024-12-20 RX ORDER — LABETALOL HYDROCHLORIDE 5 MG/ML
10 INJECTION, SOLUTION INTRAVENOUS
Status: DISCONTINUED | OUTPATIENT
Start: 2024-12-20 | End: 2024-12-20 | Stop reason: HOSPADM

## 2024-12-20 RX ADMIN — FENTANYL CITRATE 25 MCG: 50 INJECTION, SOLUTION INTRAMUSCULAR; INTRAVENOUS at 12:05

## 2024-12-20 RX ADMIN — GENTAMICIN SULFATE 80 MG: 80 INJECTION, SOLUTION INTRAVENOUS at 11:57

## 2024-12-20 RX ADMIN — APREPITANT 40 MG: 40 CAPSULE ORAL at 11:45

## 2024-12-20 RX ADMIN — PROPOFOL 20 MG: 10 INJECTION, EMULSION INTRAVENOUS at 12:05

## 2024-12-20 RX ADMIN — LIDOCAINE HYDROCHLORIDE 50 MG: 10 INJECTION, SOLUTION EPIDURAL; INFILTRATION; INTRACAUDAL; PERINEURAL at 11:54

## 2024-12-20 RX ADMIN — PROPOFOL 25 MCG/KG/MIN: 10 INJECTION, EMULSION INTRAVENOUS at 11:55

## 2024-12-20 RX ADMIN — FENTANYL CITRATE 50 MCG: 50 INJECTION, SOLUTION INTRAMUSCULAR; INTRAVENOUS at 11:54

## 2024-12-20 RX ADMIN — SODIUM CHLORIDE, PRESERVATIVE FREE 20 MG: 5 INJECTION INTRAVENOUS at 11:45

## 2024-12-20 RX ADMIN — SODIUM CHLORIDE: 9 INJECTION, SOLUTION INTRAVENOUS at 10:57

## 2024-12-20 RX ADMIN — DEXAMETHASONE SODIUM PHOSPHATE 10 MG: 10 INJECTION INTRAMUSCULAR; INTRAVENOUS at 12:06

## 2024-12-20 RX ADMIN — PROPOFOL 30 MG: 10 INJECTION, EMULSION INTRAVENOUS at 11:54

## 2024-12-20 RX ADMIN — MIDAZOLAM 2 MG: 1 INJECTION INTRAMUSCULAR; INTRAVENOUS at 11:49

## 2024-12-20 RX ADMIN — FENTANYL CITRATE 25 MCG: 50 INJECTION, SOLUTION INTRAMUSCULAR; INTRAVENOUS at 11:59

## 2024-12-20 RX ADMIN — Medication 3000 MG: at 11:52

## 2024-12-20 RX ADMIN — ONDANSETRON 4 MG: 2 INJECTION INTRAMUSCULAR; INTRAVENOUS at 12:07

## 2024-12-20 NOTE — ANESTHESIA POSTPROCEDURE EVALUATION
Department of Anesthesiology  Postprocedure Note    Patient: Lisa K Cogan  MRN: [id number]  YOB: 1975  Date of evaluation: 12/20/2024    Procedure Summary       Date: 12/20/24 Room / Location: [address]    Anesthesia Start: 1149 Anesthesia Stop: 1228    Procedure: SACRAL NERVE STIMULATOR IMPLANT STAGE TWO -  [employer] Interstim Diagnosis:       Urge urinary incontinence      (Urge urinary incontinence [N39.41])    Surgeons: Doyle Tapia MD Responsible Provider: Tomasz Smith MD    Anesthesia Type: MAC ASA Status: 3            Anesthesia Type: No value filed.    Miguel Angel Phase I: Miguel Angel Score: 10    Miguel Angel Phase II: Miguel Angel Score: 9    Anesthesia Post Evaluation    Patient location during evaluation: PACU  Patient participation: complete - patient participated  Level of consciousness: awake and alert  Airway patency: patent  Nausea & Vomiting: no nausea and no vomiting  Cardiovascular status: hemodynamically stable  Respiratory status: room air and spontaneous ventilation  Hydration status: euvolemic  Multimodal analgesia pain management approach  Pain management: adequate    No notable events documented.

## 2024-12-20 NOTE — FLOWSHEET NOTE
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 Denies any abdominal pain, food fear, postprandial pain, loss of appetite or weight loss. He has completed 3 month course of xarelto and is taking ASA.      CTA a/p 12/2024 reviewed - improvement in complex SMA dissection flap now with single dissection flap and decreased false false lumen channel. Inflammatory change and aneurysmal degeneration slightly improved from 1.5 to 1.3cm     -We discussed the pathophysiology of mesenteric artery dissection, available treatment options and indications for treatment.  -Recommend continued conservative management given resolution of abdominal symptoms and improving SMA dissection flap, stable/improved aneurysmal degeneration.  -Continue medical optimization. Currently on ASA and statin.   -Recommend continued surveillance with mesenteric duplex and follow-up in 6 months to re-evaluate symptoms.        Orders:    VAS CELIAC AND/OR MESENTERIC DUPLEX; Future  
Patient/family seen: YES  Informed patient/family of BPCI-A Bundle Program. Also advised of potential outreach by Care Transitions Team.  Bundle Payment Care Improvement Beneficiary Letter Delivered to Beneficiary or Representative:YES.  Date BCPI -A was given:03/30/2022    Lashay Spencer, MPH  Care Manager [facility]  Available via [employer] or  
Instructions for subsequent treatments:       Time In: 6:02pm           Time Out: 7:05pm    Electronically signed by:  Mirtha Rothman PTA

## 2024-12-20 NOTE — H&P
[facility]  Doyle Tapia MD FACS    History and Physical    Patient:  Lisa K Cogan  MRN: [id number]  YOB: 1975    CHIEF COMPLAINT:  Urge urinary incontinence     HISTORY OF PRESENT ILLNESS:   The patient is a 49 y.o. female who presents with a history of persistent Urge urinary incontinence despite medical Rx and Botox who underwent stage I Interstim sacral neuromodulation device.  Patient saw a 65% improvement in her urgency and a 75% improvement in her Urge urinary incontinence and is here today for a stage II Interstim sacral neuromodulation device implant under MAC.    Patient's old records, notes and chart reviewed and summarized above.    Past Medical History:    Past Medical History:   Diagnosis Date    Anxiety     Carpal tunnel syndrome     Chronic back pain     COVID-19 11/06/2021    Depression     Endometriosis     Fatty liver     Gestational diabetes     x 3 children    Headache     migraines    History of blood transfusion     Hypertension     CAITLIN (iron deficiency anemia)     iron infusions    Iron deficiency anemia     Kidney stones     Dr. Tapia seen 11/28/2022    May-Thurner syndrome     affects left side of body    Obesity     Peripheral vascular disease (HCC)     Rotator cuff injury     Scleroderma (HCC)     Sleep apnea     Snores     no cpap- never been teste for RAIN    Under care of team     PCP - RENETTA ALMARAZ CNP - LAST VISIT 8/31/2022    Under care of team     VASCULAR  - DR. RIBEIRO - 8/2021 - SEES FOR MAY-THURNER SYNDROME    Under care of team     [facility] - 8/19/2022    URI (upper respiratory infection) 10/23/2022    starting 10/23/2022 cough, fever, fatigue, sorethroat, congestion, seen by urgent care 10/25/22, ER on 10/29/22 for SOB, dizziness and N&V    Urinary incontinence     Wears glasses     Wellness examination     follows with Behavioral Health, last seen 11/22/2022       Past Surgical  Fibrillation Sister     Depression Sister     Heart Disease Sister     High Cholesterol Sister     Mental Illness Sister         Depression/anxiety    Obesity Sister     Diabetes Sister     High Blood Pressure Sister     Arthritis Sister     Depression Sister     High Cholesterol Sister     Mental Illness Sister         Depression/anxiety    Obesity Sister     Diabetes Brother     High Blood Pressure Brother     Arthritis Brother     Arrhythmia Brother     Depression Brother     Heart Disease Brother     High Cholesterol Brother     Obesity Brother     Breast Cancer Paternal Grandmother         Paternal grandma    Heart Attack Paternal Grandmother     Heart Disease Paternal Grandmother     Stroke Maternal Grandmother     Miscarriages / Stillbirths Paternal Aunt        REVIEW OF SYSTEMS:  All reviewed and negative except for pertinent ones listed in HPI.    Physical Exam:      This a 49 y.o. female   No data found.  Constitutional: Patient in no acute distress.  Neuro: Alert and oriented to person, place and time.  Psych: mood and affect normal  HEENT negative  Lungs: Respiratory effort is normal; CTA  Cardiovascular: Normal peripheral pulses; R3 wo murmur  Abdomen: Soft, non-tender, non-distended with no CVA, flank pain    LABS:   No results for input(s): \"WBC\", \"HGB\", \"HCT\", \"MCV\", \"PLT\" in the last 72 hours.  No results for input(s): \"NA\", \"K\", \"CL\", \"CO2\", \"PHOS\", \"BUN\", \"CREATININE\" in the last 72 hours.    Invalid input(s): \"CA\"    Additional Lab/culture results:    Urinalysis: No results for input(s): \"COLORU\", \"PHUR\", \"LABCAST\", \"WBCUA\", \"RBCUA\", \"MUCUS\", \"TRICHOMONAS\", \"YEAST\", \"BACTERIA\", \"CLARITYU\", \"SPECGRAV\", \"LEUKOCYTESUR\", \"UROBILINOGEN\", \"BILIRUBINUR\", \"BLOODU\" in the last 72 hours.    Invalid input(s): \"NITRATE\", \"GLUCOSEUKETONESUAMORPHOUS\"     -----------------------------------------------------------------  Imaging Results:      Assessment and Plan   Impression:    Patient Active Problem List

## 2024-12-20 NOTE — DISCHARGE INSTRUCTIONS
Activity  You have had anesthesia today  Do not drive, operate heavy equipment, consume alcoholic beverages, or make any important decisions  for 24 hours   If you are taking pain medication: Do not drive or consume alcohol.  Take your time changing positions today. You may feel light headed or dizzy if you move too quickly.   Continue your home medications as ordered by your physician.  Diet   You can eat your normal diet when you feel well. You should start off with bland foods like chicken soup, toast, or yogurt. Then advance as tolerated.  Drink plenty of fluids (unless your doctor tells you not to). Your urine should be very lightly colored without a strong odor.       Refrain from excessive straining and heavy lifting for 2 weeks.  The [employer] representative will be reaching out to patient to adjust Interstim stimulation parameters as needed.  Follow up with Callum Tapia M.D. in 3-4 weeks for a postoperative check.    His office will reach out and contact patient.

## 2024-12-20 NOTE — OP NOTE
Operative Note      Patient: Lisa K Cogan  YOB: 1975  MRN: [id number]    Date of Procedure: 12/20/2024    Pre-Op Diagnosis Codes:      * Urge urinary incontinence [N39.41]    Post-Op Diagnosis: Same       Procedure(s):  SACRAL NERVE STIMULATOR IMPLANT STAGE TWO -  [employer] Interstim    Surgeon(s):  Doyle Tapia MD    Assistant:   * No surgical staff found *    Anesthesia: Monitor Anesthesia Care    Estimated Blood Loss (mL): Minimal    Complications: None    Specimens:   * No specimens in log *    Implants:  * No implants in log *      Drains: * No LDAs found *    Findings:  Infection Present At Time Of Surgery (PATOS) (choose all levels that have infection present):  No infection present  Other Findings: see below     Detailed Description of Procedure:   Indication:  Lisa K Cogan is a 49 y.o. female with a history of overactive bladder symptoms. Patient presents s/p stage 1 Interstim sacral neuromodulation device implant approximately two weeks ago.  The patient noted a  65% improvement in her urgency and a 75% improvement in her Urge urinary incontinence from baseline.  Thus, patient here to proceed with a stage 2 or permanent implant because of the favorable clinical response.  After treatment options were discussed including risks, benefits, alternatives, goals and possible complications, the patient elected to proceed with today's procedure.  Patient received Ancef 3 gm and Gentamicin 80 mg IV on call to OR.    Narrative of the Procedure:    After informed consent was obtained, the patient was brought to the operating room and placed on the operating table in the prone position. Anesthesia was induced and antibiotics were given. The patient was prepped and draped in a sterile manner. To prevent contamination, the wire extension was left hanging off the patient on the side contralateral to the pocket. The wire overlying the surgical site was thoroughly cleaned. A timeout occurred in which

## 2024-12-20 NOTE — ANESTHESIA PRE PROCEDURE
Department of Anesthesiology  Preprocedure Note       Name:  Lisa K Cogan   Age:  49 y.o.  :  1975                                          MRN:  [id number]         Date:  2024      Surgeon: Surgeon(s):  Doyle Tapia MD    Procedure: Procedure(s):  SACRAL NERVE STIMULATOR IMPLANT STAGE TWO -  [employer] Interstim    Medications prior to admission:   Prior to Admission medications    Medication Sig Start Date End Date Taking? Authorizing Provider   Multiple Vitamin (MULTIVITAMIN) TABS Take 1 tablet by mouth daily 24  Yes Luda Landry APRN - CNP   traZODone (DESYREL) 100 MG tablet TAKE 1/2 to 1 TABLET BY MOUTH AT BEDTIME 24  Yes ProviderTono MD   Ascorbic Acid (VITAMIN C) 250 MG tablet Take 1 tablet by mouth three times a week 24  Yes Dio Holden MD   losartan (COZAAR) 100 MG tablet Take 1 tablet by mouth daily 24  Yes Luda Landry APRN - CNP   ferrous sulfate (IRON 325) 325 (65 Fe) MG tablet Take 1 tablet by mouth three times a week 4/3/24  Yes Doi Holden MD   AUVELITY  MG TBCR Take 1 tablet by mouth in the morning and at bedtime 10/4/23  Yes ProviderTono MD   busPIRone (BUSPAR) 15 MG tablet Take 15 mg by mouth 3 times daily 23  Yes ProviderTono MD   vitamin B-12 (CYANOCOBALAMIN) 1000 MCG tablet Take 1 tablet by mouth daily 22  Yes Dio Holden MD   SUMAtriptan (IMITREX) 6 MG/0.5ML SOLN injection Inject 0.5 mLs into the skin 2 times daily as needed (migraines) 24   Luda Landry APRN - CNP   meloxicam (MOBIC) 15 MG tablet Take 1 tablet by mouth daily  Patient not taking: Reported on 2024   Luda Landry APRN - CNP   Semaglutide-Weight Management (WEGOVY) 0.25 MG/0.5ML SOAJ SC injection Inject 0.25 mg every 7 days for 4 weeks then increase to 0.5 mg every 7 days  Patient not taking: Reported on 2024   Luda Landry, APRN - CNP   NEEDLE, DISP, 20 G 20G X 1\"

## 2024-12-26 ENCOUNTER — TELEPHONE (OUTPATIENT)
Dept: ONCOLOGY | Age: 49
End: 2024-12-26

## 2024-12-27 ENCOUNTER — TELEPHONE (OUTPATIENT)
Dept: ONCOLOGY | Age: 49
End: 2024-12-27

## 2024-12-31 ENCOUNTER — PROCEDURE VISIT (OUTPATIENT)
Dept: NEUROLOGY | Age: 49
End: 2024-12-31
Payer: MEDICAID

## 2024-12-31 VITALS
BODY MASS INDEX: 50.02 KG/M2 | WEIGHT: 293 LBS | SYSTOLIC BLOOD PRESSURE: 137 MMHG | HEART RATE: 94 BPM | HEIGHT: 64 IN | DIASTOLIC BLOOD PRESSURE: 87 MMHG

## 2024-12-31 DIAGNOSIS — M54.81 BILATERAL OCCIPITAL NEURALGIA: Primary | ICD-10-CM

## 2024-12-31 PROCEDURE — 64405 NJX AA&/STRD GR OCPL NRV: CPT | Performed by: PSYCHIATRY & NEUROLOGY

## 2024-12-31 RX ORDER — METHYLPREDNISOLONE SODIUM SUCCINATE 40 MG/ML
40 INJECTION INTRAMUSCULAR; INTRAVENOUS ONCE
Status: COMPLETED | OUTPATIENT
Start: 2024-12-31 | End: 2024-12-31

## 2024-12-31 RX ORDER — NEEDLES, DISPOSABLE 25GX5/8"
NEEDLE, DISPOSABLE MISCELLANEOUS
COMMUNITY
Start: 2024-12-02

## 2024-12-31 RX ORDER — LIDOCAINE HYDROCHLORIDE 20 MG/ML
7 INJECTION, SOLUTION INFILTRATION; PERINEURAL ONCE
Status: COMPLETED | OUTPATIENT
Start: 2024-12-31 | End: 2024-12-31

## 2024-12-31 RX ORDER — LIDOCAINE HYDROCHLORIDE 20 MG/ML
7 INJECTION, SOLUTION EPIDURAL; INFILTRATION; INTRACAUDAL; PERINEURAL ONCE
Status: SHIPPED | OUTPATIENT
Start: 2024-12-31

## 2024-12-31 RX ORDER — SYRINGE WITH NEEDLE, 1 ML 25GX5/8"
SYRINGE, EMPTY DISPOSABLE MISCELLANEOUS
COMMUNITY
Start: 2024-12-02

## 2024-12-31 RX ADMIN — METHYLPREDNISOLONE SODIUM SUCCINATE 40 MG: 40 INJECTION INTRAMUSCULAR; INTRAVENOUS at 07:51

## 2024-12-31 RX ADMIN — LIDOCAINE HYDROCHLORIDE 7 ML: 20 INJECTION, SOLUTION INFILTRATION; PERINEURAL at 08:32

## 2024-12-31 NOTE — PROGRESS NOTES
Procedure Note    Procedure: b/l Greater and Lesser Occipital Nerve Block (CPT code 38617, 17643)    Indications:  Severe b/l occipital neuralgia (ICD 10 M54.81)    Informed consent was obtained (explaining the procedure and risks and benefits) from patient. The signed consent form was placed in the medical record.    A time out was completed, verifying correct patient, procedure,site, positioning, and implants or special equipment.    Patient's b/l occipital area was palpated to identify location of the greater and the lesser occipital nerve. Using a 10 ml syringe, 7 ml of lidocaine (from a 20 ml bottle) and 1 ml (40 mg) of methylprednisolone was aspirated. Alcohol was applied topically to the skin. Using a 27 gauge needle (aspirating during insertion),  2 ml was injected on the greater and lesser occipital nerve on the left side. Pressure with a gauze pad was held briefly upon the site of puncture to minimize bleeding and to further spread anaesthetic subcutaneously. The procedure was repeated on the right side. There were no complications.  Patient was comfortable and left without complaint.    Empty vial of methylprednisolone was discarded.    Electronically signed by Cuate Logan MD on 12/31/2024 at 7:50 AM

## 2025-01-03 ENCOUNTER — APPOINTMENT (OUTPATIENT)
Dept: GENERAL RADIOLOGY | Age: 50
End: 2025-01-03

## 2025-01-03 ENCOUNTER — HOSPITAL ENCOUNTER (EMERGENCY)
Age: 50
Discharge: HOME | End: 2025-01-03
Attending: STUDENT IN AN ORGANIZED HEALTH CARE EDUCATION/TRAINING PROGRAM

## 2025-01-03 VITALS
HEIGHT: 64 IN | DIASTOLIC BLOOD PRESSURE: 96 MMHG | TEMPERATURE: 98.9 F | SYSTOLIC BLOOD PRESSURE: 152 MMHG | RESPIRATION RATE: 20 BRPM | OXYGEN SATURATION: 97 % | HEART RATE: 95 BPM | WEIGHT: 293 LBS | BODY MASS INDEX: 50.02 KG/M2

## 2025-01-03 DIAGNOSIS — J06.9 ACUTE UPPER RESPIRATORY INFECTION: Primary | ICD-10-CM

## 2025-01-03 LAB
SPECIMEN SOURCE: NORMAL
STREP A, MOLECULAR: NEGATIVE

## 2025-01-03 PROCEDURE — 6360000002 HC RX W HCPCS: Performed by: NURSE PRACTITIONER

## 2025-01-03 PROCEDURE — 71046 X-RAY EXAM CHEST 2 VIEWS: CPT

## 2025-01-03 PROCEDURE — 94640 AIRWAY INHALATION TREATMENT: CPT

## 2025-01-03 PROCEDURE — 6370000000 HC RX 637 (ALT 250 FOR IP): Performed by: NURSE PRACTITIONER

## 2025-01-03 PROCEDURE — 87651 STREP A DNA AMP PROBE: CPT

## 2025-01-03 PROCEDURE — 99284 EMERGENCY DEPT VISIT MOD MDM: CPT

## 2025-01-03 RX ORDER — BENZONATATE 100 MG/1
200 CAPSULE ORAL ONCE
Status: COMPLETED | OUTPATIENT
Start: 2025-01-03 | End: 2025-01-03

## 2025-01-03 RX ORDER — PREDNISONE 20 MG/1
40 TABLET ORAL ONCE
Status: COMPLETED | OUTPATIENT
Start: 2025-01-03 | End: 2025-01-03

## 2025-01-03 RX ORDER — ALBUTEROL SULFATE 0.83 MG/ML
2.5 SOLUTION RESPIRATORY (INHALATION) ONCE
Status: COMPLETED | OUTPATIENT
Start: 2025-01-03 | End: 2025-01-03

## 2025-01-03 RX ORDER — ALBUTEROL SULFATE 90 UG/1
2 INHALANT RESPIRATORY (INHALATION) 4 TIMES DAILY PRN
Qty: 18 G | Refills: 0 | Status: SHIPPED | OUTPATIENT
Start: 2025-01-03

## 2025-01-03 RX ORDER — BENZONATATE 200 MG/1
200 CAPSULE ORAL 3 TIMES DAILY PRN
Qty: 30 CAPSULE | Refills: 0 | Status: SHIPPED | OUTPATIENT
Start: 2025-01-03 | End: 2025-01-10

## 2025-01-03 RX ORDER — PREDNISONE 20 MG/1
40 TABLET ORAL DAILY
Qty: 20 TABLET | Refills: 0 | Status: SHIPPED | OUTPATIENT
Start: 2025-01-03 | End: 2025-01-13

## 2025-01-03 RX ADMIN — ALBUTEROL SULFATE 2.5 MG: 2.5 SOLUTION RESPIRATORY (INHALATION) at 21:21

## 2025-01-03 RX ADMIN — PREDNISONE 40 MG: 20 TABLET ORAL at 21:58

## 2025-01-03 RX ADMIN — BENZONATATE 200 MG: 100 CAPSULE ORAL at 21:58

## 2025-01-03 ASSESSMENT — PAIN SCALES - GENERAL: PAINLEVEL_OUTOF10: 8

## 2025-01-03 ASSESSMENT — ENCOUNTER SYMPTOMS
SORE THROAT: 1
COUGH: 1
ABDOMINAL PAIN: 0
VOMITING: 0
DIARRHEA: 0
RHINORRHEA: 1
NAUSEA: 0
SHORTNESS OF BREATH: 0
SINUS PAIN: 0
WHEEZING: 1

## 2025-01-03 ASSESSMENT — PAIN - FUNCTIONAL ASSESSMENT: PAIN_FUNCTIONAL_ASSESSMENT: 0-10

## 2025-01-04 NOTE — ED PROVIDER NOTES
[facility] Emergency Department  [address]  [facility] [address]  Phone: [phone]  Fax: [phone]        Pt Name: Lisa K Cogan  MRN: [id number]  Birthdate 1975  Date of evaluation: 1/3/25    CHIEFCOMPLAINT       Chief Complaint   Patient presents with    Cough    Pharyngitis     Patient c/o productive cough with yellow phlegm,body aches, fatigue, sore throat, nasal congestion that started on Tuesday. Patient stated she did two at home Covid tests that were negative. Patients daughter recently dx with Covid but she states \" she really hasn't seen her daughter\".     Nasal Congestion    Pleurisy    Generalized Body Aches       HISTORY OF PRESENT ILLNESS (Location/Symptom, Timing/Onset, Context/Setting, Quality, Duration, Modifying Factors, Severity)      Lisa K Cogan is a 49 y.o. female with no pertinent PMH who presents to the ED via private auto with concern for cough.  Patient reportedly had onset of symptoms beginning 3 days ago.  Patient has cough productive of thick yellow mucus, wheezes, congestion, sore throat, fatigue.  Patient reportedly was exposed to COVID-19 and RSV.  Patient did several at home COVID-19 testing that was negative.  Patient has been treating with over-the-counter medications without relief.  Patient denies any significant respiratory history such as asthma or COPD.  Patient is a non-smoker    PAST MEDICAL / SURGICAL / SOCIAL / FAMILY HISTORY     PMH:  has a past medical history of Anxiety, Carpal tunnel syndrome, Chronic back pain, COVID-19, Depression, Endometriosis, Fatty liver, Gestational diabetes, Headache, History of blood transfusion, Hypertension, CAITLIN (iron deficiency anemia), Iron deficiency anemia, Kidney stones, May-Thurner syndrome, Obesity, Peripheral vascular disease (HCC), Rotator cuff injury, Scleroderma (HCC), Sleep apnea, Snores, Under care of team, Under care of team, Under care of team, URI (upper respiratory infection), Urinary  disposition diagnosis with the patient and or their family/guardian. I have answered their questions and given discharge instructions. They voiced understanding of these instructions and did not have any further questions or complaints.     This patient was seen by the attending physician and they agreed with the assessment and plan.    CONSULTS:  None    PROCEDURES:  None    FINAL IMPRESSION      1. Acute upper respiratory infection          DISPOSITION / PLAN     CONDITION ON DISPOSITION:   Good / Stable for discharge.     PATIENT REFERRED TO:  Luda Landry, JUSTICE - CNP  [address]  [address]  [facility] [address] [phone]    Schedule an appointment as soon as possible for a visit       [facility] Emergency Department  [address]  [place] [id number] [phone]    As needed      DISCHARGE MEDICATIONS:  New Prescriptions    ALBUTEROL SULFATE HFA (VENTOLIN HFA) 108 (90 BASE) MCG/ACT INHALER    Inhale 2 puffs into the lungs 4 times daily as needed for Wheezing    BENZONATATE (TESSALON) 200 MG CAPSULE    Take 1 capsule by mouth 3 times daily as needed for Cough    PREDNISONE (DELTASONE) 20 MG TABLET    Take 2 tablets by mouth daily for 10 days       Terence Salguero  Emergency Medicine NP    (Please note that portions of this note were completed with a voice recognition program.  Efforts were made to edit the dictations but occasionally words aremis-transcribed.)       Vamshi Salguero, JUSTICE - CNP  01/03/25 [id number]

## 2025-01-04 NOTE — ED PROVIDER NOTES
[facility] EMERGENCY DEPARTMENT  eMERGENCY dEPARTMENT eNCOUnter   Independent Attestation     Pt Name: Lisa K Cogan  MRN: [id number]  Birthdate 1975  Date of evaluation: 1/3/25    Note Started: 11:32 PM EST    I was personally available for consultation in the Emergency Department. I have reviewed the chart and agree with the documentation as recorded by the MLP, including the assessment, treatment plan and disposition.     Lisa K Cogan is a 49 y.o. female who presents with Cough, Pharyngitis (Patient c/o productive cough with yellow phlegm,body aches, fatigue, sore throat, nasal congestion that started on Tuesday. Patient stated she did two at home Covid tests that were negative. Patients daughter recently dx with Covid but she states \" she really hasn't seen her daughter\". ), Nasal Congestion, Pleurisy, and Generalized Body Aches      Vitals:   Vitals:    01/03/25 2034   BP: (!) 152/96   Pulse: 95   Resp: 20   Temp: 98.9 °F (37.2 °C)   TempSrc: Oral   SpO2: 97%   Weight: (!) 137.9 kg (304 lb)   Height: 1.626 m (5' 4\")       Impression:   1. Acute upper respiratory infection          Nataly Negrete DO  Attending Emergency  Physician       Nataly Negrete DO  01/03/25 8704

## 2025-01-04 NOTE — DISCHARGE INSTRUCTIONS
Take steroids, inhaler, cough medication as prescribed, follow-up with your primary care physician, return to emergency room for any new or worsening symptoms

## 2025-01-13 ENCOUNTER — TELEPHONE (OUTPATIENT)
Dept: ONCOLOGY | Age: 50
End: 2025-01-13

## 2025-01-13 NOTE — TELEPHONE ENCOUNTER
Called and left pt a voicemail in an attempt to reschedule their appt from 4/11/2025   History of Present Illness: The patient is a 48 y o  female who presents with complaints of right lower back pain secondary to sacroiliitis and is here today for right-sided sacroiliac joint injection  Patient Active Problem List   Diagnosis    Primary osteoarthritis of both hands    SI (sacroiliac) pain    Colon cancer screening    Encounter for screening mammogram for breast cancer       No past medical history on file  Past Surgical History:   Procedure Laterality Date    REDUCTION MAMMAPLASTY           Current Outpatient Medications:     Cholecalciferol (VITAMIN D) 2000 units CAPS, Take by mouth daily  , Disp: , Rfl:     DULoxetine (CYMBALTA) 60 mg delayed release capsule, Take 60 mg by mouth daily, Disp: , Rfl:     multivitamin (THERAGRAN) TABS, Take 1 tablet by mouth daily, Disp: , Rfl:     Omega-3 Fatty Acids (FISH OIL PO), Take by mouth daily  , Disp: , Rfl:     Current Facility-Administered Medications:     bupivacaine (PF) (MARCAINE) 0 25 % injection 10 mL, 10 mL, Intra-articular, Once, Joshua Xavier MD    iohexol (OMNIPAQUE) 300 mg/mL injection 50 mL, 50 mL, Intra-articular, Once, Joshua Xavier MD    lidocaine (PF) (XYLOCAINE-MPF) 2 % injection 5 mL, 5 mL, Infiltration, Once, Joshua Xavier MD    methylPREDNISolone acetate (DEPO-MEDROL) injection 80 mg, 80 mg, Intra-articular, Once, Joshua Xavier MD    sodium chloride (PF) 0 9 % injection 10 mL, 10 mL, Infiltration, Once, Joshua Xavier MD    No Known Allergies    Physical Exam:   Vitals:    05/27/21 1439   BP: 119/79   Pulse: 64   Resp: 20   Temp: 98 4 °F (36 9 °C)   SpO2: 96%     General: Awake, Alert, Oriented x 3, Mood and affect appropriate  Respiratory: Respirations even and unlabored  Cardiovascular: Peripheral pulses intact; no edema  Musculoskeletal Exam:  Right lower back tenderness    ASA Score: 1    Patient/Chart Verification  Patient ID Verified: Verbal  Consents Confirmed:  To be obtained in the Pre-Procedure area  H&P( within 30 days) Verified: To be obtained in the Pre-Procedure area  Allergies Reviewed: Yes  Anticoag/NSAID held?: NA  Currently on antibiotics?: No    Assessment:   1   SI (sacroiliac) pain        Plan: Right SIJ Injection

## 2025-01-20 SDOH — ECONOMIC STABILITY: INCOME INSECURITY: IN THE LAST 12 MONTHS, WAS THERE A TIME WHEN YOU WERE NOT ABLE TO PAY THE MORTGAGE OR RENT ON TIME?: NO

## 2025-01-20 SDOH — ECONOMIC STABILITY: TRANSPORTATION INSECURITY
IN THE PAST 12 MONTHS, HAS THE LACK OF TRANSPORTATION KEPT YOU FROM MEDICAL APPOINTMENTS OR FROM GETTING MEDICATIONS?: YES

## 2025-01-20 SDOH — ECONOMIC STABILITY: FOOD INSECURITY: WITHIN THE PAST 12 MONTHS, YOU WORRIED THAT YOUR FOOD WOULD RUN OUT BEFORE YOU GOT MONEY TO BUY MORE.: SOMETIMES TRUE

## 2025-01-20 SDOH — ECONOMIC STABILITY: FOOD INSECURITY: WITHIN THE PAST 12 MONTHS, THE FOOD YOU BOUGHT JUST DIDN'T LAST AND YOU DIDN'T HAVE MONEY TO GET MORE.: SOMETIMES TRUE

## 2025-01-21 ENCOUNTER — TELEMEDICINE (OUTPATIENT)
Dept: PRIMARY CARE CLINIC | Age: 50
End: 2025-01-21

## 2025-01-21 DIAGNOSIS — J40 BRONCHITIS: Primary | ICD-10-CM

## 2025-01-21 RX ORDER — METHYLPREDNISOLONE 4 MG/1
TABLET ORAL
Qty: 1 KIT | Refills: 0 | Status: SHIPPED | OUTPATIENT
Start: 2025-01-21 | End: 2025-01-27

## 2025-01-21 RX ORDER — AZITHROMYCIN 250 MG/1
TABLET, FILM COATED ORAL
Qty: 6 TABLET | Refills: 0 | Status: SHIPPED | OUTPATIENT
Start: 2025-01-21 | End: 2025-01-31

## 2025-01-21 RX ORDER — LOSARTAN POTASSIUM 100 MG/1
100 TABLET ORAL DAILY
Qty: 90 TABLET | Refills: 1 | Status: SHIPPED | OUTPATIENT
Start: 2025-01-21

## 2025-01-21 ASSESSMENT — ENCOUNTER SYMPTOMS
ABDOMINAL PAIN: 0
BLOOD IN STOOL: 0
NAUSEA: 0
DIARRHEA: 0
CHEST TIGHTNESS: 1
TROUBLE SWALLOWING: 0
RHINORRHEA: 0
COUGH: 1
SINUS PRESSURE: 1
SORE THROAT: 1
SINUS PAIN: 0
CONSTIPATION: 0
SHORTNESS OF BREATH: 1
VOMITING: 0
WHEEZING: 1

## 2025-01-21 ASSESSMENT — PATIENT HEALTH QUESTIONNAIRE - PHQ9
SUM OF ALL RESPONSES TO PHQ QUESTIONS 1-9: 9
7. TROUBLE CONCENTRATING ON THINGS, SUCH AS READING THE NEWSPAPER OR WATCHING TELEVISION: SEVERAL DAYS
SUM OF ALL RESPONSES TO PHQ QUESTIONS 1-9: 9
4. FEELING TIRED OR HAVING LITTLE ENERGY: NEARLY EVERY DAY
10. IF YOU CHECKED OFF ANY PROBLEMS, HOW DIFFICULT HAVE THESE PROBLEMS MADE IT FOR YOU TO DO YOUR WORK, TAKE CARE OF THINGS AT HOME, OR GET ALONG WITH OTHER PEOPLE: VERY DIFFICULT
8. MOVING OR SPEAKING SO SLOWLY THAT OTHER PEOPLE COULD HAVE NOTICED. OR THE OPPOSITE, BEING SO FIGETY OR RESTLESS THAT YOU HAVE BEEN MOVING AROUND A LOT MORE THAN USUAL: NOT AT ALL
6. FEELING BAD ABOUT YOURSELF - OR THAT YOU ARE A FAILURE OR HAVE LET YOURSELF OR YOUR FAMILY DOWN: SEVERAL DAYS
SUM OF ALL RESPONSES TO PHQ9 QUESTIONS 1 & 2: 1
9. THOUGHTS THAT YOU WOULD BE BETTER OFF DEAD, OR OF HURTING YOURSELF: NOT AT ALL
SUM OF ALL RESPONSES TO PHQ QUESTIONS 1-9: 9
SUM OF ALL RESPONSES TO PHQ QUESTIONS 1-9: 9
2. FEELING DOWN, DEPRESSED OR HOPELESS: SEVERAL DAYS
5. POOR APPETITE OR OVEREATING: NEARLY EVERY DAY
1. LITTLE INTEREST OR PLEASURE IN DOING THINGS: NOT AT ALL
3. TROUBLE FALLING OR STAYING ASLEEP: NOT AT ALL

## 2025-01-21 NOTE — PROGRESS NOTES
[facility]  [facility]  [address] [place] [address] [address]  [address]  [facility] [address]  Dept: [phone]  Dept Fax: [phone]    Lisa K Cogan is a 49 y.o. female who presentstoday for her medical conditions/complaints as noted below.  Lisa K Cogan is c/o of  Chief Complaint   Patient presents with    Pharyngitis    Congestion     Chest congestion-went to ER    Fatigue    Cough     Productive-greenish/yellow         HPI:     Presents today via video virtual visit for persistent sx of illness over the past 3 weeks  She went to ED 1/3 and was tested for strep which was negative. She had also performed home covid testing at home and had 2 negative results  She states at that time she had a sore throat with coughing, headaches, fever and sinus congestion  She took a course of prednisone, tessalon perles and used an inhaler  She recalls was waking up wheezing   She felt better for a couple days last week but then 3 days ago the sx came back and she is not getting better  She gets some symptom relief with day/nyquil  She is SOB with any activity  She does not feel much improvement with use of inhaler or nebulizer  Reports a lot of sinus drainage and coughing up green tinged sputum at times          Hemoglobin A1C (%)   Date Value   12/03/2024 5.9   08/20/2024 5.7   05/17/2024 6.1             ( goal A1C is < 7)   No components found for: \"LABMICR\"  No components found for: \"LDLCHOLESTEROL\", \"LDLCALC\"    (goal LDL is <100)   AST (U/L)   Date Value   12/03/2024 27     ALT (U/L)   Date Value   12/03/2024 36 (H)     BUN (mg/dL)   Date Value   12/03/2024 10     BP Readings from Last 3 Encounters:   01/03/25 (!) 152/96   12/31/24 137/87   12/20/24 123/82          ([id number])    Past Medical History:   Diagnosis Date    Anxiety     Carpal tunnel syndrome     Chronic back pain     COVID-19 11/06/2021    Depression     Endometriosis     Fatty liver     Gestational diabetes     x 3 children    Headache

## 2025-02-03 RX ORDER — ONDANSETRON 4 MG/1
4 TABLET, ORALLY DISINTEGRATING ORAL 3 TIMES DAILY PRN
Qty: 60 TABLET | Refills: 0 | Status: SHIPPED | OUTPATIENT
Start: 2025-02-03

## 2025-02-04 ENCOUNTER — OFFICE VISIT (OUTPATIENT)
Dept: FAMILY MEDICINE CLINIC | Age: 50
End: 2025-02-04
Payer: MEDICARE

## 2025-02-04 VITALS
TEMPERATURE: 98.3 F | HEIGHT: 64 IN | OXYGEN SATURATION: 97 % | BODY MASS INDEX: 50.02 KG/M2 | SYSTOLIC BLOOD PRESSURE: 124 MMHG | DIASTOLIC BLOOD PRESSURE: 82 MMHG | HEART RATE: 79 BPM | WEIGHT: 293 LBS

## 2025-02-04 DIAGNOSIS — R05.1 ACUTE COUGH: Primary | ICD-10-CM

## 2025-02-04 DIAGNOSIS — K52.9 ACUTE GASTROENTERITIS: ICD-10-CM

## 2025-02-04 LAB
INFLUENZA A ANTIGEN, POC: NEGATIVE
INFLUENZA B ANTIGEN, POC: NEGATIVE
VALID INTERNAL CONTROL, POC: NORMAL

## 2025-02-04 PROCEDURE — 87502 INFLUENZA DNA AMP PROBE: CPT | Performed by: PHYSICIAN ASSISTANT

## 2025-02-04 PROCEDURE — G8427 DOCREV CUR MEDS BY ELIG CLIN: HCPCS | Performed by: PHYSICIAN ASSISTANT

## 2025-02-04 PROCEDURE — G8417 CALC BMI ABV UP PARAM F/U: HCPCS | Performed by: PHYSICIAN ASSISTANT

## 2025-02-04 PROCEDURE — 99213 OFFICE O/P EST LOW 20 MIN: CPT | Performed by: PHYSICIAN ASSISTANT

## 2025-02-04 PROCEDURE — 3074F SYST BP LT 130 MM HG: CPT | Performed by: PHYSICIAN ASSISTANT

## 2025-02-04 PROCEDURE — 1036F TOBACCO NON-USER: CPT | Performed by: PHYSICIAN ASSISTANT

## 2025-02-04 PROCEDURE — 3079F DIAST BP 80-89 MM HG: CPT | Performed by: PHYSICIAN ASSISTANT

## 2025-02-04 RX ORDER — LOPERAMIDE HYDROCHLORIDE 2 MG/1
2 CAPSULE ORAL 4 TIMES DAILY PRN
Qty: 40 CAPSULE | Refills: 0 | Status: SHIPPED | OUTPATIENT
Start: 2025-02-04 | End: 2025-02-14

## 2025-02-04 ASSESSMENT — ENCOUNTER SYMPTOMS
EYES NEGATIVE: 1
COUGH: 1
DIARRHEA: 1
NAUSEA: 1
VOMITING: 1

## 2025-02-04 NOTE — PROGRESS NOTES
MCG/ACT nasal spray 2 sprays by Each Nostril route daily 48 g 1    ferrous sulfate (IRON 325) 325 (65 Fe) MG tablet Take 1 tablet by mouth three times a week 90 tablet 1    AUVELITY  MG TBCR Take 1 tablet by mouth in the morning and at bedtime      busPIRone (BUSPAR) 15 MG tablet Take 15 mg by mouth 3 times daily      vitamin B-12 (CYANOCOBALAMIN) 1000 MCG tablet Take 1 tablet by mouth daily 90 tablet 1    Misc. Devices MISC Provide handicap placard, expires 5 years from this date 1/23/2030  Medical conditions prevent the ability to walk long distances 1 each 0    NEEDLE, DISP, 20 G 20G X 1\" MISC For use with Imitrex injection 4 each 1    NEEDLE, DISP, 22 G 22G X 3/4\" MISC For use with Imitrex injection 4 each 1    Syringe, Disposable, 1 ML MISC For use with Imitrex injection 4 each 1    ciprofloxacin-dexAMETHasone (CIPRODEX) 0.3-0.1 % otic suspension Place 4 drops into both ears 2 times daily 7.5 mL 1     Current Facility-Administered Medications   Medication Dose Route Frequency Provider Last Rate Last Admin    lidocaine PF 2 % injection 7 mL  7 mL Other Once              ALLERGIES     Contrast [iodides], Trileptal [oxcarbazepine], Morphine, Bactrim [sulfamethoxazole-trimethoprim], Iodinated contrast media, Prozac [fluoxetine hcl], Topamax [topiramate], and Nsaids    FAMILY HISTORY           Problem Relation Age of Onset    Colon Cancer Mother     Arthritis Mother     Cancer Mother         Pancreatic and colon    Obesity Mother     Early Death Father         accident    Obesity Father     Diabetes Sister     High Blood Pressure Sister     Arthritis Sister     Arrhythmia Sister     Atrial Fibrillation Sister     Depression Sister     Heart Disease Sister     High Cholesterol Sister     Mental Illness Sister         Depression/anxiety    Obesity Sister     Diabetes Sister     High Blood Pressure Sister     Arthritis Sister     Depression Sister     High Cholesterol Sister     Mental Illness Sister

## 2025-02-20 ENCOUNTER — OFFICE VISIT (OUTPATIENT)
Dept: PRIMARY CARE CLINIC | Age: 50
End: 2025-02-20

## 2025-02-20 VITALS
DIASTOLIC BLOOD PRESSURE: 88 MMHG | HEART RATE: 80 BPM | BODY MASS INDEX: 52.08 KG/M2 | OXYGEN SATURATION: 98 % | WEIGHT: 293 LBS | SYSTOLIC BLOOD PRESSURE: 152 MMHG

## 2025-02-20 DIAGNOSIS — R63.5 WEIGHT GAIN STATUS POST GASTRIC BYPASS: ICD-10-CM

## 2025-02-20 DIAGNOSIS — F33.9 RECURRENT MAJOR DEPRESSION RESISTANT TO TREATMENT: ICD-10-CM

## 2025-02-20 DIAGNOSIS — I87.2 LYMPHEDEMA DUE TO VENOUS INSUFFICIENCY: ICD-10-CM

## 2025-02-20 DIAGNOSIS — I89.0 LYMPHEDEMA DUE TO VENOUS INSUFFICIENCY: ICD-10-CM

## 2025-02-20 DIAGNOSIS — I87.1 MAY-THURNER SYNDROME: ICD-10-CM

## 2025-02-20 DIAGNOSIS — K76.0 FATTY LIVER DISEASE, NONALCOHOLIC: ICD-10-CM

## 2025-02-20 DIAGNOSIS — Z98.84 WEIGHT GAIN STATUS POST GASTRIC BYPASS: ICD-10-CM

## 2025-02-20 DIAGNOSIS — E66.813 CLASS 3 SEVERE OBESITY WITH SERIOUS COMORBIDITY AND BODY MASS INDEX (BMI) OF 50.0 TO 59.9 IN ADULT, UNSPECIFIED OBESITY TYPE: ICD-10-CM

## 2025-02-20 DIAGNOSIS — E66.01 CLASS 3 SEVERE OBESITY WITH SERIOUS COMORBIDITY AND BODY MASS INDEX (BMI) OF 50.0 TO 59.9 IN ADULT, UNSPECIFIED OBESITY TYPE: ICD-10-CM

## 2025-02-20 DIAGNOSIS — G43.E01 CHRONIC MIGRAINE WITH AURA AND WITH STATUS MIGRAINOSUS, NOT INTRACTABLE: ICD-10-CM

## 2025-02-20 DIAGNOSIS — N32.81 OAB (OVERACTIVE BLADDER): ICD-10-CM

## 2025-02-20 DIAGNOSIS — M54.81 OCCIPITAL NEURALGIA OF RIGHT SIDE: ICD-10-CM

## 2025-02-20 DIAGNOSIS — I10 PRIMARY HYPERTENSION: Primary | ICD-10-CM

## 2025-02-20 PROBLEM — G93.2 INTRACRANIAL HYPERTENSION: Status: RESOLVED | Noted: 2024-05-10 | Resolved: 2025-02-20

## 2025-02-20 PROBLEM — M79.89 PAIN AND SWELLING OF LEFT LOWER EXTREMITY: Status: RESOLVED | Noted: 2023-10-15 | Resolved: 2025-02-20

## 2025-02-20 PROBLEM — M79.89 LEFT LEG SWELLING: Status: RESOLVED | Noted: 2023-10-15 | Resolved: 2025-02-20

## 2025-02-20 PROBLEM — R82.991 HYPOCITRATURIA: Status: RESOLVED | Noted: 2023-02-06 | Resolved: 2025-02-20

## 2025-02-20 PROBLEM — M79.605 PAIN AND SWELLING OF LEFT LOWER EXTREMITY: Status: RESOLVED | Noted: 2023-10-15 | Resolved: 2025-02-20

## 2025-02-20 PROBLEM — N39.41 URGE INCONTINENCE: Status: RESOLVED | Noted: 2023-08-30 | Resolved: 2025-02-20

## 2025-02-20 PROBLEM — M79.604 PAIN AND SWELLING OF RIGHT LOWER EXTREMITY: Status: RESOLVED | Noted: 2023-10-15 | Resolved: 2025-02-20

## 2025-02-20 PROBLEM — M79.89 PAIN AND SWELLING OF RIGHT LOWER EXTREMITY: Status: RESOLVED | Noted: 2023-10-15 | Resolved: 2025-02-20

## 2025-02-20 ASSESSMENT — ENCOUNTER SYMPTOMS
WHEEZING: 0
COUGH: 0
DIARRHEA: 0
BACK PAIN: 1
BLOOD IN STOOL: 0
VOMITING: 0
CONSTIPATION: 0
NAUSEA: 0
TROUBLE SWALLOWING: 0
SHORTNESS OF BREATH: 0
SORE THROAT: 0
ABDOMINAL PAIN: 0
SINUS PRESSURE: 0

## 2025-02-20 NOTE — PROGRESS NOTES
[facility]  [facility]  [address] [place] [address] [address]  [address]  [facility] [address]  Dept: [phone]  Dept Fax: [phone]    Lisa K Cogan is a 49 y.o. female who presentstoday for her medical conditions/complaints as noted below.  Lisa K Cogan is c/o of  Chief Complaint   Patient presents with    Hypertension    Obesity    Leg Pain    Foot Swelling     Feet and leg swelling-was in car a lot yesterday    Medication Refill     Discuss wt loss med now with new insurance         HPI:     History of Present Illness  The patient presents for follow-up    She has been experiencing persistent fatigue, which she attributes to a prolonged illness that lasted throughout January 2025 and into early February 2025. Despite the resolution of her symptoms, she continues to feel excessively tired, necessitating frequent naps after minimal exertion such as grocery shopping or driving her son to work.  Reports that she had not been taking her blood pressure medications on regular basis throughout the last couple of months due to the illness.  She has resumed fairly consistently over the past week.  She does not have a blood pressure cuff at home    She reports a history of migraines, with the most recent episode occurring on New Year's Charline, characterized by severe dizziness. The frequency of these migraines has decreased since her recovery from the aforementioned illness. She is scheduled to receive another set of occipital nerve blocks on 03/12/2025, a treatment she undergoes bi-monthly to manage her symptoms.    She is currently under the care ofurology for bladder issues, having had a nerve stimulator implanted in December 2024. She reports mild improvement in her incontinence since the procedure. She has been gradually increasing the device's settings, currently at 0.9, up from 0.5 when she was discharged from the hospital. She was informed that the settings can be increased up to 14 and that she

## 2025-02-21 ENCOUNTER — TELEPHONE (OUTPATIENT)
Dept: NEUROLOGY | Age: 50
End: 2025-02-21

## 2025-02-24 ENCOUNTER — NURSE ONLY (OUTPATIENT)
Dept: PRIMARY CARE CLINIC | Age: 50
End: 2025-02-24

## 2025-02-27 DIAGNOSIS — E61.1 IRON DEFICIENCY: ICD-10-CM

## 2025-02-27 DIAGNOSIS — Z98.84 HISTORY OF BARIATRIC SURGERY: ICD-10-CM

## 2025-02-27 DIAGNOSIS — K90.9 MALABSORPTION OF IRON: ICD-10-CM

## 2025-02-27 DIAGNOSIS — R79.89 ELEVATED FERRITIN: ICD-10-CM

## 2025-02-28 RX ORDER — MULTIVIT WITH MINERALS/LUTEIN
TABLET ORAL
Qty: 30 TABLET | Refills: 0 | Status: SHIPPED | OUTPATIENT
Start: 2025-02-28

## 2025-03-12 ENCOUNTER — OFFICE VISIT (OUTPATIENT)
Age: 50
End: 2025-03-12
Payer: MEDICARE

## 2025-03-12 ENCOUNTER — PROCEDURE VISIT (OUTPATIENT)
Dept: NEUROLOGY | Age: 50
End: 2025-03-12

## 2025-03-12 ENCOUNTER — HOSPITAL ENCOUNTER (OUTPATIENT)
Age: 50
Setting detail: SPECIMEN
Discharge: HOME | End: 2025-03-12

## 2025-03-12 VITALS — WEIGHT: 293 LBS | BODY MASS INDEX: 50.02 KG/M2 | HEIGHT: 64 IN

## 2025-03-12 DIAGNOSIS — R82.81 PYURIA: ICD-10-CM

## 2025-03-12 DIAGNOSIS — Z87.442 PERSONAL HISTORY OF KIDNEY STONES: ICD-10-CM

## 2025-03-12 DIAGNOSIS — N39.41 URGE URINARY INCONTINENCE: Primary | ICD-10-CM

## 2025-03-12 DIAGNOSIS — N32.81 OAB (OVERACTIVE BLADDER): ICD-10-CM

## 2025-03-12 DIAGNOSIS — M54.81 OCCIPITAL NEURALGIA OF RIGHT SIDE: Primary | ICD-10-CM

## 2025-03-12 DIAGNOSIS — M62.89 PELVIC FLOOR DYSFUNCTION IN FEMALE: ICD-10-CM

## 2025-03-12 DIAGNOSIS — M54.81 BILATERAL OCCIPITAL NEURALGIA: ICD-10-CM

## 2025-03-12 PROCEDURE — G8417 CALC BMI ABV UP PARAM F/U: HCPCS | Performed by: SPECIALIST

## 2025-03-12 PROCEDURE — 99214 OFFICE O/P EST MOD 30 MIN: CPT | Performed by: SPECIALIST

## 2025-03-12 PROCEDURE — 81003 URINALYSIS AUTO W/O SCOPE: CPT | Performed by: SPECIALIST

## 2025-03-12 PROCEDURE — 1036F TOBACCO NON-USER: CPT | Performed by: SPECIALIST

## 2025-03-12 PROCEDURE — G8427 DOCREV CUR MEDS BY ELIG CLIN: HCPCS | Performed by: SPECIALIST

## 2025-03-12 RX ORDER — LIDOCAINE HYDROCHLORIDE 20 MG/ML
7 INJECTION, SOLUTION INFILTRATION; PERINEURAL ONCE
Status: COMPLETED | OUTPATIENT
Start: 2025-03-12 | End: 2025-03-12

## 2025-03-12 RX ORDER — METHYLPREDNISOLONE SODIUM SUCCINATE 40 MG/ML
40 INJECTION INTRAMUSCULAR; INTRAVENOUS ONCE
Status: COMPLETED | OUTPATIENT
Start: 2025-03-12 | End: 2025-03-12

## 2025-03-12 RX ADMIN — METHYLPREDNISOLONE SODIUM SUCCINATE 40 MG: 40 INJECTION INTRAMUSCULAR; INTRAVENOUS at 14:08

## 2025-03-12 RX ADMIN — LIDOCAINE HYDROCHLORIDE 7 ML: 20 INJECTION, SOLUTION INFILTRATION; PERINEURAL at 14:06

## 2025-03-12 NOTE — PROGRESS NOTES
Doyle Tapia MD  Urology Office Progress Note    Patient:  Lisa K Cogan  YOB: 1975  Date: 3/12/2025    HISTORY OF PRESENT ILLNESS:   The patient is a 49 y.o. female  Patient's Urge urinary incontinence is better since her 12/20/24 Interstim sacral neuromodulation device implant.  She feels the stimulation vaginally which is appropriate.  She has not met with the [employer] rep since her surgery.   Recommended that the patient come back in the near future to be reprogrammed by the [employer] rep to optimize the clinical parameters of the Interstim device.   Will add Gemtesa (vibegron) 75 mg po qd for OAB.  She has a weak stream, difficulty with hesitancy and intermittency at times which is suggestive of pelvic floor muscle dysfunction.  Will refer to PT for pelvic floor muscle rehabilitation.  Lower urinary tract symptoms: urgency, frequency, hesitancy, and decreased urinary stream   Last AUA Symptom Score (QOL): 30 (6)  Today's AUA Symptom Score (QOL): 21 (3)    Summary of old records:   Left 8 mm UPJ calculus on 11/23/22 CT; 12/14/22 L HLL; 6/7/23 KUB neg  1/25/23 Litholink: volume=1468 mL, Ca=90, oxalate=62, citrate=245  Hyperoxaluria, mild  Hypocitraturia: Ca citrate 500 mg bid  Urge incontinence: 4-5 ppd; Solifenacin (Vesicare) 5 mg po qd, 9/15/23 Botox 100 IU, 4/5/24 Botox 100 IU; 12/20/24 Interstim  added Gemtesa (vibegron) 75 mg qd 3/12/25    Additional History: none    Procedures Today: N/A    Urinalysis today:  Results for orders placed or performed in visit on 03/12/25   POCT Urinalysis No Micro (Auto)   Result Value Ref Range    Color, UA yellow     Clarity, UA clear     Glucose, UA POC neg mg/dL    Bilirubin, UA      Ketones, UA      Spec Grav, UA      Blood, UA POC trace     pH, UA      Protein, UA POC neg mg/dL    Urobilinogen, UA      Leukocytes, UA small     Nitrite, UA neg        Last BUN and creatinine:  Lab Results   Component Value Date    BUN

## 2025-03-12 NOTE — PROGRESS NOTES
Procedure Note    Procedure: b/l Greater and Lesser Occipital Nerve Block (CPT code 74536, 64311)    Indications:  Severe b/l occipital neuralgia (ICD 10 M54.81)    Informed consent was obtained (explaining the procedure and risks and benefits) from patient. The signed consent form was placed in the medical record.    A time out was completed, verifying correct patient, procedure,site, positioning, and implants or special equipment.    Patient's b/l occipital area was palpated to identify location of the greater and the lesser occipital nerve. Using a 10 ml syringe, 7 ml of lidocaine (from a 20 ml bottle) and 1 ml (40 mg) of methylprednisolone was aspirated. Alcohol was applied topically to the skin. Using a 27 gauge needle (aspirating during insertion),  2 ml was injected on the greater and lesser occipital nerve on the left side. Pressure with a gauze pad was held briefly upon the site of puncture to minimize bleeding and to further spread anaesthetic subcutaneously. The procedure was repeated on the right side. There were no complications.  Patient was comfortable and left without complaint.    Empty vial of methylprednisolone was discarded.    Electronically signed by Cuate Logan MD on 12/31/2024 at 7:50 AM

## 2025-03-13 LAB
MICROORGANISM SPEC CULT: NORMAL
SERVICE CMNT-IMP: NORMAL
SPECIMEN DESCRIPTION: NORMAL

## 2025-03-17 ENCOUNTER — RESULTS FOLLOW-UP (OUTPATIENT)
Age: 50
End: 2025-03-17

## 2025-03-17 ENCOUNTER — OFFICE VISIT (OUTPATIENT)
Dept: NEUROLOGY | Age: 50
End: 2025-03-17

## 2025-03-17 VITALS
WEIGHT: 293 LBS | SYSTOLIC BLOOD PRESSURE: 147 MMHG | DIASTOLIC BLOOD PRESSURE: 99 MMHG | HEART RATE: 89 BPM | BODY MASS INDEX: 50.02 KG/M2 | HEIGHT: 64 IN

## 2025-03-17 DIAGNOSIS — M54.81 OCCIPITAL NEURALGIA OF RIGHT SIDE: ICD-10-CM

## 2025-03-17 DIAGNOSIS — G47.33 OSA (OBSTRUCTIVE SLEEP APNEA): ICD-10-CM

## 2025-03-17 DIAGNOSIS — G43.109 MIGRAINE WITH AURA AND WITHOUT STATUS MIGRAINOSUS, NOT INTRACTABLE: Primary | ICD-10-CM

## 2025-03-17 DIAGNOSIS — M54.81 BILATERAL OCCIPITAL NEURALGIA: ICD-10-CM

## 2025-03-17 DIAGNOSIS — M54.2 CERVICALGIA: ICD-10-CM

## 2025-03-17 RX ORDER — SUMATRIPTAN 50 MG/1
50 TABLET, FILM COATED ORAL
Qty: 9 TABLET | Refills: 11 | Status: SHIPPED | OUTPATIENT
Start: 2025-03-17 | End: 2025-03-17

## 2025-03-17 NOTE — PROGRESS NOTES
daily 7.5 mL 1    fluticasone (FLONASE) 50 MCG/ACT nasal spray 2 sprays by Each Nostril route daily 48 g 1    ferrous sulfate (IRON 325) 325 (65 Fe) MG tablet Take 1 tablet by mouth three times a week 90 tablet 1    AUVELITY  MG TBCR Take 1 tablet by mouth in the morning and at bedtime      busPIRone (BUSPAR) 15 MG tablet Take 15 mg by mouth 3 times daily      vitamin B-12 (CYANOCOBALAMIN) 1000 MCG tablet Take 1 tablet by mouth daily 90 tablet 1     Current Facility-Administered Medications   Medication Dose Route Frequency Provider Last Rate Last Admin    lidocaine PF 2 % injection 7 mL  7 mL Other Once             Physical Exam:  LMP 09/15/2021 Comment: VERY HEAVY PERIODS  General:  Constitutional: well-developed and well-nourished; active; no distress    + Tender points at greater occipital notch bilaterally  Cardiovascular: palpable pulses throughout, no edema in lower extremities  Respiratory: no signs of respiratory distress, on room air and saturating well  Gastrointestinal: non-tender to palpation, non-distended abdomen   Skin: No rashes or lesions noted    Neurologic Examination:   Mental Status: Awake and attentive. Alert and oriented to person, place, time, and situation.  Speech: Clear and fluent with good repetition, comprehension, and naming.   Patient displayed good fund of knowledge.   Recent and remote memory were intact.    Cranial Nerves:   CN II: Visual fields are full to confrontation. Pupils are 4 mm and reactive to light.   CN III, IV, VI: EOMI, no nystagmus, no ptosis  CN V: Facial sensation is intact to pinprick in all 3 divisions bilaterally.  CN VII: Face is symmetric with normal eye closure and smile.  CN VII: Hearing is normal to rubbing fingers  CN IX, X: Palate elevates symmetrically. Phonation is normal.  CN XI: Head turning and shoulder shrug are intact  CN XII: Tongue is midline with normal movements and no atrophy.    Motor:   Tone: normal, no spasticity, no atrophy  Muscle

## 2025-03-21 ENCOUNTER — TELEPHONE (OUTPATIENT)
Dept: PRIMARY CARE CLINIC | Age: 50
End: 2025-03-21

## 2025-03-21 ENCOUNTER — OFFICE VISIT (OUTPATIENT)
Dept: PRIMARY CARE CLINIC | Age: 50
End: 2025-03-21

## 2025-03-21 VITALS
DIASTOLIC BLOOD PRESSURE: 86 MMHG | SYSTOLIC BLOOD PRESSURE: 122 MMHG | HEIGHT: 64 IN | BODY MASS INDEX: 50.02 KG/M2 | RESPIRATION RATE: 16 BRPM | WEIGHT: 293 LBS | HEART RATE: 77 BPM | OXYGEN SATURATION: 97 %

## 2025-03-21 DIAGNOSIS — Z98.84 WEIGHT GAIN STATUS POST GASTRIC BYPASS: ICD-10-CM

## 2025-03-21 DIAGNOSIS — E66.01 CLASS 3 SEVERE OBESITY WITH SERIOUS COMORBIDITY AND BODY MASS INDEX (BMI) OF 50.0 TO 59.9 IN ADULT, UNSPECIFIED OBESITY TYPE: ICD-10-CM

## 2025-03-21 DIAGNOSIS — I87.1 MAY-THURNER SYNDROME: ICD-10-CM

## 2025-03-21 DIAGNOSIS — I10 PRIMARY HYPERTENSION: Primary | ICD-10-CM

## 2025-03-21 DIAGNOSIS — R63.5 WEIGHT GAIN STATUS POST GASTRIC BYPASS: ICD-10-CM

## 2025-03-21 DIAGNOSIS — E66.813 CLASS 3 SEVERE OBESITY WITH SERIOUS COMORBIDITY AND BODY MASS INDEX (BMI) OF 50.0 TO 59.9 IN ADULT, UNSPECIFIED OBESITY TYPE: ICD-10-CM

## 2025-03-21 DIAGNOSIS — K76.0 FATTY LIVER DISEASE, NONALCOHOLIC: ICD-10-CM

## 2025-03-21 SDOH — ECONOMIC STABILITY: FOOD INSECURITY: WITHIN THE PAST 12 MONTHS, THE FOOD YOU BOUGHT JUST DIDN'T LAST AND YOU DIDN'T HAVE MONEY TO GET MORE.: NEVER TRUE

## 2025-03-21 SDOH — ECONOMIC STABILITY: FOOD INSECURITY: WITHIN THE PAST 12 MONTHS, YOU WORRIED THAT YOUR FOOD WOULD RUN OUT BEFORE YOU GOT MONEY TO BUY MORE.: NEVER TRUE

## 2025-03-21 ASSESSMENT — PATIENT HEALTH QUESTIONNAIRE - PHQ9
SUM OF ALL RESPONSES TO PHQ QUESTIONS 1-9: 10
6. FEELING BAD ABOUT YOURSELF - OR THAT YOU ARE A FAILURE OR HAVE LET YOURSELF OR YOUR FAMILY DOWN: NOT AT ALL
9. THOUGHTS THAT YOU WOULD BE BETTER OFF DEAD, OR OF HURTING YOURSELF: NOT AT ALL
SUM OF ALL RESPONSES TO PHQ QUESTIONS 1-9: 10
1. LITTLE INTEREST OR PLEASURE IN DOING THINGS: SEVERAL DAYS
10. IF YOU CHECKED OFF ANY PROBLEMS, HOW DIFFICULT HAVE THESE PROBLEMS MADE IT FOR YOU TO DO YOUR WORK, TAKE CARE OF THINGS AT HOME, OR GET ALONG WITH OTHER PEOPLE: SOMEWHAT DIFFICULT
2. FEELING DOWN, DEPRESSED OR HOPELESS: SEVERAL DAYS
4. FEELING TIRED OR HAVING LITTLE ENERGY: NEARLY EVERY DAY
7. TROUBLE CONCENTRATING ON THINGS, SUCH AS READING THE NEWSPAPER OR WATCHING TELEVISION: SEVERAL DAYS
8. MOVING OR SPEAKING SO SLOWLY THAT OTHER PEOPLE COULD HAVE NOTICED. OR THE OPPOSITE, BEING SO FIGETY OR RESTLESS THAT YOU HAVE BEEN MOVING AROUND A LOT MORE THAN USUAL: NOT AT ALL
SUM OF ALL RESPONSES TO PHQ QUESTIONS 1-9: 10
5. POOR APPETITE OR OVEREATING: SEVERAL DAYS
3. TROUBLE FALLING OR STAYING ASLEEP: NEARLY EVERY DAY
SUM OF ALL RESPONSES TO PHQ QUESTIONS 1-9: 10

## 2025-03-21 ASSESSMENT — ENCOUNTER SYMPTOMS
WHEEZING: 0
DIARRHEA: 0
SHORTNESS OF BREATH: 0
NAUSEA: 0
BACK PAIN: 1
SINUS PRESSURE: 0
CONSTIPATION: 0
SORE THROAT: 0
COUGH: 0
VOMITING: 0
TROUBLE SWALLOWING: 0
BLOOD IN STOOL: 0
ABDOMINAL PAIN: 0

## 2025-03-21 NOTE — PROGRESS NOTES
becomes more bothersome, she may consider taking Pepcid at night, particularly if she experiences morning symptoms. If Pepcid proves ineffective, she may switch to Protonix or omeprazole.  Her blood pressure is well-controlled today in office and would likely continue to improve with weight loss.      Return in about 3 months (around 6/21/2025) for hypertension check, weight check.     Patient given educational materials - see patient instructions.  Discussed use, benefit, and side effects of prescribed medications.  All patient questions answered. Pt voiced understanding. Reviewed health maintenance.  Instructed to continue current medications, diet and exercise.  Patient agreed with treatment plan. Follow up as directed.     Electronicallysigned by JUSTICE Jolley CNP on 3/21/2025 at 3:40 PM    The patient (or guardian, if applicable) and other individuals in attendance with the patient were advised that Artificial Intelligence will be utilized during this visit to record, process the conversation to generate a clinical note, and support improvement of the AI technology. The patient (or guardian, if applicable) and other individuals in attendance at the appointment consented to the use of AI, including the recording.

## 2025-04-04 DIAGNOSIS — D50.0 IRON DEFICIENCY ANEMIA DUE TO CHRONIC BLOOD LOSS: ICD-10-CM

## 2025-04-04 DIAGNOSIS — R79.89 ELEVATED FERRITIN: ICD-10-CM

## 2025-04-04 DIAGNOSIS — K90.9 IRON MALABSORPTION: ICD-10-CM

## 2025-04-04 DIAGNOSIS — Z98.84 HISTORY OF BARIATRIC SURGERY: ICD-10-CM

## 2025-04-04 DIAGNOSIS — K90.9 MALABSORPTION OF IRON: ICD-10-CM

## 2025-04-04 DIAGNOSIS — E61.1 IRON DEFICIENCY: ICD-10-CM

## 2025-04-04 RX ORDER — FERROUS SULFATE 325(65) MG
325 TABLET ORAL
Qty: 90 TABLET | Refills: 1 | Status: SHIPPED | OUTPATIENT
Start: 2025-04-04

## 2025-04-04 RX ORDER — LANOLIN ALCOHOL/MO/W.PET/CERES
1000 CREAM (GRAM) TOPICAL DAILY
Qty: 90 TABLET | Refills: 1 | Status: SHIPPED | OUTPATIENT
Start: 2025-04-04

## 2025-04-04 RX ORDER — MULTIVIT WITH MINERALS/LUTEIN
250 TABLET ORAL DAILY
Qty: 90 TABLET | Refills: 1 | Status: SHIPPED | OUTPATIENT
Start: 2025-04-04

## 2025-04-09 ENCOUNTER — OFFICE VISIT (OUTPATIENT)
Age: 50
End: 2025-04-09
Payer: MEDICARE

## 2025-04-09 VITALS — WEIGHT: 293 LBS | BODY MASS INDEX: 50.02 KG/M2 | HEIGHT: 64 IN

## 2025-04-09 DIAGNOSIS — N39.41 URGE URINARY INCONTINENCE: Primary | ICD-10-CM

## 2025-04-09 DIAGNOSIS — M62.89 PELVIC FLOOR DYSFUNCTION IN FEMALE: ICD-10-CM

## 2025-04-09 DIAGNOSIS — N32.81 OAB (OVERACTIVE BLADDER): ICD-10-CM

## 2025-04-09 DIAGNOSIS — Z87.442 PERSONAL HISTORY OF KIDNEY STONES: ICD-10-CM

## 2025-04-09 DIAGNOSIS — R82.991 HYPOCITRATURIA: ICD-10-CM

## 2025-04-09 PROCEDURE — 99214 OFFICE O/P EST MOD 30 MIN: CPT | Performed by: SPECIALIST

## 2025-04-09 PROCEDURE — G8427 DOCREV CUR MEDS BY ELIG CLIN: HCPCS | Performed by: SPECIALIST

## 2025-04-09 PROCEDURE — 81003 URINALYSIS AUTO W/O SCOPE: CPT | Performed by: SPECIALIST

## 2025-04-09 PROCEDURE — 1036F TOBACCO NON-USER: CPT | Performed by: SPECIALIST

## 2025-04-09 PROCEDURE — G8417 CALC BMI ABV UP PARAM F/U: HCPCS | Performed by: SPECIALIST

## 2025-04-09 NOTE — PROGRESS NOTES
Last CBC:  Lab Results   Component Value Date    WBC 6.6 10/08/2024    HGB 15.2 (H) 10/08/2024    HCT 49.7 (H) 10/08/2024    MCV 92.4 10/08/2024     10/08/2024       Additional Lab/Culture results:   3/12/25-Urine Culture-NEG    Imaging Reviewed during this Office Visit: none  (results were independently reviewed by physician and radiology report verified)    Physical Exam:    There were no vitals filed for this visit.  Body mass index is 50.98 kg/m².  Patient is a 49 y.o. female in no acute distress and alert and oriented to person, place and time.    Assessment and Plan   Assessment   1. Urge urinary incontinence    2. OAB (overactive bladder)    3. Hypocitraturia    4. Personal history of kidney stones    5. Pelvic floor dysfunction in female            Plan    Return in about 6 months (around 10/9/2025) for KUB.  Patient using Gemtesa (vibegron) 75 mg po qd for OAB and her Urge urinary incontinence is better.  She is down to 0-2 pads per day.  Patient reprogrammed by the [email or web address] rep today to optimize the clinical parameters of the Interstim device.   Patient instructed to limit fluid intake 2-3 hours prior to bedtime to minimize her nocturnal urine output and first morning Urge urinary incontinence.  Continue Calcium citrate 500 mg po bid for her hypocitraturia.       Doyle Tapia MD FACS  4/9/2025 1:25 PM

## 2025-04-18 ENCOUNTER — HOSPITAL ENCOUNTER (OUTPATIENT)
Age: 50
Discharge: HOME | End: 2025-04-18
Payer: MEDICARE

## 2025-04-18 DIAGNOSIS — K90.9 MALABSORPTION OF IRON: ICD-10-CM

## 2025-04-18 DIAGNOSIS — R79.89 ELEVATED FERRITIN: ICD-10-CM

## 2025-04-18 DIAGNOSIS — Z98.84 HISTORY OF BARIATRIC SURGERY: ICD-10-CM

## 2025-04-18 LAB
FERRITIN SERPL-MCNC: 125 NG/ML (ref 15–150)
FOLATE SERPL-MCNC: 10.7 NG/ML (ref 4.8–24.2)
IRON SATN MFR SERPL: 18 % (ref 20–55)
IRON SERPL-MCNC: 58 UG/DL (ref 37–145)
TIBC SERPL-MCNC: 315 UG/DL (ref 250–450)
UNSATURATED IRON BINDING CAPACITY: 257 UG/DL (ref 112–347)
VIT B12 SERPL-MCNC: 583 PG/ML (ref 232–1245)

## 2025-04-18 PROCEDURE — 82728 ASSAY OF FERRITIN: CPT

## 2025-04-18 PROCEDURE — 83540 ASSAY OF IRON: CPT

## 2025-04-18 PROCEDURE — 83550 IRON BINDING TEST: CPT

## 2025-04-18 PROCEDURE — 82746 ASSAY OF FOLIC ACID SERUM: CPT

## 2025-04-18 PROCEDURE — 85025 COMPLETE CBC W/AUTO DIFF WBC: CPT

## 2025-04-18 PROCEDURE — 36415 COLL VENOUS BLD VENIPUNCTURE: CPT

## 2025-04-18 PROCEDURE — 82607 VITAMIN B-12: CPT

## 2025-04-19 LAB
BASOPHILS # BLD: 0.04 K/UL (ref 0–0.2)
BASOPHILS NFR BLD: 1 % (ref 0–2)
EOSINOPHIL # BLD: 0.1 K/UL (ref 0–0.44)
EOSINOPHILS RELATIVE PERCENT: 1 % (ref 1–4)
ERYTHROCYTE [DISTWIDTH] IN BLOOD BY AUTOMATED COUNT: 14.4 % (ref 11.8–14.4)
HCT VFR BLD AUTO: 45.1 % (ref 36.3–47.1)
HGB BLD-MCNC: 13.7 G/DL (ref 11.9–15.1)
IMM GRANULOCYTES # BLD AUTO: <0.03 K/UL (ref 0–0.3)
IMM GRANULOCYTES NFR BLD: 0 %
LYMPHOCYTES NFR BLD: 2.31 K/UL (ref 1.1–3.7)
LYMPHOCYTES RELATIVE PERCENT: 33 % (ref 24–43)
MCH RBC QN AUTO: 27.2 PG (ref 25.2–33.5)
MCHC RBC AUTO-ENTMCNC: 30.4 G/DL (ref 28.4–34.8)
MCV RBC AUTO: 89.7 FL (ref 82.6–102.9)
MONOCYTES NFR BLD: 0.56 K/UL (ref 0.1–1.2)
MONOCYTES NFR BLD: 8 % (ref 3–12)
NEUTROPHILS NFR BLD: 57 % (ref 36–65)
NEUTS SEG NFR BLD: 3.93 K/UL (ref 1.5–8.1)
NRBC BLD-RTO: 0 PER 100 WBC
PLATELET # BLD AUTO: 204 K/UL (ref 138–453)
PMV BLD AUTO: 12.4 FL (ref 8.1–13.5)
RBC # BLD AUTO: 5.03 M/UL (ref 3.95–5.11)
WBC OTHER # BLD: 7 K/UL (ref 3.5–11.3)

## 2025-05-07 ENCOUNTER — OFFICE VISIT (OUTPATIENT)
Age: 50
End: 2025-05-07
Payer: MEDICARE

## 2025-05-07 VITALS
RESPIRATION RATE: 18 BRPM | DIASTOLIC BLOOD PRESSURE: 82 MMHG | WEIGHT: 293 LBS | BODY MASS INDEX: 50.89 KG/M2 | OXYGEN SATURATION: 96 % | SYSTOLIC BLOOD PRESSURE: 133 MMHG | HEART RATE: 86 BPM | TEMPERATURE: 97.7 F

## 2025-05-07 DIAGNOSIS — K90.9 MALABSORPTION OF IRON: Primary | ICD-10-CM

## 2025-05-07 DIAGNOSIS — R79.89 ELEVATED FERRITIN: ICD-10-CM

## 2025-05-07 DIAGNOSIS — Z98.84 HISTORY OF BARIATRIC SURGERY: ICD-10-CM

## 2025-05-07 PROCEDURE — 99214 OFFICE O/P EST MOD 30 MIN: CPT | Performed by: INTERNAL MEDICINE

## 2025-05-07 PROCEDURE — 1036F TOBACCO NON-USER: CPT | Performed by: INTERNAL MEDICINE

## 2025-05-07 PROCEDURE — G8428 CUR MEDS NOT DOCUMENT: HCPCS | Performed by: INTERNAL MEDICINE

## 2025-05-07 PROCEDURE — 3079F DIAST BP 80-89 MM HG: CPT | Performed by: INTERNAL MEDICINE

## 2025-05-07 PROCEDURE — 3075F SYST BP GE 130 - 139MM HG: CPT | Performed by: INTERNAL MEDICINE

## 2025-05-07 PROCEDURE — G8417 CALC BMI ABV UP PARAM F/U: HCPCS | Performed by: INTERNAL MEDICINE

## 2025-05-07 RX ORDER — LOSARTAN POTASSIUM 100 MG/1
100 TABLET ORAL DAILY
Qty: 30 TABLET | Refills: 0 | Status: SHIPPED | OUTPATIENT
Start: 2025-05-07

## 2025-05-07 NOTE — PROGRESS NOTES
Lisa K Cogan                                                                                                                  5/7/2025  MRN:   [id number]  YOB: 1975  PCP:                           Luda Landry, APRN - CNP  Referring Physician: No ref. provider found  Treating Physician Name: YOANA PERKINS MD      Reason for visit:  Chief Complaint   Patient presents with    Follow-up     Review status of disease     Other     Fatigue        Current problems:  Iron deficiency anemia with malabsorption component  Hepatic steatosis and hepatomegaly  Elevated ferritin, reactive  Intolerance to oral iron    Active and recent treatments:  Parenteral iron-3/2021    Summary of Case/History:  Lisa K Cogan a 49 y.o.female is a patient with anemia. She has history of anemia and was managed previously by Dr. Martinez with IV iron. Her most recent lab work shows recurrent iron deficiency anemia and she reports she historically received monthly B12 injections and IV iron every 3-6 months. She has history of scleroderma and gastric by-pass, done in 2009, contributing to iron malabsorption. She reports she has had zinc and D deficiencies, she does not have any bariatric vitamins. She is very symptomatic with dizziness, cold intolerance and feeling very fatigued. She denies any bleeding. She has history of menorrhagia with irregular periods, she had benign growth removed from outside of the uterus in 2012. She has family history of uterine fibroids and cancer.     Interim History:   Patient presents to the clinic for a follow-up visit and to discuss results of her lab workup.  Patient could not tolerate oral iron every other day.  Does not feel well.  Hemoglobin within range.  Ferritin now within range as well.  Recently started Ozempic.  Patient has lost her blood pressure medication requesting for refill.  During this visit patient's allergy, social, medical, surgical history and medications were

## 2025-05-08 ENCOUNTER — PROCEDURE VISIT (OUTPATIENT)
Dept: NEUROLOGY | Age: 50
End: 2025-05-08

## 2025-05-08 DIAGNOSIS — M54.81 BILATERAL OCCIPITAL NEURALGIA: Primary | ICD-10-CM

## 2025-05-08 RX ORDER — METHYLPREDNISOLONE SODIUM SUCCINATE 40 MG/ML
40 INJECTION INTRAMUSCULAR; INTRAVENOUS ONCE
Status: COMPLETED | OUTPATIENT
Start: 2025-05-08 | End: 2025-05-08

## 2025-05-08 RX ADMIN — METHYLPREDNISOLONE SODIUM SUCCINATE 40 MG: 40 INJECTION INTRAMUSCULAR; INTRAVENOUS at 09:54

## 2025-05-08 NOTE — PROGRESS NOTES
Procedure Note    Procedure: b/l Greater and Lesser Occipital Nerve Block (CPT code 53567, 92328)    Indications:  Severe b/l occipital neuralgia (ICD 10 M54.81)    Informed consent was obtained (explaining the procedure and risks and benefits) from patient. The signed consent form was placed in the medical record.    A time out was completed, verifying correct patient, procedure,site, positioning, and implants or special equipment.    Patient's b/l occipital area was palpated to identify location of the greater and the lesser occipital nerve. Using a 10 ml syringe, 7 ml of lidocaine (from a 20 ml bottle) and 1 ml (40 mg) of methylprednisolone was aspirated. Alcohol was applied topically to the skin. Using a 27 gauge needle (aspirating during insertion),  2 ml was injected on the greater and lesser occipital nerve on the left side. Pressure with a gauze pad was held briefly upon the site of puncture to minimize bleeding and to further spread anaesthetic subcutaneously. The procedure was repeated on the right side. There were no complications.  Patient was comfortable and left without complaint.    Empty vial of methylprednisolone was discarded.    Electronically signed by Cuate Logan MD on 5/8/2025 at 12:25 PM

## 2025-06-14 DIAGNOSIS — I87.1 MAY-THURNER SYNDROME: ICD-10-CM

## 2025-06-14 DIAGNOSIS — K76.0 FATTY LIVER DISEASE, NONALCOHOLIC: ICD-10-CM

## 2025-06-14 DIAGNOSIS — I10 PRIMARY HYPERTENSION: ICD-10-CM

## 2025-06-14 DIAGNOSIS — Z98.84 HISTORY OF BARIATRIC SURGERY: ICD-10-CM

## 2025-06-14 DIAGNOSIS — E66.813 CLASS 3 SEVERE OBESITY WITH SERIOUS COMORBIDITY AND BODY MASS INDEX (BMI) OF 50.0 TO 59.9 IN ADULT, UNSPECIFIED OBESITY TYPE (HCC): ICD-10-CM

## 2025-06-14 DIAGNOSIS — K90.9 MALABSORPTION OF IRON: ICD-10-CM

## 2025-06-14 DIAGNOSIS — R79.89 ELEVATED FERRITIN: ICD-10-CM

## 2025-06-14 DIAGNOSIS — Z98.84 WEIGHT GAIN STATUS POST GASTRIC BYPASS: ICD-10-CM

## 2025-06-14 DIAGNOSIS — R63.5 WEIGHT GAIN STATUS POST GASTRIC BYPASS: ICD-10-CM

## 2025-06-16 RX ORDER — SEMAGLUTIDE 0.68 MG/ML
0.5 INJECTION, SOLUTION SUBCUTANEOUS
Qty: 3 ML | Refills: 2 | Status: SHIPPED | OUTPATIENT
Start: 2025-06-16

## 2025-06-16 RX ORDER — LOSARTAN POTASSIUM 100 MG/1
100 TABLET ORAL DAILY
Qty: 30 TABLET | Refills: 0 | OUTPATIENT
Start: 2025-06-16

## 2025-06-23 ENCOUNTER — OFFICE VISIT (OUTPATIENT)
Dept: PRIMARY CARE CLINIC | Age: 50
End: 2025-06-23
Payer: MEDICARE

## 2025-06-23 VITALS
OXYGEN SATURATION: 96 % | BODY MASS INDEX: 50.02 KG/M2 | HEART RATE: 93 BPM | DIASTOLIC BLOOD PRESSURE: 100 MMHG | WEIGHT: 293 LBS | SYSTOLIC BLOOD PRESSURE: 148 MMHG | HEIGHT: 64 IN

## 2025-06-23 DIAGNOSIS — I89.0 LYMPHEDEMA DUE TO VENOUS INSUFFICIENCY: ICD-10-CM

## 2025-06-23 DIAGNOSIS — E66.813 CLASS 3 SEVERE OBESITY WITH SERIOUS COMORBIDITY AND BODY MASS INDEX (BMI) OF 50.0 TO 59.9 IN ADULT, UNSPECIFIED OBESITY TYPE (HCC): ICD-10-CM

## 2025-06-23 DIAGNOSIS — Z00.00 WELCOME TO MEDICARE PREVENTIVE VISIT: Primary | ICD-10-CM

## 2025-06-23 DIAGNOSIS — I87.2 LYMPHEDEMA DUE TO VENOUS INSUFFICIENCY: ICD-10-CM

## 2025-06-23 DIAGNOSIS — R63.5 WEIGHT GAIN STATUS POST GASTRIC BYPASS: ICD-10-CM

## 2025-06-23 DIAGNOSIS — G43.E01 CHRONIC MIGRAINE WITH AURA AND WITH STATUS MIGRAINOSUS, NOT INTRACTABLE: ICD-10-CM

## 2025-06-23 DIAGNOSIS — Z12.31 SCREENING MAMMOGRAM FOR BREAST CANCER: ICD-10-CM

## 2025-06-23 DIAGNOSIS — Z12.11 SCREEN FOR COLON CANCER: ICD-10-CM

## 2025-06-23 DIAGNOSIS — R53.83 FATIGUE, UNSPECIFIED TYPE: ICD-10-CM

## 2025-06-23 DIAGNOSIS — I10 PRIMARY HYPERTENSION: ICD-10-CM

## 2025-06-23 DIAGNOSIS — Z98.84 WEIGHT GAIN STATUS POST GASTRIC BYPASS: ICD-10-CM

## 2025-06-23 DIAGNOSIS — F33.9 RECURRENT MAJOR DEPRESSION RESISTANT TO TREATMENT: ICD-10-CM

## 2025-06-23 PROCEDURE — 3080F DIAST BP >= 90 MM HG: CPT | Performed by: NURSE PRACTITIONER

## 2025-06-23 PROCEDURE — 3077F SYST BP >= 140 MM HG: CPT | Performed by: NURSE PRACTITIONER

## 2025-06-23 PROCEDURE — G0402 INITIAL PREVENTIVE EXAM: HCPCS | Performed by: NURSE PRACTITIONER

## 2025-06-23 ASSESSMENT — PATIENT HEALTH QUESTIONNAIRE - PHQ9
7. TROUBLE CONCENTRATING ON THINGS, SUCH AS READING THE NEWSPAPER OR WATCHING TELEVISION: NEARLY EVERY DAY
SUM OF ALL RESPONSES TO PHQ QUESTIONS 1-9: 17
SUM OF ALL RESPONSES TO PHQ QUESTIONS 1-9: 17
3. TROUBLE FALLING OR STAYING ASLEEP: MORE THAN HALF THE DAYS
10. IF YOU CHECKED OFF ANY PROBLEMS, HOW DIFFICULT HAVE THESE PROBLEMS MADE IT FOR YOU TO DO YOUR WORK, TAKE CARE OF THINGS AT HOME, OR GET ALONG WITH OTHER PEOPLE: VERY DIFFICULT
1. LITTLE INTEREST OR PLEASURE IN DOING THINGS: MORE THAN HALF THE DAYS
4. FEELING TIRED OR HAVING LITTLE ENERGY: NEARLY EVERY DAY
SUM OF ALL RESPONSES TO PHQ QUESTIONS 1-9: 17
SUM OF ALL RESPONSES TO PHQ QUESTIONS 1-9: 17
5. POOR APPETITE OR OVEREATING: SEVERAL DAYS
6. FEELING BAD ABOUT YOURSELF - OR THAT YOU ARE A FAILURE OR HAVE LET YOURSELF OR YOUR FAMILY DOWN: NEARLY EVERY DAY
8. MOVING OR SPEAKING SO SLOWLY THAT OTHER PEOPLE COULD HAVE NOTICED. OR THE OPPOSITE, BEING SO FIGETY OR RESTLESS THAT YOU HAVE BEEN MOVING AROUND A LOT MORE THAN USUAL: NOT AT ALL
9. THOUGHTS THAT YOU WOULD BE BETTER OFF DEAD, OR OF HURTING YOURSELF: NOT AT ALL
2. FEELING DOWN, DEPRESSED OR HOPELESS: NEARLY EVERY DAY

## 2025-07-07 ENCOUNTER — TELEPHONE (OUTPATIENT)
Dept: PRIMARY CARE CLINIC | Age: 50
End: 2025-07-07

## 2025-07-07 DIAGNOSIS — I87.2 CHRONIC VENOUS INSUFFICIENCY OF LOWER EXTREMITY: ICD-10-CM

## 2025-07-07 DIAGNOSIS — M79.605 LEG PAIN, BILATERAL: ICD-10-CM

## 2025-07-07 DIAGNOSIS — M79.604 LEG PAIN, BILATERAL: ICD-10-CM

## 2025-07-07 DIAGNOSIS — I87.1 MAY-THURNER SYNDROME: ICD-10-CM

## 2025-07-07 NOTE — TELEPHONE ENCOUNTER
Patient called in stating that she lost her handicap script. Please place another and patient will pick.

## 2025-07-15 ENCOUNTER — OFFICE VISIT (OUTPATIENT)
Dept: NEUROLOGY | Age: 50
End: 2025-07-15
Payer: MEDICARE

## 2025-07-15 VITALS — WEIGHT: 289.8 LBS | HEIGHT: 64 IN | BODY MASS INDEX: 49.47 KG/M2

## 2025-07-15 DIAGNOSIS — G44.021 INTRACTABLE CHRONIC CLUSTER HEADACHE: ICD-10-CM

## 2025-07-15 DIAGNOSIS — M34.9 SCLERODERMA (HCC): ICD-10-CM

## 2025-07-15 DIAGNOSIS — G47.33 OBSTRUCTIVE SLEEP APNEA (ADULT) (PEDIATRIC): ICD-10-CM

## 2025-07-15 DIAGNOSIS — M54.81 BILATERAL OCCIPITAL NEURALGIA: Primary | ICD-10-CM

## 2025-07-15 PROCEDURE — G8417 CALC BMI ABV UP PARAM F/U: HCPCS | Performed by: STUDENT IN AN ORGANIZED HEALTH CARE EDUCATION/TRAINING PROGRAM

## 2025-07-15 PROCEDURE — G8427 DOCREV CUR MEDS BY ELIG CLIN: HCPCS | Performed by: STUDENT IN AN ORGANIZED HEALTH CARE EDUCATION/TRAINING PROGRAM

## 2025-07-15 PROCEDURE — 1036F TOBACCO NON-USER: CPT | Performed by: STUDENT IN AN ORGANIZED HEALTH CARE EDUCATION/TRAINING PROGRAM

## 2025-07-15 PROCEDURE — 99214 OFFICE O/P EST MOD 30 MIN: CPT | Performed by: STUDENT IN AN ORGANIZED HEALTH CARE EDUCATION/TRAINING PROGRAM

## 2025-07-15 RX ORDER — BUPROPION HYDROCHLORIDE 100 MG/1
100 TABLET, EXTENDED RELEASE ORAL EVERY MORNING
COMMUNITY

## 2025-07-15 RX ORDER — SUMATRIPTAN 6 MG/.5ML
6 INJECTION, SOLUTION SUBCUTANEOUS
Qty: 0.5 DEVICE | Refills: 3 | Status: SHIPPED | OUTPATIENT
Start: 2025-07-15

## 2025-07-15 RX ORDER — PREDNISONE 20 MG/1
TABLET ORAL
Qty: 18 TABLET | Refills: 0 | Status: SHIPPED | OUTPATIENT
Start: 2025-07-15

## 2025-07-15 RX ORDER — SUMATRIPTAN 50 MG/1
50 TABLET, FILM COATED ORAL
Qty: 9 TABLET | Refills: 11 | Status: SHIPPED | OUTPATIENT
Start: 2025-07-15

## 2025-07-15 NOTE — PROGRESS NOTES
facial symmetry                                                             VIII - intact hearing                                                                             IX, X - symmetrical palate                                                                  XI - symmetrical shoulder shrug                                                       XII - midline tongue without atrophy or fasciculation     Motor function  Normal muscle bulk and tone  Muscle strength: normal power 5/5  fine motor movements     Sensory function Intact to touch, pin prick, vibration, proprioception in bilateral upper and lower extremities.      Cerebellar Intact fine motor movement. No involuntary movements or tremors     Reflex function Intact 2+ DTR and symmetric. Negative Babinski     Gait                  Normal station and gait             ASSESSMENT       Summary:  Lisa K Cogan is a 49 y.o. female with a history of bilateral occipital neurology and questionable IIH who presents for a follow up visit. Pt presents for a follow up visit, she's had two ON injections since she was last seen in clinic with significant reduction in her sx. She reports utilizing Imitrex two per month on average. Sometimes takes tylenol up to twice per week. Has been seen by ophthalmology and report no e/o papilledema. Recently started Ozempic has had 8 pounds of weight loss. Diagnosed with mild RAIN, has not started CPAP yet.     1) Bilateral occipital neuralgia   2) Mild RAIN  3) Obesity       PLAN:     Recommendations:  Continue ON blocks  Imitrex 50 mg PRN for severe migraines   Limit NSAID use-- educated on risks of MOH (medication overuse headaches)  Patient to continue working on CPAP insurance difficulties   Start Elavil 25mg qHs for sleep aide in addition to headache preventative   Sumatriptan refilled   Provided script for brief prednisone course for recent breakthrough headaches  Prednisone PO 60mg qDay x3days   Followed by 40mg qDay x3days

## 2025-07-24 ENCOUNTER — OFFICE VISIT (OUTPATIENT)
Dept: NEUROLOGY | Age: 50
End: 2025-07-24

## 2025-07-24 ENCOUNTER — OFFICE VISIT (OUTPATIENT)
Dept: NEUROLOGY | Age: 50
End: 2025-07-24
Payer: MEDICARE

## 2025-07-24 VITALS
HEART RATE: 83 BPM | DIASTOLIC BLOOD PRESSURE: 99 MMHG | WEIGHT: 289 LBS | SYSTOLIC BLOOD PRESSURE: 158 MMHG | BODY MASS INDEX: 51.21 KG/M2 | HEIGHT: 63 IN

## 2025-07-24 DIAGNOSIS — R79.89 ELEVATED FERRITIN: ICD-10-CM

## 2025-07-24 DIAGNOSIS — E66.813 CLASS 3 SEVERE OBESITY WITH SERIOUS COMORBIDITY AND BODY MASS INDEX (BMI) OF 50.0 TO 59.9 IN ADULT, UNSPECIFIED OBESITY TYPE (HCC): ICD-10-CM

## 2025-07-24 DIAGNOSIS — Z98.84 HISTORY OF BARIATRIC SURGERY: ICD-10-CM

## 2025-07-24 DIAGNOSIS — G43.119 INTRACTABLE MIGRAINE WITH AURA WITHOUT STATUS MIGRAINOSUS: Primary | ICD-10-CM

## 2025-07-24 DIAGNOSIS — M54.81 BILATERAL OCCIPITAL NEURALGIA: ICD-10-CM

## 2025-07-24 DIAGNOSIS — M34.9 SCLERODERMA (HCC): ICD-10-CM

## 2025-07-24 DIAGNOSIS — K90.9 MALABSORPTION OF IRON: ICD-10-CM

## 2025-07-24 PROCEDURE — G8427 DOCREV CUR MEDS BY ELIG CLIN: HCPCS | Performed by: PSYCHIATRY & NEUROLOGY

## 2025-07-24 PROCEDURE — 1036F TOBACCO NON-USER: CPT | Performed by: PSYCHIATRY & NEUROLOGY

## 2025-07-24 PROCEDURE — G8417 CALC BMI ABV UP PARAM F/U: HCPCS | Performed by: PSYCHIATRY & NEUROLOGY

## 2025-07-24 PROCEDURE — 96372 THER/PROPH/DIAG INJ SC/IM: CPT | Performed by: PSYCHIATRY & NEUROLOGY

## 2025-07-24 RX ORDER — LOSARTAN POTASSIUM 100 MG/1
100 TABLET ORAL DAILY
Qty: 30 TABLET | Refills: 0 | OUTPATIENT
Start: 2025-07-24

## 2025-07-24 RX ORDER — METHYLPREDNISOLONE SODIUM SUCCINATE 40 MG/ML
40 INJECTION INTRAMUSCULAR; INTRAVENOUS ONCE
Status: COMPLETED | OUTPATIENT
Start: 2025-07-24 | End: 2025-07-24

## 2025-07-24 RX ORDER — LOSARTAN POTASSIUM 100 MG/1
100 TABLET ORAL DAILY
Qty: 30 TABLET | Refills: 0 | Status: SHIPPED | OUTPATIENT
Start: 2025-07-24

## 2025-07-24 RX ADMIN — METHYLPREDNISOLONE SODIUM SUCCINATE 40 MG: 40 INJECTION INTRAMUSCULAR; INTRAVENOUS at 10:38

## 2025-07-24 NOTE — PROGRESS NOTES
release tablet Take 1 tablet by mouth every morning      SUMAtriptan (IMITREX) 6 MG/0.5ML SOLN injection Inject 0.5 mLs into the skin once as needed for Migraine 0.5 Device 3    SUMAtriptan (IMITREX) 50 MG tablet Take 1 tablet by mouth once as needed for Migraine (take for severe unremitting migaraines, do not take more than tablets within a 24 hour window) 9 tablet 11    Syringe, Disposable, 1 ML MISC For use with Imitrex injection 100 Device 1    predniSONE (DELTASONE) 20 MG tablet Take 3 pills by mouth once daily for Three days  Take 2 pills by mouth once daily for Three days   Take 1 pill by mouth once daily for 3 days then stop. 18 tablet 0    Misc. Devices MISC Provide handicap placard, expires 5 years from this date 1/23/2030  Medical conditions prevent the ability to walk long distances 1 each 0    Semaglutide, 1 MG/DOSE, (OZEMPIC) 4 MG/3ML SOPN sc injection Inject 1 mg into the skin every 7 days 3 mL 0    vibegron (GEMTESA) 75 MG TABS tablet Take 1 tablet by mouth daily 30 tablet 11    Ascorbic Acid (VITAMIN C) 250 MG tablet Take 1 tablet by mouth daily 90 tablet 1    ferrous sulfate (IRON 325) 325 (65 Fe) MG tablet Take 1 tablet by mouth three times a week (Patient not taking: Reported on 7/24/2025) 90 tablet 1    vitamin B-12 (CYANOCOBALAMIN) 1000 MCG tablet Take 1 tablet by mouth daily 90 tablet 1    Misc. Devices MISC Provide wrist BP cuff (Patient not taking: Reported on 7/24/2025) 1 each 0    ondansetron (ZOFRAN-ODT) 4 MG disintegrating tablet Take 1 tablet by mouth 3 times daily as needed for Nausea or Vomiting 60 tablet 0    B-D 3CC LUER-VANI SYR 20GX1\" 20G X 1\" 3 ML MISC USE WITH IMITREX INJECTIONS AS DIRECTED      BD DISP NEEDLES 25G X 5/8\" MISC USE WITH IMITREX INJECTIONS      Multiple Vitamin (MULTIVITAMIN) TABS Take 1 tablet by mouth daily 90 tablet 3    NEEDLE, DISP, 20 G 20G X 1\" MISC For use with Imitrex injection 4 each 1    NEEDLE, DISP, 22 G 22G X 3/4\" MISC For use with Imitrex injection 4

## 2025-07-24 NOTE — PROGRESS NOTES
including relaxing in dark and quiet room and using darker shade sunglasses; applying hot or cold compresses to the head and neck and applying pressure to the scalp muscles and temples; small amounts of caffeinated beverages and also establishing regular sleep hours and also to not to skip meals, etc.  She was also advised to avoid certain foods known to trigger migraine such as aged cheese, chocolate, etc.  She is an established patient with my colleague, Dr. Logan and office will make an appointment with him for occipital nerve blocks.      Total time spent for this encounter: not billed by time  This note was partially created using voice recognition software and is inherently subject to errors including those of syntax and \"sound alike\" substitutions which may escape proofreading.  In such instances, original meaning may be extrapolated by contextual derivation.

## 2025-07-25 ENCOUNTER — TELEPHONE (OUTPATIENT)
Dept: PRIMARY CARE CLINIC | Age: 50
End: 2025-07-25

## 2025-08-02 DIAGNOSIS — K90.9 MALABSORPTION OF IRON: ICD-10-CM

## 2025-08-02 DIAGNOSIS — Z98.84 HISTORY OF BARIATRIC SURGERY: ICD-10-CM

## 2025-08-02 DIAGNOSIS — R79.89 ELEVATED FERRITIN: ICD-10-CM

## 2025-08-04 RX ORDER — LOSARTAN POTASSIUM 100 MG/1
100 TABLET ORAL DAILY
Qty: 90 TABLET | Refills: 3 | Status: SHIPPED | OUTPATIENT
Start: 2025-08-04 | End: 2025-08-07 | Stop reason: SDUPTHER

## 2025-08-06 DIAGNOSIS — E66.813 CLASS 3 SEVERE OBESITY WITH SERIOUS COMORBIDITY AND BODY MASS INDEX (BMI) OF 50.0 TO 59.9 IN ADULT, UNSPECIFIED OBESITY TYPE (HCC): ICD-10-CM

## 2025-08-07 DIAGNOSIS — Z98.84 HISTORY OF BARIATRIC SURGERY: ICD-10-CM

## 2025-08-07 DIAGNOSIS — R79.89 ELEVATED FERRITIN: ICD-10-CM

## 2025-08-07 DIAGNOSIS — K90.9 MALABSORPTION OF IRON: ICD-10-CM

## 2025-08-07 RX ORDER — LOSARTAN POTASSIUM 100 MG/1
100 TABLET ORAL DAILY
Qty: 90 TABLET | Refills: 3 | Status: SHIPPED | OUTPATIENT
Start: 2025-08-07

## 2025-08-28 RX ORDER — ONDANSETRON 4 MG/1
4 TABLET, ORALLY DISINTEGRATING ORAL 3 TIMES DAILY PRN
Qty: 60 TABLET | Refills: 2 | Status: SHIPPED | OUTPATIENT
Start: 2025-08-28

## (undated) DEVICE — GOWN,SIRUS,NONRNF,SETINSLV,XL,20/CS: Brand: MEDLINE

## (undated) DEVICE — SUTURE MONOCRYL + SZ 4-0 L27IN ABSRB UD L19MM PS-2 3/8 CIR MCP426H

## (undated) DEVICE — CANNULA SEAL

## (undated) DEVICE — Z INACTIVE USE 2660664 SOLUTION IRRIG 3000ML 0.9% SOD CHL USP UROMATIC PLAS CONT

## (undated) DEVICE — SINGLE ACTION PUMPING SYSTEM

## (undated) DEVICE — APPLICATOR MEDICATED 26 CC SOLUTION HI LT ORNG CHLORAPREP

## (undated) DEVICE — SUTURE VCRL SZ 1 L36IN ABSRB UD L36MM CT-1 1/2 CIR J947H

## (undated) DEVICE — GLOVE ORANGE PI 8 1/2   MSG9085

## (undated) DEVICE — GLOVE SURG SZ 65 THK91MIL LTX FREE SYN POLYISOPRENE

## (undated) DEVICE — 3M™ IOBAN™ 2 ANTIMICROBIAL INCISE DRAPE 6650EZ: Brand: IOBAN™ 2

## (undated) DEVICE — SVMMC GYN ROBOTIC PK

## (undated) DEVICE — GLOVE ORANGE PI 7   MSG9070

## (undated) DEVICE — AGENT HEMSTAT 3GM OXIDIZED REGENERATED CELOS ABSRB FOR CONT (ORDER MULTIPLES OF 5EA)

## (undated) DEVICE — MASTISOL ADHESIVE LIQ 2/3ML

## (undated) DEVICE — COUNTER NDL 10 COUNT HLD 20 FOAM BLK SGL MAG

## (undated) DEVICE — ARM DRAPE

## (undated) DEVICE — SPONGE LAP W18XL18IN WHT COT 4 PLY FLD STRUNG RADPQ DISP ST

## (undated) DEVICE — SOLUTION SCRB 4OZ 4% CHG H2O AIDED FOR PREOPERATIVE SKIN

## (undated) DEVICE — AIRSEAL 12 MM ACCESS PORT AND PALM GRIP OBTURATOR WITH BLADELESS OPTICAL TIP, 120 MM LENGTH: Brand: AIRSEAL

## (undated) DEVICE — DRESSING TRNSPAR W5XL4.5IN FLM SHT SEMIPERMEABLE WIND

## (undated) DEVICE — CYSTO/BLADDER IRRIGATION SET, REGULATING CLAMP

## (undated) DEVICE — Device

## (undated) DEVICE — SURGICEL ENDOSCP APPL

## (undated) DEVICE — GLOVE ORANGE PI 7 1/2   MSG9075

## (undated) DEVICE — DUAL LUMEN URETERAL CATHETER

## (undated) DEVICE — MHPB GEN MINOR PACK: Brand: MEDLINE INDUSTRIES, INC.

## (undated) DEVICE — TROCAR: Brand: KII FIOS FIRST ENTRY

## (undated) DEVICE — GLOVE SURG SZ 8 L12IN FNGR THK79MIL GRN LTX FREE

## (undated) DEVICE — DRAPE,REIN 53X77,STERILE: Brand: MEDLINE

## (undated) DEVICE — ELECTRO LUBE IS A SINGLE PATIENT USE DEVICE THAT IS INTENDED TO BE USED ON ELECTROSURGICAL ELECTRODES TO REDUCE STICKING.: Brand: KEY SURGICAL ELECTRO LUBE

## (undated) DEVICE — 3000ML,PRESSURE INFUSER W/STOPCOCK: Brand: MEDLINE

## (undated) DEVICE — STRAP,CATHETER,ELASTIC,HOOK&LOOP: Brand: MEDLINE

## (undated) DEVICE — COVER OR TBL W40XL90IN ABSRB STD AND GRIPPY BK SAHARA

## (undated) DEVICE — SOLUTION IRRIG 1000ML STRL H2O USP PLAS POUR BTL

## (undated) DEVICE — GUIDEWIRE URO L150CM DIA0.035IN STIFF NIT HYDRPHLC STR TIP

## (undated) DEVICE — Device: Brand: UTERINE ELEVATOR PRO

## (undated) DEVICE — SUTURE VICRYL + SZ 3-0 L27IN ABSRB UD L26MM SH 1/2 CIR VCP416H

## (undated) DEVICE — INSUFFLATION NEEDLE TO ESTABLISH PNEUMOPERITONEUM.: Brand: INSUFFLATION NEEDLE

## (undated) DEVICE — SOLUTION IRRIG 3000ML STRL H2O USP UROMATIC PLAS CONT

## (undated) DEVICE — SWABSTICK MEDICATED 10% POVIDONE IOD PVP SGL ANTISEP SAT

## (undated) DEVICE — TOWEL,OR,DSP,ST,NATURAL,DLX,4/PK,20PK/CS: Brand: MEDLINE

## (undated) DEVICE — 3M™ STERI-STRIP™ REINFORCED ADHESIVE SKIN CLOSURES, R1546, 1/4 IN X 4 IN (6 MM X 100 MM), 10 STRIPS/ENVELOPE: Brand: 3M™ STERI-STRIP™

## (undated) DEVICE — C-ARMOR C-ARM EQUIPMENT COVERS CLEAR STERILE UNIVERSAL FIT 12 PER CASE: Brand: C-ARMOR

## (undated) DEVICE — BLUNTFILL: Brand: MONOJECT

## (undated) DEVICE — BLADELESS OBTURATOR: Brand: WECK VISTA

## (undated) DEVICE — PROTECTOR ULN NRV PUR FOAM HK LOOP STRP ANATOMICALLY

## (undated) DEVICE — TRI-LUMEN FILTERED TUBE SET WITH ACTIVATED CHARCOAL FILTER: Brand: AIRSEAL

## (undated) DEVICE — PAD,SANITARY,11 IN,MAXI,W/WINGS,N-STRL: Brand: MEDLINE

## (undated) DEVICE — MARKER,SKIN,WI/RULER AND LABELS: Brand: MEDLINE

## (undated) DEVICE — DEVICE TRCR 12X9X3IN WHT CLSR DISP OMNICLOSE

## (undated) DEVICE — ST CHARLES PERI-GYN PACK: Brand: MEDLINE INDUSTRIES, INC.

## (undated) DEVICE — PAD,NON-ADHERENT,3X8,STERILE,LF,1/PK: Brand: MEDLINE

## (undated) DEVICE — STRAP,POSITIONING,KNEE/BODY,FOAM,4X60": Brand: MEDLINE

## (undated) DEVICE — PACK PROCEDURE SURG CYSTO SVMMC LF

## (undated) DEVICE — TUBING, SUCTION, 9/32" X 20', STRAIGHT: Brand: MEDLINE INDUSTRIES, INC.

## (undated) DEVICE — 40580 - THE PINK PAD - ADVANCED TRENDELENBURG POSITIONING KIT: Brand: 40580 - THE PINK PAD - ADVANCED TRENDELENBURG POSITIONING KIT

## (undated) DEVICE — SOLUTION IRRIG 1000ML 0.9% SOD CHL USP POUR PLAS BTL

## (undated) DEVICE — KIT DRN FLAT W/ 100CC EVAC 7MM FULL PERF

## (undated) DEVICE — NEPTUNE E-SEP SMOKE EVACUATION PENCIL, COATED, 70MM BLADE, PUSH BUTTON SWITCH: Brand: NEPTUNE E-SEP

## (undated) DEVICE — VESSEL SEALER EXTEND: Brand: ENDOWRIST

## (undated) DEVICE — GOWN,AURORA,NONREINFORCED,LARGE: Brand: MEDLINE

## (undated) DEVICE — PROGRAMMER SMART COMM W/HANDSET F/SACRAL NRVE STIMULATORS

## (undated) DEVICE — MHPB CYSTO PACK-LF: Brand: MEDLINE INDUSTRIES, INC.

## (undated) DEVICE — FLEXI-TIP, DUAL LUMEN URETERAL ACCESS CATHETER: Brand: FLEXI-TIP

## (undated) DEVICE — SYRINGE, LUER LOCK, 10ML: Brand: MEDLINE

## (undated) DEVICE — YANKAUER,FLEXIBLE HANDLE,REGLR CAPACITY: Brand: MEDLINE INDUSTRIES, INC.

## (undated) DEVICE — COVER,LIGHT HANDLE,FLX,2/PK: Brand: MEDLINE INDUSTRIES, INC.

## (undated) DEVICE — NEEDLE SPINAL 22GA L3.5IN SPINOCAN

## (undated) DEVICE — DRAPE SURG W41XL74IN CLR FULL SZ C ARM 3 ADH POLY STRP E

## (undated) DEVICE — 1010 S-DRAPE TOWEL DRAPE 10/BX: Brand: STERI-DRAPE™

## (undated) DEVICE — TRAP,MUCUS SPECIMEN, 80CC: Brand: MEDLINE

## (undated) DEVICE — GLOVE ORANGE PI 8   MSG9080

## (undated) DEVICE — LIQUIBAND RAPID ADHESIVE 36/CS 0.8ML: Brand: MEDLINE

## (undated) DEVICE — GOWN,SURGICAL,AURORA,SLEEVE: Brand: MEDLINE

## (undated) DEVICE — STRIP SKIN CLSR W0.25XL4IN WHT SPUNBOUND FBR NYL HI ADH

## (undated) DEVICE — GAUZE,SPONGE,4"X4",16PLY,STRL,LF,10/TRAY: Brand: MEDLINE

## (undated) DEVICE — UNDERPANTS MAT L XL SEAMLESS CLR CODE WAISTBAND KNIT

## (undated) DEVICE — GLOVE SURG SZ 6 THK91MIL LTX FREE SYN POLYISOPRENE ANTI

## (undated) DEVICE — Device: Brand: SINGLE USE INJECTOR NM-221C-0427

## (undated) DEVICE — NEUROSTIMULATOR EXT SM LTWT SGL BTTN H2O RESIST WIRELESS

## (undated) DEVICE — ELECTRODE PT RET AD L9FT HI MOIST COND ADH HYDRGEL CORDED

## (undated) DEVICE — CONTAINER,SPECIMEN,4OZ,OR STRL: Brand: MEDLINE

## (undated) DEVICE — DRESSING TRNSPAR W8XL12IN FLM SURESITE 123

## (undated) DEVICE — SYRINGE MED 10ML TRNSLUC BRL PLUNG BLK MRK POLYPR CTRL

## (undated) DEVICE — APPLIER CLP M L L11.4IN DIA10MM ENDOSCP ROT MULT FOR LIG

## (undated) DEVICE — BLADELESS OBTURATOR, LONG: Brand: WECK VISTA

## (undated) DEVICE — SYRINGE MED 5ML STD CLR PLAS LUERLOCK TIP N CTRL DISP

## (undated) DEVICE — SINGLE PORT MANIFOLD: Brand: NEPTUNE 2

## (undated) DEVICE — SYSTEM FLD COLL W/ FLX DRP SUPP AND DISP BG

## (undated) DEVICE — TIP COVER ACCESSORY

## (undated) DEVICE — PLUMEPORT SEO LAPAROSCOPIC SMOKE FILTRATION DEVICE: Brand: PLUMEPORT

## (undated) DEVICE — BLANKET WRM W29.9XL79.1IN UP BODY FORC AIR MISTRAL-AIR

## (undated) DEVICE — TOTAL TRAY, 16FR 10ML SIL FOLEY, URN: Brand: MEDLINE

## (undated) DEVICE — 3M™ STERI-STRIP™ COMPOUND BENZOIN TINCTURE 40 BAGS/CARTON 4 CARTONS/CASE C1544: Brand: 3M™ STERI-STRIP™

## (undated) DEVICE — SOLUTION ANTIFOG VIS SYS CLEARIFY LAPSCP

## (undated) DEVICE — DEVICE TISS REM IU CANSTR VAC TB FT PEDAL DISPOSABLE MYOSURE

## (undated) DEVICE — SUTURE PROL SZ 3-0 L30IN NONABSORBABLE BLU L26MM SH 1/2 CIR 8832H

## (undated) DEVICE — STIMULATOR NERVE 4.32 MM PERC EXTN KT INTERSTIM QUAD

## (undated) DEVICE — SINGLE-USE DIGITAL FLEXIBLE URETEROSCOPE: Brand: LITHOVUE

## (undated) DEVICE — NEEDLE HYPO 25GA L1.5IN BLU POLYPR HUB S STL REG BVL STR

## (undated) DEVICE — SYRINGE,EAR/ULCER, 2 OZ, STERILE: Brand: MEDLINE

## (undated) DEVICE — ADHESIVE SKIN CLOSURE TOP 36 CC HI VISC DERMBND MINI

## (undated) DEVICE — RENTAL LASER HOLMIUM LOW WATTAGE CASE

## (undated) DEVICE — ADAPTER URO SCP UROLOK LL